# Patient Record
Sex: FEMALE | Race: WHITE | NOT HISPANIC OR LATINO | Employment: OTHER | URBAN - METROPOLITAN AREA
[De-identification: names, ages, dates, MRNs, and addresses within clinical notes are randomized per-mention and may not be internally consistent; named-entity substitution may affect disease eponyms.]

---

## 2017-01-26 ENCOUNTER — OFFICE VISIT (OUTPATIENT)
Dept: NEUROSURGERY | Facility: CLINIC | Age: 57
End: 2017-01-26

## 2017-01-26 VITALS
BODY MASS INDEX: 31.02 KG/M2 | DIASTOLIC BLOOD PRESSURE: 88 MMHG | SYSTOLIC BLOOD PRESSURE: 182 MMHG | HEART RATE: 100 BPM | WEIGHT: 193 LBS | HEIGHT: 66 IN

## 2017-01-26 DIAGNOSIS — M54.12 CERVICAL RADICULITIS: Primary | ICD-10-CM

## 2017-01-26 PROCEDURE — 99243 OFF/OP CNSLTJ NEW/EST LOW 30: CPT | Performed by: NEUROLOGICAL SURGERY

## 2017-01-26 RX ORDER — BUTALBITAL, ACETAMINOPHEN AND CAFFEINE 50; 325; 40 MG/1; MG/1; MG/1
1 CAPSULE ORAL
COMMUNITY
Start: 2016-02-26 | End: 2017-02-09

## 2017-01-26 RX ORDER — NAPROXEN 500 MG/1
500 TABLET ORAL
COMMUNITY
Start: 2014-10-10 | End: 2017-07-03

## 2017-01-26 RX ORDER — MELOXICAM 15 MG/1
15 TABLET ORAL
COMMUNITY
End: 2017-02-09

## 2017-01-26 NOTE — LETTER
January 26, 2017     DAYNE Ospina  [address]  [facility] [address]    Patient: Kristina Yates   YOB: 1960   Date of Visit: 1/26/2017       Dear DAYNE Connolly:    Thank you for referring Kristina Yates to me for evaluation. Below are the relevant portions of my assessment and plan of care.      Independent Review of Radiographic Studies:      I reviewed an MRI of her cervical spine done on November 23 of last year.  This looks okay at C2 3.  There is a little foraminal narrowing at C3 4 but more significant foraminal narrowing with a possible left-sided disc extrusion into the neural foramen at C4 5.  C5 6 and C6 7 are widely patent and appeared to be solidly fused.  C7-T1 looks okay overall.    Medical Decision Making:      I told the patient about the MRI.  I told her I did not see any imaging of the lumbar spine on the disc.  I told her that from my point of view the disc at C4 5 and may be causing some of her problems but that nerve does not go all the way down her arm and is not huge.  I think that we should really try some further nonsurgical treatment before we talk about adding a third fusion level in her neck.  I recommended some cervical epidural blocks and some cervical PT.  I will have her call me after that has been done and if she is still having problems we will get her back into the office to discuss a possible myelogram.    I counseled this patient with regard to weight loss.  We gave him a handout on exercise for weight loss and I told the patient to talk to their family doctor about a proper diet.    I counseled the patient with regard to smoking cessation.  We gave the patient a handout about how to quit.    Kristina was seen today for neck pain and back pain.    Diagnoses and all orders for this visit:    Cervical radiculitis  -     Epidural Block  -     Ambulatory Referral to Physical Therapy    Return for Recheck and call after treatment or consultation.                         If you have questions, please do not hesitate to call me. I look forward to following Kristina along with you.         Sincerely,        Radu Renteria MD        CC: No Recipients

## 2017-01-26 NOTE — PATIENT INSTRUCTIONS

## 2017-01-26 NOTE — PROGRESS NOTES
Subjective   Patient ID: Kristina Yates is a 56 y.o. female is being seen for consultation today at the request of DAYNE Ospina for neck and back pain with left arm and hand pain.     History of Present Illness    This patient has been having pain in her left neck and into her left shoulder and left arm for about 5 months.  She says the pain is sharp and stabbing and began without a particular incident.  It is located in the left side of the neck and radiates into the left shoulder.  It then radiates down the entire left arm and into the left wrist.  She does not have any trouble with her right arm at all.  She had a two-level anterior cervical discectomy and fusion in 2003.  She has no difficulty with bowel or bladder control or other associated symptoms.  She has some lower back pain as well.  Activity makes the pain worse.    The following portions of the patient's history were reviewed and updated as appropriate: allergies, current medications, past family history, past medical history, past social history, past surgical history and problem list.    Review of Systems   Constitutional: Positive for activity change and fatigue.   Respiratory: Negative for chest tightness and shortness of breath.    Cardiovascular: Negative for chest pain.   Gastrointestinal: Positive for diarrhea.   Musculoskeletal: Positive for arthralgias, back pain, myalgias, neck pain and neck stiffness.   Neurological: Positive for numbness and headaches.        Bladder incontinence, Tingling   Psychiatric/Behavioral: Positive for sleep disturbance.   All other systems reviewed and are negative.      Objective   Physical Exam   Constitutional: She is oriented to person, place, and time. She appears well-developed and well-nourished.   HENT:   Head: Normocephalic and atraumatic.   Eyes: Conjunctivae and EOM are normal. Pupils are equal, round, and reactive to light.   Fundoscopic exam:       The right eye shows no papilledema. The right  eye shows venous pulsations.        The left eye shows no papilledema. The left eye shows venous pulsations.   Neck: Carotid bruit is not present.   Neurological: She is oriented to person, place, and time. She has a normal Finger-Nose-Finger Test and a normal Heel to Shin Test. Gait normal.   Reflex Scores:       Tricep reflexes are 2+ on the right side and 2+ on the left side.       Bicep reflexes are 2+ on the right side and 2+ on the left side.       Brachioradialis reflexes are 2+ on the right side and 2+ on the left side.       Patellar reflexes are 2+ on the right side and 2+ on the left side.       Achilles reflexes are 2+ on the right side and 2+ on the left side.  Psychiatric: Her speech is normal.     Neurologic Exam     Mental Status   Oriented to person, place, and time.   Registration of memory: Good recent and remote memory.   Attention: normal. Concentration: normal.   Speech: speech is normal   Level of consciousness: alert  Knowledge: consistent with education.     Cranial Nerves     CN II   Visual fields full to confrontation.   Visual acuity: normal    CN III, IV, VI   Pupils are equal, round, and reactive to light.  Extraocular motions are normal.     CN V   Facial sensation intact.   Right corneal reflex: normal  Left corneal reflex: normal    CN VII   Facial expression full, symmetric.   Right facial weakness: none  Left facial weakness: none    CN VIII   Hearing: intact    CN IX, X   Palate: symmetric    CN XI   Right sternocleidomastoid strength: normal  Left sternocleidomastoid strength: normal    CN XII   Tongue: not atrophic  Tongue deviation: none    Motor Exam   Muscle bulk: normal  Right arm tone: normal  Left arm tone: normal  Right leg tone: normal  Left leg tone: normal    Strength   Strength 5/5 except as noted.     Sensory Exam   Light touch normal.     Gait, Coordination, and Reflexes     Gait  Gait: normal    Coordination   Finger to nose coordination: normal  Heel to shin  coordination: normal    Reflexes   Right brachioradialis: 2+  Left brachioradialis: 2+  Right biceps: 2+  Left biceps: 2+  Right triceps: 2+  Left triceps: 2+  Right patellar: 2+  Left patellar: 2+  Right achilles: 2+  Left achilles: 2+  Right : 2+  Left : 2+      Assessment/Plan   Independent Review of Radiographic Studies:      I reviewed an MRI of her cervical spine done on November 23 of last year.  This looks okay at C2 3.  There is a little foraminal narrowing at C3 4 but more significant foraminal narrowing with a possible left-sided disc extrusion into the neural foramen at C4 5.  C5 6 and C6 7 are widely patent and appeared to be solidly fused.  C7-T1 looks okay overall.    Medical Decision Making:      I told the patient about the MRI.  I told her I did not see any imaging of the lumbar spine on the disc.  I told her that from my point of view the disc at C4 5 and may be causing some of her problems but that nerve does not go all the way down her arm and is not huge.  I think that we should really try some further nonsurgical treatment before we talk about adding a third fusion level in her neck.  I recommended some cervical epidural blocks and some cervical PT.  I will have her call me after that has been done and if she is still having problems we will get her back into the office to discuss a possible myelogram.    I counseled this patient with regard to weight loss.  We gave him a handout on exercise for weight loss and I told the patient to talk to their family doctor about a proper diet.    I counseled the patient with regard to smoking cessation.  We gave the patient a handout about how to quit.    Kristina was seen today for neck pain and back pain.    Diagnoses and all orders for this visit:    Cervical radiculitis  -     Epidural Block  -     Ambulatory Referral to Physical Therapy    Return for Recheck and call after treatment or consultation.

## 2017-01-26 NOTE — MR AVS SNAPSHOT
[facility] NEUROSURGERY  [phone]                    Kristina Yates   1/26/2017 12:00 PM   Office Visit    Dept Phone:  [phone]   Encounter #:  [id number]    Provider:  Radu Renteria MD   Department:  [facility] NEUROSURGERY                Your Full Care Plan              Your Updated Medication List          This list is accurate as of: 1/26/17 11:55 AM.  Always use your most recent med list.                buPROPion  MG 12 hr tablet   Commonly known as:  WELLBUTRIN SR       Butalbital-APAP-Caffeine -40 MG per capsule       CYMBALTA PO       gabapentin 100 MG capsule   Commonly known as:  NEURONTIN       lithium carbonate 300 MG capsule       meloxicam 15 MG tablet   Commonly known as:  MOBIC       MUCINEX ALLERGY PO       NAPROSYN 500 MG tablet   Generic drug:  naproxen       PROAIR HFA IN       promethazine 12.5 MG tablet   Commonly known as:  PHENERGAN   Take 1 tablet by mouth Every 8 (Eight) Hours As Needed for nausea.       QUEtiapine 100 MG tablet   Commonly known as:  SEROquel       TOPAMAX PO               We Performed the Following     Ambulatory Referral to Physical Therapy     Epidural Block       You Were Diagnosed With        Codes Comments    Cervical radiculitis    -  Primary ICD-10-CM: M54.12  ICD-9-CM: 723.4       Instructions    Exercising to Lose Weight  Exercising can help you to lose weight. In order to lose weight through exercise, you need to do vigorous-intensity exercise. You can tell that you are exercising with vigorous intensity if you are breathing very hard and fast and cannot hold a conversation while exercising.  Moderate-intensity exercise helps to maintain your current weight. You can tell that you are exercising at a moderate level if you have a higher heart rate and faster breathing, but you are still able to hold a conversation.  HOW OFTEN SHOULD I EXERCISE?  Choose an activity that you enjoy and set realistic goals.  Your health care provider can help you to make an activity plan that works for you. Exercise regularly as directed by your health care provider. This may include:  · Doing resistance training twice each week, such as:    Push-ups.    Sit-ups.    Lifting weights.    Using resistance bands.  · Doing a given intensity of exercise for a given amount of time. Choose from these options:    150 minutes of moderate-intensity exercise every week.    75 minutes of vigorous-intensity exercise every week.    A mix of moderate-intensity and vigorous-intensity exercise every week.  Children, pregnant women, people who are out of shape, people who are overweight, and older adults may need to consult a health care provider for individual recommendations. If you have any sort of medical condition, be sure to consult your health care provider before starting a new exercise program.  WHAT ARE SOME ACTIVITIES THAT CAN HELP ME TO LOSE WEIGHT?   · Walking at a rate of at least 4.5 miles an hour.  · Jogging or running at a rate of 5 miles per hour.  · Biking at a rate of at least 10 miles per hour.  · Lap swimming.  · Roller-skating or in-line skating.  · Cross-country skiing.  · Vigorous competitive sports, such as football, basketball, and soccer.  · Jumping rope.  · Aerobic dancing.  HOW CAN I BE MORE ACTIVE IN MY DAY-TO-DAY ACTIVITIES?  · Use the stairs instead of the elevator.  · Take a walk during your lunch break.  · If you drive, park your car farther away from work or school.  · If you take public transportation, get off one stop early and walk the rest of the way.  · Make all of your phone calls while standing up and walking around.  · Get up, stretch, and walk around every 30 minutes throughout the day.  WHAT GUIDELINES SHOULD I FOLLOW WHILE EXERCISING?  · Do not exercise so much that you hurt yourself, feel dizzy, or get very short of breath.  · Consult your health care provider prior to starting a new exercise  program.  · Wear comfortable clothes and shoes with good support.  · Drink plenty of water while you exercise to prevent dehydration or heat stroke. Body water is lost during exercise and must be replaced.  · Work out until you breathe faster and your heart beats faster.     This information is not intended to replace advice given to you by your health care provider. Make sure you discuss any questions you have with your health care provider.     Document Released: 2012 Document Revised: 2016 Document Reviewed: 2015  [employer] Interactive Patient Education © [employer]       Patient Instructions History      Upcoming Appointments     Visit Type Date Time Department    NEW PATIENT 2017 12:00 PM [facility]      CoinBatch Signup     [facility] CoinBatch allows you to send messages to your doctor, view your test results, renew your prescriptions, schedule appointments, and more. To sign up, go to [employer] and click on the Sign Up Now link in the New User? box. Enter your CoinBatch Activation Code exactly as it appears below along with the last four digits of your Social Security Number and your Date of Birth () to complete the sign-up process. If you do not sign up before the expiration date, you must request a new code.    CoinBatch Activation Code: [email or web address]  Expires: 2017 11:47 AM    If you have questions, you can email [email or web address] or call [phone] to talk to our Coin[employer] staff. Remember, CoinBatch is NOT to be used for urgent needs. For medical emergencies, dial 911.               Other Info from Your Visit           Your Appointments     2017 12:00 PM EST   New Patient with Radu Renteria MD   [facility] NEUROSURGERY (--)    [address]  [facility] [id number] [phone]           Bring all previous medical records and films, along with current medications and insurance information.     "          Allergies     Fluoxetine      Hives    Sulfa Antibiotics      THROAT CLOSES    Zofran [Ondansetron Hcl]      THROAT CLOSES      Reason for Visit     Neck Pain     Back Pain           Vital Signs     Blood Pressure Pulse Height Weight Body Mass Index Smoking Status    182/88 100 66\" (167.6 cm) 193 lb (87.5 kg) 31.15 kg/m2 Current Every Day Smoker      Problems and Diagnoses Noted     Cervical radiculitis        "

## 2017-02-09 ENCOUNTER — TRANSCRIBE ORDERS (OUTPATIENT)
Dept: PAIN MEDICINE | Facility: HOSPITAL | Age: 57
End: 2017-02-09

## 2017-02-09 ENCOUNTER — HOSPITAL ENCOUNTER (OUTPATIENT)
Dept: PAIN MEDICINE | Facility: HOSPITAL | Age: 57
Discharge: HOME | End: 2017-02-09
Attending: NEUROLOGICAL SURGERY | Admitting: NEUROLOGICAL SURGERY

## 2017-02-09 ENCOUNTER — ANESTHESIA (OUTPATIENT)
Dept: PAIN MEDICINE | Facility: HOSPITAL | Age: 57
End: 2017-02-09

## 2017-02-09 ENCOUNTER — HOSPITAL ENCOUNTER (OUTPATIENT)
Dept: GENERAL RADIOLOGY | Facility: HOSPITAL | Age: 57
Discharge: HOME | End: 2017-02-09

## 2017-02-09 ENCOUNTER — ANESTHESIA EVENT (OUTPATIENT)
Dept: PAIN MEDICINE | Facility: HOSPITAL | Age: 57
End: 2017-02-09

## 2017-02-09 VITALS
TEMPERATURE: 97.4 F | WEIGHT: 195 LBS | HEART RATE: 82 BPM | OXYGEN SATURATION: 100 % | BODY MASS INDEX: 31.34 KG/M2 | DIASTOLIC BLOOD PRESSURE: 95 MMHG | RESPIRATION RATE: 16 BRPM | SYSTOLIC BLOOD PRESSURE: 160 MMHG | HEIGHT: 66 IN

## 2017-02-09 DIAGNOSIS — R52 PAIN: ICD-10-CM

## 2017-02-09 DIAGNOSIS — M54.12 CERVICAL RADICULITIS: Primary | ICD-10-CM

## 2017-02-09 DIAGNOSIS — M54.12 CERVICAL RADICULITIS: ICD-10-CM

## 2017-02-09 PROCEDURE — 25010000002 MIDAZOLAM PER 1 MG: Performed by: ANESTHESIOLOGY

## 2017-02-09 PROCEDURE — 25010000002 PROMETHAZINE PER 50 MG: Performed by: NEUROLOGICAL SURGERY

## 2017-02-09 PROCEDURE — 25010000002 METHYLPREDNISOLONE PER 80 MG: Performed by: ANESTHESIOLOGY

## 2017-02-09 PROCEDURE — 25010000002 FENTANYL CITRATE (PF) 100 MCG/2ML SOLUTION: Performed by: ANESTHESIOLOGY

## 2017-02-09 PROCEDURE — C1755 CATHETER, INTRASPINAL: HCPCS

## 2017-02-09 PROCEDURE — 77003 FLUOROGUIDE FOR SPINE INJECT: CPT

## 2017-02-09 PROCEDURE — 99156 MOD SED OTH PHYS/QHP 5/>YRS: CPT

## 2017-02-09 RX ORDER — METHYLPREDNISOLONE ACETATE 80 MG/ML
80 INJECTION, SUSPENSION INTRA-ARTICULAR; INTRALESIONAL; INTRAMUSCULAR; SOFT TISSUE ONCE
Status: COMPLETED | OUTPATIENT
Start: 2017-02-09 | End: 2017-02-09

## 2017-02-09 RX ORDER — SODIUM CHLORIDE 0.9 % (FLUSH) 0.9 %
1-10 SYRINGE (ML) INJECTION AS NEEDED
Status: DISCONTINUED | OUTPATIENT
Start: 2017-02-09 | End: 2017-02-10 | Stop reason: HOSPADM

## 2017-02-09 RX ORDER — LIDOCAINE HYDROCHLORIDE 10 MG/ML
1 INJECTION, SOLUTION INFILTRATION; PERINEURAL ONCE
Status: DISCONTINUED | OUTPATIENT
Start: 2017-02-09 | End: 2017-02-10 | Stop reason: HOSPADM

## 2017-02-09 RX ORDER — MIDAZOLAM HYDROCHLORIDE 1 MG/ML
1 INJECTION INTRAMUSCULAR; INTRAVENOUS
Status: DISCONTINUED | OUTPATIENT
Start: 2017-02-09 | End: 2017-02-10 | Stop reason: HOSPADM

## 2017-02-09 RX ORDER — FENTANYL CITRATE 50 UG/ML
50 INJECTION, SOLUTION INTRAMUSCULAR; INTRAVENOUS
Status: DISCONTINUED | OUTPATIENT
Start: 2017-02-09 | End: 2017-02-10 | Stop reason: HOSPADM

## 2017-02-09 RX ORDER — PROMETHAZINE HYDROCHLORIDE 25 MG/ML
12.5 INJECTION, SOLUTION INTRAMUSCULAR; INTRAVENOUS EVERY 6 HOURS PRN
Status: DISCONTINUED | OUTPATIENT
Start: 2017-02-09 | End: 2017-02-10 | Stop reason: HOSPADM

## 2017-02-09 RX ADMIN — METHYLPREDNISOLONE ACETATE 80 MG: 80 INJECTION, SUSPENSION INTRA-ARTICULAR; INTRALESIONAL; INTRAMUSCULAR; SOFT TISSUE at 10:27

## 2017-02-09 RX ADMIN — FENTANYL CITRATE 50 MCG: 50 INJECTION INTRAMUSCULAR; INTRAVENOUS at 10:22

## 2017-02-09 RX ADMIN — PROMETHAZINE HYDROCHLORIDE 12.5 MG: 25 INJECTION INTRAMUSCULAR; INTRAVENOUS at 10:44

## 2017-02-09 RX ADMIN — MIDAZOLAM 1 MG: 1 INJECTION INTRAMUSCULAR; INTRAVENOUS at 10:22

## 2017-02-09 NOTE — ANESTHESIA PROCEDURE NOTES
PAIN Epidural block    Patient location during procedure: pain clinic  Indication:procedure for pain  Performed By  Anesthesiologist: OBINNA CRAIG  Preanesthetic Checklist  Completed: patient identified, site marked, surgical consent, pre-op evaluation, timeout performed, IV checked, risks and benefits discussed and monitors and equipment checked  Additional Notes  C RAD WITH FLURO  Epidural Block Prep:  Pt Position:prone  Sterile Tech:cap, gloves, mask and sterile barrier  Monitoring:blood pressure monitoring, continuous pulse oximetry and EKG  Epidural Block Procedure:  Approach:midline  Guidance: fluoroscopy  Location:cervical  Level:7-8  Needle Type:Tuohy  Needle Gauge:20  Aspiration:negative  Medications:  Depomedrol:80  Preservative Free Saline:3mL    Post Assessment:  Dressing:secured with tape  Pt Tolerance:patient tolerated the procedure well with no apparent complications  Complications:no

## 2017-02-09 NOTE — H&P
[facility]    History and Physical    Patient Name: Kristina Yates  :  1960  MRN:  [id number]  Date of Admission: 2017    Subjective     Patient is a 56 y.o. female presents with chief complaint of chronic neck pain.  Onset of symptoms was gradual starting several years ago.  Symptoms are associated/aggravated by exercise or lifting. Symptoms improve with relaxation and injection.  Patient has started PT.  Pain 6-8/10, weakness in left arm worse than right.    The following portions of the patients history were reviewed and updated as appropriate: current medications, allergies, past medical history, past surgical history, past family history, past social history and problem list                Objective     Past Medical History:   Past Medical History   Diagnosis Date   • Arthritis    • Arthritis    • Asthma    • Edmonds esophagus    • Cervical neuritis    • Foot spasms    • GERD (gastroesophageal reflux disease)    • Headache    • Hiatal hernia    • Seasonal allergies      Past Surgical History:   Past Surgical History   Procedure Laterality Date   • Laparoscopic nissen fundoplication  2012   • Endoscopy       MULTIPLE   • Esophageal dilation     • Anterior cervical discectomy w/ fusion  2003     C5-6, C6-7   • Cervical epidural     • Cerclage cervix     • Hand surgery Bilateral      CARTILAGE   • Endometrial ablation     • Endoscopy N/A 2016     Procedure: ESOPHAGOGASTRODUODENOSCOPY WITH COLD BIOPSIES;  Surgeon: Jose Mcclellan MD;  Location: [facility] ENDOSCOPY;  Service:    • Dilatation and curettage       Family History: History reviewed. No pertinent family history.  Social History:   Social History   Substance Use Topics   • Smoking status: Current Every Day Smoker     Packs/day: 0.50     Years: 15.00   • Smokeless tobacco: Never Used   • Alcohol use No       Vital Signs Range for the last 24 hours  Temperature: Temp:  [36.3 °C (97.4 °F)] 36.3 °C (97.4 °F)   Temp Source:  "Temp src: Oral   BP: BP: (155)/(98) 155/98   Pulse: Heart Rate:  [85] 85   Respirations: Resp:  [16] 16   SPO2: SpO2:  [99 %] 99 %   O2 Amount (l/min):     O2 Devices O2 Device: room air   Weight: Weight:  [195 lb (88.5 kg)] 195 lb (88.5 kg)     Flowsheet Rows         First Filed Value    Admission Height  66\" (167.6 cm) Documented at 02/09/2017 0942    Admission Weight  195 lb (88.5 kg) Documented at 02/09/2017 0942          --------------------------------------------------------------------------------    Current Outpatient Prescriptions   Medication Sig Dispense Refill   • Albuterol Sulfate (PROAIR HFA IN) Inhale as needed.     • buPROPion SR (WELLBUTRIN SR) 150 MG 12 hr tablet Take 150 mg by mouth daily.     • DULoxetine HCl (CYMBALTA PO) Take 120 mg by mouth daily.     • Fexofenadine HCl (MUCINEX ALLERGY PO) Take  by mouth daily as needed.     • gabapentin (NEURONTIN) 100 MG capsule Take 100 mg by mouth 2 (Two) Times a Day.     • naproxen (NAPROSYN) 500 MG tablet Take 500 mg by mouth.     • promethazine (PHENERGAN) 12.5 MG tablet Take 1 tablet by mouth Every 8 (Eight) Hours As Needed for nausea. 10 tablet 0   • QUEtiapine (SEROquel) 100 MG tablet      • Topiramate (TOPAMAX PO) Take 300 mg by mouth daily.       Current Facility-Administered Medications   Medication Dose Route Frequency Provider Last Rate Last Dose   • sodium chloride 0.9 % flush 1-10 mL  1-10 mL Intravenous PRN Radu Renteria MD           --------------------------------------------------------------------------------  Assessment/Plan      Anesthesia Evaluation     Patient summary reviewed and Nursing notes reviewed      Airway   Mallampati: II  TM distance: >3 FB  Neck ROM: full  no difficulty expected  Dental - normal exam     Pulmonary     breath sounds clear to auscultation  (+) a smoker, asthma,   Cardiovascular - negative cardio ROS and normal exam  Exercise tolerance: good (4-7 METS)    Rhythm: regular  Rate: normal        Neuro/Psych- " neuro exam normal  (+) headaches, numbness, psychiatric history Anxiety and Depression,    GI/Hepatic/Renal/Endo    (+) obesity,  hiatal hernia, GERD,     Musculoskeletal (-) normal exam    (+) neck pain, neck stiffness, radiculopathy Left upper extremity and Right upper extremity  Abdominal  - normal exam   Substance History - negative use     OB/GYN          Other   (+) arthritis                            Diagnosis and Plan    Treatment Plan  ASA 3      Procedures: Cervical Epidural Steroid Injection(DICKSON), With fluoroscopy,       Anesthetic plan and risks discussed with patient.          Diagnosis     * Cervical radiculopathy due to degenerative joint disease of spine [M47.22]

## 2017-02-16 ENCOUNTER — ANESTHESIA EVENT (OUTPATIENT)
Dept: PAIN MEDICINE | Facility: HOSPITAL | Age: 57
End: 2017-02-16

## 2017-02-16 ENCOUNTER — ANESTHESIA (OUTPATIENT)
Dept: PAIN MEDICINE | Facility: HOSPITAL | Age: 57
End: 2017-02-16

## 2017-02-16 ENCOUNTER — HOSPITAL ENCOUNTER (OUTPATIENT)
Dept: PAIN MEDICINE | Facility: HOSPITAL | Age: 57
Discharge: HOME | End: 2017-02-16
Admitting: NEUROLOGICAL SURGERY

## 2017-02-16 ENCOUNTER — HOSPITAL ENCOUNTER (OUTPATIENT)
Dept: GENERAL RADIOLOGY | Facility: HOSPITAL | Age: 57
Discharge: HOME | End: 2017-02-16

## 2017-02-16 VITALS
HEART RATE: 86 BPM | OXYGEN SATURATION: 96 % | DIASTOLIC BLOOD PRESSURE: 101 MMHG | TEMPERATURE: 98.6 F | SYSTOLIC BLOOD PRESSURE: 152 MMHG | RESPIRATION RATE: 16 BRPM

## 2017-02-16 DIAGNOSIS — M54.12 CERVICAL RADICULITIS: ICD-10-CM

## 2017-02-16 DIAGNOSIS — R52 PAIN: ICD-10-CM

## 2017-02-16 PROCEDURE — 25010000002 METHYLPREDNISOLONE PER 80 MG: Performed by: ANESTHESIOLOGY

## 2017-02-16 PROCEDURE — 77003 FLUOROGUIDE FOR SPINE INJECT: CPT

## 2017-02-16 PROCEDURE — 25010000002 MIDAZOLAM PER 1 MG: Performed by: ANESTHESIOLOGY

## 2017-02-16 PROCEDURE — 25010000002 FENTANYL CITRATE (PF) 100 MCG/2ML SOLUTION: Performed by: ANESTHESIOLOGY

## 2017-02-16 PROCEDURE — C1755 CATHETER, INTRASPINAL: HCPCS

## 2017-02-16 RX ORDER — LIDOCAINE HYDROCHLORIDE 10 MG/ML
1 INJECTION, SOLUTION INFILTRATION; PERINEURAL ONCE
Status: DISCONTINUED | OUTPATIENT
Start: 2017-02-16 | End: 2017-02-17 | Stop reason: HOSPADM

## 2017-02-16 RX ORDER — METHYLPREDNISOLONE ACETATE 80 MG/ML
80 INJECTION, SUSPENSION INTRA-ARTICULAR; INTRALESIONAL; INTRAMUSCULAR; SOFT TISSUE ONCE
Status: COMPLETED | OUTPATIENT
Start: 2017-02-16 | End: 2017-02-16

## 2017-02-16 RX ORDER — LIDOCAINE HYDROCHLORIDE 10 MG/ML
0.5 INJECTION, SOLUTION INFILTRATION; PERINEURAL ONCE AS NEEDED
Status: CANCELLED | OUTPATIENT
Start: 2017-02-16

## 2017-02-16 RX ORDER — SODIUM CHLORIDE 0.9 % (FLUSH) 0.9 %
1-10 SYRINGE (ML) INJECTION AS NEEDED
Status: DISCONTINUED | OUTPATIENT
Start: 2017-02-16 | End: 2017-02-17 | Stop reason: HOSPADM

## 2017-02-16 RX ORDER — MIDAZOLAM HYDROCHLORIDE 1 MG/ML
1 INJECTION INTRAMUSCULAR; INTRAVENOUS
Status: DISCONTINUED | OUTPATIENT
Start: 2017-02-16 | End: 2017-02-17 | Stop reason: HOSPADM

## 2017-02-16 RX ORDER — FENTANYL CITRATE 50 UG/ML
50 INJECTION, SOLUTION INTRAMUSCULAR; INTRAVENOUS
Status: DISCONTINUED | OUTPATIENT
Start: 2017-02-16 | End: 2017-02-17 | Stop reason: HOSPADM

## 2017-02-16 RX ADMIN — METHYLPREDNISOLONE ACETATE 80 MG: 80 INJECTION, SUSPENSION INTRA-ARTICULAR; INTRALESIONAL; INTRAMUSCULAR; SOFT TISSUE at 08:17

## 2017-02-16 RX ADMIN — MIDAZOLAM 2 MG: 1 INJECTION INTRAMUSCULAR; INTRAVENOUS at 08:14

## 2017-02-16 RX ADMIN — FENTANYL CITRATE 50 MCG: 50 INJECTION INTRAMUSCULAR; INTRAVENOUS at 08:14

## 2017-02-16 NOTE — INTERVAL H&P NOTE
[facility]  H&P reviewed. No changes since last visit.  Patient states   25-50% improvement since the last procedure/injection.    Diagnosis     * Cervical radiculopathy due to degenerative joint disease of spine [M47.22]      Airway assessed since last visit. Airway class equals: 2.

## 2017-02-16 NOTE — H&P (VIEW-ONLY)
[facility]    History and Physical    Patient Name: Kristina Yates  :  1960  MRN:  [id number]  Date of Admission: 2017    Subjective     Patient is a 56 y.o. female presents with chief complaint of chronic neck pain.  Onset of symptoms was gradual starting several years ago.  Symptoms are associated/aggravated by exercise or lifting. Symptoms improve with relaxation and injection.  Patient has started PT.  Pain 6-8/10, weakness in left arm worse than right.    The following portions of the patients history were reviewed and updated as appropriate: current medications, allergies, past medical history, past surgical history, past family history, past social history and problem list                Objective     Past Medical History:   Past Medical History   Diagnosis Date   • Arthritis    • Arthritis    • Asthma    • Edmonds esophagus    • Cervical neuritis    • Foot spasms    • GERD (gastroesophageal reflux disease)    • Headache    • Hiatal hernia    • Seasonal allergies      Past Surgical History:   Past Surgical History   Procedure Laterality Date   • Laparoscopic nissen fundoplication  2012   • Endoscopy       MULTIPLE   • Esophageal dilation     • Anterior cervical discectomy w/ fusion  2003     C5-6, C6-7   • Cervical epidural     • Cerclage cervix     • Hand surgery Bilateral      CARTILAGE   • Endometrial ablation     • Endoscopy N/A 2016     Procedure: ESOPHAGOGASTRODUODENOSCOPY WITH COLD BIOPSIES;  Surgeon: Jose Mcclellan MD;  Location: [facility];  Service:    • Dilatation and curettage       Family History: History reviewed. No pertinent family history.  Social History:   Social History   Substance Use Topics   • Smoking status: Current Every Day Smoker     Packs/day: 0.50     Years: 15.00   • Smokeless tobacco: Never Used   • Alcohol use No       Vital Signs Range for the last 24 hours  Temperature: Temp:  [36.3 °C (97.4 °F)] 36.3 °C (97.4 °F)   Temp Source:  "Temp src: Oral   BP: BP: (155)/(98) 155/98   Pulse: Heart Rate:  [85] 85   Respirations: Resp:  [16] 16   SPO2: SpO2:  [99 %] 99 %   O2 Amount (l/min):     O2 Devices O2 Device: room air   Weight: Weight:  [195 lb (88.5 kg)] 195 lb (88.5 kg)     Flowsheet Rows         First Filed Value    Admission Height  66\" (167.6 cm) Documented at 02/09/2017 0942    Admission Weight  195 lb (88.5 kg) Documented at 02/09/2017 0942          --------------------------------------------------------------------------------    Current Outpatient Prescriptions   Medication Sig Dispense Refill   • Albuterol Sulfate (PROAIR HFA IN) Inhale as needed.     • buPROPion SR (WELLBUTRIN SR) 150 MG 12 hr tablet Take 150 mg by mouth daily.     • DULoxetine HCl (CYMBALTA PO) Take 120 mg by mouth daily.     • Fexofenadine HCl (MUCINEX ALLERGY PO) Take  by mouth daily as needed.     • gabapentin (NEURONTIN) 100 MG capsule Take 100 mg by mouth 2 (Two) Times a Day.     • naproxen (NAPROSYN) 500 MG tablet Take 500 mg by mouth.     • promethazine (PHENERGAN) 12.5 MG tablet Take 1 tablet by mouth Every 8 (Eight) Hours As Needed for nausea. 10 tablet 0   • QUEtiapine (SEROquel) 100 MG tablet      • Topiramate (TOPAMAX PO) Take 300 mg by mouth daily.       Current Facility-Administered Medications   Medication Dose Route Frequency Provider Last Rate Last Dose   • sodium chloride 0.9 % flush 1-10 mL  1-10 mL Intravenous PRN Radu Renteria MD           --------------------------------------------------------------------------------  Assessment/Plan      Anesthesia Evaluation     Patient summary reviewed and Nursing notes reviewed      Airway   Mallampati: II  TM distance: >3 FB  Neck ROM: full  no difficulty expected  Dental - normal exam     Pulmonary     breath sounds clear to auscultation  (+) a smoker, asthma,   Cardiovascular - negative cardio ROS and normal exam  Exercise tolerance: good (4-7 METS)    Rhythm: regular  Rate: normal        Neuro/Psych- " neuro exam normal  (+) headaches, numbness, psychiatric history Anxiety and Depression,    GI/Hepatic/Renal/Endo    (+) obesity,  hiatal hernia, GERD,     Musculoskeletal (-) normal exam    (+) neck pain, neck stiffness, radiculopathy Left upper extremity and Right upper extremity  Abdominal  - normal exam   Substance History - negative use     OB/GYN          Other   (+) arthritis                            Diagnosis and Plan    Treatment Plan  ASA 3      Procedures: Cervical Epidural Steroid Injection(DICKSON), With fluoroscopy,       Anesthetic plan and risks discussed with patient.          Diagnosis     * Cervical radiculopathy due to degenerative joint disease of spine [M47.22]

## 2017-02-16 NOTE — ANESTHESIA PROCEDURE NOTES
PAIN Epidural block    Patient location during procedure: pain clinic  Indication:procedure for pain  Performed By  Anesthesiologist: LILO WEAVER  Preanesthetic Checklist  Completed: patient identified, surgical consent, pre-op evaluation, timeout performed, IV checked, risks and benefits discussed and monitors and equipment checked  Additional Notes  Diagnosis:  Post-Op Diagnosis Codes:     * Cervical radiculopathy due to degenerative joint disease of spine (M47.22)    Plan includes:  1. Epidural steroid injections, up to 3, spaced 1-2 weeks apart. If pain control is acceptable after 1 or 2 injections, it was discussed with the patient that they may return for the subsequent injections if and when their pain returns. The risks were discussed with the patient including failure of relief, worsening pain, Headache (post dural puncture headache), bleeding (epidural hematoma) and infection (epidural abscess or skin infection).  2. Physical therapy exercises at home as prescribed by physical therapy or from the pain clinic handout (given to the patient). Continuation of these exercises every day, or multiple times per week, even when the patient has good pain relief, was stressed to the patient as a preventative measure to decrease the frequency and severity of future pain episodes.  3. Continue pain medicines as already prescribed. If patient not currently taking any, it is recommended to begin Acetaminophen 1000 mg po q 8 hours. If other medicines containing Acetaminophen are currently prescribed, maintain daily dose at 3000 mg.   4. If they can tolerate NSAIDS, it is recommended to take Ibuprofen 600 mg po q 6 hours for 7 days during pain exacerbations. Alternatively, they may substitute an NSAID of their choice (e.g. Aleve). This may be taken at the same time as Acetaminophen.  5. Heat and ice to the affected area as tolerated for pain control. It was discussed that heating pads can cause burns.  6. Daily  low impact exercise such as walking or water exercise was recommended to maintain overall health and aid in weight control.   7. Follow up as needed for subsequent injections.  8. Patient was counseled to abstain from tobacco products.   Epidural Block Prep:  Pt Position:prone  Sterile Tech:gloves, mask and sterile barrier  Monitoring:blood pressure monitoring, continuous pulse oximetry and EKG  Epidural Block Procedure:  Approach:midline  Guidance: fluoroscopy  Location:cervical  Level:6-7  Needle Type:Tuohy  Needle Gauge:20  Aspiration:negative  Test Dose:negative  Medications:  Depomedrol:80 mg  Preservative Free Saline:3mL    Post Assessment:  Dressing:occlusive dressing applied  Pt Tolerance:patient tolerated the procedure well with no apparent complications  Complications:no

## 2017-03-02 ENCOUNTER — APPOINTMENT (OUTPATIENT)
Dept: PAIN MEDICINE | Facility: HOSPITAL | Age: 57
End: 2017-03-02

## 2017-03-08 ENCOUNTER — HOSPITAL ENCOUNTER (OUTPATIENT)
Dept: GENERAL RADIOLOGY | Facility: HOSPITAL | Age: 57
Discharge: HOME | End: 2017-03-08

## 2017-03-08 ENCOUNTER — ANESTHESIA EVENT (OUTPATIENT)
Dept: PAIN MEDICINE | Facility: HOSPITAL | Age: 57
End: 2017-03-08

## 2017-03-08 ENCOUNTER — HOSPITAL ENCOUNTER (OUTPATIENT)
Dept: PAIN MEDICINE | Facility: HOSPITAL | Age: 57
Discharge: HOME | End: 2017-03-08
Attending: NEUROLOGICAL SURGERY | Admitting: NEUROLOGICAL SURGERY

## 2017-03-08 ENCOUNTER — ANESTHESIA (OUTPATIENT)
Dept: PAIN MEDICINE | Facility: HOSPITAL | Age: 57
End: 2017-03-08

## 2017-03-08 VITALS
OXYGEN SATURATION: 100 % | DIASTOLIC BLOOD PRESSURE: 82 MMHG | TEMPERATURE: 98.5 F | SYSTOLIC BLOOD PRESSURE: 141 MMHG | RESPIRATION RATE: 16 BRPM | HEIGHT: 66 IN | BODY MASS INDEX: 31.82 KG/M2 | WEIGHT: 198 LBS | HEART RATE: 65 BPM

## 2017-03-08 DIAGNOSIS — R52 PAIN: ICD-10-CM

## 2017-03-08 PROCEDURE — 25010000002 METHYLPREDNISOLONE PER 80 MG: Performed by: ANESTHESIOLOGY

## 2017-03-08 PROCEDURE — C1755 CATHETER, INTRASPINAL: HCPCS

## 2017-03-08 PROCEDURE — 25010000002 MIDAZOLAM PER 1 MG: Performed by: ANESTHESIOLOGY

## 2017-03-08 PROCEDURE — 25010000002 FENTANYL CITRATE (PF) 100 MCG/2ML SOLUTION: Performed by: ANESTHESIOLOGY

## 2017-03-08 PROCEDURE — 77003 FLUOROGUIDE FOR SPINE INJECT: CPT

## 2017-03-08 RX ORDER — SODIUM CHLORIDE 0.9 % (FLUSH) 0.9 %
1-10 SYRINGE (ML) INJECTION AS NEEDED
Status: DISCONTINUED | OUTPATIENT
Start: 2017-03-08 | End: 2017-03-09 | Stop reason: HOSPADM

## 2017-03-08 RX ORDER — LIDOCAINE HYDROCHLORIDE 10 MG/ML
0.5 INJECTION, SOLUTION INFILTRATION; PERINEURAL ONCE AS NEEDED
Status: CANCELLED | OUTPATIENT
Start: 2017-03-08

## 2017-03-08 RX ORDER — METHYLPREDNISOLONE ACETATE 80 MG/ML
80 INJECTION, SUSPENSION INTRA-ARTICULAR; INTRALESIONAL; INTRAMUSCULAR; SOFT TISSUE ONCE
Status: COMPLETED | OUTPATIENT
Start: 2017-03-08 | End: 2017-03-08

## 2017-03-08 RX ORDER — FENTANYL CITRATE 50 UG/ML
50 INJECTION, SOLUTION INTRAMUSCULAR; INTRAVENOUS
Status: DISCONTINUED | OUTPATIENT
Start: 2017-03-08 | End: 2017-03-09 | Stop reason: HOSPADM

## 2017-03-08 RX ORDER — MIDAZOLAM HYDROCHLORIDE 1 MG/ML
1 INJECTION INTRAMUSCULAR; INTRAVENOUS
Status: DISCONTINUED | OUTPATIENT
Start: 2017-03-08 | End: 2017-03-09 | Stop reason: HOSPADM

## 2017-03-08 RX ORDER — LIDOCAINE HYDROCHLORIDE 10 MG/ML
1 INJECTION, SOLUTION INFILTRATION; PERINEURAL ONCE
Status: DISCONTINUED | OUTPATIENT
Start: 2017-03-08 | End: 2017-03-09 | Stop reason: HOSPADM

## 2017-03-08 RX ADMIN — METHYLPREDNISOLONE ACETATE 80 MG: 80 INJECTION, SUSPENSION INTRA-ARTICULAR; INTRALESIONAL; INTRAMUSCULAR; SOFT TISSUE at 09:12

## 2017-03-08 RX ADMIN — FENTANYL CITRATE 50 MCG: 50 INJECTION INTRAMUSCULAR; INTRAVENOUS at 08:59

## 2017-03-08 RX ADMIN — MIDAZOLAM 2 MG: 1 INJECTION INTRAMUSCULAR; INTRAVENOUS at 08:59

## 2017-03-08 NOTE — INTERVAL H&P NOTE
[facility]  H&P reviewed. No changes since last visit.  Patient states   25-50% improvement since the last procedure/injection.    Diagnosis     * Displacement of cervical intervertebral disc [M50.20]     * Cervical radiculitis [M54.12]      Airway assessed since last visit. Airway class equals: 3.  Her pain level today is a 7/10.

## 2017-03-08 NOTE — PLAN OF CARE
Problem: Pain, Chronic (Adult)  Goal: Acceptable Pain Control/Comfort Level  Outcome: Unable to achieve outcome(s) by discharge Date Met:  03/08/17

## 2017-03-08 NOTE — ANESTHESIA PROCEDURE NOTES
PAIN Epidural block    Patient location during procedure: pain clinic  Start Time: 3/8/2017 8:57 AM  Stop Time: 3/8/2017 9:15 AM  Indication:procedure for pain  Performed By  Anesthesiologist: JOHANA BENITES  Preanesthetic Checklist  Completed: patient identified, site marked, surgical consent, pre-op evaluation, timeout performed, risks and benefits discussed and monitors and equipment checked  Additional Notes  Interlaminar epidural was performed under fluoroscopic guidance.    Diagnosis  * Displacement of cervical intervertebral disc (M50.20)  * Cervical radiculitis (M54.12)  * Previous anterior cervical fusion with hardware  Epidural Block Prep:  Pt Position:prone  Sterile Tech:cap, gloves, mask and sterile barrier  Monitoring:blood pressure monitoring, continuous pulse oximetry and EKG  Epidural Block Procedure:  Approach:left paramedian  Guidance: fluoroscopy  Location:cervical  Interspace: C7-T1.  Needle Type:Tuohy  Needle Gauge:20  Aspiration:negative  Medications:  Depomedrol:80 mg  Preservative Free Saline:3mL  Isovue:0mL    Post Assessment:  Dressing:occlusive dressing applied  Pt Tolerance:patient tolerated the procedure well with no apparent complications  Complications:no

## 2017-03-08 NOTE — PLAN OF CARE
Problem: Pain, Chronic (Adult)  Goal: Identify Related Risk Factors and Signs and Symptoms  Outcome: Unable to achieve outcome(s) by discharge Date Met:  03/08/17

## 2017-03-08 NOTE — H&P (VIEW-ONLY)
[facility]    History and Physical    Patient Name: Kristina Yates  :  1960  MRN:  [id number]  Date of Admission: 2017    Subjective     Patient is a 56 y.o. female presents with chief complaint of chronic neck pain.  Onset of symptoms was gradual starting several years ago.  Symptoms are associated/aggravated by exercise or lifting. Symptoms improve with relaxation and injection.  Patient has started PT.  Pain 6-8/10, weakness in left arm worse than right.    The following portions of the patients history were reviewed and updated as appropriate: current medications, allergies, past medical history, past surgical history, past family history, past social history and problem list                Objective     Past Medical History:   Past Medical History   Diagnosis Date   • Arthritis    • Arthritis    • Asthma    • Edmonds esophagus    • Cervical neuritis    • Foot spasms    • GERD (gastroesophageal reflux disease)    • Headache    • Hiatal hernia    • Seasonal allergies      Past Surgical History:   Past Surgical History   Procedure Laterality Date   • Laparoscopic nissen fundoplication  2012   • Endoscopy       MULTIPLE   • Esophageal dilation     • Anterior cervical discectomy w/ fusion  2003     C5-6, C6-7   • Cervical epidural     • Cerclage cervix     • Hand surgery Bilateral      CARTILAGE   • Endometrial ablation     • Endoscopy N/A 2016     Procedure: ESOPHAGOGASTRODUODENOSCOPY WITH COLD BIOPSIES;  Surgeon: Jose Mcclellan MD;  Location: [facility] ENDOSCOPY;  Service:    • Dilatation and curettage       Family History: History reviewed. No pertinent family history.  Social History:   Social History   Substance Use Topics   • Smoking status: Current Every Day Smoker     Packs/day: 0.50     Years: 15.00   • Smokeless tobacco: Never Used   • Alcohol use No       Vital Signs Range for the last 24 hours  Temperature: Temp:  [36.3 °C (97.4 °F)] 36.3 °C (97.4 °F)   Temp Source:  "Temp src: Oral   BP: BP: (155)/(98) 155/98   Pulse: Heart Rate:  [85] 85   Respirations: Resp:  [16] 16   SPO2: SpO2:  [99 %] 99 %   O2 Amount (l/min):     O2 Devices O2 Device: room air   Weight: Weight:  [195 lb (88.5 kg)] 195 lb (88.5 kg)     Flowsheet Rows         First Filed Value    Admission Height  66\" (167.6 cm) Documented at 02/09/2017 0942    Admission Weight  195 lb (88.5 kg) Documented at 02/09/2017 0942          --------------------------------------------------------------------------------    Current Outpatient Prescriptions   Medication Sig Dispense Refill   • Albuterol Sulfate (PROAIR HFA IN) Inhale as needed.     • buPROPion SR (WELLBUTRIN SR) 150 MG 12 hr tablet Take 150 mg by mouth daily.     • DULoxetine HCl (CYMBALTA PO) Take 120 mg by mouth daily.     • Fexofenadine HCl (MUCINEX ALLERGY PO) Take  by mouth daily as needed.     • gabapentin (NEURONTIN) 100 MG capsule Take 100 mg by mouth 2 (Two) Times a Day.     • naproxen (NAPROSYN) 500 MG tablet Take 500 mg by mouth.     • promethazine (PHENERGAN) 12.5 MG tablet Take 1 tablet by mouth Every 8 (Eight) Hours As Needed for nausea. 10 tablet 0   • QUEtiapine (SEROquel) 100 MG tablet      • Topiramate (TOPAMAX PO) Take 300 mg by mouth daily.       Current Facility-Administered Medications   Medication Dose Route Frequency Provider Last Rate Last Dose   • sodium chloride 0.9 % flush 1-10 mL  1-10 mL Intravenous PRN Radu Renteria MD           --------------------------------------------------------------------------------  Assessment/Plan      Anesthesia Evaluation     Patient summary reviewed and Nursing notes reviewed      Airway   Mallampati: II  TM distance: >3 FB  Neck ROM: full  no difficulty expected  Dental - normal exam     Pulmonary     breath sounds clear to auscultation  (+) a smoker, asthma,   Cardiovascular - negative cardio ROS and normal exam  Exercise tolerance: good (4-7 METS)    Rhythm: regular  Rate: normal        Neuro/Psych- " neuro exam normal  (+) headaches, numbness, psychiatric history Anxiety and Depression,    GI/Hepatic/Renal/Endo    (+) obesity,  hiatal hernia, GERD,     Musculoskeletal (-) normal exam    (+) neck pain, neck stiffness, radiculopathy Left upper extremity and Right upper extremity  Abdominal  - normal exam   Substance History - negative use     OB/GYN          Other   (+) arthritis                            Diagnosis and Plan    Treatment Plan  ASA 3      Procedures: Cervical Epidural Steroid Injection(DICKSON), With fluoroscopy,       Anesthetic plan and risks discussed with patient.          Diagnosis     * Cervical radiculopathy due to degenerative joint disease of spine [M47.22]

## 2017-03-20 DIAGNOSIS — M54.2 NECK PAIN: ICD-10-CM

## 2017-03-20 RX ORDER — PROMETHAZINE HYDROCHLORIDE 12.5 MG/1
12.5 TABLET ORAL EVERY 8 HOURS PRN
Qty: 10 TABLET | Refills: 0 | Status: SHIPPED | OUTPATIENT
Start: 2017-03-20 | End: 2017-09-12 | Stop reason: SDUPTHER

## 2017-03-20 NOTE — TELEPHONE ENCOUNTER
----- Message from Maru Mendoza sent at 3/20/2017  3:04 PM EDT -----  Patient called asking for a refill on Phenergan 25mg called to [facility] [phone] call back number [phone]

## 2017-04-04 ENCOUNTER — OFFICE VISIT (OUTPATIENT)
Dept: FAMILY MEDICINE CLINIC | Facility: CLINIC | Age: 57
End: 2017-04-04

## 2017-04-04 VITALS
DIASTOLIC BLOOD PRESSURE: 90 MMHG | SYSTOLIC BLOOD PRESSURE: 142 MMHG | WEIGHT: 195 LBS | BODY MASS INDEX: 31.47 KG/M2 | HEART RATE: 118 BPM | OXYGEN SATURATION: 97 %

## 2017-04-04 DIAGNOSIS — N20.0 KIDNEY STONE: Primary | ICD-10-CM

## 2017-04-04 DIAGNOSIS — R10.9 FLANK PAIN: ICD-10-CM

## 2017-04-04 PROCEDURE — 99213 OFFICE O/P EST LOW 20 MIN: CPT | Performed by: NURSE PRACTITIONER

## 2017-04-04 RX ORDER — HYDROCODONE BITARTRATE AND ACETAMINOPHEN 5; 325 MG/1; MG/1
2 TABLET ORAL EVERY 6 HOURS PRN
Qty: 20 TABLET | Refills: 0 | Status: SHIPPED | OUTPATIENT
Start: 2017-04-04 | End: 2017-07-03

## 2017-04-04 RX ORDER — PROMETHAZINE HYDROCHLORIDE 25 MG/1
TABLET ORAL
COMMUNITY
Start: 2017-04-01 | End: 2017-04-04

## 2017-04-04 RX ORDER — CIPROFLOXACIN 500 MG/1
TABLET, FILM COATED ORAL
COMMUNITY
Start: 2017-04-01 | End: 2017-07-03

## 2017-04-04 NOTE — PROGRESS NOTES
Subjective   Kristina Yates is a 56 y.o. female.     Chief Complaint   Patient presents with   • Flank Pain     Went to ER last friday dx with kidney stone in the kidney and a UTI and was started on Cipro and some medication for nausea.      Social History   Substance Use Topics   • Smoking status: Current Every Day Smoker     Packs/day: 0.50     Years: 15.00   • Smokeless tobacco: Never Used   • Alcohol use No       History of Present Illness   Has had flank pain for the past 4-5 days, was seen at the [facility] and diagnosed with kidney stones that were nonobstructing. She is still in quite a bit of pain and is having nausea. The pain is stabbing and constant    Review of Systems   Gastrointestinal: Positive for abdominal distention and abdominal pain.   Genitourinary: Positive for frequency and urgency.   All other systems reviewed and are negative.      Objective   Vitals:    04/04/17 1415   BP: 142/90   Pulse: 118   SpO2: 97%   Weight: 195 lb (88.5 kg)     Body mass index is 31.47 kg/(m^2).    Physical Exam   Constitutional: She appears well-developed and well-nourished. She appears distressed.   HENT:   Head: Atraumatic.   Cardiovascular: Normal rate and regular rhythm.    Pulmonary/Chest: Effort normal and breath sounds normal.   Abdominal: Soft.   + rt flank pain   Skin: Skin is warm.   Nursing note and vitals reviewed.      Assessment/Plan   Problem List Items Addressed This Visit     None      Visit Diagnoses     Kidney stone    -  Primary    Relevant Medications    HYDROcodone-acetaminophen (NORCO) 5-325 MG per tablet    Other Relevant Orders    Ambulatory Referral to Urology    Flank pain             Cleve #[id number] rena

## 2017-05-04 ENCOUNTER — LAB REQUISITION (OUTPATIENT)
Dept: LAB | Facility: HOSPITAL | Age: 57
End: 2017-05-04

## 2017-05-04 DIAGNOSIS — N39.0 URINARY TRACT INFECTION: ICD-10-CM

## 2017-05-04 PROCEDURE — 87086 URINE CULTURE/COLONY COUNT: CPT | Performed by: UROLOGY

## 2017-05-06 LAB — BACTERIA SPEC AEROBE CULT: NO GROWTH

## 2017-07-03 ENCOUNTER — OFFICE VISIT (OUTPATIENT)
Dept: FAMILY MEDICINE CLINIC | Facility: CLINIC | Age: 57
End: 2017-07-03

## 2017-07-03 VITALS
SYSTOLIC BLOOD PRESSURE: 144 MMHG | DIASTOLIC BLOOD PRESSURE: 82 MMHG | OXYGEN SATURATION: 99 % | WEIGHT: 196.6 LBS | HEART RATE: 88 BPM | BODY MASS INDEX: 31.73 KG/M2

## 2017-07-03 DIAGNOSIS — R19.5 DARK STOOLS: ICD-10-CM

## 2017-07-03 DIAGNOSIS — R10.84 GENERALIZED ABDOMINAL PAIN: Primary | ICD-10-CM

## 2017-07-03 PROCEDURE — 99214 OFFICE O/P EST MOD 30 MIN: CPT | Performed by: NURSE PRACTITIONER

## 2017-07-03 NOTE — PROGRESS NOTES
Kristina Yates is a 56 y.o. female.  Seen 07/03/2017    Assessment/Plan   Problem List Items Addressed This Visit     None      Visit Diagnoses     Generalized abdominal pain    -  Primary    Dark stools                 No Follow-up on file.  There are no Patient Instructions on file for this visit.    Vitals:    07/03/17 1111   BP: 144/82   Pulse: 88   SpO2: 99%   Weight: 196 lb 9.6 oz (89.2 kg)     Body mass index is 31.73 kg/(m^2).    Subjective   Diarrhea for the last week and a half. Has 3-4 loose stools a day. Is very painful. Has been taking imodium with no improvement.    Heartburn for a couple weeks, took some tums and zagrid which helps a little.  Nausea for about 3 weeks.  Dark tarry stools for the past 3 weeks.  Chief Complaint   Patient presents with   • Nausea     x 4 weeks   • Diarrhea     x 4 weeks    • Abdominal Pain     x 4 weeks     Social History   Substance Use Topics   • Smoking status: Current Every Day Smoker     Packs/day: 0.50     Years: 15.00   • Smokeless tobacco: Never Used   • Alcohol use No       History of Present Illness     The following portions of the patient's history were reviewed and updated as appropriate:PMHroutine: Social history , Allergies, Current Medications and Active Problem List    Review of Systems   Constitutional: Positive for activity change, appetite change, fatigue and fever.   Gastrointestinal: Positive for abdominal pain, diarrhea and nausea.   All other systems reviewed and are negative.      Objective   Physical Exam   Constitutional: She appears distressed.   Nontoxic but ill appearing   Cardiovascular: Normal rate and regular rhythm.    Pulmonary/Chest: Effort normal and breath sounds normal.   Abdominal: Soft. She exhibits distension. There is tenderness. There is guarding. There is no rebound.   Musculoskeletal: Normal range of motion.   Neurological: She is alert.   Skin: Skin is warm.   Nursing note and vitals reviewed.    Reviewed Data:  No visits with  results within 1 Month(s) from this visit.  Latest known visit with results is:    Lab Requisition on 05/04/2017   Component Date Value Ref Range Status   • Urine Culture 05/04/2017 No growth   Final     Pt left and went to the emergency room at [facility]. Report was called by Yessi

## 2017-09-12 ENCOUNTER — OFFICE VISIT (OUTPATIENT)
Dept: FAMILY MEDICINE CLINIC | Facility: CLINIC | Age: 57
End: 2017-09-12

## 2017-09-12 VITALS
DIASTOLIC BLOOD PRESSURE: 82 MMHG | OXYGEN SATURATION: 97 % | WEIGHT: 194.3 LBS | HEIGHT: 67 IN | HEART RATE: 106 BPM | SYSTOLIC BLOOD PRESSURE: 140 MMHG | BODY MASS INDEX: 30.49 KG/M2

## 2017-09-12 DIAGNOSIS — M54.2 NECK PAIN: ICD-10-CM

## 2017-09-12 DIAGNOSIS — R73.01 ELEVATED FASTING GLUCOSE: ICD-10-CM

## 2017-09-12 DIAGNOSIS — B95.8 STAPH INFECTION: Primary | ICD-10-CM

## 2017-09-12 PROCEDURE — 99214 OFFICE O/P EST MOD 30 MIN: CPT | Performed by: NURSE PRACTITIONER

## 2017-09-12 RX ORDER — CLINDAMYCIN HYDROCHLORIDE 300 MG/1
300 CAPSULE ORAL 3 TIMES DAILY
Qty: 30 CAPSULE | Refills: 0 | Status: SHIPPED | OUTPATIENT
Start: 2017-09-12 | End: 2017-09-25

## 2017-09-12 RX ORDER — PROMETHAZINE HYDROCHLORIDE 12.5 MG/1
12.5 TABLET ORAL EVERY 8 HOURS PRN
Qty: 10 TABLET | Refills: 0 | Status: SHIPPED | OUTPATIENT
Start: 2017-09-12 | End: 2017-09-25 | Stop reason: SDUPTHER

## 2017-09-12 NOTE — PROGRESS NOTES
"Kristina Yates is a 57 y.o. female.  Seen 09/12/2017 rash on hands, arms and ankles   Started a while back but has been picking and scratching all of the lesions and then she has more lesions.  Also continues to have chronic neck pain that she has tried PT for several times.  Has not had any lab work for a couple of years.    Assessment/Plan   Problem List Items Addressed This Visit     None      Visit Diagnoses     Staph infection    -  Primary    Relevant Medications    clindamycin (CLEOCIN) 300 MG capsule    mupirocin (BACTROBAN) 2 % ointment    Neck pain        Relevant Medications    promethazine (PHENERGAN) 12.5 MG tablet    Elevated fasting glucose        Relevant Orders    Comprehensive Metabolic Panel    Lipid Panel With / Chol / HDL Ratio    Hemoglobin A1c             No Follow-up on file.  There are no Patient Instructions on file for this visit.    Subjective     Chief Complaint   Patient presents with   • Rash     Social History   Substance Use Topics   • Smoking status: Current Every Day Smoker     Packs/day: 0.50     Years: 15.00   • Smokeless tobacco: Never Used   • Alcohol use No       History of Present Illness     The following portions of the patient's history were reviewed and updated as appropriate:PMHroutine: Social history , Allergies, Current Medications and Active Problem List    Review of Systems   Constitutional: Negative for activity change and appetite change.   Musculoskeletal: Positive for back pain and neck pain.   Skin: Positive for rash.       Objective   Vitals:    09/12/17 1458   BP: 140/82   BP Location: Left arm   Patient Position: Sitting   Cuff Size: Adult   Pulse: 106   SpO2: 97%   Weight: 194 lb 4.8 oz (88.1 kg)   Height: 66.5\" (168.9 cm)     Body mass index is 30.89 kg/(m^2).  Physical Exam   Constitutional: She appears well-developed and well-nourished. She appears distressed.   HENT:   Head: Normocephalic.   Right Ear: External ear normal.   Left Ear: External ear normal. "   Eyes: EOM are normal.   Cardiovascular: Normal rate and regular rhythm.    Pulmonary/Chest: Effort normal and breath sounds normal.   Musculoskeletal: Normal range of motion. She exhibits tenderness.   Pain to lumbar spine for several years, is followed by Dr. Renteria neurosurg.   Neurological: No cranial nerve deficit.   Skin: Skin is warm.   Nursing note and vitals reviewed.    Reviewed Data:  No visits with results within 1 Month(s) from this visit.  Latest known visit with results is:    Lab Requisition on 05/04/2017   Component Date Value Ref Range Status   • Urine Culture 05/04/2017 No growth   Final     Will return to have labs drawn and will then schedule a physical exam.

## 2017-09-17 ENCOUNTER — RESULTS ENCOUNTER (OUTPATIENT)
Dept: FAMILY MEDICINE CLINIC | Facility: CLINIC | Age: 57
End: 2017-09-17

## 2017-09-17 DIAGNOSIS — R73.01 ELEVATED FASTING GLUCOSE: ICD-10-CM

## 2017-09-19 LAB
ALBUMIN SERPL-MCNC: 4.7 G/DL (ref 3.5–5.2)
ALBUMIN/GLOB SERPL: 2 G/DL
ALP SERPL-CCNC: 86 U/L (ref 39–117)
ALT SERPL-CCNC: 28 U/L (ref 1–33)
AST SERPL-CCNC: 23 U/L (ref 1–32)
BILIRUB SERPL-MCNC: <0.2 MG/DL (ref 0.1–1.2)
BUN SERPL-MCNC: 11 MG/DL (ref 6–20)
BUN/CREAT SERPL: 14.3 (ref 7–25)
CALCIUM SERPL-MCNC: 10.1 MG/DL (ref 8.6–10.5)
CHLORIDE SERPL-SCNC: 100 MMOL/L (ref 98–107)
CO2 SERPL-SCNC: 27.1 MMOL/L (ref 22–29)
CREAT SERPL-MCNC: 0.77 MG/DL (ref 0.57–1)
GLOBULIN SER CALC-MCNC: 2.4 GM/DL
GLUCOSE SERPL-MCNC: 146 MG/DL (ref 65–99)
HBA1C MFR BLD: 6.8 % (ref 4.8–5.6)
POTASSIUM SERPL-SCNC: 4.5 MMOL/L (ref 3.5–5.2)
PROT SERPL-MCNC: 7.1 G/DL (ref 6–8.5)
SODIUM SERPL-SCNC: 142 MMOL/L (ref 136–145)

## 2017-09-20 LAB
CHOLEST SERPL-MCNC: 270 MG/DL (ref 0–200)
CHOLEST/HDLC SERPL: 7.11 {RATIO}
HDLC SERPL-MCNC: 38 MG/DL (ref 40–60)
LDLC SERPL CALC-MCNC: ABNORMAL MG/DL
TRIGL SERPL-MCNC: 433 MG/DL (ref 0–150)
VLDLC SERPL CALC-MCNC: ABNORMAL MG/DL

## 2017-09-25 ENCOUNTER — OFFICE VISIT (OUTPATIENT)
Dept: FAMILY MEDICINE CLINIC | Facility: CLINIC | Age: 57
End: 2017-09-25

## 2017-09-25 VITALS
SYSTOLIC BLOOD PRESSURE: 138 MMHG | OXYGEN SATURATION: 99 % | WEIGHT: 193 LBS | BODY MASS INDEX: 31.02 KG/M2 | HEIGHT: 66 IN | HEART RATE: 94 BPM | DIASTOLIC BLOOD PRESSURE: 88 MMHG

## 2017-09-25 DIAGNOSIS — Z00.00 PHYSICAL EXAM: Primary | ICD-10-CM

## 2017-09-25 DIAGNOSIS — Z12.39 SCREENING FOR BREAST CANCER: ICD-10-CM

## 2017-09-25 DIAGNOSIS — E78.2 MIXED HYPERLIPIDEMIA: ICD-10-CM

## 2017-09-25 DIAGNOSIS — R11.0 NAUSEA: ICD-10-CM

## 2017-09-25 DIAGNOSIS — Z23 NEED FOR VACCINATION: ICD-10-CM

## 2017-09-25 DIAGNOSIS — M54.2 NECK PAIN: ICD-10-CM

## 2017-09-25 DIAGNOSIS — E13.3551: ICD-10-CM

## 2017-09-25 PROCEDURE — 99396 PREV VISIT EST AGE 40-64: CPT | Performed by: NURSE PRACTITIONER

## 2017-09-25 PROCEDURE — 99213 OFFICE O/P EST LOW 20 MIN: CPT | Performed by: NURSE PRACTITIONER

## 2017-09-25 PROCEDURE — 90471 IMMUNIZATION ADMIN: CPT | Performed by: NURSE PRACTITIONER

## 2017-09-25 PROCEDURE — 90686 IIV4 VACC NO PRSV 0.5 ML IM: CPT | Performed by: NURSE PRACTITIONER

## 2017-09-25 RX ORDER — PROMETHAZINE HYDROCHLORIDE 12.5 MG/1
25 TABLET ORAL EVERY 8 HOURS PRN
Qty: 10 TABLET | Refills: 0 | Status: SHIPPED | OUTPATIENT
Start: 2017-09-25 | End: 2017-10-17 | Stop reason: SDUPTHER

## 2017-09-25 NOTE — PROGRESS NOTES
Kristina Yates is a 57 y.o. female.  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  There are no Patient Instructions on file for this visit.    Subjective   Here for her annual exam.  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   has 2 children.  Still having trouble with nausea once or twice a week.  Colonoscopy this year and was fine.  Hx.of back pain and nausea and seasonal allergies and doing well on her medicines.  Chief Complaint   Patient presents with   • Physical     Social History   Substance Use Topics   • Smoking status: Current Every Day Smoker     Packs/day: 0.50     Years: 15.00   • Smokeless tobacco: Never Used   • Alcohol use No       History of Present Illness     The following portions of the patient's history were reviewed and updated as appropriate:PMHroutine: Social history , Past Medical History, Surgical history , Allergies, Current Medications, Active Problem List, Family History and Health Maintenance    Review of Systems   Endocrine: Positive for polydipsia, polyphagia and polyuria.   Musculoskeletal: Positive for back pain.       Objective   Vitals:    09/25/17  "0808   BP: 138/88   Pulse: 94   SpO2: 99%   Weight: 193 lb (87.5 kg)   Height: 66\" (167.6 cm)     Body mass index is 31.15 kg/(m^2).  Physical Exam   Constitutional: She appears well-developed and well-nourished. No distress.   HENT:   Head: Normocephalic and atraumatic.   Right Ear: External ear normal.   Left Ear: External ear normal.   Eyes: EOM are normal.   Neck: Neck supple. No thyromegaly present.   Cardiovascular: Normal rate, regular rhythm and normal heart sounds.    Pulmonary/Chest: Effort normal and breath sounds normal.   Musculoskeletal: Normal range of motion.   Neurological: She is alert.   Skin: Skin is warm.   Pt is a \"\" has several areas to her arms and legs that look excoriated.   Nursing note and vitals reviewed.    Reviewed Data:  Results Encounter on 09/17/2017   Component Date Value Ref Range Status   • Glucose 09/19/2017 146* 65 - 99 mg/dL Final   • BUN 09/19/2017 11  6 - 20 mg/dL Final   • Creatinine 09/19/2017 0.77  0.57 - 1.00 mg/dL Final   • eGFR Non  Am 09/19/2017 78  >60 mL/min/1.73 Final   • eGFR African Am 09/19/2017 94  >60 mL/min/1.73 Final   • BUN/Creatinine Ratio 09/19/2017 14.3  7.0 - 25.0 Final   • Sodium 09/19/2017 142  136 - 145 mmol/L Final   • Potassium 09/19/2017 4.5  3.5 - 5.2 mmol/L Final   • Chloride 09/19/2017 100  98 - 107 mmol/L Final   • Total CO2 09/19/2017 27.1  22.0 - 29.0 mmol/L Final   • Calcium 09/19/2017 10.1  8.6 - 10.5 mg/dL Final   • Total Protein 09/19/2017 7.1  6.0 - 8.5 g/dL Final   • Albumin 09/19/2017 4.70  3.50 - 5.20 g/dL Final   • Globulin 09/19/2017 2.4  gm/dL Final   • A/G Ratio 09/19/2017 2.0  g/dL Final   • Total Bilirubin 09/19/2017 <0.2  0.1 - 1.2 mg/dL Final   • Alkaline Phosphatase 09/19/2017 86  39 - 117 U/L Final   • AST (SGOT) 09/19/2017 23  1 - 32 U/L Final   • ALT (SGPT) 09/19/2017 28  1 - 33 U/L Final   • Total Cholesterol 09/19/2017 270* 0 - 200 mg/dL Final   • Triglycerides 09/19/2017 433* 0 - 150 mg/dL Final   • HDL " Cholesterol 09/19/2017 38* 40 - 60 mg/dL Final   • VLDL Cholesterol 09/19/2017 CANCELED  mg/dL Final-Edited    Comment: Test not performed  Unable to calculate    Result canceled by the ancillary     • LDL Cholesterol  09/19/2017 CANCELED  mg/dL Final-Edited    Comment: Test not performed  Unable to calculate    Result canceled by the ancillary     • Chol/HDL Ratio 09/19/2017 7.11   Final   • Hemoglobin A1C 09/19/2017 6.80* 4.80 - 5.60 % Final    Comment: Hemoglobin A1C Ranges:  Increased Risk for Diabetes  5.7% to 6.4%  Diabetes                     >= 6.5%  Diabetic Goal                < 7.0%       Discussed her labs with her, we are going to start her on metformin for a couple of weeks and she is going to call the office and then we will be ordering some lipitor for her.  We will recheck her labs in 2 months and may readjust her meds.

## 2017-10-17 DIAGNOSIS — R11.0 NAUSEA: ICD-10-CM

## 2017-10-17 DIAGNOSIS — M54.2 NECK PAIN: ICD-10-CM

## 2017-10-17 NOTE — TELEPHONE ENCOUNTER
----- Message from Maru Mendoza sent at 10/17/2017  1:27 PM EDT -----  Patient called asking for a refill on Phenergan 25mg , states she thinks she has a virus complaining of nausea, body aches, and chills.  Taking OTC medication.  [facility] [phone]

## 2017-10-18 RX ORDER — PROMETHAZINE HYDROCHLORIDE 12.5 MG/1
25 TABLET ORAL 2 TIMES DAILY WITH MEALS
Qty: 4 TABLET | Refills: 0 | Status: SHIPPED | OUTPATIENT
Start: 2017-10-18 | End: 2018-01-25

## 2017-11-14 ENCOUNTER — LAB (OUTPATIENT)
Dept: FAMILY MEDICINE CLINIC | Facility: CLINIC | Age: 57
End: 2017-11-14

## 2017-11-14 DIAGNOSIS — E13.3551: ICD-10-CM

## 2017-11-14 DIAGNOSIS — E78.2 MIXED HYPERLIPIDEMIA: ICD-10-CM

## 2017-11-14 LAB
CHOLEST SERPL-MCNC: 284 MG/DL (ref 0–200)
CHOLEST/HDLC SERPL: 7.28 {RATIO}
HBA1C MFR BLD: 6.5 % (ref 4.8–5.6)
HDLC SERPL-MCNC: 39 MG/DL (ref 40–60)
LDLC SERPL CALC-MCNC: 168 MG/DL (ref 0–100)
TRIGL SERPL-MCNC: 386 MG/DL (ref 0–150)
VLDLC SERPL CALC-MCNC: 77.2 MG/DL (ref 5–40)

## 2017-12-20 DIAGNOSIS — E13.3551: ICD-10-CM

## 2017-12-21 DIAGNOSIS — E78.2 MIXED HYPERLIPIDEMIA: Primary | ICD-10-CM

## 2017-12-21 DIAGNOSIS — E11.9 TYPE 2 DIABETES MELLITUS WITHOUT COMPLICATION, WITHOUT LONG-TERM CURRENT USE OF INSULIN (HCC): ICD-10-CM

## 2017-12-21 RX ORDER — ATORVASTATIN CALCIUM 20 MG/1
20 TABLET, FILM COATED ORAL DAILY
Qty: 30 TABLET | Refills: 2 | Status: SHIPPED | OUTPATIENT
Start: 2017-12-21 | End: 2018-02-13

## 2018-01-25 ENCOUNTER — OFFICE VISIT (OUTPATIENT)
Dept: FAMILY MEDICINE CLINIC | Facility: CLINIC | Age: 58
End: 2018-01-25

## 2018-01-25 VITALS
HEART RATE: 105 BPM | WEIGHT: 178 LBS | DIASTOLIC BLOOD PRESSURE: 98 MMHG | HEIGHT: 66 IN | SYSTOLIC BLOOD PRESSURE: 162 MMHG | BODY MASS INDEX: 28.61 KG/M2 | OXYGEN SATURATION: 98 %

## 2018-01-25 DIAGNOSIS — G89.29 CHRONIC NECK PAIN: ICD-10-CM

## 2018-01-25 DIAGNOSIS — E78.1 PURE HYPERGLYCERIDEMIA: ICD-10-CM

## 2018-01-25 DIAGNOSIS — E11.9 TYPE 2 DIABETES MELLITUS WITHOUT COMPLICATION, WITHOUT LONG-TERM CURRENT USE OF INSULIN (HCC): Primary | ICD-10-CM

## 2018-01-25 DIAGNOSIS — M54.2 CHRONIC NECK PAIN: ICD-10-CM

## 2018-01-25 LAB
ALBUMIN SERPL-MCNC: 5.1 G/DL (ref 3.5–5.2)
ALBUMIN/GLOB SERPL: 2.3 G/DL
ALP SERPL-CCNC: 86 U/L (ref 39–117)
ALT SERPL-CCNC: 20 U/L (ref 1–33)
AST SERPL-CCNC: 20 U/L (ref 1–32)
BILIRUB SERPL-MCNC: 0.2 MG/DL (ref 0.1–1.2)
BUN SERPL-MCNC: 15 MG/DL (ref 6–20)
BUN/CREAT SERPL: 20.8 (ref 7–25)
CALCIUM SERPL-MCNC: 9.7 MG/DL (ref 8.6–10.5)
CHLORIDE SERPL-SCNC: 100 MMOL/L (ref 98–107)
CHOLEST SERPL-MCNC: 253 MG/DL (ref 0–200)
CHOLEST/HDLC SERPL: 4.87 {RATIO}
CO2 SERPL-SCNC: 26 MMOL/L (ref 22–29)
CREAT SERPL-MCNC: 0.72 MG/DL (ref 0.57–1)
GFR SERPLBLD CREATININE-BSD FMLA CKD-EPI: 102 ML/MIN/1.73
GFR SERPLBLD CREATININE-BSD FMLA CKD-EPI: 84 ML/MIN/1.73
GLOBULIN SER CALC-MCNC: 2.2 GM/DL
GLUCOSE SERPL-MCNC: 116 MG/DL (ref 65–99)
HBA1C MFR BLD: 6.5 % (ref 4.8–5.6)
HDLC SERPL-MCNC: 52 MG/DL (ref 40–60)
LDLC SERPL CALC-MCNC: 170 MG/DL (ref 0–100)
POTASSIUM SERPL-SCNC: 4.5 MMOL/L (ref 3.5–5.2)
PROT SERPL-MCNC: 7.3 G/DL (ref 6–8.5)
SODIUM SERPL-SCNC: 142 MMOL/L (ref 136–145)
TRIGL SERPL-MCNC: 155 MG/DL (ref 0–150)
VLDLC SERPL CALC-MCNC: 31 MG/DL (ref 5–40)

## 2018-01-25 PROCEDURE — 99214 OFFICE O/P EST MOD 30 MIN: CPT | Performed by: NURSE PRACTITIONER

## 2018-01-25 RX ORDER — HYDROCODONE BITARTRATE AND ACETAMINOPHEN 5; 325 MG/1; MG/1
1 TABLET ORAL EVERY 8 HOURS PRN
Qty: 12 TABLET | Refills: 0 | Status: SHIPPED | OUTPATIENT
Start: 2018-01-25 | End: 2018-02-13

## 2018-01-25 RX ORDER — METHYLPREDNISOLONE 4 MG/1
TABLET ORAL
Qty: 1 EACH | Refills: 0 | Status: SHIPPED | OUTPATIENT
Start: 2018-01-25 | End: 2018-02-13

## 2018-01-25 NOTE — PROGRESS NOTES
Kristina Yates is a 57 y.o. female.Patient presents for a follow up of diabetes. She has lost 15 pounds since her last visit. Patient has also cut down on tobacco use, she is currently smoking about 3 a day.    Patient is following up on neck pain. She states that she has had intermittent neck pain since a spinal fusion. Over the last 3 weeks the pain has been severe. She was seen by Dr. Patel the last time she had a problem and was sent to physical therapy.She states that physical therapy helps short term, however she is looking for a long term solution. She is having a difficult time going to work due to this.       Assessment/Plan   Problem List Items Addressed This Visit     None      Visit Diagnoses     Type 2 diabetes mellitus without complication, without long-term current use of insulin    -  Primary    Relevant Orders    Ambulatory Referral to Endocrinology    Comprehensive Metabolic Panel    Hemoglobin A1c    Chronic neck pain        Relevant Medications    MethylPREDNISolone (MEDROL, REYNA,) 4 MG tablet    Other Relevant Orders    Ambulatory Referral to Pain Management    Pure hyperglyceridemia        Relevant Orders    Lipid Panel With / Chol / HDL Ratio             No Follow-up on file.  There are no Patient Instructions on file for this visit.    Chief Complaint   Patient presents with   • Neck Pain   • Diabetes     Social History   Substance Use Topics   • Smoking status: Current Every Day Smoker     Packs/day: 0.25     Years: 15.00   • Smokeless tobacco: Never Used   • Alcohol use No       History of Present Illness     The following portions of the patient's history were reviewed and updated as appropriate:PMHroutine: Social history , Allergies, Current Medications, Active Problem List and Health Maintenance    Review of Systems   Constitutional: Positive for activity change and appetite change.   Respiratory: Negative for cough.    Gastrointestinal: Negative for vomiting.   Genitourinary: Negative for  "difficulty urinating and frequency.   Musculoskeletal: Positive for back pain, myalgias and neck pain.   Neurological: Negative for dizziness.       Objective   Vitals:    01/25/18 1006   BP: 162/98   Pulse: 105   SpO2: 98%   Weight: 80.7 kg (178 lb)   Height: 167.6 cm (66\")     Body mass index is 28.73 kg/(m^2).  Physical Exam   Constitutional: She appears well-developed and well-nourished. She appears distressed.   Mild discomfort   HENT:   Head: Normocephalic and atraumatic.   Right Ear: External ear normal.   Left Ear: External ear normal.   Eyes: EOM are normal.   Neck: Neck supple. No thyromegaly present.   Cardiovascular: Normal rate, regular rhythm and normal heart sounds.    Pulmonary/Chest: Effort normal and breath sounds normal.   Musculoskeletal: Normal range of motion.   Neurological: She is alert. She displays normal reflexes. No cranial nerve deficit. She exhibits normal muscle tone. Coordination normal.   Skin: Skin is warm.   Nursing note and vitals reviewed.    Reviewed Data:  No visits with results within 1 Month(s) from this visit.  Latest known visit with results is:    Lab on 11/14/2017   Component Date Value Ref Range Status   • Total Cholesterol 11/14/2017 284* 0 - 200 mg/dL Final   • Triglycerides 11/14/2017 386* 0 - 150 mg/dL Final   • HDL Cholesterol 11/14/2017 39* 40 - 60 mg/dL Final   • VLDL Cholesterol 11/14/2017 77.2* 5 - 40 mg/dL Final   • LDL Cholesterol  11/14/2017 168* 0 - 100 mg/dL Final   • Chol/HDL Ratio 11/14/2017 7.28   Final   • Hemoglobin A1C 11/14/2017 6.50* 4.80 - 5.60 % Final    Comment: Hemoglobin A1C Ranges:  Increased Risk for Diabetes  5.7% to 6.4%  Diabetes                     >= 6.5%  Diabetic Goal                < 7.0%       [facility] [id number] clear    "

## 2018-02-01 ENCOUNTER — TELEPHONE (OUTPATIENT)
Dept: FAMILY MEDICINE CLINIC | Facility: CLINIC | Age: 58
End: 2018-02-01

## 2018-02-01 DIAGNOSIS — G89.29 CHRONIC NECK AND BACK PAIN: Primary | ICD-10-CM

## 2018-02-01 DIAGNOSIS — M54.9 CHRONIC NECK AND BACK PAIN: Primary | ICD-10-CM

## 2018-02-01 DIAGNOSIS — M54.2 CHRONIC NECK AND BACK PAIN: Primary | ICD-10-CM

## 2018-02-13 ENCOUNTER — HOSPITAL ENCOUNTER (OUTPATIENT)
Dept: PAIN MEDICINE | Facility: HOSPITAL | Age: 58
Discharge: HOME | End: 2018-02-13
Admitting: NURSE PRACTITIONER

## 2018-02-13 ENCOUNTER — HOSPITAL ENCOUNTER (OUTPATIENT)
Dept: GENERAL RADIOLOGY | Facility: HOSPITAL | Age: 58
Discharge: HOME | End: 2018-02-13

## 2018-02-13 ENCOUNTER — ANESTHESIA (OUTPATIENT)
Dept: PAIN MEDICINE | Facility: HOSPITAL | Age: 58
End: 2018-02-13

## 2018-02-13 ENCOUNTER — ANESTHESIA EVENT (OUTPATIENT)
Dept: PAIN MEDICINE | Facility: HOSPITAL | Age: 58
End: 2018-02-13

## 2018-02-13 VITALS
BODY MASS INDEX: 29.41 KG/M2 | TEMPERATURE: 97.4 F | OXYGEN SATURATION: 100 % | HEIGHT: 66 IN | SYSTOLIC BLOOD PRESSURE: 140 MMHG | WEIGHT: 183 LBS | HEART RATE: 109 BPM | RESPIRATION RATE: 16 BRPM | DIASTOLIC BLOOD PRESSURE: 99 MMHG

## 2018-02-13 DIAGNOSIS — M54.9 CHRONIC NECK AND BACK PAIN: ICD-10-CM

## 2018-02-13 DIAGNOSIS — G89.29 CHRONIC NECK AND BACK PAIN: ICD-10-CM

## 2018-02-13 DIAGNOSIS — R52 PAIN: ICD-10-CM

## 2018-02-13 DIAGNOSIS — M54.2 CHRONIC NECK AND BACK PAIN: ICD-10-CM

## 2018-02-13 PROCEDURE — 25010000002 METHYLPREDNISOLONE PER 80 MG: Performed by: ANESTHESIOLOGY

## 2018-02-13 PROCEDURE — 25010000002 MIDAZOLAM PER 1 MG: Performed by: ANESTHESIOLOGY

## 2018-02-13 PROCEDURE — 77003 FLUOROGUIDE FOR SPINE INJECT: CPT

## 2018-02-13 PROCEDURE — 25010000002 FENTANYL CITRATE (PF) 100 MCG/2ML SOLUTION: Performed by: ANESTHESIOLOGY

## 2018-02-13 PROCEDURE — C1755 CATHETER, INTRASPINAL: HCPCS

## 2018-02-13 RX ORDER — SODIUM CHLORIDE 0.9 % (FLUSH) 0.9 %
1-10 SYRINGE (ML) INJECTION AS NEEDED
Status: DISCONTINUED | OUTPATIENT
Start: 2018-02-13 | End: 2018-02-14 | Stop reason: HOSPADM

## 2018-02-13 RX ORDER — FENTANYL CITRATE 50 UG/ML
50 INJECTION, SOLUTION INTRAMUSCULAR; INTRAVENOUS AS NEEDED
Status: DISCONTINUED | OUTPATIENT
Start: 2018-02-13 | End: 2018-02-14 | Stop reason: HOSPADM

## 2018-02-13 RX ORDER — LIDOCAINE HYDROCHLORIDE 10 MG/ML
1 INJECTION, SOLUTION INFILTRATION; PERINEURAL ONCE AS NEEDED
Status: DISCONTINUED | OUTPATIENT
Start: 2018-02-13 | End: 2018-02-14 | Stop reason: HOSPADM

## 2018-02-13 RX ORDER — METHYLPREDNISOLONE ACETATE 80 MG/ML
80 INJECTION, SUSPENSION INTRA-ARTICULAR; INTRALESIONAL; INTRAMUSCULAR; SOFT TISSUE ONCE
Status: COMPLETED | OUTPATIENT
Start: 2018-02-13 | End: 2018-02-13

## 2018-02-13 RX ORDER — MIDAZOLAM HYDROCHLORIDE 1 MG/ML
1 INJECTION INTRAMUSCULAR; INTRAVENOUS AS NEEDED
Status: DISCONTINUED | OUTPATIENT
Start: 2018-02-13 | End: 2018-02-14 | Stop reason: HOSPADM

## 2018-02-13 RX ADMIN — MIDAZOLAM 1 MG: 1 INJECTION INTRAMUSCULAR; INTRAVENOUS at 14:16

## 2018-02-13 RX ADMIN — METHYLPREDNISOLONE ACETATE 80 MG: 80 INJECTION, SUSPENSION INTRA-ARTICULAR; INTRALESIONAL; INTRAMUSCULAR; SOFT TISSUE at 14:18

## 2018-02-13 RX ADMIN — FENTANYL CITRATE 50 MCG: 50 INJECTION INTRAMUSCULAR; INTRAVENOUS at 14:15

## 2018-02-13 NOTE — H&P
[facility]    History and Physical    Patient Name: Kristina Yates  :  1960  MRN:  [id number]  Date of Admission: 2018    Subjective   Patient is a 57 y.o. female presents with chief complaint of chronic neck pain.  Onset of symptoms was gradual starting several years ago.  Symptoms are associated/aggravated by exercise or lifting. Symptoms improve with relaxation and injection.  Patient has undergone PT.  Pain 6-8/10, weakness in L>R arm.     The following portions of the patients history were reviewed and updated as appropriate: current medications, allergies, past medical history, past surgical history, past family history, past social history and problem list                Objective     Past Medical History:   Past Medical History:   Diagnosis Date   • Arthritis    • Arthritis    • Asthma    • Edmonds esophagus    • Cervical neuritis    • Foot spasms    • GERD (gastroesophageal reflux disease)    • Headache    • Hiatal hernia    • Seasonal allergies      Past Surgical History:   Past Surgical History:   Procedure Laterality Date   • ANTERIOR CERVICAL DISCECTOMY W/ FUSION  2003    C5-6, C6-7   • CERCLAGE CERVIX     • CERVICAL EPIDURAL     • DILATATION AND CURETTAGE     • ENDOMETRIAL ABLATION     • ENDOSCOPY      MULTIPLE   • ENDOSCOPY N/A 2016    Procedure: ESOPHAGOGASTRODUODENOSCOPY WITH COLD BIOPSIES;  Surgeon: Jose Mcclellan MD;  Location: [facility] ENDOSCOPY;  Service:    • ESOPHAGEAL DILATATION     • HAND SURGERY Bilateral     CARTILAGE   • NISSEN FUNDOPLICATION LAPAROSCOPIC  2012     Family History:   Family History   Problem Relation Age of Onset   • Cancer Mother    • Heart disease Father    • Lupus Maternal Aunt      Social History:   Social History   Substance Use Topics   • Smoking status: Current Every Day Smoker     Packs/day: 0.25     Years: 15.00   • Smokeless tobacco: Never Used   • Alcohol use No       Vital Signs Range for the last 24 hours  Temperature:      Temp Source:     BP:     Pulse:     Respirations:     SPO2:     O2 Amount (l/min):     O2 Devices     Weight:           --------------------------------------------------------------------------------    Current Outpatient Prescriptions   Medication Sig Dispense Refill   • Albuterol Sulfate (PROAIR HFA IN) Inhale as needed.     • atorvastatin (LIPITOR) 20 MG tablet Take 1 tablet by mouth Daily. 30 tablet 2   • DULoxetine HCl (CYMBALTA PO) Take 120 mg by mouth daily.     • Fexofenadine HCl (MUCINEX ALLERGY PO) Take  by mouth daily as needed.     • HYDROcodone-acetaminophen (NORCO) 5-325 MG per tablet Take 1 tablet by mouth Every 8 (Eight) Hours As Needed (neck pain). 12 tablet 0   • metFORMIN (GLUCOPHAGE) 500 MG tablet Take 1 tablet by mouth 2 (Two) Times a Day With Meals. 60 tablet 2   • MethylPREDNISolone (MEDROL, REYNA,) 4 MG tablet Take as directed on package instructions. 1 each 0   • mupirocin (BACTROBAN) 2 % ointment Apply  topically 3 (Three) Times a Day. 15 g 1     No current facility-administered medications for this encounter.        --------------------------------------------------------------------------------  Assessment/Plan      Anesthesia Evaluation     Patient summary reviewed and Nursing notes reviewed                Airway   Mallampati: II  TM distance: >3 FB  Dental - normal exam     Pulmonary - normal exam   (+) asthma,   Cardiovascular - negative cardio ROS and normal exam  Exercise tolerance: good (4-7 METS)        Neuro/Psych- neuro exam normal  (+) headaches, numbness,     GI/Hepatic/Renal/Endo    (+)  hiatal hernia, GERD,     Musculoskeletal (-) normal exam    (+) neck pain, neck stiffness,   Abdominal  - normal exam   Substance History - negative use     OB/GYN negative ob/gyn ROS         Other   (+) arthritis                Diagnosis and Plan   Plan:  1. Epidural with fluoroscopy +/- epidurogram (if needed)  2. Physical therapy exercises at home as prescribed by physical therapy or from  the pain clinic handout (given to the patient). Continuation of these exercises every day, or multiple times per week, even when the patient has good pain relief, was stressed to the patient as a preventative measure to decrease the frequency and severity of future pain episodes.  3. Continue pain medicines as already prescribed. If patient not currently taking any, it is recommended to begin Acetaminophen 1000 mg po q 8 hours. If other medicines containing Acetaminophen are currently prescribed, maintain daily dose at 3000mg.   4. If they can tolerate NSAIDS, it is recommended to take Ibuprofen 600 mg po q 6 hours for 7 days during pain exacerbations. Alternatively, they may substitute an NSAID of their choice (e.g. Aleve)  5. Heat and ice to the affected area as tolerated for pain control. It was discussed that heating pads can cause burns.  6. Low impact exercise such as walking or water exercise was recommended to maintain overall health and aid in weight control.   7. Follow up as needed for subsequent injections.  8. Patient was counseled to abstain from tobacco products.    Treatment Plan  ASA 2      Procedures: Cervical Epidural Steroid Injection(DICKSON), With fluoroscopy,       Anesthetic plan and risks discussed with patient.          Diagnosis     * Cervical radiculitis [M54.12]

## 2018-02-13 NOTE — ANESTHESIA PROCEDURE NOTES
PAIN Epidural block    Patient location during procedure: pain clinic  Start Time: 2/13/2018 2:10 PM  Stop Time: 2/13/2018 2:20 PM  Indication:procedure for pain  Performed By  Anesthesiologist: KENA SALINAS  Preanesthetic Checklist  Completed: patient identified, site marked, surgical consent, pre-op evaluation, timeout performed, IV checked, risks and benefits discussed and monitors and equipment checked  Additional Notes  Cervical radiculitis  Fluoro used  Prep:  Pt Position:prone  Sterile Tech:cap, gloves, mask and sterile barrier  Prep:chlorhexidine gluconate and isopropyl alcohol  Monitoring:blood pressure monitoring, continuous pulse oximetry and EKG  Procedure:  Sedation: yes   Approach:midline  Guidance: fluoroscopy  Location:cervical  Level:7-8  Needle Type:Tuohy  Needle Gauge:20  Aspiration:negative  Medications:  Depomedrol:80  Preservative Free Saline:3mL    Post Assessment:  Dressing:occlusive dressing applied  Pt Tolerance:patient tolerated the procedure well with no apparent complications  Complications:no

## 2018-02-26 ENCOUNTER — RESULTS ENCOUNTER (OUTPATIENT)
Dept: FAMILY MEDICINE CLINIC | Facility: CLINIC | Age: 58
End: 2018-02-26

## 2018-02-26 DIAGNOSIS — E11.9 TYPE 2 DIABETES MELLITUS WITHOUT COMPLICATION, WITHOUT LONG-TERM CURRENT USE OF INSULIN (HCC): ICD-10-CM

## 2018-02-26 DIAGNOSIS — E78.2 MIXED HYPERLIPIDEMIA: ICD-10-CM

## 2018-02-27 ENCOUNTER — OFFICE VISIT (OUTPATIENT)
Dept: ENDOCRINOLOGY | Age: 58
End: 2018-02-27

## 2018-02-27 ENCOUNTER — TELEPHONE (OUTPATIENT)
Dept: FAMILY MEDICINE CLINIC | Facility: CLINIC | Age: 58
End: 2018-02-27

## 2018-02-27 VITALS
SYSTOLIC BLOOD PRESSURE: 124 MMHG | BODY MASS INDEX: 28.61 KG/M2 | WEIGHT: 178 LBS | DIASTOLIC BLOOD PRESSURE: 78 MMHG | HEIGHT: 66 IN

## 2018-02-27 DIAGNOSIS — N95.1 MENOPAUSAL SYMPTOM: ICD-10-CM

## 2018-02-27 DIAGNOSIS — L70.9 ADULT ACNE: ICD-10-CM

## 2018-02-27 DIAGNOSIS — E11.42 DIABETIC PERIPHERAL NEUROPATHY (HCC): ICD-10-CM

## 2018-02-27 DIAGNOSIS — E11.9 CONTROLLED TYPE 2 DIABETES MELLITUS WITHOUT COMPLICATION, WITHOUT LONG-TERM CURRENT USE OF INSULIN (HCC): Primary | ICD-10-CM

## 2018-02-27 DIAGNOSIS — E78.5 HYPERLIPIDEMIA, UNSPECIFIED HYPERLIPIDEMIA TYPE: ICD-10-CM

## 2018-02-27 PROCEDURE — 99214 OFFICE O/P EST MOD 30 MIN: CPT | Performed by: NURSE PRACTITIONER

## 2018-02-27 RX ORDER — DULOXETIN HYDROCHLORIDE 60 MG/1
60 CAPSULE, DELAYED RELEASE ORAL DAILY
COMMUNITY
Start: 2018-02-17 | End: 2021-04-01 | Stop reason: SDUPTHER

## 2018-02-27 RX ORDER — LISINOPRIL 10 MG/1
10 TABLET ORAL DAILY
Qty: 30 TABLET | Refills: 5 | Status: SHIPPED | OUTPATIENT
Start: 2018-02-27 | End: 2018-03-15 | Stop reason: SINTOL

## 2018-02-27 RX ORDER — ATORVASTATIN CALCIUM 20 MG/1
20 TABLET, FILM COATED ORAL DAILY
COMMUNITY
End: 2018-03-01 | Stop reason: SDUPTHER

## 2018-02-27 NOTE — TELEPHONE ENCOUNTER
Patient called stating that she needed an order for an epidural.     I contacted [facility] and they state that they need a Prior Auth for the epidural.

## 2018-02-27 NOTE — PROGRESS NOTES
"Jc Yates is a 57 y.o. female is here today for new pt referral  Chief Complaint   Patient presents with   • Diabetes     New patient, recent labs with PCP, pt does not test BG     /78  Ht 167.6 cm (66\")  Wt 80.7 kg (178 lb)  BMI 28.73 kg/m2  Current Outpatient Prescriptions on File Prior to Visit   Medication Sig   • Albuterol Sulfate (PROAIR HFA IN) Inhale as needed.   • Fexofenadine HCl (MUCINEX ALLERGY PO) Take  by mouth daily as needed.   • metFORMIN (GLUCOPHAGE) 500 MG tablet Take 1 tablet by mouth 2 (Two) Times a Day With Meals.   • [DISCONTINUED] DULoxetine HCl (CYMBALTA PO) Take 120 mg by mouth daily.     No current facility-administered medications on file prior to visit.      Family History   Problem Relation Age of Onset   • Cancer Mother    • Heart disease Father    • Lupus Maternal Aunt      Social History   Substance Use Topics   • Smoking status: Current Every Day Smoker     Packs/day: 0.25     Years: 15.00   • Smokeless tobacco: Never Used   • Alcohol use No     Allergies   Allergen Reactions   • Fluoxetine      Hives   • Sulfa Antibiotics      THROAT CLOSES   • Zofran [Ondansetron Hcl]      THROAT CLOSES         History of Present Illness   Encounter Diagnoses   Name Primary?   • Controlled type 2 diabetes mellitus without complication, without long-term current use of insulin Yes   • Diabetic peripheral neuropathy    • Hyperlipidemia, unspecified hyperlipidemia type    • Adult acne    • Menopausal symptom      This is a 57-year-old female patient here today for a new patient referral for evaluation of type 2 diabetes.  She was recently diagnosed with type 2 diabetes and started on metformin approximately 6 months ago.  She states that time she has changed her diet and lowered her carbohydrate intake.  She is also stopped drinking regular soft drinks.  She has lost 20 pounds.  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  " She is cut out process sugars and pasta and potatoes from her diet.  She does have a history of having back surgery on her cervical spine and also a history of bulging disc.  She deals with chronic pain and is needing to go to pain management.  She does have trouble sitting due to pain and states she feels much better moving around.  She had a uterine cerclage done in .  She does have complaints of sweats and hot flashes at times.  She is currently not on any hormone therapy.  She was recently started on atorvastatin 20 mg for dyslipidemia.  She has complaints of muscle aches however this was occurring prior to the start of statin therapy.  Currently for pain she will take Phenergan and use alternate hot and cold therapy.  She does not know her family medical history other than her mother passed away at age 48 from non-Hodgkin's lymphoma at her father  at age 37 from a drug overdose.  She had recent labs done her primary care providers office which were reviewed.  Her most recent hemoglobin A1c is 6.5.  She has no renal insufficiency.  She does have hypertension as noted in her medical record    The following portions of the patient's history were reviewed and updated as appropriate: allergies, current medications, past family history, past medical history, past social history, past surgical history and problem list.    Review of Systems   Constitutional: Negative for fatigue.   HENT: Negative for trouble swallowing.    Eyes: Negative for visual disturbance.   Respiratory: Negative for shortness of breath.    Cardiovascular: Negative for leg swelling.   Endocrine: Negative for polyuria.   Skin: Negative for wound.   Neurological: Negative for numbness.       Objective   Physical Exam   Constitutional: She is oriented to person, place, and time. She appears well-developed and well-nourished. No distress.   HENT:   Head: Normocephalic and atraumatic.   Right Ear: External ear normal.   Left Ear: External ear  normal.   Nose: Nose normal.   Mouth/Throat: Oropharynx is clear and moist. No oropharyngeal exudate.   Eyes: EOM are normal. Pupils are equal, round, and reactive to light. Right eye exhibits no discharge. Left eye exhibits no discharge.   Neck: Trachea normal, normal range of motion and full passive range of motion without pain. Neck supple. No tracheal tenderness present. Carotid bruit is not present. No tracheal deviation, no edema and no erythema present. No thyroid mass and no thyromegaly present.   Cardiovascular: Normal rate, regular rhythm, normal heart sounds and intact distal pulses.  Exam reveals no gallop and no friction rub.    No murmur heard.  Pulmonary/Chest: Effort normal and breath sounds normal. No stridor. No respiratory distress. She has no wheezes. She has no rales.   Abdominal: Soft. Bowel sounds are normal. She exhibits no distension.   Musculoskeletal: Normal range of motion. She exhibits no edema or deformity.   Lymphadenopathy:     She has no cervical adenopathy.   Neurological: She is alert and oriented to person, place, and time.   Skin: Skin is warm and dry. No rash noted. She is not diaphoretic. No erythema. No pallor.   Psychiatric: She has a normal mood and affect. Her behavior is normal. Judgment and thought content normal.   Nursing note and vitals reviewed.    Results for orders placed or performed in visit on 01/25/18   Comprehensive Metabolic Panel   Result Value Ref Range    Glucose 116 (H) 65 - 99 mg/dL    BUN 15 6 - 20 mg/dL    Creatinine 0.72 0.57 - 1.00 mg/dL    eGFR Non African Am 84 >60 mL/min/1.73    eGFR African Am 102 >60 mL/min/1.73    BUN/Creatinine Ratio 20.8 7.0 - 25.0    Sodium 142 136 - 145 mmol/L    Potassium 4.5 3.5 - 5.2 mmol/L    Chloride 100 98 - 107 mmol/L    Total CO2 26.0 22.0 - 29.0 mmol/L    Calcium 9.7 8.6 - 10.5 mg/dL    Total Protein 7.3 6.0 - 8.5 g/dL    Albumin 5.10 3.50 - 5.20 g/dL    Globulin 2.2 gm/dL    A/G Ratio 2.3 g/dL    Total Bilirubin  0.2 0.1 - 1.2 mg/dL    Alkaline Phosphatase 86 39 - 117 U/L    AST (SGOT) 20 1 - 32 U/L    ALT (SGPT) 20 1 - 33 U/L   Lipid Panel With / Chol / HDL Ratio   Result Value Ref Range    Total Cholesterol 253 (H) 0 - 200 mg/dL    Triglycerides 155 (H) 0 - 150 mg/dL    HDL Cholesterol 52 40 - 60 mg/dL    VLDL Cholesterol 31 5 - 40 mg/dL    LDL Cholesterol  170 (H) 0 - 100 mg/dL    Chol/HDL Ratio 4.87    Hemoglobin A1c   Result Value Ref Range    Hemoglobin A1C 6.50 (H) 4.80 - 5.60 %         Assessment/Plan   Problems Addressed this Visit        Endocrine    Controlled type 2 diabetes mellitus without complication, without long-term current use of insulin - Primary    Relevant Medications    DULoxetine (CYMBALTA) 60 MG capsule    atorvastatin (LIPITOR) 20 MG tablet    Other Relevant Orders    TestT+TestF+SHBG    FSH & LH    Vitamin D 25 Hydroxy    Lipid Panel    MicroAlbumin, Urine, Random - Urine, Clean Catch    C-Peptide    T3, Free    T4, Free    Thyroid Panel With TSH    Thyroid Antibodies    Thyroid Peroxidase Antibody    Diabetic peripheral neuropathy    Relevant Medications    DULoxetine (CYMBALTA) 60 MG capsule    atorvastatin (LIPITOR) 20 MG tablet    Other Relevant Orders    TestT+TestF+SHBG    FSH & LH    Vitamin D 25 Hydroxy    Lipid Panel    MicroAlbumin, Urine, Random - Urine, Clean Catch    C-Peptide    T3, Free    T4, Free    Thyroid Panel With TSH    Thyroid Antibodies    Thyroid Peroxidase Antibody      Other Visit Diagnoses     Hyperlipidemia, unspecified hyperlipidemia type        Relevant Medications    DULoxetine (CYMBALTA) 60 MG capsule    atorvastatin (LIPITOR) 20 MG tablet    Other Relevant Orders    TestT+TestF+SHBG    FSH & LH    Vitamin D 25 Hydroxy    Lipid Panel    MicroAlbumin, Urine, Random - Urine, Clean Catch    C-Peptide    T3, Free    T4, Free    Thyroid Panel With TSH    Thyroid Antibodies    Thyroid Peroxidase Antibody          In summary, patient was seen and examined.  She'll be  started on lisinopril 10 mg once daily for renal protection as well as for blood pressure.  She will have some labs done at today's visit to further evaluate her hormone status.  She has had problems with adult acne here recently and is experiencing hot flashes.  She is currently taking metformin 500 mg twice a day with food.  She is tolerating this medication okay.  She has lost 20 pounds with diet and exercise.  Her BMI is still above goal at 28.7.  Metabolically she is stable.  Her medical record as well as her recent labs were all reviewed.  She is to follow-up in 4 months with labs prior.  I've encouraged her to keep the office inform should she have any questions or concerns prior to her next visit.  Currently she does not check blood sugars and is not at risk for hypoglycemia

## 2018-02-28 ENCOUNTER — ANESTHESIA (OUTPATIENT)
Dept: PAIN MEDICINE | Facility: HOSPITAL | Age: 58
End: 2018-02-28

## 2018-02-28 ENCOUNTER — HOSPITAL ENCOUNTER (OUTPATIENT)
Dept: PAIN MEDICINE | Facility: HOSPITAL | Age: 58
Discharge: HOME | End: 2018-02-28
Admitting: NURSE PRACTITIONER

## 2018-02-28 ENCOUNTER — HOSPITAL ENCOUNTER (OUTPATIENT)
Dept: GENERAL RADIOLOGY | Facility: HOSPITAL | Age: 58
Discharge: HOME | End: 2018-02-28

## 2018-02-28 ENCOUNTER — TRANSCRIBE ORDERS (OUTPATIENT)
Dept: PAIN MEDICINE | Facility: HOSPITAL | Age: 58
End: 2018-02-28

## 2018-02-28 ENCOUNTER — ANESTHESIA EVENT (OUTPATIENT)
Dept: PAIN MEDICINE | Facility: HOSPITAL | Age: 58
End: 2018-02-28

## 2018-02-28 VITALS
HEART RATE: 85 BPM | DIASTOLIC BLOOD PRESSURE: 89 MMHG | RESPIRATION RATE: 16 BRPM | OXYGEN SATURATION: 99 % | SYSTOLIC BLOOD PRESSURE: 148 MMHG | TEMPERATURE: 98.8 F

## 2018-02-28 DIAGNOSIS — M54.9 CHRONIC NECK AND BACK PAIN: Primary | ICD-10-CM

## 2018-02-28 DIAGNOSIS — R52 PAIN: ICD-10-CM

## 2018-02-28 DIAGNOSIS — M54.2 CHRONIC NECK AND BACK PAIN: Primary | ICD-10-CM

## 2018-02-28 DIAGNOSIS — G89.29 CHRONIC NECK AND BACK PAIN: Primary | ICD-10-CM

## 2018-02-28 PROCEDURE — 77003 FLUOROGUIDE FOR SPINE INJECT: CPT

## 2018-02-28 PROCEDURE — 25010000002 METHYLPREDNISOLONE PER 80 MG: Performed by: ANESTHESIOLOGY

## 2018-02-28 PROCEDURE — C1755 CATHETER, INTRASPINAL: HCPCS

## 2018-02-28 PROCEDURE — 25010000002 MIDAZOLAM PER 1 MG: Performed by: ANESTHESIOLOGY

## 2018-02-28 RX ORDER — FENTANYL CITRATE 50 UG/ML
50 INJECTION, SOLUTION INTRAMUSCULAR; INTRAVENOUS AS NEEDED
Status: DISCONTINUED | OUTPATIENT
Start: 2018-02-28 | End: 2018-03-01 | Stop reason: HOSPADM

## 2018-02-28 RX ORDER — MIDAZOLAM HYDROCHLORIDE 1 MG/ML
1 INJECTION INTRAMUSCULAR; INTRAVENOUS AS NEEDED
Status: DISCONTINUED | OUTPATIENT
Start: 2018-02-28 | End: 2018-03-01 | Stop reason: HOSPADM

## 2018-02-28 RX ORDER — METHYLPREDNISOLONE ACETATE 80 MG/ML
80 INJECTION, SUSPENSION INTRA-ARTICULAR; INTRALESIONAL; INTRAMUSCULAR; SOFT TISSUE ONCE
Status: COMPLETED | OUTPATIENT
Start: 2018-02-28 | End: 2018-02-28

## 2018-02-28 RX ORDER — SODIUM CHLORIDE 0.9 % (FLUSH) 0.9 %
1-10 SYRINGE (ML) INJECTION AS NEEDED
Status: DISCONTINUED | OUTPATIENT
Start: 2018-02-28 | End: 2018-03-01 | Stop reason: HOSPADM

## 2018-02-28 RX ORDER — LIDOCAINE HYDROCHLORIDE 10 MG/ML
0.5 INJECTION, SOLUTION INFILTRATION; PERINEURAL ONCE AS NEEDED
Status: CANCELLED | OUTPATIENT
Start: 2018-02-28

## 2018-02-28 RX ORDER — LIDOCAINE HYDROCHLORIDE 10 MG/ML
1 INJECTION, SOLUTION INFILTRATION; PERINEURAL ONCE AS NEEDED
Status: DISCONTINUED | OUTPATIENT
Start: 2018-02-28 | End: 2018-03-01 | Stop reason: HOSPADM

## 2018-02-28 RX ADMIN — MIDAZOLAM 2 MG: 1 INJECTION INTRAMUSCULAR; INTRAVENOUS at 08:30

## 2018-02-28 RX ADMIN — METHYLPREDNISOLONE ACETATE 80 MG: 80 INJECTION, SUSPENSION INTRA-ARTICULAR; INTRALESIONAL; INTRAMUSCULAR; SOFT TISSUE at 08:35

## 2018-02-28 NOTE — ANESTHESIA PROCEDURE NOTES
PAIN Epidural block    Patient location during procedure: pain clinic  Start Time: 2/28/2018 8:30 AM  Stop Time: 2/28/2018 8:35 AM  Indication:procedure for pain  Performed By  Anesthesiologist: CURT MEZA  Preanesthetic Checklist  Completed: patient identified, site marked, surgical consent, pre-op evaluation, timeout performed, IV checked, risks and benefits discussed and monitors and equipment checked  Additional Notes  Post-Op Diagnosis Codes:     * Cervical radiculitis (M54.12)  Performed under fluoroscopic guidance.  Prep:  Pt Position:prone  Sterile Tech:cap, gloves, mask and sterile barrier  Prep:chlorhexidine gluconate and isopropyl alcohol  Monitoring:blood pressure monitoring, continuous pulse oximetry and EKG  Procedure:  Sedation: yes   Approach:midline  Guidance: fluoroscopy  Location:cervical (Intralaminar)  Interspace: C7-T1.  Needle Type:Tuohy  Needle Gauge:20  Aspiration:negative  Medications:  Depomedrol:80  Preservative Free Saline:2mL    Post Assessment:  Post-procedure: Band-Aid.  Pt Tolerance:patient tolerated the procedure well with no apparent complications  Complications:no

## 2018-03-01 DIAGNOSIS — E11.42 DIABETIC PERIPHERAL NEUROPATHY (HCC): ICD-10-CM

## 2018-03-01 DIAGNOSIS — E11.9 CONTROLLED TYPE 2 DIABETES MELLITUS WITHOUT COMPLICATION, WITHOUT LONG-TERM CURRENT USE OF INSULIN (HCC): ICD-10-CM

## 2018-03-01 DIAGNOSIS — E78.5 HYPERLIPIDEMIA, UNSPECIFIED HYPERLIPIDEMIA TYPE: ICD-10-CM

## 2018-03-01 LAB
25(OH)D3+25(OH)D2 SERPL-MCNC: 15.4 NG/ML (ref 30–100)
C PEPTIDE SERPL-MCNC: 12.2 NG/ML (ref 1.1–4.4)
CHOLEST SERPL-MCNC: 254 MG/DL (ref 0–200)
FSH SERPL-ACNC: 66.1 MIU/ML
FT4I SERPL CALC-MCNC: 2.3 (ref 1.2–4.9)
HDLC SERPL-MCNC: 65 MG/DL (ref 40–60)
INTERPRETATION: NORMAL
LDLC SERPL CALC-MCNC: 156 MG/DL (ref 0–100)
LH SERPL-ACNC: 24.4 MIU/ML
MICROALBUMIN UR-MCNC: 13.9 UG/ML
SHBG SERPL-SCNC: 50.6 NMOL/L (ref 17.3–125)
T3FREE SERPL-MCNC: 3 PG/ML (ref 2–4.4)
T3RU NFR SERPL: 26 % (ref 24–39)
T4 FREE SERPL-MCNC: 1.27 NG/DL (ref 0.93–1.7)
T4 SERPL-MCNC: 8.7 UG/DL (ref 4.5–12)
TESTOST FREE SERPL-MCNC: <0.2 PG/ML (ref 0–4.2)
TESTOST SERPL-MCNC: <3 NG/DL (ref 3–41)
THYROGLOB AB SERPL-ACNC: <1 IU/ML (ref 0–0.9)
THYROPEROXIDASE AB SERPL-ACNC: 22 IU/ML (ref 0–34)
TRIGL SERPL-MCNC: 167 MG/DL (ref 0–150)
TSH SERPL DL<=0.005 MIU/L-ACNC: 0.51 UIU/ML (ref 0.45–4.5)
VLDLC SERPL CALC-MCNC: 33.4 MG/DL (ref 5–40)

## 2018-03-01 RX ORDER — ERGOCALCIFEROL 1.25 MG/1
CAPSULE ORAL
Qty: 12 CAPSULE | Refills: 1 | Status: SHIPPED | OUTPATIENT
Start: 2018-03-01 | End: 2021-03-03

## 2018-03-01 RX ORDER — ATORVASTATIN CALCIUM 40 MG/1
40 TABLET, FILM COATED ORAL DAILY
Qty: 90 TABLET | Refills: 1 | Status: SHIPPED | OUTPATIENT
Start: 2018-03-01

## 2018-03-05 ENCOUNTER — OFFICE VISIT (OUTPATIENT)
Dept: FAMILY MEDICINE CLINIC | Facility: CLINIC | Age: 58
End: 2018-03-05

## 2018-03-05 VITALS
WEIGHT: 180 LBS | OXYGEN SATURATION: 98 % | SYSTOLIC BLOOD PRESSURE: 130 MMHG | HEART RATE: 94 BPM | HEIGHT: 66 IN | DIASTOLIC BLOOD PRESSURE: 78 MMHG | BODY MASS INDEX: 28.93 KG/M2

## 2018-03-05 DIAGNOSIS — G89.29 CHRONIC NECK PAIN: Primary | ICD-10-CM

## 2018-03-05 DIAGNOSIS — M54.2 CHRONIC NECK PAIN: Primary | ICD-10-CM

## 2018-03-05 PROCEDURE — 99213 OFFICE O/P EST LOW 20 MIN: CPT | Performed by: NURSE PRACTITIONER

## 2018-03-05 NOTE — PROGRESS NOTES
"Kristina Yates is a 57 y.o. female.Patient states that she was seen by endocrinology last week. She was placed on Lisinopril and Vitamin D.    Secondly patient has had two spinal epidurals for back pain and states that the second one has helped some. She does admit to headaches following the epidural. She would like to go back to pain management      Assessment/Plan   Problem List Items Addressed This Visit     None      Visit Diagnoses     Chronic neck pain    -  Primary    Relevant Orders    Ambulatory Referral to Pain Management             No Follow-up on file.  There are no Patient Instructions on file for this visit.    Chief Complaint   Patient presents with   • Back Pain   • Hypertension     Social History   Substance Use Topics   • Smoking status: Current Every Day Smoker     Packs/day: 0.25     Years: 15.00   • Smokeless tobacco: Never Used   • Alcohol use No       History of Present Illness     The following portions of the patient's history were reviewed and updated as appropriate:PMHroutine: Social history , Allergies, Current Medications and Active Problem List    Review of Systems   Constitutional: Negative for activity change and appetite change.   Musculoskeletal: Positive for neck pain.       Objective   Vitals:    03/05/18 0929   BP: 130/78   Pulse: 94   SpO2: 98%   Weight: 81.6 kg (180 lb)   Height: 167.6 cm (66\")     Body mass index is 29.05 kg/(m^2).  Physical Exam   Constitutional: She is oriented to person, place, and time. She appears well-developed and well-nourished. No distress.   HENT:   Head: Normocephalic and atraumatic.   Mouth/Throat: Oropharynx is clear and moist.   Pulmonary/Chest: Effort normal.   Musculoskeletal: Normal range of motion.   Neurological: She is alert and oriented to person, place, and time.   Nursing note and vitals reviewed.    Reviewed Data:  Office Visit on 02/27/2018   Component Date Value Ref Range Status   • Testosterone, Total 02/27/2018 <3* 3 - 41 ng/dL Final "   • Testosterone, Free 02/27/2018 <0.2  0.0 - 4.2 pg/mL Final   • Sex Hormone Binding Globulin 02/27/2018 50.6  17.3 - 125.0 nmol/L Final   • LH 02/27/2018 24.4  mIU/mL Final    Comment:                     Adult Female:                        Follicular phase      2.4 -  12.6                        Ovulation phase      14.0 -  95.6                        Luteal phase          1.0 -  11.4                        Postmenopausal        7.7 -  58.5     • FSH 02/27/2018 66.1  mIU/mL Final    Comment:                     Adult Female:                        Follicular phase      3.5 -  12.5                        Ovulation phase       4.7 -  21.5                        Luteal phase          1.7 -   7.7                        Postmenopausal       25.8 - 134.8     • 25 Hydroxy, Vitamin D 02/27/2018 15.4* 30.0 - 100.0 ng/ml Final    Comment: Reference Range for Total Vitamin D 25(OH)  Deficiency    <20.0 ng/mL  Insufficiency 21-29 ng/mL  Sufficiency    ng/mL  Toxicity      >100 ng/ml        • Total Cholesterol 02/27/2018 254* 0 - 200 mg/dL Final   • Triglycerides 02/27/2018 167* 0 - 150 mg/dL Final   • HDL Cholesterol 02/27/2018 65* 40 - 60 mg/dL Final   • VLDL Cholesterol 02/27/2018 33.4  5 - 40 mg/dL Final   • LDL Cholesterol  02/27/2018 156* 0 - 100 mg/dL Final   • Microalbumin, Urine 02/27/2018 13.9  Not Estab. ug/mL Final   • C-Peptide 02/27/2018 12.2* 1.1 - 4.4 ng/mL Final    C-Peptide reference interval is for fasting patients.   • T3, Free 02/27/2018 3.0  2.0 - 4.4 pg/mL Final   • Free T4 02/27/2018 1.27  0.93 - 1.70 ng/dL Final   • TSH 02/27/2018 0.508  0.450 - 4.500 uIU/mL Final   • T4, Total 02/27/2018 8.7  4.5 - 12.0 ug/dL Final   • T3 Uptake 02/27/2018 26  24 - 39 % Final   • Free Thyroxine Index 02/27/2018 2.3  1.2 - 4.9 Final   • Thyroid Peroxidase Antibody 02/27/2018 22  0 - 34 IU/mL Final   • Thyroglobulin Ab 02/27/2018 <1.0  0.0 - 0.9 IU/mL Final    Thyroglobulin Antibody measured by Hortencia Dyan  Methodology   • Interpretation 02/27/2018 Note   Final    Supplemental report is available.       Referral to pain management

## 2018-03-09 ENCOUNTER — TELEPHONE (OUTPATIENT)
Dept: FAMILY MEDICINE CLINIC | Facility: CLINIC | Age: 58
End: 2018-03-09

## 2018-03-09 DIAGNOSIS — R11.2 NON-INTRACTABLE VOMITING WITH NAUSEA, UNSPECIFIED VOMITING TYPE: Primary | ICD-10-CM

## 2018-03-09 RX ORDER — PROMETHAZINE HYDROCHLORIDE 12.5 MG/1
12.5 TABLET ORAL EVERY 6 HOURS PRN
Qty: 30 TABLET | Refills: 0 | Status: SHIPPED | OUTPATIENT
Start: 2018-03-09 | End: 2018-03-15

## 2018-03-09 NOTE — TELEPHONE ENCOUNTER
Patient states that she has been throwing up all morning with nausea. She denies diarrhea or stomach pain. No fever. She asked for nausea medication.

## 2018-03-14 ENCOUNTER — TELEPHONE (OUTPATIENT)
Dept: ENDOCRINOLOGY | Age: 58
End: 2018-03-14

## 2018-03-14 ENCOUNTER — APPOINTMENT (OUTPATIENT)
Dept: PAIN MEDICINE | Facility: HOSPITAL | Age: 58
End: 2018-03-14

## 2018-03-15 ENCOUNTER — OFFICE VISIT (OUTPATIENT)
Dept: FAMILY MEDICINE CLINIC | Facility: CLINIC | Age: 58
End: 2018-03-15

## 2018-03-15 VITALS
HEIGHT: 66 IN | OXYGEN SATURATION: 98 % | HEART RATE: 104 BPM | WEIGHT: 179 LBS | RESPIRATION RATE: 15 BRPM | DIASTOLIC BLOOD PRESSURE: 86 MMHG | SYSTOLIC BLOOD PRESSURE: 124 MMHG | BODY MASS INDEX: 28.77 KG/M2

## 2018-03-15 DIAGNOSIS — M47.22 OSTEOARTHRITIS OF SPINE WITH RADICULOPATHY, CERVICAL REGION: Primary | ICD-10-CM

## 2018-03-15 DIAGNOSIS — I10 ESSENTIAL HYPERTENSION: ICD-10-CM

## 2018-03-15 PROCEDURE — 99214 OFFICE O/P EST MOD 30 MIN: CPT | Performed by: FAMILY MEDICINE

## 2018-03-15 RX ORDER — LOSARTAN POTASSIUM 25 MG/1
25 TABLET ORAL DAILY
Qty: 30 TABLET | Refills: 3 | Status: SHIPPED | OUTPATIENT
Start: 2018-03-15 | End: 2021-03-03 | Stop reason: DRUGHIGH

## 2018-03-15 RX ORDER — TIZANIDINE HYDROCHLORIDE 2 MG/1
2 CAPSULE, GELATIN COATED ORAL 3 TIMES DAILY PRN
Qty: 90 CAPSULE | Refills: 1 | Status: SHIPPED | OUTPATIENT
Start: 2018-03-15 | End: 2018-12-19

## 2018-03-15 RX ORDER — METHYLPREDNISOLONE 4 MG/1
TABLET ORAL
Qty: 21 TABLET | Refills: 0 | Status: SHIPPED | OUTPATIENT
Start: 2018-03-15 | End: 2018-03-29

## 2018-03-29 ENCOUNTER — OFFICE VISIT (OUTPATIENT)
Dept: FAMILY MEDICINE CLINIC | Facility: CLINIC | Age: 58
End: 2018-03-29

## 2018-03-29 VITALS
BODY MASS INDEX: 28.83 KG/M2 | RESPIRATION RATE: 18 BRPM | DIASTOLIC BLOOD PRESSURE: 96 MMHG | WEIGHT: 179.4 LBS | HEART RATE: 112 BPM | OXYGEN SATURATION: 97 % | SYSTOLIC BLOOD PRESSURE: 180 MMHG | HEIGHT: 66 IN

## 2018-03-29 DIAGNOSIS — M54.50 ACUTE LEFT-SIDED LOW BACK PAIN WITHOUT SCIATICA: ICD-10-CM

## 2018-03-29 DIAGNOSIS — R30.0 DYSURIA: Primary | ICD-10-CM

## 2018-03-29 LAB
BILIRUB BLD-MCNC: NEGATIVE MG/DL
CLARITY, POC: CLEAR
COLOR UR: ABNORMAL
GLUCOSE UR STRIP-MCNC: NEGATIVE MG/DL
KETONES UR QL: ABNORMAL
LEUKOCYTE EST, POC: ABNORMAL
NITRITE UR-MCNC: NEGATIVE MG/ML
PH UR: 7.5 [PH] (ref 5–8)
PROT UR STRIP-MCNC: ABNORMAL MG/DL
RBC # UR STRIP: NEGATIVE /UL
SP GR UR: 1.01 (ref 1–1.03)
UROBILINOGEN UR QL: NORMAL

## 2018-03-29 PROCEDURE — 81002 URINALYSIS NONAUTO W/O SCOPE: CPT | Performed by: FAMILY MEDICINE

## 2018-03-29 PROCEDURE — 99213 OFFICE O/P EST LOW 20 MIN: CPT | Performed by: FAMILY MEDICINE

## 2018-03-29 RX ORDER — ACETAMINOPHEN 500 MG
500 TABLET ORAL ONCE
COMMUNITY

## 2018-03-29 NOTE — PROGRESS NOTES
Subjective   Kristina Yates is a 57 y.o. female.     History of Present Illness   Pt is here for low back pain and dark urine. Pt is having severe pain in L low back. She says it started 3/26/18 , and it's been getting worse until today that is severe. And she states her urine looked darker than usual.   She has had a stone in the past and is concerned.    The following portions of the patient's history were reviewed and updated as appropriate: allergies, current medications, past medical history, past social history, past surgical history and problem list.    Review of Systems   Musculoskeletal: Positive for back pain.       Objective   Physical Exam   Constitutional: She appears well-developed and well-nourished. No distress.   Cardiovascular: Normal rate and regular rhythm.    Pulmonary/Chest: Effort normal.   Abdominal: Soft. There is no tenderness. There is no guarding.   Musculoskeletal: She exhibits no edema.   Skin: Skin is warm and dry. No rash noted.   Nursing note and vitals reviewed.      Assessment/Plan   Kristina was seen today for back pain and polyuria.    Diagnoses and all orders for this visit:    Dysuria  -     POCT urinalysis dipstick, manual  No blood or bacteria in urine    Acute left-sided low back pain without sciatica      She is really in pain and it seems to be high up on the left side. I have advised her to go to the ER for evaluation.       //

## 2018-04-16 ENCOUNTER — OFFICE VISIT (OUTPATIENT)
Dept: FAMILY MEDICINE CLINIC | Facility: CLINIC | Age: 58
End: 2018-04-16

## 2018-04-16 VITALS
WEIGHT: 174 LBS | DIASTOLIC BLOOD PRESSURE: 90 MMHG | HEIGHT: 66 IN | OXYGEN SATURATION: 95 % | HEART RATE: 103 BPM | BODY MASS INDEX: 27.97 KG/M2 | SYSTOLIC BLOOD PRESSURE: 150 MMHG

## 2018-04-16 DIAGNOSIS — R10.9 FLANK PAIN: Primary | ICD-10-CM

## 2018-04-16 DIAGNOSIS — R31.9 HEMATURIA, UNSPECIFIED TYPE: ICD-10-CM

## 2018-04-16 DIAGNOSIS — N20.0 KIDNEY STONE: Primary | ICD-10-CM

## 2018-04-16 DIAGNOSIS — R03.0 ELEVATED BLOOD PRESSURE READING: ICD-10-CM

## 2018-04-16 DIAGNOSIS — A49.01 STAPH AUREUS INFECTION: ICD-10-CM

## 2018-04-16 LAB
BILIRUB BLD-MCNC: NEGATIVE MG/DL
CLARITY, POC: CLEAR
COLOR UR: YELLOW
GLUCOSE UR STRIP-MCNC: NEGATIVE MG/DL
KETONES UR QL: NEGATIVE
LEUKOCYTE EST, POC: ABNORMAL
NITRITE UR-MCNC: NEGATIVE MG/ML
PH UR: 7 [PH] (ref 5–8)
PROT UR STRIP-MCNC: ABNORMAL MG/DL
RBC # UR STRIP: ABNORMAL /UL
SP GR UR: 1.01 (ref 1–1.03)
UROBILINOGEN UR QL: NORMAL

## 2018-04-16 PROCEDURE — 81002 URINALYSIS NONAUTO W/O SCOPE: CPT | Performed by: NURSE PRACTITIONER

## 2018-04-16 PROCEDURE — 99213 OFFICE O/P EST LOW 20 MIN: CPT | Performed by: NURSE PRACTITIONER

## 2018-04-16 RX ORDER — CLINDAMYCIN HYDROCHLORIDE 300 MG/1
300 CAPSULE ORAL 3 TIMES DAILY
Qty: 30 CAPSULE | Refills: 0 | Status: SHIPPED | OUTPATIENT
Start: 2018-04-16 | End: 2018-12-19

## 2018-04-16 NOTE — PROGRESS NOTES
"Kristina Yates is a 57 y.o. female. Patient states that she was seen in the ER for a kidney stone on 3/29/18. She does have some back pain at this time, having left side flank pain    Secondly patient presents with acne that is on her back and has been present for about one year. Symptoms decreased when starting Metformin. She does state that her testosterone level is high.H/O staph and the same lesions were cleared up with a round of clindamycin      Assessment/Plan   Problem List Items Addressed This Visit     None      Visit Diagnoses     Flank pain    -  Primary    Staph aureus infection        Relevant Medications    clindamycin (CLEOCIN) 300 MG capsule    Other Relevant Orders    Ambulatory Referral to Urology    Elevated blood pressure reading        Hematuria, unspecified type        Relevant Orders    POCT urinalysis dipstick, manual (Completed)    Urine Culture - Urine, Urine, Clean Catch             No Follow-up on file.  There are no Patient Instructions on file for this visit.    Chief Complaint   Patient presents with   • Back Pain   • Acne     Social History   Substance Use Topics   • Smoking status: Current Every Day Smoker     Packs/day: 0.50     Years: 15.00   • Smokeless tobacco: Never Used   • Alcohol use No      Comment: sober since 2012       History of Present Illness     The following portions of the patient's history were reviewed and updated as appropriate:PMHroutine: Social history , Allergies, Current Medications and Active Problem List    Review of Systems   Constitutional: Negative for activity change, appetite change and fever.   Gastrointestinal: Negative for vomiting.   Endocrine: Negative for cold intolerance and heat intolerance.   Genitourinary: Positive for flank pain.   Musculoskeletal: Positive for back pain.   Skin: Positive for wound.       Objective   Vitals:    04/16/18 1038   BP: 150/90   Pulse: 103   SpO2: 95%   Weight: 78.9 kg (174 lb)   Height: 167.6 cm (66\")     Body " mass index is 28.08 kg/m².  Physical Exam   Constitutional: She appears well-developed and well-nourished. No distress.   HENT:   Head: Normocephalic and atraumatic.   Eyes: EOM are normal.   Cardiovascular: Normal rate and regular rhythm.    Pulmonary/Chest: Effort normal and breath sounds normal.   Genitourinary:   Genitourinary Comments: Left side flank pain   Musculoskeletal: Normal range of motion. She exhibits no edema.   Neurological: She is alert.   Skin: Skin is warm.   Several areas that looked as though they are staph   Nursing note and vitals reviewed.    Reviewed Data:  Office Visit on 04/16/2018   Component Date Value Ref Range Status   • Color 04/16/2018 Yellow  Yellow, Straw, Dark Yellow, Gina Final   • Clarity, UA 04/16/2018 Clear  Clear Final   • Glucose, UA 04/16/2018 Negative  Negative, 1000 mg/dL (3+) mg/dL Final   • Bilirubin 04/16/2018 Negative  Negative Final   • Ketones, UA 04/16/2018 Negative  Negative Final   • Specific Gravity  04/16/2018 1.010  1.005 - 1.030 Final   • Blood, UA 04/16/2018 Trace* Negative Final   • pH, Urine 04/16/2018 7.0  5.0 - 8.0 Final   • Protein, POC 04/16/2018 1+* Negative mg/dL Final   • Urobilinogen, UA 04/16/2018 Normal  Normal Final   • Leukocytes 04/16/2018 Trace* Negative Final   • Nitrite, UA 04/16/2018 Negative  Negative Final   Office Visit on 03/29/2018   Component Date Value Ref Range Status   • Color 03/29/2018 Straw  Yellow, Straw, Dark Yellow, Gina Final   • Clarity, UA 03/29/2018 Clear  Clear Final   • Glucose, UA 03/29/2018 Negative  Negative, 1000 mg/dL (3+) mg/dL Final   • Bilirubin 03/29/2018 Negative  Negative Final   • Ketones, UA 03/29/2018 Trace* Negative Final   • Specific Gravity  03/29/2018 1.010  1.005 - 1.030 Final   • Blood, UA 03/29/2018 Negative  Negative Final   • pH, Urine 03/29/2018 7.5  5.0 - 8.0 Final   • Protein, POC 03/29/2018 1+* Negative mg/dL Final   • Urobilinogen, UA 03/29/2018 Normal  Normal Final   • Leukocytes  03/29/2018 Trace* Negative Final   • Nitrite, UA 03/29/2018 Negative  Negative Final     Pt was seen by Dr Boyce urology last year will send her back.

## 2018-04-18 LAB
BACTERIA UR CULT: NO GROWTH
BACTERIA UR CULT: NORMAL

## 2018-05-04 ENCOUNTER — TELEPHONE (OUTPATIENT)
Dept: FAMILY MEDICINE CLINIC | Facility: CLINIC | Age: 58
End: 2018-05-04

## 2018-05-04 NOTE — TELEPHONE ENCOUNTER
Patient contacted the office stating that she is now on Medicaid, she takes Cymbalta and is needing a refill however the provider that refills this for her does not take Medicaid and is unable to fill it. I left a detailed message letting the patient that we also do not take Medicaid and that Medicaid would not pay for an rx that we have written. She was advised to reach out to the doctor that she has been assigned to or go to the ER.

## 2018-07-17 DIAGNOSIS — E55.9 VITAMIN D DEFICIENCY: ICD-10-CM

## 2018-07-17 DIAGNOSIS — E11.9 CONTROLLED TYPE 2 DIABETES MELLITUS WITHOUT COMPLICATION, WITHOUT LONG-TERM CURRENT USE OF INSULIN (HCC): Primary | ICD-10-CM

## 2018-07-19 ENCOUNTER — TELEPHONE (OUTPATIENT)
Dept: FAMILY MEDICINE CLINIC | Facility: CLINIC | Age: 58
End: 2018-07-19

## 2018-07-19 NOTE — TELEPHONE ENCOUNTER
----- Message from Malick Mares sent at 7/19/2018 12:22 PM EDT -----  Regarding: FW: Cancelled Order      ----- Message -----  From: Malick Mares  Sent: 7/19/2018   9:58 AM  To: Ann Santiago  Subject: Cancelled Order                                  I spoke to pt and she states that her  has lost his job and they now have Medicaid. She is going to see another provider in a couple of weeks and she is supposed to order the mammo.

## 2018-08-17 ENCOUNTER — RESULTS ENCOUNTER (OUTPATIENT)
Dept: ENDOCRINOLOGY | Age: 58
End: 2018-08-17

## 2018-08-17 DIAGNOSIS — E55.9 VITAMIN D DEFICIENCY: ICD-10-CM

## 2018-08-17 DIAGNOSIS — E11.9 CONTROLLED TYPE 2 DIABETES MELLITUS WITHOUT COMPLICATION, WITHOUT LONG-TERM CURRENT USE OF INSULIN (HCC): ICD-10-CM

## 2019-02-14 ENCOUNTER — APPOINTMENT (OUTPATIENT)
Dept: GENERAL RADIOLOGY | Facility: HOSPITAL | Age: 59
End: 2019-02-14

## 2019-02-14 ENCOUNTER — HOSPITAL ENCOUNTER (EMERGENCY)
Facility: HOSPITAL | Age: 59
Discharge: HOME | End: 2019-02-14
Attending: EMERGENCY MEDICINE | Admitting: EMERGENCY MEDICINE

## 2019-02-14 VITALS
DIASTOLIC BLOOD PRESSURE: 85 MMHG | TEMPERATURE: 97.4 F | BODY MASS INDEX: 29.73 KG/M2 | SYSTOLIC BLOOD PRESSURE: 168 MMHG | HEART RATE: 114 BPM | RESPIRATION RATE: 16 BRPM | OXYGEN SATURATION: 98 % | WEIGHT: 185 LBS | HEIGHT: 66 IN

## 2019-02-14 DIAGNOSIS — M25.511 PAIN OF RIGHT STERNOCLAVICULAR JOINT: ICD-10-CM

## 2019-02-14 DIAGNOSIS — S16.1XXA ACUTE STRAIN OF NECK MUSCLE, INITIAL ENCOUNTER: Primary | ICD-10-CM

## 2019-02-14 PROCEDURE — 96372 THER/PROPH/DIAG INJ SC/IM: CPT

## 2019-02-14 PROCEDURE — 25010000002 PROMETHAZINE PER 50 MG: Performed by: EMERGENCY MEDICINE

## 2019-02-14 PROCEDURE — 99283 EMERGENCY DEPT VISIT LOW MDM: CPT

## 2019-02-14 PROCEDURE — 71046 X-RAY EXAM CHEST 2 VIEWS: CPT

## 2019-02-14 PROCEDURE — 72050 X-RAY EXAM NECK SPINE 4/5VWS: CPT

## 2019-02-14 PROCEDURE — 25010000002 HYDROMORPHONE 1 MG/ML SOLUTION: Performed by: EMERGENCY MEDICINE

## 2019-02-14 RX ORDER — PROMETHAZINE HYDROCHLORIDE 25 MG/ML
12.5 INJECTION, SOLUTION INTRAMUSCULAR; INTRAVENOUS ONCE
Status: COMPLETED | OUTPATIENT
Start: 2019-02-14 | End: 2019-02-14

## 2019-02-14 RX ORDER — HYDROCODONE BITARTRATE AND ACETAMINOPHEN 5; 325 MG/1; MG/1
1 TABLET ORAL EVERY 6 HOURS PRN
Qty: 10 TABLET | Refills: 0 | Status: SHIPPED | OUTPATIENT
Start: 2019-02-14 | End: 2019-09-01

## 2019-02-14 RX ORDER — PREDNISONE 20 MG/1
20 TABLET ORAL 2 TIMES DAILY
Qty: 6 TABLET | Refills: 0 | OUTPATIENT
Start: 2019-02-14 | End: 2019-05-07 | Stop reason: HOSPADM

## 2019-02-14 RX ADMIN — HYDROMORPHONE HYDROCHLORIDE 1 MG: 1 INJECTION, SOLUTION INTRAMUSCULAR; INTRAVENOUS; SUBCUTANEOUS at 17:19

## 2019-02-14 RX ADMIN — PROMETHAZINE HYDROCHLORIDE 25 MG: 25 INJECTION INTRAMUSCULAR; INTRAVENOUS at 17:20

## 2019-02-14 NOTE — ED NOTES
Patient was assisting friends son while he was having a seizure and she thinks she hurt her neck area. Hx of cervical fusion. Friend told her she thinks it is her thyroid, so she wants to be evaluated.      Nat Anderson  02/14/19 0937

## 2019-02-14 NOTE — ED PROVIDER NOTES
" EMERGENCY DEPARTMENT ENCOUNTER    CHIEF COMPLAINT  Chief Complaint: L sided neck pain  History given by: patient   History limited by: nothing  Room Number: 08/08  PMD: Provider, No Known      HPI:  Pt is a 58 y.o. female who presents to the ED complaining of L sided neck pain that started a few days ago. Pt states her friends son was having a seizure and they tried to help and get him on his side and 2 days later pt felt a knot in her neck. Pt reports \"uncomfortable pain\" that radiates to her L arm and L hand. Pt denies abd pain. Pt states she has had neck surgery in the past. Pt reports a hx of asthma and diabetes. Pt is a smoker.     Duration: days   Onset: gradual  Timing: constant  Location: L side of neck  Radiation: L arm and L hand  Quality: \"uncomfortable\"  Intensity/Severity: moderate  Progression: unchanged   Associated Symptoms: L arm and L hand pain  Aggravating Factors: none mentioned  Alleviating Factors: none mentioned   Previous Episodes: pt states she has had neck surgery in the past   Treatment before arrival: none    PAST MEDICAL HISTORY  Active Ambulatory Problems     Diagnosis Date Noted   • Cervical radiculitis 01/26/2017   • Controlled type 2 diabetes mellitus without complication, without long-term current use of insulin (CMS/[facility]) 02/27/2018   • Diabetic peripheral neuropathy (CMS/[facility]) 02/27/2018   • Menopausal symptom 02/27/2018   • Adult acne 02/27/2018   • Hyperlipidemia 02/27/2018   • Arthritis    • DJD (degenerative joint disease), cervical    • Asthma    • Spinal stenosis    • Chronic pain    • COPD (chronic obstructive pulmonary disease) (CMS/[facility])    • GERD (gastroesophageal reflux disease)    • Alcohol abuse    • Edmonds esophagus      Resolved Ambulatory Problems     Diagnosis Date Noted   • No Resolved Ambulatory Problems     Past Medical History:   Diagnosis Date   • Alcohol abuse    • Arthritis    • Asthma    • Edmonds esophagus    • Cervical neuritis    • Chronic pain    • COPD " (chronic obstructive pulmonary disease) (CMS/HCC)    • Depression    • Diabetes mellitus (CMS/HCC)    • DJD (degenerative joint disease), cervical    • Foot spasms    • GERD (gastroesophageal reflux disease)    • Headache    • Hiatal hernia    • Hyperlipidemia    • Seasonal allergies    • Spinal stenosis        PAST SURGICAL HISTORY  Past Surgical History:   Procedure Laterality Date   • ANTERIOR CERVICAL DISCECTOMY W/ FUSION  11/2003    C5-6, C6-7   • CERCLAGE CERVIX     • CERVICAL EPIDURAL  2007   • CERVICAL FUSION     • DILATATION AND CURETTAGE     • ENDOMETRIAL ABLATION  2007   • ENDOSCOPY      MULTIPLE   • ENDOSCOPY N/A 7/5/2016    Procedure: ESOPHAGOGASTRODUODENOSCOPY WITH COLD BIOPSIES;  Surgeon: Jose Mcclellan MD;  Location: [facility] ENDOSCOPY;  Service:    • ESOPHAGEAL DILATATION  2009   • HAND SURGERY Bilateral     CARTILAGE   • NECK SURGERY     • NISSEN FUNDOPLICATION LAPAROSCOPIC  03/2012       FAMILY HISTORY  Family History   Problem Relation Age of Onset   • Cancer Mother    • Heart disease Father    • Lupus Maternal Aunt        SOCIAL HISTORY  Social History     Socioeconomic History   • Marital status:      Spouse name: Not on file   • Number of children: 2   • Years of education: Not on file   • Highest education level: Not on file   Social Needs   • Financial resource strain: Not on file   • Food insecurity - worry: Not on file   • Food insecurity - inability: Not on file   • Transportation needs - medical: Not on file   • Transportation needs - non-medical: Not on file   Occupational History   • Not on file   Tobacco Use   • Smoking status: Current Every Day Smoker     Packs/day: 0.50     Years: 15.00     Pack years: 7.50   • Smokeless tobacco: Never Used   Substance and Sexual Activity   • Alcohol use: No     Comment: sober since 2012   • Drug use: No   • Sexual activity: Yes     Partners: Male   Other Topics Concern   • Not on file   Social History Narrative    Works cleaning and caring  for the elderly.  Lives at home with her  and one of their 2 sons.       ALLERGIES  Fluoxetine; Lisinopril; Sulfa antibiotics; and Zofran [ondansetron hcl]    REVIEW OF SYSTEMS  Review of Systems   Constitutional: Negative for fever.   HENT: Negative for sore throat.    Eyes: Negative.    Respiratory: Negative for cough and shortness of breath.    Cardiovascular: Negative for chest pain.   Gastrointestinal: Negative for abdominal pain, diarrhea and vomiting.   Genitourinary: Negative for dysuria.   Musculoskeletal: Positive for neck pain (L side).        L arm and L hand pain   Skin: Negative for rash.   Neurological: Negative for weakness, numbness and headaches.   Hematological: Negative.    Psychiatric/Behavioral: Negative.    All other systems reviewed and are negative.      PHYSICAL EXAM  ED Triage Vitals [02/14/19 1643]   Temp Heart Rate Resp BP SpO2   97.4 °F (36.3 °C) 114 16 -- 98 %      Temp src Heart Rate Source Patient Position BP Location FiO2 (%)   -- -- -- -- --       Physical Exam   Constitutional: She is oriented to person, place, and time. No distress.   HENT:   Head: Normocephalic and atraumatic.   Eyes: EOM are normal. Pupils are equal, round, and reactive to light.   Neck: Normal range of motion. Neck supple.   Cardiovascular: Normal rate, regular rhythm and normal heart sounds.   Pulmonary/Chest: Effort normal and breath sounds normal. No respiratory distress.   Abdominal: Soft. There is no tenderness. There is no rebound and no guarding.   Musculoskeletal: Normal range of motion. She exhibits tenderness (to L side of back and L sternal clavicular joint). She exhibits no edema.   Neurological: She is alert and oriented to person, place, and time. She has normal sensation, normal strength, normal reflexes and intact cranial nerves.   Skin: Skin is warm and dry. No rash noted.   Psychiatric: Mood and affect normal.   Nursing note and vitals reviewed.      LAB RESULTS  Lab Results (last 24  hours)     ** No results found for the last 24 hours. **          I ordered the above labs and reviewed the results    RADIOLOGY  XR Spine Cervical Complete 4 or 5 View   Final Result      XR Chest 2 View   Final Result           I ordered the above noted radiological studies. Interpreted by radiologist. Reviewed by me in PACS.       PROCEDURES  Procedures      PROGRESS AND CONSULTS  ED Course as of Feb 14 1900   Thu Feb 14, 2019   1856 6:56 PM  Patient with left sided neck pain after helping with a person having a seizure.  Xrays negative.  May have a little radiculopathy.  Will start on steroids. Small amount of pain meds.  Follow up with Dr. Renteria.  [SL]      ED Course User Index  [SL] Radu Asher MD     1700  Ordered CXR and XR spine cervical for further evaluation and ordered phenergan and dilaudid for pain.     1855  Rechecked pt. Pt is resting comfortably. Notified pt of normal CXR and spine XR results. Discussed the plan to discharge. Pt understands and agrees with the plan, all questions answered.      MEDICAL DECISION MAKING  Results were reviewed/discussed with the patient and they were also made aware of online access. Pt also made aware that some labs, such as cultures, will not be resulted during ER visit and follow up with PMD is necessary.     MDM  Number of Diagnoses or Management Options     Amount and/or Complexity of Data Reviewed  Tests in the radiology section of CPT®: ordered and reviewed (CXR= nothing acute.)  Decide to obtain previous medical records or to obtain history from someone other than the patient: yes  Review and summarize past medical records: yes (Pt's previous medical records show pt was seen on 12/19/2018 at [facility] due to RUQ abd pain.)           DIAGNOSIS  Final diagnoses:   Acute strain of neck muscle, initial encounter   Pain of right sternoclavicular joint       DISPOSITION  DISCHARGE    Patient discharged in stable condition.    Reviewed  implications of results, diagnosis, meds, responsibility to follow up, warning signs and symptoms of possible worsening, potential complications and reasons to return to ER.    Patient/Family voiced understanding of above instructions.    Discussed plan for discharge, as there is no emergent indication for admission. Patient referred to primary care provider for BP management due to today's BP. Pt/family is agreeable and understands need for follow up and repeat testing.  Pt is aware that discharge does not mean that nothing is wrong but it indicates no emergency is present that requires admission and they must continue care with follow-up as given below or physician of their choice.     FOLLOW-UP  PATIENT LIAISON [facility] [address]  [phone]  Schedule an appointment as soon as possible for a visit       Radu Renteria MD  [address] [address]  [phone]    Schedule an appointment as soon as possible for a visit            Medication List      New Prescriptions    HYDROcodone-acetaminophen 5-325 MG per tablet  Commonly known as:  NORCO  Take 1 tablet by mouth Every 6 (Six) Hours As Needed for Moderate Pain .     predniSONE 20 MG tablet  Commonly known as:  DELTASONE  Take 1 tablet by mouth 2 (Two) Times a Day.              Latest Documented Vital Signs:  As of 7:00 PM  BP- 168/85 HR- 114 Temp- 97.4 °F (36.3 °C) O2 sat- 98%    --  Documentation assistance provided by kendra Hung for Dr. Lakeshia MD. Information recorded by the scribe was done at my direction and has been verified and validated by me.         Mirella Hung  02/14/19 0929       Radu Asher MD  02/14/19 [id number]

## 2019-03-01 ENCOUNTER — TELEPHONE (OUTPATIENT)
Dept: FAMILY MEDICINE CLINIC | Facility: CLINIC | Age: 59
End: 2019-03-01

## 2019-03-01 NOTE — TELEPHONE ENCOUNTER
Lmtc, I received a prior auth request regarding an rx for Tizanidine, it states Dr. Eugene has prescribed this. I do not see that she has prescribed this since last year. Is she taking this still?

## 2019-06-13 ENCOUNTER — APPOINTMENT (OUTPATIENT)
Dept: GENERAL RADIOLOGY | Facility: HOSPITAL | Age: 59
End: 2019-06-13

## 2019-06-13 ENCOUNTER — HOSPITAL ENCOUNTER (EMERGENCY)
Facility: HOSPITAL | Age: 59
Discharge: HOME | End: 2019-06-13
Attending: EMERGENCY MEDICINE | Admitting: EMERGENCY MEDICINE

## 2019-06-13 VITALS
RESPIRATION RATE: 18 BRPM | HEART RATE: 96 BPM | OXYGEN SATURATION: 98 % | HEIGHT: 66 IN | SYSTOLIC BLOOD PRESSURE: 178 MMHG | DIASTOLIC BLOOD PRESSURE: 86 MMHG | BODY MASS INDEX: 29.73 KG/M2 | WEIGHT: 185 LBS | TEMPERATURE: 97.2 F

## 2019-06-13 DIAGNOSIS — S20.211A CONTUSION OF RIGHT CHEST WALL, INITIAL ENCOUNTER: Primary | ICD-10-CM

## 2019-06-13 DIAGNOSIS — S83.8X2A SPRAIN OF OTHER LIGAMENT OF LEFT KNEE, INITIAL ENCOUNTER: ICD-10-CM

## 2019-06-13 LAB
BACTERIA UR QL AUTO: NORMAL /HPF
BILIRUB UR QL STRIP: NEGATIVE
CLARITY UR: CLEAR
COLOR UR: YELLOW
GLUCOSE UR STRIP-MCNC: NEGATIVE MG/DL
HGB UR QL STRIP.AUTO: NEGATIVE
HYALINE CASTS UR QL AUTO: NORMAL /LPF
KETONES UR QL STRIP: NEGATIVE
LEUKOCYTE ESTERASE UR QL STRIP.AUTO: ABNORMAL
NITRITE UR QL STRIP: NEGATIVE
PH UR STRIP.AUTO: 6.5 [PH] (ref 5–8)
PROT UR QL STRIP: NEGATIVE
RBC # UR: NORMAL /HPF
REF LAB TEST METHOD: NORMAL
SP GR UR STRIP: 1.01 (ref 1–1.03)
SQUAMOUS #/AREA URNS HPF: NORMAL /HPF
UROBILINOGEN UR QL STRIP: ABNORMAL
WBC UR QL AUTO: NORMAL /HPF

## 2019-06-13 PROCEDURE — 73560 X-RAY EXAM OF KNEE 1 OR 2: CPT

## 2019-06-13 PROCEDURE — 63710000001 PROMETHAZINE PER 25 MG: Performed by: EMERGENCY MEDICINE

## 2019-06-13 PROCEDURE — 71101 X-RAY EXAM UNILAT RIBS/CHEST: CPT

## 2019-06-13 PROCEDURE — 99284 EMERGENCY DEPT VISIT MOD MDM: CPT

## 2019-06-13 PROCEDURE — 81001 URINALYSIS AUTO W/SCOPE: CPT | Performed by: EMERGENCY MEDICINE

## 2019-06-13 RX ORDER — HYDROCODONE BITARTRATE AND ACETAMINOPHEN 5; 325 MG/1; MG/1
1 TABLET ORAL EVERY 6 HOURS PRN
Qty: 8 TABLET | Refills: 0 | Status: SHIPPED | OUTPATIENT
Start: 2019-06-13 | End: 2019-09-01

## 2019-06-13 RX ORDER — PROMETHAZINE HYDROCHLORIDE 25 MG/1
25 TABLET ORAL ONCE
Status: COMPLETED | OUTPATIENT
Start: 2019-06-13 | End: 2019-06-13

## 2019-06-13 RX ORDER — HYDROCODONE BITARTRATE AND ACETAMINOPHEN 7.5; 325 MG/1; MG/1
1 TABLET ORAL ONCE
Status: COMPLETED | OUTPATIENT
Start: 2019-06-13 | End: 2019-06-13

## 2019-06-13 RX ADMIN — HYDROCODONE BITARTRATE AND ACETAMINOPHEN 1 TABLET: 7.5; 325 TABLET ORAL at 19:32

## 2019-06-13 RX ADMIN — PROMETHAZINE HYDROCHLORIDE 25 MG: 25 TABLET ORAL at 19:38

## 2019-11-15 ENCOUNTER — APPOINTMENT (OUTPATIENT)
Dept: CT IMAGING | Facility: HOSPITAL | Age: 59
End: 2019-11-15

## 2019-11-15 ENCOUNTER — HOSPITAL ENCOUNTER (EMERGENCY)
Facility: HOSPITAL | Age: 59
Discharge: HOME | End: 2019-11-15
Attending: EMERGENCY MEDICINE | Admitting: EMERGENCY MEDICINE

## 2019-11-15 VITALS
BODY MASS INDEX: 29.05 KG/M2 | TEMPERATURE: 98.3 F | HEART RATE: 87 BPM | DIASTOLIC BLOOD PRESSURE: 78 MMHG | SYSTOLIC BLOOD PRESSURE: 150 MMHG | HEIGHT: 66 IN | RESPIRATION RATE: 16 BRPM | OXYGEN SATURATION: 98 %

## 2019-11-15 DIAGNOSIS — N23 RENAL COLIC ON LEFT SIDE: Primary | ICD-10-CM

## 2019-11-15 LAB
ALBUMIN SERPL-MCNC: 4.3 G/DL (ref 3.5–5.2)
ALBUMIN/GLOB SERPL: 1.4 G/DL
ALP SERPL-CCNC: 90 U/L (ref 39–117)
ALT SERPL W P-5'-P-CCNC: 11 U/L (ref 1–33)
ANION GAP SERPL CALCULATED.3IONS-SCNC: 10.2 MMOL/L (ref 5–15)
AST SERPL-CCNC: 14 U/L (ref 1–32)
BACTERIA UR QL AUTO: ABNORMAL /HPF
BASOPHILS # BLD AUTO: 0.06 10*3/MM3 (ref 0–0.2)
BASOPHILS NFR BLD AUTO: 0.6 % (ref 0–1.5)
BILIRUB SERPL-MCNC: <0.2 MG/DL (ref 0.2–1.2)
BILIRUB UR QL STRIP: NEGATIVE
BUN BLD-MCNC: 12 MG/DL (ref 6–20)
BUN/CREAT SERPL: 18.2 (ref 7–25)
CALCIUM SPEC-SCNC: 9.3 MG/DL (ref 8.6–10.5)
CHLORIDE SERPL-SCNC: 104 MMOL/L (ref 98–107)
CLARITY UR: CLEAR
CO2 SERPL-SCNC: 28.8 MMOL/L (ref 22–29)
COLOR UR: YELLOW
CREAT BLD-MCNC: 0.66 MG/DL (ref 0.57–1)
DEPRECATED RDW RBC AUTO: 45.6 FL (ref 37–54)
EOSINOPHIL # BLD AUTO: 0.46 10*3/MM3 (ref 0–0.4)
EOSINOPHIL NFR BLD AUTO: 4.4 % (ref 0.3–6.2)
ERYTHROCYTE [DISTWIDTH] IN BLOOD BY AUTOMATED COUNT: 13.1 % (ref 12.3–15.4)
GFR SERPL CREATININE-BSD FRML MDRD: 92 ML/MIN/1.73
GLOBULIN UR ELPH-MCNC: 3.1 GM/DL
GLUCOSE BLD-MCNC: 146 MG/DL (ref 65–99)
GLUCOSE UR STRIP-MCNC: NEGATIVE MG/DL
HCT VFR BLD AUTO: 37.5 % (ref 34–46.6)
HGB BLD-MCNC: 12.3 G/DL (ref 12–15.9)
HGB UR QL STRIP.AUTO: NEGATIVE
HYALINE CASTS UR QL AUTO: ABNORMAL /LPF
IMM GRANULOCYTES # BLD AUTO: 0.04 10*3/MM3 (ref 0–0.05)
IMM GRANULOCYTES NFR BLD AUTO: 0.4 % (ref 0–0.5)
KETONES UR QL STRIP: NEGATIVE
LEUKOCYTE ESTERASE UR QL STRIP.AUTO: ABNORMAL
LIPASE SERPL-CCNC: 35 U/L (ref 13–60)
LYMPHOCYTES # BLD AUTO: 4.27 10*3/MM3 (ref 0.7–3.1)
LYMPHOCYTES NFR BLD AUTO: 40.5 % (ref 19.6–45.3)
MCH RBC QN AUTO: 31.1 PG (ref 26.6–33)
MCHC RBC AUTO-ENTMCNC: 32.8 G/DL (ref 31.5–35.7)
MCV RBC AUTO: 94.9 FL (ref 79–97)
MONOCYTES # BLD AUTO: 0.75 10*3/MM3 (ref 0.1–0.9)
MONOCYTES NFR BLD AUTO: 7.1 % (ref 5–12)
NEUTROPHILS # BLD AUTO: 4.97 10*3/MM3 (ref 1.7–7)
NEUTROPHILS NFR BLD AUTO: 47 % (ref 42.7–76)
NITRITE UR QL STRIP: NEGATIVE
NRBC BLD AUTO-RTO: 0 /100 WBC (ref 0–0.2)
PH UR STRIP.AUTO: 6.5 [PH] (ref 5–8)
PLATELET # BLD AUTO: 261 10*3/MM3 (ref 140–450)
PMV BLD AUTO: 10.3 FL (ref 6–12)
POTASSIUM BLD-SCNC: 3.9 MMOL/L (ref 3.5–5.2)
PROT SERPL-MCNC: 7.4 G/DL (ref 6–8.5)
PROT UR QL STRIP: NEGATIVE
RBC # BLD AUTO: 3.95 10*6/MM3 (ref 3.77–5.28)
RBC # UR: ABNORMAL /HPF
REF LAB TEST METHOD: ABNORMAL
SODIUM BLD-SCNC: 143 MMOL/L (ref 136–145)
SP GR UR STRIP: 1.01 (ref 1–1.03)
SQUAMOUS #/AREA URNS HPF: ABNORMAL /HPF
UROBILINOGEN UR QL STRIP: ABNORMAL
WBC NRBC COR # BLD: 10.55 10*3/MM3 (ref 3.4–10.8)
WBC UR QL AUTO: ABNORMAL /HPF

## 2019-11-15 PROCEDURE — 25010000002 HYDROMORPHONE PER 4 MG: Performed by: EMERGENCY MEDICINE

## 2019-11-15 PROCEDURE — 99284 EMERGENCY DEPT VISIT MOD MDM: CPT

## 2019-11-15 PROCEDURE — 25010000002 KETOROLAC TROMETHAMINE PER 15 MG: Performed by: EMERGENCY MEDICINE

## 2019-11-15 PROCEDURE — 96375 TX/PRO/DX INJ NEW DRUG ADDON: CPT

## 2019-11-15 PROCEDURE — 81001 URINALYSIS AUTO W/SCOPE: CPT | Performed by: EMERGENCY MEDICINE

## 2019-11-15 PROCEDURE — 80053 COMPREHEN METABOLIC PANEL: CPT | Performed by: EMERGENCY MEDICINE

## 2019-11-15 PROCEDURE — 96374 THER/PROPH/DIAG INJ IV PUSH: CPT

## 2019-11-15 PROCEDURE — 85025 COMPLETE CBC W/AUTO DIFF WBC: CPT | Performed by: EMERGENCY MEDICINE

## 2019-11-15 PROCEDURE — 25010000002 PROMETHAZINE PER 50 MG: Performed by: EMERGENCY MEDICINE

## 2019-11-15 PROCEDURE — 96361 HYDRATE IV INFUSION ADD-ON: CPT

## 2019-11-15 PROCEDURE — 83690 ASSAY OF LIPASE: CPT | Performed by: EMERGENCY MEDICINE

## 2019-11-15 PROCEDURE — 74176 CT ABD & PELVIS W/O CONTRAST: CPT

## 2019-11-15 RX ORDER — CEFUROXIME AXETIL 500 MG/1
500 TABLET ORAL 2 TIMES DAILY
Qty: 14 TABLET | Refills: 0 | Status: SHIPPED | OUTPATIENT
Start: 2019-11-15 | End: 2021-03-03

## 2019-11-15 RX ORDER — CEPHALEXIN 500 MG/1
500 CAPSULE ORAL ONCE
Status: COMPLETED | OUTPATIENT
Start: 2019-11-15 | End: 2019-11-15

## 2019-11-15 RX ORDER — HYDROMORPHONE HYDROCHLORIDE 1 MG/ML
0.5 INJECTION, SOLUTION INTRAMUSCULAR; INTRAVENOUS; SUBCUTANEOUS ONCE
Status: COMPLETED | OUTPATIENT
Start: 2019-11-15 | End: 2019-11-15

## 2019-11-15 RX ORDER — PROMETHAZINE HYDROCHLORIDE 12.5 MG/1
12.5 TABLET ORAL EVERY 6 HOURS PRN
Qty: 12 TABLET | Refills: 0 | Status: SHIPPED | OUTPATIENT
Start: 2019-11-15 | End: 2021-03-03

## 2019-11-15 RX ORDER — HYDROCODONE BITARTRATE AND ACETAMINOPHEN 5; 325 MG/1; MG/1
1 TABLET ORAL EVERY 6 HOURS PRN
Qty: 12 TABLET | Refills: 0 | Status: SHIPPED | OUTPATIENT
Start: 2019-11-15 | End: 2021-03-03

## 2019-11-15 RX ORDER — SODIUM CHLORIDE 0.9 % (FLUSH) 0.9 %
10 SYRINGE (ML) INJECTION AS NEEDED
Status: DISCONTINUED | OUTPATIENT
Start: 2019-11-15 | End: 2019-11-15 | Stop reason: HOSPADM

## 2019-11-15 RX ORDER — HYDROCODONE BITARTRATE AND ACETAMINOPHEN 7.5; 325 MG/1; MG/1
1 TABLET ORAL ONCE
Status: COMPLETED | OUTPATIENT
Start: 2019-11-15 | End: 2019-11-15

## 2019-11-15 RX ORDER — PROMETHAZINE HYDROCHLORIDE 25 MG/ML
6.25 INJECTION, SOLUTION INTRAMUSCULAR; INTRAVENOUS ONCE
Status: COMPLETED | OUTPATIENT
Start: 2019-11-15 | End: 2019-11-15

## 2019-11-15 RX ORDER — KETOROLAC TROMETHAMINE 15 MG/ML
10 INJECTION, SOLUTION INTRAMUSCULAR; INTRAVENOUS ONCE
Status: COMPLETED | OUTPATIENT
Start: 2019-11-15 | End: 2019-11-15

## 2019-11-15 RX ORDER — LOSARTAN POTASSIUM AND HYDROCHLOROTHIAZIDE 12.5; 1 MG/1; MG/1
1 TABLET ORAL DAILY
COMMUNITY
Start: 2019-10-04 | End: 2020-10-03

## 2019-11-15 RX ADMIN — HYDROMORPHONE HYDROCHLORIDE 0.5 MG: 10 INJECTION INTRAMUSCULAR; INTRAVENOUS; SUBCUTANEOUS at 00:46

## 2019-11-15 RX ADMIN — SODIUM CHLORIDE, POTASSIUM CHLORIDE, SODIUM LACTATE AND CALCIUM CHLORIDE 1000 ML: 600; 310; 30; 20 INJECTION, SOLUTION INTRAVENOUS at 00:46

## 2019-11-15 RX ADMIN — KETOROLAC TROMETHAMINE 10 MG: 15 INJECTION, SOLUTION INTRAMUSCULAR; INTRAVENOUS at 00:46

## 2019-11-15 RX ADMIN — CEPHALEXIN 500 MG: 500 CAPSULE ORAL at 02:36

## 2019-11-15 RX ADMIN — HYDROCODONE BITARTRATE AND ACETAMINOPHEN 1 TABLET: 7.5; 325 TABLET ORAL at 02:36

## 2019-11-15 RX ADMIN — PROMETHAZINE HYDROCHLORIDE 6.25 MG: 25 INJECTION INTRAMUSCULAR; INTRAVENOUS at 00:46

## 2019-11-15 NOTE — ED NOTES
Pt c/o L flank pain that is worse with movement and radiates to LLQ abd. Hx of kidney stones. Denies urinary symptoms. +nausea. Denies v/d.      Katie Velázquez, RN  11/15/19 0007

## 2019-11-15 NOTE — DISCHARGE INSTRUCTIONS
Take medications as prescribed.  Drink plenty of fluids.  Follow-up with your primary care doctor later today as scheduled.  Return to emergency department for worsening pain, persistent vomiting, fever, difficulty urinating, or other concern.

## 2019-11-15 NOTE — ED PROVIDER NOTES
" EMERGENCY DEPARTMENT ENCOUNTER    CHIEF COMPLAINT  Chief Complaint: Left flank pain  History given by: Patient  History limited by: None  Room Number: 28/28  PMD: Denice Benson MD      HPI:  Pt is a 59 y.o. female who presents complaining of gradual onset of intermittent left flank pain which radiates to left groin and is associated with nausea that started PTA. Pt reports her sxs are progressively worsening since onset. Pt reports that laying down helps provide sxs relief and movement aggravates her current sxs. Pt reports her urine looks \"concentrated.\" Pt denies fever, chills, dysuria, hematuria, abd pain, vomiting, diarrhea, CP, SOA. Pt reports PMHx of kidney stones and chronic low back pain.    Duration:  PTA  Onset: Gradual  Timing: Intermittent  Location: Left flank  Radiation: Left groin  Intensity/Severity: Moderate  Progression: Worsening   Associated Symptoms: Nausea  Aggravating Factors: Movement  Alleviating Factors: Laying down      PAST MEDICAL HISTORY  Active Ambulatory Problems     Diagnosis Date Noted   • Cervical radiculitis 01/26/2017   • Controlled type 2 diabetes mellitus without complication, without long-term current use of insulin (CMS/[facility]) 02/27/2018   • Diabetic peripheral neuropathy (CMS/[facility]) 02/27/2018   • Menopausal symptom 02/27/2018   • Adult acne 02/27/2018   • Hyperlipidemia 02/27/2018   • Arthritis    • DJD (degenerative joint disease), cervical    • Asthma    • Spinal stenosis    • Chronic pain    • COPD (chronic obstructive pulmonary disease) (CMS/[facility])    • GERD (gastroesophageal reflux disease)    • Alcohol abuse    • Edmonds esophagus      Resolved Ambulatory Problems     Diagnosis Date Noted   • No Resolved Ambulatory Problems     Past Medical History:   Diagnosis Date   • Alcohol abuse    • Arthritis    • Asthma    • Edmonds esophagus    • Cervical neuritis    • Chronic pain    • COPD (chronic obstructive pulmonary disease) (CMS/[facility])    • Depression    • Diabetes " mellitus (CMS/HCC)    • DJD (degenerative joint disease), cervical    • Foot spasms    • GERD (gastroesophageal reflux disease)    • Headache    • Hiatal hernia    • Hyperlipidemia    • Seasonal allergies    • Spinal stenosis        PAST SURGICAL HISTORY  Past Surgical History:   Procedure Laterality Date   • ANTERIOR CERVICAL DISCECTOMY W/ FUSION  11/2003    C5-6, C6-7   • CERCLAGE CERVIX     • CERVICAL EPIDURAL  2007   • CERVICAL FUSION     • DILATATION AND CURETTAGE     • ENDOMETRIAL ABLATION  2007   • ENDOSCOPY      MULTIPLE   • ENDOSCOPY N/A 7/5/2016    Procedure: ESOPHAGOGASTRODUODENOSCOPY WITH COLD BIOPSIES;  Surgeon: Jose Mcclellan MD;  Location: [facility] ENDOSCOPY;  Service:    • ESOPHAGEAL DILATATION  2009   • HAND SURGERY Bilateral     CARTILAGE   • NECK SURGERY     • NISSEN FUNDOPLICATION LAPAROSCOPIC  03/2012       FAMILY HISTORY  Family History   Problem Relation Age of Onset   • Cancer Mother    • Heart disease Father    • Lupus Maternal Aunt        SOCIAL HISTORY  Social History     Socioeconomic History   • Marital status:      Spouse name: Not on file   • Number of children: 2   • Years of education: Not on file   • Highest education level: Not on file   Tobacco Use   • Smoking status: Current Every Day Smoker     Packs/day: 0.50     Years: 15.00     Pack years: 7.50   • Smokeless tobacco: Never Used   Substance and Sexual Activity   • Alcohol use: No     Comment: sober since 2012   • Drug use: No   • Sexual activity: Yes     Partners: Male   Social History Narrative    Works cleaning and caring for the elderly.  Lives at home with her  and one of their 2 sons.       ALLERGIES  Fluoxetine; Lisinopril; Sulfa antibiotics; and Zofran [ondansetron hcl]    REVIEW OF SYSTEMS  Review of Systems   Constitutional: Negative for chills and fever.   HENT: Negative for sore throat.    Eyes: Negative.    Respiratory: Negative for cough and shortness of breath.    Cardiovascular: Negative for chest  pain.   Gastrointestinal: Positive for nausea. Negative for abdominal pain, diarrhea and vomiting.   Genitourinary: Negative for dysuria and hematuria.   Musculoskeletal: Positive for arthralgias (left flank). Negative for neck pain.   Skin: Negative for rash.   Allergic/Immunologic: Negative.    Neurological: Negative for weakness, numbness and headaches.   Hematological: Negative.    Psychiatric/Behavioral: Negative.    All other systems reviewed and are negative.      PHYSICAL EXAM  ED Triage Vitals   Temp Heart Rate Resp BP SpO2   11/15/19 0007 11/15/19 0007 11/15/19 0007 11/15/19 0035 11/15/19 0007   98 °F (36.7 °C) 113 16 (!) 168/103 98 %      Temp src Heart Rate Source Patient Position BP Location FiO2 (%)   11/15/19 0007 11/15/19 0007 -- -- --   Tympanic Monitor            Physical Exam   Constitutional: She is oriented to person, place, and time. Distressed: mild.   Appears uncomfortable    HENT:   Head: Normocephalic and atraumatic.   Eyes: EOM are normal. Pupils are equal, round, and reactive to light.   Neck: Normal range of motion. Neck supple.   Cardiovascular: Normal rate, regular rhythm and normal heart sounds.   Pulmonary/Chest: Effort normal and breath sounds normal. No respiratory distress.   Abdominal: Soft. Bowel sounds are normal. She exhibits no distension. There is no tenderness. There is no rebound and no guarding.   Musculoskeletal: Normal range of motion. She exhibits tenderness (left CVA). She exhibits no edema.   Neurological: She is alert and oriented to person, place, and time. She has normal sensation and normal strength.   Skin: Skin is warm and dry. No rash noted.   Psychiatric: Mood and affect normal.   Nursing note and vitals reviewed.      LAB RESULTS  Lab Results (last 24 hours)     Procedure Component Value Units Date/Time    CBC & Differential [[id number]] Collected:  11/15/19 0045    Specimen:  Blood Updated:  11/15/19 0056    Narrative:       The following orders were created  for panel order CBC & Differential.  Procedure                               Abnormality         Status                     ---------                               -----------         ------                     CBC Auto Differential[[id number]]        Abnormal            Final result                 Please view results for these tests on the individual orders.    Comprehensive Metabolic Panel [[id number]]  (Abnormal) Collected:  11/15/19 0045    Specimen:  Blood Updated:  11/15/19 0116     Glucose 146 mg/dL      BUN 12 mg/dL      Creatinine 0.66 mg/dL      Sodium 143 mmol/L      Potassium 3.9 mmol/L      Chloride 104 mmol/L      CO2 28.8 mmol/L      Calcium 9.3 mg/dL      Total Protein 7.4 g/dL      Albumin 4.30 g/dL      ALT (SGPT) 11 U/L      AST (SGOT) 14 U/L      Alkaline Phosphatase 90 U/L      Total Bilirubin <0.2 mg/dL      eGFR Non African Amer 92 mL/min/1.73      Globulin 3.1 gm/dL      A/G Ratio 1.4 g/dL      BUN/Creatinine Ratio 18.2     Anion Gap 10.2 mmol/L     Narrative:       GFR Normal >60  Chronic Kidney Disease <60  Kidney Failure <15    Lipase [[id number]]  (Normal) Collected:  11/15/19 0045    Specimen:  Blood Updated:  11/15/19 0116     Lipase 35 U/L     CBC Auto Differential [[id number]]  (Abnormal) Collected:  11/15/19 0045    Specimen:  Blood Updated:  11/15/19 0056     WBC 10.55 10*3/mm3      RBC 3.95 10*6/mm3      Hemoglobin 12.3 g/dL      Hematocrit 37.5 %      MCV 94.9 fL      MCH 31.1 pg      MCHC 32.8 g/dL      RDW 13.1 %      RDW-SD 45.6 fl      MPV 10.3 fL      Platelets 261 10*3/mm3      Neutrophil % 47.0 %      Lymphocyte % 40.5 %      Monocyte % 7.1 %      Eosinophil % 4.4 %      Basophil % 0.6 %      Immature Grans % 0.4 %      Neutrophils, Absolute 4.97 10*3/mm3      Lymphocytes, Absolute 4.27 10*3/mm3      Monocytes, Absolute 0.75 10*3/mm3      Eosinophils, Absolute 0.46 10*3/mm3      Basophils, Absolute 0.06 10*3/mm3      Immature Grans, Absolute 0.04 10*3/mm3      nRBC 0.0 /100  WBC     Urinalysis With Microscopic If Indicated (No Culture) - Urine, Clean Catch [[id number]]  (Abnormal) Collected:  11/15/19 0123    Specimen:  Urine, Clean Catch Updated:  11/15/19 0201     Color, UA Yellow     Appearance, UA Clear     pH, UA 6.5     Specific Gravity, UA 1.008     Glucose, UA Negative     Ketones, UA Negative     Bilirubin, UA Negative     Blood, UA Negative     Protein, UA Negative     Leuk Esterase, UA Large (3+)     Nitrite, UA Negative     Urobilinogen, UA 0.2 E.U./dL    Urinalysis, Microscopic Only - Urine, Clean Catch [[id number]]  (Abnormal) Collected:  11/15/19 0123    Specimen:  Urine, Clean Catch Updated:  11/15/19 0211     RBC, UA None Seen /HPF      WBC, UA 13-20 /HPF      Bacteria, UA None Seen /HPF      Squamous Epithelial Cells, UA None Seen /HPF      Hyaline Casts, UA None Seen /LPF      Methodology Manual Light Microscopy          I ordered the above labs and reviewed the results    RADIOLOGY  CT Abdomen Pelvis Without Contrast   Preliminary Result   1.  Mild constipation without obstruction or acute inflammation as best   assessed by noncontrast technique                                   I ordered the above noted radiological studies. Interpreted by radiologist. Reviewed by me in PACS.       PROCEDURES  Procedures      PROGRESS AND CONSULTS    0029: Ordered labs and imaging studies for further evaluation and management. Ordered Phenergan for Nausea. Ordered Toradol and Dilaudid for pain.       ED Course as of Nov 15 0251   Fri Nov 15, 2019   0212 Test results discussed with the patient.  She is resting comfortably.  Her pain is improved after IV medications.  She will be discharged with prescriptions for Norco, Phenergan, and Ceftin.  [WH]   0219 BEL report was not available.  I believe the medical necessity for and safety in prescribing the controlled substance substantially outweighs the risk of unlawful use or diversion of the controlled substance.    [WH]      ED Course  User Index  [WH] Kelvin Marc MD            MEDICAL DECISION MAKING  Results were reviewed/discussed with the patient and they were also made aware of online access. Pt also made aware that some labs, such as cultures, will not be resulted during ER visit and follow up with PMD is necessary.     MDM  Number of Diagnoses or Management Options  Renal colic on left side:   Diagnosis management comments: Patient's symptoms were suggestive of renal colic.  CT scan did not show any obvious ureteral stones.  There were some WBCs and the patient's urine.  Pain was well controlled with IV Toradol and Dilaudid.  Patient will be discharged with prescriptions for Norco, Phenergan, and Ceftin.  Return precautions were discussed with the patient.  Patient has an appointment with her primary care doctor later today.       Amount and/or Complexity of Data Reviewed  Clinical lab tests: reviewed  Tests in the radiology section of CPT®: reviewed           DIAGNOSIS  Final diagnoses:   Renal colic on left side       DISPOSITION  DISCHARGE    Patient discharged in stable condition.    Reviewed implications of results, diagnosis, meds, responsibility to follow up, warning signs and symptoms of possible worsening, potential complications and reasons to return to ER.    Patient/Family voiced understanding of above instructions.    Discussed plan for discharge, as there is no emergent indication for admission. Patient referred to primary care provider for BP management due to today's BP. Pt/family is agreeable and understands need for follow up and repeat testing.  Pt is aware that discharge does not mean that nothing is wrong but it indicates no emergency is present that requires admission and they must continue care with follow-up as given below or physician of their choice.     FOLLOW-UP  Denice Benson MD  [address] [place], [place] [id number] [phone]    Go today  As scheduled         Medication List      New  Prescriptions    cefuroxime 500 MG tablet  Commonly known as:  CEFTIN  Take 1 tablet by mouth 2 (Two) Times a Day.     HYDROcodone-acetaminophen 5-325 MG per tablet  Commonly known as:  NORCO  Take 1 tablet by mouth Every 6 (Six) Hours As Needed for Moderate Pain .        Changed    promethazine 12.5 MG tablet  Commonly known as:  PHENERGAN  Take 1 tablet by mouth Every 6 (Six) Hours As Needed for Nausea or   Vomiting.  What changed:    medication strength  how much to take  when to take this  reasons to take this              Latest Documented Vital Signs:  As of 2:51 AM  BP- 150/78 HR- 87 Temp- 98.3 °F (36.8 °C) (Oral) O2 sat- 98%    --  Documentation assistance provided by kendra Cyr for Dr. Yasir Marc MD. Information recorded by the bassemibalejandro was done at my direction and has been verified and validated by me.         Sierra Cyr  11/15/19 0222       Kelvin Marc MD  11/15/19 0251

## 2020-06-24 ENCOUNTER — APPOINTMENT (OUTPATIENT)
Dept: GENERAL RADIOLOGY | Facility: HOSPITAL | Age: 60
End: 2020-06-24

## 2020-06-24 ENCOUNTER — HOSPITAL ENCOUNTER (EMERGENCY)
Facility: HOSPITAL | Age: 60
Discharge: HOME | End: 2020-06-24
Attending: EMERGENCY MEDICINE | Admitting: EMERGENCY MEDICINE

## 2020-06-24 VITALS
OXYGEN SATURATION: 97 % | TEMPERATURE: 96.6 F | DIASTOLIC BLOOD PRESSURE: 90 MMHG | RESPIRATION RATE: 16 BRPM | SYSTOLIC BLOOD PRESSURE: 158 MMHG | HEART RATE: 74 BPM | WEIGHT: 175 LBS | HEIGHT: 66 IN | BODY MASS INDEX: 28.12 KG/M2

## 2020-06-24 DIAGNOSIS — M54.2 NECK PAIN: Primary | ICD-10-CM

## 2020-06-24 PROCEDURE — 99283 EMERGENCY DEPT VISIT LOW MDM: CPT

## 2020-06-24 PROCEDURE — 63710000001 PROMETHAZINE PER 25 MG: Performed by: EMERGENCY MEDICINE

## 2020-06-24 PROCEDURE — 72050 X-RAY EXAM NECK SPINE 4/5VWS: CPT

## 2020-06-24 RX ORDER — PROMETHAZINE HYDROCHLORIDE 25 MG/1
25 TABLET ORAL ONCE
Status: COMPLETED | OUTPATIENT
Start: 2020-06-24 | End: 2020-06-24

## 2020-06-24 RX ADMIN — PROMETHAZINE HYDROCHLORIDE 25 MG: 25 TABLET ORAL at 20:42

## 2020-06-24 NOTE — ED TRIAGE NOTES
Pt states that she tripped going down the steps last night and aggravated her existing C5 fracture. States that her pain has been worse today. Pt masked at arrival and triage staff had all appropriate PPE.

## 2020-06-25 NOTE — ED PROVIDER NOTES
" EMERGENCY DEPARTMENT ENCOUNTER    Room Number:  26/26  Date of encounter:  6/24/2020  PCP: Denice Benson MD  Historian: Patient      HPI:  Chief Complaint: Neck pain  A complete HPI/ROS/PMH/PSH/SH/FH are unobtainable due to: Nothing    Context: Kristina Yates is a 59 y.o. female who presents to the ED c/o severe neck pain.  Patient has a history of neck problems, and said that she had a cervical fusion about 15 years ago by Dr. Renteria.  She said that she has been having some increased pain in the neck over the last few weeks, and she reports that she had an x-ray performed at her PCPs office last week which showed \"a broken bone\".    Patient has chronic radiculopathy in the right arm and hand, and does have some sensory deficit in that hand at baseline.  She said that none of this is gotten worse.    Last night she was carrying a laundry when she fell and hit the back of her head.  She said that since then the pain is increased in her neck, but she has no new weakness or numbness.      PAST MEDICAL HISTORY  Active Ambulatory Problems     Diagnosis Date Noted   • Cervical radiculitis 01/26/2017   • Controlled type 2 diabetes mellitus without complication, without long-term current use of insulin (CMS/[facility]) 02/27/2018   • Diabetic peripheral neuropathy (CMS/[facility]) 02/27/2018   • Menopausal symptom 02/27/2018   • Adult acne 02/27/2018   • Hyperlipidemia 02/27/2018   • Arthritis    • DJD (degenerative joint disease), cervical    • Asthma    • Spinal stenosis    • Chronic pain    • COPD (chronic obstructive pulmonary disease) (CMS/[facility])    • GERD (gastroesophageal reflux disease)    • Alcohol abuse    • Edmonds esophagus      Resolved Ambulatory Problems     Diagnosis Date Noted   • No Resolved Ambulatory Problems     Past Medical History:   Diagnosis Date   • Cervical neuritis    • Depression    • Diabetes mellitus (CMS/[facility])    • Foot spasms    • Headache    • Hiatal hernia    • Seasonal allergies          PAST SURGICAL " HISTORY  Past Surgical History:   Procedure Laterality Date   • ANTERIOR CERVICAL DISCECTOMY W/ FUSION  11/2003    C5-6, C6-7   • CERCLAGE CERVIX     • CERVICAL EPIDURAL  2007   • CERVICAL FUSION     • DILATATION AND CURETTAGE     • ENDOMETRIAL ABLATION  2007   • ENDOSCOPY      MULTIPLE   • ENDOSCOPY N/A 7/5/2016    Procedure: ESOPHAGOGASTRODUODENOSCOPY WITH COLD BIOPSIES;  Surgeon: Jose Mcclellan MD;  Location: [facility] ENDOSCOPY;  Service:    • ESOPHAGEAL DILATATION  2009   • HAND SURGERY Bilateral     CARTILAGE   • NECK SURGERY     • NISSEN FUNDOPLICATION LAPAROSCOPIC  03/2012         FAMILY HISTORY  Family History   Problem Relation Age of Onset   • Cancer Mother    • Heart disease Father    • Lupus Maternal Aunt          SOCIAL HISTORY  Social History     Socioeconomic History   • Marital status:      Spouse name: Not on file   • Number of children: 2   • Years of education: Not on file   • Highest education level: Not on file   Tobacco Use   • Smoking status: Current Every Day Smoker     Packs/day: 0.50     Years: 15.00     Pack years: 7.50   • Smokeless tobacco: Never Used   Substance and Sexual Activity   • Alcohol use: No     Comment: sober since 2012   • Drug use: No   • Sexual activity: Yes     Partners: Male   Social History Narrative    Works cleaning and caring for the elderly.  Lives at home with her  and one of their 2 sons.         ALLERGIES  Fluoxetine; Lisinopril; Sulfa antibiotics; and Zofran [ondansetron hcl]        REVIEW OF SYSTEMS  Review of Systems   All systems reviewed and negative except for those discussed in HPI.       PHYSICAL EXAM    I have reviewed the triage vital signs and nursing notes.    ED Triage Vitals   Temp Heart Rate Resp BP SpO2   06/24/20 1902 06/24/20 1902 06/24/20 1902 06/24/20 1904 06/24/20 1902   96.6 °F (35.9 °C) 114 18 161/94 94 %      Temp src Heart Rate Source Patient Position BP Location FiO2 (%)   06/24/20 1902 06/24/20 1902 06/24/20 1904 -- --    Tympanic Monitor Standing         Physical Exam  GENERAL: Awake and alert, nondistressed  HENT: nares patent.  There is some soft tissue tenderness in the right paracervical region adjacent to C7-T2.  There is no midline tenderness or step-off.  Range of motion is slightly limited due to pain.  EYES: no scleral icterus  CV: regular rhythm, regular rate  RESPIRATORY: normal effort  ABDOMEN: soft  MUSCULOSKELETAL: no deformity  NEURO: alert, moves all extremities, follows commands.  Strength is normal and symmetric in the upper extremities, reflexes are symmetric, but there is some loss of fine point sensation in the right hand.  SKIN: warm, dry        LAB RESULTS  No results found for this or any previous visit (from the past 24 hour(s)).    Ordered the above labs and independently reviewed the results.        RADIOLOGY  Xr Spine Cervical Complete 4 Or 5 View    Result Date: 6/24/2020  XR SPINE CERVICAL COMPLETE 4 OR 5 VW-  06/24/2020  HISTORY: Neck pain. Fell.  FINDINGS: AP lateral and oblique views are submitted. There is an anterior cervical plate fusing C5, C6 and C7. There is fracture of the superiormost leftward screw anchoring the compression plate. This was also present on the 02/14/2019 study.  There is normal alignment. There is osseous fusion of the vertebral bodies of C5-6 and C6-7. Anterior osteophytosis is seen at the C4-5 level.  No acute fractures are seen. The neural foramen appear relatively well-maintained except for slight narrowing on the left at C4-5 and slight narrowing on the right at C3-4.  Prevertebral soft tissues appear normal.      1. Postoperative changes of the cervical spine. Fracture of a superior most screw anchoring the surgical plate is again seen, unchanged from 02/14/2019. 2. No acute process identified.         I ordered the above noted radiological studies. Reviewed by me and discussed with radiologist.  See dictation for official radiology  interpretation.      PROCEDURES    Procedures      MEDICATIONS GIVEN IN ER    Medications   promethazine (PHENERGAN) tablet 25 mg (25 mg Oral Given 6/24/20 2042)         PROGRESS, DATA ANALYSIS, CONSULTS, AND MEDICAL DECISION MAKING    All labs have been independently reviewed by me.  All radiology studies have been reviewed by me and discussed with radiologist dictating the report.   EKG's independently viewed and interpreted by me.  Discussion below represents my analysis of pertinent findings related to patient's condition, differential diagnosis, treatment plan and final disposition.        ED Course as of Jun 24 2144   Wed Jun 24, 2020 2143 Cervical spine series shows no acute fracture.  No acute malalignment.  The fusion hardware is seen from C3-C5, and the most superior screw is fractured at the end.  There is no bony fracture.  Of note this is reported to be unchanged from imaging on February 2019    [DP]   2143 Patient asked if I could give her some Phenergan so that she could make sure and get her pain medicine ingested at home.  She asked for a cervical collar which I gave her.  She plans to follow-up with her neurosurgeon, which is now Dr. Hadley at [place]    [DP]      ED Course User Index  [DP] Milton Medeiros MD               AS OF 21:44 VITALS:    BP - 158/90  HR - 74  TEMP - 96.6 °F (35.9 °C) (Tympanic)  O2 SATS - 97%        DIAGNOSIS  Final diagnoses:   Neck pain         DISPOSITION  Discharge           Milton Medeiros MD  06/24/20 2144

## 2021-02-05 ENCOUNTER — IMMUNIZATION (OUTPATIENT)
Dept: VACCINE CLINIC | Facility: HOSPITAL | Age: 61
End: 2021-02-05

## 2021-02-05 PROCEDURE — 0001A: CPT | Performed by: THORACIC SURGERY (CARDIOTHORACIC VASCULAR SURGERY)

## 2021-02-05 PROCEDURE — 91300 HC SARSCOV02 VAC 30MCG/0.3ML IM: CPT | Performed by: THORACIC SURGERY (CARDIOTHORACIC VASCULAR SURGERY)

## 2021-02-26 ENCOUNTER — APPOINTMENT (OUTPATIENT)
Dept: VACCINE CLINIC | Facility: HOSPITAL | Age: 61
End: 2021-02-26

## 2021-03-03 ENCOUNTER — OFFICE VISIT (OUTPATIENT)
Dept: FAMILY MEDICINE CLINIC | Facility: CLINIC | Age: 61
End: 2021-03-03

## 2021-03-03 VITALS
WEIGHT: 177 LBS | DIASTOLIC BLOOD PRESSURE: 106 MMHG | BODY MASS INDEX: 28.45 KG/M2 | HEIGHT: 66 IN | SYSTOLIC BLOOD PRESSURE: 182 MMHG | RESPIRATION RATE: 18 BRPM

## 2021-03-03 DIAGNOSIS — E11.9 CONTROLLED TYPE 2 DIABETES MELLITUS WITHOUT COMPLICATION, WITHOUT LONG-TERM CURRENT USE OF INSULIN (HCC): ICD-10-CM

## 2021-03-03 DIAGNOSIS — E78.5 HYPERLIPIDEMIA, UNSPECIFIED HYPERLIPIDEMIA TYPE: ICD-10-CM

## 2021-03-03 DIAGNOSIS — M25.541 ARTHRALGIA OF BOTH HANDS: Primary | ICD-10-CM

## 2021-03-03 DIAGNOSIS — M25.542 ARTHRALGIA OF BOTH HANDS: Primary | ICD-10-CM

## 2021-03-03 DIAGNOSIS — K21.9 GASTROESOPHAGEAL REFLUX DISEASE WITHOUT ESOPHAGITIS: ICD-10-CM

## 2021-03-03 DIAGNOSIS — Z01.89 ROUTINE LAB DRAW: ICD-10-CM

## 2021-03-03 PROCEDURE — 99214 OFFICE O/P EST MOD 30 MIN: CPT | Performed by: NURSE PRACTITIONER

## 2021-03-03 RX ORDER — LOSARTAN POTASSIUM AND HYDROCHLOROTHIAZIDE 25; 100 MG/1; MG/1
1 TABLET ORAL DAILY
COMMUNITY
Start: 2021-02-03

## 2021-03-03 RX ORDER — AMITRIPTYLINE HYDROCHLORIDE 25 MG/1
25 TABLET, FILM COATED ORAL NIGHTLY
Qty: 30 TABLET | Refills: 1 | Status: SHIPPED | OUTPATIENT
Start: 2021-03-03 | End: 2021-05-24

## 2021-03-03 RX ORDER — MELOXICAM 7.5 MG/1
7.5 TABLET ORAL DAILY
COMMUNITY

## 2021-03-03 NOTE — PROGRESS NOTES
Subjective   Kristina Yates is a 60 y.o. female.   Hypertension, Diabetes, Anxiety, and Arthritis    History of Present Illness   Patient was here before 2018 and then moved to another practice because of her insurance.  She has history of hypertension, diabetes, anxiety and arthritis.  She is stable on all her current medications which we have reviewed in the chart.  BP recheck was 138/84.  She denies any complaints other than hand pain secondary to her arthritis.  The following portions of the patient's history were reviewed and updated as appropriate: allergies, current medications, past family history, past medical history, past social history, past surgical history and problem list.    Review of Systems   Constitutional: Negative for activity change, appetite change, chills and fever.   HENT: Negative for congestion.    Eyes: Negative for pain.   Respiratory: Negative for cough and shortness of breath.    Cardiovascular: Negative for chest pain and leg swelling.   Gastrointestinal: Negative for nausea and vomiting.   Genitourinary: Negative for difficulty urinating.   Skin: Negative for rash.   Neurological: Negative for dizziness, facial asymmetry and headache.   Psychiatric/Behavioral: Negative for sleep disturbance. The patient is nervous/anxious.        Objective   Physical Exam  Vitals signs and nursing note reviewed.   Constitutional:       General: She is not in acute distress.     Appearance: She is well-developed.   HENT:      Head: Normocephalic and atraumatic.      Right Ear: External ear normal.      Left Ear: External ear normal.   Neck:      Musculoskeletal: Neck supple.      Thyroid: No thyromegaly.   Cardiovascular:      Rate and Rhythm: Normal rate and regular rhythm.      Heart sounds: Normal heart sounds.   Pulmonary:      Effort: Pulmonary effort is normal.      Breath sounds: Normal breath sounds.   Musculoskeletal: Normal range of motion.   Skin:     General: Skin is warm.   Neurological:       Mental Status: She is alert.           Assessment/Plan   Problem List Items Addressed This Visit        Cardiac and Vasculature    Hyperlipidemia       Endocrine and Metabolic    Controlled type 2 diabetes mellitus without complication, without long-term current use of insulin (CMS/HCC)       Gastrointestinal Abdominal     GERD (gastroesophageal reflux disease)      Other Visit Diagnoses     Arthralgia of both hands    -  Primary    Relevant Orders    Uric acid    Antistreptolysin O screen    Rheumatoid factor    C-reactive protein    DEAN    Routine lab draw        Relevant Orders    Comprehensive Metabolic Panel    Lipid Panel With LDL / HDL Ratio        Will call with her lab results.  She was using trazodone for sleep but it was not helping.  She was taken off the trazodone today and started on 25 mg of Elavil nightly.  Return in 4 weeks for review of the Elavil.         Return in about 4 weeks (around 3/31/2021) for Annual.

## 2021-03-04 LAB
ALBUMIN SERPL-MCNC: 4.3 G/DL (ref 3.5–5.2)
ALBUMIN/GLOB SERPL: 1.9 G/DL
ALP SERPL-CCNC: 78 U/L (ref 39–117)
ALT SERPL-CCNC: 10 U/L (ref 1–33)
ANA SER QL: NEGATIVE
AST SERPL-CCNC: 16 U/L (ref 1–32)
BILIRUB SERPL-MCNC: 0.2 MG/DL (ref 0–1.2)
BUN SERPL-MCNC: 11 MG/DL (ref 6–20)
BUN/CREAT SERPL: 15.5 (ref 7–25)
CALCIUM SERPL-MCNC: 9.4 MG/DL (ref 8.6–10.5)
CHLORIDE SERPL-SCNC: 104 MMOL/L (ref 98–107)
CHOLEST SERPL-MCNC: 246 MG/DL (ref 0–200)
CO2 SERPL-SCNC: 27.8 MMOL/L (ref 22–29)
CREAT SERPL-MCNC: 0.71 MG/DL (ref 0.57–1)
CRP SERPL-MCNC: 0.55 MG/DL (ref 0–0.5)
GLOBULIN SER CALC-MCNC: 2.3 GM/DL
GLUCOSE SERPL-MCNC: 103 MG/DL (ref 65–99)
HDLC SERPL-MCNC: 49 MG/DL (ref 40–60)
LDLC SERPL CALC-MCNC: 165 MG/DL (ref 0–100)
LDLC/HDLC SERPL: 3.31 {RATIO}
POTASSIUM SERPL-SCNC: 4.9 MMOL/L (ref 3.5–5.2)
PROT SERPL-MCNC: 6.6 G/DL (ref 6–8.5)
SODIUM SERPL-SCNC: 140 MMOL/L (ref 136–145)
TRIGL SERPL-MCNC: 173 MG/DL (ref 0–150)
URATE SERPL-MCNC: 4 MG/DL (ref 2.4–5.7)
VLDLC SERPL CALC-MCNC: 32 MG/DL (ref 5–40)

## 2021-03-29 ENCOUNTER — LAB (OUTPATIENT)
Dept: FAMILY MEDICINE CLINIC | Facility: CLINIC | Age: 61
End: 2021-03-29

## 2021-03-29 DIAGNOSIS — M25.542 ARTHRALGIA OF BOTH HANDS: Primary | ICD-10-CM

## 2021-03-29 DIAGNOSIS — M25.541 ARTHRALGIA OF BOTH HANDS: Primary | ICD-10-CM

## 2021-03-30 LAB
ASO AB SERPL-ACNC: <20 IU/ML (ref 0–200)
RHEUMATOID FACT SERPL-ACNC: <10 IU/ML (ref 0–13.9)

## 2021-04-01 DIAGNOSIS — E11.42 DIABETIC PERIPHERAL NEUROPATHY (HCC): ICD-10-CM

## 2021-04-01 DIAGNOSIS — E78.5 HYPERLIPIDEMIA, UNSPECIFIED HYPERLIPIDEMIA TYPE: ICD-10-CM

## 2021-04-01 DIAGNOSIS — E11.9 CONTROLLED TYPE 2 DIABETES MELLITUS WITHOUT COMPLICATION, WITHOUT LONG-TERM CURRENT USE OF INSULIN (HCC): ICD-10-CM

## 2021-04-01 RX ORDER — DULOXETIN HYDROCHLORIDE 60 MG/1
60 CAPSULE, DELAYED RELEASE ORAL DAILY
Qty: 30 CAPSULE | Refills: 3 | Status: SHIPPED | OUTPATIENT
Start: 2021-04-01 | End: 2021-09-29

## 2021-04-01 NOTE — TELEPHONE ENCOUNTER
Caller: Kristina Yates    Relationship: Self    Best call back number: [phone]    Medication needed:   Requested Prescriptions     Pending Prescriptions Disp Refills   • DULoxetine (CYMBALTA) 60 MG capsule       Sig: Take 1 capsule by mouth Daily.       When do you need the refill by: ASAP  What additional details did the patient provide when requesting the medication: PATIENT STATES SHE HAS ONE DAY LEFT    Does the patient have less than a 3 day supply:  [x] Yes  [] No    What is the patient's preferred pharmacy: [id number] [place] [address] AT [facility] [phone] [facility] [phone] FX           ”

## 2021-04-06 ENCOUNTER — OFFICE VISIT (OUTPATIENT)
Dept: FAMILY MEDICINE CLINIC | Facility: CLINIC | Age: 61
End: 2021-04-06

## 2021-04-06 VITALS
WEIGHT: 177 LBS | HEIGHT: 66 IN | RESPIRATION RATE: 14 BRPM | HEART RATE: 102 BPM | SYSTOLIC BLOOD PRESSURE: 164 MMHG | DIASTOLIC BLOOD PRESSURE: 98 MMHG | OXYGEN SATURATION: 96 % | BODY MASS INDEX: 28.45 KG/M2

## 2021-04-06 DIAGNOSIS — M19.90 ARTHRITIS: ICD-10-CM

## 2021-04-06 DIAGNOSIS — G89.4 CHRONIC PAIN SYNDROME: ICD-10-CM

## 2021-04-06 DIAGNOSIS — E11.9 CONTROLLED TYPE 2 DIABETES MELLITUS WITHOUT COMPLICATION, WITHOUT LONG-TERM CURRENT USE OF INSULIN (HCC): ICD-10-CM

## 2021-04-06 DIAGNOSIS — Z01.419 PAP SMEAR, LOW-RISK: ICD-10-CM

## 2021-04-06 DIAGNOSIS — I10 ESSENTIAL HYPERTENSION: ICD-10-CM

## 2021-04-06 DIAGNOSIS — E78.5 HYPERLIPIDEMIA, UNSPECIFIED HYPERLIPIDEMIA TYPE: Primary | ICD-10-CM

## 2021-04-06 DIAGNOSIS — Z00.00 PHYSICAL EXAM: ICD-10-CM

## 2021-04-06 PROCEDURE — 99396 PREV VISIT EST AGE 40-64: CPT | Performed by: NURSE PRACTITIONER

## 2021-04-06 NOTE — PROGRESS NOTES
Subjective   Kristina Yates is a 60 y.o. female.   PMHX:Hypertension,hyperlipidemia,chronic pain,insomnia and diabetes.  PSHX:Reviewed in chart and with pt.  PFHX: Reviewed in chart and with pt.  Exercise:walks alot  Diet:Well balanced  Sleep hygiene:mediocre sleep hygiene  Employment status:[employer]  Followed by Dr Perez with [facility]    History of Present Illness   See above  The following portions of the patient's history were reviewed and updated as appropriate: allergies, current medications, past family history, past medical history, past social history, past surgical history and problem list.    Review of Systems   Constitutional: Negative for activity change, appetite change, chills and fever.   HENT: Negative for congestion.    Eyes: Negative for pain.   Respiratory: Negative for cough and shortness of breath.    Cardiovascular: Negative for chest pain and leg swelling.   Gastrointestinal: Negative for nausea and vomiting.   Genitourinary: Negative for difficulty urinating.   Musculoskeletal:        Hand pain    Skin: Negative for rash.   Neurological: Negative for dizziness, facial asymmetry and headache.       Objective   Physical Exam  Vitals and nursing note reviewed.   Constitutional:       General: She is not in acute distress.     Appearance: She is well-developed.   HENT:      Head: Normocephalic and atraumatic.      Right Ear: External ear normal.      Left Ear: External ear normal.   Neck:      Thyroid: No thyromegaly.   Cardiovascular:      Rate and Rhythm: Normal rate and regular rhythm.      Heart sounds: Normal heart sounds.   Pulmonary:      Effort: Pulmonary effort is normal.      Breath sounds: Normal breath sounds.   Musculoskeletal:         General: Normal range of motion.      Cervical back: Neck supple.   Skin:     General: Skin is warm.   Neurological:      Mental Status: She is alert.           Assessment/Plan   Diagnoses and all orders for this visit:    1.  Hyperlipidemia, unspecified hyperlipidemia type (Primary)    2. Essential hypertension    3. Physical exam    4. Chronic pain syndrome    5. Arthritis    6. Controlled type 2 diabetes mellitus without complication, without long-term current use of insulin (CMS/[facility])    7. Pap smear, low-risk  -     Ambulatory Referral to Gynecology      She will continue her current medications as discussed at todays visit.  She was advised to follow back up with [facility] as they have taken care of her in the past for her hand pain.  She received an ambulatory referral to gynecology for her Pap smear as she requested.

## 2021-04-22 ENCOUNTER — APPOINTMENT (OUTPATIENT)
Dept: CT IMAGING | Facility: HOSPITAL | Age: 61
End: 2021-04-22

## 2021-04-22 ENCOUNTER — HOSPITAL ENCOUNTER (EMERGENCY)
Facility: HOSPITAL | Age: 61
Discharge: HOME | End: 2021-04-22
Attending: EMERGENCY MEDICINE | Admitting: EMERGENCY MEDICINE

## 2021-04-22 VITALS
BODY MASS INDEX: 28.45 KG/M2 | TEMPERATURE: 97.5 F | RESPIRATION RATE: 18 BRPM | HEIGHT: 66 IN | SYSTOLIC BLOOD PRESSURE: 149 MMHG | OXYGEN SATURATION: 97 % | WEIGHT: 177 LBS | DIASTOLIC BLOOD PRESSURE: 79 MMHG | HEART RATE: 107 BPM

## 2021-04-22 DIAGNOSIS — M62.830 LUMBAR PARASPINAL MUSCLE SPASM: Primary | ICD-10-CM

## 2021-04-22 LAB
ALBUMIN SERPL-MCNC: 4.4 G/DL (ref 3.5–5.2)
ALBUMIN/GLOB SERPL: 1.8 G/DL
ALP SERPL-CCNC: 70 U/L (ref 39–117)
ALT SERPL W P-5'-P-CCNC: 12 U/L (ref 1–33)
ANION GAP SERPL CALCULATED.3IONS-SCNC: 9.4 MMOL/L (ref 5–15)
AST SERPL-CCNC: 15 U/L (ref 1–32)
BACTERIA UR QL AUTO: ABNORMAL /HPF
BASOPHILS # BLD AUTO: 0.04 10*3/MM3 (ref 0–0.2)
BASOPHILS NFR BLD AUTO: 0.5 % (ref 0–1.5)
BILIRUB SERPL-MCNC: 0.2 MG/DL (ref 0–1.2)
BILIRUB UR QL STRIP: NEGATIVE
BUN SERPL-MCNC: 10 MG/DL (ref 6–20)
BUN/CREAT SERPL: 13.5 (ref 7–25)
CALCIUM SPEC-SCNC: 9 MG/DL (ref 8.6–10.5)
CHLORIDE SERPL-SCNC: 106 MMOL/L (ref 98–107)
CLARITY UR: ABNORMAL
CO2 SERPL-SCNC: 25.6 MMOL/L (ref 22–29)
COLOR UR: YELLOW
CREAT SERPL-MCNC: 0.74 MG/DL (ref 0.57–1)
DEPRECATED RDW RBC AUTO: 42.9 FL (ref 37–54)
EOSINOPHIL # BLD AUTO: 0.35 10*3/MM3 (ref 0–0.4)
EOSINOPHIL NFR BLD AUTO: 4.1 % (ref 0.3–6.2)
ERYTHROCYTE [DISTWIDTH] IN BLOOD BY AUTOMATED COUNT: 12.8 % (ref 12.3–15.4)
GFR SERPL CREATININE-BSD FRML MDRD: 80 ML/MIN/1.73
GLOBULIN UR ELPH-MCNC: 2.5 GM/DL
GLUCOSE SERPL-MCNC: 112 MG/DL (ref 65–99)
GLUCOSE UR STRIP-MCNC: NEGATIVE MG/DL
HCT VFR BLD AUTO: 39.9 % (ref 34–46.6)
HGB BLD-MCNC: 13.3 G/DL (ref 12–15.9)
HGB UR QL STRIP.AUTO: NEGATIVE
HOLD SPECIMEN: NORMAL
HOLD SPECIMEN: NORMAL
HYALINE CASTS UR QL AUTO: ABNORMAL /LPF
IMM GRANULOCYTES # BLD AUTO: 0.02 10*3/MM3 (ref 0–0.05)
IMM GRANULOCYTES NFR BLD AUTO: 0.2 % (ref 0–0.5)
KETONES UR QL STRIP: NEGATIVE
LEUKOCYTE ESTERASE UR QL STRIP.AUTO: ABNORMAL
LIPASE SERPL-CCNC: 34 U/L (ref 13–60)
LIPASE SERPL-CCNC: 35 U/L (ref 13–60)
LYMPHOCYTES # BLD AUTO: 3.37 10*3/MM3 (ref 0.7–3.1)
LYMPHOCYTES NFR BLD AUTO: 39.1 % (ref 19.6–45.3)
MCH RBC QN AUTO: 30.8 PG (ref 26.6–33)
MCHC RBC AUTO-ENTMCNC: 33.3 G/DL (ref 31.5–35.7)
MCV RBC AUTO: 92.4 FL (ref 79–97)
MONOCYTES # BLD AUTO: 0.68 10*3/MM3 (ref 0.1–0.9)
MONOCYTES NFR BLD AUTO: 7.9 % (ref 5–12)
NEUTROPHILS NFR BLD AUTO: 4.15 10*3/MM3 (ref 1.7–7)
NEUTROPHILS NFR BLD AUTO: 48.2 % (ref 42.7–76)
NITRITE UR QL STRIP: NEGATIVE
NRBC BLD AUTO-RTO: 0 /100 WBC (ref 0–0.2)
PH UR STRIP.AUTO: 6 [PH] (ref 5–8)
PLATELET # BLD AUTO: 261 10*3/MM3 (ref 140–450)
PMV BLD AUTO: 11 FL (ref 6–12)
POTASSIUM SERPL-SCNC: 4.1 MMOL/L (ref 3.5–5.2)
PROT SERPL-MCNC: 6.9 G/DL (ref 6–8.5)
PROT UR QL STRIP: NEGATIVE
RBC # BLD AUTO: 4.32 10*6/MM3 (ref 3.77–5.28)
RBC # UR: ABNORMAL /HPF
REF LAB TEST METHOD: ABNORMAL
SODIUM SERPL-SCNC: 141 MMOL/L (ref 136–145)
SP GR UR STRIP: 1.02 (ref 1–1.03)
SQUAMOUS #/AREA URNS HPF: ABNORMAL /HPF
UROBILINOGEN UR QL STRIP: ABNORMAL
WBC # BLD AUTO: 8.61 10*3/MM3 (ref 3.4–10.8)
WBC UR QL AUTO: ABNORMAL /HPF
WHOLE BLOOD HOLD SPECIMEN: NORMAL
WHOLE BLOOD HOLD SPECIMEN: NORMAL

## 2021-04-22 PROCEDURE — 83690 ASSAY OF LIPASE: CPT | Performed by: EMERGENCY MEDICINE

## 2021-04-22 PROCEDURE — 96361 HYDRATE IV INFUSION ADD-ON: CPT

## 2021-04-22 PROCEDURE — 74176 CT ABD & PELVIS W/O CONTRAST: CPT

## 2021-04-22 PROCEDURE — 83690 ASSAY OF LIPASE: CPT

## 2021-04-22 PROCEDURE — 96375 TX/PRO/DX INJ NEW DRUG ADDON: CPT

## 2021-04-22 PROCEDURE — 99283 EMERGENCY DEPT VISIT LOW MDM: CPT

## 2021-04-22 PROCEDURE — 25010000002 METOCLOPRAMIDE PER 10 MG: Performed by: EMERGENCY MEDICINE

## 2021-04-22 PROCEDURE — 80053 COMPREHEN METABOLIC PANEL: CPT

## 2021-04-22 PROCEDURE — 85025 COMPLETE CBC W/AUTO DIFF WBC: CPT

## 2021-04-22 PROCEDURE — 96374 THER/PROPH/DIAG INJ IV PUSH: CPT

## 2021-04-22 PROCEDURE — 81001 URINALYSIS AUTO W/SCOPE: CPT

## 2021-04-22 PROCEDURE — 25010000002 MORPHINE PER 10 MG: Performed by: EMERGENCY MEDICINE

## 2021-04-22 RX ORDER — METOCLOPRAMIDE HYDROCHLORIDE 5 MG/ML
5 INJECTION INTRAMUSCULAR; INTRAVENOUS ONCE
Status: COMPLETED | OUTPATIENT
Start: 2021-04-22 | End: 2021-04-22

## 2021-04-22 RX ORDER — LIDOCAINE 50 MG/G
1 PATCH TOPICAL EVERY 24 HOURS
Qty: 6 EACH | Refills: 0 | Status: SHIPPED | OUTPATIENT
Start: 2021-04-22

## 2021-04-22 RX ORDER — CYCLOBENZAPRINE HCL 10 MG
10 TABLET ORAL 3 TIMES DAILY PRN
Qty: 12 TABLET | Refills: 0 | Status: SHIPPED | OUTPATIENT
Start: 2021-04-22

## 2021-04-22 RX ORDER — FAMOTIDINE 10 MG/ML
20 INJECTION, SOLUTION INTRAVENOUS ONCE
Status: COMPLETED | OUTPATIENT
Start: 2021-04-22 | End: 2021-04-22

## 2021-04-22 RX ORDER — SODIUM CHLORIDE 9 MG/ML
125 INJECTION, SOLUTION INTRAVENOUS CONTINUOUS
Status: DISCONTINUED | OUTPATIENT
Start: 2021-04-22 | End: 2021-04-22 | Stop reason: HOSPADM

## 2021-04-22 RX ORDER — SODIUM CHLORIDE 0.9 % (FLUSH) 0.9 %
10 SYRINGE (ML) INJECTION AS NEEDED
Status: DISCONTINUED | OUTPATIENT
Start: 2021-04-22 | End: 2021-04-22

## 2021-04-22 RX ORDER — MORPHINE SULFATE 2 MG/ML
4 INJECTION, SOLUTION INTRAMUSCULAR; INTRAVENOUS ONCE
Status: COMPLETED | OUTPATIENT
Start: 2021-04-22 | End: 2021-04-22

## 2021-04-22 RX ORDER — LIDOCAINE 50 MG/G
1 PATCH TOPICAL ONCE
Status: DISCONTINUED | OUTPATIENT
Start: 2021-04-22 | End: 2021-04-22 | Stop reason: HOSPADM

## 2021-04-22 RX ORDER — SODIUM CHLORIDE 9 MG/ML
125 INJECTION, SOLUTION INTRAVENOUS CONTINUOUS
Status: DISCONTINUED | OUTPATIENT
Start: 2021-04-22 | End: 2021-04-22

## 2021-04-22 RX ORDER — CYCLOBENZAPRINE HCL 10 MG
10 TABLET ORAL ONCE
Status: COMPLETED | OUTPATIENT
Start: 2021-04-22 | End: 2021-04-22

## 2021-04-22 RX ADMIN — FAMOTIDINE 20 MG: 10 INJECTION INTRAVENOUS at 20:17

## 2021-04-22 RX ADMIN — SODIUM CHLORIDE 125 ML/HR: 9 INJECTION, SOLUTION INTRAVENOUS at 19:31

## 2021-04-22 RX ADMIN — CYCLOBENZAPRINE 10 MG: 10 TABLET, FILM COATED ORAL at 20:18

## 2021-04-22 RX ADMIN — METOCLOPRAMIDE HYDROCHLORIDE 5 MG: 5 INJECTION INTRAMUSCULAR; INTRAVENOUS at 18:09

## 2021-04-22 RX ADMIN — MORPHINE SULFATE 4 MG: 2 INJECTION, SOLUTION INTRAMUSCULAR; INTRAVENOUS at 18:10

## 2021-04-22 RX ADMIN — LIDOCAINE 1 PATCH: 50 PATCH TOPICAL at 20:21

## 2021-04-22 NOTE — ED NOTES
Patient from home with complaints of left flank pain that started intermittently since Sunday that has progressively gotten worse. No known injury. Hx of kidney stones.      Jena Vela, MAGGY  04/22/21 2705

## 2021-04-22 NOTE — ED PROVIDER NOTES
EMERGENCY DEPARTMENT ENCOUNTER    CHIEF COMPLAINT  Chief Complaint: Left back pain  History given by: Patient  History limited by: Nothing  Room Number: 14/14  PMD: Maru Flores APRN      HPI:  Pt is a 60 y.o. female presents with report of left mid back pain intermittent over the past 4 days, sharp in nature, worse with movement.  Patient reports the pain has increased in frequency since 11 AM, is accompanied with nausea.  Patient denies chest pains, shortness of air, cough, fever, abdominal pain, vomiting, dysuria but does report decreased urination, dark malodorous urine over the past 3 days.  Patient denies recent injury, heavy lifting, pain in her abdomen, rash, pain/weakness/numbness of her legs, new incontinence.    Duration: 4 days  Associated Symptoms: Decreased urination, malodorous urine  Aggravating Factors: Movement  Alleviating Factors: Nothing  Treatment before arrival: Patient reports she has tried nothing for her discomfort at home    PAST MEDICAL HISTORY  Active Ambulatory Problems     Diagnosis Date Noted   • Cervical radiculitis 01/26/2017   • Controlled type 2 diabetes mellitus without complication, without long-term current use of insulin (CMS/[facility]) 02/27/2018   • Diabetic peripheral neuropathy (CMS/[facility]) 02/27/2018   • Menopausal symptom 02/27/2018   • Adult acne 02/27/2018   • Hyperlipidemia 02/27/2018   • Arthritis    • DJD (degenerative joint disease), cervical    • Asthma    • Spinal stenosis    • Chronic pain    • COPD (chronic obstructive pulmonary disease) (CMS/[facility])    • GERD (gastroesophageal reflux disease)    • Alcohol abuse    • Edmonds esophagus    • Essential hypertension 04/06/2021     Resolved Ambulatory Problems     Diagnosis Date Noted   • No Resolved Ambulatory Problems     Past Medical History:   Diagnosis Date   • Cervical neuritis    • Depression    • Diabetes mellitus (CMS/[facility])    • Foot spasms    • Headache    • Hiatal hernia    • Seasonal allergies        PAST  SURGICAL HISTORY  Past Surgical History:   Procedure Laterality Date   • ANTERIOR CERVICAL DISCECTOMY W/ FUSION  11/2003    C5-6, C6-7   • CERCLAGE CERVIX     • CERVICAL EPIDURAL  2007   • CERVICAL FUSION     • DILATATION AND CURETTAGE     • ENDOMETRIAL ABLATION  2007   • ENDOSCOPY      MULTIPLE   • ENDOSCOPY N/A 7/5/2016    Procedure: ESOPHAGOGASTRODUODENOSCOPY WITH COLD BIOPSIES;  Surgeon: Jose Mcclellan MD;  Location: [facility] ENDOSCOPY;  Service:    • ESOPHAGEAL DILATATION  2009   • HAND SURGERY Bilateral     CARTILAGE   • NECK SURGERY     • NISSEN FUNDOPLICATION LAPAROSCOPIC  03/2012       FAMILY HISTORY  Family History   Problem Relation Age of Onset   • Cancer Mother    • Heart disease Father    • Lupus Maternal Aunt        SOCIAL HISTORY  Social History     Socioeconomic History   • Marital status:      Spouse name: Not on file   • Number of children: 2   • Years of education: Not on file   • Highest education level: Not on file   Tobacco Use   • Smoking status: Current Every Day Smoker     Packs/day: 0.50     Years: 15.00     Pack years: 7.50   • Smokeless tobacco: Never Used   Substance and Sexual Activity   • Alcohol use: No     Comment: sober since 2012   • Drug use: No   • Sexual activity: Yes     Partners: Male       ALLERGIES  Fluoxetine, Sulfa antibiotics, Zofran [ondansetron hcl], and Lisinopril    REVIEW OF SYSTEMS  Review of Systems   Constitutional: Negative for chills and fever.   HENT: Negative for rhinorrhea and sore throat.    Eyes: Negative for visual disturbance.   Respiratory: Negative for cough and shortness of breath.    Cardiovascular: Negative for chest pain, palpitations and leg swelling.   Gastrointestinal: Positive for nausea. Negative for abdominal pain, diarrhea and vomiting.   Endocrine: Negative.    Genitourinary: Positive for decreased urine volume. Negative for dysuria, enuresis, frequency, hematuria and urgency.   Musculoskeletal: Positive for back pain. Negative for  neck pain.   Neurological: Negative for syncope and headaches.   Psychiatric/Behavioral: Negative.    All other systems reviewed and are negative.      PHYSICAL EXAM  ED Triage Vitals [04/22/21 1555]   Temp Heart Rate Resp BP SpO2   97.5 °F (36.4 °C) 107 18 (!) 181/105 97 %      Temp src Heart Rate Source Patient Position BP Location FiO2 (%)   -- -- -- -- --       Physical Exam  Vitals and nursing note reviewed.   Constitutional:       General: She is in acute distress (mild).      Appearance: She is not toxic-appearing.   HENT:      Head: Normocephalic and atraumatic.   Cardiovascular:      Rate and Rhythm: Normal rate.      Pulses: Normal pulses.           Posterior tibial pulses are 2+ on the right side and 2+ on the left side.   Pulmonary:      Effort: Pulmonary effort is normal. No respiratory distress.      Breath sounds: Normal breath sounds.   Abdominal:      General: Bowel sounds are normal.      Palpations: Abdomen is soft.      Tenderness: There is no abdominal tenderness. There is no right CVA tenderness, left CVA tenderness, guarding or rebound.      Comments: No rash to the torso   Musculoskeletal:         General: Normal range of motion.      Cervical back: Normal range of motion and neck supple.      Lumbar back: Spasms and tenderness present. No bony tenderness.        Back:       Right lower leg: No edema.      Left lower leg: No edema.      Comments: No midline T/L-spine tenderness   Skin:     General: Skin is warm and dry.      Capillary Refill: Capillary refill takes less than 2 seconds.      Findings: No rash.   Neurological:      Mental Status: She is alert and oriented to person, place, and time.   Psychiatric:         Mood and Affect: Affect normal.         LAB RESULTS  Lab Results (last 24 hours)     Procedure Component Value Units Date/Time    CBC & Differential [[id number]]  (Abnormal) Collected: 04/22/21 1616    Specimen: Blood Updated: 04/22/21 1644    Narrative:      The following  orders were created for panel order CBC & Differential.  Procedure                               Abnormality         Status                     ---------                               -----------         ------                     CBC Auto Differential[[id number]]        Abnormal            Final result                 Please view results for these tests on the individual orders.    Comprehensive Metabolic Panel [[id number]]  (Abnormal) Collected: 04/22/21 1616    Specimen: Blood Updated: 04/22/21 1703     Glucose 112 mg/dL      BUN 10 mg/dL      Creatinine 0.74 mg/dL      Sodium 141 mmol/L      Potassium 4.1 mmol/L      Chloride 106 mmol/L      CO2 25.6 mmol/L      Calcium 9.0 mg/dL      Total Protein 6.9 g/dL      Albumin 4.40 g/dL      ALT (SGPT) 12 U/L      AST (SGOT) 15 U/L      Alkaline Phosphatase 70 U/L      Total Bilirubin 0.2 mg/dL      eGFR Non African Amer 80 mL/min/1.73      Globulin 2.5 gm/dL      A/G Ratio 1.8 g/dL      BUN/Creatinine Ratio 13.5     Anion Gap 9.4 mmol/L     Narrative:      GFR Normal >60  Chronic Kidney Disease <60  Kidney Failure <15      Lipase [[id number]]  (Normal) Collected: 04/22/21 1616    Specimen: Blood Updated: 04/22/21 1703     Lipase 34 U/L     CBC Auto Differential [[id number]]  (Abnormal) Collected: 04/22/21 1616    Specimen: Blood Updated: 04/22/21 1644     WBC 8.61 10*3/mm3      RBC 4.32 10*6/mm3      Hemoglobin 13.3 g/dL      Hematocrit 39.9 %      MCV 92.4 fL      MCH 30.8 pg      MCHC 33.3 g/dL      RDW 12.8 %      RDW-SD 42.9 fl      MPV 11.0 fL      Platelets 261 10*3/mm3      Neutrophil % 48.2 %      Lymphocyte % 39.1 %      Monocyte % 7.9 %      Eosinophil % 4.1 %      Basophil % 0.5 %      Immature Grans % 0.2 %      Neutrophils, Absolute 4.15 10*3/mm3      Lymphocytes, Absolute 3.37 10*3/mm3      Monocytes, Absolute 0.68 10*3/mm3      Eosinophils, Absolute 0.35 10*3/mm3      Basophils, Absolute 0.04 10*3/mm3      Immature Grans, Absolute 0.02 10*3/mm3      nRBC  0.0 /100 WBC     Lipase [[id number]]  (Normal) Collected: 04/22/21 1616    Specimen: Blood Updated: 04/22/21 1812     Lipase 35 U/L     Urinalysis With Microscopic If Indicated (No Culture) - Urine, Clean Catch [[id number]]  (Abnormal) Collected: 04/22/21 1633    Specimen: Urine, Clean Catch Updated: 04/22/21 1649     Color, UA Yellow     Appearance, UA Slightly Cloudy     pH, UA 6.0     Specific Gravity, UA 1.020     Glucose, UA Negative     Ketones, UA Negative     Bilirubin, UA Negative     Blood, UA Negative     Protein, UA Negative     Leuk Esterase, UA Small (1+)     Nitrite, UA Negative     Urobilinogen, UA 0.2 E.U./dL    Urinalysis, Microscopic Only - Urine, Clean Catch [[id number]]  (Abnormal) Collected: 04/22/21 1633    Specimen: Urine, Clean Catch Updated: 04/22/21 1839     RBC, UA 3-5 /HPF      WBC, UA 31-50 /HPF      Bacteria, UA None Seen /HPF      Squamous Epithelial Cells, UA 3-6 /HPF      Hyaline Casts, UA 0-2 /LPF      Methodology Automated Microscopy          I ordered the above labs and reviewed the results    RADIOLOGY  CT Abdomen Pelvis Without Contrast   Final Result           1. No urolithiasis or hydronephrosis.   2. Colonic diverticulosis. No acute inflammatory process of bowel is   identified.       This report was finalized on 4/22/2021 7:33 PM by Dr. Vicente Sainz M.D.               I ordered the above noted radiological studies. Interpreted by radiologist. Viewed by me in PACS.       PROCEDURES  Procedures      PROGRESS AND CONSULTS  ED Course as of Apr 23 0103   Thu Apr 22, 2021 1952 Blood pressure 140s over 80s, no further vomiting in the ER, patient complaining of recurrent pain and nausea    [TO]      ED Course User Index  [TO] Pili Flores MD       Patient voices understanding of need to follow closely with PCP for recheck, further testing, treatment as needed, heat and gentle stretching.    MEDICAL DECISION MAKING  Results were reviewed/discussed with the patient and they  were also made aware of online access. Pt also made aware that some labs, such as cultures, will not be resulted during ER visit and follow up with PMD is necessary.       MDM       DIAGNOSIS  Final diagnoses:   Lumbar paraspinal muscle spasm       DISPOSITION  DISCHARGE    Patient discharged in stable condition.    Reviewed implications of results, diagnosis, meds, responsibility to follow up, warning signs and symptoms of possible worsening, potential complications and reasons to return to ER, including persistent or worsening pain, fever, chest pain, shortness of air, abdominal pain, persistent vomiting or other concerns    Patient/Family voiced understanding of above instructions.    Discussed plan for discharge, as there is no emergent indication for admission. Patient referred to primary care provider for BP management due to today's BP. Pt/family is agreeable and understands need for follow up and repeat testing.  Pt is aware that discharge does not mean that nothing is wrong but it indicates no emergency is present that requires admission and they must continue care with follow-up as given below or physician of their choice.     FOLLOW-UP  Maru Flores, APRN  [address] [facility] [address] [phone]    Schedule an appointment as soon as possible for a visit in 3 days  EVEN IF WELL         Medication List      New Prescriptions    cyclobenzaprine 10 MG tablet  Commonly known as: FLEXERIL  Take 1 tablet by mouth 3 (Three) Times a Day As Needed for Muscle Spasms.     lidocaine 5 %  Commonly known as: LIDODERM  Place 1 patch on the skin as directed by provider Daily. Remove & Discard patch within 12 hours or as directed by MD        Changed    metFORMIN 500 MG tablet  Commonly known as: GLUCOPHAGE  TAKE ONE TABLET BY MOUTH TWICE A DAY WITH A MEAL  What changed:   · how to take this  · when to take this           Where to Get Your Medications      These medications were sent to [facility] #[id number] - [place], [place] - [address] [facility] AT [facility] & [address] - [phone]  - [phone]   [address] [facility] [facility], [facility] [address]    Phone: [phone]   · cyclobenzaprine 10 MG tablet  · lidocaine 5 %           Latest Documented Vital Signs:  As of 01:03 EDT  BP- 149/79 HR- 107 Temp- 97.5 °F (36.4 °C) O2 sat- 97%    --  Patient was wearing facemask when I entered the room and throughout our encounter. Full protective equipment was worn throughout this patient encounter including a face mask, eye protection and gloves. Hand hygiene was performed before donning protective equipment and after removal when leaving the room.      Pili Flores MD  04/23/21 0104

## 2021-04-22 NOTE — DISCHARGE INSTRUCTIONS
You are advised to follow closely with Maru Flores or primary care provider of your choice in 2-3 days for recheck, final results of lab work and imaging testing, and further testing/treatment as needed.    Heat and gentle stretching to your back.      Please return to the emergency department immediately with chest pain, shortness of air, abdominal pain, persistent vomiting/fever, blood in emesis or stool, lightheadedness/fainting, problems with speech, new weakness/numbness, new incontinence, problems with vision, intolerable pain, or for worsening of symptoms or other concerns.

## 2021-04-27 ENCOUNTER — OFFICE VISIT (OUTPATIENT)
Dept: FAMILY MEDICINE CLINIC | Facility: CLINIC | Age: 61
End: 2021-04-27

## 2021-04-27 VITALS
RESPIRATION RATE: 16 BRPM | BODY MASS INDEX: 27.62 KG/M2 | SYSTOLIC BLOOD PRESSURE: 148 MMHG | TEMPERATURE: 97.3 F | OXYGEN SATURATION: 97 % | HEIGHT: 67 IN | HEART RATE: 93 BPM | WEIGHT: 176 LBS | DIASTOLIC BLOOD PRESSURE: 100 MMHG

## 2021-04-27 DIAGNOSIS — M54.6 THORACIC SPINE PAIN: Primary | ICD-10-CM

## 2021-04-27 PROCEDURE — 99213 OFFICE O/P EST LOW 20 MIN: CPT | Performed by: NURSE PRACTITIONER

## 2021-04-27 RX ORDER — METHYLPREDNISOLONE 4 MG/1
TABLET ORAL
Qty: 1 EACH | Refills: 0 | Status: SHIPPED | OUTPATIENT
Start: 2021-04-27

## 2021-04-27 RX ORDER — HYDROCODONE BITARTRATE AND ACETAMINOPHEN 5; 325 MG/1; MG/1
1 TABLET ORAL EVERY 6 HOURS PRN
Qty: 12 TABLET | Refills: 0 | Status: SHIPPED | OUTPATIENT
Start: 2021-04-27

## 2021-04-27 NOTE — PROGRESS NOTES
Subjective   Kristina Yates is a 60 y.o. female.   er follow up (back pain )    History of Present Illness   In the emergency room for back pain. She had a cervical fusion in 2003 and has done well.She was seen for thoracic back pain at the ER and  She had an abdominal CT which showed nothing in her abdomen but had DDD in her thoracic spine which is causing her pain. OTC medicine is not working.    The following portions of the patient's history were reviewed and updated as appropriate: allergies, current medications, past family history, past medical history, past social history, past surgical history and problem list.    Review of Systems   Constitutional: Positive for activity change.   Musculoskeletal: Positive for arthralgias, back pain and myalgias.       Objective   Physical Exam  Vitals and nursing note reviewed.   Constitutional:       Appearance: She is well-developed.   HENT:      Head: Normocephalic and atraumatic.   Eyes:      Pupils: Pupils are equal, round, and reactive to light.   Pulmonary:      Effort: Pulmonary effort is normal.   Musculoskeletal:         General: Normal range of motion.   Skin:     General: Skin is warm and dry.   Neurological:      Mental Status: She is alert and oriented to person, place, and time.           Assessment/Plan   Problem List Items Addressed This Visit     None      Visit Diagnoses     Thoracic spine pain    -  Primary    Relevant Orders    MRI thoracic spine wo contrast    Ambulatory Referral to Spine Surgery        Will follow up PRN       Return if symptoms worsen or fail to improve.

## 2021-04-30 DIAGNOSIS — J06.9 UPPER RESPIRATORY TRACT INFECTION, UNSPECIFIED TYPE: ICD-10-CM

## 2021-04-30 DIAGNOSIS — R06.2 WHEEZING: ICD-10-CM

## 2021-04-30 RX ORDER — ALBUTEROL SULFATE 90 UG/1
2 AEROSOL, METERED RESPIRATORY (INHALATION) EVERY 4 HOURS PRN
Qty: 6.7 G | Refills: 5 | Status: SHIPPED | OUTPATIENT
Start: 2021-04-30

## 2021-04-30 NOTE — TELEPHONE ENCOUNTER
Caller: Kristina Yates    Relationship: Self    Best call back number: [phone]    Medication needed:   Requested Prescriptions     Pending Prescriptions Disp Refills   • albuterol sulfate  (90 Base) MCG/ACT inhaler       Sig: Inhale 2 puffs Every 4 (Four) Hours As Needed for Wheezing.       When do you need the refill by: ASAP    What additional details did the patient provide when requesting the medication: PATIENT STATES THAT SHE IS OUT OF THIS MEDICATION AND IS WHEEZY AND TIGHT IN CHEST.    Does the patient have less than a 3 day supply:  [x] Yes  [] No    What is the patient's preferred pharmacy: [facility] #[id number] [facility] [address] AT [facility] [phone] [facility] [phone] FX

## 2021-05-24 RX ORDER — AMITRIPTYLINE HYDROCHLORIDE 25 MG/1
25 TABLET, FILM COATED ORAL NIGHTLY
Qty: 30 TABLET | Refills: 1 | Status: SHIPPED | OUTPATIENT
Start: 2021-05-24

## 2021-05-26 ENCOUNTER — HOSPITAL ENCOUNTER (OUTPATIENT)
Dept: MRI IMAGING | Facility: HOSPITAL | Age: 61
Discharge: HOME | End: 2021-05-26
Admitting: NURSE PRACTITIONER

## 2021-05-26 DIAGNOSIS — M54.6 THORACIC SPINE PAIN: ICD-10-CM

## 2021-05-26 PROCEDURE — 72146 MRI CHEST SPINE W/O DYE: CPT

## 2021-05-27 DIAGNOSIS — M54.6 CHRONIC BILATERAL THORACIC BACK PAIN: Primary | ICD-10-CM

## 2021-05-27 DIAGNOSIS — G89.29 CHRONIC BILATERAL THORACIC BACK PAIN: Primary | ICD-10-CM

## 2021-06-17 ENCOUNTER — TELEPHONE (OUTPATIENT)
Dept: FAMILY MEDICINE CLINIC | Facility: CLINIC | Age: 61
End: 2021-06-17

## 2021-06-17 RX ORDER — PROMETHAZINE HYDROCHLORIDE 25 MG/1
25 TABLET ORAL EVERY 6 HOURS PRN
Qty: 9 TABLET | Refills: 0 | Status: SHIPPED | OUTPATIENT
Start: 2021-06-17

## 2021-06-17 NOTE — TELEPHONE ENCOUNTER
Spoke w/ pt.  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  She states she likes to have these on hand because when she has the nausea she cannot work.  Please advise.

## 2021-06-17 NOTE — TELEPHONE ENCOUNTER
Caller: Kristina Yates    Relationship to patient: Self    Best call back number: [phone]    Patient is needing: PATIENT IS REQUESTING THAT SHE GET SOME FENAGRIN FOR NAUSEA. IT ISN'T SHOWING IN HER CHART, BUT SHE SAYS SHE HAS HAD THIS BEFORE. PLEASE REACH OUT WITH ANY QUESTIONS  PLEASE SEND TO   [id number] [place], [place] - [address] AT [facility] [address] [facility] [phone] [facility] [phone] FX

## 2021-09-28 DIAGNOSIS — E78.5 HYPERLIPIDEMIA, UNSPECIFIED HYPERLIPIDEMIA TYPE: ICD-10-CM

## 2021-09-28 DIAGNOSIS — E11.42 DIABETIC PERIPHERAL NEUROPATHY (HCC): ICD-10-CM

## 2021-09-28 DIAGNOSIS — E11.9 CONTROLLED TYPE 2 DIABETES MELLITUS WITHOUT COMPLICATION, WITHOUT LONG-TERM CURRENT USE OF INSULIN (HCC): ICD-10-CM

## 2021-09-29 RX ORDER — DULOXETIN HYDROCHLORIDE 60 MG/1
60 CAPSULE, DELAYED RELEASE ORAL DAILY
Qty: 30 CAPSULE | Refills: 2 | Status: SHIPPED | OUTPATIENT
Start: 2021-09-29

## 2021-11-16 ENCOUNTER — HOSPITAL ENCOUNTER (OUTPATIENT)
Dept: GENERAL RADIOLOGY | Facility: HOSPITAL | Age: 61
Discharge: HOME | End: 2021-11-16
Admitting: NURSE PRACTITIONER

## 2021-11-16 DIAGNOSIS — M54.2 CERVICALGIA: ICD-10-CM

## 2021-11-16 PROCEDURE — 72050 X-RAY EXAM NECK SPINE 4/5VWS: CPT

## 2021-11-23 ENCOUNTER — TRANSCRIBE ORDERS (OUTPATIENT)
Dept: ADMINISTRATIVE | Facility: HOSPITAL | Age: 61
End: 2021-11-23

## 2021-11-23 DIAGNOSIS — R93.7 ABNORMAL FINDINGS ON DIAGNOSTIC IMAGING OF SPINE: Primary | ICD-10-CM

## 2021-11-27 ENCOUNTER — IMMUNIZATION (OUTPATIENT)
Dept: VACCINE CLINIC | Facility: HOSPITAL | Age: 61
End: 2021-11-27

## 2021-11-27 PROCEDURE — 91300 HC SARSCOV02 VAC 30MCG/0.3ML IM: CPT | Performed by: INTERNAL MEDICINE

## 2021-11-27 PROCEDURE — 0004A ADM SARSCOV2 30MCG/0.3ML BOOSTER: CPT | Performed by: INTERNAL MEDICINE

## 2021-12-13 ENCOUNTER — APPOINTMENT (OUTPATIENT)
Dept: MRI IMAGING | Facility: HOSPITAL | Age: 61
End: 2021-12-13

## 2021-12-23 ENCOUNTER — HOSPITAL ENCOUNTER (OUTPATIENT)
Dept: MRI IMAGING | Facility: HOSPITAL | Age: 61
Discharge: HOME | End: 2021-12-23
Admitting: FAMILY MEDICINE

## 2021-12-23 ENCOUNTER — APPOINTMENT (OUTPATIENT)
Dept: MRI IMAGING | Facility: HOSPITAL | Age: 61
End: 2021-12-23

## 2021-12-23 DIAGNOSIS — R93.7 ABNORMAL FINDINGS ON DIAGNOSTIC IMAGING OF SPINE: ICD-10-CM

## 2021-12-23 PROCEDURE — 82565 ASSAY OF CREATININE: CPT

## 2021-12-23 PROCEDURE — 0 GADOBENATE DIMEGLUMINE 529 MG/ML SOLUTION: Performed by: FAMILY MEDICINE

## 2021-12-23 PROCEDURE — 72156 MRI NECK SPINE W/O & W/DYE: CPT

## 2021-12-23 PROCEDURE — A9577 INJ MULTIHANCE: HCPCS | Performed by: FAMILY MEDICINE

## 2021-12-23 RX ADMIN — GADOBENATE DIMEGLUMINE 16 ML: 529 INJECTION, SOLUTION INTRAVENOUS at 21:36

## 2021-12-26 LAB — CREAT BLDA-MCNC: 0.8 MG/DL (ref 0.6–1.3)

## 2023-02-10 NOTE — TELEPHONE ENCOUNTER
Pt's call returned.  She continues to have frequent vomiting and mild diarrhea.  No severe abdominal pain.  She is a diabetic but does not routinely check her blood sugar.  She takes Metformin BID.  She has been tolerating watered-down ginger ale.  She is allergic to Zofran and requests a Rx for Phenergan.    Will send Rx for Phenergan to pt's pharmacy.  Pt advised to work on maintaining PO hydration and to go the ER if symptoms worsen or if urine output drops.  Pt advised to decrease Metformin from BID to once daily while ill.     Communicate Risk of Fall with Harm to all staff/Reinforce activity limits and safety measures with patient and family/Tailored Fall Risk Interventions/Visual Cue: Yellow wristband and red socks/Bed in lowest position, wheels locked, appropriate side rails in place/Call bell, personal items and telephone in reach/Instruct patient to call for assistance before getting out of bed or chair/Non-slip footwear when patient is out of bed/[place] to call system/Physically safe environment - no spills, clutter or unnecessary equipment/Purposeful Proactive Rounding/Room/bathroom lighting operational, light cord in reach

## 2023-04-12 ENCOUNTER — APPOINTMENT (OUTPATIENT)
Dept: GENERAL RADIOLOGY | Facility: HOSPITAL | Age: 63
End: 2023-04-12
Payer: COMMERCIAL

## 2023-04-12 ENCOUNTER — HOSPITAL ENCOUNTER (EMERGENCY)
Facility: HOSPITAL | Age: 63
Discharge: HOME | End: 2023-04-12
Attending: EMERGENCY MEDICINE | Admitting: EMERGENCY MEDICINE
Payer: COMMERCIAL

## 2023-04-12 VITALS
TEMPERATURE: 97.5 F | WEIGHT: 170 LBS | SYSTOLIC BLOOD PRESSURE: 179 MMHG | OXYGEN SATURATION: 91 % | BODY MASS INDEX: 27.32 KG/M2 | HEIGHT: 66 IN | HEART RATE: 57 BPM | DIASTOLIC BLOOD PRESSURE: 78 MMHG | RESPIRATION RATE: 20 BRPM

## 2023-04-12 DIAGNOSIS — S70.02XA CONTUSION OF LEFT HIP, INITIAL ENCOUNTER: ICD-10-CM
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
## 2023-04-12 DIAGNOSIS — M54.50 ACUTE LEFT-SIDED LOW BACK PAIN WITHOUT SCIATICA: ICD-10-CM

## 2023-04-12 PROCEDURE — 72110 X-RAY EXAM L-2 SPINE 4/>VWS: CPT

## 2023-04-12 PROCEDURE — 99283 EMERGENCY DEPT VISIT LOW MDM: CPT

## 2023-04-12 PROCEDURE — 73502 X-RAY EXAM HIP UNI 2-3 VIEWS: CPT

## 2023-04-12 PROCEDURE — 72220 X-RAY EXAM SACRUM TAILBONE: CPT

## 2023-04-12 RX ORDER — OXYCODONE HYDROCHLORIDE AND ACETAMINOPHEN 5; 325 MG/1; MG/1
1 TABLET ORAL ONCE
Status: COMPLETED | OUTPATIENT
Start: 2023-04-12 | End: 2023-04-12

## 2023-04-12 RX ORDER — DICLOFENAC SODIUM 75 MG/1
75 TABLET, DELAYED RELEASE ORAL 2 TIMES DAILY
Qty: 20 TABLET | Refills: 0 | Status: SHIPPED | OUTPATIENT
Start: 2023-04-12

## 2023-04-12 RX ORDER — METHOCARBAMOL 500 MG/1
1000 TABLET, FILM COATED ORAL 4 TIMES DAILY
Qty: 40 TABLET | Refills: 0 | Status: SHIPPED | OUTPATIENT
Start: 2023-04-12

## 2023-04-12 RX ADMIN — OXYCODONE AND ACETAMINOPHEN 1 TABLET: 5; 325 TABLET ORAL at 02:45

## 2023-04-12 NOTE — ED TRIAGE NOTES
Pt to Er after falling down approx 4 stairs, landing on concrete. Pt denies LOC, blood thinners, or striking head. Pt states she fell on L side, reports difficulty ambulating d/t pain. Pt assisted to wheelchair by ER staff.

## 2023-04-12 NOTE — DISCHARGE INSTRUCTIONS
Home, rest, medicine as directed, apply ice to affected areas.  Home medicine as prescribed, follow up with PCP for recheck. Return to care with further concerns.

## 2023-04-12 NOTE — ED PROVIDER NOTES
EMERGENCY DEPARTMENT ENCOUNTER    Room Number:  03/03  Date of encounter:  4/12/2023  PCP: Provider, No Known  Patient Care Team:  Provider, No Known as PCP - Brooklyn Dela Cruz PA as Physician Assistant (Obstetrics and Gynecology)   Independent Historians: Patient    HPI:  Chief Complaint: Fall  A complete HPI/ROS/PMH/PSH/SH/FH are unobtainable due to: N/A    Chronic or social conditions impacting patient care (social determinants of health): None    Context: Kristina Yates is a 62 y.o. female with past medical history of diabetes and hypertension who arrives to the ED with complaint of left low back and hip pain after a fall.  Patient states that she was at home around 8:00 last night when she fell down approximately 4 wooden steps landing on concrete in her basement while doing laundry.  Patient states that she just lost her balance because the steps are uneven.  Patient denies any head injury, headache, loss of consciousness, no neck pain, chest pain, or abdominal pain.  Patient is hurting in her left lower back, left buttock, and left hip and tailbone.  Denies any loss of bowel or bladder control, no numbness or tingling going down the arms or legs or other complaints.  Patient states that she took a hydrocodone that she had at home with minimal relief, pain is worse with movement, walking, and sitting.    Review of prior external notes (non-ED): None    Review of prior external test results outside of this encounter: None    PAST MEDICAL HISTORY  Active Ambulatory Problems     Diagnosis Date Noted   • Cervical radiculitis 01/26/2017   • Controlled type 2 diabetes mellitus without complication, without long-term current use of insulin 02/27/2018   • Diabetic peripheral neuropathy 02/27/2018   • Menopausal symptom 02/27/2018   • Adult acne 02/27/2018   • Hyperlipidemia 02/27/2018   • Arthritis    • DJD (degenerative joint disease), cervical    • Asthma    • Spinal stenosis    • Chronic pain    • COPD  (chronic obstructive pulmonary disease)    • GERD (gastroesophageal reflux disease)    • Alcohol abuse    • Edmonds esophagus    • Essential hypertension 2021     Resolved Ambulatory Problems     Diagnosis Date Noted   • No Resolved Ambulatory Problems     Past Medical History:   Diagnosis Date   • Cervical neuritis    • Depression    • Diabetes mellitus    • Foot spasms    • Headache    • Hiatal hernia    • Seasonal allergies        The patient has started, but not completed, their COVID-19 vaccination series.    PAST SURGICAL HISTORY  Past Surgical History:   Procedure Laterality Date   • ANTERIOR CERVICAL DISCECTOMY W/ FUSION  2003    C5-6, C6-7   • CERCLAGE CERVIX     • CERVICAL EPIDURAL     • CERVICAL FUSION     • DILATATION AND CURETTAGE     • ENDOMETRIAL ABLATION     • ENDOSCOPY      MULTIPLE   • ENDOSCOPY N/A 2016    Procedure: ESOPHAGOGASTRODUODENOSCOPY WITH COLD BIOPSIES;  Surgeon: Jose Mcclellan MD;  Location: [facility] ENDOSCOPY;  Service:    • ESOPHAGEAL DILATATION     • HAND SURGERY Bilateral     CARTILAGE   • NECK SURGERY     • NISSEN FUNDOPLICATION LAPAROSCOPIC  2012         FAMILY HISTORY  Family History   Problem Relation Age of Onset   • Cancer Mother    • Heart disease Father    • Lupus Maternal Aunt          SOCIAL HISTORY  Social History     Socioeconomic History   • Marital status:    • Number of children: 2   Tobacco Use   • Smoking status: Former     Packs/day: 0.50     Years: 15.00     Pack years: 7.50     Types: Cigarettes     Quit date: 2021     Years since quittin.1   • Smokeless tobacco: Never   Substance and Sexual Activity   • Alcohol use: No     Comment: sober since    • Drug use: No   • Sexual activity: Yes     Partners: Male         ALLERGIES  Fluoxetine, Sulfa antibiotics, Zofran [ondansetron hcl], and Lisinopril        REVIEW OF SYSTEMS  Review of Systems     All systems reviewed and negative except for those discussed in HPI.        PHYSICAL EXAM    I have reviewed the triage vital signs and nursing notes.    ED Triage Vitals   Temp Heart Rate Resp BP SpO2   04/12/23 0211 04/12/23 0211 04/12/23 0211 04/12/23 0217 04/12/23 0211   97.5 °F (36.4 °C) 105 20 (!) 192/131 98 %      Temp src Heart Rate Source Patient Position BP Location FiO2 (%)   04/12/23 0211 -- -- -- --   Tympanic           Physical Exam    GENERAL: alert and oriented x4, mildly distressed  HENT: normocephalic, atraumatic, no C-spine tenderness, normal ROM, no step-offs, no dental injury or malocclusion, no hemotympanum  EYES: no scleral icterus, PERRL, EOMI  CV: regular rhythm, regular rate, intact distal perfusion  RESPIRATORY: normal effort, CTAB  ABDOMEN: soft/nontender  MUSCULOSKELETAL: no deformity, tender to palpate to the left lower back especially in the left paraspinal soft tissues of the lower L-spine, tenderness over the left buttock and lateral left hip  NEURO: alert, moves all extremities, no focal neuro deficits, follows commands  SKIN: warm, dry, no rash   Psych: Appropriate mood and affect      Nursing notes and vital signs reviewed      LAB RESULTS  No results found for this or any previous visit (from the past 24 hour(s)).    Ordered the above labs and independently reviewed and interpreted the results by me.        RADIOLOGY  XR Sacrum & Coccyx    Result Date: 4/12/2023  Patient: NUVIA SHERIFF  Time Out: 04:49 Exam(s): XR SACRUM COCCYX EXAM:   XR Sacrum and Coccyx, 2 or more Views CLINICAL HISTORY:    Reason for exam: Fall, pain. TECHNIQUE:   Frontal and lateral views of the sacrum and coccyx. COMPARISON:   No relevant prior studies available. FINDINGS:   Sacrum coccyx:  Unremarkable as visualized.  No acute fracture.   Vertebrae:  Visualized lumbar vertebrae are unremarkable.   Soft tissues:  Unremarkable. IMPRESSION:       Normal sacrum and coccyx x-rays.     Electronically signed by Pedro Lazcano M.D. on 04-12-23 at 0449    XR Spine Lumbar Complete  4+VW    Result Date: 4/12/2023  Patient: NUVIA SHERIFF  Time Out: 04:47 Exam(s): XR L SPINE 4+ VIEWS EXAM:   XR Lumbosacral Spine, 4 or 5 Views CLINICAL HISTORY:    Reason for exam: Fall, pain. TECHNIQUE:   Frontal, lateral and bilateral oblique views of the lumbar spine. COMPARISON:   No relevant prior studies available. FINDINGS:   Vertebrae:  Unremarkable.  No acute fracture.  Normal alignment.  Facet arthropathy throughout the lumbar spine most prominent at L4-L5 and L5-S1.   Sacrum coccyx:  Unremarkable as visualized.  No acute fracture.   Disc spaces:  No acute findings.  Mild disc space narrowing at L5-S1.   Soft tissues:  Unremarkable. IMPRESSION:     No acute fractures or spondylolisthesis of the lumbar spine.    Electronically signed by Pedro Lazcano M.D. on 04-12-23 at 0447    XR Hip With or Without Pelvis 2 - 3 View Left    Result Date: 4/12/2023  Patient: NUVIA SHERIFF  Time Out: 04:48 Exam(s): XR HIP + PELVIS EXAM:   XR Left Hip With Pelvis When Performed, 2 or 3 Views CLINICAL HISTORY:    Reason for exam: Fall, pain. TECHNIQUE:   Two or three views of the left hip with pelvis when performed. COMPARISON:   No relevant prior studies available. FINDINGS:   Bones joints:  Unremarkable.  No acute fracture.  No dislocation.   Soft tissues:  Unremarkable. IMPRESSION:       Normal left hip x-rays.     Electronically signed by Pedro Lazcano M.D. on 04-12-23 at 0448      I ordered the above noted radiological studies.  These were independently interpreted and reviewed by me.  See dictation for official radiology interpretation.      PROCEDURES    Procedures      MEDICATIONS GIVEN IN ER    Medications   oxyCODONE-acetaminophen (PERCOCET) 5-325 MG per tablet 1 tablet (1 tablet Oral Given 4/12/23 0245)         PROGRESS, DATA ANALYSIS, CONSULTS, AND MEDICAL DECISION MAKING    All labs have been independently reviewed by me.  All radiology studies have been reviewed by me and discussed with radiologist dictating the  report.   EKG's independently viewed and interpreted by me.  Discussion below represents my analysis of pertinent findings related to patient's condition, differential diagnosis, treatment plan and final disposition.    DDx:  Includes, but is not limited to hip fracture, pelvis fracture, compression fracture, sacrum/coccyx fracture, contusion      ED Course as of 04/12/23 0505   Wed Apr 12, 2023   0453 X-ray images independently viewed by me, I see no evidence for fracture or dislocation. [MAYTE]   0457 Patient rechecked, resting in bed, no acute distress.  Pain improved after p.o. pain medicines.  Discussed radiology results, diagnosis, and treatment plan.  Patient expresses understanding and agrees with plan. [MAYTE]      ED Course User Index  [MAYTE] Jonathan Montgomery, PA       MDM: Patient's x-rays show no evidence for fracture or acute traumatic abnormality, will treat with NSAIDs muscle relaxers and local therapy.  Vital signs are stable, patient safe for discharge home.    PPE:  The patient wore a mask and I wore a mask and all appropriate PPE throughout the entire patient encounter.      AS OF 05:05 EDT VITALS:    BP - 179/78  HR - 57  TEMP - 97.5 °F (36.4 °C) (Tympanic)  O2 SATS - 91%      DIAGNOSIS  Final diagnoses:   Fall down stairs, initial encounter   Contusion of left hip, initial encounter   Acute left-sided low back pain without sciatica         DISPOSITION  DISCHARGE    Patient discharged in stable condition.    Reviewed implications of results, diagnosis, meds, responsibility to follow up, warning signs and symptoms of possible worsening, potential complications and reasons to return to ER.    Patient/Family voiced understanding of above instructions.    Discussed plan for discharge, as there is no emergent indication for admission. Patient referred to primary care provider for BP management due to today's BP. Pt/family is agreeable and understands need for follow up and repeat testing.  Pt is aware that  discharge does not mean that nothing is wrong but it indicates no emergency is present that requires admission and they must continue care with follow-up as given below or physician of their choice.     FOLLOW-UP  PATIENT CONNECTION - [place] [id number] [phone]  Schedule an appointment as soon as possible for a visit            Medication List      New Prescriptions    diclofenac 75 MG EC tablet  Commonly known as: VOLTAREN  Take 1 tablet by mouth 2 (Two) Times a Day.     methocarbamol 500 MG tablet  Commonly known as: ROBAXIN  Take 2 tablets by mouth 4 (Four) Times a Day.        Changed    metFORMIN 500 MG tablet  Commonly known as: GLUCOPHAGE  TAKE ONE TABLET BY MOUTH TWICE A DAY WITH A MEAL  What changed:   · how to take this  · when to take this           Where to Get Your Medications      These medications were sent to [id number] - [place], [place] - [address] [address] [facility] AT [facility] - [phone]  - [phone]   [address] [address]    Phone: [phone]   · diclofenac 75 MG EC tablet  · methocarbamol 500 MG tablet           Note Disclaimer: At [facility], we believe that sharing information builds trust and better relationships. You are receiving this note because you recently visited [facility]. It is possible you will see health information before a provider has talked with you about it. This kind of information can be easy to misunderstand. To help you fully understand what it means for your health, we urge you to discuss this note with your provider.     Jonathan Montgomery PA  04/12/23 9687

## 2023-04-12 NOTE — ED PROVIDER NOTES
"The WILSON and I have discussed this patient's history, physical exam, and treatment plan.  I have reviewed the documentation and personally had a face to face interaction with the patient. I affirm the documentation and agree with the treatment and plan.  The attached note describes my personal findings.      I provided a substantive portion of the care of the patient.  I personally performed the physical exam in its entirety, and below are my findings.     Brief history of present illness: 62-year-old female complains of low back pain, left hip pain and tailbone pain status post fall approximately 2000 this evening at home.  Describes mechanical fall with no syncope.  No other injuries including chest, abdomen, head or neck.    Physical exam:    BP (!) 192/131   Pulse 105   Temp 97.5 °F (36.4 °C) (Tympanic)   Resp 20   Ht 167.6 cm (66\")   Wt 77.1 kg (170 lb)   SpO2 98%   BMI 27.44 kg/m²       Physical Exam   Constitutional: No distress.  Nontoxic  HENT:  Head: Normocephalic and atraumatic.   Oropharynx: Mucous membranes are moist.   Eyes: . No scleral icterus. No conjunctival pallor.  Neck: Normal range of motion. Neck supple.   Cardiovascular: Pink warm and well perfused throughout.    Pulmonary/Chest: No respiratory distress.  No tachypnea or increased work of breathing appreciated.    Musculoskeletal: Moves all extremities equally.  Discomfort with palpation left posterior pelvis and sacroiliac area.  Neurological: Alert and oriented.  No acute focal deficit appreciated.  Skin: Skin is pink, warm, and dry.   Psychiatric: Mood and affect normal.   Nursing note and vitals reviewed.        MDM: Agree with plan for treating discomfort and obtaining radiologic studies to evaluate for acute traumatic injuries.       hCris Sotomayor MD  04/12/23 0259    "

## 2023-06-28 PROBLEM — M54.50 LOW BACK PAIN: Status: ACTIVE | Noted: 2023-06-28

## 2023-06-29 PROBLEM — Z72.0 TOBACCO ABUSE: Status: ACTIVE | Noted: 2023-06-29

## 2023-06-29 PROBLEM — M54.50 LUMBAR BACK PAIN: Status: ACTIVE | Noted: 2023-06-29

## 2023-06-29 PROBLEM — G83.4 CAUDA EQUINA SYNDROME WITH NEUROGENIC BLADDER: Status: ACTIVE | Noted: 2023-06-28

## 2023-07-05 ENCOUNTER — HOME HEALTH ADMISSION (OUTPATIENT)
Dept: HOME HEALTH SERVICES | Facility: HOME HEALTHCARE | Age: 63
End: 2023-07-05
Payer: COMMERCIAL

## 2023-07-05 PROBLEM — K59.03 DRUG-INDUCED CONSTIPATION: Status: RESOLVED | Noted: 2023-07-05 | Resolved: 2023-07-05

## 2023-07-05 PROBLEM — G83.4 CAUDA EQUINA SYNDROME WITH NEUROGENIC BLADDER: Status: RESOLVED | Noted: 2023-06-28 | Resolved: 2023-07-05

## 2023-07-05 PROBLEM — K59.03 DRUG-INDUCED CONSTIPATION: Status: ACTIVE | Noted: 2023-07-05

## 2023-08-03 ENCOUNTER — OFFICE VISIT (OUTPATIENT)
Dept: FAMILY MEDICINE CLINIC | Facility: CLINIC | Age: 63
End: 2023-08-03
Payer: COMMERCIAL

## 2023-08-03 ENCOUNTER — TELEPHONE (OUTPATIENT)
Dept: NEUROSURGERY | Facility: CLINIC | Age: 63
End: 2023-08-03
Payer: COMMERCIAL

## 2023-08-03 VITALS
SYSTOLIC BLOOD PRESSURE: 158 MMHG | RESPIRATION RATE: 18 BRPM | BODY MASS INDEX: 25.07 KG/M2 | DIASTOLIC BLOOD PRESSURE: 90 MMHG | WEIGHT: 156 LBS | HEART RATE: 91 BPM | HEIGHT: 66 IN | OXYGEN SATURATION: 97 %

## 2023-08-03 DIAGNOSIS — Z12.31 SCREENING MAMMOGRAM FOR BREAST CANCER: ICD-10-CM

## 2023-08-03 DIAGNOSIS — Z00.00 ENCOUNTER FOR PREVENTATIVE ADULT HEALTH CARE EXAMINATION: Primary | ICD-10-CM

## 2023-08-03 DIAGNOSIS — F41.9 ANXIETY AND DEPRESSION: ICD-10-CM

## 2023-08-03 DIAGNOSIS — F32.A ANXIETY AND DEPRESSION: ICD-10-CM

## 2023-08-03 DIAGNOSIS — E11.9 CONTROLLED TYPE 2 DIABETES MELLITUS WITHOUT COMPLICATION, WITHOUT LONG-TERM CURRENT USE OF INSULIN: ICD-10-CM

## 2023-08-03 DIAGNOSIS — G89.4 CHRONIC PAIN SYNDROME: ICD-10-CM

## 2023-08-03 DIAGNOSIS — I10 ESSENTIAL HYPERTENSION: ICD-10-CM

## 2023-08-03 DIAGNOSIS — M54.50 CHRONIC MIDLINE LOW BACK PAIN WITHOUT SCIATICA: ICD-10-CM

## 2023-08-03 DIAGNOSIS — M48.061 SPINAL STENOSIS OF LUMBAR REGION, UNSPECIFIED WHETHER NEUROGENIC CLAUDICATION PRESENT: ICD-10-CM

## 2023-08-03 DIAGNOSIS — E78.5 HYPERLIPIDEMIA, UNSPECIFIED HYPERLIPIDEMIA TYPE: ICD-10-CM

## 2023-08-03 DIAGNOSIS — Z11.59 ENCOUNTER FOR HEPATITIS C SCREENING TEST FOR LOW RISK PATIENT: ICD-10-CM

## 2023-08-03 DIAGNOSIS — E11.42 DIABETIC PERIPHERAL NEUROPATHY: ICD-10-CM

## 2023-08-03 DIAGNOSIS — G89.29 CHRONIC MIDLINE LOW BACK PAIN WITHOUT SCIATICA: ICD-10-CM

## 2023-08-03 DIAGNOSIS — Z72.0 TOBACCO ABUSE: ICD-10-CM

## 2023-08-03 PROBLEM — N20.0 NEPHROLITHIASIS: Status: ACTIVE | Noted: 2018-06-01

## 2023-08-03 PROBLEM — M75.41 IMPINGEMENT SYNDROME OF RIGHT SHOULDER: Status: ACTIVE | Noted: 2020-05-04

## 2023-08-03 PROBLEM — M94.269 CHONDROMALACIA OF KNEE: Status: ACTIVE | Noted: 2019-11-15

## 2023-08-03 PROBLEM — Z91.89 DES EXPOSURE IN UTERO: Status: ACTIVE | Noted: 2023-08-03

## 2023-08-03 PROBLEM — M70.50 PES ANSERINE BURSITIS: Status: ACTIVE | Noted: 2019-11-15

## 2023-08-03 RX ORDER — HYDROCHLOROTHIAZIDE 25 MG/1
25 TABLET ORAL DAILY
Qty: 30 TABLET | Refills: 0 | Status: SHIPPED | OUTPATIENT
Start: 2023-08-03 | End: 2023-09-02

## 2023-08-03 RX ORDER — BUPROPION HYDROCHLORIDE 150 MG/1
150 TABLET ORAL DAILY
Qty: 30 TABLET | Refills: 0 | Status: SHIPPED | OUTPATIENT
Start: 2023-08-03 | End: 2023-09-02

## 2023-08-04 LAB
ALBUMIN SERPL-MCNC: 4.9 G/DL (ref 3.5–5.2)
ALBUMIN/CREAT UR: 81 MG/G CREAT (ref 0–29)
ALBUMIN/GLOB SERPL: 2 G/DL
ALP SERPL-CCNC: 103 U/L (ref 39–117)
ALT SERPL-CCNC: 12 U/L (ref 1–33)
AST SERPL-CCNC: 15 U/L (ref 1–32)
BASOPHILS # BLD AUTO: 0.04 10*3/MM3 (ref 0–0.2)
BASOPHILS NFR BLD AUTO: 0.4 % (ref 0–1.5)
BILIRUB SERPL-MCNC: 0.3 MG/DL (ref 0–1.2)
BUN SERPL-MCNC: 9 MG/DL (ref 8–23)
BUN/CREAT SERPL: 12.2 (ref 7–25)
CALCIUM SERPL-MCNC: 10 MG/DL (ref 8.6–10.5)
CHLORIDE SERPL-SCNC: 97 MMOL/L (ref 98–107)
CHOLEST SERPL-MCNC: 269 MG/DL (ref 0–200)
CHOLEST/HDLC SERPL: 4.01 {RATIO}
CO2 SERPL-SCNC: 26.2 MMOL/L (ref 22–29)
CREAT SERPL-MCNC: 0.74 MG/DL (ref 0.57–1)
CREAT UR-MCNC: 123.3 MG/DL
EGFRCR SERPLBLD CKD-EPI 2021: 91.6 ML/MIN/1.73
EOSINOPHIL # BLD AUTO: 0.24 10*3/MM3 (ref 0–0.4)
EOSINOPHIL NFR BLD AUTO: 2.2 % (ref 0.3–6.2)
ERYTHROCYTE [DISTWIDTH] IN BLOOD BY AUTOMATED COUNT: 12.4 % (ref 12.3–15.4)
GLOBULIN SER CALC-MCNC: 2.4 GM/DL
GLUCOSE SERPL-MCNC: 115 MG/DL (ref 65–99)
HBA1C MFR BLD: 6 % (ref 4.8–5.6)
HCT VFR BLD AUTO: 39.1 % (ref 34–46.6)
HCV IGG SERPL QL IA: NON REACTIVE
HDLC SERPL-MCNC: 67 MG/DL (ref 40–60)
HGB BLD-MCNC: 13.1 G/DL (ref 12–15.9)
IMM GRANULOCYTES # BLD AUTO: 0.05 10*3/MM3 (ref 0–0.05)
IMM GRANULOCYTES NFR BLD AUTO: 0.5 % (ref 0–0.5)
LDLC SERPL CALC-MCNC: 182 MG/DL (ref 0–100)
LYMPHOCYTES # BLD AUTO: 2.15 10*3/MM3 (ref 0.7–3.1)
LYMPHOCYTES NFR BLD AUTO: 20.1 % (ref 19.6–45.3)
MCH RBC QN AUTO: 30.7 PG (ref 26.6–33)
MCHC RBC AUTO-ENTMCNC: 33.5 G/DL (ref 31.5–35.7)
MCV RBC AUTO: 91.6 FL (ref 79–97)
MICROALBUMIN UR-MCNC: 99.8 UG/ML
MONOCYTES # BLD AUTO: 0.57 10*3/MM3 (ref 0.1–0.9)
MONOCYTES NFR BLD AUTO: 5.3 % (ref 5–12)
NEUTROPHILS # BLD AUTO: 7.66 10*3/MM3 (ref 1.7–7)
NEUTROPHILS NFR BLD AUTO: 71.5 % (ref 42.7–76)
NRBC BLD AUTO-RTO: 0 /100 WBC (ref 0–0.2)
PLATELET # BLD AUTO: 302 10*3/MM3 (ref 140–450)
POTASSIUM SERPL-SCNC: 4.4 MMOL/L (ref 3.5–5.2)
PROT SERPL-MCNC: 7.3 G/DL (ref 6–8.5)
RBC # BLD AUTO: 4.27 10*6/MM3 (ref 3.77–5.28)
SODIUM SERPL-SCNC: 137 MMOL/L (ref 136–145)
TRIGL SERPL-MCNC: 113 MG/DL (ref 0–150)
TSH SERPL DL<=0.005 MIU/L-ACNC: 0.42 UIU/ML (ref 0.27–4.2)
VLDLC SERPL CALC-MCNC: 20 MG/DL (ref 5–40)
WBC # BLD AUTO: 10.71 10*3/MM3 (ref 3.4–10.8)

## 2023-08-08 ENCOUNTER — HOSPITAL ENCOUNTER (OUTPATIENT)
Dept: GENERAL RADIOLOGY | Facility: HOSPITAL | Age: 63
Discharge: HOME | End: 2023-08-08
Admitting: NEUROLOGICAL SURGERY
Payer: COMMERCIAL

## 2023-08-08 DIAGNOSIS — G83.4 CAUDA EQUINA SYNDROME WITH NEUROGENIC BLADDER: ICD-10-CM

## 2023-08-08 PROCEDURE — 72100 X-RAY EXAM L-S SPINE 2/3 VWS: CPT

## 2023-08-09 NOTE — PROGRESS NOTES
Patient ID: Kristina Yates is a 63 y.o. female is here today for follow-up.    Subjective     The patient is here in regards to   Chief Complaint   Patient presents with    Follow-up     Cannot sit for longer than two hours.     Leg Pain     Left leg has gotten weaker. Feeling pins and needles in left leg/foot.    Back Pain     Lower back pain       History of Present Illness  Kristina has been making progress with physical therapy.  She says that she is able to control her bladder somewhat although it is not 100% back to normal.  She has no bowel incontinence.  She has been walking asymmetrically due to residual leg discoordination and as a result has developed a right-sided back/hip pain which she describes as a crushing pain that is worse from going sitting to standing and worse when walking for certain distances or standing for long periods of time.    While in the room and during my examination of the patient I wore a mask and eye protection.  I washed my hands before and after this patient encounter.  The patient was also wearing a mask.    The following portions of the patient's history were reviewed and updated as appropriate: allergies, current medications, past family history, past medical history, past social history, past surgical history and problem list.    Review of Systems   Cardiovascular:  Negative for leg swelling.   Musculoskeletal:  Positive for back pain and gait problem. Negative for joint swelling.      Past Medical History:   Diagnosis Date    Alcohol abuse     in recovery, sober since 2012    Arthritis     Asthma     Edmonds esophagus     Cervical neuritis     Chronic pain     COPD (chronic obstructive pulmonary disease)     Depression     Diabetes mellitus     Type 2    DJD (degenerative joint disease), cervical     Foot spasms     GERD (gastroesophageal reflux disease)     Headache     Hiatal hernia     Hyperlipidemia     Seasonal allergies     Spinal stenosis        Allergies   Allergen  Reactions    Fluoxetine Hives     Hives    Sulfa Antibiotics Hives     THROAT CLOSES    Zofran [Ondansetron Hcl] Hives     THROAT CLOSES    Lisinopril Cough       Family History   Problem Relation Age of Onset    Cancer Mother         nonhodkinds lymphoma    Heart disease Father     Drug abuse Sister     Heart disease Brother     Lupus Maternal Aunt        Social History     Socioeconomic History    Marital status:     Number of children: 2   Tobacco Use    Smoking status: Every Day     Packs/day: 0.50     Years: 15.00     Pack years: 7.50     Types: Cigarettes     Last attempt to quit: 2021     Years since quittin.4    Smokeless tobacco: Never   Vaping Use    Vaping Use: Never used   Substance and Sexual Activity    Alcohol use: No     Comment: sober since , worsk 12 steps    Drug use: No    Sexual activity: Yes     Partners: Male       Past Surgical History:   Procedure Laterality Date    ANTERIOR CERVICAL DISCECTOMY W/ FUSION  2003    C5-6, C6-7    CERCLAGE CERVIX      CERVICAL EPIDURAL      CERVICAL FUSION      DILATATION AND CURETTAGE      ENDOMETRIAL ABLATION  2007    ENDOSCOPY      MULTIPLE    ENDOSCOPY N/A 2016    Procedure: ESOPHAGOGASTRODUODENOSCOPY WITH COLD BIOPSIES;  Surgeon: Jose Mcclellan MD;  Location: [facility] ENDOSCOPY;  Service:     ESOPHAGEAL DILATATION  2009    HAND SURGERY Bilateral     CARTILAGE    LUMBAR FUSION      NECK SURGERY      NISSEN FUNDOPLICATION LAPAROSCOPIC  2012         Objective     Vitals:    08/10/23 0908   BP: 160/90   Pulse: 94   SpO2: 94%     There is no height or weight on file to calculate BMI.    Physical Exam  Constitutional:       Appearance: Normal appearance.   HENT:      Head: Normocephalic and atraumatic.   Eyes:      Extraocular Movements: Extraocular movements intact.      Conjunctiva/sclera: Conjunctivae normal.      Pupils: Pupils are equal, round, and reactive to light.   Cardiovascular:      Rate and Rhythm: Normal rate and  regular rhythm.      Pulses: Normal pulses.   Pulmonary:      Breath sounds: Normal breath sounds.   Abdominal:      Palpations: Abdomen is soft.   Musculoskeletal:         General: Normal range of motion.      Cervical back: Normal range of motion and neck supple.      Comments: Right-sided SI joint inflammation characterized by:    -Pelvic Distraction test   -Lateral Iliac compression test   -FABERS (Kam's test)   -Ganslen's Test     Skin:     General: Skin is warm and dry.   Neurological:      Mental Status: She is alert and oriented to person, place, and time.      Cranial Nerves: Cranial nerves 2-12 are intact.      Motor: Motor function is intact. No weakness or atrophy.      Coordination: Coordination is intact. Romberg sign negative. Romberg Test normal.      Gait: Gait is intact. Gait normal.      Deep Tendon Reflexes: Reflexes are normal and symmetric.      Reflex Scores:       Tricep reflexes are 2+ on the right side and 2+ on the left side.       Bicep reflexes are 2+ on the right side and 2+ on the left side.       Brachioradialis reflexes are 2+ on the right side and 2+ on the left side.       Patellar reflexes are 2+ on the right side and 2+ on the left side.       Achilles reflexes are 2+ on the right side and 2+ on the left side.  Psychiatric:         Speech: Speech normal.       Neurologic Exam     Mental Status   Oriented to person, place, and time.   Attention: normal. Concentration: normal.   Speech: speech is normal   Level of consciousness: alert    Cranial Nerves   Cranial nerves II through XII intact.     CN III, IV, VI   Pupils are equal, round, and reactive to light.    Motor Exam   Muscle bulk: normal  Overall muscle tone: normal    Strength   Strength 5/5 except as noted.     Sensory Exam   Light touch normal.     Gait, Coordination, and Reflexes     Gait  Gait: normal    Coordination   Romberg: negative    Reflexes   Reflexes 2+ except as noted.   Right brachioradialis: 2+  Left  brachioradialis: 2+  Right biceps: 2+  Left biceps: 2+  Right triceps: 2+  Left triceps: 2+  Right patellar: 2+  Left patellar: 2+  Right achilles: 2+  Left achilles: 2+    Assessment & Plan   Independent Review of Radiographic Studies:      I personally reviewed the images from the following studies.    XR: XR of the lumbar spine was reviewed and shows intact instrumentation and alignment    Assessment/Plan: Kristina is overall doing well recovering from her lumbar surgery, however she has developed florid SI joint inflammation and I think she will benefit from anti-inflammatory treatments including ice baths.  I would like to see her back in 6 weeks with another lumbar x-ray.  She is not cleared to go back to work yet given her condition.    Medical Decision Making:      X-ray lumbar spine  SI joint injection, follow-up in 6 weeks         Diagnoses and all orders for this visit:    1. Cauda equina syndrome with neurogenic bladder (Primary)  -     XR Spine Lumbar 2 or 3 View; Future  -     pregabalin (Lyrica) 25 MG capsule; Take 1 capsule by mouth 2 (Two) Times a Day.  Dispense: 90 capsule; Refill: 2    2. SI (sacroiliac) joint inflammation  -     SI Joint Injection    Other orders  -     methocarbamol (ROBAXIN) 500 MG tablet; Take 1 tablet by mouth 4 (Four) Times a Day. Indications: Musculoskeletal Pain  Dispense: 90 tablet; Refill: 4             Patient Instructions/Recommendations:    After your SI joint injection:  -Please schedule your second SI joint injection at the time of your first SI joint injection for 2 weeks later  -If you have recently had a fusion surgery, do not take any NSAIDs and only take extra strength Tylenol every 4 hours for at least 2 weeks, okay to use turmeric supplement as well for anti-inflammatory effect.    -Take an ice bath by submerging the buttocks area in 50 degree water for 30 minutes/day for 5 days consecutively after your injection.   -Try adding ice gradually to decrease the  shock of the ice bath   -Try putting a robe in the dryer for 35 minutes so that the patient can have a warm rope to wear after the ice bath    -Continue to use ice packs as much as tolerable when you are not taking an ice bath        Alex Ortiz MD  08/10/23  09:22 EDT

## 2023-08-10 ENCOUNTER — OFFICE VISIT (OUTPATIENT)
Dept: NEUROSURGERY | Facility: CLINIC | Age: 63
End: 2023-08-10
Payer: COMMERCIAL

## 2023-08-10 VITALS — OXYGEN SATURATION: 94 % | SYSTOLIC BLOOD PRESSURE: 160 MMHG | DIASTOLIC BLOOD PRESSURE: 90 MMHG | HEART RATE: 94 BPM

## 2023-08-10 DIAGNOSIS — M46.1 SI (SACROILIAC) JOINT INFLAMMATION: ICD-10-CM

## 2023-08-10 DIAGNOSIS — G83.4 CAUDA EQUINA SYNDROME WITH NEUROGENIC BLADDER: Primary | ICD-10-CM

## 2023-08-10 PROCEDURE — 99024 POSTOP FOLLOW-UP VISIT: CPT | Performed by: NEUROLOGICAL SURGERY

## 2023-08-10 RX ORDER — METHOCARBAMOL 500 MG/1
500 TABLET, FILM COATED ORAL 4 TIMES DAILY
Qty: 90 TABLET | Refills: 4 | Status: SHIPPED | OUTPATIENT
Start: 2023-08-10

## 2023-08-10 RX ORDER — PREGABALIN 25 MG/1
25 CAPSULE ORAL 2 TIMES DAILY
Qty: 90 CAPSULE | Refills: 2 | Status: SHIPPED | OUTPATIENT
Start: 2023-08-10

## 2023-08-17 ENCOUNTER — HOSPITAL ENCOUNTER (OUTPATIENT)
Dept: GENERAL RADIOLOGY | Facility: HOSPITAL | Age: 63
Discharge: HOME | End: 2023-08-17
Payer: COMMERCIAL

## 2023-08-17 ENCOUNTER — ANESTHESIA (OUTPATIENT)
Dept: PAIN MEDICINE | Facility: HOSPITAL | Age: 63
End: 2023-08-17
Payer: COMMERCIAL

## 2023-08-17 ENCOUNTER — HOSPITAL ENCOUNTER (OUTPATIENT)
Dept: PAIN MEDICINE | Facility: HOSPITAL | Age: 63
Discharge: HOME | End: 2023-08-17
Payer: COMMERCIAL

## 2023-08-17 ENCOUNTER — ANESTHESIA EVENT (OUTPATIENT)
Dept: PAIN MEDICINE | Facility: HOSPITAL | Age: 63
End: 2023-08-17
Payer: COMMERCIAL

## 2023-08-17 VITALS
RESPIRATION RATE: 14 BRPM | SYSTOLIC BLOOD PRESSURE: 168 MMHG | HEART RATE: 82 BPM | DIASTOLIC BLOOD PRESSURE: 90 MMHG | HEIGHT: 67 IN | TEMPERATURE: 98.6 F | BODY MASS INDEX: 24.43 KG/M2 | OXYGEN SATURATION: 98 %

## 2023-08-17 DIAGNOSIS — R52 PAIN: ICD-10-CM

## 2023-08-17 DIAGNOSIS — M46.1 SI (SACROILIAC) JOINT INFLAMMATION: Primary | ICD-10-CM

## 2023-08-17 LAB — GLUCOSE BLDC GLUCOMTR-MCNC: 133 MG/DL (ref 70–130)

## 2023-08-17 PROCEDURE — 82948 REAGENT STRIP/BLOOD GLUCOSE: CPT

## 2023-08-17 PROCEDURE — 25010000002 MIDAZOLAM PER 1 MG: Performed by: ANESTHESIOLOGY

## 2023-08-17 PROCEDURE — 77003 FLUOROGUIDE FOR SPINE INJECT: CPT

## 2023-08-17 PROCEDURE — 25010000002 METHYLPREDNISOLONE PER 80 MG: Performed by: ANESTHESIOLOGY

## 2023-08-17 RX ORDER — MIDAZOLAM HYDROCHLORIDE 1 MG/ML
1 INJECTION INTRAMUSCULAR; INTRAVENOUS ONCE AS NEEDED
Status: COMPLETED | OUTPATIENT
Start: 2023-08-17 | End: 2023-08-17

## 2023-08-17 RX ORDER — SODIUM CHLORIDE 0.9 % (FLUSH) 0.9 %
10 SYRINGE (ML) INJECTION EVERY 12 HOURS SCHEDULED
Status: DISCONTINUED | OUTPATIENT
Start: 2023-08-17 | End: 2023-08-18 | Stop reason: HOSPADM

## 2023-08-17 RX ORDER — SODIUM CHLORIDE 0.9 % (FLUSH) 0.9 %
10 SYRINGE (ML) INJECTION AS NEEDED
Status: DISCONTINUED | OUTPATIENT
Start: 2023-08-17 | End: 2023-08-18 | Stop reason: HOSPADM

## 2023-08-17 RX ORDER — SODIUM CHLORIDE 9 MG/ML
40 INJECTION, SOLUTION INTRAVENOUS AS NEEDED
Status: DISCONTINUED | OUTPATIENT
Start: 2023-08-17 | End: 2023-08-18 | Stop reason: HOSPADM

## 2023-08-17 RX ORDER — LIDOCAINE HYDROCHLORIDE 10 MG/ML
0.5 INJECTION, SOLUTION INFILTRATION; PERINEURAL ONCE AS NEEDED
Status: DISCONTINUED | OUTPATIENT
Start: 2023-08-17 | End: 2023-08-18 | Stop reason: HOSPADM

## 2023-08-17 RX ORDER — BUPIVACAINE HYDROCHLORIDE AND EPINEPHRINE 2.5; 5 MG/ML; UG/ML
10 INJECTION, SOLUTION EPIDURAL; INFILTRATION; INTRACAUDAL; PERINEURAL ONCE
Status: COMPLETED | OUTPATIENT
Start: 2023-08-17 | End: 2023-08-17

## 2023-08-17 RX ORDER — METHYLPREDNISOLONE ACETATE 80 MG/ML
80 INJECTION, SUSPENSION INTRA-ARTICULAR; INTRALESIONAL; INTRAMUSCULAR; SOFT TISSUE ONCE
Status: COMPLETED | OUTPATIENT
Start: 2023-08-17 | End: 2023-08-17

## 2023-08-17 RX ORDER — LIDOCAINE HYDROCHLORIDE 10 MG/ML
1 INJECTION, SOLUTION INFILTRATION; PERINEURAL ONCE
Status: DISCONTINUED | OUTPATIENT
Start: 2023-08-17 | End: 2023-08-18 | Stop reason: HOSPADM

## 2023-08-17 RX ORDER — FENTANYL CITRATE 50 UG/ML
50 INJECTION, SOLUTION INTRAMUSCULAR; INTRAVENOUS ONCE
Status: DISCONTINUED | OUTPATIENT
Start: 2023-08-17 | End: 2023-08-18 | Stop reason: HOSPADM

## 2023-08-17 RX ADMIN — MIDAZOLAM 2 MG: 1 INJECTION INTRAMUSCULAR; INTRAVENOUS at 08:11

## 2023-08-17 RX ADMIN — BUPIVACAINE HYDROCHLORIDE AND EPINEPHRINE BITARTRATE 10 ML: 2.5; .005 INJECTION, SOLUTION EPIDURAL; INFILTRATION; INTRACAUDAL; PERINEURAL at 08:14

## 2023-08-17 RX ADMIN — METHYLPREDNISOLONE ACETATE 80 MG: 80 INJECTION, SUSPENSION INTRA-ARTICULAR; INTRALESIONAL; INTRAMUSCULAR; SOFT TISSUE at 08:14

## 2023-08-17 NOTE — ANESTHESIA PROCEDURE NOTES
PAIN SI joint injection    Pre-sedation assessment completed: 8/17/2023 7:55 AM    Patient reassessed immediately prior to procedure    Patient location during procedure: Pain Clinic  Start time: 8/17/2023 8:01 AM  Stop time: 8/17/2023 8:17 AM    Reason for block: procedure for pain  Performed by  Anesthesiologist: Amanda Alves MD  Preanesthetic Checklist  Completed: patient identified, IV checked, site marked, risks and benefits discussed, surgical consent, monitors and equipment checked, pre-op evaluation and timeout performed  Prep:  Patient position: supine  Sterile barriers:mask, gloves and cap  Prep: ChloraPrep  Patient monitoring: blood pressure monitoring, continuous pulse oximetry and EKG  Procedure:  Sedation:yes  Guidance:fluoroscopy  Contrast Medium:no  Location:Right SIJ  Needle Type:Tuohy  Needle Gauge:22 G  Aspiration:negative  Medications:  Depomedrol:80 mg  Comment:3 ml 0.25% marcaine w/ epi  Dx: sacroiliac joint inflammation   Fluoro used.    Sedation: [id number]    Post Assessment  Injection Assessment: negative  Patient Tolerance:comfortable throughout block  Complications:no

## 2023-08-17 NOTE — H&P
[facility]    History and Physical    Patient Name: Kristina Yates  :  1960  MRN:  [id number]  Date of Admission: 2023    Subjective     Patient is a 63 y.o. female s/p L4/5 fusion on 23 w dr ortiz who presents with chief complaint of chronic, moderate, severe low back, hips: right, and buttock pain.  Also reports numbness/tingling in left foot.  Bladder control has improved since surgery.  Onset of symptoms was gradual starting several months ago.  Symptoms are associated/aggravated by activity or standing or sitting for long periods of time. Symptoms improve with lying down.  She has been seen by dr. Ortiz who referred her for a right sacroiliac steroid joint injection which she presents for today.      The following portions of the patients history were reviewed and updated as appropriate: current medications, allergies, past medical history, past surgical history, past family history, past social history, and problem list                Objective     Past Medical History:   Past Medical History:   Diagnosis Date    Alcohol abuse     in recovery, sober since     Arthritis     Asthma     Edmonds esophagus     Cervical neuritis     Chronic pain     COPD (chronic obstructive pulmonary disease)     Depression     Diabetes mellitus     Type 2    DJD (degenerative joint disease), cervical     Foot spasms     GERD (gastroesophageal reflux disease)     Headache     Hiatal hernia     Hyperlipidemia     Low back pain     Seasonal allergies     Spinal stenosis      Past Surgical History:   Past Surgical History:   Procedure Laterality Date    ANTERIOR CERVICAL DISCECTOMY W/ FUSION  2003    C5-6, C6-7    CERCLAGE CERVIX      CERVICAL EPIDURAL      CERVICAL FUSION      DILATATION AND CURETTAGE      ENDOMETRIAL ABLATION  2007    ENDOSCOPY      MULTIPLE    ENDOSCOPY N/A 2016    Procedure: ESOPHAGOGASTRODUODENOSCOPY WITH COLD BIOPSIES;  Surgeon: Jose Mcclellan MD;  Location: [facility]  ENDOSCOPY;  Service:     ESOPHAGEAL DILATATION      HAND SURGERY Bilateral     CARTILAGE    LUMBAR FUSION      LUMBAR LAMINECTOMY WITH FUSION N/A 2023    Procedure: OPEN LUMBAR FOUR TO FIVE TRANSFORAMINAL LUMBAR INTERBODY FUSION;  Surgeon: Alex Ortiz MD;  Location: [facility] OR;  Service: Neurosurgery;  Laterality: N/A;    NECK SURGERY      NISSEN FUNDOPLICATION LAPAROSCOPIC  2012     Family History:   Family History   Problem Relation Age of Onset    Cancer Mother         nonhodkinds lymphoma    Heart disease Father     Drug abuse Sister     Heart disease Brother     Lupus Maternal Aunt      Social History:   Social History     Socioeconomic History    Marital status:     Number of children: 2   Tobacco Use    Smoking status: Every Day     Packs/day: 0.50     Years: 15.00     Pack years: 7.50     Types: Cigarettes     Last attempt to quit: 2021     Years since quittin.4    Smokeless tobacco: Never   Vaping Use    Vaping Use: Never used   Substance and Sexual Activity    Alcohol use: No     Comment: sober since , worsk 12 steps    Drug use: No    Sexual activity: Yes     Partners: Male       Vital Signs Range for the last 24 hours  Temperature:     Temp Source:     BP:     Pulse:     Respirations:     SPO2:     O2 Amount (l/min):     O2 Devices     Weight:           --------------------------------------------------------------------------------    Current Outpatient Medications   Medication Sig Dispense Refill    albuterol sulfate  (90 Base) MCG/ACT inhaler Inhale 2 puffs Every 4 (Four) Hours As Needed for Wheezing. 6.7 g 5    atorvastatin (LIPITOR) 40 MG tablet Take 1 tablet by mouth Daily. 90 tablet 1    buPROPion XL (Wellbutrin XL) 150 MG 24 hr tablet Take 1 tablet by mouth Daily for 30 days. 30 tablet 0    DULoxetine (CYMBALTA) 60 MG capsule TAKE 1 CAPSULE BY MOUTH DAILY 30 capsule 2    hydroCHLOROthiazide (HYDRODIURIL) 25 MG tablet Take 1 tablet by mouth Daily for 30  days. 30 tablet 0    losartan (COZAAR) 100 MG tablet Take 0.5 tablets by mouth Daily.      metFORMIN (GLUCOPHAGE) 500 MG tablet TAKE ONE TABLET BY MOUTH TWICE A DAY WITH A MEAL (Patient taking differently: 1 tablet.) 60 tablet 4    methocarbamol (ROBAXIN) 500 MG tablet Take 1 tablet by mouth 4 (Four) Times a Day. Indications: Musculoskeletal Pain 90 tablet 4    Multiple Vitamins-Minerals (MULTIVITAMIN ADULT) tablet Take 1 tablet by mouth Daily.      polyethylene glycol (MIRALAX) 17 GM/SCOOP powder Mix one capful (17 g) with liquid and drink once Daily. 510 g 0    pregabalin (Lyrica) 25 MG capsule Take 1 capsule by mouth 2 (Two) Times a Day. 90 capsule 2    sennosides-docusate (PERICOLACE) 8.6-50 MG per tablet Take 1 tablet by mouth 2 (Two) Times a Day. 60 tablet 0    acetaminophen (TYLENOL) 500 MG tablet Take 1 tablet by mouth 2 (Two) Times a Day As Needed. Indications: Pain      diclofenac (VOLTAREN) 75 MG EC tablet Take 1 tablet by mouth 2 (Two) Times a Day. 20 tablet 0    naloxone (NARCAN) 4 MG/0.1ML nasal spray Call 911. Don't prime. [place] in 1 nostril for overdose. Repeat in 2-3 minutes in other nostril if no or minimal breathing/responsiveness. 2 each 0    oxyCODONE-acetaminophen (Percocet) 5-325 MG per tablet Take 1 tablet by mouth Every 4 (Four) Hours As Needed for Moderate Pain or Severe Pain. 18 tablet 0    promethazine (PHENERGAN) 25 MG tablet Take 1 tablet by mouth Every 6 (Six) Hours As Needed for Nausea or Vomiting. (Patient not taking: Reported on 8/10/2023) 9 tablet 0     Current Facility-Administered Medications   Medication Dose Route Frequency Provider Last Rate Last Admin    bupivacaine-EPINEPHrine PF (MARCAINE w/EPI) 0.25% [id number] injection 10 mL  10 mL Injection Once Amanda Alves MD        fentaNYL citrate (PF) (SUBLIMAZE) injection 50 mcg  50 mcg Intravenous Once Amanda Alves MD        iopamidol (ISOVUE-M 200) injection 41%  10 mL Epidural Once in imaging  Amanda Alves MD        lidocaine (XYLOCAINE) 1 % injection 0.5 mL  0.5 mL Intradermal Once PRN Amanda Alves MD        lidocaine (XYLOCAINE) 1 % injection 1 mL  1 mL Intradermal Once Amanda Alves MD        methylPREDNISolone acetate (DEPO-medrol) injection 80 mg  80 mg Epidural Once Amanda Alves MD        midazolam (VERSED) injection 1 mg  1 mg Intravenous Once PRN Amanda Alvse MD        sodium chloride 0.9 % flush 10 mL  10 mL Intravenous Q12H Amanda Alves MD        sodium chloride 0.9 % flush 10 mL  10 mL Intravenous PRN Amanda Alves MD        sodium chloride 0.9 % infusion 40 mL  40 mL Intravenous PRN Amanda Alves MD           --------------------------------------------------------------------------------  Assessment & Plan      Anesthesia Evaluation     Patient summary reviewed and Nursing notes reviewed   no history of anesthetic complications:                Airway   Mallampati: II  Dental      Pulmonary    (+) a smoker Former, COPD, asthma,  Cardiovascular     (+) hypertensionhyperlipidemia      Neuro/Psych  (+) headaches, numbness, psychiatric history Anxiety and Depression  GI/Hepatic/Renal/Endo    (+) hiatal hernia, GERD, renal disease, diabetes mellitus    Musculoskeletal     (+) back pain, chronic pain, SAI test      PE comment: + right SI distraction  + right SI compression  Abdominal    Substance History   (+) alcohol use      Comment: Sober since 2012   OB/GYN negative ob/gyn ROS         Other   arthritis,                Diagnosis and Plan    Treatment Plan  ASA 3      Procedures: Sacroiliac joint injection, With fluoroscopy,      Anesthetic plan and risks discussed with patient.        Diagnosis     * SI (sacroiliac) joint inflammation [M46.1]

## 2023-08-24 DIAGNOSIS — R06.2 WHEEZING: ICD-10-CM

## 2023-08-24 DIAGNOSIS — E11.42 DIABETIC PERIPHERAL NEUROPATHY: ICD-10-CM

## 2023-08-24 DIAGNOSIS — J06.9 UPPER RESPIRATORY TRACT INFECTION, UNSPECIFIED TYPE: ICD-10-CM

## 2023-08-24 DIAGNOSIS — E11.9 CONTROLLED TYPE 2 DIABETES MELLITUS WITHOUT COMPLICATION, WITHOUT LONG-TERM CURRENT USE OF INSULIN: ICD-10-CM

## 2023-08-24 DIAGNOSIS — E78.5 HYPERLIPIDEMIA, UNSPECIFIED HYPERLIPIDEMIA TYPE: ICD-10-CM

## 2023-08-24 RX ORDER — DULOXETIN HYDROCHLORIDE 60 MG/1
60 CAPSULE, DELAYED RELEASE ORAL DAILY
Qty: 90 CAPSULE | Refills: 0 | Status: SHIPPED | OUTPATIENT
Start: 2023-08-24

## 2023-08-24 RX ORDER — ALBUTEROL SULFATE 90 UG/1
AEROSOL, METERED RESPIRATORY (INHALATION)
Qty: 8.5 G | Refills: 1 | Status: SHIPPED | OUTPATIENT
Start: 2023-08-24

## 2023-09-01 ENCOUNTER — HOSPITAL ENCOUNTER (OUTPATIENT)
Dept: MAMMOGRAPHY | Facility: HOSPITAL | Age: 63
Discharge: HOME | End: 2023-09-01
Payer: COMMERCIAL

## 2023-09-05 RX ORDER — HYDROCHLOROTHIAZIDE 25 MG/1
TABLET ORAL
Qty: 30 TABLET | Refills: 0 | Status: SHIPPED | OUTPATIENT
Start: 2023-09-05

## 2023-09-05 RX ORDER — BUPROPION HYDROCHLORIDE 150 MG/1
TABLET ORAL
Qty: 30 TABLET | Refills: 0 | Status: SHIPPED | OUTPATIENT
Start: 2023-09-05

## 2023-09-21 ENCOUNTER — TELEPHONE (OUTPATIENT)
Dept: NEUROSURGERY | Facility: CLINIC | Age: 63
End: 2023-09-21

## 2023-09-21 NOTE — TELEPHONE ENCOUNTER
PATIENT CALLED IN AND STATES SHE HAS FALLEN 2 TIMES SINCE HER LAST VISIT 08/10/23.  STATES SHE IS BACK ON THE CANE SINCE HER FALLS.     PATIENT HAS ONLY BEEN ABLE TO GET 1 SHOT, INSURANCE WILL NOT APPROVE ANOTHER SHOT FOR 3 MONTHS.     PAIN HAS INCREASED BUT HAS NOT HAD ANY FEVER.  HAS BEEN DOING HER ICE BATHS AS INSTRUCTED.       PATIENT STATES SHE IS DEPRESSED AND TIRE OF ALL THIS, WANTS HER OLD LIFE BACK.  DID NOT WANT TO MAKE THE FALLS A BIG DEAL BUT HER  TOLD HER TO CALL THE OFFICE TODAY.     PLEASE CALL PATIENT WITH ANY ADVICE.     THANK YOU!

## 2023-09-25 NOTE — PROGRESS NOTES
08-Jun-2023 11:06 13-Jun-2023 08:00 Patient ID: Kristina Yates is a 63 y.o. female is here today for follow-up after having an SI injection on 08-17-23    Subjective     The patient is here in regards to   Chief Complaint   Patient presents with    Follow-up       History of Present Illness  Kristina has done well recovering from her cauda equina syndrome and neurogenic bladder.  The last time she was here 6 weeks ago she had significant right-sided SI joint pain and we schedule her for SI joint injections and ice baths.  That has essentially taken care of her right-sided pain but since then she has developed left-sided SI joint pain.  She did not get SI joint injection on the left side because her insurance company will not approve it until November.  It seems to be affecting her ability to ambulate as well as sleep.  She had 2 falls related to her SI joint pain on the left side recently.  She is now ambulating using a cane to make sure that she does not fall.  Lower Extremity Issue  Pertinent negatives include no chest pain.     While in the room and during my examination of the patient I wore a mask and eye protection.  I washed my hands before and after this patient encounter.  The patient was also wearing a mask.    The following portions of the patient's history were reviewed and updated as appropriate: allergies, current medications, past family history, past medical history, past social history, past surgical history and problem list.    Review of Systems   Respiratory:  Negative for chest tightness and shortness of breath.    Cardiovascular:  Negative for chest pain.   Musculoskeletal:  Positive for back pain.      Past Medical History:   Diagnosis Date    Alcohol abuse     in recovery, sober since 2012    Arthritis     Asthma     Edmonds esophagus     Cervical neuritis     Chronic pain     COPD (chronic obstructive pulmonary disease)     Depression     Diabetes mellitus     Type 2    DJD (degenerative joint disease), cervical     Foot spasms      08-Jun-2023 05:58 GERD (gastroesophageal reflux disease)     Headache     Hiatal hernia     Hyperlipidemia     Low back pain     Seasonal allergies     Spinal stenosis        Allergies   Allergen Reactions    Fluoxetine Hives     Hives    Sulfa Antibiotics Hives     THROAT CLOSES    Zofran [Ondansetron Hcl] Hives     THROAT CLOSES    Lisinopril Cough       Family History   Problem Relation Age of Onset    Cancer Mother         nonhodkinds lymphoma    Heart disease Father     Drug abuse Sister     Heart disease Brother     Lupus Maternal Aunt        Social History     Socioeconomic History    Marital status:     Number of children: 2   Tobacco Use    Smoking status: Every Day     Packs/day: 0.50     Years: 15.00     Pack years: 7.50     Types: Cigarettes     Last attempt to quit: 2021     Years since quittin.5    Smokeless tobacco: Never   Vaping Use    Vaping Use: Never used   Substance and Sexual Activity    Alcohol use: No     Comment: sober since , worsk 12 steps    Drug use: No    Sexual activity: Yes     Partners: Male       Past Surgical History:   Procedure Laterality Date    ANTERIOR CERVICAL DISCECTOMY W/ FUSION  2003    C5-6, C6-7    CERCLAGE CERVIX      CERVICAL EPIDURAL      CERVICAL FUSION      DILATATION AND CURETTAGE      ENDOMETRIAL ABLATION  2007    ENDOSCOPY      MULTIPLE    ENDOSCOPY N/A 2016    Procedure: ESOPHAGOGASTRODUODENOSCOPY WITH COLD BIOPSIES;  Surgeon: Jose Mcclellan MD;  Location: [facility] ENDOSCOPY;  Service:     ESOPHAGEAL DILATATION  2009    HAND SURGERY Bilateral     CARTILAGE    LUMBAR FUSION      LUMBAR LAMINECTOMY WITH FUSION N/A 2023    Procedure: OPEN LUMBAR FOUR TO FIVE TRANSFORAMINAL LUMBAR INTERBODY FUSION;  Surgeon: Alex Ortiz MD;  Location: [facility] MAIN OR;  Service: Neurosurgery;  Laterality: N/A;    NECK SURGERY      NISSEN FUNDOPLICATION LAPAROSCOPIC  2012         Objective     Vitals:    23 0901   BP: 158/88     Body mass index is 24.43  kg/m².    Physical Exam  Constitutional:       Appearance: Normal appearance.   HENT:      Head: Normocephalic and atraumatic.   Eyes:      Extraocular Movements: Extraocular movements intact.      Conjunctiva/sclera: Conjunctivae normal.      Pupils: Pupils are equal, round, and reactive to light.   Cardiovascular:      Rate and Rhythm: Normal rate and regular rhythm.      Pulses: Normal pulses.   Pulmonary:      Breath sounds: Normal breath sounds.   Abdominal:      Palpations: Abdomen is soft.   Musculoskeletal:         General: Normal range of motion.      Cervical back: Normal range of motion and neck supple.      Comments: Left-sided SI joint inflammation characterized by:    -Pelvic Distraction test   -Lateral Iliac compression test   -FABERS (Kam's test)   -Ganslen's Test     Skin:     General: Skin is warm and dry.   Neurological:      Mental Status: She is alert and oriented to person, place, and time.      Cranial Nerves: Cranial nerves 2-12 are intact.      Motor: Motor function is intact. No weakness or atrophy.      Coordination: Coordination is intact. Romberg sign negative. Romberg Test normal.      Gait: Gait is intact. Gait normal.      Deep Tendon Reflexes: Reflexes are normal and symmetric.      Reflex Scores:       Tricep reflexes are 2+ on the right side and 2+ on the left side.       Bicep reflexes are 2+ on the right side and 2+ on the left side.       Brachioradialis reflexes are 2+ on the right side and 2+ on the left side.       Patellar reflexes are 2+ on the right side and 2+ on the left side.       Achilles reflexes are 2+ on the right side and 2+ on the left side.  Psychiatric:         Speech: Speech normal.       Neurologic Exam     Mental Status   Oriented to person, place, and time.   Attention: normal. Concentration: normal.   Speech: speech is normal   Level of consciousness: alert    Cranial Nerves   Cranial nerves II through XII intact.     CN III, IV, VI   Pupils are equal,  round, and reactive to light.    Motor Exam   Muscle bulk: normal  Overall muscle tone: normal    Strength   Strength 5/5 except as noted.     Sensory Exam   Light touch normal.     Gait, Coordination, and Reflexes     Gait  Gait: normal    Coordination   Romberg: negative    Reflexes   Reflexes 2+ except as noted.   Right brachioradialis: 2+  Left brachioradialis: 2+  Right biceps: 2+  Left biceps: 2+  Right triceps: 2+  Left triceps: 2+  Right patellar: 2+  Left patellar: 2+  Right achilles: 2+  Left achilles: 2+    Assessment & Plan   Independent Review of Radiographic Studies:      I personally reviewed the images from the following studies.    XR: XR of the lumbar spine was reviewed and shows intact hardware and alignment    Assessment/Plan: She is cleared from lifting restrictions.  I do think that she needs SI joint injections bilaterally followed by ice baths and hopefully that will normalize her symptoms.  She is doing remarkably well after recovering from cauda equina syndrome overall.  I like to see her back in 3 months with final x-rays    Medical Decision Making:      X-ray lumbar spine  X-ray scoliosis  SI joint injections, bilateral with steroids         Diagnoses and all orders for this visit:    1. Cauda equina syndrome with neurogenic bladder (Primary)  -     XR Spine Lumbar 2 or 3 View; Future  -     XR Spine Scoliosis 2 or 3 Views; Future    2. SI (sacroiliac) joint inflammation  -     SI Joint Injection             Patient Instructions/Recommendations:    Follow-up in 3 months with x-rays      Alex Ortiz MD  09/28/23  09:23 EDT       09-Jun-2023 13:49 08-Jun-2023 08:40

## 2023-09-26 ENCOUNTER — HOSPITAL ENCOUNTER (OUTPATIENT)
Dept: GENERAL RADIOLOGY | Facility: HOSPITAL | Age: 63
Discharge: HOME | End: 2023-09-26
Admitting: NEUROLOGICAL SURGERY
Payer: COMMERCIAL

## 2023-09-26 DIAGNOSIS — G83.4 CAUDA EQUINA SYNDROME WITH NEUROGENIC BLADDER: ICD-10-CM

## 2023-09-26 PROCEDURE — 72100 X-RAY EXAM L-S SPINE 2/3 VWS: CPT

## 2023-09-26 NOTE — TELEPHONE ENCOUNTER
PATIENT CALLED IN SAYING SHE MISSED A CALL, ADVISED THAT DR CRAIG WILL CALL HER BACK AND TO KEEP PHONE NEAR HER TO ANSWER.

## 2023-09-28 ENCOUNTER — OFFICE VISIT (OUTPATIENT)
Dept: NEUROSURGERY | Facility: CLINIC | Age: 63
End: 2023-09-28
Payer: COMMERCIAL

## 2023-09-28 VITALS
WEIGHT: 156 LBS | DIASTOLIC BLOOD PRESSURE: 88 MMHG | BODY MASS INDEX: 24.48 KG/M2 | SYSTOLIC BLOOD PRESSURE: 158 MMHG | HEIGHT: 67 IN

## 2023-09-28 DIAGNOSIS — G83.4 CAUDA EQUINA SYNDROME WITH NEUROGENIC BLADDER: Primary | ICD-10-CM

## 2023-09-28 DIAGNOSIS — M46.1 SI (SACROILIAC) JOINT INFLAMMATION: ICD-10-CM

## 2023-09-28 PROCEDURE — 99024 POSTOP FOLLOW-UP VISIT: CPT | Performed by: NEUROLOGICAL SURGERY

## 2023-11-06 ENCOUNTER — OFFICE VISIT (OUTPATIENT)
Dept: FAMILY MEDICINE CLINIC | Facility: CLINIC | Age: 63
End: 2023-11-06
Payer: COMMERCIAL

## 2023-11-06 VITALS
WEIGHT: 166.4 LBS | BODY MASS INDEX: 26.74 KG/M2 | SYSTOLIC BLOOD PRESSURE: 140 MMHG | DIASTOLIC BLOOD PRESSURE: 90 MMHG | RESPIRATION RATE: 20 BRPM | OXYGEN SATURATION: 98 % | HEIGHT: 66 IN | HEART RATE: 96 BPM | TEMPERATURE: 98.4 F

## 2023-11-06 DIAGNOSIS — G62.9 PERIPHERAL POLYNEUROPATHY: ICD-10-CM

## 2023-11-06 DIAGNOSIS — I10 PRIMARY HYPERTENSION: Primary | ICD-10-CM

## 2023-11-06 LAB
ALBUMIN SERPL-MCNC: 5 G/DL (ref 3.5–5.2)
ALBUMIN/GLOB SERPL: 2.4 G/DL
ALP SERPL-CCNC: 94 U/L (ref 39–117)
ALT SERPL-CCNC: 30 U/L (ref 1–33)
AST SERPL-CCNC: 33 U/L (ref 1–32)
BILIRUB SERPL-MCNC: <0.2 MG/DL (ref 0–1.2)
BUN SERPL-MCNC: 9 MG/DL (ref 8–23)
BUN/CREAT SERPL: 13.2 (ref 7–25)
CALCIUM SERPL-MCNC: 10 MG/DL (ref 8.6–10.5)
CHLORIDE SERPL-SCNC: 101 MMOL/L (ref 98–107)
CO2 SERPL-SCNC: 25.5 MMOL/L (ref 22–29)
CREAT SERPL-MCNC: 0.68 MG/DL (ref 0.57–1)
EGFRCR SERPLBLD CKD-EPI 2021: 98.6 ML/MIN/1.73
GLOBULIN SER CALC-MCNC: 2.1 GM/DL
GLUCOSE SERPL-MCNC: 103 MG/DL (ref 65–99)
POTASSIUM SERPL-SCNC: 4.7 MMOL/L (ref 3.5–5.2)
PROT SERPL-MCNC: 7.1 G/DL (ref 6–8.5)
SODIUM SERPL-SCNC: 138 MMOL/L (ref 136–145)

## 2023-11-06 RX ORDER — LOSARTAN POTASSIUM 100 MG/1
100 TABLET ORAL DAILY
Qty: 90 TABLET | Refills: 0 | Status: SHIPPED | OUTPATIENT
Start: 2023-11-06 | End: 2024-02-04

## 2023-11-06 RX ORDER — NAPROXEN SODIUM 220 MG
220 TABLET ORAL 2 TIMES DAILY PRN
COMMUNITY

## 2023-11-06 RX ORDER — DICLOFENAC SODIUM 75 MG/1
75 TABLET, DELAYED RELEASE ORAL DAILY
Qty: 30 TABLET | Refills: 0 | Status: SHIPPED | OUTPATIENT
Start: 2023-11-06 | End: 2023-12-06

## 2023-11-06 RX ORDER — BUPROPION HYDROCHLORIDE 150 MG/1
150 TABLET ORAL DAILY
Qty: 90 TABLET | Refills: 0 | Status: SHIPPED | OUTPATIENT
Start: 2023-11-06

## 2023-11-06 RX ORDER — HYDROCHLOROTHIAZIDE 25 MG/1
25 TABLET ORAL DAILY
Qty: 90 TABLET | Refills: 0 | Status: SHIPPED | OUTPATIENT
Start: 2023-11-06 | End: 2024-02-04

## 2023-11-06 NOTE — PROGRESS NOTES
Subjective   Kristina Yates is a 63 y.o. female.     History of Present Illness   Estab pt here for follow up    Chronic htn x years. [id number] mg and hydrochlorothiazide 25 mg. BP today 140/90. Needs refills. Denies chest pain, palps, headache, vision changes.     Neuropathy in Left foot. Sees Dr Ortiz. She is on lyrica and duloxetine 60 mg  and uses voltaren.     The following portions of the patient's history were reviewed and updated as appropriate: allergies, current medications, past family history, past medical history, past social history, past surgical history and problem list.    Review of Systems    Objective   Physical Exam  Vitals reviewed.   Constitutional:       Appearance: Normal appearance.   HENT:      Mouth/Throat:      Mouth: Mucous membranes are moist.   Cardiovascular:      Rate and Rhythm: Normal rate and regular rhythm.      Pulses: Normal pulses.      Heart sounds: Normal heart sounds.   Pulmonary:      Effort: Pulmonary effort is normal.      Breath sounds: Normal breath sounds.   Musculoskeletal:         General: Normal range of motion.   Skin:     General: Skin is warm.   Neurological:      General: No focal deficit present.      Mental Status: She is alert.         Vitals:    11/06/23 1034   BP: 140/90   Pulse: 96   Resp: 20   Temp: 98.4 °F (36.9 °C)   SpO2: 98%     Body mass index is 26.86 kg/m².    Procedures    Assessment & Plan   Problems Addressed this Visit    None  Visit Diagnoses       Primary hypertension    -  Primary    Relevant Medications    hydroCHLOROthiazide (HYDRODIURIL) 25 MG tablet    losartan (COZAAR) 100 MG tablet    Other Relevant Orders    Comprehensive metabolic panel (Completed)    Peripheral polyneuropathy              Diagnoses         Codes Comments    Primary hypertension    -  Primary ICD-10-CM: I10  ICD-9-CM: 401.9     Peripheral polyneuropathy     ICD-10-CM: G62.9  ICD-9-CM: 356.9           HTN- enc heart healthy diet, cw hctz 25, losartan 100, enc low na  diet    Neuroapthy- cw lyrica and duloxetine 60 mg and add voltaren gel 4 gm tid prn pain, ice, heat, check skin daily          Education provided in AVS   No follow-ups on file.

## 2023-11-17 DIAGNOSIS — J06.9 UPPER RESPIRATORY TRACT INFECTION, UNSPECIFIED TYPE: ICD-10-CM

## 2023-11-17 DIAGNOSIS — R06.2 WHEEZING: ICD-10-CM

## 2023-11-17 RX ORDER — ALBUTEROL SULFATE 90 UG/1
AEROSOL, METERED RESPIRATORY (INHALATION)
Qty: 8.5 G | Refills: 1 | Status: SHIPPED | OUTPATIENT
Start: 2023-11-17

## 2023-11-27 ENCOUNTER — ANESTHESIA EVENT (OUTPATIENT)
Dept: PAIN MEDICINE | Facility: HOSPITAL | Age: 63
End: 2023-11-27
Payer: COMMERCIAL

## 2023-11-27 ENCOUNTER — HOSPITAL ENCOUNTER (OUTPATIENT)
Dept: GENERAL RADIOLOGY | Facility: HOSPITAL | Age: 63
Discharge: HOME | End: 2023-11-27
Payer: COMMERCIAL

## 2023-11-27 ENCOUNTER — ANESTHESIA (OUTPATIENT)
Dept: PAIN MEDICINE | Facility: HOSPITAL | Age: 63
End: 2023-11-27
Payer: COMMERCIAL

## 2023-11-27 ENCOUNTER — HOSPITAL ENCOUNTER (OUTPATIENT)
Dept: PAIN MEDICINE | Facility: HOSPITAL | Age: 63
Discharge: HOME | End: 2023-11-27
Payer: COMMERCIAL

## 2023-11-27 VITALS
OXYGEN SATURATION: 95 % | TEMPERATURE: 98.6 F | HEART RATE: 86 BPM | SYSTOLIC BLOOD PRESSURE: 183 MMHG | DIASTOLIC BLOOD PRESSURE: 102 MMHG | RESPIRATION RATE: 16 BRPM

## 2023-11-27 DIAGNOSIS — M46.1 SI (SACROILIAC) JOINT INFLAMMATION: ICD-10-CM

## 2023-11-27 DIAGNOSIS — R52 PAIN: ICD-10-CM

## 2023-11-27 LAB — GLUCOSE BLDC GLUCOMTR-MCNC: 135 MG/DL (ref 70–130)

## 2023-11-27 PROCEDURE — 82948 REAGENT STRIP/BLOOD GLUCOSE: CPT

## 2023-11-27 PROCEDURE — 25010000002 METHYLPREDNISOLONE PER 80 MG: Performed by: ANESTHESIOLOGY

## 2023-11-27 PROCEDURE — 25010000002 MIDAZOLAM PER 1 MG: Performed by: ANESTHESIOLOGY

## 2023-11-27 PROCEDURE — 25010000002 BUPIVACAINE (PF) 0.25 % SOLUTION: Performed by: ANESTHESIOLOGY

## 2023-11-27 RX ORDER — FENTANYL CITRATE 50 UG/ML
100 INJECTION, SOLUTION INTRAMUSCULAR; INTRAVENOUS ONCE
Status: DISCONTINUED | OUTPATIENT
Start: 2023-11-27 | End: 2023-11-28 | Stop reason: HOSPADM

## 2023-11-27 RX ORDER — SODIUM CHLORIDE 0.9 % (FLUSH) 0.9 %
10 SYRINGE (ML) INJECTION EVERY 12 HOURS SCHEDULED
Status: DISCONTINUED | OUTPATIENT
Start: 2023-11-27 | End: 2023-11-28 | Stop reason: HOSPADM

## 2023-11-27 RX ORDER — SODIUM CHLORIDE 9 MG/ML
40 INJECTION, SOLUTION INTRAVENOUS AS NEEDED
Status: DISCONTINUED | OUTPATIENT
Start: 2023-11-27 | End: 2023-11-28 | Stop reason: HOSPADM

## 2023-11-27 RX ORDER — LIDOCAINE HYDROCHLORIDE 10 MG/ML
1 INJECTION, SOLUTION INFILTRATION; PERINEURAL ONCE
Status: DISCONTINUED | OUTPATIENT
Start: 2023-11-27 | End: 2023-11-28 | Stop reason: HOSPADM

## 2023-11-27 RX ORDER — SODIUM CHLORIDE 0.9 % (FLUSH) 0.9 %
10 SYRINGE (ML) INJECTION AS NEEDED
Status: DISCONTINUED | OUTPATIENT
Start: 2023-11-27 | End: 2023-11-28 | Stop reason: HOSPADM

## 2023-11-27 RX ORDER — METHYLPREDNISOLONE ACETATE 80 MG/ML
80 INJECTION, SUSPENSION INTRA-ARTICULAR; INTRALESIONAL; INTRAMUSCULAR; SOFT TISSUE ONCE
Status: COMPLETED | OUTPATIENT
Start: 2023-11-27 | End: 2023-11-27

## 2023-11-27 RX ORDER — LIDOCAINE HYDROCHLORIDE 10 MG/ML
0.5 INJECTION, SOLUTION INFILTRATION; PERINEURAL ONCE AS NEEDED
Status: DISCONTINUED | OUTPATIENT
Start: 2023-11-27 | End: 2023-11-28 | Stop reason: HOSPADM

## 2023-11-27 RX ORDER — BUPIVACAINE HYDROCHLORIDE 2.5 MG/ML
10 INJECTION, SOLUTION EPIDURAL; INFILTRATION; INTRACAUDAL ONCE
Status: COMPLETED | OUTPATIENT
Start: 2023-11-27 | End: 2023-11-27

## 2023-11-27 RX ORDER — MIDAZOLAM HYDROCHLORIDE 1 MG/ML
2 INJECTION INTRAMUSCULAR; INTRAVENOUS ONCE
Status: COMPLETED | OUTPATIENT
Start: 2023-11-27 | End: 2023-11-27

## 2023-11-27 RX ADMIN — MIDAZOLAM 2 MG: 1 INJECTION INTRAMUSCULAR; INTRAVENOUS at 08:41

## 2023-11-27 RX ADMIN — BUPIVACAINE HYDROCHLORIDE 10 ML: 2.5 INJECTION, SOLUTION EPIDURAL; INFILTRATION; INTRACAUDAL; PERINEURAL at 08:45

## 2023-11-27 RX ADMIN — METHYLPREDNISOLONE ACETATE 80 MG: 80 INJECTION, SUSPENSION INTRA-ARTICULAR; INTRALESIONAL; INTRAMUSCULAR; SOFT TISSUE at 08:45

## 2023-11-27 NOTE — ANESTHESIA PROCEDURE NOTES
PAIN SI joint injection    Pre-sedation assessment completed: 11/27/2023 8:39 AM    Patient reassessed immediately prior to procedure    Patient location during procedure: Pain Clinic  Start time: 11/27/2023 8:39 AM  Stop time: 11/27/2023 8:51 AM    Reason for block: procedure for pain  Performed by  Anesthesiologist: Jonathan Gaston MD  Preanesthetic Checklist  Completed: patient identified, IV checked, site marked, risks and benefits discussed, surgical consent, monitors and equipment checked, pre-op evaluation and timeout performed  Prep:  Patient position: prone  Sterile barriers:cap, gloves, mask and sterile barrier  Prep: ChloraPrep  Patient monitoring: blood pressure monitoring, continuous pulse oximetry and EKG  Procedure:  Sedation:yes  Guidance:fluoroscopy  Contrast Medium:no  Location:Bilateral  Needle type: short bevel needle.  Needle Gauge:22 G  Aspiration:negative  Medications:  Depomedrol:80  Comment:0.25 % bup 2cc each side    Post Assessment  Injection Assessment: negative  Patient Tolerance:comfortable throughout block  Complications:no  Additional Notes  Dx : Post-Op Diagnosis Codes:     * Sacroiliitis [M46.1]    Sedation start:        0841                             End:   0851

## 2023-11-27 NOTE — H&P
INTERVAL HISTORY:    The patient returns for another sacroiliac joint steroid injection, left and right  today.  They have received 80 % improvement since their last injection with a pain level of 9/10 at its worst recently.  She only had the right side treated on her last treatment day because she thought she can only have 1 side at a time but her left side is also hurting and is worse in the right side now.  So we will do both sides today  Conservative measures tried in the interim. Daily activities are still affected by the pain.    Radiology reports and/or previous notes have been reviewed and are consistent with their diagnosis of Post-Op Diagnosis Codes:     * Sacroiliitis [M46.1]    Alert and oriented  MP - 2  Lungs - clear  CV - rrr    Diagnosis:  Post-Op Diagnosis Codes:     * Sacroiliitis [M46.1]      Plan:  sacroiliac joint steroid injection, left and right under fluoroscopic guidance        Target : sacroiliac joint steroid injection, left and right    I have encouraged them to continue:  1.  Physical therapy exercises at home as prescribed by physical therapy or from the pain clinic handout (given to the patient).  Continuation of these exercises every day, or multiple times per week, even when the patient has good pain relief, was stressed to the patient as a preventative measure to decrease the frequency and severity of future pain episodes.  2.  Continue pain medicines as already prescribed.  If patient not currently taking any, it is recommended to begin Acetaminophen 1000 mg po q 8 hours.  If other medicines containing Acetaminophen are currently prescribed, maintain daily dose at 3000mg.    3.  If they can tolerate NSAIDS, it is recommended to take Ibuprofen 600 mg po q 6 hours for 7 days during pain exacerbations.   Alternatively, they may substitute an NSAID of their choice (e.g. Aleve)  4.  Heat and ice to the affected area as tolerated for pain control.  It was discussed that heating pads can  cause burns.  5.  Low impact exercise such as walking or water exercise was recommended to maintain overall health and aid in weight control.   6.  Follow up as needed for subsequent injections.  7.  Patient was counseled to abstain from tobacco products.    Time :  23  min

## 2023-11-27 NOTE — ADDENDUM NOTE
Addendum  created 11/27/23 0859 by Jonathan Gaston MD    Clinical Note Signed, SmartForm saved

## 2023-11-27 NOTE — DISCHARGE INSTRUCTIONS
After your SI joint injection:  -Please schedule your second SI joint injection at the time of your first SI joint injection for 2 weeks later  -Alternate 1 tablet of extra strength Tylenol and 2 tablets of Aleve (or any other NSAID) in a scheduled manner every 4 hours for at least 2 weeks after the injection  -Continue to use ice packs as much as tolerable and if you can tolerate an ice bath at least up to the waist  -The patient can also consider taking an ice bath by submerging the buttocks area in 60 degree water for 30 minutes/day for 5 days consecutively.   -Try adding ice gradually to decrease the shock of the ice bath   -Try putting a robe in the dryer for 35 minutes so that the patient can have a warm rope to wear after the ice bath  -After 2 injections 2 weeks apart, if your symptoms have not fully resolved we can consider an SI joint fusionAfter your SI joint injection:  -Please schedule your second SI joint injection at the time of your first SI joint injection for 2 weeks later  -Alternate 1 tablet of extra strength Tylenol and 2 tablets of Aleve (or any other NSAID) in a scheduled manner every 4 hours for at least 2 weeks after the injection  -Continue to use ice packs as much as tolerable and if you can tolerate an ice bath at least up to the waist  -The patient can also consider taking an ice bath by submerging the buttocks area in 60 degree water for 30 minutes/day for 5 days consecutively.   -Try adding ice gradually to decrease the shock of the ice bath   -Try putting a robe in the dryer for 35 minutes so that the patient can have a warm rope to wear after the ice bath  -After 2 injections 2 weeks apart, if your symptoms have not fully resolved we can consider an SI joint fusion

## 2023-11-30 DIAGNOSIS — E11.42 DIABETIC PERIPHERAL NEUROPATHY: ICD-10-CM

## 2023-11-30 DIAGNOSIS — E78.5 HYPERLIPIDEMIA, UNSPECIFIED HYPERLIPIDEMIA TYPE: ICD-10-CM

## 2023-11-30 DIAGNOSIS — E11.9 CONTROLLED TYPE 2 DIABETES MELLITUS WITHOUT COMPLICATION, WITHOUT LONG-TERM CURRENT USE OF INSULIN: ICD-10-CM

## 2023-11-30 RX ORDER — DULOXETIN HYDROCHLORIDE 60 MG/1
60 CAPSULE, DELAYED RELEASE ORAL DAILY
Qty: 90 CAPSULE | Refills: 0 | Status: SHIPPED | OUTPATIENT
Start: 2023-11-30

## 2023-12-26 ENCOUNTER — HOSPITAL ENCOUNTER (OUTPATIENT)
Dept: GENERAL RADIOLOGY | Facility: HOSPITAL | Age: 63
Discharge: HOME | End: 2023-12-26
Payer: COMMERCIAL

## 2023-12-26 DIAGNOSIS — G83.4 CAUDA EQUINA SYNDROME WITH NEUROGENIC BLADDER: ICD-10-CM

## 2023-12-26 PROCEDURE — 72100 X-RAY EXAM L-S SPINE 2/3 VWS: CPT

## 2023-12-26 PROCEDURE — 72082 X-RAY EXAM ENTIRE SPI 2/3 VW: CPT

## 2023-12-27 NOTE — PROGRESS NOTES
Patient ID: Kristina Yates is a 63 y.o. female is here today for a follow-up for cauda equina syndrome and SI joint inflammation.  Last completed XRs on 12/26/2023.    Subjective     The patient is here in regards to   Chief Complaint   Patient presents with    Follow-up       History of Present Illness  Kristina continues to have SI joint pain causing the majority of her discomfort.  She tolerates pain overall very well.  She has had 2 rounds of SI joint injections and each time he takes away about 60% of her pain she does use ice afterwards but the SI joint pain does come back eventually after a few weeks.  She has been able to quit smoking since I first talked to her after her emergent cauda equina decompression.  She does occasionally take pain medications and steroids for her back pain.  She has recently tried acupuncture for her peripheral neuropathy in her lower extremities and has gotten excellent relief.  She started having some cervical radiculopathy in her first 2 digits on her left side and is going to try acupuncture for those symptoms as well.      While in the room and during my examination of the patient I wore a mask and eye protection.  I washed my hands before and after this patient encounter.  The patient was also wearing a mask.    The following portions of the patient's history were reviewed and updated as appropriate: allergies, current medications, past family history, past medical history, past social history, past surgical history and problem list.    Review of Systems   HENT:  Negative for tinnitus.    Eyes:  Positive for visual disturbance (blurry vision).   Musculoskeletal:  Positive for back pain, gait problem (blance issues) and neck pain. Negative for neck stiffness.   Neurological:  Positive for weakness (left leg and arm), light-headedness and numbness (bilateral hands). Negative for dizziness and headaches.        Past Medical History:   Diagnosis Date    Alcohol abuse     in recovery,  sober since     Arthritis     Asthma     Edmonds esophagus     Cervical neuritis     Chronic pain     COPD (chronic obstructive pulmonary disease)     Depression     Diabetes mellitus     Type 2    DJD (degenerative joint disease), cervical     Foot spasms     GERD (gastroesophageal reflux disease)     Headache     Hiatal hernia     Hyperlipidemia     Low back pain     Seasonal allergies     Spinal stenosis        Allergies   Allergen Reactions    Fluoxetine Hives     Hives    Sulfa Antibiotics Hives     THROAT CLOSES    Zofran [Ondansetron Hcl] Hives     THROAT CLOSES    Lisinopril Cough       Family History   Problem Relation Age of Onset    Cancer Mother         nonhodkinds lymphoma    Heart disease Father     Drug abuse Sister     Heart disease Brother     Lupus Maternal Aunt        Social History     Socioeconomic History    Marital status:     Number of children: 2   Tobacco Use    Smoking status: Every Day     Packs/day: 0.50     Years: 15.00     Additional pack years: 0.00     Total pack years: 7.50     Types: Cigarettes     Last attempt to quit: 2021     Years since quittin.8    Smokeless tobacco: Never   Vaping Use    Vaping Use: Never used   Substance and Sexual Activity    Alcohol use: No     Comment: sober since , worsk 12 steps    Drug use: No    Sexual activity: Yes     Partners: Male       Past Surgical History:   Procedure Laterality Date    ANTERIOR CERVICAL DISCECTOMY W/ FUSION  2003    C5-6, C6-7    CERCLAGE CERVIX      CERVICAL EPIDURAL      CERVICAL FUSION      DILATATION AND CURETTAGE      ENDOMETRIAL ABLATION  2007    ENDOSCOPY      MULTIPLE    ENDOSCOPY N/A 2016    Procedure: ESOPHAGOGASTRODUODENOSCOPY WITH COLD BIOPSIES;  Surgeon: Jose Mcclellan MD;  Location[facility] ENDOSCOPY;  Service:     ESOPHAGEAL DILATATION  2009    HAND SURGERY Bilateral     CARTILAGE    LUMBAR FUSION      LUMBAR LAMINECTOMY WITH FUSION N/A 2023    Procedure: OPEN LUMBAR  FOUR TO FIVE TRANSFORAMINAL LUMBAR INTERBODY FUSION;  Surgeon: Alex Ortiz MD;  Location: [facility] OR;  Service: Neurosurgery;  Laterality: N/A;    NECK SURGERY      NISSEN FUNDOPLICATION LAPAROSCOPIC  03/2012         Objective     Vitals:    12/28/23 0835   BP: 166/86   Pulse: 102   Resp: 16   SpO2: 95%     Body mass index is 26.79 kg/m².    Physical Exam  Constitutional:       Appearance: Normal appearance.   HENT:      Head: Normocephalic and atraumatic.   Eyes:      Extraocular Movements: Extraocular movements intact.      Conjunctiva/sclera: Conjunctivae normal.      Pupils: Pupils are equal, round, and reactive to light.   Cardiovascular:      Rate and Rhythm: Normal rate and regular rhythm.      Pulses: Normal pulses.   Pulmonary:      Breath sounds: Normal breath sounds.   Abdominal:      Palpations: Abdomen is soft.   Musculoskeletal:         General: Normal range of motion.      Cervical back: Normal range of motion and neck supple.      Comments: Bilateral SI joint inflammation characterized by:    -Pelvic Distraction test   -Lateral Iliac compression test   -FABERS (Kam's test)   -Ganslen's Test     Skin:     General: Skin is warm and dry.   Neurological:      Mental Status: She is alert and oriented to person, place, and time.      Cranial Nerves: Cranial nerves 2-12 are intact.      Motor: Motor function is intact. No weakness or atrophy.      Coordination: Coordination is intact. Romberg sign negative. Romberg Test normal.      Gait: Gait is intact. Gait normal.      Deep Tendon Reflexes: Reflexes are normal and symmetric.      Reflex Scores:       Tricep reflexes are 2+ on the right side and 2+ on the left side.       Bicep reflexes are 2+ on the right side and 2+ on the left side.       Brachioradialis reflexes are 2+ on the right side and 2+ on the left side.       Patellar reflexes are 2+ on the right side and 2+ on the left side.       Achilles reflexes are 2+ on the right side and 2+ on  the left side.  Psychiatric:         Speech: Speech normal.         Neurologic Exam     Mental Status   Oriented to person, place, and time.   Attention: normal. Concentration: normal.   Speech: speech is normal   Level of consciousness: alert    Cranial Nerves   Cranial nerves II through XII intact.     CN III, IV, VI   Pupils are equal, round, and reactive to light.    Motor Exam   Muscle bulk: normal  Overall muscle tone: normal    Strength   Strength 5/5 except as noted.     Sensory Exam   Light touch normal.     Gait, Coordination, and Reflexes     Gait  Gait: normal    Coordination   Romberg: negative    Reflexes   Reflexes 2+ except as noted.   Right brachioradialis: 2+  Left brachioradialis: 2+  Right biceps: 2+  Left biceps: 2+  Right triceps: 2+  Left triceps: 2+  Right patellar: 2+  Left patellar: 2+  Right achilles: 2+  Left achilles: 2+      Assessment & Plan   Independent Review of Radiographic Studies:      I personally reviewed the images from the following studies.    XR: XR of the lumbar spine was reviewed and shows settling of the superior endplate of L5, preserved hardware alignment  XR of the whole standing spine was reviewed and shows excellent neutral sagittal alignment    Assessment/Plan: Her scoliosis x-ray looks good.  I am not sure if she is fully fused yet due to her increased risk of pseudoarthrosis from smoking and steroid usage but her pain is manageable.  I do think that given enough time, her L4-5 TLIF may fuse eventually since it has not really changed over time.  She has developed cervical radiculopathy I do think it is warranted to obtain a cervical MRI.  We also discussed the possibility of an SI joint fusion for her right side which is slightly worse than her left side.  At this point she would like to try another SI joint injection and follow-up in March.    Medical Decision Making:      MRI of the cervical spine without contrast         Diagnoses and all orders for this  visit:    1. SI (sacroiliac) joint inflammation (Primary)    2. Lumbar pseudoarthrosis    3. Cervical radiculopathy  -     MRI Cervical Spine Without Contrast; Future             Patient Instructions/Recommendations:    Follow-up in March with MRI.      Alex Ortiz MD  12/28/23  09:13 EST

## 2023-12-28 ENCOUNTER — OFFICE VISIT (OUTPATIENT)
Dept: NEUROSURGERY | Facility: CLINIC | Age: 63
End: 2023-12-28
Payer: COMMERCIAL

## 2023-12-28 VITALS
HEART RATE: 102 BPM | BODY MASS INDEX: 26.68 KG/M2 | SYSTOLIC BLOOD PRESSURE: 166 MMHG | DIASTOLIC BLOOD PRESSURE: 86 MMHG | HEIGHT: 66 IN | OXYGEN SATURATION: 95 % | RESPIRATION RATE: 16 BRPM | WEIGHT: 166 LBS

## 2023-12-28 DIAGNOSIS — S32.009K LUMBAR PSEUDOARTHROSIS: ICD-10-CM

## 2023-12-28 DIAGNOSIS — M46.1 SI (SACROILIAC) JOINT INFLAMMATION: Primary | ICD-10-CM

## 2023-12-28 DIAGNOSIS — M54.12 CERVICAL RADICULOPATHY: ICD-10-CM

## 2024-01-10 ENCOUNTER — OFFICE VISIT (OUTPATIENT)
Dept: FAMILY MEDICINE CLINIC | Facility: CLINIC | Age: 64
End: 2024-01-10
Payer: COMMERCIAL

## 2024-01-10 VITALS
RESPIRATION RATE: 18 BRPM | WEIGHT: 166 LBS | HEART RATE: 72 BPM | DIASTOLIC BLOOD PRESSURE: 98 MMHG | HEIGHT: 66 IN | BODY MASS INDEX: 26.68 KG/M2 | SYSTOLIC BLOOD PRESSURE: 170 MMHG | OXYGEN SATURATION: 97 %

## 2024-01-10 DIAGNOSIS — I10 UNCONTROLLED HYPERTENSION: Primary | ICD-10-CM

## 2024-01-10 DIAGNOSIS — E78.5 HYPERLIPIDEMIA, UNSPECIFIED HYPERLIPIDEMIA TYPE: ICD-10-CM

## 2024-01-10 DIAGNOSIS — E11.42 DIABETIC PERIPHERAL NEUROPATHY: ICD-10-CM

## 2024-01-10 DIAGNOSIS — E11.9 CONTROLLED TYPE 2 DIABETES MELLITUS WITHOUT COMPLICATION, WITHOUT LONG-TERM CURRENT USE OF INSULIN: ICD-10-CM

## 2024-01-10 RX ORDER — AMLODIPINE BESYLATE 5 MG/1
5 TABLET ORAL DAILY
Qty: 30 TABLET | Refills: 0 | Status: SHIPPED | OUTPATIENT
Start: 2024-01-10

## 2024-01-10 RX ORDER — DULOXETIN HYDROCHLORIDE 60 MG/1
60 CAPSULE, DELAYED RELEASE ORAL DAILY
Qty: 90 CAPSULE | Refills: 0 | Status: SHIPPED | OUTPATIENT
Start: 2024-01-10

## 2024-01-10 NOTE — PROGRESS NOTES
Subjective   Kristina Yates is a 63 y.o. female.     History of Present Illness   Estab pt here for uncontrolled hypertension    Uncontrolled hypertension x years . She has been checking this at home. She is on losartan 100 mg, hctz 25 mg qd. Some caffeine, no alcohol, no smoking or vaping. She has been having headaches. No chest pain, palps, vision changes.   She has been checking this at home running 160s-170s/ 90s-100. Last labs 11/6/23 GFR 98.6, creat 0.68. She has chronic pain which affects her BP. She is having much relief w acupuncture.     Chronic pain and depression/anxiety x years. She is needing refill of duloxetine 60 mg qd. Denies SI.HI. She has chronic neuropathy and this has improved w acupuncture.       The following portions of the patient's history were reviewed and updated as appropriate: allergies, current medications, past family history, past medical history, past social history, past surgical history and problem list.    Review of Systems    Objective   Physical Exam  Vitals reviewed.   Constitutional:       Appearance: Normal appearance.   HENT:      Mouth/Throat:      Mouth: Mucous membranes are moist.   Cardiovascular:      Rate and Rhythm: Normal rate and regular rhythm.      Pulses: Normal pulses.      Heart sounds: Normal heart sounds.   Pulmonary:      Effort: Pulmonary effort is normal.      Breath sounds: Normal breath sounds.   Abdominal:      General: Bowel sounds are normal.   Musculoskeletal:      Right lower leg: No edema.      Left lower leg: No edema.   Skin:     General: Skin is warm.   Neurological:      General: No focal deficit present.      Mental Status: She is alert.   Psychiatric:         Mood and Affect: Mood is anxious.         Vitals:    01/10/24 1401   BP: 170/98   Pulse: 72   Resp: 18   SpO2: 97%     Body mass index is 26.79 kg/m².    Procedures    Assessment & Plan   Problems Addressed this Visit       Controlled type 2 diabetes mellitus without complication,  without long-term current use of insulin    Relevant Medications    DULoxetine (CYMBALTA) 60 MG capsule    Diabetic peripheral neuropathy    Relevant Medications    DULoxetine (CYMBALTA) 60 MG capsule    Hyperlipidemia    Relevant Medications    DULoxetine (CYMBALTA) 60 MG capsule     Other Visit Diagnoses       Uncontrolled hypertension    -  Primary    Relevant Medications    amLODIPine (NORVASC) 5 MG tablet          Diagnoses         Codes Comments    Uncontrolled hypertension    -  Primary ICD-10-CM: I10  ICD-9-CM: 401.9     Controlled type 2 diabetes mellitus without complication, without long-term current use of insulin     ICD-10-CM: E11.9  ICD-9-CM: 250.00     Diabetic peripheral neuropathy     ICD-10-CM: E11.42  ICD-9-CM: 250.60, 357.2     Hyperlipidemia, unspecified hyperlipidemia type     ICD-10-CM: E78.5  ICD-9-CM: 272.4           HTN- add amlopdine 5 mg qd, cont hctz 25 and losartan 100 mg, walk, low na diet, keep BP log, goal < 130/80  Heart healthy diet , drink water  Limit stress, less stimulants     Neuropathy/pain- refill duloxetine 60 mg qd.       Education provided in AVS   Return in about 4 weeks (around 2/7/2024) for Recheck.  Answers submitted by the patient for this visit:  Primary Reason for Visit (Submitted on 1/9/2024)  What is the primary reason for your visit?: High Blood Pressure

## 2024-02-05 ENCOUNTER — OFFICE VISIT (OUTPATIENT)
Dept: FAMILY MEDICINE CLINIC | Facility: CLINIC | Age: 64
End: 2024-02-05
Payer: COMMERCIAL

## 2024-02-05 VITALS
HEART RATE: 79 BPM | OXYGEN SATURATION: 98 % | HEIGHT: 66 IN | BODY MASS INDEX: 26.84 KG/M2 | SYSTOLIC BLOOD PRESSURE: 162 MMHG | WEIGHT: 167 LBS | RESPIRATION RATE: 18 BRPM | DIASTOLIC BLOOD PRESSURE: 99 MMHG

## 2024-02-05 DIAGNOSIS — I10 UNCONTROLLED HYPERTENSION: Primary | ICD-10-CM

## 2024-02-05 DIAGNOSIS — E11.42 DIABETIC PERIPHERAL NEUROPATHY: ICD-10-CM

## 2024-02-05 DIAGNOSIS — E78.5 HYPERLIPIDEMIA, UNSPECIFIED HYPERLIPIDEMIA TYPE: ICD-10-CM

## 2024-02-05 DIAGNOSIS — J01.10 ACUTE NON-RECURRENT FRONTAL SINUSITIS: ICD-10-CM

## 2024-02-05 DIAGNOSIS — E11.9 CONTROLLED TYPE 2 DIABETES MELLITUS WITHOUT COMPLICATION, WITHOUT LONG-TERM CURRENT USE OF INSULIN: ICD-10-CM

## 2024-02-05 DIAGNOSIS — R06.2 WHEEZING: ICD-10-CM

## 2024-02-05 PROCEDURE — 99214 OFFICE O/P EST MOD 30 MIN: CPT | Performed by: NURSE PRACTITIONER

## 2024-02-05 RX ORDER — FLUTICASONE PROPIONATE 50 MCG
2 SPRAY, SUSPENSION (ML) NASAL DAILY
Qty: 18.2 ML | Refills: 5 | Status: SHIPPED | OUTPATIENT
Start: 2024-02-05

## 2024-02-05 RX ORDER — HYDROCHLOROTHIAZIDE 25 MG/1
25 TABLET ORAL DAILY
Qty: 90 TABLET | Refills: 1 | Status: SHIPPED | OUTPATIENT
Start: 2024-02-05 | End: 2024-05-05

## 2024-02-05 RX ORDER — AMLODIPINE BESYLATE 10 MG/1
10 TABLET ORAL DAILY
Qty: 90 TABLET | Refills: 1 | Status: SHIPPED | OUTPATIENT
Start: 2024-02-05 | End: 2024-03-06

## 2024-02-05 RX ORDER — LOSARTAN POTASSIUM 100 MG/1
100 TABLET ORAL DAILY
Qty: 90 TABLET | Refills: 1 | Status: SHIPPED | OUTPATIENT
Start: 2024-02-05 | End: 2024-05-05

## 2024-02-05 RX ORDER — AMOXICILLIN 500 MG/1
1000 CAPSULE ORAL 2 TIMES DAILY
Qty: 28 CAPSULE | Refills: 0 | Status: SHIPPED | OUTPATIENT
Start: 2024-02-05 | End: 2024-02-12

## 2024-02-05 RX ORDER — ATORVASTATIN CALCIUM 40 MG/1
40 TABLET, FILM COATED ORAL DAILY
Qty: 90 TABLET | Refills: 1 | Status: SHIPPED | OUTPATIENT
Start: 2024-02-05

## 2024-02-05 RX ORDER — METFORMIN HYDROCHLORIDE 500 MG/1
500 TABLET, EXTENDED RELEASE ORAL NIGHTLY
Qty: 90 TABLET | Refills: 1 | Status: SHIPPED | OUTPATIENT
Start: 2024-02-05 | End: 2024-05-05

## 2024-02-05 RX ORDER — ALBUTEROL SULFATE 90 UG/1
2 AEROSOL, METERED RESPIRATORY (INHALATION) EVERY 4 HOURS PRN
Qty: 8.5 G | Refills: 1 | Status: SHIPPED | OUTPATIENT
Start: 2024-02-05 | End: 2024-03-06

## 2024-02-05 NOTE — PROGRESS NOTES
Subjective   Kristina Yates is a 63 y.o. female.     History of Present Illness   Estab pt here for follow up uncontrolled hypertension    She was last seen 3 weeks ago for hypertension. She has added amlodipine and is still having headaches with uncontrolled hypertension. She is still on hctz 25, losartan 100. She denies chest pain, palps, vision changes. She has reduced salt intake and driks caffeine and quits by 2 pm.She is working as caregiver. She is sober. No alcohol intake. Daily smoker.     Acute sinus congestions, nasal drip and facial fullness x 2 weeks. Worsening headaches. She needs albuterol inhaler refill today. Uses as needed. No fever , but concerns for sinusitis     The following portions of the patient's history were reviewed and updated as appropriate: allergies, current medications, past family history, past medical history, past social history, past surgical history and problem list.    Review of Systems    Objective   Physical Exam  Vitals reviewed.   Constitutional:       Appearance: Normal appearance.   HENT:      Right Ear: Tympanic membrane normal.      Left Ear: Tympanic membrane normal.      Nose: Nose normal.      Right Turbinates: Swollen.      Left Turbinates: Swollen.      Right Sinus: Frontal sinus tenderness present.      Left Sinus: Frontal sinus tenderness present.      Mouth/Throat:      Mouth: Mucous membranes are moist.   Cardiovascular:      Rate and Rhythm: Normal rate and regular rhythm.      Pulses: Normal pulses.      Heart sounds: Normal heart sounds.   Pulmonary:      Effort: Pulmonary effort is normal.      Breath sounds: Normal breath sounds.   Abdominal:      General: Bowel sounds are normal.      Palpations: Abdomen is soft.   Skin:     General: Skin is warm.   Neurological:      General: No focal deficit present.      Mental Status: She is alert.         Vitals:    02/05/24 0750   BP: 162/99   Pulse: 79   Resp: 18   SpO2: 98%     Body mass index is 26.95  kg/m².    Procedures    Assessment & Plan   Problems Addressed this Visit       Controlled type 2 diabetes mellitus without complication, without long-term current use of insulin    Relevant Medications    atorvastatin (LIPITOR) 40 MG tablet    metFORMIN ER (GLUCOPHAGE-XR) 500 MG 24 hr tablet    Diabetic peripheral neuropathy    Relevant Medications    atorvastatin (LIPITOR) 40 MG tablet    metFORMIN ER (GLUCOPHAGE-XR) 500 MG 24 hr tablet    Hyperlipidemia    Relevant Medications    atorvastatin (LIPITOR) 40 MG tablet     Other Visit Diagnoses       Uncontrolled hypertension    -  Primary    Relevant Medications    amLODIPine (NORVASC) 10 MG tablet    hydroCHLOROthiazide (HYDRODIURIL) 25 MG tablet    losartan (COZAAR) 100 MG tablet    Acute non-recurrent frontal sinusitis        Relevant Medications    albuterol sulfate  (90 Base) MCG/ACT inhaler    Wheezing        Relevant Medications    albuterol sulfate  (90 Base) MCG/ACT inhaler          Diagnoses         Codes Comments    Uncontrolled hypertension    -  Primary ICD-10-CM: I10  ICD-9-CM: 401.9     Acute non-recurrent frontal sinusitis     ICD-10-CM: J01.10  ICD-9-CM: 461.1     Wheezing     ICD-10-CM: R06.2  ICD-9-CM: 786.07     Controlled type 2 diabetes mellitus without complication, without long-term current use of insulin     ICD-10-CM: E11.9  ICD-9-CM: 250.00     Diabetic peripheral neuropathy     ICD-10-CM: E11.42  ICD-9-CM: 250.60, 357.2     Hyperlipidemia, unspecified hyperlipidemia type     ICD-10-CM: E78.5  ICD-9-CM: 272.4           HTN- INCREASE amlopdine 10 mg qd, cont hctz 25 and losartan 100 mg, walk, low na diet, keep BP log, goal < 130/80, if continues to be > 140/90 call office and I will add metoprolol  Heart healthy diet , drink water  Limit stress, less stimulants , consider cardio referral and renal u/s    Sinusitis- add amox 1g bid x 7 days , flonase 2 sprays qd, add coricidin HBP, nasal saline rinse. Rest, hydrate, tyelnol as  needed          Education provided in AVS   Return in about 4 weeks (around 3/4/2024) for Recheck.

## 2024-02-15 ENCOUNTER — TELEPHONE (OUTPATIENT)
Dept: PAIN MEDICINE | Facility: HOSPITAL | Age: 64
End: 2024-02-15
Payer: COMMERCIAL

## 2024-03-06 ENCOUNTER — OFFICE VISIT (OUTPATIENT)
Dept: FAMILY MEDICINE CLINIC | Facility: CLINIC | Age: 64
End: 2024-03-06
Payer: COMMERCIAL

## 2024-03-06 VITALS
HEART RATE: 109 BPM | WEIGHT: 156 LBS | BODY MASS INDEX: 25.07 KG/M2 | RESPIRATION RATE: 18 BRPM | OXYGEN SATURATION: 96 % | SYSTOLIC BLOOD PRESSURE: 178 MMHG | DIASTOLIC BLOOD PRESSURE: 102 MMHG | HEIGHT: 66 IN

## 2024-03-06 DIAGNOSIS — R00.0 TACHYCARDIA: ICD-10-CM

## 2024-03-06 DIAGNOSIS — I10 UNCONTROLLED HYPERTENSION: Primary | ICD-10-CM

## 2024-03-06 PROCEDURE — 99214 OFFICE O/P EST MOD 30 MIN: CPT | Performed by: NURSE PRACTITIONER

## 2024-03-06 RX ORDER — METOPROLOL SUCCINATE 50 MG/1
50 TABLET, EXTENDED RELEASE ORAL DAILY
Qty: 30 TABLET | Refills: 0 | Status: SHIPPED | OUTPATIENT
Start: 2024-03-06

## 2024-03-06 NOTE — PROGRESS NOTES
Subjective   Kristina Yates is a 63 y.o. female.     Hypertension       Estab pt here for follow up hypertension    Acute on chronic uncontrolled hypertension x years, She has had recent loss of nephew 6 days ago due to drug abuse. She is tearful and stressed. On amlodipine 10, hctz 25 mg qd, losartan 100 qd. HR today 109. She reports she can feel Bp elevating at night and heart racing.  She denies chest pain, potation's, headache or vision changes today.  She is very nervous about blood pressure.  BP today 178/102.  She is open to cardiology.    Worsening acute on chronic anxiety and insomnia. She is asking about amitriptyline. She is on duloxetine 60 mg qd and buproprion 150 mg qhs.  Patient is very upset today with grief over the loss of her nephew.    The following portions of the patient's history were reviewed and updated as appropriate: allergies, current medications, past family history, past medical history, past social history, past surgical history and problem list.    Review of Systems      Current Outpatient Medications:     acetaminophen (TYLENOL) 500 MG tablet, Take 1 tablet by mouth 2 (Two) Times a Day As Needed. Indications: Pain, Disp: , Rfl:     albuterol sulfate  (90 Base) MCG/ACT inhaler, Inhale 2 puffs Every 4 (Four) Hours As Needed for Wheezing for up to 30 days., Disp: 8.5 g, Rfl: 1    amLODIPine (NORVASC) 10 MG tablet, Take 1 tablet by mouth Daily for 30 days., Disp: 90 tablet, Rfl: 1    atorvastatin (LIPITOR) 40 MG tablet, Take 1 tablet by mouth Daily., Disp: 90 tablet, Rfl: 1    buPROPion XL (WELLBUTRIN XL) 150 MG 24 hr tablet, Take 1 tablet by mouth Daily., Disp: 90 tablet, Rfl: 0    DULoxetine (CYMBALTA) 60 MG capsule, Take 1 capsule by mouth Daily. Indications: Major Depressive Disorder, Musculoskeletal Pain, Disp: 90 capsule, Rfl: 0    fluticasone (FLONASE) 50 MCG/ACT nasal spray, 2 sprays into the nostril(s) as directed by provider Daily., Disp: 18.2 mL, Rfl: 5     hydroCHLOROthiazide (HYDRODIURIL) 25 MG tablet, Take 1 tablet by mouth Daily for 90 days., Disp: 90 tablet, Rfl: 1    losartan (COZAAR) 100 MG tablet, Take 1 tablet by mouth Daily for 90 days., Disp: 90 tablet, Rfl: 1    metFORMIN ER (GLUCOPHAGE-XR) 500 MG 24 hr tablet, Take 1 tablet by mouth Every Night for 90 days., Disp: 90 tablet, Rfl: 1    methocarbamol (ROBAXIN) 500 MG tablet, Take 1 tablet by mouth 4 (Four) Times a Day. Indications: Musculoskeletal Pain, Disp: 90 tablet, Rfl: 4    metoprolol succinate XL (Toprol XL) 50 MG 24 hr tablet, Take 1 tablet by mouth Daily., Disp: 30 tablet, Rfl: 0    Multiple Vitamins-Minerals (MULTIVITAMIN ADULT) tablet, Take 1 tablet by mouth Daily., Disp: , Rfl:     naproxen sodium (ALEVE) 220 MG tablet, Take 1 tablet by mouth 2 (Two) Times a Day As Needed., Disp: , Rfl:     Objective   Physical Exam  Vitals reviewed.   Constitutional:       Appearance: Normal appearance.   HENT:      Mouth/Throat:      Mouth: Mucous membranes are moist.   Cardiovascular:      Rate and Rhythm: Regular rhythm. Tachycardia present.      Pulses: Normal pulses.      Heart sounds: Normal heart sounds.   Pulmonary:      Effort: Pulmonary effort is normal.   Abdominal:      General: Bowel sounds are normal.   Musculoskeletal:         General: Normal range of motion.   Skin:     General: Skin is warm.   Neurological:      Mental Status: She is alert.   Psychiatric:         Mood and Affect: Mood is anxious and depressed. Affect is tearful.         Vitals:    03/06/24 0826   BP: (!) 178/102   Pulse: 109   Resp: 18   SpO2: 96%     Body mass index is 25.18 kg/m².    Procedures    TSH   Date Value Ref Range Status   08/03/2023 0.418 0.270 - 4.200 uIU/mL Final     Hemoglobin A1C   Date Value Ref Range Status   08/03/2023 6.00 (H) 4.80 - 5.60 % Final     Comment:     Hemoglobin A1C Ranges:  Increased Risk for Diabetes  5.7% to 6.4%  Diabetes                     >= 6.5%  Diabetic Goal                < 7.0%            Assessment & Plan   Problems Addressed this Visit    None  Visit Diagnoses       Uncontrolled hypertension    -  Primary    Relevant Medications    metoprolol succinate XL (Toprol XL) 50 MG 24 hr tablet    Other Relevant Orders    Ambulatory Referral to Cardiology    Tachycardia        Relevant Orders    Ambulatory Referral to Cardiology          Diagnoses         Codes Comments    Uncontrolled hypertension    -  Primary ICD-10-CM: I10  ICD-9-CM: 401.9     Tachycardia     ICD-10-CM: R00.0  ICD-9-CM: 785.0           HTN-refer to cardiology, add metoprolol succinate 50 mg nightly, continue with amlopdine 10 mg qd, cont hctz 25 and losartan 100 mg, walk, low na diet, keep BP log, goal < 130/80 Heart healthy diet , drink water  Limit stress, less stimulants   Reviewed beta-blocker education that affects  ER for severe chest pain, palpitations or acute changes    Grief/anxiety-reviewed risk of adding amitriptyline to her medication regimen.  In addition to increased risk of serotonin syndrome.  Will first try beta-blocker and see if this helps settle her down, heart rate and blood pressure which may help reduce her anxiety.  Patient is open to holding off on psych med changes.  Information on therapy given, patient will call and try to find therapist and encouraged 12-step program, grief counseling.  ER or call [phone] if worsening will go to the couch       Education provided in AVS   Return in about 3 months (around 6/6/2024) for Recheck.    Answers submitted by the patient for this visit:  Primary Reason for Visit (Submitted on 3/5/2024)  What is the primary reason for your visit?: High Blood Pressure

## 2024-03-11 ENCOUNTER — HOSPITAL ENCOUNTER (OUTPATIENT)
Dept: GENERAL RADIOLOGY | Facility: HOSPITAL | Age: 64
Discharge: HOME | End: 2024-03-11
Payer: COMMERCIAL

## 2024-03-11 ENCOUNTER — HOSPITAL ENCOUNTER (OUTPATIENT)
Dept: PAIN MEDICINE | Facility: HOSPITAL | Age: 64
Discharge: HOME | End: 2024-03-11
Payer: COMMERCIAL

## 2024-03-11 ENCOUNTER — ANESTHESIA (OUTPATIENT)
Dept: PAIN MEDICINE | Facility: HOSPITAL | Age: 64
End: 2024-03-11
Payer: COMMERCIAL

## 2024-03-11 ENCOUNTER — ANESTHESIA EVENT (OUTPATIENT)
Dept: PAIN MEDICINE | Facility: HOSPITAL | Age: 64
End: 2024-03-11
Payer: COMMERCIAL

## 2024-03-11 VITALS
SYSTOLIC BLOOD PRESSURE: 114 MMHG | HEART RATE: 81 BPM | TEMPERATURE: 98.2 F | DIASTOLIC BLOOD PRESSURE: 66 MMHG | RESPIRATION RATE: 16 BRPM | OXYGEN SATURATION: 96 %

## 2024-03-11 DIAGNOSIS — R52 PAIN: ICD-10-CM

## 2024-03-11 DIAGNOSIS — M46.1 BILATERAL SACROILIITIS: Primary | ICD-10-CM

## 2024-03-11 PROCEDURE — 25010000002 BUPIVACAINE (PF) 0.25 % SOLUTION: Performed by: STUDENT IN AN ORGANIZED HEALTH CARE EDUCATION/TRAINING PROGRAM

## 2024-03-11 PROCEDURE — 25010000002 METHYLPREDNISOLONE PER 80 MG: Performed by: STUDENT IN AN ORGANIZED HEALTH CARE EDUCATION/TRAINING PROGRAM

## 2024-03-11 PROCEDURE — 25010000002 MIDAZOLAM PER 1 MG: Performed by: STUDENT IN AN ORGANIZED HEALTH CARE EDUCATION/TRAINING PROGRAM

## 2024-03-11 RX ORDER — LIDOCAINE HYDROCHLORIDE 10 MG/ML
1 INJECTION, SOLUTION INFILTRATION; PERINEURAL ONCE
Status: DISCONTINUED | OUTPATIENT
Start: 2024-03-11 | End: 2024-03-12 | Stop reason: HOSPADM

## 2024-03-11 RX ORDER — BUPIVACAINE HYDROCHLORIDE 2.5 MG/ML
10 INJECTION, SOLUTION EPIDURAL; INFILTRATION; INTRACAUDAL ONCE
Status: COMPLETED | OUTPATIENT
Start: 2024-03-11 | End: 2024-03-11

## 2024-03-11 RX ORDER — METHYLPREDNISOLONE ACETATE 80 MG/ML
80 INJECTION, SUSPENSION INTRA-ARTICULAR; INTRALESIONAL; INTRAMUSCULAR; SOFT TISSUE ONCE
Status: COMPLETED | OUTPATIENT
Start: 2024-03-11 | End: 2024-03-11

## 2024-03-11 RX ORDER — SODIUM CHLORIDE 0.9 % (FLUSH) 0.9 %
10 SYRINGE (ML) INJECTION EVERY 12 HOURS SCHEDULED
Status: DISCONTINUED | OUTPATIENT
Start: 2024-03-11 | End: 2024-03-12 | Stop reason: HOSPADM

## 2024-03-11 RX ORDER — SODIUM CHLORIDE 9 MG/ML
40 INJECTION, SOLUTION INTRAVENOUS AS NEEDED
Status: DISCONTINUED | OUTPATIENT
Start: 2024-03-11 | End: 2024-03-12 | Stop reason: HOSPADM

## 2024-03-11 RX ORDER — SODIUM CHLORIDE 0.9 % (FLUSH) 0.9 %
10 SYRINGE (ML) INJECTION AS NEEDED
Status: DISCONTINUED | OUTPATIENT
Start: 2024-03-11 | End: 2024-03-12 | Stop reason: HOSPADM

## 2024-03-11 RX ORDER — MIDAZOLAM HYDROCHLORIDE 1 MG/ML
1 INJECTION INTRAMUSCULAR; INTRAVENOUS AS NEEDED
Status: DISCONTINUED | OUTPATIENT
Start: 2024-03-11 | End: 2024-03-12 | Stop reason: HOSPADM

## 2024-03-11 RX ADMIN — METHYLPREDNISOLONE ACETATE 80 MG: 80 INJECTION, SUSPENSION INTRA-ARTICULAR; INTRALESIONAL; INTRAMUSCULAR; SOFT TISSUE at 09:07

## 2024-03-11 RX ADMIN — MIDAZOLAM 2 MG: 1 INJECTION INTRAMUSCULAR; INTRAVENOUS at 09:04

## 2024-03-11 RX ADMIN — BUPIVACAINE HYDROCHLORIDE 10 ML: 2.5 INJECTION, SOLUTION EPIDURAL; INFILTRATION; INTRACAUDAL at 09:07

## 2024-03-11 NOTE — DISCHARGE INSTRUCTIONS
After your SI joint injection:  -Please schedule your second SI joint injection at the time of your first SI joint injection for 2 weeks later  -Alternate 1 tablet of extra strength Tylenol and 2 tablets of Aleve (or any other NSAID) in a scheduled manner every 4 hours for at least 2 weeks after the injection  -Continue to use ice packs as much as tolerable and if you can tolerate an ice bath at least up to the waist  -The patient can also consider taking an ice bath by submerging the buttocks area in 60 degree water for 30 minutes/day for 5 days consecutively.   -Try adding ice gradually to decrease the shock of the ice bath   -Try putting a robe in the dryer for 35 minutes so that the patient can have a warm rope to wear after the ice bath  -After 2 injections 2 weeks apart, if your symptoms have not fully resolved we can consider an SI joint fusion

## 2024-03-11 NOTE — H&P
INTERVAL HISTORY:    The patient returns for another sacroiliac joint steroid injection today.    They have received at least 60% improvement since their last injection.    Today their pain is a 6 out of 10   The pain limits the patient's activities of daily living including: Sleep, caring for herself, in the home, staying active.    Conservative measures tried in the interim include: rest, ice, heat OTC medications, acupuncture    Current Outpatient Medications on File Prior to Encounter   Medication Sig Dispense Refill    acetaminophen (TYLENOL) 500 MG tablet Take 1 tablet by mouth 2 (Two) Times a Day As Needed. Indications: Pain      atorvastatin (LIPITOR) 40 MG tablet Take 1 tablet by mouth Daily. 90 tablet 1    buPROPion XL (WELLBUTRIN XL) 150 MG 24 hr tablet Take 1 tablet by mouth Daily. 90 tablet 0    DULoxetine (CYMBALTA) 60 MG capsule Take 1 capsule by mouth Daily. Indications: Major Depressive Disorder, Musculoskeletal Pain 90 capsule 0    fluticasone (FLONASE) 50 MCG/ACT nasal spray 2 sprays into the nostril(s) as directed by provider Daily. 18.2 mL 5    hydroCHLOROthiazide (HYDRODIURIL) 25 MG tablet Take 1 tablet by mouth Daily for 90 days. 90 tablet 1    losartan (COZAAR) 100 MG tablet Take 1 tablet by mouth Daily for 90 days. 90 tablet 1    metFORMIN ER (GLUCOPHAGE-XR) 500 MG 24 hr tablet Take 1 tablet by mouth Every Night for 90 days. 90 tablet 1    methocarbamol (ROBAXIN) 500 MG tablet Take 1 tablet by mouth 4 (Four) Times a Day. Indications: Musculoskeletal Pain 90 tablet 4    metoprolol succinate XL (Toprol XL) 50 MG 24 hr tablet Take 1 tablet by mouth Daily. 30 tablet 0    Multiple Vitamins-Minerals (MULTIVITAMIN ADULT) tablet Take 1 tablet by mouth Daily.      naproxen sodium (ALEVE) 220 MG tablet Take 1 tablet by mouth 2 (Two) Times a Day As Needed.       No current facility-administered medications on file prior to encounter.       Past Medical History:   Diagnosis Date    Alcohol abuse     in  recovery, sober since 2012    Arthritis     Asthma     Edmonds esophagus     Cervical neuritis     Chronic pain     COPD (chronic obstructive pulmonary disease)     Depression     Diabetes mellitus     Type 2    DJD (degenerative joint disease), cervical     Foot spasms     GERD (gastroesophageal reflux disease)     Headache     Hiatal hernia     Hyperlipidemia     Low back pain     Seasonal allergies     Spinal stenosis        No hematologic infectious or constitutional symptoms  Negative screen for DRAKE      Exam:  There were no vitals taken for this visit.  Alert and oriented  NCAT  Unlabored respirations  Extremities WWP  Bilateral lower extremity strength intact      Diagnosis:  Post-Op Diagnosis Codes:     * Sacroiliitis [M46.1]    Plan: Bilateral sacroiliac joint steroid injection under fluoroscopic guidance    The patient wishes to continue SI steroid injection as long as she is getting good pain relief.  In the future she is considering SI joint fusion.    I have encouraged them to continue:  1.  Physical therapy exercises at home as prescribed by physical therapy or from the pain clinic handout.  Continuation of these exercises every day, or multiple times per week, even when the patient has good pain relief, was stressed to the patient as a preventative measure to decrease the frequency and severity of future pain episodes.  2.  Continue pain medicines as already prescribed.  If patient not currently taking any, it is recommended to begin Acetaminophen 1000 mg po q 8 hours.  If other medicines containing Acetaminophen are currently prescribed, maintain daily dose at 3000mg.    3.  If they can tolerate NSAIDS, it is recommended to take Ibuprofen 600 mg po q 6 hours for 7 days during pain exacerbations.   Alternatively, they may substitute an NSAID of their choice (e.g. Aleve)  4.  Heat and ice to the affected area as tolerated for pain control.   5.  Low impact exercise such as walking or water exercise was  recommended to maintain overall health and aid in weight control.   6.  Follow up as needed for subsequent injections.

## 2024-03-11 NOTE — ANESTHESIA PROCEDURE NOTES
PAIN SI joint injection      Patient reassessed immediately prior to procedure    Patient location during procedure: Pain Clinic    Reason for block: procedure for pain  Performed by  Anesthesiologist: Priyanka Santoro MD  Preanesthetic Checklist  Completed: patient identified, IV checked, site marked, risks and benefits discussed, surgical consent, monitors and equipment checked, pre-op evaluation and timeout performed  Prep:  Patient position: prone  Sterile barriers:cap, gloves, mask and sterile barrier  Prep: ChloraPrep  Patient monitoring: blood pressure monitoring and continuous pulse oximetry  Procedure:  Sedation:yes  Guidance:fluoroscopy  Contrast Medium:no  Location:Bilateral  Needle Type:Quincke  Needle Gauge:22 G  Aspiration:negative  Medications:  Depomedrol:80 mg  Comment:0.25% Bupivacaine 4 ml    Post Assessment  Injection Assessment: negative  Patient Tolerance:comfortable throughout block  Complications:no  Additional Notes  Diagnosis: Post-Op Diagnosis Codes:     * Sacroiliitis [M46.1]     Sedation: Versed, 1 mg     Sedation time: 9:04 AM to 9:12 AM     Under fluoroscopic guidance and after ChloraPrep, the SI joint was accessed and injected with the above medications.

## 2024-03-20 ENCOUNTER — OFFICE VISIT (OUTPATIENT)
Dept: CARDIOLOGY | Facility: CLINIC | Age: 64
End: 2024-03-20
Payer: COMMERCIAL

## 2024-03-20 VITALS
DIASTOLIC BLOOD PRESSURE: 80 MMHG | HEART RATE: 70 BPM | OXYGEN SATURATION: 96 % | BODY MASS INDEX: 26.52 KG/M2 | HEIGHT: 66 IN | SYSTOLIC BLOOD PRESSURE: 136 MMHG | WEIGHT: 165 LBS

## 2024-03-20 DIAGNOSIS — I10 ESSENTIAL HYPERTENSION: ICD-10-CM

## 2024-03-20 DIAGNOSIS — R06.09 DOE (DYSPNEA ON EXERTION): Primary | ICD-10-CM

## 2024-03-20 DIAGNOSIS — E78.5 HYPERLIPIDEMIA LDL GOAL <70: ICD-10-CM

## 2024-03-20 PROCEDURE — 93000 ELECTROCARDIOGRAM COMPLETE: CPT | Performed by: STUDENT IN AN ORGANIZED HEALTH CARE EDUCATION/TRAINING PROGRAM

## 2024-03-20 PROCEDURE — 99204 OFFICE O/P NEW MOD 45 MIN: CPT | Performed by: STUDENT IN AN ORGANIZED HEALTH CARE EDUCATION/TRAINING PROGRAM

## 2024-03-20 NOTE — PROGRESS NOTES
Subjective:     Encounter Date:2024      Patient ID: Kristina Yates is a 63 y.o. female.    Chief Complaint:  HTN    HPI:   63 y.o. female, former smoker, with hypertension, diabetes, GERD, hyperlipidemia, COPD, cauda equina syndrome requiring emergent spinal fusion who presents for initial evaluation of hypertension.  Patient has unfortunately been dealing with a lot of stress.  Her nephew  about 2 weeks ago of infectious endocarditis.  She lost 2 siblings within 6 weeks of each other last year.  She also had an episode of cauda equina syndrome requiring emergent surgery.  She believes that all of this has contributed to her elevated blood pressure.  She also complains of intermittent palpitations.  They occur mostly at night, a couple of times a week.  A couple of weeks ago, her PCP Nyla Banks started her on metoprolol for her blood pressure and palpitations and she knows that her palpitations have significantly improved.  Today her blood pressure is controlled.  With respect to cardiac symptoms, she does report dyspnea on exertion particularly with stairs.  She has been attributing this to COPD/asthma.    The following portions of the patient's history were reviewed and updated as appropriate: allergies, current medications, past family history, past medical history, past social history, past surgical history and problem list.     REVIEW OF SYSTEMS:   All systems reviewed.  Pertinent positives identified in HPI.  All other systems are negative.    Past Medical History:   Diagnosis Date    Alcohol abuse     in recovery, sober since     Arthritis     Asthma     Edmonds esophagus     Cervical neuritis     Chronic pain     COPD (chronic obstructive pulmonary disease)     Depression     Diabetes mellitus     Type 2    DJD (degenerative joint disease), cervical     Foot spasms     GERD (gastroesophageal reflux disease)     Headache     Hiatal hernia     Hyperlipidemia     Low back pain     Seasonal  allergies     Spinal stenosis        Family History   Problem Relation Age of Onset    Cancer Mother         nonhodkinds lymphoma    Heart disease Father     Drug abuse Sister     Heart disease Brother     Lupus Maternal Aunt        Social History     Socioeconomic History    Marital status:     Number of children: 2   Tobacco Use    Smoking status: Every Day     Current packs/day: 0.00     Average packs/day: 0.5 packs/day for 15.0 years (7.5 ttl pk-yrs)     Types: Cigarettes     Start date: 2/25/2006     Last attempt to quit: 2/25/2021     Years since quitting: 3.0    Smokeless tobacco: Never   Vaping Use    Vaping status: Never Used   Substance and Sexual Activity    Alcohol use: No     Comment: sober since 2012, worsk 12 steps    Drug use: No    Sexual activity: Yes     Partners: Male       Allergies   Allergen Reactions    Fluoxetine Hives     Hives    Sulfa Antibiotics Hives     THROAT CLOSES    Zofran [Ondansetron Hcl] Hives     THROAT CLOSES    Lisinopril Cough       Past Surgical History:   Procedure Laterality Date    ANTERIOR CERVICAL DISCECTOMY W/ FUSION  11/2003    C5-6, C6-7    CERCLAGE CERVIX      CERVICAL EPIDURAL  2007    CERVICAL FUSION      DILATATION AND CURETTAGE      ENDOMETRIAL ABLATION  2007    ENDOSCOPY      MULTIPLE    ENDOSCOPY N/A 07/05/2016    Procedure: ESOPHAGOGASTRODUODENOSCOPY WITH COLD BIOPSIES;  Surgeon: Jose Mcclellan MD;  Location: [facility] ENDOSCOPY;  Service:     ESOPHAGEAL DILATATION  2009    HAND SURGERY Bilateral     CARTILAGE    LUMBAR FUSION      LUMBAR LAMINECTOMY WITH FUSION N/A 6/30/2023    Procedure: OPEN LUMBAR FOUR TO FIVE TRANSFORAMINAL LUMBAR INTERBODY FUSION;  Surgeon: Alex Ortiz MD;  Location: [facility] MAIN OR;  Service: Neurosurgery;  Laterality: N/A;    NECK SURGERY      NISSEN FUNDOPLICATION LAPAROSCOPIC  03/2012         ECG 12 Lead    Date/Time: 3/20/2024 2:35 PM  Performed by: Pedro Luis Zayas MD    Authorized by: Pedro Luis Zayas MD  Comparison: not compared  with previous ECG   Previous ECG: no previous ECG available  Rhythm: sinus rhythm  Rate: normal  Conduction: conduction normal  ST Segments: ST segments normal  T Waves: T waves normal  QRS axis: normal    Clinical impression: normal ECG             Objective:         Vitals:    03/20/24 1402   BP: 136/80   Pulse: 70   SpO2: 96%       PHYSICAL EXAM:  GEN: well appearing, in NAD   HEENT: NCAT, EOMI, moist mucus membranes   Respiratory: CTAB, no wheezes, rales or rhonchi  CV: normal rate, regular rhythm, normal S1, S2, 2/6 systolic murmur in RUSB, +2 radial pulses b/l  GI: soft, nontender, nondistended  MSK: no edema  Skin: no rash, warm, dry  Heme/Lymph: no bruising or bleeding  Neuro: Alert and Oriented x 3, grossly normal motor function        Assessment:         (R06.09) JOYCE (dyspnea on exertion) - Plan: Adult Transthoracic Echo Complete w/ Color, Spectral and Contrast if Necessary Per Protocol    (E78.5) Hyperlipidemia LDL goal <70 - Plan: Lipid Panel    (I10) Essential hypertension    63 y.o. female, former smoker, with hypertension, diabetes, GERD, hyperlipidemia, COPD, cauda equina syndrome requiring emergent spinal fusion who presents for initial evaluation of hypertension.        Plan:       #Hypertension  Currently well-controlled.  -Continue amlodipine 10 mg daily, hydrochlorothiazide 25 mg daily, losartan 100 mg daily, metoprolol succinate 50 mg daily    #Hyperlipidemia  Last lipid panel was August 2023 and her LDL was 182.  -Repeat lipid panel  -Continue atorvastatin 40 mg daily, depending on lipid panel, may need to double, and possibly add Zetia    #Dyspnea on exertion  Patient with significant risk factors for CAD including hypertension, diabetes, uncontrolled hyperlipidemia, and former smoking.  For now will evaluate further with an echocardiogram given her murmur however this is normal, we will likely pursue ischemic evaluation with stress testing.    DAYNE Garcias, thank you very much for  referring this kind patient to me. Please call me with any questions or concerns. I will see the patient again in the office pending test results         Pedro Luis Alvarez MD, [facility]  03/20/24  [facility]    Outpatient Encounter Medications as of 3/20/2024   Medication Sig Dispense Refill    acetaminophen (TYLENOL) 500 MG tablet Take 1 tablet by mouth 2 (Two) Times a Day As Needed. Indications: Pain      atorvastatin (LIPITOR) 40 MG tablet Take 1 tablet by mouth Daily. 90 tablet 1    buPROPion XL (WELLBUTRIN XL) 150 MG 24 hr tablet Take 1 tablet by mouth Daily. 90 tablet 0    DULoxetine (CYMBALTA) 60 MG capsule Take 1 capsule by mouth Daily. Indications: Major Depressive Disorder, Musculoskeletal Pain 90 capsule 0    fluticasone (FLONASE) 50 MCG/ACT nasal spray 2 sprays into the nostril(s) as directed by provider Daily. 18.2 mL 5    hydroCHLOROthiazide (HYDRODIURIL) 25 MG tablet Take 1 tablet by mouth Daily for 90 days. 90 tablet 1    losartan (COZAAR) 100 MG tablet Take 1 tablet by mouth Daily for 90 days. 90 tablet 1    metFORMIN ER (GLUCOPHAGE-XR) 500 MG 24 hr tablet Take 1 tablet by mouth Every Night for 90 days. 90 tablet 1    methocarbamol (ROBAXIN) 500 MG tablet Take 1 tablet by mouth 4 (Four) Times a Day. Indications: Musculoskeletal Pain 90 tablet 4    metoprolol succinate XL (Toprol XL) 50 MG 24 hr tablet Take 1 tablet by mouth Daily. 30 tablet 0    Multiple Vitamins-Minerals (MULTIVITAMIN ADULT) tablet Take 1 tablet by mouth Daily.      naproxen sodium (ALEVE) 220 MG tablet Take 1 tablet by mouth 2 (Two) Times a Day As Needed.       No facility-administered encounter medications on file as of 3/20/2024.

## 2024-04-01 RX ORDER — AMLODIPINE BESYLATE 5 MG/1
5 TABLET ORAL DAILY
Qty: 30 TABLET | Refills: 0 | OUTPATIENT
Start: 2024-04-01

## 2024-04-02 ENCOUNTER — HOSPITAL ENCOUNTER (OUTPATIENT)
Dept: CARDIOLOGY | Facility: HOSPITAL | Age: 64
Discharge: HOME | End: 2024-04-02
Admitting: STUDENT IN AN ORGANIZED HEALTH CARE EDUCATION/TRAINING PROGRAM
Payer: COMMERCIAL

## 2024-04-02 VITALS
HEART RATE: 61 BPM | SYSTOLIC BLOOD PRESSURE: 122 MMHG | OXYGEN SATURATION: 97 % | BODY MASS INDEX: 26.5 KG/M2 | HEIGHT: 66 IN | DIASTOLIC BLOOD PRESSURE: 64 MMHG | WEIGHT: 164.9 LBS

## 2024-04-02 DIAGNOSIS — R06.09 DOE (DYSPNEA ON EXERTION): ICD-10-CM

## 2024-04-02 LAB
AORTIC DIMENSIONLESS INDEX: 0.6 (DI)
ASCENDING AORTA: 2.8 CM
BH CV ECHO MEAS - ACS: 0.93 CM
BH CV ECHO MEAS - AO MAX PG: 18.2 MMHG
BH CV ECHO MEAS - AO MEAN PG: 9.5 MMHG
BH CV ECHO MEAS - AO ROOT DIAM: 2.47 CM
BH CV ECHO MEAS - AO V2 MAX: 213.4 CM/SEC
BH CV ECHO MEAS - AO V2 VTI: 53.1 CM
BH CV ECHO MEAS - AVA(I,D): 1.72 CM2
BH CV ECHO MEAS - EDV(CUBED): 57.2 ML
BH CV ECHO MEAS - EDV(MOD-SP2): 81 ML
BH CV ECHO MEAS - EDV(MOD-SP4): 65 ML
BH CV ECHO MEAS - EF(MOD-BP): 60.1 %
BH CV ECHO MEAS - EF(MOD-SP2): 60.5 %
BH CV ECHO MEAS - EF(MOD-SP4): 56.9 %
BH CV ECHO MEAS - ESV(CUBED): 15.5 ML
BH CV ECHO MEAS - ESV(MOD-SP2): 32 ML
BH CV ECHO MEAS - ESV(MOD-SP4): 28 ML
BH CV ECHO MEAS - FS: 35.2 %
BH CV ECHO MEAS - IVS/LVPW: 1.15 CM
BH CV ECHO MEAS - IVSD: 1.09 CM
BH CV ECHO MEAS - LAT PEAK E' VEL: 12 CM/SEC
BH CV ECHO MEAS - LV DIASTOLIC VOL/BSA (35-75): 35.2 CM2
BH CV ECHO MEAS - LV MASS(C)D: 123.6 GRAMS
BH CV ECHO MEAS - LV MAX PG: 6.3 MMHG
BH CV ECHO MEAS - LV MEAN PG: 3.7 MMHG
BH CV ECHO MEAS - LV SYSTOLIC VOL/BSA (12-30): 15.2 CM2
BH CV ECHO MEAS - LV V1 MAX: 125.2 CM/SEC
BH CV ECHO MEAS - LV V1 VTI: 32.9 CM
BH CV ECHO MEAS - LVIDD: 3.9 CM
BH CV ECHO MEAS - LVIDS: 2.5 CM
BH CV ECHO MEAS - LVOT AREA: 2.8 CM2
BH CV ECHO MEAS - LVOT DIAM: 1.88 CM
BH CV ECHO MEAS - LVPWD: 0.95 CM
BH CV ECHO MEAS - MED PEAK E' VEL: 8.8 CM/SEC
BH CV ECHO MEAS - MV A DUR: 0.19 SEC
BH CV ECHO MEAS - MV A MAX VEL: 69 CM/SEC
BH CV ECHO MEAS - MV DEC SLOPE: 394.2 CM/SEC2
BH CV ECHO MEAS - MV DEC TIME: 0.17 SEC
BH CV ECHO MEAS - MV E MAX VEL: 102 CM/SEC
BH CV ECHO MEAS - MV E/A: 1.48
BH CV ECHO MEAS - MV MAX PG: 5.8 MMHG
BH CV ECHO MEAS - MV MEAN PG: 1.83 MMHG
BH CV ECHO MEAS - MV P1/2T: 92.8 MSEC
BH CV ECHO MEAS - MV V2 VTI: 53 CM
BH CV ECHO MEAS - MVA(P1/2T): 2.37 CM2
BH CV ECHO MEAS - MVA(VTI): 1.72 CM2
BH CV ECHO MEAS - PA ACC TIME: 0.13 SEC
BH CV ECHO MEAS - PA V2 MAX: 102.8 CM/SEC
BH CV ECHO MEAS - PULM A REVS DUR: 0.19 SEC
BH CV ECHO MEAS - PULM A REVS VEL: 24.8 CM/SEC
BH CV ECHO MEAS - PULM DIAS VEL: 50.5 CM/SEC
BH CV ECHO MEAS - PULM S/D: 1.69
BH CV ECHO MEAS - PULM SYS VEL: 85.2 CM/SEC
BH CV ECHO MEAS - RAP SYSTOLE: 3 MMHG
BH CV ECHO MEAS - RV MAX PG: 2.41 MMHG
BH CV ECHO MEAS - RV V1 MAX: 77.7 CM/SEC
BH CV ECHO MEAS - RV V1 VTI: 24.1 CM
BH CV ECHO MEAS - RVSP: 23.8 MMHG
BH CV ECHO MEAS - SI(MOD-SP2): 26.6 ML/M2
BH CV ECHO MEAS - SI(MOD-SP4): 20 ML/M2
BH CV ECHO MEAS - SV(LVOT): 91.2 ML
BH CV ECHO MEAS - SV(MOD-SP2): 49 ML
BH CV ECHO MEAS - SV(MOD-SP4): 37 ML
BH CV ECHO MEAS - TAPSE (>1.6): 2.6 CM
BH CV ECHO MEAS - TR MAX PG: 20.8 MMHG
BH CV ECHO MEAS - TR MAX VEL: 228 CM/SEC
BH CV ECHO MEASUREMENTS AVERAGE E/E' RATIO: 9.81
BH CV XLRA - RV BASE: 3.4 CM
BH CV XLRA - RV LENGTH: 8.1 CM
BH CV XLRA - RV MID: 3.3 CM
BH CV XLRA - TDI S': 11.5 CM/SEC
LEFT ATRIUM VOLUME INDEX: 27.5 ML/M2
SINUS: 2.8 CM
STJ: 2.8 CM

## 2024-04-02 PROCEDURE — 93306 TTE W/DOPPLER COMPLETE: CPT

## 2024-04-03 DIAGNOSIS — R06.09 DOE (DYSPNEA ON EXERTION): Primary | ICD-10-CM

## 2024-04-03 NOTE — PROGRESS NOTES
Christa Acevedo,    Good news, your echocardiogram was normal.  It does not point to any specific reason as to why he would be short of breath and I think given your risk factors, the next best up is probably a stress test.  You will receive a call from our schedulers to schedule this.  Let me know if you have any questions or concerns.    Dr. Alvarez

## 2024-04-05 RX ORDER — LOSARTAN POTASSIUM 100 MG/1
100 TABLET ORAL DAILY
Qty: 90 TABLET | Refills: 1 | Status: SHIPPED | OUTPATIENT
Start: 2024-04-05

## 2024-04-09 RX ORDER — METOPROLOL SUCCINATE 50 MG/1
50 TABLET, EXTENDED RELEASE ORAL DAILY
Qty: 30 TABLET | Refills: 0 | Status: SHIPPED | OUTPATIENT
Start: 2024-04-09

## 2024-04-11 ENCOUNTER — TELEPHONE (OUTPATIENT)
Dept: CARDIOLOGY | Facility: CLINIC | Age: 64
End: 2024-04-11
Payer: COMMERCIAL

## 2024-04-11 ENCOUNTER — TELEPHONE (OUTPATIENT)
Dept: CARDIOLOGY | Facility: CLINIC | Age: 64
End: 2024-04-11

## 2024-04-14 ENCOUNTER — HOSPITAL ENCOUNTER (OUTPATIENT)
Dept: MRI IMAGING | Facility: HOSPITAL | Age: 64
Discharge: HOME | End: 2024-04-14
Admitting: NEUROLOGICAL SURGERY
Payer: COMMERCIAL

## 2024-04-14 DIAGNOSIS — M54.12 CERVICAL RADICULOPATHY: ICD-10-CM

## 2024-04-14 PROCEDURE — 72141 MRI NECK SPINE W/O DYE: CPT

## 2024-04-15 ENCOUNTER — TELEPHONE (OUTPATIENT)
Dept: CARDIOLOGY | Facility: CLINIC | Age: 64
End: 2024-04-15

## 2024-04-15 NOTE — TELEPHONE ENCOUNTER
Hub staff attempted to follow warm transfer process and was unsuccessful     Caller: Kristina Yates    Relationship to patient: Self    Best call back number: [phone]    Patient is needing: PATIENT STATED THAT SHE NEEDS TO RESCHEDULE STRESS TEST THAT IS ON 04.16.24. PATIENT STATED THAT IT WOULD NEED TO BE RESCHEDULED AT LEAST 3 WEEKS OUT. PLEASE CONTACT PATIENT TO SCHEDULE. THANK YOU.

## 2024-04-16 DIAGNOSIS — R06.09 DOE (DYSPNEA ON EXERTION): Primary | ICD-10-CM

## 2024-04-16 NOTE — PROGRESS NOTES
Patient ID: Kristina Yates is a 63 y.o. female is here today for follow-up SI joint inflammation and cervical radiculopathy.    Imaging: MRI performed on 03/26/2024    Subjective     The patient is here in regards to   Chief Complaint   Patient presents with    Neck Pain    Back Pain    Leg Pain    Follow-up       History of Present Illness  Kristina is overall in a better place than when I saw her last.  Her arm pain seems to have subsided over time and does not complain of overt cervical radiculopathy at this time.  Her SI joint pain has responded very well to SI joint injection but only last for about 1 to 2 weeks even with ice baths on the right side.  Her midline back pain has improved.  She still has some neuropathy from her cauda equina compression but that is being treated with Cymbalta.  She cannot tolerate gabapentin or Lyrica side effects.      While in the room and during my examination of the patient I wore a mask and eye protection.  I washed my hands before and after this patient encounter.  The patient was also wearing a mask.    The following portions of the patient's history were reviewed and updated as appropriate: allergies, current medications, past family history, past medical history, past social history, past surgical history and problem list.    Review of Systems   Constitutional:  Negative for fever.   Musculoskeletal:  Positive for back pain (and leg pain), gait problem, neck pain and neck stiffness.   Neurological:  Positive for weakness, numbness (bilateral feet) and headaches. Negative for dizziness and light-headedness.        Past Medical History:   Diagnosis Date    Alcohol abuse     in recovery, sober since 2012    Arthritis     Asthma     Edmonds esophagus     Cervical neuritis     Chronic pain     COPD (chronic obstructive pulmonary disease)     Depression     Diabetes mellitus     Type 2    DJD (degenerative joint disease), cervical     Foot spasms     GERD (gastroesophageal reflux  disease)     Headache     Hiatal hernia     Hyperlipidemia     Low back pain     Seasonal allergies     Spinal stenosis        Allergies   Allergen Reactions    Fluoxetine Hives     Hives    Sulfa Antibiotics Hives     THROAT CLOSES    Zofran [Ondansetron Hcl] Hives     THROAT CLOSES    Lisinopril Cough       Family History   Problem Relation Age of Onset    Cancer Mother         nonhodkinds lymphoma    Heart disease Father     Drug abuse Sister     Heart disease Brother     Lupus Maternal Aunt        Social History     Socioeconomic History    Marital status:     Number of children: 2   Tobacco Use    Smoking status: Every Day     Average packs/day: 0.5 packs/day for 15.0 years (7.5 ttl pk-yrs)     Types: Cigarettes     Start date: 2/25/2006     Last attempt to quit: 2/25/2021     Years since quitting: 3.1    Smokeless tobacco: Never   Vaping Use    Vaping status: Never Used   Substance and Sexual Activity    Alcohol use: No     Comment: sober since 2012, worsk 12 steps    Drug use: No    Sexual activity: Yes     Partners: Male       Past Surgical History:   Procedure Laterality Date    ANTERIOR CERVICAL DISCECTOMY W/ FUSION  11/2003    C5-6, C6-7    CERCLAGE CERVIX      CERVICAL EPIDURAL  2007    CERVICAL FUSION      DILATATION AND CURETTAGE      ENDOMETRIAL ABLATION  2007    ENDOSCOPY      MULTIPLE    ENDOSCOPY N/A 07/05/2016    Procedure: ESOPHAGOGASTRODUODENOSCOPY WITH COLD BIOPSIES;  Surgeon: Jose Mcclellan MD;  Location: [facility] ENDOSCOPY;  Service:     ESOPHAGEAL DILATATION  2009    HAND SURGERY Bilateral     CARTILAGE    LUMBAR FUSION      LUMBAR LAMINECTOMY WITH FUSION N/A 6/30/2023    Procedure: OPEN LUMBAR FOUR TO FIVE TRANSFORAMINAL LUMBAR INTERBODY FUSION;  Surgeon: Alex Ortiz MD;  Location: [facility] MAIN OR;  Service: Neurosurgery;  Laterality: N/A;    NECK SURGERY      NISSEN FUNDOPLICATION LAPAROSCOPIC  03/2012         Objective     Vitals:    04/17/24 0959   BP: 128/76   Pulse: 57   Resp:  16   SpO2: 92%     Body mass index is 26.15 kg/m².    Physical Exam  Constitutional:       Appearance: Normal appearance.   HENT:      Head: Normocephalic and atraumatic.   Eyes:      Extraocular Movements: Extraocular movements intact.      Conjunctiva/sclera: Conjunctivae normal.      Pupils: Pupils are equal, round, and reactive to light.   Cardiovascular:      Rate and Rhythm: Normal rate and regular rhythm.      Pulses: Normal pulses.   Pulmonary:      Breath sounds: Normal breath sounds.   Abdominal:      Palpations: Abdomen is soft.   Musculoskeletal:         General: Normal range of motion.      Cervical back: Normal range of motion and neck supple.      Comments: Right-sided SI joint inflammation characterized by:    -Pelvic Distraction test   -Lateral Iliac compression test   -FABERS (Kam's test)   -Ganslen's Test     Skin:     General: Skin is warm and dry.   Neurological:      Mental Status: She is alert and oriented to person, place, and time.      Cranial Nerves: Cranial nerves 2-12 are intact.      Motor: Motor function is intact. No weakness or atrophy.      Coordination: Coordination is intact. Romberg sign negative. Romberg Test normal.      Gait: Gait is intact. Gait normal.      Deep Tendon Reflexes: Reflexes are normal and symmetric.      Reflex Scores:       Tricep reflexes are 2+ on the right side and 2+ on the left side.       Bicep reflexes are 2+ on the right side and 2+ on the left side.       Brachioradialis reflexes are 2+ on the right side and 2+ on the left side.       Patellar reflexes are 2+ on the right side and 2+ on the left side.       Achilles reflexes are 2+ on the right side and 2+ on the left side.  Psychiatric:         Speech: Speech normal.         Neurologic Exam     Mental Status   Oriented to person, place, and time.   Attention: normal. Concentration: normal.   Speech: speech is normal   Level of consciousness: alert    Cranial Nerves   Cranial nerves II through XII  intact.     CN III, IV, VI   Pupils are equal, round, and reactive to light.    Motor Exam   Muscle bulk: normal  Overall muscle tone: normal    Strength   Strength 5/5 except as noted.     Sensory Exam   Light touch normal.     Gait, Coordination, and Reflexes     Gait  Gait: normal    Coordination   Romberg: negative    Reflexes   Reflexes 2+ except as noted.   Right brachioradialis: 2+  Left brachioradialis: 2+  Right biceps: 2+  Left biceps: 2+  Right triceps: 2+  Left triceps: 2+  Right patellar: 2+  Left patellar: 2+  Right achilles: 2+  Left achilles: 2+      Assessment & Plan   Independent Review of Radiographic Studies:      I personally reviewed the images from the following studies.    MR: MRI of the cervical spine wo contrast was reviewed and shows some adjacent segment spondylolisthesis at C4-5.  There is also mild degenerative disc disease at through C3-4 with congenitally short pedicles.  She has solid fusion from C5-7.  No overt cord compression or cord signal change    Assessment/Plan: Her MRI appears very similar to her MRI in 2021 with no significant changes and clinically she is doing better from her cervical radiculopathy.  I do not think there is any need for further workup regarding her neck.  She has recurrent right-sided SI joint inflammation and has benefited well from multiple rounds of SI joint injections but I do think that it is reasonable to pursue an SI joint fusion given that she continues to have recurrent issues.  We discussed the risk and benefits and alternatives of surgery including injury to the nerves, infection, pseudoarthrosis nonfusion.  She understands and is willing to proceed with surgery    Medical Decision Making:      Right-sided SI joint fusion         Diagnoses and all orders for this visit:    1. SI (sacroiliac) joint inflammation (Primary)  -     Case Request; Standing  -     CBC & Differential; Future  -     Comprehensive Metabolic Panel; Future  -     aPTT;  Future  -     Protime-INR; Future  -     Type & Screen; Future  -     ceFAZolin (ANCEF) 2,000 mg in sodium chloride 0.9 % 100 mL IVPB  -     Case Request    2. Cauda equina syndrome with neurogenic bladder    3. Cervical radiculopathy    Other orders  -     Outpatient In A Bed; Standing  -     Follow Anesthesia Guidelines / Protocol; Future  -     Follow Anesthesia Guidelines / Protocol; Standing  -     Verify / Perform Chlorhexidine Skin Prep; Standing  -     Provide Patient With Instructions on NPO Status; Future  -     Provide Chlorhexidine Skin Prep Wipes and Instructions; Future             Patient Instructions/Recommendations:    Call with any questions or concerns      Alex Ortiz MD  04/17/24  11:01 EDT

## 2024-04-17 ENCOUNTER — OFFICE VISIT (OUTPATIENT)
Dept: NEUROSURGERY | Facility: CLINIC | Age: 64
End: 2024-04-17
Payer: COMMERCIAL

## 2024-04-17 VITALS
BODY MASS INDEX: 26.03 KG/M2 | SYSTOLIC BLOOD PRESSURE: 128 MMHG | HEART RATE: 57 BPM | WEIGHT: 162 LBS | DIASTOLIC BLOOD PRESSURE: 76 MMHG | HEIGHT: 66 IN | OXYGEN SATURATION: 92 % | RESPIRATION RATE: 16 BRPM

## 2024-04-17 DIAGNOSIS — M54.12 CERVICAL RADICULOPATHY: ICD-10-CM

## 2024-04-17 DIAGNOSIS — M46.1 SI (SACROILIAC) JOINT INFLAMMATION: Primary | ICD-10-CM

## 2024-04-17 DIAGNOSIS — G83.4 CAUDA EQUINA SYNDROME WITH NEUROGENIC BLADDER: ICD-10-CM

## 2024-04-23 DIAGNOSIS — J06.9 UPPER RESPIRATORY TRACT INFECTION, UNSPECIFIED TYPE: ICD-10-CM

## 2024-04-23 DIAGNOSIS — R06.2 WHEEZING: ICD-10-CM

## 2024-04-23 RX ORDER — ALBUTEROL SULFATE 90 UG/1
AEROSOL, METERED RESPIRATORY (INHALATION)
Qty: 8.5 G | Refills: 1 | Status: SHIPPED | OUTPATIENT
Start: 2024-04-23

## 2024-05-07 ENCOUNTER — TELEPHONE (OUTPATIENT)
Dept: NEUROSURGERY | Facility: CLINIC | Age: 64
End: 2024-05-07
Payer: COMMERCIAL

## 2024-05-08 ENCOUNTER — TELEPHONE (OUTPATIENT)
Dept: CARDIOLOGY | Facility: CLINIC | Age: 64
End: 2024-05-08
Payer: COMMERCIAL

## 2024-05-10 ENCOUNTER — HOSPITAL ENCOUNTER (OUTPATIENT)
Dept: CARDIOLOGY | Facility: HOSPITAL | Age: 64
Discharge: HOME | End: 2024-05-10
Payer: COMMERCIAL

## 2024-05-10 ENCOUNTER — TELEPHONE (OUTPATIENT)
Dept: CARDIOLOGY | Facility: CLINIC | Age: 64
End: 2024-05-10
Payer: COMMERCIAL

## 2024-05-10 DIAGNOSIS — R06.09 DOE (DYSPNEA ON EXERTION): ICD-10-CM

## 2024-05-10 DIAGNOSIS — R06.09 DOE (DYSPNEA ON EXERTION): Primary | ICD-10-CM

## 2024-05-10 LAB
BH CV STRESS BP STAGE 1: NORMAL
BH CV STRESS DURATION MIN STAGE 1: 3
BH CV STRESS DURATION MIN STAGE 2: 0
BH CV STRESS DURATION SEC STAGE 1: 0
BH CV STRESS DURATION SEC STAGE 2: 3
BH CV STRESS GRADE STAGE 1: 10
BH CV STRESS GRADE STAGE 2: 12
BH CV STRESS HR STAGE 1: 138
BH CV STRESS HR STAGE 2: 138
BH CV STRESS METS STAGE 1: 5
BH CV STRESS METS STAGE 2: 5
BH CV STRESS PROTOCOL 1: NORMAL
BH CV STRESS RECOVERY BP: NORMAL MMHG
BH CV STRESS RECOVERY HR: 107 BPM
BH CV STRESS SPEED STAGE 1: 1.7
BH CV STRESS SPEED STAGE 2: 2.5
BH CV STRESS STAGE 1: 1
BH CV STRESS STAGE 2: 2
MAXIMAL PREDICTED HEART RATE: 158 BPM
PERCENT MAX PREDICTED HR: 87.34 %
STRESS BASELINE BP: NORMAL MMHG
STRESS BASELINE HR: 102 BPM
STRESS PERCENT HR: 103 %
STRESS POST ESTIMATED WORKLOAD: 5 METS
STRESS POST EXERCISE DUR MIN: 3 MIN
STRESS POST EXERCISE DUR SEC: 3 SEC
STRESS POST PEAK BP: NORMAL MMHG
STRESS POST PEAK HR: 138 BPM
STRESS TARGET HR: 134 BPM

## 2024-05-10 PROCEDURE — 93017 CV STRESS TEST TRACING ONLY: CPT

## 2024-05-21 ENCOUNTER — TELEPHONE (OUTPATIENT)
Dept: CARDIOLOGY | Facility: CLINIC | Age: 64
End: 2024-05-21
Payer: COMMERCIAL

## 2024-05-21 NOTE — H&P (VIEW-ONLY)
Patient ID: Kristina Yates is a 63 y.o. female is here today for follow-up to discuss upcoming surgery for SI joint inflammation.      Subjective     The patient is here in regards to   Chief Complaint   Patient presents with    Follow-up    Leg Pain       History of Present Illness  Vani is here for preoperative visit.  She still continues to have crushing right-sided low back pain consistent with her SI joint pain.      While in the room and during my examination of the patient I wore a mask and eye protection.  I washed my hands before and after this patient encounter.  The patient was also wearing a mask.    The following portions of the patient's history were reviewed and updated as appropriate: allergies, current medications, past family history, past medical history, past social history, past surgical history and problem list.    Review of Systems   Constitutional:  Negative for fever.   Musculoskeletal:  Positive for back pain (and leg pain) and gait problem (balance).   Neurological:  Positive for weakness and numbness (right foot). Negative for dizziness, light-headedness and headaches.        Past Medical History:   Diagnosis Date    Alcohol abuse     in recovery, sober since 2012    Arthritis     Asthma     Edmonds esophagus     Cervical neuritis     Chronic pain     COPD (chronic obstructive pulmonary disease)     Depression     Diabetes mellitus     Type 2    DJD (degenerative joint disease), cervical     Foot spasms     GERD (gastroesophageal reflux disease)     Headache     Hiatal hernia     Hyperlipidemia     Low back pain     Seasonal allergies     Spinal stenosis        Allergies   Allergen Reactions    Fluoxetine Hives     Hives    Sulfa Antibiotics Hives     THROAT CLOSES    Zofran [Ondansetron Hcl] Hives     THROAT CLOSES    Lisinopril Cough       Family History   Problem Relation Age of Onset    Cancer Mother         nonhodkinds lymphoma    Heart disease Father     Drug abuse Sister     Heart  disease Brother     Lupus Maternal Aunt        Social History     Socioeconomic History    Marital status:     Number of children: 2   Tobacco Use    Smoking status: Every Day     Current packs/day: 0.25     Average packs/day: 0.5 packs/day for 16.4 years (7.8 ttl pk-yrs)     Types: Cigarettes     Start date: 2/25/2006     Last attempt to quit: 2/25/2021    Smokeless tobacco: Never   Vaping Use    Vaping status: Never Used   Substance and Sexual Activity    Alcohol use: No     Comment: sober since 2012, worsk 12 steps    Drug use: No    Sexual activity: Yes     Partners: Male       Past Surgical History:   Procedure Laterality Date    ANTERIOR CERVICAL DISCECTOMY W/ FUSION  11/2003    C5-6, C6-7    CERCLAGE CERVIX      CERVICAL EPIDURAL  2007    CERVICAL FUSION      DILATATION AND CURETTAGE      ENDOMETRIAL ABLATION  2007    ENDOSCOPY      MULTIPLE    ENDOSCOPY N/A 07/05/2016    Procedure: ESOPHAGOGASTRODUODENOSCOPY WITH COLD BIOPSIES;  Surgeon: Jose Mcclellan MD;  Location: [facility] ENDOSCOPY;  Service:     ESOPHAGEAL DILATATION  2009    HAND SURGERY Bilateral     CARTILAGE    LUMBAR FUSION      LUMBAR LAMINECTOMY WITH FUSION N/A 6/30/2023    Procedure: OPEN LUMBAR FOUR TO FIVE TRANSFORAMINAL LUMBAR INTERBODY FUSION;  Surgeon: Alex Ortiz MD;  Location: [facility] MAIN OR;  Service: Neurosurgery;  Laterality: N/A;    NECK SURGERY      NISSEN FUNDOPLICATION LAPAROSCOPIC  03/2012         Objective     Vitals:    05/23/24 1057   BP: 132/84   Pulse: 93   Resp: 16   Temp: 98.5 °F (36.9 °C)   SpO2: 94%     Body mass index is 27.12 kg/m².    Physical Exam  Constitutional:       Appearance: Normal appearance.   HENT:      Head: Normocephalic and atraumatic.   Eyes:      Extraocular Movements: Extraocular movements intact.      Conjunctiva/sclera: Conjunctivae normal.      Pupils: Pupils are equal, round, and reactive to light.   Cardiovascular:      Rate and Rhythm: Normal rate and regular rhythm.      Pulses: Normal  pulses.   Pulmonary:      Breath sounds: Normal breath sounds.   Abdominal:      Palpations: Abdomen is soft.   Musculoskeletal:         General: Normal range of motion.      Cervical back: Normal range of motion and neck supple.      Comments: Right-sided SI joint inflammation characterized by:    -Pelvic Distraction test   -Lateral Iliac compression test   -FABERS (Kam's test)   -Ganslen's Test     Skin:     General: Skin is warm and dry.   Neurological:      Mental Status: She is alert and oriented to person, place, and time.      Cranial Nerves: Cranial nerves 2-12 are intact.      Motor: Motor function is intact. No weakness or atrophy.      Coordination: Coordination is intact. Romberg sign negative. Romberg Test normal.      Gait: Gait is intact. Gait normal.      Deep Tendon Reflexes: Reflexes are normal and symmetric.      Reflex Scores:       Tricep reflexes are 2+ on the right side and 2+ on the left side.       Bicep reflexes are 2+ on the right side and 2+ on the left side.       Brachioradialis reflexes are 2+ on the right side and 2+ on the left side.       Patellar reflexes are 2+ on the right side and 2+ on the left side.       Achilles reflexes are 2+ on the right side and 2+ on the left side.  Psychiatric:         Speech: Speech normal.         Neurologic Exam     Mental Status   Oriented to person, place, and time.   Attention: normal. Concentration: normal.   Speech: speech is normal   Level of consciousness: alert    Cranial Nerves   Cranial nerves II through XII intact.     CN III, IV, VI   Pupils are equal, round, and reactive to light.    Motor Exam   Muscle bulk: normal  Overall muscle tone: normal    Strength   Strength 5/5 except as noted.     Sensory Exam   Light touch normal.     Gait, Coordination, and Reflexes     Gait  Gait: normal    Coordination   Romberg: negative    Reflexes   Reflexes 2+ except as noted.   Right brachioradialis: 2+  Left brachioradialis: 2+  Right biceps:  2+  Left biceps: 2+  Right triceps: 2+  Left triceps: 2+  Right patellar: 2+  Left patellar: 2+  Right achilles: 2+  Left achilles: 2+      Assessment & Plan   Independent Review of Radiographic Studies:      I personally reviewed the images from the following studies.    No new neuroimaging.    Assessment/Plan: We discussed the risk and benefits and alternatives of surgery including pseudoarthrosis, infection, injury to the sacral nerves.  She understands and is willing to proceed with surgery.  Also talked about restarting Lyrica for her neuropathy in her feet secondary to her cauda equina damage.    Medical Decision Making:      Right-sided SI joint fusion         Diagnoses and all orders for this visit:    1. SI (sacroiliac) joint inflammation (Primary)  -     pregabalin (Lyrica) 75 MG capsule; Take 1 capsule by mouth 2 (Two) Times a Day.  Dispense: 90 capsule; Refill: 1    2. Diabetic peripheral neuropathy             Patient Instructions/Recommendations:    Call with any questions or concerns      Alex Ortiz MD  05/23/24  11:24 EDT

## 2024-05-22 ENCOUNTER — HOSPITAL ENCOUNTER (OUTPATIENT)
Dept: CARDIOLOGY | Facility: HOSPITAL | Age: 64
Discharge: HOME | End: 2024-05-22
Admitting: NURSE PRACTITIONER
Payer: COMMERCIAL

## 2024-05-22 VITALS — BODY MASS INDEX: 26.03 KG/M2 | HEIGHT: 66 IN | WEIGHT: 162 LBS

## 2024-05-22 DIAGNOSIS — R06.09 DOE (DYSPNEA ON EXERTION): ICD-10-CM

## 2024-05-22 LAB
BH CV NUCLEAR PRIOR STUDY: 2
BH CV REST NUCLEAR ISOTOPE DOSE: 10.5 MCI
BH CV STRESS BP STAGE 1: NORMAL
BH CV STRESS COMMENTS STAGE 1: NORMAL
BH CV STRESS DOSE REGADENOSON STAGE 1: 0.4
BH CV STRESS DURATION MIN STAGE 1: 0
BH CV STRESS DURATION SEC STAGE 1: 10
BH CV STRESS HR STAGE 1: 90
BH CV STRESS NUCLEAR ISOTOPE DOSE: 34.6 MCI
BH CV STRESS PROTOCOL 1: NORMAL
BH CV STRESS RECOVERY BP: NORMAL MMHG
BH CV STRESS RECOVERY HR: 80 BPM
BH CV STRESS STAGE 1: 1
LV EF NUC BP: 64 %
MAXIMAL PREDICTED HEART RATE: 158 BPM
PERCENT MAX PREDICTED HR: 56.96 %
STRESS BASELINE BP: NORMAL MMHG
STRESS BASELINE HR: 71 BPM
STRESS PERCENT HR: 67 %
STRESS POST EXERCISE DUR SEC: 10 SEC
STRESS POST PEAK BP: NORMAL MMHG
STRESS POST PEAK HR: 90 BPM
STRESS TARGET HR: 134 BPM

## 2024-05-22 PROCEDURE — 0 TECHNETIUM TETROFOSMIN KIT: Performed by: NURSE PRACTITIONER

## 2024-05-22 PROCEDURE — 93017 CV STRESS TEST TRACING ONLY: CPT

## 2024-05-22 PROCEDURE — 78452 HT MUSCLE IMAGE SPECT MULT: CPT

## 2024-05-22 PROCEDURE — 25010000002 REGADENOSON 0.4 MG/5ML SOLUTION: Performed by: NURSE PRACTITIONER

## 2024-05-22 PROCEDURE — A9502 TC99M TETROFOSMIN: HCPCS | Performed by: NURSE PRACTITIONER

## 2024-05-22 RX ORDER — REGADENOSON 0.08 MG/ML
0.4 INJECTION, SOLUTION INTRAVENOUS
Status: COMPLETED | OUTPATIENT
Start: 2024-05-22 | End: 2024-05-22

## 2024-05-22 RX ADMIN — TETROFOSMIN 1 DOSE: 1.38 INJECTION, POWDER, LYOPHILIZED, FOR SOLUTION INTRAVENOUS at 07:50

## 2024-05-22 RX ADMIN — REGADENOSON 0.4 MG: 0.08 INJECTION, SOLUTION INTRAVENOUS at 08:50

## 2024-05-22 RX ADMIN — TETROFOSMIN 1 DOSE: 1.38 INJECTION, POWDER, LYOPHILIZED, FOR SOLUTION INTRAVENOUS at 08:50

## 2024-05-23 ENCOUNTER — OFFICE VISIT (OUTPATIENT)
Dept: NEUROSURGERY | Facility: CLINIC | Age: 64
End: 2024-05-23
Payer: COMMERCIAL

## 2024-05-23 VITALS
OXYGEN SATURATION: 94 % | HEART RATE: 93 BPM | DIASTOLIC BLOOD PRESSURE: 84 MMHG | SYSTOLIC BLOOD PRESSURE: 132 MMHG | WEIGHT: 168 LBS | TEMPERATURE: 98.5 F | BODY MASS INDEX: 27 KG/M2 | RESPIRATION RATE: 16 BRPM | HEIGHT: 66 IN

## 2024-05-23 DIAGNOSIS — M46.1 SI (SACROILIAC) JOINT INFLAMMATION: Primary | ICD-10-CM

## 2024-05-23 DIAGNOSIS — E11.42 DIABETIC PERIPHERAL NEUROPATHY: ICD-10-CM

## 2024-05-23 RX ORDER — PREGABALIN 75 MG/1
75 CAPSULE ORAL 2 TIMES DAILY
Qty: 90 CAPSULE | Refills: 1 | Status: SHIPPED | OUTPATIENT
Start: 2024-05-23

## 2024-05-24 ENCOUNTER — PRE-ADMISSION TESTING (OUTPATIENT)
Dept: PREADMISSION TESTING | Facility: HOSPITAL | Age: 64
End: 2024-05-24
Payer: COMMERCIAL

## 2024-05-24 VITALS
TEMPERATURE: 98.7 F | WEIGHT: 164.4 LBS | RESPIRATION RATE: 18 BRPM | BODY MASS INDEX: 25.8 KG/M2 | HEART RATE: 98 BPM | DIASTOLIC BLOOD PRESSURE: 89 MMHG | OXYGEN SATURATION: 95 % | HEIGHT: 67 IN | SYSTOLIC BLOOD PRESSURE: 159 MMHG

## 2024-05-24 DIAGNOSIS — M46.1 SI (SACROILIAC) JOINT INFLAMMATION: ICD-10-CM

## 2024-05-24 LAB
ABO GROUP BLD: NORMAL
ALBUMIN SERPL-MCNC: 4.2 G/DL (ref 3.5–5.2)
ALBUMIN/GLOB SERPL: 1.8 G/DL
ALP SERPL-CCNC: 74 U/L (ref 39–117)
ALT SERPL W P-5'-P-CCNC: 10 U/L (ref 1–33)
ANION GAP SERPL CALCULATED.3IONS-SCNC: 11.3 MMOL/L (ref 5–15)
APTT PPP: 51.6 SECONDS (ref 22.7–35.4)
AST SERPL-CCNC: 13 U/L (ref 1–32)
BASOPHILS # BLD AUTO: 0.04 10*3/MM3 (ref 0–0.2)
BASOPHILS NFR BLD AUTO: 0.6 % (ref 0–1.5)
BILIRUB SERPL-MCNC: <0.2 MG/DL (ref 0–1.2)
BLD GP AB SCN SERPL QL: NEGATIVE
BUN SERPL-MCNC: 7 MG/DL (ref 8–23)
BUN/CREAT SERPL: 10.4 (ref 7–25)
CALCIUM SPEC-SCNC: 8.7 MG/DL (ref 8.6–10.5)
CHLORIDE SERPL-SCNC: 103 MMOL/L (ref 98–107)
CO2 SERPL-SCNC: 24.7 MMOL/L (ref 22–29)
CREAT SERPL-MCNC: 0.67 MG/DL (ref 0.57–1)
DEPRECATED RDW RBC AUTO: 43.3 FL (ref 37–54)
EGFRCR SERPLBLD CKD-EPI 2021: 99 ML/MIN/1.73
EOSINOPHIL # BLD AUTO: 0.24 10*3/MM3 (ref 0–0.4)
EOSINOPHIL NFR BLD AUTO: 3.9 % (ref 0.3–6.2)
ERYTHROCYTE [DISTWIDTH] IN BLOOD BY AUTOMATED COUNT: 12.8 % (ref 12.3–15.4)
GLOBULIN UR ELPH-MCNC: 2.4 GM/DL
GLUCOSE SERPL-MCNC: 95 MG/DL (ref 65–99)
HCT VFR BLD AUTO: 36.6 % (ref 34–46.6)
HGB BLD-MCNC: 12 G/DL (ref 12–15.9)
IMM GRANULOCYTES # BLD AUTO: 0.01 10*3/MM3 (ref 0–0.05)
IMM GRANULOCYTES NFR BLD AUTO: 0.2 % (ref 0–0.5)
INR PPP: 0.96 (ref 0.9–1.1)
LYMPHOCYTES # BLD AUTO: 2.45 10*3/MM3 (ref 0.7–3.1)
LYMPHOCYTES NFR BLD AUTO: 39.6 % (ref 19.6–45.3)
MCH RBC QN AUTO: 30.5 PG (ref 26.6–33)
MCHC RBC AUTO-ENTMCNC: 32.8 G/DL (ref 31.5–35.7)
MCV RBC AUTO: 93.1 FL (ref 79–97)
MONOCYTES # BLD AUTO: 0.38 10*3/MM3 (ref 0.1–0.9)
MONOCYTES NFR BLD AUTO: 6.1 % (ref 5–12)
NEUTROPHILS NFR BLD AUTO: 3.06 10*3/MM3 (ref 1.7–7)
NEUTROPHILS NFR BLD AUTO: 49.6 % (ref 42.7–76)
NRBC BLD AUTO-RTO: 0 /100 WBC (ref 0–0.2)
PLATELET # BLD AUTO: 205 10*3/MM3 (ref 140–450)
PMV BLD AUTO: 10.3 FL (ref 6–12)
POTASSIUM SERPL-SCNC: 4.2 MMOL/L (ref 3.5–5.2)
PROT SERPL-MCNC: 6.6 G/DL (ref 6–8.5)
PROTHROMBIN TIME: 12.9 SECONDS (ref 11.7–14.2)
RBC # BLD AUTO: 3.93 10*6/MM3 (ref 3.77–5.28)
RH BLD: NEGATIVE
SODIUM SERPL-SCNC: 139 MMOL/L (ref 136–145)
T&S EXPIRATION DATE: NORMAL
WBC NRBC COR # BLD AUTO: 6.18 10*3/MM3 (ref 3.4–10.8)

## 2024-05-24 PROCEDURE — 86850 RBC ANTIBODY SCREEN: CPT

## 2024-05-24 PROCEDURE — 85730 THROMBOPLASTIN TIME PARTIAL: CPT

## 2024-05-24 PROCEDURE — 85610 PROTHROMBIN TIME: CPT

## 2024-05-24 PROCEDURE — 36415 COLL VENOUS BLD VENIPUNCTURE: CPT

## 2024-05-24 PROCEDURE — 86901 BLOOD TYPING SEROLOGIC RH(D): CPT

## 2024-05-24 PROCEDURE — 85025 COMPLETE CBC W/AUTO DIFF WBC: CPT

## 2024-05-24 PROCEDURE — 80053 COMPREHEN METABOLIC PANEL: CPT

## 2024-05-24 PROCEDURE — 86900 BLOOD TYPING SEROLOGIC ABO: CPT

## 2024-05-24 NOTE — DISCHARGE INSTRUCTIONS
Take the following medications the morning of surgery: METOPROLOL, CYMBALTA, WELLBUTRIN, LYRICA, AND INHALER      If you are on prescription narcotic pain medication to control your pain you may also take that medication the morning of surgery.    General Instructions:  Do not eat solid food after midnight the night before surgery.  You may drink clear liquids day of surgery but must stop at least one hour before your hospital arrival time.  It is beneficial for you to have a clear drink that contains carbohydrates the day of surgery.  We suggest a 12 to 20 ounce bottle of Gatorade or Powerade for non-diabetic patients or a 12 to 20 ounce bottle of G2 or Powerade Zero for diabetic patients. (Pediatric patients, are not advised to drink a 12 to 20 ounce carbohydrate drink)    Clear liquids are liquids you can see through.  Nothing red in color.     Plain water                               Sports drinks  Sodas                                   Gelatin (Jell-O)  Fruit juices without pulp such as white grape juice and apple juice  Popsicles that contain no fruit or yogurt  Tea or coffee (no cream or milk added)  Gatorade / Powerade  G2 / Powerade Zero    Infants may have breast milk up to four hours before surgery.  Infants drinking formula may drink formula up to six hours before surgery.   Patients who avoid smoking, chewing tobacco and alcohol for 4 weeks prior to surgery have a reduced risk of post-operative complications.  Quit smoking as many days before surgery as you can.  Do not smoke, use chewing tobacco or drink alcohol the day of surgery.   If applicable bring your C-PAP/ BI-PAP machine in with you to preop day of surgery.  Bring any papers given to you in the doctor’s office.  Wear clean comfortable clothes.  Do not wear contact lenses, false eyelashes or make-up.  Bring a case for your glasses.   Bring crutches or walker if applicable.  Remove all piercings.  Leave jewelry and any other valuables at  home.  Hair extensions with metal clips must be removed prior to surgery.  The Pre-Admission Testing nurse will instruct you to bring medications if unable to obtain an accurate list in Pre-Admission Testing.        If you were given a blood bank ID arm band remember to bring it with you the day of surgery.    Preventing a Surgical Site Infection:  For 2 to 3 days before surgery, avoid shaving with a razor because the razor can irritate skin and make it easier to develop an infection.    Any areas of open skin can increase the risk of a post-operative wound infection by allowing bacteria to enter and travel throughout the body.  Notify your surgeon if you have any skin wounds / rashes even if it is not near the expected surgical site.  The area will need assessed to determine if surgery should be delayed until it is healed.  The night prior to surgery shower using a fresh bar of anti-bacterial soap (such as Dial) and clean washcloth.  Sleep in a clean bed with clean clothing.  Do not allow pets to sleep with you.  Shower on the morning of surgery using a fresh bar of anti-bacterial soap (such as Dial) and clean washcloth.  Dry with a clean towel and dress in clean clothing.  Ask your surgeon if you will be receiving antibiotics prior to surgery.  Make sure you, your family, and all healthcare providers clean their hands with soap and water or an alcohol based hand  before caring for you or your wound.  CHLORHEXIDINE CLOTH INSTRUCTIONS  The morning of surgery follow these instructions using the Chlorhexidine cloths you've been given.  These steps reduce bacteria on the body.  Do not use the cloths near your eyes, ears mouth, genitalia or on open wounds.  Throw the cloths away after use but do not try to flush them down a toilet.      Open and remove one cloth at a time from the package.    Leave the cloth unfolded and begin the bathing.  Massage the skin with the cloths using gentle pressure to remove  bacteria.  Do not scrub harshly.   Follow the steps below with one 2% CHG cloth per area (6 total cloths).  One cloth for neck, shoulders and chest.  One cloth for both arms, hands, fingers and underarms (do underarms last).  One cloth for the abdomen followed by groin.  One cloth for right leg and foot including between the toes.  One cloth for left leg and foot including between the toes.  The last cloth is to be used for the back of the neck, back and buttocks.    Allow the CHG to air dry 3 minutes on the skin which will give it time to work and decrease the chance of irritation.  The skin may feel sticky until it is dry.  Do not rinse with water or any other liquid or you will lose the beneficial effects of the CHG.  If mild skin irritation occurs, do rinse the skin to remove the CHG.  Report this to the nurse at time of admission.  Do not apply lotions, creams, ointments, deodorants or perfumes after using the clothes. Dress in clean clothes before coming to the hospital.  Day of surgery:  Your arrival time is approximately two hours before your scheduled surgery time.  Upon arrival, a Pre-op nurse and Anesthesiologist will review your health history, obtain vital signs, and answer questions you may have.  The only belongings needed at this time will be a list of your home medications and if applicable your C-PAP/BI-PAP machine.  A Pre-op nurse will start an IV and you may receive medication in preparation for surgery, including something to help you relax.     Please be aware that surgery does come with discomfort.  We want to make every effort to control your discomfort so please discuss any uncontrolled symptoms with your nurse.   Your doctor will most likely have prescribed pain medications.      If you are going home after surgery you will receive individualized written care instructions before being discharged.  A responsible adult must drive you to and from the hospital on the day of your surgery and  ideally stay with you through the night.   .  Discharge prescriptions can be filled by the hospital pharmacy during regular pharmacy hours.  If you are having surgery late in the day/evening your prescription may be e-prescribed to your pharmacy.  Please verify your pharmacy hours or chose a 24 hour pharmacy to avoid not having access to your prescription because your pharmacy has closed for the day.    If you are staying overnight following surgery, you will be transported to your hospital room following the recovery period.  [facility] has all private rooms.    If you have any questions please call Pre-Admission Testing at [phone].  Deductibles and co-payments are collected on the day of service. Please be prepared to pay the required co-pay, deductible or deposit on the day of service as defined by your plan.    Call your surgeon immediately if you experience any of the following symptoms:  Sore Throat  Shortness of Breath or difficulty breathing  Cough  Chills  Body soreness or muscle pain  Headache  Fever  New loss of taste or smell  Do not arrive for your surgery ill.  Your procedure will need to be rescheduled to another time.  You will need to call your physician before the day of surgery to avoid any unnecessary exposure to hospital staff as well as other patients.

## 2024-06-04 ENCOUNTER — ANESTHESIA (OUTPATIENT)
Dept: PERIOP | Facility: HOSPITAL | Age: 64
End: 2024-06-04
Payer: COMMERCIAL

## 2024-06-04 ENCOUNTER — APPOINTMENT (OUTPATIENT)
Dept: GENERAL RADIOLOGY | Facility: HOSPITAL | Age: 64
End: 2024-06-04
Payer: COMMERCIAL

## 2024-06-04 ENCOUNTER — ANESTHESIA EVENT (OUTPATIENT)
Dept: PERIOP | Facility: HOSPITAL | Age: 64
End: 2024-06-04
Payer: COMMERCIAL

## 2024-06-04 ENCOUNTER — HOSPITAL ENCOUNTER (OUTPATIENT)
Facility: HOSPITAL | Age: 64
Discharge: HOME | End: 2024-06-04
Attending: NEUROLOGICAL SURGERY | Admitting: NEUROLOGICAL SURGERY
Payer: COMMERCIAL

## 2024-06-04 VITALS
RESPIRATION RATE: 18 BRPM | OXYGEN SATURATION: 94 % | TEMPERATURE: 97.8 F | HEART RATE: 81 BPM | DIASTOLIC BLOOD PRESSURE: 82 MMHG | SYSTOLIC BLOOD PRESSURE: 132 MMHG

## 2024-06-04 DIAGNOSIS — M46.1 SI (SACROILIAC) JOINT INFLAMMATION: Primary | ICD-10-CM

## 2024-06-04 LAB
ABO GROUP BLD: NORMAL
BLD GP AB SCN SERPL QL: NEGATIVE
GLUCOSE BLDC GLUCOMTR-MCNC: 109 MG/DL (ref 70–130)
GLUCOSE BLDC GLUCOMTR-MCNC: 117 MG/DL (ref 70–130)
RH BLD: NEGATIVE
T&S EXPIRATION DATE: NORMAL

## 2024-06-04 PROCEDURE — 94664 DEMO&/EVAL PT USE INHALER: CPT

## 2024-06-04 PROCEDURE — 25010000002 PROPOFOL 10 MG/ML EMULSION: Performed by: NURSE ANESTHETIST, CERTIFIED REGISTERED

## 2024-06-04 PROCEDURE — 27279 ARTHRD SI JT PLMT TARTCLR DV: CPT | Performed by: NEUROLOGICAL SURGERY

## 2024-06-04 PROCEDURE — 25010000002 HYDROMORPHONE PER 4 MG: Performed by: NURSE ANESTHETIST, CERTIFIED REGISTERED

## 2024-06-04 PROCEDURE — 86850 RBC ANTIBODY SCREEN: CPT | Performed by: NEUROLOGICAL SURGERY

## 2024-06-04 PROCEDURE — C1776 JOINT DEVICE (IMPLANTABLE): HCPCS | Performed by: NEUROLOGICAL SURGERY

## 2024-06-04 PROCEDURE — 27279 ARTHRD SI JT PLMT TARTCLR DV: CPT | Performed by: SPECIALIST/TECHNOLOGIST, OTHER

## 2024-06-04 PROCEDURE — 82948 REAGENT STRIP/BLOOD GLUCOSE: CPT

## 2024-06-04 PROCEDURE — 25010000002 FENTANYL CITRATE (PF) 50 MCG/ML SOLUTION: Performed by: NURSE ANESTHETIST, CERTIFIED REGISTERED

## 2024-06-04 PROCEDURE — 25010000002 CEFAZOLIN PER 500 MG: Performed by: NEUROLOGICAL SURGERY

## 2024-06-04 PROCEDURE — 25010000002 PHENYLEPHRINE 10 MG/ML SOLUTION: Performed by: NURSE ANESTHETIST, CERTIFIED REGISTERED

## 2024-06-04 PROCEDURE — 76000 FLUOROSCOPY <1 HR PHYS/QHP: CPT

## 2024-06-04 PROCEDURE — 25810000003 LACTATED RINGERS PER 1000 ML: Performed by: STUDENT IN AN ORGANIZED HEALTH CARE EDUCATION/TRAINING PROGRAM

## 2024-06-04 PROCEDURE — 86900 BLOOD TYPING SEROLOGIC ABO: CPT | Performed by: NEUROLOGICAL SURGERY

## 2024-06-04 PROCEDURE — 94640 AIRWAY INHALATION TREATMENT: CPT

## 2024-06-04 PROCEDURE — 86901 BLOOD TYPING SEROLOGIC RH(D): CPT | Performed by: NEUROLOGICAL SURGERY

## 2024-06-04 DEVICE — IMPLANTABLE DEVICE: Type: IMPLANTABLE DEVICE | Site: SPINE LUMBAR | Status: FUNCTIONAL

## 2024-06-04 DEVICE — DEV FUSN SI IFUSE/3D 3D/PRINT TRIANG TRAB POR FEN TI 7X40MM: Type: IMPLANTABLE DEVICE | Site: SPINE LUMBAR | Status: FUNCTIONAL

## 2024-06-04 RX ORDER — PHENYLEPHRINE HYDROCHLORIDE 10 MG/ML
INJECTION INTRAVENOUS AS NEEDED
Status: DISCONTINUED | OUTPATIENT
Start: 2024-06-04 | End: 2024-06-04 | Stop reason: SURG

## 2024-06-04 RX ORDER — DOCUSATE SODIUM 250 MG
250 CAPSULE ORAL 2 TIMES DAILY
Qty: 60 CAPSULE | Refills: 2 | Status: SHIPPED | OUTPATIENT
Start: 2024-06-04

## 2024-06-04 RX ORDER — DROPERIDOL 2.5 MG/ML
0.62 INJECTION, SOLUTION INTRAMUSCULAR; INTRAVENOUS
Status: DISCONTINUED | OUTPATIENT
Start: 2024-06-04 | End: 2024-06-04 | Stop reason: HOSPADM

## 2024-06-04 RX ORDER — SODIUM CHLORIDE, SODIUM LACTATE, POTASSIUM CHLORIDE, CALCIUM CHLORIDE 600; 310; 30; 20 MG/100ML; MG/100ML; MG/100ML; MG/100ML
9 INJECTION, SOLUTION INTRAVENOUS CONTINUOUS
Status: DISCONTINUED | OUTPATIENT
Start: 2024-06-04 | End: 2024-06-04 | Stop reason: HOSPADM

## 2024-06-04 RX ORDER — BUPIVACAINE HYDROCHLORIDE AND EPINEPHRINE 2.5; 5 MG/ML; UG/ML
INJECTION, SOLUTION EPIDURAL; INFILTRATION; INTRACAUDAL; PERINEURAL AS NEEDED
Status: DISCONTINUED | OUTPATIENT
Start: 2024-06-04 | End: 2024-06-04 | Stop reason: HOSPADM

## 2024-06-04 RX ORDER — PROPOFOL 10 MG/ML
VIAL (ML) INTRAVENOUS AS NEEDED
Status: DISCONTINUED | OUTPATIENT
Start: 2024-06-04 | End: 2024-06-04 | Stop reason: SURG

## 2024-06-04 RX ORDER — NALOXONE HCL 0.4 MG/ML
0.2 VIAL (ML) INJECTION AS NEEDED
Status: DISCONTINUED | OUTPATIENT
Start: 2024-06-04 | End: 2024-06-04 | Stop reason: HOSPADM

## 2024-06-04 RX ORDER — IPRATROPIUM BROMIDE AND ALBUTEROL SULFATE 2.5; .5 MG/3ML; MG/3ML
3 SOLUTION RESPIRATORY (INHALATION) ONCE
Status: COMPLETED | OUTPATIENT
Start: 2024-06-04 | End: 2024-06-04

## 2024-06-04 RX ORDER — LABETALOL HYDROCHLORIDE 5 MG/ML
5 INJECTION, SOLUTION INTRAVENOUS
Status: DISCONTINUED | OUTPATIENT
Start: 2024-06-04 | End: 2024-06-04 | Stop reason: HOSPADM

## 2024-06-04 RX ORDER — FENTANYL CITRATE 50 UG/ML
50 INJECTION, SOLUTION INTRAMUSCULAR; INTRAVENOUS
Status: DISCONTINUED | OUTPATIENT
Start: 2024-06-04 | End: 2024-06-04 | Stop reason: HOSPADM

## 2024-06-04 RX ORDER — HYDROMORPHONE HYDROCHLORIDE 1 MG/ML
0.5 INJECTION, SOLUTION INTRAMUSCULAR; INTRAVENOUS; SUBCUTANEOUS
Status: DISCONTINUED | OUTPATIENT
Start: 2024-06-04 | End: 2024-06-04 | Stop reason: HOSPADM

## 2024-06-04 RX ORDER — SODIUM CHLORIDE 0.9 % (FLUSH) 0.9 %
3-10 SYRINGE (ML) INJECTION AS NEEDED
Status: DISCONTINUED | OUTPATIENT
Start: 2024-06-04 | End: 2024-06-04 | Stop reason: HOSPADM

## 2024-06-04 RX ORDER — OXYCODONE AND ACETAMINOPHEN 7.5; 325 MG/1; MG/1
1 TABLET ORAL EVERY 4 HOURS PRN
Status: DISCONTINUED | OUTPATIENT
Start: 2024-06-04 | End: 2024-06-04 | Stop reason: HOSPADM

## 2024-06-04 RX ORDER — EPHEDRINE SULFATE 50 MG/ML
5 INJECTION, SOLUTION INTRAVENOUS ONCE AS NEEDED
Status: DISCONTINUED | OUTPATIENT
Start: 2024-06-04 | End: 2024-06-04 | Stop reason: HOSPADM

## 2024-06-04 RX ORDER — HYDROCODONE BITARTRATE AND ACETAMINOPHEN 5; 325 MG/1; MG/1
1 TABLET ORAL ONCE AS NEEDED
Status: COMPLETED | OUTPATIENT
Start: 2024-06-04 | End: 2024-06-04

## 2024-06-04 RX ORDER — FLUMAZENIL 0.1 MG/ML
0.2 INJECTION INTRAVENOUS AS NEEDED
Status: DISCONTINUED | OUTPATIENT
Start: 2024-06-04 | End: 2024-06-04 | Stop reason: HOSPADM

## 2024-06-04 RX ORDER — FENTANYL CITRATE 50 UG/ML
INJECTION, SOLUTION INTRAMUSCULAR; INTRAVENOUS AS NEEDED
Status: DISCONTINUED | OUTPATIENT
Start: 2024-06-04 | End: 2024-06-04 | Stop reason: SURG

## 2024-06-04 RX ORDER — LIDOCAINE HYDROCHLORIDE 20 MG/ML
INJECTION, SOLUTION INFILTRATION; PERINEURAL AS NEEDED
Status: DISCONTINUED | OUTPATIENT
Start: 2024-06-04 | End: 2024-06-04 | Stop reason: SURG

## 2024-06-04 RX ORDER — EPHEDRINE SULFATE 50 MG/ML
INJECTION INTRAVENOUS AS NEEDED
Status: DISCONTINUED | OUTPATIENT
Start: 2024-06-04 | End: 2024-06-04 | Stop reason: SURG

## 2024-06-04 RX ORDER — AMLODIPINE BESYLATE 2.5 MG/1
2.5 TABLET ORAL NIGHTLY
COMMUNITY

## 2024-06-04 RX ORDER — SODIUM CHLORIDE 0.9 % (FLUSH) 0.9 %
3 SYRINGE (ML) INJECTION EVERY 12 HOURS SCHEDULED
Status: DISCONTINUED | OUTPATIENT
Start: 2024-06-04 | End: 2024-06-04 | Stop reason: HOSPADM

## 2024-06-04 RX ORDER — DEXMEDETOMIDINE HYDROCHLORIDE 4 UG/ML
INJECTION, SOLUTION INTRAVENOUS AS NEEDED
Status: DISCONTINUED | OUTPATIENT
Start: 2024-06-04 | End: 2024-06-04 | Stop reason: SURG

## 2024-06-04 RX ORDER — DIPHENHYDRAMINE HYDROCHLORIDE 50 MG/ML
12.5 INJECTION INTRAMUSCULAR; INTRAVENOUS
Status: DISCONTINUED | OUTPATIENT
Start: 2024-06-04 | End: 2024-06-04 | Stop reason: HOSPADM

## 2024-06-04 RX ORDER — SUCCINYLCHOLINE/SOD CL,ISO/PF 200MG/10ML
SYRINGE (ML) INTRAVENOUS AS NEEDED
Status: DISCONTINUED | OUTPATIENT
Start: 2024-06-04 | End: 2024-06-04 | Stop reason: SURG

## 2024-06-04 RX ORDER — PROMETHAZINE HYDROCHLORIDE 25 MG/1
25 TABLET ORAL ONCE AS NEEDED
Status: DISCONTINUED | OUTPATIENT
Start: 2024-06-04 | End: 2024-06-04 | Stop reason: HOSPADM

## 2024-06-04 RX ORDER — HYDRALAZINE HYDROCHLORIDE 20 MG/ML
5 INJECTION INTRAMUSCULAR; INTRAVENOUS
Status: DISCONTINUED | OUTPATIENT
Start: 2024-06-04 | End: 2024-06-04 | Stop reason: HOSPADM

## 2024-06-04 RX ORDER — HYDROCODONE BITARTRATE AND ACETAMINOPHEN 5; 325 MG/1; MG/1
1 TABLET ORAL EVERY 6 HOURS PRN
Qty: 6 TABLET | Refills: 0 | Status: SHIPPED | OUTPATIENT
Start: 2024-06-04 | End: 2024-06-06 | Stop reason: SDUPTHER

## 2024-06-04 RX ORDER — LIDOCAINE HYDROCHLORIDE 10 MG/ML
0.5 INJECTION, SOLUTION INFILTRATION; PERINEURAL ONCE AS NEEDED
Status: DISCONTINUED | OUTPATIENT
Start: 2024-06-04 | End: 2024-06-04 | Stop reason: HOSPADM

## 2024-06-04 RX ORDER — IPRATROPIUM BROMIDE AND ALBUTEROL SULFATE 2.5; .5 MG/3ML; MG/3ML
3 SOLUTION RESPIRATORY (INHALATION) ONCE AS NEEDED
Status: DISCONTINUED | OUTPATIENT
Start: 2024-06-04 | End: 2024-06-04 | Stop reason: HOSPADM

## 2024-06-04 RX ORDER — PROMETHAZINE HYDROCHLORIDE 25 MG/1
25 SUPPOSITORY RECTAL ONCE AS NEEDED
Status: DISCONTINUED | OUTPATIENT
Start: 2024-06-04 | End: 2024-06-04 | Stop reason: HOSPADM

## 2024-06-04 RX ADMIN — CEFAZOLIN 2000 MG: 2 INJECTION, POWDER, FOR SOLUTION INTRAVENOUS at 13:53

## 2024-06-04 RX ADMIN — HYDROMORPHONE HYDROCHLORIDE 0.5 MG: 1 INJECTION, SOLUTION INTRAMUSCULAR; INTRAVENOUS; SUBCUTANEOUS at 16:13

## 2024-06-04 RX ADMIN — Medication 120 MG: at 14:05

## 2024-06-04 RX ADMIN — FENTANYL CITRATE 50 MCG: 50 INJECTION, SOLUTION INTRAMUSCULAR; INTRAVENOUS at 15:47

## 2024-06-04 RX ADMIN — SODIUM CHLORIDE, POTASSIUM CHLORIDE, SODIUM LACTATE AND CALCIUM CHLORIDE 9 ML/HR: 600; 310; 30; 20 INJECTION, SOLUTION INTRAVENOUS at 13:54

## 2024-06-04 RX ADMIN — PROPOFOL 150 MG: 10 INJECTION, EMULSION INTRAVENOUS at 14:05

## 2024-06-04 RX ADMIN — LIDOCAINE HYDROCHLORIDE 60 MG: 20 INJECTION, SOLUTION INFILTRATION; PERINEURAL at 14:05

## 2024-06-04 RX ADMIN — HYDROMORPHONE HYDROCHLORIDE 0.5 MG: 1 INJECTION, SOLUTION INTRAMUSCULAR; INTRAVENOUS; SUBCUTANEOUS at 15:26

## 2024-06-04 RX ADMIN — EPHEDRINE SULFATE 10 MG: 50 INJECTION INTRAVENOUS at 14:41

## 2024-06-04 RX ADMIN — FENTANYL CITRATE 50 MCG: 50 INJECTION, SOLUTION INTRAMUSCULAR; INTRAVENOUS at 14:05

## 2024-06-04 RX ADMIN — EPHEDRINE SULFATE 10 MG: 50 INJECTION INTRAVENOUS at 14:26

## 2024-06-04 RX ADMIN — DEXMEDETOMIDINE HYDROCHLORIDE 12 MCG: 4 INJECTION, SOLUTION INTRAVENOUS at 14:15

## 2024-06-04 RX ADMIN — PHENYLEPHRINE HYDROCHLORIDE 100 MCG: 10 INJECTION INTRAVENOUS at 14:41

## 2024-06-04 RX ADMIN — PHENYLEPHRINE HYDROCHLORIDE 100 MCG: 10 INJECTION INTRAVENOUS at 14:30

## 2024-06-04 RX ADMIN — HYDROCODONE BITARTRATE AND ACETAMINOPHEN 1 TABLET: 5; 325 TABLET ORAL at 16:13

## 2024-06-04 RX ADMIN — IPRATROPIUM BROMIDE AND ALBUTEROL SULFATE 3 ML: 2.5; .5 SOLUTION RESPIRATORY (INHALATION) at 10:52

## 2024-06-04 NOTE — INTERVAL H&P NOTE
H&P reviewed. The patient was examined and there are no changes to the H&P.      Continues to have right worse than left SI joint inflammation.  We discussed the risk and benefits and alternatives of surgery including infection, injury to the sacral nerves, nonunion.  She understands and is willing to proceed with surgery.    Alex Ortiz MD  06/04/24  10:43 EDT

## 2024-06-04 NOTE — ANESTHESIA PREPROCEDURE EVALUATION
Anesthesia Evaluation     Patient summary reviewed and Nursing notes reviewed   NPO Solid Status: > 8 hours  NPO Liquid Status: > 2 hours           Airway   Mallampati: II  TM distance: >3 FB  Neck ROM: full  Dental      Comment: Bridge - top    Pulmonary    (+) a smoker Current, COPD, asthma,  Cardiovascular     ECG reviewed    (+) hypertension, hyperlipidemia    ROS comment: Normal stress test 5/2024, Normal EF    Neuro/Psych  (+) headaches, numbness, psychiatric history Anxiety and Depression  GI/Hepatic/Renal/Endo    (+) hiatal hernia, GERD well controlled, diabetes mellitus type 2    Musculoskeletal         ROS comment: S/p ACDF  Abdominal    Substance History      OB/GYN negative ob/gyn ROS         Other   arthritis,                   Anesthesia Plan    ASA 3     general     (CMAC d blade used previously)  intravenous induction     Anesthetic plan, risks, benefits, and alternatives have been provided, discussed and informed consent has been obtained with: patient.      CODE STATUS:

## 2024-06-04 NOTE — OP NOTE
Sacroiliac joint fusion procedure note    Kristina Yates  6/4/2024  [id number]    SURGEON  Alex Ortiz MD    Assistant:  Mely Nieves CSA was present throughout the entire surgery to provide intraoperative suction, retraction, suturing, and irrigation to promote visualization by the operative surgeon and assist with opening and closure.  The skilled assistance was necessary for the success of this case.  Our practice does not have a relationship with neurosurgical residents.      Indication: Kristina Yates is a 63 y.o. female who presents with Right-sided SI joint inflammation that is refractory to conservative measures including SI joint injections.  We discussed the risks and benefits and alternatives of surgery, he understands and is willing to proceed with surgery.    Pre-op Diagnosis:   SI joint inflammation on the Right    Post-op Diagnosis:     SI joint inflammation on the Right    Procedure Performed:  Procedure(s):  RIGHT PERCUTANEOUS ARTHRODESIS SACROILIAC JOINT, NEUROMONITORING    Percutaneous sacroiliac joint arthrodesis on the Right  Intraoperative neuro monitoring    Anesthesia: General    Staff:   Circulator: Liza Harris RN  Radiology Technologist: Cheyenne Novak, RRT  Scrub Person: Jacques Del Cid Yamungu  Vendor Representative: Kristina Minaya  Assistant: Mely Nieves CSA  Orientee: Kimberly Finn RN    Estimated Blood Loss: minimal    Specimens:                Order Name Source Comment Collection Info Order Time   TYPE AND SCREEN   Collected By: Cami Askew RN 6/4/2024 10:33 AM     Release to patient   Routine Release            Findings: None    Complications: None apparent    Narrative Description:     Positioning:  After operative consent, the patient was taken to the operating room in stable condition and placed under general endotracheal intubation. Once adequate anesthesia was administered the patient was placed in the prone position on a Buddy table.  After adequate prepping and draping, C arm was used to determine the alar line and the mid sacral line which were both marked on the skin under fluoroscopic guidance.  Neuromonitoring was used for sensory and EMG modalities.    Approach:  A 3 cm skin incision was made along the mid sacral line 1 cm posterior to the alar line.  Bovie cautery was used to dissect through the initial fat plane.  Then a radiolucent alonzo and fluoroscopic guidance were used to place a sharp probe in the superior anterior portion of the sacroiliac joint.  This positioning was confirmed using an outlet and inlet fluoroscopic view, 30 degrees angled respectively anterior and posterior from the AP x-ray.      SI joint arthrodesis:  Under the fluoroscopic working view, a mallet was used to gradually introduce the sharp probe through the SI joint just shy of the S1 foramen.  Then a tissue  device was used to clear off muscle and fascia from the surface of the ileum.  A measuring device was then used to measure the appropriate length of screw for placement.  A drill was then used to drill through the SI joint into the sacrum and then an iFuse Bethel was implanted followed by a TORQ implant followed by another triangle respectively anterior to posterior under fluoroscopic guidance.  The size of the implants were 40 mm, 35 mm, 40 mm respectively. the placement trajectories of the screws were then confirmed using an inlet and outlet x-ray view to ensure no damage to the neuroforamina and the wound was irrigated copiously prior to closure.    Closure:  The fascia was now closed with 0 Vicryl suture. The superficial subcutaneous tissues closed with 3-0 Vicryl suture and the skin with 3-0 monocryl suture in a running fashion. Dermabond was then applied to the surface of the incision. The patient arose from anesthesia in stable condition and was transported to postop recovery.    Alex Ortiz MD     Date: 6/4/2024  Time: 14:53 EDT

## 2024-06-04 NOTE — ANESTHESIA PROCEDURE NOTES
Airway  Urgency: elective    Date/Time: 6/4/2024 2:08 PM  Airway not difficult    General Information and Staff    Patient location during procedure: OR  Anesthesiologist: Francisco Lyon DO  CRNA/CAA: Marco A Strong CRNA    Indications and Patient Condition  Indications for airway management: airway protection    Preoxygenated: yes  MILS maintained throughout  Mask difficulty assessment: 1 - vent by mask    Final Airway Details  Final airway type: endotracheal airway      Successful airway: ETT  Cuffed: yes   Successful intubation technique: video laryngoscopy  Endotracheal tube insertion site: oral  Blade: CMAC  Blade size: D  ETT size (mm): 7.0  Cormack-Lehane Classification: grade I - full view of glottis  Placement verified by: chest auscultation and capnometry   Measured from: teeth  ETT/EBT  to teeth (cm): 22  Number of attempts at approach: 1  Assessment: lips, teeth, and gum same as pre-op and atraumatic intubation

## 2024-06-04 NOTE — DISCHARGE INSTRUCTIONS
[facility]  [address], [address]  [place] [address]  Phone:  [phone]  Fax:  [phone]    Dr. Alex Ortiz MD            Sacroiliac Fusion Surgery Post-Operative Instructions        Your incision is closed with skin glue. Do not attempt to remove the skin glue, it will gradually fall off by itself after a few days.    You may resume normal diet as you tolerate    Try to remain partial weightbearing for 2 weeks after surgery.  You can use crutches or a walker to help achieve this.  This will help accelerate the healing process.  Please see the brochure that was provided at the time of discharge for activity recommendations and physical therapy exercises that can be done while laying down.    Refrain from any sexual activity until after your first evaluation at the office.     Incisional pain after surgery may worsen 2 to 3 days following surgery.  It should smooth out after the third day.  Use over the counter Tylenol for pain control.    You may shower and pat the incision dry, but do not soak your wound in water such as a swimming pool, hot tub or lake.   Avoid direct sunlight to the wound for at least three (3) months.  You may apply ice to the surgical site for 15 to 20 minutes each hour while awake for the first few days following surgery.  Simply put the ice in a plastic bag in place a towel between the bag of ice and your skin.    You will leave the hospital with prescriptions for pain medicine and a muscle relaxer.  These medications must be taken as prescribed only.  A hospital policy prevents us from prescribing more than 6 days worth of pain medications, so please call into the office for refills if needed.  Please allow for 72 hours to refill the pain medication and note that it may not be possible to refill pain medications over the weekend, so please contact our office prior to the weekend. After your first week post surgery, If tolerable, please wean yourself from the  narcotic pain medications slowly but it is important that you do not stop taking the narcotics all at once.    A small amount of bleeding from the incision during the first few days is not unusual.      You should have a post-operative visit approximately 3 weeks after surgery.  If you do not have a scheduled appointment, call the office at [phone] to schedule one.  We will discuss return to work at your first post-operative visit, but you can expect to be off of work for an average of 6 weeks.     Notify the office if there are any problems, such as:    Excessive back or leg pain   If you lose control of urine or bowel movements  Persistent temperature of 101.5° F (38.6° C) or greater that is not relieved by Tylenol.  Excessive bleeding, redness, heat, leakage of fluid, swelling or pus around the incision   If you develop difficulty breathing, have chest pain or shortness of breath, call 911.  If you develop swelling in the calf or leg  Your pain is not controlled with medication  You seem to be getting worse rather than better    Thank you.

## 2024-06-05 NOTE — ANESTHESIA POSTPROCEDURE EVALUATION
Patient: Kristina ROSADO Iler    Procedure Summary       Date: 06/04/24 [place] / [place] [facility] [place] [address] [place] [address] MAIN OR    Anesthesia Start: 1359 Anesthesia Stop: 1512    Procedure: RIGHT PERCUTANEOUS ARTHRODESIS SACROILIAC JOINT, NEUROMONITORING (Right: Spine Lumbar) Diagnosis:       SI (sacroiliac) joint inflammation      (SI (sacroiliac) joint inflammation [M46.1])    Surgeons: Alex Ortiz MD Provider: Heidi De Leon MD    Anesthesia Type: general ASA Status: 3            Anesthesia Type: general    Vitals  Vitals Value Taken Time   /66 06/04/24 1645   Temp 36.6 °C (97.8 °F) 06/04/24 1510   Pulse 80 06/04/24 1656   Resp 16 06/04/24 1645   SpO2 91 % 06/04/24 1656   Vitals shown include unfiled device data.        Post Anesthesia Care and Evaluation    Patient location: did not personally evaluate patient.    Comments: Discharge criteria met per RN

## 2024-06-06 DIAGNOSIS — J06.9 UPPER RESPIRATORY TRACT INFECTION, UNSPECIFIED TYPE: ICD-10-CM

## 2024-06-06 DIAGNOSIS — R06.2 WHEEZING: ICD-10-CM

## 2024-06-06 DIAGNOSIS — M46.1 SI (SACROILIAC) JOINT INFLAMMATION: ICD-10-CM

## 2024-06-06 RX ORDER — ALBUTEROL SULFATE 90 UG/1
1 AEROSOL, METERED RESPIRATORY (INHALATION) AS NEEDED
Qty: 6.7 G | Refills: 3 | Status: SHIPPED | OUTPATIENT
Start: 2024-06-06

## 2024-06-06 RX ORDER — HYDROCODONE BITARTRATE AND ACETAMINOPHEN 5; 325 MG/1; MG/1
1 TABLET ORAL EVERY 6 HOURS PRN
Qty: 6 TABLET | Refills: 0 | Status: SHIPPED | OUTPATIENT
Start: 2024-06-06

## 2024-06-06 NOTE — TELEPHONE ENCOUNTER
Pt  was provided 6 HYDROCODONE AFTER SURGERY and only has one left.  Pt had surgery ON 06/04/24       Any refills need to go through RX request from their pharmacy(by the patient calling their pharmacy and requesting the refill), unless they are calling to change dosage or change pharmacy.     Confirm pharmacy.[facility]    [phone]      Which prescription needs to be filled?  Hydrocodone 5/325 mg      How many days' worth does have the patient currently have left?   1 left    PT PHONE NUMBER: [phone]

## 2024-06-06 NOTE — TELEPHONE ENCOUNTER
PATIENT CALLED FOR MEDICATION REFILL OF     albuterol sulfate  (90 Base) MCG/ACT inhaler     SHE HAS ABOUT 2 PUFFS LEFT    [facility] [id number] - [place] [address] [address] AT [facility] [address] - [phone]  - [phone]  [phone]     CALL BACK NUMBER [phone]

## 2024-06-13 ENCOUNTER — OFFICE VISIT (OUTPATIENT)
Dept: FAMILY MEDICINE CLINIC | Facility: CLINIC | Age: 64
End: 2024-06-13
Payer: COMMERCIAL

## 2024-06-13 VITALS
OXYGEN SATURATION: 99 % | HEIGHT: 67 IN | BODY MASS INDEX: 25.43 KG/M2 | RESPIRATION RATE: 18 BRPM | DIASTOLIC BLOOD PRESSURE: 88 MMHG | WEIGHT: 162 LBS | SYSTOLIC BLOOD PRESSURE: 118 MMHG | HEART RATE: 52 BPM

## 2024-06-13 DIAGNOSIS — F32.A ANXIETY AND DEPRESSION: ICD-10-CM

## 2024-06-13 DIAGNOSIS — F41.9 ANXIETY AND DEPRESSION: ICD-10-CM

## 2024-06-13 DIAGNOSIS — M53.3 CHRONIC RIGHT SI JOINT PAIN: Primary | ICD-10-CM

## 2024-06-13 DIAGNOSIS — G89.29 CHRONIC RIGHT SI JOINT PAIN: Primary | ICD-10-CM

## 2024-06-13 DIAGNOSIS — E11.9 TYPE 2 DIABETES MELLITUS WITHOUT COMPLICATION, WITHOUT LONG-TERM CURRENT USE OF INSULIN: ICD-10-CM

## 2024-06-13 DIAGNOSIS — F43.21 GRIEF: ICD-10-CM

## 2024-06-13 PROCEDURE — 99214 OFFICE O/P EST MOD 30 MIN: CPT | Performed by: NURSE PRACTITIONER

## 2024-06-13 RX ORDER — DULOXETIN HYDROCHLORIDE 30 MG/1
30 CAPSULE, DELAYED RELEASE ORAL DAILY
Qty: 30 CAPSULE | Refills: 5 | Status: SHIPPED | OUTPATIENT
Start: 2024-06-13

## 2024-06-13 NOTE — PROGRESS NOTES
"Transitional Care Follow Up Visit  Subjective     Kristina Yates is a 63 y.o. female who presents for a transitional care management visit.    Within 48 business hours after discharge our office contacted her via telephone to coordinate her care and needs.      I reviewed and discussed the details of that call along with the discharge summary, hospital problems, inpatient lab results, inpatient diagnostic studies, and consultation reports with Kristina.     Current outpatient and discharge medications have been reconciled for the patient.  Reviewed by: DAYNE Garcias           No data to display              Risk for Readmission (LACE) No data recorded    History of Present Illness   Course During Hospital Stay:  Neurosurgery 6/4/24 Dr Ortiz Right Si joint fusion. She is following up 6/19 w neurosurg. She is recovering well. She is doing daily PT exercises. Incision is pink, no heat, no dng, some itching. No fever, no chills.     Anxiety/depression and grief since death of nephew at the end of February. She is on wellbutrin 150 and duloxetine 60. She is very tearful and having a difficult time. She is in therapy and is part of 12 step program. She is asking for increase in duloxetine 90 mg. Denies SI/HI.     Dm2 x years, managed well with diet changes and metformin 500 mg qd.  A1c 6. Renal fxn is stable     The following portions of the patient's history were reviewed and updated as appropriate: allergies, current medications, past family history, past medical history, past social history, past surgical history, and problem list.    Review of Systems    Objective   /88   Pulse 52   Resp 18   Ht 170.2 cm (67\")   Wt 73.5 kg (162 lb)   SpO2 99%   BMI 25.37 kg/m²   Physical Exam  Vitals reviewed.   Constitutional:       Appearance: Normal appearance.   HENT:      Mouth/Throat:      Mouth: Mucous membranes are moist.   Cardiovascular:      Rate and Rhythm: Normal rate and regular rhythm.      Pulses: Normal " pulses.      Heart sounds: Normal heart sounds.   Pulmonary:      Effort: Pulmonary effort is normal.      Breath sounds: Normal breath sounds.   Abdominal:      General: Bowel sounds are normal.   Musculoskeletal:         General: Tenderness present. Normal range of motion.   Skin:     General: Skin is warm.             Comments: Surgical wound healing, mild erythema, no heat, no dng    Neurological:      General: No focal deficit present.      Mental Status: She is alert.   Psychiatric:         Mood and Affect: Mood is anxious and depressed. Affect is tearful.         Assessment & Plan   Diagnoses and all orders for this visit:    1. Chronic right SI joint pain (Primary)    2. Grief    3. Anxiety and depression    4. Type 2 diabetes mellitus without complication, without long-term current use of insulin    Other orders  -     DULoxetine (CYMBALTA) 30 MG capsule; Take 1 capsule by mouth Daily. Add to 60 mg= 90 mg total daily  Dispense: 30 capsule; Refill: 5      SI joint- follow w neurosurg, cw PT exercises, inspect incision daily, call if redness, heat, dng  Follow up w neurosurg next week     Grief/anxiety- increase duloxetine to 90 mg qd, aware of side effect profiles, cw therapy    Dm2- cw metformin and ADA diet

## 2024-06-17 NOTE — PROGRESS NOTES
Patient ID: Kristina Yates is a 63 y.o. female is an established patient with SI joint inflammation who has previously undergone right percutaneous arthrodesis sacroiliac joint on 06/04/2024.    Subjective:     History of Present Illness  Kristina has some postoperative pain and soreness in her left SI joint after surgery.  She still continues to have some SI joint inflammation after surgery.      Review of Systems   Constitutional:  Positive for appetite change and fatigue. Negative for fever.   Musculoskeletal:  Positive for gait problem.   Neurological:  Positive for dizziness, weakness and light-headedness.   All other systems reviewed and are negative.       While in the room and during my examination of the patient I wore a mask and eye protection.  I washed my hands before and after this patient encounter.  The patient was also wearing a mask.    The following portions of the patient's history were reviewed and updated as appropriate: allergies, current medications, past family history, past medical history, past social history, past surgical history and problem list.     Objective:    Vitals:    06/19/24 1050   BP: 124/70   Resp: 20     Body mass index is 25.37 kg/m².    Physical Exam  Constitutional:       Appearance: Normal appearance.   HENT:      Head: Normocephalic and atraumatic.   Eyes:      Extraocular Movements: Extraocular movements intact.      Conjunctiva/sclera: Conjunctivae normal.      Pupils: Pupils are equal, round, and reactive to light.   Cardiovascular:      Rate and Rhythm: Normal rate and regular rhythm.      Pulses: Normal pulses.   Pulmonary:      Breath sounds: Normal breath sounds.   Abdominal:      Palpations: Abdomen is soft.   Musculoskeletal:         General: Normal range of motion.      Cervical back: Normal range of motion and neck supple.      Comments: Bilateral SI joint inflammation characterized by:    -Pelvic Distraction test   -Lateral Iliac compression test   -FABERS  (Kam's test)   -Ganslen's Test     Skin:     General: Skin is warm and dry.   Neurological:      Mental Status: She is alert and oriented to person, place, and time.      Cranial Nerves: Cranial nerves 2-12 are intact.      Motor: Motor function is intact. No weakness or atrophy.      Coordination: Coordination is intact. Romberg sign negative. Romberg Test normal.      Gait: Gait is intact. Gait normal.      Deep Tendon Reflexes: Reflexes are normal and symmetric.      Reflex Scores:       Tricep reflexes are 2+ on the right side and 2+ on the left side.       Bicep reflexes are 2+ on the right side and 2+ on the left side.       Brachioradialis reflexes are 2+ on the right side and 2+ on the left side.       Patellar reflexes are 2+ on the right side and 2+ on the left side.       Achilles reflexes are 2+ on the right side and 2+ on the left side.  Psychiatric:         Speech: Speech normal.       Neurologic Exam     Mental Status   Oriented to person, place, and time.   Attention: normal. Concentration: normal.   Speech: speech is normal   Level of consciousness: alert    Cranial Nerves   Cranial nerves II through XII intact.     CN III, IV, VI   Pupils are equal, round, and reactive to light.    Motor Exam   Muscle bulk: normal  Overall muscle tone: normal    Strength   Strength 5/5 except as noted.     Sensory Exam   Light touch normal.     Gait, Coordination, and Reflexes     Gait  Gait: normal    Coordination   Romberg: negative    Reflexes   Reflexes 2+ except as noted.   Right brachioradialis: 2+  Left brachioradialis: 2+  Right biceps: 2+  Left biceps: 2+  Right triceps: 2+  Left triceps: 2+  Right patellar: 2+  Left patellar: 2+  Right achilles: 2+  Left achilles: 2+       Results: No new neuroimaging    Assessment/Plan: He is recovering as expected after surgery.  Incisional pain is controlled.  Still continues to have some SI joint pain which is to be expected.  I think that may gradually improve  over time.  She is cleared for normal activity and I like to see her back in 3 months.     Diagnoses and all orders for this visit:    1. SI (sacroiliac) joint inflammation (Primary)    2. Benign paroxysmal positional vertigo, unspecified laterality  -     Ambulatory Referral to ENT (Otolaryngology)                Alex Ortiz MD  06/19/24  11:06 EDT            Answers submitted by the patient for this visit:  Other (Submitted on 6/17/2024)  Please describe your symptoms.: Follow up from outpatient surgery  Have you had these symptoms before?: Yes  How long have you been having these symptoms?: 1-2 weeks  Please list any medications you are currently taking for this condition.: Tylenol Aleve Ice  Please describe any probable cause for these symptoms. : Healing from outpatient surgery  Primary Reason for Visit (Submitted on 6/17/2024)  What is the primary reason for your visit?: Other

## 2024-06-18 ENCOUNTER — TELEPHONE (OUTPATIENT)
Dept: NEUROSURGERY | Facility: CLINIC | Age: 64
End: 2024-06-18

## 2024-06-18 ENCOUNTER — TELEPHONE (OUTPATIENT)
Dept: NEUROSURGERY | Facility: CLINIC | Age: 64
End: 2024-06-18
Payer: COMMERCIAL

## 2024-06-18 NOTE — TELEPHONE ENCOUNTER
Pt started feeling as of yesterday morning lightheaded and nausea. She stated she fell twice yesterday and wants to know what pain medicine she can take today to help with pain.     Please call and advise   Thanks

## 2024-06-19 ENCOUNTER — OFFICE VISIT (OUTPATIENT)
Dept: NEUROSURGERY | Facility: CLINIC | Age: 64
End: 2024-06-19
Payer: COMMERCIAL

## 2024-06-19 VITALS
DIASTOLIC BLOOD PRESSURE: 70 MMHG | SYSTOLIC BLOOD PRESSURE: 124 MMHG | WEIGHT: 162 LBS | BODY MASS INDEX: 25.43 KG/M2 | HEIGHT: 67 IN | RESPIRATION RATE: 20 BRPM

## 2024-06-19 DIAGNOSIS — H81.10 BENIGN PAROXYSMAL POSITIONAL VERTIGO, UNSPECIFIED LATERALITY: ICD-10-CM

## 2024-06-19 DIAGNOSIS — M46.1 SI (SACROILIAC) JOINT INFLAMMATION: Primary | ICD-10-CM

## 2024-06-19 PROCEDURE — 99024 POSTOP FOLLOW-UP VISIT: CPT | Performed by: NEUROLOGICAL SURGERY

## 2024-06-27 RX ORDER — FLUTICASONE PROPIONATE 50 MCG
2 SPRAY, SUSPENSION (ML) NASAL DAILY
Qty: 18.2 ML | Refills: 5 | Status: SHIPPED | OUTPATIENT
Start: 2024-06-27

## 2024-07-01 DIAGNOSIS — E11.42 DIABETIC PERIPHERAL NEUROPATHY: ICD-10-CM

## 2024-07-01 DIAGNOSIS — E11.9 CONTROLLED TYPE 2 DIABETES MELLITUS WITHOUT COMPLICATION, WITHOUT LONG-TERM CURRENT USE OF INSULIN: ICD-10-CM

## 2024-07-01 DIAGNOSIS — E78.5 HYPERLIPIDEMIA, UNSPECIFIED HYPERLIPIDEMIA TYPE: ICD-10-CM

## 2024-07-01 RX ORDER — DULOXETIN HYDROCHLORIDE 60 MG/1
60 CAPSULE, DELAYED RELEASE ORAL DAILY
Qty: 90 CAPSULE | Refills: 0 | Status: SHIPPED | OUTPATIENT
Start: 2024-07-01

## 2024-07-02 ENCOUNTER — TELEPHONE (OUTPATIENT)
Dept: NEUROSURGERY | Facility: CLINIC | Age: 64
End: 2024-07-02
Payer: COMMERCIAL

## 2024-07-02 DIAGNOSIS — M46.1 SI (SACROILIAC) JOINT INFLAMMATION: Primary | ICD-10-CM

## 2024-07-02 DIAGNOSIS — S32.009K LUMBAR PSEUDOARTHROSIS: ICD-10-CM

## 2024-07-02 NOTE — TELEPHONE ENCOUNTER
Patient called office. She stated that she is following the exercises given to her after surgery but she is having hip pain while doing those exercises. Patient wants to make sure she is recovering correctly. She would like to know if the pain in her hips is normal or if she is doing the exercise incorrect? She wants to know if she can have a referral to PT to follow a physical therapist to make sure she is recovering correctly. Please advise and a referral for PT.

## 2024-07-05 NOTE — TELEPHONE ENCOUNTER
Called patient. Sent orders to [facility] per patient's request.

## 2024-07-26 ENCOUNTER — TELEPHONE (OUTPATIENT)
Dept: NEUROSURGERY | Facility: CLINIC | Age: 64
End: 2024-07-26
Payer: COMMERCIAL

## 2024-07-26 DIAGNOSIS — M54.12 CERVICAL RADICULOPATHY: Primary | ICD-10-CM

## 2024-07-26 NOTE — TELEPHONE ENCOUNTER
PATIENT: NUVIA SHERIFF    PROVIDER: DR. CRAIG    REASON FOR CALL:    PATIENT HAD PREVIOUSLY BEEN SEEN BY DR. EDMONDSON FOR HER NECK - DEBO COLE PERFORMED SX ON HER NECK IN 2003 - SHE STATES THAT HER NECK HAS BEEN IN TERRIBLE PAIN OVER THE PAST 3 WEEKS. SHE HAS DONE ACUPUNCTURE AND MASSAGES FOR THE PAST 3 WEEKS AND NOTHING IS HELPING HER. SINCE DR. CRAIG HAS NEVER SEEN HER FOR THIS, BUT SHE HAS BEEN SEEN BY US FOR THIS DX, DO WE NEED A NEW REFERRAL OR CAN WE SCHEDULE A F/U TO DISCUSS HER NECK? SHE HAD AND MRI OF HER CERVICAL SPINE 04/14/24, WHICH IS IN HER CHART. PLEASE CALL THE PATIENT BACK TO [facility]VISE, THANKS.     CALL BACK # [phone]    CALL BACK ASAP.

## 2024-07-26 NOTE — TELEPHONE ENCOUNTER
Know that MRI should be fine.  Please order a cervical flexion-extension x-ray that she can obtain prior to her visit.

## 2024-07-29 NOTE — PROGRESS NOTES
Patient ID: Kristina Yates is a 64 y.o. female is here today for follow-up for neck pain.    Imaging: MRI of the cervical spine performed on 04/14/2024 and XR of there cervical spine performed on 07/30/2024    Subjective     The patient is here in regards to   Chief Complaint   Patient presents with    Neck Pain    Follow-up       History of Present Illness  Kristina is doing really well with her SI joint fusion and is walking a lot but in the past few weeks has developed severe cervicalgia towards the left side that radiates down her arm into her left ring finger.  This has been refractory to massage and acupuncture which typically helps her neck pain.  Has become severe.  She has had a previous cervical fusion as C5-6, C6-7      While in the room and during my examination of the patient I wore a mask and eye protection.  I washed my hands before and after this patient encounter.  The patient was also wearing a mask.    The following portions of the patient's history were reviewed and updated as appropriate: allergies, current medications, past family history, past medical history, past social history, past surgical history and problem list.    Review of Systems   Eyes:  Positive for visual disturbance.   Gastrointestinal:  Positive for nausea. Negative for vomiting.   Musculoskeletal:  Positive for neck pain and neck stiffness. Negative for back pain.   Neurological:  Positive for weakness, light-headedness, numbness and headaches. Negative for dizziness.   Psychiatric/Behavioral:  Positive for decreased concentration. Negative for confusion.         Past Medical History:   Diagnosis Date    Alcohol abuse     in recovery, sober since 2012    Anxiety     Arthritis     Asthma     Edmonds esophagus     Cervical neuritis     Chronic pain     COPD (chronic obstructive pulmonary disease)     Depression     Diabetes mellitus     Type 2    DJD (degenerative joint disease), cervical     Foot spasms     GERD (gastroesophageal  reflux disease)     Headache     Hiatal hernia     Hyperlipidemia     Hypertension     Low back pain     Seasonal allergies     Spinal headache     Spinal stenosis        Allergies   Allergen Reactions    Fluoxetine Hives     Hives    Sulfa Antibiotics Hives     THROAT CLOSES    Zofran [Ondansetron Hcl] Hives     THROAT CLOSES    Lisinopril Cough       Family History   Problem Relation Age of Onset    Cancer Mother         nonhodkinds lymphoma    Heart disease Father     Drug abuse Sister     Heart disease Brother     Lupus Maternal Aunt     Malig Hyperthermia Neg Hx        Social History     Socioeconomic History    Marital status:     Number of children: 2   Tobacco Use    Smoking status: Every Day     Current packs/day: 0.25     Average packs/day: 0.5 packs/day for 16.6 years (7.9 ttl pk-yrs)     Types: Cigarettes     Start date: 2/25/2006     Last attempt to quit: 2/25/2021    Smokeless tobacco: Never   Vaping Use    Vaping status: Never Used   Substance and Sexual Activity    Alcohol use: No     Comment: sober since 2012, worsk 12 steps    Drug use: No    Sexual activity: Yes     Partners: Male       Past Surgical History:   Procedure Laterality Date    ANTERIOR CERVICAL DISCECTOMY W/ FUSION  11/2003    C5-6, C6-7    CERCLAGE CERVIX      CERVICAL EPIDURAL  2007    CERVICAL FUSION      DILATATION AND CURETTAGE      ENDOMETRIAL ABLATION  2007    ENDOSCOPY      MULTIPLE    ENDOSCOPY N/A 07/05/2016    Procedure: ESOPHAGOGASTRODUODENOSCOPY WITH COLD BIOPSIES;  Surgeon: Jose Mcclellan MD;  Location: [facility] ENDOSCOPY;  Service:     ESOPHAGEAL DILATATION  2009    HAND SURGERY Bilateral     CARTILAGE    LUMBAR FUSION      LUMBAR LAMINECTOMY WITH FUSION N/A 06/30/2023    Procedure: OPEN LUMBAR FOUR TO FIVE TRANSFORAMINAL LUMBAR INTERBODY FUSION;  Surgeon: Alex Ortiz MD;  Location: [facility] MAIN OR;  Service: Neurosurgery;  Laterality: N/A;    NECK SURGERY      NISSEN FUNDOPLICATION LAPAROSCOPIC  03/2012     SACROILIAC JOINT FUSION Right 6/4/2024    Procedure: RIGHT PERCUTANEOUS ARTHRODESIS SACROILIAC JOINT, NEUROMONITORING;  Surgeon: Alex Ortiz MD;  Location: [facility];  Service: Neurosurgery;  Laterality: Right;    TONSILLECTOMY           Objective     Vitals:    07/30/24 0921   BP: 154/82   Pulse: 110   Resp: 16   Temp: 97.7 °F (36.5 °C)   SpO2: 97%     Body mass index is 25.8 kg/m².    Physical Exam  Constitutional:       Appearance: Normal appearance.   HENT:      Head: Normocephalic and atraumatic.   Eyes:      Extraocular Movements: Extraocular movements intact.      Conjunctiva/sclera: Conjunctivae normal.      Pupils: Pupils are equal, round, and reactive to light.   Neck:     Cardiovascular:      Rate and Rhythm: Normal rate and regular rhythm.      Pulses: Normal pulses.   Pulmonary:      Breath sounds: Normal breath sounds.   Abdominal:      Palpations: Abdomen is soft.   Musculoskeletal:         General: Normal range of motion.      Cervical back: Normal range of motion and neck supple.   Skin:     General: Skin is warm and dry.   Neurological:      Mental Status: She is alert and oriented to person, place, and time.      Cranial Nerves: Cranial nerves 2-12 are intact.      Motor: Motor function is intact. No weakness or atrophy.      Coordination: Coordination is intact. Romberg sign negative. Romberg Test normal.      Gait: Gait is intact. Gait normal.      Deep Tendon Reflexes: Reflexes are normal and symmetric.      Reflex Scores:       Tricep reflexes are 2+ on the right side and 2+ on the left side.       Bicep reflexes are 2+ on the right side and 2+ on the left side.       Brachioradialis reflexes are 2+ on the right side and 2+ on the left side.       Patellar reflexes are 2+ on the right side and 2+ on the left side.       Achilles reflexes are 2+ on the right side and 2+ on the left side.  Psychiatric:         Speech: Speech normal.         Neurologic Exam     Mental Status   Oriented to  person, place, and time.   Attention: normal. Concentration: normal.   Speech: speech is normal   Level of consciousness: alert    Cranial Nerves   Cranial nerves II through XII intact.     CN III, IV, VI   Pupils are equal, round, and reactive to light.    Motor Exam   Muscle bulk: normal  Overall muscle tone: normal    Strength   Strength 5/5 except as noted.     Sensory Exam   Light touch normal.     Gait, Coordination, and Reflexes     Gait  Gait: normal    Coordination   Romberg: negative    Reflexes   Reflexes 2+ except as noted.   Right brachioradialis: 2+  Left brachioradialis: 2+  Right biceps: 2+  Left biceps: 2+  Right triceps: 2+  Left triceps: 2+  Right patellar: 2+  Left patellar: 2+  Right achilles: 2+  Left achilles: 2+      Assessment & Plan   Independent Review of Radiographic Studies:      I personally reviewed the images from the following studies.    XR: XR of the cervical spine was reviewed and shows no subluxation with dynamic movement.  Stable spondylolisthesis at C3-4, C4-5    Assessment/Plan: Has spondylolisthesis and adjacent segment disease at C4-5 with some involvement of C3-4 as well.  She also has loss of normal cervical lordosis on her most recent MRI this year in April.  I do think that she will respond well to a cervical epidural steroid injection with physical therapy.  If not we may need to consider a C4-5 ACDF and removal of her previous hardware.    Medical Decision Making:      Epidural steroid injection  Physical therapy         Diagnoses and all orders for this visit:    1. Cervical radiculopathy (Primary)  -     Epidural Block  -     Ambulatory Referral to Physical Therapy    2. Cervicalgia  -     Epidural Block  -     Ambulatory Referral to Physical Therapy    Other orders  -     cyclobenzaprine (FLEXERIL) 10 MG tablet; Take 1 tablet by mouth 3 (Three) Times a Day As Needed for Muscle Spasms.  Dispense: 90 tablet; Refill: 2             Patient  Instructions/Recommendations:    Follow-up as previously scheduled in September      Alex Ortiz MD  07/30/24  09:49 EDT

## 2024-07-30 ENCOUNTER — OFFICE VISIT (OUTPATIENT)
Dept: NEUROSURGERY | Facility: CLINIC | Age: 64
End: 2024-07-30
Payer: COMMERCIAL

## 2024-07-30 VITALS
TEMPERATURE: 97.7 F | WEIGHT: 164.7 LBS | DIASTOLIC BLOOD PRESSURE: 82 MMHG | RESPIRATION RATE: 16 BRPM | HEIGHT: 67 IN | OXYGEN SATURATION: 97 % | HEART RATE: 110 BPM | BODY MASS INDEX: 25.85 KG/M2 | SYSTOLIC BLOOD PRESSURE: 154 MMHG

## 2024-07-30 DIAGNOSIS — M54.12 CERVICAL RADICULOPATHY: Primary | ICD-10-CM

## 2024-07-30 DIAGNOSIS — M54.2 CERVICALGIA: ICD-10-CM

## 2024-07-30 RX ORDER — CYCLOBENZAPRINE HCL 10 MG
10 TABLET ORAL 3 TIMES DAILY PRN
Qty: 90 TABLET | Refills: 2 | Status: SHIPPED | OUTPATIENT
Start: 2024-07-30

## 2024-08-14 ENCOUNTER — HOSPITAL ENCOUNTER (OUTPATIENT)
Facility: HOSPITAL | Age: 64
Setting detail: OBSERVATION
LOS: 1 days | Discharge: HOME | End: 2024-08-17
Attending: EMERGENCY MEDICINE | Admitting: INTERNAL MEDICINE
Payer: COMMERCIAL

## 2024-08-14 ENCOUNTER — TELEPHONE (OUTPATIENT)
Dept: NEUROSURGERY | Facility: CLINIC | Age: 64
End: 2024-08-14
Payer: COMMERCIAL

## 2024-08-14 DIAGNOSIS — M54.2 CERVICALGIA: ICD-10-CM

## 2024-08-14 DIAGNOSIS — M54.12 ACUTE CERVICAL RADICULOPATHY: Primary | ICD-10-CM

## 2024-08-14 DIAGNOSIS — M54.2 ACUTE NECK PAIN: ICD-10-CM

## 2024-08-14 DIAGNOSIS — R52 INTRACTABLE PAIN: ICD-10-CM

## 2024-08-14 LAB
ALBUMIN SERPL-MCNC: 4.6 G/DL (ref 3.5–5.2)
ALBUMIN/GLOB SERPL: 1.7 G/DL
ALP SERPL-CCNC: 95 U/L (ref 39–117)
ALT SERPL W P-5'-P-CCNC: 8 U/L (ref 1–33)
ANION GAP SERPL CALCULATED.3IONS-SCNC: 11 MMOL/L (ref 5–15)
APTT PPP: 49.7 SECONDS (ref 22.7–35.4)
AST SERPL-CCNC: 15 U/L (ref 1–32)
BASOPHILS # BLD AUTO: 0.04 10*3/MM3 (ref 0–0.2)
BASOPHILS NFR BLD AUTO: 0.6 % (ref 0–1.5)
BILIRUB SERPL-MCNC: <0.2 MG/DL (ref 0–1.2)
BUN SERPL-MCNC: 7 MG/DL (ref 8–23)
BUN/CREAT SERPL: 10.8 (ref 7–25)
CALCIUM SPEC-SCNC: 9.5 MG/DL (ref 8.6–10.5)
CHLORIDE SERPL-SCNC: 99 MMOL/L (ref 98–107)
CO2 SERPL-SCNC: 25 MMOL/L (ref 22–29)
CREAT SERPL-MCNC: 0.65 MG/DL (ref 0.57–1)
DEPRECATED RDW RBC AUTO: 39.8 FL (ref 37–54)
EGFRCR SERPLBLD CKD-EPI 2021: 99.1 ML/MIN/1.73
EOSINOPHIL # BLD AUTO: 0.19 10*3/MM3 (ref 0–0.4)
EOSINOPHIL NFR BLD AUTO: 2.7 % (ref 0.3–6.2)
ERYTHROCYTE [DISTWIDTH] IN BLOOD BY AUTOMATED COUNT: 12.2 % (ref 12.3–15.4)
GLOBULIN UR ELPH-MCNC: 2.7 GM/DL
GLUCOSE SERPL-MCNC: 101 MG/DL (ref 65–99)
HCT VFR BLD AUTO: 38.5 % (ref 34–46.6)
HGB BLD-MCNC: 12.8 G/DL (ref 12–15.9)
IMM GRANULOCYTES # BLD AUTO: 0.01 10*3/MM3 (ref 0–0.05)
IMM GRANULOCYTES NFR BLD AUTO: 0.1 % (ref 0–0.5)
INR PPP: 0.93 (ref 0.9–1.1)
LYMPHOCYTES # BLD AUTO: 2.86 10*3/MM3 (ref 0.7–3.1)
LYMPHOCYTES NFR BLD AUTO: 41 % (ref 19.6–45.3)
MCH RBC QN AUTO: 30 PG (ref 26.6–33)
MCHC RBC AUTO-ENTMCNC: 33.2 G/DL (ref 31.5–35.7)
MCV RBC AUTO: 90.4 FL (ref 79–97)
MONOCYTES # BLD AUTO: 0.53 10*3/MM3 (ref 0.1–0.9)
MONOCYTES NFR BLD AUTO: 7.6 % (ref 5–12)
NEUTROPHILS NFR BLD AUTO: 3.34 10*3/MM3 (ref 1.7–7)
NEUTROPHILS NFR BLD AUTO: 48 % (ref 42.7–76)
NRBC BLD AUTO-RTO: 0 /100 WBC (ref 0–0.2)
PLATELET # BLD AUTO: 248 10*3/MM3 (ref 140–450)
PMV BLD AUTO: 10.1 FL (ref 6–12)
POTASSIUM SERPL-SCNC: 3.8 MMOL/L (ref 3.5–5.2)
PROT SERPL-MCNC: 7.3 G/DL (ref 6–8.5)
PROTHROMBIN TIME: 12.7 SECONDS (ref 11.7–14.2)
RBC # BLD AUTO: 4.26 10*6/MM3 (ref 3.77–5.28)
SODIUM SERPL-SCNC: 135 MMOL/L (ref 136–145)
WBC NRBC COR # BLD AUTO: 6.97 10*3/MM3 (ref 3.4–10.8)

## 2024-08-14 PROCEDURE — 99285 EMERGENCY DEPT VISIT HI MDM: CPT

## 2024-08-14 PROCEDURE — 25010000002 KETOROLAC TROMETHAMINE PER 15 MG: Performed by: INTERNAL MEDICINE

## 2024-08-14 PROCEDURE — 96374 THER/PROPH/DIAG INJ IV PUSH: CPT

## 2024-08-14 PROCEDURE — 36415 COLL VENOUS BLD VENIPUNCTURE: CPT

## 2024-08-14 PROCEDURE — 25010000002 KETOROLAC TROMETHAMINE PER 15 MG: Performed by: PHYSICIAN ASSISTANT

## 2024-08-14 PROCEDURE — 85610 PROTHROMBIN TIME: CPT | Performed by: PHYSICIAN ASSISTANT

## 2024-08-14 PROCEDURE — 85025 COMPLETE CBC W/AUTO DIFF WBC: CPT | Performed by: PHYSICIAN ASSISTANT

## 2024-08-14 PROCEDURE — G0378 HOSPITAL OBSERVATION PER HR: HCPCS

## 2024-08-14 PROCEDURE — 99221 1ST HOSP IP/OBS SF/LOW 40: CPT | Performed by: NURSE PRACTITIONER

## 2024-08-14 PROCEDURE — 25010000002 PROCHLORPERAZINE 10 MG/2ML SOLUTION: Performed by: INTERNAL MEDICINE

## 2024-08-14 PROCEDURE — 25010000002 METOCLOPRAMIDE PER 10 MG: Performed by: EMERGENCY MEDICINE

## 2024-08-14 PROCEDURE — 96375 TX/PRO/DX INJ NEW DRUG ADDON: CPT

## 2024-08-14 PROCEDURE — 25010000002 MORPHINE PER 10 MG: Performed by: EMERGENCY MEDICINE

## 2024-08-14 PROCEDURE — 25010000002 DEXAMETHASONE PER 1 MG: Performed by: PHYSICIAN ASSISTANT

## 2024-08-14 PROCEDURE — 96376 TX/PRO/DX INJ SAME DRUG ADON: CPT

## 2024-08-14 PROCEDURE — 80053 COMPREHEN METABOLIC PANEL: CPT | Performed by: PHYSICIAN ASSISTANT

## 2024-08-14 PROCEDURE — 85730 THROMBOPLASTIN TIME PARTIAL: CPT | Performed by: PHYSICIAN ASSISTANT

## 2024-08-14 RX ORDER — METOCLOPRAMIDE HYDROCHLORIDE 5 MG/ML
10 INJECTION INTRAMUSCULAR; INTRAVENOUS ONCE
Status: COMPLETED | OUTPATIENT
Start: 2024-08-14 | End: 2024-08-14

## 2024-08-14 RX ORDER — ATORVASTATIN CALCIUM 20 MG/1
40 TABLET, FILM COATED ORAL NIGHTLY
Status: DISCONTINUED | OUTPATIENT
Start: 2024-08-14 | End: 2024-08-17 | Stop reason: HOSPADM

## 2024-08-14 RX ORDER — DULOXETIN HYDROCHLORIDE 60 MG/1
60 CAPSULE, DELAYED RELEASE ORAL DAILY
Status: DISCONTINUED | OUTPATIENT
Start: 2024-08-14 | End: 2024-08-17 | Stop reason: HOSPADM

## 2024-08-14 RX ORDER — AMOXICILLIN 250 MG
2 CAPSULE ORAL 2 TIMES DAILY
Status: DISCONTINUED | OUTPATIENT
Start: 2024-08-14 | End: 2024-08-17 | Stop reason: HOSPADM

## 2024-08-14 RX ORDER — CYCLOBENZAPRINE HCL 10 MG
10 TABLET ORAL 3 TIMES DAILY PRN
Status: DISCONTINUED | OUTPATIENT
Start: 2024-08-14 | End: 2024-08-16

## 2024-08-14 RX ORDER — AMLODIPINE BESYLATE 2.5 MG/1
2.5 TABLET ORAL NIGHTLY
Status: DISCONTINUED | OUTPATIENT
Start: 2024-08-14 | End: 2024-08-16

## 2024-08-14 RX ORDER — ALBUTEROL SULFATE 0.83 MG/ML
2.5 SOLUTION RESPIRATORY (INHALATION) EVERY 6 HOURS PRN
Status: DISCONTINUED | OUTPATIENT
Start: 2024-08-14 | End: 2024-08-17 | Stop reason: HOSPADM

## 2024-08-14 RX ORDER — KETOROLAC TROMETHAMINE 15 MG/ML
15 INJECTION, SOLUTION INTRAMUSCULAR; INTRAVENOUS EVERY 6 HOURS PRN
Status: DISCONTINUED | OUTPATIENT
Start: 2024-08-14 | End: 2024-08-15

## 2024-08-14 RX ORDER — SODIUM CHLORIDE 0.9 % (FLUSH) 0.9 %
10 SYRINGE (ML) INJECTION AS NEEDED
Status: DISCONTINUED | OUTPATIENT
Start: 2024-08-14 | End: 2024-08-17 | Stop reason: HOSPADM

## 2024-08-14 RX ORDER — HYDROCODONE BITARTRATE AND ACETAMINOPHEN 5; 325 MG/1; MG/1
1 TABLET ORAL EVERY 6 HOURS PRN
Status: DISCONTINUED | OUTPATIENT
Start: 2024-08-14 | End: 2024-08-15

## 2024-08-14 RX ORDER — DULOXETIN HYDROCHLORIDE 30 MG/1
30 CAPSULE, DELAYED RELEASE ORAL DAILY
Status: DISCONTINUED | OUTPATIENT
Start: 2024-08-14 | End: 2024-08-17 | Stop reason: HOSPADM

## 2024-08-14 RX ORDER — GABAPENTIN 100 MG/1
100 CAPSULE ORAL EVERY 8 HOURS SCHEDULED
Status: DISCONTINUED | OUTPATIENT
Start: 2024-08-14 | End: 2024-08-17

## 2024-08-14 RX ORDER — HYDROCHLOROTHIAZIDE 25 MG/1
25 TABLET ORAL DAILY
Status: DISCONTINUED | OUTPATIENT
Start: 2024-08-14 | End: 2024-08-15

## 2024-08-14 RX ORDER — SODIUM CHLORIDE 9 MG/ML
40 INJECTION, SOLUTION INTRAVENOUS AS NEEDED
Status: DISCONTINUED | OUTPATIENT
Start: 2024-08-14 | End: 2024-08-17 | Stop reason: HOSPADM

## 2024-08-14 RX ORDER — LIDOCAINE 4 G/G
1 PATCH TOPICAL ONCE
Status: COMPLETED | OUTPATIENT
Start: 2024-08-14 | End: 2024-08-14

## 2024-08-14 RX ORDER — BISACODYL 10 MG
10 SUPPOSITORY, RECTAL RECTAL DAILY PRN
Status: DISCONTINUED | OUTPATIENT
Start: 2024-08-14 | End: 2024-08-17 | Stop reason: HOSPADM

## 2024-08-14 RX ORDER — POLYETHYLENE GLYCOL 3350 17 G/17G
17 POWDER, FOR SOLUTION ORAL DAILY PRN
Status: DISCONTINUED | OUTPATIENT
Start: 2024-08-14 | End: 2024-08-17 | Stop reason: HOSPADM

## 2024-08-14 RX ORDER — PROCHLORPERAZINE EDISYLATE 5 MG/ML
10 INJECTION INTRAMUSCULAR; INTRAVENOUS EVERY 6 HOURS PRN
Status: DISCONTINUED | OUTPATIENT
Start: 2024-08-14 | End: 2024-08-17 | Stop reason: HOSPADM

## 2024-08-14 RX ORDER — LOSARTAN POTASSIUM 100 MG/1
100 TABLET ORAL DAILY
Status: DISCONTINUED | OUTPATIENT
Start: 2024-08-14 | End: 2024-08-17 | Stop reason: HOSPADM

## 2024-08-14 RX ORDER — MORPHINE SULFATE 2 MG/ML
4 INJECTION, SOLUTION INTRAMUSCULAR; INTRAVENOUS ONCE
Status: COMPLETED | OUTPATIENT
Start: 2024-08-14 | End: 2024-08-14

## 2024-08-14 RX ORDER — KETOROLAC TROMETHAMINE 30 MG/ML
30 INJECTION, SOLUTION INTRAMUSCULAR; INTRAVENOUS ONCE
Status: COMPLETED | OUTPATIENT
Start: 2024-08-14 | End: 2024-08-14

## 2024-08-14 RX ORDER — SODIUM CHLORIDE 0.9 % (FLUSH) 0.9 %
10 SYRINGE (ML) INJECTION EVERY 12 HOURS SCHEDULED
Status: DISCONTINUED | OUTPATIENT
Start: 2024-08-14 | End: 2024-08-17 | Stop reason: HOSPADM

## 2024-08-14 RX ORDER — BISACODYL 5 MG/1
5 TABLET, DELAYED RELEASE ORAL DAILY PRN
Status: DISCONTINUED | OUTPATIENT
Start: 2024-08-14 | End: 2024-08-17 | Stop reason: HOSPADM

## 2024-08-14 RX ORDER — METOPROLOL SUCCINATE 50 MG/1
50 TABLET, EXTENDED RELEASE ORAL DAILY
Status: DISCONTINUED | OUTPATIENT
Start: 2024-08-14 | End: 2024-08-17 | Stop reason: HOSPADM

## 2024-08-14 RX ORDER — PROCHLORPERAZINE EDISYLATE 5 MG/ML
10 INJECTION INTRAMUSCULAR; INTRAVENOUS ONCE
Status: DISCONTINUED | OUTPATIENT
Start: 2024-08-14 | End: 2024-08-17 | Stop reason: HOSPADM

## 2024-08-14 RX ORDER — DEXAMETHASONE SODIUM PHOSPHATE 10 MG/ML
10 INJECTION INTRAMUSCULAR; INTRAVENOUS ONCE
Status: COMPLETED | OUTPATIENT
Start: 2024-08-14 | End: 2024-08-14

## 2024-08-14 RX ADMIN — GABAPENTIN 100 MG: 100 CAPSULE ORAL at 21:25

## 2024-08-14 RX ADMIN — PROCHLORPERAZINE EDISYLATE 10 MG: 5 INJECTION INTRAMUSCULAR; INTRAVENOUS at 15:50

## 2024-08-14 RX ADMIN — HYDROCODONE BITARTRATE AND ACETAMINOPHEN 1 TABLET: 5; 325 TABLET ORAL at 15:50

## 2024-08-14 RX ADMIN — Medication 10 ML: at 21:36

## 2024-08-14 RX ADMIN — LOSARTAN POTASSIUM 100 MG: 100 TABLET, FILM COATED ORAL at 17:02

## 2024-08-14 RX ADMIN — DULOXETINE HYDROCHLORIDE 60 MG: 60 CAPSULE, DELAYED RELEASE ORAL at 17:02

## 2024-08-14 RX ADMIN — DULOXETINE HYDROCHLORIDE 30 MG: 30 CAPSULE, DELAYED RELEASE ORAL at 17:01

## 2024-08-14 RX ADMIN — ATORVASTATIN CALCIUM 40 MG: 20 TABLET, FILM COATED ORAL at 21:25

## 2024-08-14 RX ADMIN — GABAPENTIN 100 MG: 100 CAPSULE ORAL at 15:50

## 2024-08-14 RX ADMIN — METOPROLOL SUCCINATE 50 MG: 50 TABLET, FILM COATED, EXTENDED RELEASE ORAL at 17:02

## 2024-08-14 RX ADMIN — CYCLOBENZAPRINE 10 MG: 10 TABLET, FILM COATED ORAL at 17:02

## 2024-08-14 RX ADMIN — LIDOCAINE 1 PATCH: 4 PATCH TOPICAL at 10:23

## 2024-08-14 RX ADMIN — HYDROCODONE BITARTRATE AND ACETAMINOPHEN 1 TABLET: 5; 325 TABLET ORAL at 21:34

## 2024-08-14 RX ADMIN — KETOROLAC TROMETHAMINE 30 MG: 30 INJECTION, SOLUTION INTRAMUSCULAR at 10:20

## 2024-08-14 RX ADMIN — METOCLOPRAMIDE 10 MG: 5 INJECTION, SOLUTION INTRAMUSCULAR; INTRAVENOUS at 11:05

## 2024-08-14 RX ADMIN — HYDROCHLOROTHIAZIDE 25 MG: 25 TABLET ORAL at 17:02

## 2024-08-14 RX ADMIN — PROCHLORPERAZINE EDISYLATE 10 MG: 5 INJECTION INTRAMUSCULAR; INTRAVENOUS at 21:34

## 2024-08-14 RX ADMIN — MORPHINE SULFATE 4 MG: 2 INJECTION, SOLUTION INTRAMUSCULAR; INTRAVENOUS at 11:07

## 2024-08-14 RX ADMIN — AMLODIPINE BESYLATE 2.5 MG: 2.5 TABLET ORAL at 21:25

## 2024-08-14 RX ADMIN — KETOROLAC TROMETHAMINE 15 MG: 15 INJECTION, SOLUTION INTRAMUSCULAR; INTRAVENOUS at 17:01

## 2024-08-14 RX ADMIN — DEXAMETHASONE SODIUM PHOSPHATE 10 MG: 10 INJECTION INTRAMUSCULAR; INTRAVENOUS at 10:21

## 2024-08-14 RX ADMIN — KETOROLAC TROMETHAMINE 15 MG: 15 INJECTION, SOLUTION INTRAMUSCULAR; INTRAVENOUS at 23:01

## 2024-08-14 NOTE — TELEPHONE ENCOUNTER
Caller: Kristina Yates    Relationship to patient: Self    Best call back number: [phone]    Patient is needing: PATIENT CALLED, STATES SHE HAS HAD 2 WEEKS OF PT AND HER EPIDURAL IS SCHEDULED FOR 08/20/24. PATIENT STATES SHE IS IN EXTREME PAIN AND CAN ONLY LAY IN BED ALL DAY. PATIENT STATES THE PAIN IS SO BAD SHE ALMOST WENT TO ER LAST NIGHT. PATIENT STATES WITH THE PT THEY HAVE TRIED DRY NEEDLING BUT THERE HAS BEEN NO RELIEF. PATIENT STATES SHE DOES NOT SLEEP AT ALL. PATIENT WANTS TO KNOW WHAT SHE NEEDS TO DO AS THIS IS EXTREME AND WORRYING. PLEASE CALL PATIENT TO ADVISE.    THANK YOU

## 2024-08-14 NOTE — Clinical Note
Level of Care: Med/Surg [1]   Diagnosis: Acute neck pain [151177]   Admitting Physician: XAVI SANTIAGO [[id number]]   Attending Physician: XAVI SANTIAGO [[id number]]   Certification: I Certify That Inpatient Hospital Services Are Medically Necessary For Greater Than 2 Midnights

## 2024-08-14 NOTE — TELEPHONE ENCOUNTER
I called and talked to Kristina to let her know if her pain is that out of control then she should go to the ER for pain management and control.

## 2024-08-14 NOTE — ED PROVIDER NOTES
EMERGENCY DEPARTMENT ENCOUNTER      Room Number:  05/05  PCP: Nyla Mejia APRN  Independent Historians: Patient  Patient Care Team:  Nyla Mejia APRN as PCP - General (Family Medicine)  Brooklyn Miles PA as Physician Assistant (Obstetrics and Gynecology)  Pedro Luis Zayas MD as Cardiologist (Cardiology)       HPI:  Chief Complaint: acute on chronic neck pain    A complete HPI/ROS/PMH/PSH/SH/FH are unobtainable due to: None    Chronic or social conditions impacting patient care (Social Determinants of Health): None      Context: Kristina Yates is a 64 y.o. female with a PMH significant for cervical radiculopathy diabetic polyneuropathy, type 2 diabetes, degenerative joint disease of the cervical spine, spinal stenosis, chronic pain, cauda equina syndrome with neurogenic bladder, SI joint inflammation, lumbar pseudoarthrosis who presents to the ED for acute on chronic neck pain. Pt states she went to PT yesterday for dry needling, but there was no improvement with the pain. Pt admits to talking to Neurosurgery office today regarding the pain and they advised to come to the ED. Pt is scheduled to have an epidural 8/20 but came in today due to worsening pain. Pt admits to new onset burning and tingling in her posterior lower cervical spine. Pt admits to taking her muscle relaxer with no relief. Pt admits to sharp and burning pain in the neck, rating it a 9/10. Pt states the pain is constant and has rapidly gotten worse. Pt admits to nausea. Pt denies any visual changes, vomiting, diarrhea, lightheadedness, dizziness.       Upon review of prior external notes (non-ED) -and- Review of prior external test results outside of this encounter it appears the patient was evaluated in the office with neurosurgery for SI joint inflammation on June 19, 2024.  The patient had a normal CBC on 5/20/2024 and a normal INR on that date.      PAST MEDICAL HISTORY  Active Ambulatory Problems     Diagnosis Date Noted    Cervical  radiculopathy 01/26/2017    Controlled type 2 diabetes mellitus without complication, without long-term current use of insulin 02/27/2018    Diabetic peripheral neuropathy 02/27/2018    Menopausal symptom 02/27/2018    Adult acne 02/27/2018    Hyperlipidemia 02/27/2018    Arthritis     DJD (degenerative joint disease), cervical     Asthma     Spinal stenosis     Chronic pain     COPD (chronic obstructive pulmonary disease)     GERD (gastroesophageal reflux disease)     Alcohol abuse     Edmonds esophagus     Essential hypertension 04/06/2021    Low back pain 06/28/2023    Cauda equina syndrome with neurogenic bladder 06/28/2023    Lumbar back pain 06/29/2023    Tobacco abuse 06/29/2023    Anxiety and depression 06/01/2018    Chondromalacia of knee 11/15/2019    IBRAHIMA exposure in utero 08/03/2023    Impingement syndrome of right shoulder 05/04/2020    Nephrolithiasis 06/01/2018    Pes anserine bursitis 11/15/2019    SI (sacroiliac) joint inflammation 08/10/2023    Lumbar pseudoarthrosis 12/28/2023    Cervicalgia 07/30/2024     Resolved Ambulatory Problems     Diagnosis Date Noted    Drug-induced constipation 07/05/2023     Past Medical History:   Diagnosis Date    Anxiety     Cervical neuritis     Depression     Diabetes mellitus     Foot spasms     Headache     Hiatal hernia     Hypertension     Seasonal allergies     Spinal headache          PAST SURGICAL HISTORY  Past Surgical History:   Procedure Laterality Date    ANTERIOR CERVICAL DISCECTOMY W/ FUSION  11/2003    C5-6, C6-7    CERCLAGE CERVIX      CERVICAL EPIDURAL  2007    CERVICAL FUSION      DILATATION AND CURETTAGE      ENDOMETRIAL ABLATION  2007    ENDOSCOPY      MULTIPLE    ENDOSCOPY N/A 07/05/2016    Procedure: ESOPHAGOGASTRODUODENOSCOPY WITH COLD BIOPSIES;  Surgeon: Jose Mcclellan MD;  Location: [facility] ENDOSCOPY;  Service:     ESOPHAGEAL DILATATION  2009    HAND SURGERY Bilateral     CARTILAGE    LUMBAR FUSION      LUMBAR LAMINECTOMY WITH FUSION N/A  06/30/2023    Procedure: OPEN LUMBAR FOUR TO FIVE TRANSFORAMINAL LUMBAR INTERBODY FUSION;  Surgeon: Alex Ortiz MD;  Location: [facility] MAIN OR;  Service: Neurosurgery;  Laterality: N/A;    NECK SURGERY      NISSEN FUNDOPLICATION LAPAROSCOPIC  03/2012    SACROILIAC JOINT FUSION Right 6/4/2024    Procedure: RIGHT PERCUTANEOUS ARTHRODESIS SACROILIAC JOINT, NEUROMONITORING;  Surgeon: Alex Ortiz MD;  Location: [facility] MAIN OR;  Service: Neurosurgery;  Laterality: Right;    TONSILLECTOMY           FAMILY HISTORY  Family History   Problem Relation Age of Onset    Cancer Mother         nonhodkinds lymphoma    Heart disease Father     Drug abuse Sister     Heart disease Brother     Lupus Maternal Aunt     Malig Hyperthermia Neg Hx          SOCIAL HISTORY  Social History     Socioeconomic History    Marital status:     Number of children: 2   Tobacco Use    Smoking status: Every Day     Current packs/day: 0.25     Average packs/day: 0.5 packs/day for 16.6 years (7.9 ttl pk-yrs)     Types: Cigarettes     Start date: 2/25/2006     Last attempt to quit: 2/25/2021    Smokeless tobacco: Never   Vaping Use    Vaping status: Never Used   Substance and Sexual Activity    Alcohol use: No     Comment: sober since 2012, worsk 12 steps    Drug use: No    Sexual activity: Yes     Partners: Male         ALLERGIES  Fluoxetine, Sulfa antibiotics, Zofran [ondansetron hcl], and Lisinopril      REVIEW OF SYSTEMS  Included in HPI  All systems reviewed and negative except for those discussed in HPI.      PHYSICAL EXAM    I have reviewed the triage vital signs and nursing notes.    ED Triage Vitals [08/14/24 0945]   Temp Heart Rate Resp BP SpO2   98.9 °F (37.2 °C) 100 18 -- 100 %      Temp src Heart Rate Source Patient Position BP Location FiO2 (%)   -- -- -- -- --       Physical Exam    Vital signs and nursing notes reviewed.      PPE: I wore a surgical mask throughout my encounters with the pt. I performed hand hygiene on entry into  the pt room and upon exit.     LAB RESULTS  Recent Results (from the past 24 hour(s))   Comprehensive Metabolic Panel    Collection Time: 08/14/24 10:13 AM    Specimen: Blood   Result Value Ref Range    Glucose 101 (H) 65 - 99 mg/dL    BUN 7 (L) 8 - 23 mg/dL    Creatinine 0.65 0.57 - 1.00 mg/dL    Sodium 135 (L) 136 - 145 mmol/L    Potassium 3.8 3.5 - 5.2 mmol/L    Chloride 99 98 - 107 mmol/L    CO2 25.0 22.0 - 29.0 mmol/L    Calcium 9.5 8.6 - 10.5 mg/dL    Total Protein 7.3 6.0 - 8.5 g/dL    Albumin 4.6 3.5 - 5.2 g/dL    ALT (SGPT) 8 1 - 33 U/L    AST (SGOT) 15 1 - 32 U/L    Alkaline Phosphatase 95 39 - 117 U/L    Total Bilirubin <0.2 0.0 - 1.2 mg/dL    Globulin 2.7 gm/dL    A/G Ratio 1.7 g/dL    BUN/Creatinine Ratio 10.8 7.0 - 25.0    Anion Gap 11.0 5.0 - 15.0 mmol/L    eGFR 99.1 >60.0 mL/min/1.73   Protime-INR    Collection Time: 08/14/24 10:13 AM    Specimen: Blood   Result Value Ref Range    Protime 12.7 11.7 - 14.2 Seconds    INR 0.93 0.90 - 1.10   aPTT    Collection Time: 08/14/24 10:13 AM    Specimen: Blood   Result Value Ref Range    PTT 49.7 (H) 22.7 - 35.4 seconds   CBC Auto Differential    Collection Time: 08/14/24 10:13 AM    Specimen: Blood   Result Value Ref Range    WBC 6.97 3.40 - 10.80 10*3/mm3    RBC 4.26 3.77 - 5.28 10*6/mm3    Hemoglobin 12.8 12.0 - 15.9 g/dL    Hematocrit 38.5 34.0 - 46.6 %    MCV 90.4 79.0 - 97.0 fL    MCH 30.0 26.6 - 33.0 pg    MCHC 33.2 31.5 - 35.7 g/dL    RDW 12.2 (L) 12.3 - 15.4 %    RDW-SD 39.8 37.0 - 54.0 fl    MPV 10.1 6.0 - 12.0 fL    Platelets 248 140 - 450 10*3/mm3    Neutrophil % 48.0 42.7 - 76.0 %    Lymphocyte % 41.0 19.6 - 45.3 %    Monocyte % 7.6 5.0 - 12.0 %    Eosinophil % 2.7 0.3 - 6.2 %    Basophil % 0.6 0.0 - 1.5 %    Immature Grans % 0.1 0.0 - 0.5 %    Neutrophils, Absolute 3.34 1.70 - 7.00 10*3/mm3    Lymphocytes, Absolute 2.86 0.70 - 3.10 10*3/mm3    Monocytes, Absolute 0.53 0.10 - 0.90 10*3/mm3    Eosinophils, Absolute 0.19 0.00 - 0.40 10*3/mm3     Basophils, Absolute 0.04 0.00 - 0.20 10*3/mm3    Immature Grans, Absolute 0.01 0.00 - 0.05 10*3/mm3    nRBC 0.0 0.0 - 0.2 /100 WBC         RADIOLOGY  No Radiology Exams Resulted Within Past 24 Hours      MEDICATIONS GIVEN IN ER  Medications   sodium chloride 0.9 % flush 10 mL (has no administration in time range)   Lidocaine 4 % 1 patch (1 patch Transdermal Medication Applied 8/14/24 1023)   prochlorperazine (COMPAZINE) injection 10 mg (0 mg Intravenous Hold 8/14/24 1111)   dexAMETHasone (DECADRON) injection 10 mg (10 mg Intravenous Given 8/14/24 1021)   ketorolac (TORADOL) injection 30 mg (30 mg Intravenous Given 8/14/24 1020)   morphine injection 4 mg (4 mg Intravenous Given 8/14/24 1107)   metoclopramide (REGLAN) injection 10 mg (10 mg Intravenous Given 8/14/24 1105)         ORDERS PLACED DURING THIS VISIT:  Orders Placed This Encounter   Procedures    Comprehensive Metabolic Panel    Protime-INR    aPTT    CBC Auto Differential    Neurosurgery (on-call MD unless specified)    [facility] (on-call MD unless specified) Details    Insert Peripheral IV    Inpatient Admission    CBC & Differential         OUTPATIENT MEDICATION MANAGEMENT:  Current Facility-Administered Medications Ordered in Epic   Medication Dose Route Frequency Provider Last Rate Last Admin    Lidocaine 4 % 1 patch  1 patch Transdermal Once Niko Khanna PA   1 patch at 08/14/24 1023    prochlorperazine (COMPAZINE) injection 10 mg  10 mg Intravenous Once Niko Khanna PA        sodium chloride 0.9 % flush 10 mL  10 mL Intravenous PRN Niko Khanna PA         Current Outpatient Medications Ordered in Epic   Medication Sig Dispense Refill    acetaminophen (TYLENOL) 500 MG tablet Take 1 tablet by mouth 2 (Two) Times a Day As Needed. Indications: Pain      albuterol sulfate  (90 Base) MCG/ACT inhaler Inhale 1 puff As Needed for Wheezing. 6.7 g 3    amLODIPine (NORVASC) 2.5 MG tablet Take 1 tablet by mouth Every Night.      atorvastatin  (LIPITOR) 40 MG tablet Take 1 tablet by mouth Daily. (Patient taking differently: Take 1 tablet by mouth Every Night.) 90 tablet 1    cyclobenzaprine (FLEXERIL) 10 MG tablet Take 1 tablet by mouth 3 (Three) Times a Day As Needed for Muscle Spasms. 90 tablet 2    DULoxetine (CYMBALTA) 30 MG capsule Take 1 capsule by mouth Daily. Add to 60 mg= 90 mg total daily 30 capsule 5    DULoxetine (CYMBALTA) 60 MG capsule Take 1 capsule by mouth Daily. Indications: Major Depressive Disorder, Musculoskeletal Pain 90 capsule 0    fluticasone (FLONASE) 50 MCG/ACT nasal spray 2 sprays into the nostril(s) as directed by provider Daily. 18.2 mL 5    hydroCHLOROthiazide (HYDRODIURIL) 25 MG tablet Take 1 tablet by mouth Daily for 90 days. 90 tablet 1    losartan (COZAAR) 100 MG tablet TAKE 1 TABLET BY MOUTH DAILY 90 tablet 1    metFORMIN ER (GLUCOPHAGE-XR) 500 MG 24 hr tablet Take 1 tablet by mouth Every Night for 90 days. 90 tablet 1    metoprolol succinate XL (TOPROL-XL) 50 MG 24 hr tablet Take 1 tablet by mouth Daily. 90 tablet 0    Multiple Vitamins-Minerals (MULTIVITAMIN ADULT) tablet Take 1 tablet by mouth Daily.      naproxen sodium (ALEVE) 220 MG tablet Take 1 tablet by mouth 2 (Two) Times a Day As Needed.             PROGRESS, DATA ANALYSIS, CONSULTS, AND MEDICAL DECISION MAKING  All labs have been independently interpreted by me.  All radiology studies have been reviewed by me. All EKG's have been independently viewed and interpreted by me.  Discussion below represents my analysis of pertinent findings related to patient's condition, differential diagnosis, treatment plan and final disposition.    Patient presentation and evaluation most consistent with acute on chronic neck pain with new radiculopathic symptoms requiring admission for neurosurgical consultation and probable MRI/epidural.  Patient agreeable and all questions answered.    DIFFERENTIAL DIAGNOSIS INCLUDE BUT NOT LIMITED TO:     Cervical spinal stenosis, cervical  vertebral fracture, degenerative disc disease    Clinical Scores: N/A      ED Course as of 08/14/24 1128   Wed Aug 14, 2024   1039 I discussed the case with WILSON Guzmán with [facility] at this time regarding the patient.  I discussed work-up, results, concerns.  I discussed the consulting provider's desire for admission for observation and probable epidural, as well as neurosurgical consultation.   [DC]   1127 I discussed the case with MD Daphnie with [facility] at this time regarding the patient.  I discussed work-up, results, concerns.  I discussed the consulting provider's desire for MedSurg admit.   [DC]      ED Course User Index  [DC] Niko Khanna, PA          1128 I rechecked the patient.  I discussed the patient's labs, radiology findings (including all incidental findings), diagnosis, and plan for admission. The patient understands and agrees with the plan.      AS OF 11:28 EDT VITALS:    BP - 164/91  HR - 80  TEMP - 98.9 °F (37.2 °C)  O2 SATS - 100%    COMPLEXITY OF CARE  The patient requires admission.      DIAGNOSIS  Final diagnoses:   Acute cervical radiculopathy   Intractable pain         DISPOSITION  ED Disposition       ED Disposition   Decision to Admit    Condition   --    Comment   Level of Care: Med/Surg [1]   Diagnosis: Acute neck pain [159176]   Admitting Physician: XAVI SANTIAGO [[id number]]   Attending Physician: XAVI SANTIAGO [[id number]]   Certification: I Certify That Inpatient Hospital Services Are Medically Necessary For Greater Than 2 Midnights                  Please note that portions of this document were completed with a voice recognition program.    Note Disclaimer: At [facility], we believe that sharing information builds trust and better relationships. You are receiving this note because you recently visited [facility]. It is possible you will see health information before a provider has talked with you about it. This kind of information can be easy to  misunderstand. To help you fully understand what it means for your health, we urge you to discuss this note with your provider.         Niko Khanna PA  08/14/24 1128

## 2024-08-14 NOTE — ED NOTES
.Nursing report ED to floor  Kristina Yates  64 y.o.  female    HPI :  HPI (Adult)  Stated Reason for Visit: neck pain, told to come to ed from dr. perdue  History Obtained From: patient    Chief Complaint  Chief Complaint   Patient presents with    Neck Pain       Admitting doctor:   Adams Eller MD    Admitting diagnosis:   The primary encounter diagnosis was Acute cervical radiculopathy. A diagnosis of Intractable pain was also pertinent to this visit.    Code status:   Current Code Status       Date Active Code Status Order ID Comments User Context       Prior            Allergies:   Fluoxetine, Sulfa antibiotics, Zofran [ondansetron hcl], and Lisinopril    Isolation:   No active isolations    Intake and Output  No intake or output data in the 24 hours ending 08/14/24 1230    Weight:       08/14/24  0945   Weight: 74.8 kg (165 lb)       Most recent vitals:   Vitals:    08/14/24 0953 08/14/24 1031 08/14/24 1101 08/14/24 1131   BP:  169/85 164/91 167/87   Pulse: 93 80 80 79   Resp:       Temp:       SpO2: 100% 98% 100% 99%   Weight:       Height:           Active LDAs/IV Access:   Lines, Drains & Airways       Active LDAs       Name Placement date Placement time Site Days    Peripheral IV 06/04/24 1005 Posterior;Right Hand 06/04/24  1005  Hand  71    Peripheral IV 08/14/24 1015 Posterior;Right Hand 08/14/24  1015  Hand  less than 1                    Labs (abnormal labs have a star):   Labs Reviewed   COMPREHENSIVE METABOLIC PANEL - Abnormal; Notable for the following components:       Result Value    Glucose 101 (*)     BUN 7 (*)     Sodium 135 (*)     All other components within normal limits    Narrative:     GFR Normal >60  Chronic Kidney Disease <60  Kidney Failure <15     APTT - Abnormal; Notable for the following components:    PTT 49.7 (*)     All other components within normal limits   CBC WITH AUTO DIFFERENTIAL - Abnormal; Notable for the following components:    RDW 12.2 (*)     All other  components within normal limits   PROTIME-INR - Normal   CBC AND DIFFERENTIAL    Narrative:     The following orders were created for panel order CBC & Differential.  Procedure                               Abnormality         Status                     ---------                               -----------         ------                     CBC Auto Differential[[id number]]        Abnormal            Final result                 Please view results for these tests on the individual orders.       EKG:   No orders to display       Meds given in ED:   Medications   sodium chloride 0.9 % flush 10 mL (has no administration in time range)   Lidocaine 4 % 1 patch (1 patch Transdermal Medication Applied 8/14/24 1023)   prochlorperazine (COMPAZINE) injection 10 mg (0 mg Intravenous Hold 8/14/24 1111)   dexAMETHasone (DECADRON) injection 10 mg (10 mg Intravenous Given 8/14/24 1021)   ketorolac (TORADOL) injection 30 mg (30 mg Intravenous Given 8/14/24 1020)   morphine injection 4 mg (4 mg Intravenous Given 8/14/24 1107)   metoclopramide (REGLAN) injection 10 mg (10 mg Intravenous Given 8/14/24 1105)       Imaging results:  No radiology results for the last day    Ambulatory status:   -     Social issues:   Social History     Socioeconomic History    Marital status:     Number of children: 2   Tobacco Use    Smoking status: Every Day     Current packs/day: 0.25     Average packs/day: 0.5 packs/day for 16.6 years (7.9 ttl pk-yrs)     Types: Cigarettes     Start date: 2/25/2006     Last attempt to quit: 2/25/2021    Smokeless tobacco: Never   Vaping Use    Vaping status: Never Used   Substance and Sexual Activity    Alcohol use: No     Comment: sober since 2012, worsk 12 steps    Drug use: No    Sexual activity: Yes     Partners: Male       Peripheral Neurovascular  Peripheral Neurovascular (Adult)  Peripheral Neurovascular WDL: WDL    Neuro Cognitive  Neuro Cognitive (Adult)  Cognitive/Neuro/Behavioral WDL:  WDL    Learning       Respiratory  Respiratory WDL  Respiratory WDL: WDL    Abdominal Pain       Pain Assessments  Pain (Adult)  (0-10) Pain Rating: Rest: 9  (0-10) Pain Rating: Activity: 10  Pain Location: neck         Lanette Oliveira RN  08/14/24 12:30 EDT

## 2024-08-14 NOTE — H&P
[facility] Admission H&P    Patient Care Team:  Nyla Mejia APRN as PCP - General (Family Medicine)  Brooklyn Miles PA as Physician Assistant (Obstetrics and Gynecology)  Pedro Luis Zayas MD as Cardiologist (Cardiology)    Chief complaint: Neck pain    History of Present Illness    This is a 64-year-old female with history of cervical spine disease requiring fusion 20 years ago who presents to the emergency room with progressive acute on chronic neck pain predominantly on the left side of the neck radiating out into the left arm.  She has noticed some associated weakness in the hand.  She describes it as a numbness and tingling sensation.  She contacted her neurosurgeons office who ordered PT and a steroid epidural.  Unfortunately, the epidural has not yet occurred.  Her pain became unbearable and she presented to the emergency room this morning.  Neurosurgery was contacted and recommended admission for further pain management and decision making regarding her cervical spine.    Past Medical History:   Diagnosis Date    Alcohol abuse     in recovery, sober since 2012    Anxiety     Arthritis     Asthma     Edmonds esophagus     Cervical neuritis     Chronic pain     COPD (chronic obstructive pulmonary disease)     Depression     Diabetes mellitus     Type 2    DJD (degenerative joint disease), cervical     Foot spasms     GERD (gastroesophageal reflux disease)     Headache     Hiatal hernia     Hyperlipidemia     Hypertension     Low back pain     Seasonal allergies     Spinal headache     Spinal stenosis      Past Surgical History:   Procedure Laterality Date    ANTERIOR CERVICAL DISCECTOMY W/ FUSION  11/2003    C5-6, C6-7    CERCLAGE CERVIX      CERVICAL EPIDURAL  2007    CERVICAL FUSION      DILATATION AND CURETTAGE      ENDOMETRIAL ABLATION  2007    ENDOSCOPY      MULTIPLE    ENDOSCOPY N/A 07/05/2016    Procedure: ESOPHAGOGASTRODUODENOSCOPY WITH COLD BIOPSIES;  Surgeon: Jose Mcclellan MD;  Location: [facility]  ENDOSCOPY;  Service:     ESOPHAGEAL DILATATION  2009    HAND SURGERY Bilateral     CARTILAGE    LUMBAR FUSION      LUMBAR LAMINECTOMY WITH FUSION N/A 06/30/2023    Procedure: OPEN LUMBAR FOUR TO FIVE TRANSFORAMINAL LUMBAR INTERBODY FUSION;  Surgeon: Alex Ortiz MD;  Location: [facility];  Service: Neurosurgery;  Laterality: N/A;    NECK SURGERY      NISSEN FUNDOPLICATION LAPAROSCOPIC  03/2012    SACROILIAC JOINT FUSION Right 6/4/2024    Procedure: RIGHT PERCUTANEOUS ARTHRODESIS SACROILIAC JOINT, NEUROMONITORING;  Surgeon: Alex Ortiz MD;  Location: [facility] OR;  Service: Neurosurgery;  Laterality: Right;    TONSILLECTOMY       Family History   Problem Relation Age of Onset    Cancer Mother         nonhodkinds lymphoma    Heart disease Father     Drug abuse Sister     Heart disease Brother     Lupus Maternal Aunt     Malig Hyperthermia Neg Hx      Social History     Tobacco Use    Smoking status: Every Day     Current packs/day: 0.25     Average packs/day: 0.5 packs/day for 16.6 years (7.9 ttl pk-yrs)     Types: Cigarettes     Start date: 2/25/2006     Last attempt to quit: 2/25/2021    Smokeless tobacco: Never   Vaping Use    Vaping status: Never Used   Substance Use Topics    Alcohol use: No     Comment: sober since 2012, worsk 12 steps    Drug use: No     Medications Prior to Admission   Medication Sig Dispense Refill Last Dose    acetaminophen (TYLENOL) 500 MG tablet Take 1 tablet by mouth 2 (Two) Times a Day As Needed. Indications: Pain   8/13/2024    albuterol sulfate  (90 Base) MCG/ACT inhaler Inhale 1 puff As Needed for Wheezing. 6.7 g 3 Past Week    amLODIPine (NORVASC) 2.5 MG tablet Take 1 tablet by mouth Every Night.   8/13/2024    atorvastatin (LIPITOR) 40 MG tablet Take 1 tablet by mouth Daily. (Patient taking differently: Take 1 tablet by mouth Every Night.) 90 tablet 1 8/13/2024    cyclobenzaprine (FLEXERIL) 10 MG tablet Take 1 tablet by mouth 3 (Three) Times a Day As Needed for  Muscle Spasms. 90 tablet 2 8/13/2024    DULoxetine (CYMBALTA) 30 MG capsule Take 1 capsule by mouth Daily. Add to 60 mg= 90 mg total daily 30 capsule 5 8/13/2024    DULoxetine (CYMBALTA) 60 MG capsule Take 1 capsule by mouth Daily. Indications: Major Depressive Disorder, Musculoskeletal Pain 90 capsule 0 8/13/2024    fluticasone (FLONASE) 50 MCG/ACT nasal spray 2 sprays into the nostril(s) as directed by provider Daily. 18.2 mL 5 8/13/2024    hydroCHLOROthiazide (HYDRODIURIL) 25 MG tablet Take 1 tablet by mouth Daily for 90 days. 90 tablet 1 8/13/2024    losartan (COZAAR) 100 MG tablet TAKE 1 TABLET BY MOUTH DAILY 90 tablet 1 8/13/2024    metFORMIN ER (GLUCOPHAGE-XR) 500 MG 24 hr tablet Take 1 tablet by mouth Every Night for 90 days. 90 tablet 1 8/13/2024    metoprolol succinate XL (TOPROL-XL) 50 MG 24 hr tablet Take 1 tablet by mouth Daily. 90 tablet 0 8/13/2024    Multiple Vitamins-Minerals (MULTIVITAMIN ADULT) tablet Take 1 tablet by mouth Daily.   8/13/2024    naproxen sodium (ALEVE) 220 MG tablet Take 1 tablet by mouth 2 (Two) Times a Day As Needed.   8/13/2024     Allergies:  Fluoxetine, Sulfa antibiotics, Zofran [ondansetron hcl], and Lisinopril    Review of Systems   Constitutional:  Negative for chills and fever.   HENT:  Negative for congestion and sore throat.         Posterior neck pain   Eyes:  Negative for visual disturbance.   Respiratory:  Negative for cough, chest tightness, shortness of breath and wheezing.    Cardiovascular:  Negative for chest pain, palpitations and leg swelling.   Gastrointestinal:  Negative for abdominal distention, abdominal pain, diarrhea, nausea and vomiting.   Endocrine: Negative for polydipsia and polyuria.   Genitourinary:  Negative for difficulty urinating, dysuria, frequency and urgency.   Musculoskeletal:  Negative for arthralgias and myalgias.   Skin:  Negative for color change and rash.   Neurological:  Positive for numbness. Negative for dizziness and  light-headedness.        PHYSICAL EXAM    Vital Signs  tMax 24 hrs:  Temp (24hrs), Av.8 °F (37.1 °C), Min:98.7 °F (37.1 °C), Max:98.9 °F (37.2 °C)    Vitals Ranges:  Temp:  [98.7 °F (37.1 °C)-98.9 °F (37.2 °C)] 98.7 °F (37.1 °C)  Heart Rate:  [] 90  Resp:  [18] 18  BP: (145-196)/(83-98) 181/96    Physical Exam  Vitals and nursing note reviewed.   Constitutional:       General: She is not in acute distress.  HENT:      Head: Normocephalic and atraumatic.   Eyes:      Extraocular Movements: Extraocular movements intact.      Pupils: Pupils are equal, round, and reactive to light.   Cardiovascular:      Rate and Rhythm: Normal rate and regular rhythm.   Pulmonary:      Effort: Pulmonary effort is normal. No respiratory distress.      Breath sounds: Normal breath sounds.   Abdominal:      General: There is no distension.      Palpations: Abdomen is soft.      Tenderness: There is no abdominal tenderness.   Musculoskeletal:         General: No swelling or deformity.      Cervical back: Normal range of motion.   Skin:     General: Skin is warm and dry.   Neurological:      General: No focal deficit present.      Mental Status: She is alert and oriented to person, place, and time.         Results Review:  Results from last 7 days   Lab Units 24  1013   WBC 10*3/mm3 6.97   HEMOGLOBIN g/dL 12.8   HEMATOCRIT % 38.5   PLATELETS 10*3/mm3 248     Results from last 7 days   Lab Units 24  1013   SODIUM mmol/L 135*   POTASSIUM mmol/L 3.8   CHLORIDE mmol/L 99   CO2 mmol/L 25.0   BUN mg/dL 7*   CREATININE mg/dL 0.65   CALCIUM mg/dL 9.5   BILIRUBIN mg/dL <0.2   ALK PHOS U/L 95   ALT (SGPT) U/L 8   AST (SGOT) U/L 15   GLUCOSE mg/dL 101*        I reviewed the patient's new clinical results.  I reviewed the patient's new imaging results and agree with the interpretation.        Active Hospital Problems    Diagnosis  POA    **Acute neck pain [M54.2]  Yes    Essential hypertension [I10]  Yes    COPD (chronic  obstructive pulmonary disease) [J44.9]  Yes    Controlled type 2 diabetes mellitus without complication, without long-term current use of insulin [E11.9]  Yes    Hyperlipidemia [E78.5]  Yes      Resolved Hospital Problems   No resolved problems to display.       Assessment & Plan    The patient has been admitted.  Neurosurgery has been consulted for further recommendations regarding epidural steroid injection and need for systemic steroids, etc.  Defer any need for additional imaging of the cervical spine to neurosurgery.  I will go ahead and restart her home dose of Cymbalta, muscle relaxer, and add low-dose gabapentin along with as needed Toradol.  Blood pressure is elevated due to pain and will reinstitute her home antihypertensive regimen.  Her COPD appears stable with no evidence of acute exacerbation.  Diabetes also appears well-controlled.  Place her home metformin on hold for now.  I think we can hold off on serial Accu-Cheks and sliding scale but will check a glucose again in the morning.  Additional plans based on her clinical course.    I discussed the patients findings and my recommendations with patient and nursing staff      Adams Eller MD  08/14/24  14:56 EDT

## 2024-08-14 NOTE — PROGRESS NOTES
Clinical Pharmacy Services: Medication History    Kristina Yates is a 64 y.o. female presenting to [facility] for   Chief Complaint   Patient presents with    Neck Pain       She  has a past medical history of Alcohol abuse, Anxiety, Arthritis, Asthma, Edmonds esophagus, Cervical neuritis, Chronic pain, COPD (chronic obstructive pulmonary disease), Depression, Diabetes mellitus, DJD (degenerative joint disease), cervical, Foot spasms, GERD (gastroesophageal reflux disease), Headache, Hiatal hernia, Hyperlipidemia, Hypertension, Low back pain, Seasonal allergies, Spinal headache, and Spinal stenosis.    Allergies as of 08/14/2024 - Reviewed 08/14/2024   Allergen Reaction Noted    Fluoxetine Hives 05/23/2014    Sulfa antibiotics Hives 07/01/2016    Zofran [ondansetron hcl] Hives 07/01/2016    Lisinopril Cough 03/15/2018       Medication information was obtained from: Patient   Pharmacy and Phone Number:     Prior to Admission Medications       Prescriptions Last Dose Informant Patient Reported? Taking?    acetaminophen (TYLENOL) 500 MG tablet 8/13/2024 Self Yes Yes    Take 1 tablet by mouth 2 (Two) Times a Day As Needed. Indications: Pain    albuterol sulfate  (90 Base) MCG/ACT inhaler Past Week Pharmacy No Yes    Inhale 1 puff As Needed for Wheezing.    amLODIPine (NORVASC) 2.5 MG tablet 8/13/2024 Self, Pharmacy Yes Yes    Take 1 tablet by mouth Every Night.    atorvastatin (LIPITOR) 40 MG tablet 8/13/2024 Self No Yes    Take 1 tablet by mouth Daily.    Patient taking differently:  Take 1 tablet by mouth Every Night.    cyclobenzaprine (FLEXERIL) 10 MG tablet 8/13/2024 Pharmacy, Self No Yes    Take 1 tablet by mouth 3 (Three) Times a Day As Needed for Muscle Spasms.    DULoxetine (CYMBALTA) 30 MG capsule 8/13/2024  No Yes    Take 1 capsule by mouth Daily. Add to 60 mg= 90 mg total daily    DULoxetine (CYMBALTA) 60 MG capsule 8/13/2024 Pharmacy, Self No Yes    Take 1 capsule by mouth Daily.  Indications: Major Depressive Disorder, Musculoskeletal Pain    fluticasone (FLONASE) 50 MCG/ACT nasal spray 8/13/2024 Pharmacy, Self No Yes    2 sprays into the nostril(s) as directed by provider Daily.    hydroCHLOROthiazide (HYDRODIURIL) 25 MG tablet 8/13/2024 Pharmacy, Self No Yes    Take 1 tablet by mouth Daily for 90 days.    losartan (COZAAR) 100 MG tablet 8/13/2024 Self No Yes    TAKE 1 TABLET BY MOUTH DAILY    metFORMIN ER (GLUCOPHAGE-XR) 500 MG 24 hr tablet 8/13/2024 Pharmacy, Self No Yes    Take 1 tablet by mouth Every Night for 90 days.    metoprolol succinate XL (TOPROL-XL) 50 MG 24 hr tablet 8/13/2024 Pharmacy, Self No Yes    Take 1 tablet by mouth Daily.    Multiple Vitamins-Minerals (MULTIVITAMIN ADULT) tablet 8/13/2024 Self Yes Yes    Take 1 tablet by mouth Daily.    naproxen sodium (ALEVE) 220 MG tablet 8/13/2024 Self Yes Yes    Take 1 tablet by mouth 2 (Two) Times a Day As Needed.              Medication notes:     This medication list is complete to the best of my knowledge as of 8/14/2024    Please call if questions.    Michael Hackett  Medication History Technician  [phone]    8/14/2024 11:01 EDT

## 2024-08-14 NOTE — CONSULTS
[facility] NEUROSURGERY CONSULT NOTE    Patient name: Kristina Yates  Referring Provider: KELIN Siegel  Reason for Consultation: Neck pain    Patient Care Team:  Nyla Mejia APRN as PCP - General (Family Medicine)  Brooklyn Miles PA as Physician Assistant (Obstetrics and Gynecology)  Pedro Luis Zayas MD as Cardiologist (Cardiology)    Chief complaint: Neck pain    Subjective .     History of present illness:    Patient is a 64 y.o. right handed female with past medical history of DM 2, cervical radiculopathy, cauda equina syndrome with neurogenic bladder status post L4-5 TLIF 7/2/2023 previous C5-7 cervical fusion in 2003.  Patient presents today with worsening neck pain over the past 6 weeks.  She reports that the pain is constant, throbbing and burning in nature.  It does radiate down the left arm into her fourth finger only.  She denies weakness, fever, chills or recent injury or trauma.  She last saw Dr. Ortiz in the office 7/30/2024 with the same complaint and has been doing physical therapy and dry needling which provide temporary relief of her symptoms.  She is scheduled for cervical epidural at [facility] pain management on August 20, however,  feels that the pain is continuing to worsen and she cannot wait that long. She has been taking Flexeril and Tylenol without relief of her symptoms.      History  PAST MEDICAL HISTORY  Past Medical History:   Diagnosis Date    Alcohol abuse     in recovery, sober since 2012    Anxiety     Arthritis     Asthma     Edmonds esophagus     Cervical neuritis     Chronic pain     COPD (chronic obstructive pulmonary disease)     Depression     Diabetes mellitus     Type 2    DJD (degenerative joint disease), cervical     Foot spasms     GERD (gastroesophageal reflux disease)     Headache     Hiatal hernia     Hyperlipidemia     Hypertension     Low back pain     Seasonal allergies     Spinal headache     Spinal stenosis        PAST SURGICAL HISTORY  Past Surgical  History:   Procedure Laterality Date    ANTERIOR CERVICAL DISCECTOMY W/ FUSION  11/2003    C5-6, C6-7    CERCLAGE CERVIX      CERVICAL EPIDURAL  2007    CERVICAL FUSION      DILATATION AND CURETTAGE      ENDOMETRIAL ABLATION  2007    ENDOSCOPY      MULTIPLE    ENDOSCOPY N/A 07/05/2016    Procedure: ESOPHAGOGASTRODUODENOSCOPY WITH COLD BIOPSIES;  Surgeon: Jose Mcclellan MD;  Location: [facility] ENDOSCOPY;  Service:     ESOPHAGEAL DILATATION  2009    HAND SURGERY Bilateral     CARTILAGE    LUMBAR FUSION      LUMBAR LAMINECTOMY WITH FUSION N/A 06/30/2023    Procedure: OPEN LUMBAR FOUR TO FIVE TRANSFORAMINAL LUMBAR INTERBODY FUSION;  Surgeon: Alex Ortiz MD;  Location: [facility] MAIN OR;  Service: Neurosurgery;  Laterality: N/A;    NECK SURGERY      NISSEN FUNDOPLICATION LAPAROSCOPIC  03/2012    SACROILIAC JOINT FUSION Right 6/4/2024    Procedure: RIGHT PERCUTANEOUS ARTHRODESIS SACROILIAC JOINT, NEUROMONITORING;  Surgeon: Alex Ortiz MD;  Location: [facility] MAIN OR;  Service: Neurosurgery;  Laterality: Right;    TONSILLECTOMY         FAMILY HISTORY  Family History   Problem Relation Age of Onset    Cancer Mother         nonhodkinds lymphoma    Heart disease Father     Drug abuse Sister     Heart disease Brother     Lupus Maternal Aunt     Malig Hyperthermia Neg Hx        SOCIAL HISTORY  Social History     Tobacco Use    Smoking status: Every Day     Current packs/day: 0.25     Average packs/day: 0.5 packs/day for 16.6 years (7.9 ttl pk-yrs)     Types: Cigarettes     Start date: 2/25/2006     Last attempt to quit: 2/25/2021    Smokeless tobacco: Never   Vaping Use    Vaping status: Never Used   Substance Use Topics    Alcohol use: No     Comment: sober since 2012, worsk 12 steps    Drug use: No       Not employed    Allergies:  Fluoxetine, Sulfa antibiotics, Zofran [ondansetron hcl], and Lisinopril    MEDICATIONS:  Medications Prior to Admission   Medication Sig Dispense Refill Last Dose    acetaminophen (TYLENOL)  500 MG tablet Take 1 tablet by mouth 2 (Two) Times a Day As Needed. Indications: Pain   8/13/2024    albuterol sulfate  (90 Base) MCG/ACT inhaler Inhale 1 puff As Needed for Wheezing. 6.7 g 3 Past Week    amLODIPine (NORVASC) 2.5 MG tablet Take 1 tablet by mouth Every Night.   8/13/2024    atorvastatin (LIPITOR) 40 MG tablet Take 1 tablet by mouth Daily. (Patient taking differently: Take 1 tablet by mouth Every Night.) 90 tablet 1 8/13/2024    cyclobenzaprine (FLEXERIL) 10 MG tablet Take 1 tablet by mouth 3 (Three) Times a Day As Needed for Muscle Spasms. 90 tablet 2 8/13/2024    DULoxetine (CYMBALTA) 30 MG capsule Take 1 capsule by mouth Daily. Add to 60 mg= 90 mg total daily 30 capsule 5 8/13/2024    DULoxetine (CYMBALTA) 60 MG capsule Take 1 capsule by mouth Daily. Indications: Major Depressive Disorder, Musculoskeletal Pain 90 capsule 0 8/13/2024    fluticasone (FLONASE) 50 MCG/ACT nasal spray 2 sprays into the nostril(s) as directed by provider Daily. 18.2 mL 5 8/13/2024    hydroCHLOROthiazide (HYDRODIURIL) 25 MG tablet Take 1 tablet by mouth Daily for 90 days. 90 tablet 1 8/13/2024    losartan (COZAAR) 100 MG tablet TAKE 1 TABLET BY MOUTH DAILY 90 tablet 1 8/13/2024    metFORMIN ER (GLUCOPHAGE-XR) 500 MG 24 hr tablet Take 1 tablet by mouth Every Night for 90 days. 90 tablet 1 8/13/2024    metoprolol succinate XL (TOPROL-XL) 50 MG 24 hr tablet Take 1 tablet by mouth Daily. 90 tablet 0 8/13/2024    Multiple Vitamins-Minerals (MULTIVITAMIN ADULT) tablet Take 1 tablet by mouth Daily.   8/13/2024    naproxen sodium (ALEVE) 220 MG tablet Take 1 tablet by mouth 2 (Two) Times a Day As Needed.   8/13/2024       COMORBID CONDITIONS:  Diabetes Type 2      Review of Systems  Review of Systems   Constitutional:  Negative for chills and fever.   Musculoskeletal:  Positive for neck pain. Negative for back pain.   Neurological:  Negative for weakness.   Psychiatric/Behavioral:  The patient is not nervous/anxious.       Objective     Physical Exam  Physical Exam  Vitals reviewed.   Constitutional:       Appearance: Normal appearance.   Pulmonary:      Effort: Pulmonary effort is normal.   Musculoskeletal:      Cervical back: Bony tenderness present.      Thoracic back: Normal.      Lumbar back: Normal.   Skin:     General: Skin is warm and dry.   Neurological:      Mental Status: She is alert and oriented to person, place, and time.      Deep Tendon Reflexes:      Reflex Scores:       Tricep reflexes are 2+ on the right side and 2+ on the left side.       Bicep reflexes are 2+ on the right side and 2+ on the left side.       Brachioradialis reflexes are 2+ on the right side and 2+ on the left side.  Psychiatric:         Speech: Speech normal.       Neurologic Exam     Mental Status   Oriented to person, place, and time.   Speech: speech is normal   Level of consciousness: alert  Knowledge: good.     Motor Exam   Muscle bulk: normal  Overall muscle tone: normal  5/5 strength right upper extremity, very mild noticeable weakness in LUE.  5/5 strength to BLE     Sensory Exam   Light touch normal.     Gait, Coordination, and Reflexes     Reflexes   Right brachioradialis: 2+  Left brachioradialis: 2+  Right biceps: 2+  Left biceps: 2+  Right triceps: 2+  Left triceps: 2+  Right Roy: absent  Left Roy: absent  Gait not tested         Results Review:    LABS:    Admission on 08/14/2024   Component Date Value Ref Range Status    Glucose 08/14/2024 101 (H)  65 - 99 mg/dL Final    BUN 08/14/2024 7 (L)  8 - 23 mg/dL Final    Creatinine 08/14/2024 0.65  0.57 - 1.00 mg/dL Final    Sodium 08/14/2024 135 (L)  136 - 145 mmol/L Final    Potassium 08/14/2024 3.8  3.5 - 5.2 mmol/L Final    Chloride 08/14/2024 99  98 - 107 mmol/L Final    CO2 08/14/2024 25.0  22.0 - 29.0 mmol/L Final    Calcium 08/14/2024 9.5  8.6 - 10.5 mg/dL Final    Total Protein 08/14/2024 7.3  6.0 - 8.5 g/dL Final    Albumin 08/14/2024 4.6  3.5 - 5.2 g/dL Final    ALT  (SGPT) 08/14/2024 8  1 - 33 U/L Final    AST (SGOT) 08/14/2024 15  1 - 32 U/L Final    Alkaline Phosphatase 08/14/2024 95  39 - 117 U/L Final    Total Bilirubin 08/14/2024 <0.2  0.0 - 1.2 mg/dL Final    Globulin 08/14/2024 2.7  gm/dL Final    A/G Ratio 08/14/2024 1.7  g/dL Final    BUN/Creatinine Ratio 08/14/2024 10.8  7.0 - 25.0 Final    Anion Gap 08/14/2024 11.0  5.0 - 15.0 mmol/L Final    eGFR 08/14/2024 99.1  >60.0 mL/min/1.73 Final    Protime 08/14/2024 12.7  11.7 - 14.2 Seconds Final    INR 08/14/2024 0.93  0.90 - 1.10 Final    PTT 08/14/2024 49.7 (H)  22.7 - 35.4 seconds Final    WBC 08/14/2024 6.97  3.40 - 10.80 10*3/mm3 Final    RBC 08/14/2024 4.26  3.77 - 5.28 10*6/mm3 Final    Hemoglobin 08/14/2024 12.8  12.0 - 15.9 g/dL Final    Hematocrit 08/14/2024 38.5  34.0 - 46.6 % Final    MCV 08/14/2024 90.4  79.0 - 97.0 fL Final    MCH 08/14/2024 30.0  26.6 - 33.0 pg Final    MCHC 08/14/2024 33.2  31.5 - 35.7 g/dL Final    RDW 08/14/2024 12.2 (L)  12.3 - 15.4 % Final    RDW-SD 08/14/2024 39.8  37.0 - 54.0 fl Final    MPV 08/14/2024 10.1  6.0 - 12.0 fL Final    Platelets 08/14/2024 248  140 - 450 10*3/mm3 Final    Neutrophil % 08/14/2024 48.0  42.7 - 76.0 % Final    Lymphocyte % 08/14/2024 41.0  19.6 - 45.3 % Final    Monocyte % 08/14/2024 7.6  5.0 - 12.0 % Final    Eosinophil % 08/14/2024 2.7  0.3 - 6.2 % Final    Basophil % 08/14/2024 0.6  0.0 - 1.5 % Final    Immature Grans % 08/14/2024 0.1  0.0 - 0.5 % Final    Neutrophils, Absolute 08/14/2024 3.34  1.70 - 7.00 10*3/mm3 Final    Lymphocytes, Absolute 08/14/2024 2.86  0.70 - 3.10 10*3/mm3 Final    Monocytes, Absolute 08/14/2024 0.53  0.10 - 0.90 10*3/mm3 Final    Eosinophils, Absolute 08/14/2024 0.19  0.00 - 0.40 10*3/mm3 Final    Basophils, Absolute 08/14/2024 0.04  0.00 - 0.20 10*3/mm3 Final    Immature Grans, Absolute 08/14/2024 0.01  0.00 - 0.05 10*3/mm3 Final    nRBC 08/14/2024 0.0  0.0 - 0.2 /100 WBC Final       DIAGNOSTICS:  MRI Cervical spine  4/14/2024:  Patient is status post ACDF from C5-C7.  Multilevel foraminal narrowing with no significant change since prior study.  No significant canal stenosis identified.  C4-5 degenerative anterior spondylolisthesis of C4 on C5.    Results Review:   I reviewed the patient's new clinical results.  I personally viewed  the patient's MRI cervical spine    Vital Signs   Temp:  [98.7 °F (37.1 °C)-98.9 °F (37.2 °C)] 98.7 °F (37.1 °C)  Heart Rate:  [] 90  Resp:  [18] 18  BP: (145-196)/(83-98) 181/96      Assessment & Plan       Acute neck pain    Controlled type 2 diabetes mellitus without complication, without long-term current use of insulin    Hyperlipidemia    COPD (chronic obstructive pulmonary disease)    Essential hypertension      Problem List Items Addressed This Visit    None  Visit Diagnoses       Acute cervical radiculopathy    -  Primary    Intractable pain               64-year-old female with history of previous cervical fusion C5-7 and L4-5 TLIF who presents with worsening neck pain over the past 6 weeks.  Scheduled for cervical epidural 8/28 [facility] pain management however pain is increased and she does not feel like she can wait that long.  Will order DICKSON, continue PT during this admission.  Patient is followed by Dr. Ortiz, however he is out of the office this week, we will have patient follow-up with him when he returns.  Patient had cervical MRI in April, do not feel new imaging needed at this time.  Will try [place] cervical collar to see if that helps with patient's pain.      PLAN:   -DICKSON  -Aspen collar for comfort/pain      I discussed the patient's findings and my recommendations with patient, family, consulting provider, and Dr Obrien    During patient visit, I utilized appropriate personal protective equipment including gloves and mask.  Mask used was standard procedure mask. Appropriate PPE was worn during the entire visit.  Hand hygiene was completed before and after.  "    Adrienne Krause, APRN  08/14/24  14:19 EDT    \"Dictated utilizing Dragon dictation\".      " No

## 2024-08-14 NOTE — ED PROVIDER NOTES
MD ATTESTATION NOTE    SHARED VISIT: This visit was performed by BOTH a physician and an APC. The substantive portion of the medical decision making was performed by this attesting physician who made or approved the management plan and takes responsibility for patient management. All studies documented in the APC note (if performed) were independently interpreted by me.    The WILSON and I have discussed this patient's history, physical exam, MDM, and treatment plan.  I have reviewed the documentation and personally had a face to face interaction with the patient. The attached note describes my personal findings.      Kristina Yates is a 64 y.o. female who presents to the ED c/o acute on chronic neck pain.  She states that she has baseline neck pain but it has been worse over the past 24 hours.  No fever.  Nonradiating.    On exam:  GENERAL: not distressed  HENT: nares patent  EYES: no scleral icterus  CV: regular rhythm, regular rate  RESPIRATORY: normal effort  ABDOMEN: soft  MUSCULOSKELETAL: no deformity  NEURO: alert, moves all extremities, follows commands  5/5 strength to biceps, triceps, interosseus muscles, and FCR.  2+ reflexes to biceps, triceps, brachioradialis.   Sensation intact to light touch.  SKIN: warm, dry    Labs  Recent Results (from the past 24 hour(s))   Comprehensive Metabolic Panel    Collection Time: 08/14/24 10:13 AM    Specimen: Blood   Result Value Ref Range    Glucose 101 (H) 65 - 99 mg/dL    BUN 7 (L) 8 - 23 mg/dL    Creatinine 0.65 0.57 - 1.00 mg/dL    Sodium 135 (L) 136 - 145 mmol/L    Potassium 3.8 3.5 - 5.2 mmol/L    Chloride 99 98 - 107 mmol/L    CO2 25.0 22.0 - 29.0 mmol/L    Calcium 9.5 8.6 - 10.5 mg/dL    Total Protein 7.3 6.0 - 8.5 g/dL    Albumin 4.6 3.5 - 5.2 g/dL    ALT (SGPT) 8 1 - 33 U/L    AST (SGOT) 15 1 - 32 U/L    Alkaline Phosphatase 95 39 - 117 U/L    Total Bilirubin <0.2 0.0 - 1.2 mg/dL    Globulin 2.7 gm/dL    A/G Ratio 1.7 g/dL    BUN/Creatinine Ratio 10.8 7.0 - 25.0     Anion Gap 11.0 5.0 - 15.0 mmol/L    eGFR 99.1 >60.0 mL/min/1.73   Protime-INR    Collection Time: 08/14/24 10:13 AM    Specimen: Blood   Result Value Ref Range    Protime 12.7 11.7 - 14.2 Seconds    INR 0.93 0.90 - 1.10   aPTT    Collection Time: 08/14/24 10:13 AM    Specimen: Blood   Result Value Ref Range    PTT 49.7 (H) 22.7 - 35.4 seconds   CBC Auto Differential    Collection Time: 08/14/24 10:13 AM    Specimen: Blood   Result Value Ref Range    WBC 6.97 3.40 - 10.80 10*3/mm3    RBC 4.26 3.77 - 5.28 10*6/mm3    Hemoglobin 12.8 12.0 - 15.9 g/dL    Hematocrit 38.5 34.0 - 46.6 %    MCV 90.4 79.0 - 97.0 fL    MCH 30.0 26.6 - 33.0 pg    MCHC 33.2 31.5 - 35.7 g/dL    RDW 12.2 (L) 12.3 - 15.4 %    RDW-SD 39.8 37.0 - 54.0 fl    MPV 10.1 6.0 - 12.0 fL    Platelets 248 140 - 450 10*3/mm3    Neutrophil % 48.0 42.7 - 76.0 %    Lymphocyte % 41.0 19.6 - 45.3 %    Monocyte % 7.6 5.0 - 12.0 %    Eosinophil % 2.7 0.3 - 6.2 %    Basophil % 0.6 0.0 - 1.5 %    Immature Grans % 0.1 0.0 - 0.5 %    Neutrophils, Absolute 3.34 1.70 - 7.00 10*3/mm3    Lymphocytes, Absolute 2.86 0.70 - 3.10 10*3/mm3    Monocytes, Absolute 0.53 0.10 - 0.90 10*3/mm3    Eosinophils, Absolute 0.19 0.00 - 0.40 10*3/mm3    Basophils, Absolute 0.04 0.00 - 0.20 10*3/mm3    Immature Grans, Absolute 0.01 0.00 - 0.05 10*3/mm3    nRBC 0.0 0.0 - 0.2 /100 WBC       Radiology  No Radiology Exams Resulted Within Past 24 Hours    Medications given in the ED:  Medications   sodium chloride 0.9 % flush 10 mL (has no administration in time range)   Lidocaine 4 % 1 patch (1 patch Transdermal Medication Applied 8/14/24 1023)   prochlorperazine (COMPAZINE) injection 10 mg (0 mg Intravenous Hold 8/14/24 1111)   dexAMETHasone (DECADRON) injection 10 mg (10 mg Intravenous Given 8/14/24 1021)   ketorolac (TORADOL) injection 30 mg (30 mg Intravenous Given 8/14/24 1020)   morphine injection 4 mg (4 mg Intravenous Given 8/14/24 1107)   metoclopramide (REGLAN) injection 10 mg (10 mg  Intravenous Given 8/14/24 1105)       Orders placed during this visit:  Orders Placed This Encounter   Procedures    Comprehensive Metabolic Panel    Protime-INR    aPTT    CBC Auto Differential    Neurosurgery (on-call MD unless specified)    [facility] (on-call MD unless specified) Details    Insert Peripheral IV    Inpatient Admission    Initiate Observation Status    CBC & Differential       Medical Decision Making:  ED Course as of 08/14/24 1204   Wed Aug 14, 2024   1039 I discussed the case with WILSON Guzmán with NS at this time regarding the patient.  I discussed work-up, results, concerns.  I discussed the consulting provider's desire for admission for observation and probable epidural, as well as neurosurgical consultation.   [DC]   1127 I discussed the case with MD Daphnie with [facility] at this time regarding the patient.  I discussed work-up, results, concerns.  I discussed the consulting provider's desire for MedSurg admit.   [DC]      ED Course User Index  [DC] Niko Khanna, PA           Diagnosis  Final diagnoses:   Acute cervical radiculopathy   Intractable pain          Estevan Barton II, MD  08/14/24 1205

## 2024-08-14 NOTE — PLAN OF CARE
Goal Outcome Evaluation:  Plan of Care Reviewed With: patient        Progress: no change  Outcome Evaluation: pt admitted from ER, c/o severe neck pain, medication given per orders, c/o nausea-medicated w compazine, tolerating diet, fall prec in place, amb w assist to BR, family visited at bedside, neurosx consult, pain management consult that needs called in the AM when they open, NPO after MN

## 2024-08-14 NOTE — TELEPHONE ENCOUNTER
Spoke with Mrs. Wyatt who is having a lot of neck pain and she stated that she is not sleeping at all. She attends PT twice a week and she does some exercises at home which will give her some relief for about an hour and then the pain persists again. She is due to have an epidural next Tuesday but she said that she doesn't think that she can wait that long to try to get some relief. She stated that she almost went to the ER yesterday but she was not sure if she should go, she is requesting a callback thanks!      Any injury? (Details to what happened) no      Any arm pain? (Left/Right/Bilateral) yes left arm    Numbness, tingling or weakness present? Yes numbness, tingling,weakness, and also lightheaded at times    Any imbalance issues? yes    Any incontinence issues? no    Have you had any recent imaging? Last XRAY was on 07/30/24    Any recent surgery? (If so, when and who's the surgeon) 06/04/24 with Dr. Ortiz    Are you taking any medications for this issue? Flexeril three times a day which takes the edge off but doesn't help a great deal.

## 2024-08-15 LAB
ANION GAP SERPL CALCULATED.3IONS-SCNC: 12.6 MMOL/L (ref 5–15)
BASOPHILS # BLD AUTO: 0.02 10*3/MM3 (ref 0–0.2)
BASOPHILS NFR BLD AUTO: 0.1 % (ref 0–1.5)
BUN SERPL-MCNC: 11 MG/DL (ref 8–23)
BUN/CREAT SERPL: 18.3 (ref 7–25)
CALCIUM SPEC-SCNC: 8.8 MG/DL (ref 8.6–10.5)
CHLORIDE SERPL-SCNC: 94 MMOL/L (ref 98–107)
CO2 SERPL-SCNC: 21.4 MMOL/L (ref 22–29)
CREAT SERPL-MCNC: 0.6 MG/DL (ref 0.57–1)
DEPRECATED RDW RBC AUTO: 41.5 FL (ref 37–54)
EGFRCR SERPLBLD CKD-EPI 2021: 101 ML/MIN/1.73
EOSINOPHIL # BLD AUTO: 0.02 10*3/MM3 (ref 0–0.4)
EOSINOPHIL NFR BLD AUTO: 0.1 % (ref 0.3–6.2)
ERYTHROCYTE [DISTWIDTH] IN BLOOD BY AUTOMATED COUNT: 12.5 % (ref 12.3–15.4)
GLUCOSE SERPL-MCNC: 144 MG/DL (ref 65–99)
HCT VFR BLD AUTO: 36.1 % (ref 34–46.6)
HGB BLD-MCNC: 12.4 G/DL (ref 12–15.9)
IMM GRANULOCYTES # BLD AUTO: 0.11 10*3/MM3 (ref 0–0.05)
IMM GRANULOCYTES NFR BLD AUTO: 0.7 % (ref 0–0.5)
LYMPHOCYTES # BLD AUTO: 1.48 10*3/MM3 (ref 0.7–3.1)
LYMPHOCYTES NFR BLD AUTO: 9.2 % (ref 19.6–45.3)
MCH RBC QN AUTO: 31.5 PG (ref 26.6–33)
MCHC RBC AUTO-ENTMCNC: 34.3 G/DL (ref 31.5–35.7)
MCV RBC AUTO: 91.6 FL (ref 79–97)
MONOCYTES # BLD AUTO: 0.9 10*3/MM3 (ref 0.1–0.9)
MONOCYTES NFR BLD AUTO: 5.6 % (ref 5–12)
NEUTROPHILS NFR BLD AUTO: 13.59 10*3/MM3 (ref 1.7–7)
NEUTROPHILS NFR BLD AUTO: 84.3 % (ref 42.7–76)
NRBC BLD AUTO-RTO: 0 /100 WBC (ref 0–0.2)
PLATELET # BLD AUTO: 255 10*3/MM3 (ref 140–450)
PMV BLD AUTO: 10.1 FL (ref 6–12)
POTASSIUM SERPL-SCNC: 4.1 MMOL/L (ref 3.5–5.2)
RBC # BLD AUTO: 3.94 10*6/MM3 (ref 3.77–5.28)
SODIUM SERPL-SCNC: 128 MMOL/L (ref 136–145)
WBC NRBC COR # BLD AUTO: 16.12 10*3/MM3 (ref 3.4–10.8)

## 2024-08-15 PROCEDURE — 25010000002 KETOROLAC TROMETHAMINE PER 15 MG: Performed by: INTERNAL MEDICINE

## 2024-08-15 PROCEDURE — 96376 TX/PRO/DX INJ SAME DRUG ADON: CPT

## 2024-08-15 PROCEDURE — G0378 HOSPITAL OBSERVATION PER HR: HCPCS

## 2024-08-15 PROCEDURE — 25010000002 PROCHLORPERAZINE 10 MG/2ML SOLUTION: Performed by: INTERNAL MEDICINE

## 2024-08-15 PROCEDURE — 85025 COMPLETE CBC W/AUTO DIFF WBC: CPT | Performed by: INTERNAL MEDICINE

## 2024-08-15 PROCEDURE — 80048 BASIC METABOLIC PNL TOTAL CA: CPT | Performed by: INTERNAL MEDICINE

## 2024-08-15 PROCEDURE — 25810000003 SODIUM CHLORIDE 0.9 % SOLUTION: Performed by: INTERNAL MEDICINE

## 2024-08-15 PROCEDURE — 25010000002 MORPHINE PER 10 MG: Performed by: INTERNAL MEDICINE

## 2024-08-15 RX ORDER — SODIUM CHLORIDE 9 MG/ML
100 INJECTION, SOLUTION INTRAVENOUS CONTINUOUS
Status: ACTIVE | OUTPATIENT
Start: 2024-08-15 | End: 2024-08-15

## 2024-08-15 RX ORDER — MORPHINE SULFATE 2 MG/ML
2 INJECTION, SOLUTION INTRAMUSCULAR; INTRAVENOUS EVERY 4 HOURS PRN
Status: DISCONTINUED | OUTPATIENT
Start: 2024-08-15 | End: 2024-08-17 | Stop reason: HOSPADM

## 2024-08-15 RX ORDER — HYDROCODONE BITARTRATE AND ACETAMINOPHEN 5; 325 MG/1; MG/1
1 TABLET ORAL EVERY 4 HOURS PRN
Status: DISCONTINUED | OUTPATIENT
Start: 2024-08-15 | End: 2024-08-16

## 2024-08-15 RX ADMIN — PROCHLORPERAZINE EDISYLATE 10 MG: 5 INJECTION INTRAMUSCULAR; INTRAVENOUS at 20:07

## 2024-08-15 RX ADMIN — MORPHINE SULFATE 2 MG: 2 INJECTION, SOLUTION INTRAMUSCULAR; INTRAVENOUS at 08:52

## 2024-08-15 RX ADMIN — Medication 10 ML: at 08:53

## 2024-08-15 RX ADMIN — PROCHLORPERAZINE EDISYLATE 10 MG: 5 INJECTION INTRAMUSCULAR; INTRAVENOUS at 03:41

## 2024-08-15 RX ADMIN — LOSARTAN POTASSIUM 100 MG: 100 TABLET, FILM COATED ORAL at 08:52

## 2024-08-15 RX ADMIN — HYDROCODONE BITARTRATE AND ACETAMINOPHEN 1 TABLET: 5; 325 TABLET ORAL at 23:42

## 2024-08-15 RX ADMIN — CYCLOBENZAPRINE 10 MG: 10 TABLET, FILM COATED ORAL at 16:52

## 2024-08-15 RX ADMIN — Medication 10 ML: at 20:10

## 2024-08-15 RX ADMIN — GABAPENTIN 100 MG: 100 CAPSULE ORAL at 06:00

## 2024-08-15 RX ADMIN — MORPHINE SULFATE 2 MG: 2 INJECTION, SOLUTION INTRAMUSCULAR; INTRAVENOUS at 20:07

## 2024-08-15 RX ADMIN — ATORVASTATIN CALCIUM 40 MG: 20 TABLET, FILM COATED ORAL at 20:09

## 2024-08-15 RX ADMIN — DULOXETINE HYDROCHLORIDE 30 MG: 30 CAPSULE, DELAYED RELEASE ORAL at 08:52

## 2024-08-15 RX ADMIN — HYDROCODONE BITARTRATE AND ACETAMINOPHEN 1 TABLET: 5; 325 TABLET ORAL at 03:38

## 2024-08-15 RX ADMIN — DULOXETINE HYDROCHLORIDE 60 MG: 60 CAPSULE, DELAYED RELEASE ORAL at 08:53

## 2024-08-15 RX ADMIN — SENNOSIDES AND DOCUSATE SODIUM 2 TABLET: 50; 8.6 TABLET ORAL at 08:52

## 2024-08-15 RX ADMIN — SODIUM CHLORIDE 100 ML/HR: 9 INJECTION, SOLUTION INTRAVENOUS at 08:52

## 2024-08-15 RX ADMIN — HYDROCODONE BITARTRATE AND ACETAMINOPHEN 1 TABLET: 5; 325 TABLET ORAL at 11:56

## 2024-08-15 RX ADMIN — METOPROLOL SUCCINATE 50 MG: 50 TABLET, FILM COATED, EXTENDED RELEASE ORAL at 08:53

## 2024-08-15 RX ADMIN — CYCLOBENZAPRINE 10 MG: 10 TABLET, FILM COATED ORAL at 08:52

## 2024-08-15 RX ADMIN — MORPHINE SULFATE 2 MG: 2 INJECTION, SOLUTION INTRAMUSCULAR; INTRAVENOUS at 14:21

## 2024-08-15 RX ADMIN — HYDROCODONE BITARTRATE AND ACETAMINOPHEN 1 TABLET: 5; 325 TABLET ORAL at 16:52

## 2024-08-15 RX ADMIN — AMLODIPINE BESYLATE 2.5 MG: 2.5 TABLET ORAL at 20:18

## 2024-08-15 RX ADMIN — KETOROLAC TROMETHAMINE 15 MG: 15 INJECTION, SOLUTION INTRAMUSCULAR; INTRAVENOUS at 06:04

## 2024-08-15 NOTE — PROGRESS NOTES
" LOS: 1 day     Name: Kristina Yates  Age: 64 y.o.  Sex: female  :  1960  MRN: [id number]         Primary Care Physician: Nyla Mejia APRN    Subjective   Subjective  Still with left-sided neck pain.    Objective   Vital Signs  Temp:  [96.9 °F (36.1 °C)-98.7 °F (37.1 °C)] 97 °F (36.1 °C)  Heart Rate:  [] 81  Resp:  [15-18] 16  BP: (115-181)/(69-96) 145/77  Body mass index is 25.76 kg/m².    Objective:  General Appearance:  Comfortable and in no acute distress.    Vital signs: (most recent): Blood pressure 145/77, pulse 81, temperature 97 °F (36.1 °C), temperature source Oral, resp. rate 16, height 168.9 cm (66.5\"), weight 73.5 kg (162 lb 0.6 oz), SpO2 98%, not currently breastfeeding.    Lungs:  Normal effort and normal respiratory rate.  She is not in respiratory distress.  There are decreased breath sounds.    Heart: Normal rate.  Regular rhythm.    Abdomen: Abdomen is soft.  Bowel sounds are normal.   There is no abdominal tenderness.     Extremities: There is no dependent edema or local swelling.    Neurological: Patient is alert and oriented to person, place and time.    Skin:  Warm and dry.                Results Review:       I reviewed the patient's new clinical results.    Results from last 7 days   Lab Units 08/15/24  0556 24  1013   WBC 10*3/mm3 16.12* 6.97   HEMOGLOBIN g/dL 12.4 12.8   PLATELETS 10*3/mm3 255 248     Results from last 7 days   Lab Units 08/15/24  0556 24  1013   SODIUM mmol/L 128* 135*   POTASSIUM mmol/L 4.1 3.8   CHLORIDE mmol/L 94* 99   CO2 mmol/L 21.4* 25.0   BUN mg/dL 11 7*   CREATININE mg/dL 0.60 0.65   CALCIUM mg/dL 8.8 9.5   GLUCOSE mg/dL 144* 101*     Results from last 7 days   Lab Units 24  1013   INR  0.93             Scheduled Meds:   amLODIPine, 2.5 mg, Oral, Nightly  atorvastatin, 40 mg, Oral, Nightly  DULoxetine, 30 mg, Oral, Daily  DULoxetine, 60 mg, Oral, Daily  gabapentin, 100 mg, Oral, Q8H  losartan, 100 mg, Oral, Daily  metoprolol " succinate XL, 50 mg, Oral, Daily  prochlorperazine, 10 mg, Intravenous, Once  senna-docusate sodium, 2 tablet, Oral, BID  sodium chloride, 10 mL, Intravenous, Q12H      PRN Meds:     albuterol    senna-docusate sodium **AND** polyethylene glycol **AND** bisacodyl **AND** bisacodyl    cyclobenzaprine    HYDROcodone-acetaminophen    Morphine    prochlorperazine    [COMPLETED] Insert Peripheral IV **AND** sodium chloride    sodium chloride    sodium chloride  Continuous Infusions:  sodium chloride, 100 mL/hr, Last Rate: 100 mL/hr (08/15/24 0852)        Assessment & Plan   Active Hospital Problems    Diagnosis  POA    **Acute neck pain [M54.2]  Yes    Essential hypertension [I10]  Yes    COPD (chronic obstructive pulmonary disease) [J44.9]  Yes    Controlled type 2 diabetes mellitus without complication, without long-term current use of insulin [E11.9]  Yes    Hyperlipidemia [E78.5]  Yes      Resolved Hospital Problems   No resolved problems to display.       Assessment & Plan    -Will increase the hydrocodone to every 4 hours.  Otherwise keep her pain control regimen the same.  Neurosurgery arranging for epidural steroid injection of the cervical spine which unfortunately is being delayed until tomorrow.  -Hyponatremia noted.  Stop hydrochlorothiazide.  Will give her some gentle normal saline today and recheck tomorrow.  -Continue Norvasc, losartan, metoprolol for blood pressure control  -Leukocytosis noted and most likely secondary to the steroid she received in the emergency room yesterday.  She is afebrile and we will recheck tomorrow.        Expected Discharge Date: 8/17/2024; Expected Discharge Time:      Adams Eller MD  [facility]  08/15/24  12:25 EDT

## 2024-08-15 NOTE — PROGRESS NOTES
Pain management will not be able to get patient's DICKSON performed today, she has been rescheduled for tomorrow.

## 2024-08-15 NOTE — PLAN OF CARE
Problem: Adult Inpatient Plan of Care  Goal: Plan of Care Review  Outcome: Ongoing, Not Progressing  Flowsheets (Taken 8/15/2024 0442)  Progress: no change  Plan of Care Reviewed With: patient  Outcome Evaluation: alert and oriented, complained frequently of neck pain and upper back pain that radiates to the LUE, medicated alternately with Norco, Flexeril on scheduled gabapentin, Comapzine given for nausea, assisted to the bathrrom with cane, NPO post MN for epidural today, VSS   Goal Outcome Evaluation:  Plan of Care Reviewed With: patient        Progress: no change  Outcome Evaluation: alert and oriented, complained frequently of neck pain and upper back pain that radiates to the LUE, medicated alternately with Norco, Flexeril on scheduled gabapentin, Comapzine given for nausea, assisted to the bathrrom with cane, NPO post MN for epidural today, VSS

## 2024-08-15 NOTE — PROGRESS NOTES
Continued Stay Note  [facility]     Patient Name: Kristina Yates  MRN: [id number]  Today's Date: 8/15/2024    Admit Date: 8/14/2024    Plan: Home with family assist   Discharge Plan       Row Name 08/15/24 1219       Plan    Plan Home with family assist    Plan Comments Introduced self and role of CCP. Patient confirmed DC plan is to return to home. Stated she is independent with ADL's and uses a cane as needed. Stated her spouse and son will assist as needed and will provide transportation at DC. Denies any needs/equipment. Patient si scheduled for Epidural tomorrow.                   Discharge Codes    No documentation.                       Maru Martinez RN

## 2024-08-15 NOTE — PLAN OF CARE
Goal Outcome Evaluation:  Plan of Care Reviewed With: patient        Progress: no change  Outcome Evaluation: Medicated for neck pain with norco and morphine as needed. Up with assist and cane. Walked in halls. Voiding freely. Epidural planned for tomorrow morning, patient to be NPO after midnight. Rested well between care.

## 2024-08-15 NOTE — NURSING NOTE
Diabetes Education    Patient Name:  Kristina Yates  YOB: 1960  MRN: [id number]  Admit Date:  8/14/2024      Diabetes educator consulted. Patient's A1c is well controlled. Please reconsult for new educational needs.      Electronically signed by:  Ervin Pacheco RN  08/15/24 12:47 EDT

## 2024-08-16 ENCOUNTER — ANESTHESIA (OUTPATIENT)
Dept: PAIN MEDICINE | Facility: HOSPITAL | Age: 64
End: 2024-08-16
Payer: COMMERCIAL

## 2024-08-16 ENCOUNTER — APPOINTMENT (OUTPATIENT)
Dept: GENERAL RADIOLOGY | Facility: HOSPITAL | Age: 64
End: 2024-08-16
Payer: COMMERCIAL

## 2024-08-16 ENCOUNTER — APPOINTMENT (OUTPATIENT)
Dept: PAIN MEDICINE | Facility: HOSPITAL | Age: 64
End: 2024-08-16
Payer: COMMERCIAL

## 2024-08-16 ENCOUNTER — ANESTHESIA EVENT (OUTPATIENT)
Dept: PAIN MEDICINE | Facility: HOSPITAL | Age: 64
End: 2024-08-16
Payer: COMMERCIAL

## 2024-08-16 LAB
ANION GAP SERPL CALCULATED.3IONS-SCNC: 8.1 MMOL/L (ref 5–15)
BUN SERPL-MCNC: 11 MG/DL (ref 8–23)
BUN/CREAT SERPL: 20 (ref 7–25)
CALCIUM SPEC-SCNC: 8.8 MG/DL (ref 8.6–10.5)
CHLORIDE SERPL-SCNC: 102 MMOL/L (ref 98–107)
CO2 SERPL-SCNC: 24.9 MMOL/L (ref 22–29)
CREAT SERPL-MCNC: 0.55 MG/DL (ref 0.57–1)
DEPRECATED RDW RBC AUTO: 42.8 FL (ref 37–54)
EGFRCR SERPLBLD CKD-EPI 2021: 103.1 ML/MIN/1.73
ERYTHROCYTE [DISTWIDTH] IN BLOOD BY AUTOMATED COUNT: 12.6 % (ref 12.3–15.4)
GLUCOSE SERPL-MCNC: 95 MG/DL (ref 65–99)
HCT VFR BLD AUTO: 35.6 % (ref 34–46.6)
HGB BLD-MCNC: 11.7 G/DL (ref 12–15.9)
MCH RBC QN AUTO: 30.3 PG (ref 26.6–33)
MCHC RBC AUTO-ENTMCNC: 32.9 G/DL (ref 31.5–35.7)
MCV RBC AUTO: 92.2 FL (ref 79–97)
PLATELET # BLD AUTO: 219 10*3/MM3 (ref 140–450)
PMV BLD AUTO: 10.8 FL (ref 6–12)
POTASSIUM SERPL-SCNC: 4.5 MMOL/L (ref 3.5–5.2)
RBC # BLD AUTO: 3.86 10*6/MM3 (ref 3.77–5.28)
SODIUM SERPL-SCNC: 135 MMOL/L (ref 136–145)
WBC NRBC COR # BLD AUTO: 11.09 10*3/MM3 (ref 3.4–10.8)

## 2024-08-16 PROCEDURE — 96376 TX/PRO/DX INJ SAME DRUG ADON: CPT

## 2024-08-16 PROCEDURE — 99232 SBSQ HOSP IP/OBS MODERATE 35: CPT

## 2024-08-16 PROCEDURE — 25010000002 DEXAMETHASONE SODIUM PHOSPHATE 10 MG/ML SOLUTION: Performed by: ANESTHESIOLOGY

## 2024-08-16 PROCEDURE — 25010000002 FENTANYL CITRATE (PF) 50 MCG/ML SOLUTION: Performed by: ANESTHESIOLOGY

## 2024-08-16 PROCEDURE — 25010000002 DEXAMETHASONE PER 1 MG: Performed by: ANESTHESIOLOGY

## 2024-08-16 PROCEDURE — 25010000002 MORPHINE PER 10 MG: Performed by: INTERNAL MEDICINE

## 2024-08-16 PROCEDURE — G0378 HOSPITAL OBSERVATION PER HR: HCPCS

## 2024-08-16 PROCEDURE — 85027 COMPLETE CBC AUTOMATED: CPT | Performed by: INTERNAL MEDICINE

## 2024-08-16 PROCEDURE — 25010000002 MIDAZOLAM PER 1 MG: Performed by: ANESTHESIOLOGY

## 2024-08-16 PROCEDURE — 77003 FLUOROGUIDE FOR SPINE INJECT: CPT

## 2024-08-16 PROCEDURE — 25510000001 IOPAMIDOL 41 % SOLUTION: Performed by: ANESTHESIOLOGY

## 2024-08-16 PROCEDURE — 80048 BASIC METABOLIC PNL TOTAL CA: CPT | Performed by: INTERNAL MEDICINE

## 2024-08-16 RX ORDER — AMLODIPINE BESYLATE 5 MG/1
5 TABLET ORAL NIGHTLY
Status: DISCONTINUED | OUTPATIENT
Start: 2024-08-16 | End: 2024-08-17 | Stop reason: HOSPADM

## 2024-08-16 RX ORDER — MIDAZOLAM HYDROCHLORIDE 1 MG/ML
1 INJECTION INTRAMUSCULAR; INTRAVENOUS AS NEEDED
Status: DISCONTINUED | OUTPATIENT
Start: 2024-08-16 | End: 2024-08-17 | Stop reason: HOSPADM

## 2024-08-16 RX ORDER — HYDROCODONE BITARTRATE AND ACETAMINOPHEN 5; 325 MG/1; MG/1
1 TABLET ORAL EVERY 4 HOURS PRN
Status: DISCONTINUED | OUTPATIENT
Start: 2024-08-16 | End: 2024-08-17 | Stop reason: HOSPADM

## 2024-08-16 RX ORDER — FENTANYL CITRATE 50 UG/ML
50 INJECTION, SOLUTION INTRAMUSCULAR; INTRAVENOUS AS NEEDED
Status: DISCONTINUED | OUTPATIENT
Start: 2024-08-16 | End: 2024-08-17 | Stop reason: HOSPADM

## 2024-08-16 RX ORDER — CYCLOBENZAPRINE HCL 10 MG
10 TABLET ORAL EVERY 8 HOURS
Status: DISCONTINUED | OUTPATIENT
Start: 2024-08-16 | End: 2024-08-17 | Stop reason: HOSPADM

## 2024-08-16 RX ORDER — HYDROCHLOROTHIAZIDE 12.5 MG/1
12.5 TABLET ORAL DAILY
Status: DISCONTINUED | OUTPATIENT
Start: 2024-08-16 | End: 2024-08-17 | Stop reason: HOSPADM

## 2024-08-16 RX ORDER — LIDOCAINE HYDROCHLORIDE 10 MG/ML
1 INJECTION, SOLUTION INFILTRATION; PERINEURAL ONCE AS NEEDED
Status: DISCONTINUED | OUTPATIENT
Start: 2024-08-16 | End: 2024-08-17 | Stop reason: HOSPADM

## 2024-08-16 RX ORDER — SODIUM CHLORIDE 0.9 % (FLUSH) 0.9 %
1-10 SYRINGE (ML) INJECTION AS NEEDED
Status: DISCONTINUED | OUTPATIENT
Start: 2024-08-16 | End: 2024-08-17 | Stop reason: HOSPADM

## 2024-08-16 RX ORDER — DEXAMETHASONE SODIUM PHOSPHATE 10 MG/ML
10 INJECTION, SOLUTION INTRAMUSCULAR; INTRAVENOUS ONCE
Status: COMPLETED | OUTPATIENT
Start: 2024-08-16 | End: 2024-08-16

## 2024-08-16 RX ORDER — IOPAMIDOL 408 MG/ML
12 INJECTION, SOLUTION INTRATHECAL
Status: COMPLETED | OUTPATIENT
Start: 2024-08-16 | End: 2024-08-16

## 2024-08-16 RX ORDER — DEXAMETHASONE SODIUM PHOSPHATE 4 MG/ML
INJECTION, SOLUTION INTRA-ARTICULAR; INTRALESIONAL; INTRAMUSCULAR; INTRAVENOUS; SOFT TISSUE
Status: COMPLETED | OUTPATIENT
Start: 2024-08-16 | End: 2024-08-16

## 2024-08-16 RX ADMIN — CYCLOBENZAPRINE 10 MG: 10 TABLET, FILM COATED ORAL at 03:18

## 2024-08-16 RX ADMIN — HYDROCODONE BITARTRATE AND ACETAMINOPHEN 1 TABLET: 5; 325 TABLET ORAL at 16:30

## 2024-08-16 RX ADMIN — MIDAZOLAM 1 MG: 1 INJECTION INTRAMUSCULAR; INTRAVENOUS at 08:58

## 2024-08-16 RX ADMIN — CYCLOBENZAPRINE HYDROCHLORIDE 10 MG: 10 TABLET, FILM COATED ORAL at 16:30

## 2024-08-16 RX ADMIN — GABAPENTIN 100 MG: 100 CAPSULE ORAL at 22:34

## 2024-08-16 RX ADMIN — MORPHINE SULFATE 2 MG: 2 INJECTION, SOLUTION INTRAMUSCULAR; INTRAVENOUS at 01:20

## 2024-08-16 RX ADMIN — Medication 10 ML: at 10:12

## 2024-08-16 RX ADMIN — AMLODIPINE BESYLATE 5 MG: 5 TABLET ORAL at 20:53

## 2024-08-16 RX ADMIN — SENNOSIDES AND DOCUSATE SODIUM 2 TABLET: 50; 8.6 TABLET ORAL at 20:53

## 2024-08-16 RX ADMIN — METOPROLOL SUCCINATE 50 MG: 50 TABLET, FILM COATED, EXTENDED RELEASE ORAL at 10:12

## 2024-08-16 RX ADMIN — MORPHINE SULFATE 2 MG: 2 INJECTION, SOLUTION INTRAMUSCULAR; INTRAVENOUS at 05:55

## 2024-08-16 RX ADMIN — IOPAMIDOL 10 ML: 408 INJECTION, SOLUTION INTRATHECAL at 09:00

## 2024-08-16 RX ADMIN — GABAPENTIN 100 MG: 100 CAPSULE ORAL at 14:37

## 2024-08-16 RX ADMIN — HYDROCHLOROTHIAZIDE 12.5 MG: 12.5 TABLET ORAL at 18:38

## 2024-08-16 RX ADMIN — MORPHINE SULFATE 2 MG: 2 INJECTION, SOLUTION INTRAMUSCULAR; INTRAVENOUS at 14:37

## 2024-08-16 RX ADMIN — LOSARTAN POTASSIUM 100 MG: 100 TABLET, FILM COATED ORAL at 10:12

## 2024-08-16 RX ADMIN — FENTANYL CITRATE 50 MCG: 50 INJECTION, SOLUTION INTRAMUSCULAR; INTRAVENOUS at 08:58

## 2024-08-16 RX ADMIN — DEXAMETHASONE SODIUM PHOSPHATE 10 MG: 10 INJECTION INTRAMUSCULAR; INTRAVENOUS at 09:00

## 2024-08-16 RX ADMIN — DULOXETINE HYDROCHLORIDE 30 MG: 30 CAPSULE, DELAYED RELEASE ORAL at 10:11

## 2024-08-16 RX ADMIN — ATORVASTATIN CALCIUM 40 MG: 20 TABLET, FILM COATED ORAL at 20:53

## 2024-08-16 RX ADMIN — HYDROCODONE BITARTRATE AND ACETAMINOPHEN 1 TABLET: 5; 325 TABLET ORAL at 20:53

## 2024-08-16 RX ADMIN — Medication 10 ML: at 20:54

## 2024-08-16 RX ADMIN — DULOXETINE HYDROCHLORIDE 60 MG: 60 CAPSULE, DELAYED RELEASE ORAL at 10:11

## 2024-08-16 RX ADMIN — MORPHINE SULFATE 2 MG: 2 INJECTION, SOLUTION INTRAMUSCULAR; INTRAVENOUS at 18:38

## 2024-08-16 RX ADMIN — HYDROCODONE BITARTRATE AND ACETAMINOPHEN 1 TABLET: 5; 325 TABLET ORAL at 11:53

## 2024-08-16 RX ADMIN — HYDROCODONE BITARTRATE AND ACETAMINOPHEN 1 TABLET: 5; 325 TABLET ORAL at 03:50

## 2024-08-16 RX ADMIN — DEXAMETHASONE SODIUM PHOSPHATE 10 MG: 4 INJECTION, SOLUTION INTRA-ARTICULAR; INTRALESIONAL; INTRAMUSCULAR; INTRAVENOUS; SOFT TISSUE at 09:07

## 2024-08-16 RX ADMIN — MORPHINE SULFATE 2 MG: 2 INJECTION, SOLUTION INTRAMUSCULAR; INTRAVENOUS at 10:12

## 2024-08-16 NOTE — NURSING NOTE
Report to Floor RN, VSS, Bandaid CDI, 50mcg fentanyl IV and 1mg versed IV give for Cervical Epidural. No heat today,no heating pads, hot tubs, tub bath, may use ice pack 20 minutes on/20 minutes off. No driving for 24 hours. VSS, pain 7/10 from 10/10.

## 2024-08-16 NOTE — PROGRESS NOTES
[facility], [place] NEUROSURGERY CERVICAL PROGRESS NOTE      CC:Neck pain      Subjective     Interval History:  Got cervical VIDHI this morning. Reports little to no improvement, but has only been an hour or so. No new complaints        Objective     Vital signs in last 24 hours:  Temp:  [97.1 °F (36.2 °C)-98.2 °F (36.8 °C)] 97.6 °F (36.4 °C)  Heart Rate:  [61-93] 93  Resp:  [14-16] 16  BP: ()/(56-89) 181/88    Intake/Output this shift:  No intake/output data recorded.    LABS:  Results from last 7 days   Lab Units 08/16/24  0459 08/15/24  0556 08/14/24  1013   WBC 10*3/mm3 11.09* 16.12* 6.97   HEMOGLOBIN g/dL 11.7* 12.4 12.8   HEMATOCRIT % 35.6 36.1 38.5   PLATELETS 10*3/mm3 219 255 248      Results from last 7 days   Lab Units 08/16/24  0459 08/15/24  0556 08/14/24  1013   SODIUM mmol/L 135* 128* 135*   POTASSIUM mmol/L 4.5 4.1 3.8   CHLORIDE mmol/L 102 94* 99   CO2 mmol/L 24.9 21.4* 25.0   BUN mg/dL 11 11 7*   CREATININE mg/dL 0.55* 0.60 0.65   GLUCOSE mg/dL 95 144* 101*   CALCIUM mg/dL 8.8 8.8 9.5        IMAGING STUDIES:  No new imaging    I personally viewed and interpreted the patient's chart.    Meds reviewed/changed: Yes  Cymbalta 90 mg p.o. daily  Gabapentin 100 mg p.o. every 8 hours  Flexeril 10 mg p.o. 3 times daily as needed x 1  Norco 5 p.o. every 4 hours as needed x 2  Morphine 2 mg IV every 4 hours as needed x 4    Physical Exam:    General:   Awake, alert. Resting in bed  Neck:    No brace  Motor:    Normal muscle strength, bulk and tone in upper and lower extremities. Slightly weaker in LUE  Sensation:   Slightly decreased sensation in L hand intermittently  Station and Gait:             Deferred d/t pain  Extremities:   SCD in place    Assessment & Plan     ASSESSMENT:      Acute neck pain    Controlled type 2 diabetes mellitus without complication, without long-term current use of insulin    Hyperlipidemia    COPD (chronic obstructive pulmonary disease)    Essential hypertension    68-year-old female  "with prior cervical fusion C5-C7 about 20 years ago and prior L4-5 TLIF who presents with progressive neck pain past 6 weeks.  She is established with [facility] pain management and had plan for cervical epidural 8/28 but did not feel she could manage until then. She got a DICKSON this morning.  She denies any noticeable improvement but verbalized this about an hour after her injection.  She has had these injections in the past and is aware that it may take several hours to days to feel any effects. She does not feel that she can go home in this state.  She is receiving Flexeril and Norco in addition to morphine for pain medications however these are not utilized nearly as much as her morphine.  Will make her muscle relaxer scheduled and recommend using Norco for PRN pain relief and reserve morphine for breakthrough pain.  Considered increasing gabapentin if her pain is not improved tomorrow, however she is reserved about this as she experienced some GI discomfort with his medication in the past. She may continue to utilize aspen collar for comfort. She is free to discharge whenever she feels able from neurosurgery perspective. Will see her tomorrow if she is still here.      PLAN:     -Scheduled flexeril  -Utilize norco over morphine  -Aspen collar for comfort      I discussed the patient's findings and my recommendations with patient, nursing staff, and Dr. Obrien    During patient visit, I utilized appropriate personal protective equipment including mask and gloves.  Mask used was standard procedure mask. Appropriate PPE was worn during the entire visit.  Hand hygiene was completed before and after.      LOS: 1 day       Isi Crandall, APRN  8/16/2024  15:35 EDT    \"Dictated utilizing Dragon dictation\".     "

## 2024-08-16 NOTE — ANESTHESIA PROCEDURE NOTES
PAIN Epidural block    Pre-sedation assessment completed: 8/16/2024 8:56 AM    Patient reassessed immediately prior to procedure    Patient location during procedure: pain clinic  Start Time: 8/16/2024 8:59 AM  Stop Time: 8/16/2024 9:06 AM  Indication:at surgeon's request and procedure for pain  Performed By  Anesthesiologist: Arsh Comer MD  Preanesthetic Checklist  Completed: patient identified, risks and benefits discussed, surgical consent, monitors and equipment checked, pre-op evaluation and timeout performed  Additional Notes  Post-Op Diagnosis Codes:     * Cervical neuritis [M54.12]     * Osseous and 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  I then turned my attention to lateral fluoroscopy as AP fluoroscopy was obscured by anterior cervical fusion.  The needle was slowly advanced until there is loss resistance to injection.  Position was checked in the AP plane again to make sure I was midline.  Contrast demonstrated spread cranially caudally in the posterior epidural space.  There was moderate discomfort with injection.  I then injected dexamethasone and the needle was removed.  She tolerated the procedure well.  Post Assessment:  Pt Tolerance:patient tolerated the procedure well with no apparent complications  Complications:no

## 2024-08-16 NOTE — PROGRESS NOTES
Name: Kristina Yates ADMIT: 2024   : 1960  PCP: Nyla Mejia APRN    MRN: [id number] LOS: 1 days   AGE/SEX: 64 y.o. female  ROOM: [facility]     Subjective   Subjective     She underwent DICKSON earlier today but hasn't yet had any relief       Objective   Objective   Vital Signs  Temp:  [97.1 °F (36.2 °C)-98.2 °F (36.8 °C)] 97.6 °F (36.4 °C)  Heart Rate:  [61-93] 93  Resp:  [14-16] 16  BP: ()/(56-89) 181/88  SpO2:  [94 %-100 %] 95 %  on   ;   Device (Oxygen Therapy): room air  Body mass index is 25.76 kg/m².  Physical Exam  Constitutional:       General: She is not in acute distress.     Appearance: She is not toxic-appearing.   Cardiovascular:      Rate and Rhythm: Normal rate and regular rhythm.      Heart sounds: Normal heart sounds.   Pulmonary:      Effort: Pulmonary effort is normal.      Breath sounds: Normal breath sounds.   Abdominal:      General: Bowel sounds are normal.      Palpations: Abdomen is soft.   Musculoskeletal:         General: No tenderness.      Right lower leg: No edema.      Left lower leg: No edema.   Neurological:      Mental Status: She is alert.   Psychiatric:         Mood and Affect: Mood normal.         Behavior: Behavior normal.         Results Review     I reviewed the patient's new clinical results.  Results from last 7 days   Lab Units 24  0459 08/15/24  0556 24  1013   WBC 10*3/mm3 11.09* 16.12* 6.97   HEMOGLOBIN g/dL 11.7* 12.4 12.8   PLATELETS 10*3/mm3 219 255 248     Results from last 7 days   Lab Units 24  0459 08/15/24  0556 24  1013   SODIUM mmol/L 135* 128* 135*   POTASSIUM mmol/L 4.5 4.1 3.8   CHLORIDE mmol/L 102 94* 99   CO2 mmol/L 24.9 21.4* 25.0   BUN mg/dL 11 11 7*   CREATININE mg/dL 0.55* 0.60 0.65   GLUCOSE mg/dL 95 144* 101*   Estimated Creatinine Clearance: 108.6 mL/min (A) (by C-G formula based on SCr of 0.55 mg/dL (L)).  Results from last 7 days   Lab Units 24  1013   ALBUMIN g/dL 4.6   BILIRUBIN mg/dL <0.2   ALK  "PHOS U/L 95   AST (SGOT) U/L 15   ALT (SGPT) U/L 8     Results from last 7 days   Lab Units 08/16/24  0459 08/15/24  0556 08/14/24  1013   CALCIUM mg/dL 8.8 8.8 9.5   ALBUMIN g/dL  --   --  4.6       SARS-CoV-2, JULIO   Date Value Ref Range Status   08/12/2020 Not Detected Not Detected Final     Comment:     This test was developed and its performance characteristics determined  by [employer]. This test has not been FDA cleared or  approved. This test has been authorized by FDA under an Emergency Use  Authorization (EUA). This test is only authorized for the duration of  time the declaration that circumstances exist justifying the  authorization of the emergency use of in vitro diagnostic tests for  detection of SARS-CoV-2 virus and/or diagnosis of COVID-19 infection  under section 564(b)(1) of the Act, 21 U.S.C. 360bbb-3(b)(1), unless  the authorization is terminated or revoked sooner.  When diagnostic testing is negative, the possibility of a false  negative result should be considered in the context of a patient's  recent exposures and the presence of clinical signs and symptoms  consistent with COVID-19. An individual without symptoms of COVID-19  and who is not shedding SARS-CoV-2 virus would expect to have a  negative (not detected) result in this assay.     No results found for: \"HGBA1C\", \"POCGLU\"    [facility]  This procedure was auto-finalized with no dictation required.    Scheduled Medications  amLODIPine, 5 mg, Oral, Nightly  atorvastatin, 40 mg, Oral, Nightly  cyclobenzaprine, 10 mg, Oral, Q8H  DULoxetine, 30 mg, Oral, Daily  DULoxetine, 60 mg, Oral, Daily  gabapentin, 100 mg, Oral, Q8H  losartan, 100 mg, Oral, Daily  metoprolol succinate XL, 50 mg, Oral, Daily  prochlorperazine, 10 mg, Intravenous, Once  senna-docusate sodium, 2 tablet, Oral, BID  sodium chloride, 10 mL, Intravenous, Q12H    Infusions   Diet  Diet: Regular/House, Diabetic; Consistent Carbohydrate; Fluid " Consistency: Thin (IDDSI 0)       Assessment/Plan     Active Hospital Problems    Diagnosis  POA    **Acute neck pain [M54.2]  Yes    Essential hypertension [I10]  Yes    COPD (chronic obstructive pulmonary disease) [J44.9]  Yes    Controlled type 2 diabetes mellitus without complication, without long-term current use of insulin [E11.9]  Yes    Hyperlipidemia [E78.5]  Yes      Resolved Hospital Problems   No resolved problems to display.       64 y.o. female admitted with Acute neck pain.    Acute cervical pain-s/p DICKSON this morning without significant relief yet. Neurosurgery has scheduled her flexeril and is encouraging her to utilize norco over morphine as well as to use an aspen collar. She's already on gabapentin and duloxetine and she isn't really willing to increase the gabapentin dose at the moment since she's had side effects on higher doses in the past.   Hyponatremia-resolved with fluids and with hctz held. This honestly could have been secondary to pain as well and I'm less convinced since she's been on hctz for months. Her bp is uncontrolled, so I'll restart it at a half dose  Essential hypertension-increase amlodipine and restart hctz at a reduced dose. Continue losartan  Hyperlipidemia-statin  Type 2 diabetes-metformin is held acutely  SCDs for DVT prophylaxis.  Full code.  Discussed with patient and CCP.  Anticipate discharge home in 1-2 days.      Neil Cameron MD  [facility]  08/16/24  17:06 EDT    I wore protective equipment throughout this patient encounter including a face mask, gloves and protective eyewear.  Hand hygiene was performed before donning protective equipment and after removal when leaving the room.

## 2024-08-16 NOTE — PLAN OF CARE
Problem: Adult Inpatient Plan of Care  Goal: Plan of Care Review  Outcome: Ongoing, Not Progressing  Flowsheets (Taken 8/16/2024 0325)  Progress: no change  Plan of Care Reviewed With: patient  Outcome Evaluation: c/o constant neck pain tonight, requiring IV Morphine. NOrco and Flexeril, slept between care, for Epidural injection tomorrow, NPO post MN, falls precaution, assisted to the bathroom x1 with her cane, VSS   Goal Outcome Evaluation:  Plan of Care Reviewed With: patient        Progress: no change  Outcome Evaluation: c/o constant neck pain tonight, requiring IV Morphine. NOrco and Flexeril, slept between care, for Epidural injection tomorrow, NPO post MN, falls precaution, assisted to the bathroom x1 with her cane, VSS

## 2024-08-16 NOTE — H&P
[facility]    History and Physical    Patient Name: Kristina Yates  :  1960  MRN:  [id number]  Date of Admission: 2024    Subjective     Patient is a 64 y.o. female presents with chief complaint of acute on chronic neck pain.  Onset of symptoms was gradual starting several weeks ago.  Symptoms are associated/aggravated by nothing in particular or activity. Symptoms improve with nothing    The following portions of the patients history were reviewed and updated as appropriate: current medications, allergies, past medical history, past surgical history, past family history, past social history, and problem list    Previous anterior cervical fusion of C5-6 and C6-7    She rates her pain essentially to 10 right now with severe caused her to be admitted to the hospital she says she is not able to do anything around the house other than watch TV and go to the restroom.  She was scheduled to have an epidural, with however could not make it and was admitted to the hospital.    This is adversely affect her ability to do any of her home housework or take care of herself at home.    She scribes the pain is severe and burning radiating to both her shoulders and especially to her left arm    MRI  IMPRESSION:     The patient is status post an anterior cervical discectomy and fusion  procedure from C5 down to C7. An anterior cervical plate and traversing  screws fuse C5 down to C7. Interbody fusion procedures are evident and  complete osseous fusion is noted across the C5-6 and C6-7 disc spaces.  These postsurgical changes were also identified on the prior exam.  Multilevel foraminal narrowing is evident and demonstrates no  significant interval change since the prior study. No significant canal  stenosis is identified.     There is prominent arthritic change involving the left C3-4 facet joint  with new arthritic marrow edema is noted within the left posterior  elements of C3 and C4. There is new degenerative  anterior  spondylolisthesis of C3 on C4 by approximately 2 mm.      This report was finalized on 4/16/2024 8:27 AM by Dr. Isaiah Olea M.D  on Workstation: [id number]     Objective     Past Medical History:   Past Medical History:   Diagnosis Date   • Alcohol abuse     in recovery, sober since 2012   • Anxiety    • Arthritis    • Asthma    • Edmonds esophagus    • Cervical neuritis    • Chronic pain    • COPD (chronic obstructive pulmonary disease)    • Depression    • Diabetes mellitus     Type 2   • DJD (degenerative joint disease), cervical    • Foot spasms    • GERD (gastroesophageal reflux disease)    • Headache    • Hiatal hernia    • Hyperlipidemia    • Hypertension    • Low back pain    • Seasonal allergies    • Spinal headache    • Spinal stenosis      Past Surgical History:   Past Surgical History:   Procedure Laterality Date   • ANTERIOR CERVICAL DISCECTOMY W/ FUSION  11/2003    C5-6, C6-7   • CERCLAGE CERVIX     • CERVICAL EPIDURAL  2007   • CERVICAL FUSION     • DILATATION AND CURETTAGE     • ENDOMETRIAL ABLATION  2007   • ENDOSCOPY      MULTIPLE   • ENDOSCOPY N/A 07/05/2016    Procedure: ESOPHAGOGASTRODUODENOSCOPY WITH COLD BIOPSIES;  Surgeon: Jose Mcclellan MD;  Location: [facility] ENDOSCOPY;  Service:    • ESOPHAGEAL DILATATION  2009   • HAND SURGERY Bilateral     CARTILAGE   • LUMBAR FUSION     • LUMBAR LAMINECTOMY WITH FUSION N/A 06/30/2023    Procedure: OPEN LUMBAR FOUR TO FIVE TRANSFORAMINAL LUMBAR INTERBODY FUSION;  Surgeon: Alex Ortiz MD;  Location: [facility];  Service: Neurosurgery;  Laterality: N/A;   • NECK SURGERY     • NISSEN FUNDOPLICATION LAPAROSCOPIC  03/2012   • SACROILIAC JOINT FUSION Right 6/4/2024    Procedure: RIGHT PERCUTANEOUS ARTHRODESIS SACROILIAC JOINT, NEUROMONITORING;  Surgeon: Alex Ortiz MD;  Location: [facility];  Service: Neurosurgery;  Laterality: Right;   • TONSILLECTOMY       Family History:   Family History   Problem Relation Age of Onset   • Cancer  "Mother         nonhodkinds lymphoma   • Heart disease Father    • Drug abuse Sister    • Heart disease Brother    • Lupus Maternal Aunt    • Malig Hyperthermia Neg Hx      Social History:   Social History     Socioeconomic History   • Marital status:    • Number of children: 2   Tobacco Use   • Smoking status: Every Day     Current packs/day: 0.25     Average packs/day: 0.5 packs/day for 16.6 years (7.9 ttl pk-yrs)     Types: Cigarettes     Start date: 2/25/2006     Last attempt to quit: 2/25/2021   • Smokeless tobacco: Never   Vaping Use   • Vaping status: Never Used   Substance and Sexual Activity   • Alcohol use: No     Comment: sober since 2012, worsk 12 steps   • Drug use: No   • Sexual activity: Yes     Partners: Male       Vital Signs Range for the last 24 hours  Temperature: Temp:  [36.1 °C (97 °F)-36.5 °C (97.7 °F)] 36.5 °C (97.7 °F)   Temp Source: Temp src: Oral   BP: BP: ()/(56-77) 130/74   Pulse: Heart Rate:  [61-81] 80   Respirations: Resp:  [16] 16   SPO2: SpO2:  [95 %-100 %] 97 %   O2 Amount (l/min):     O2 Devices Device (Oxygen Therapy): room air   Weight:       Flowsheet Rows      Flowsheet Row First Filed Value   Admission Height 168.9 cm (66.5\") Documented at 08/14/2024 0945   Admission Weight 74.8 kg (165 lb) Documented at 08/14/2024 0945            --------------------------------------------------------------------------------    Current Facility-Administered Medications   Medication Dose Route Frequency Provider Last Rate Last Admin   • albuterol (PROVENTIL) nebulizer solution 0.083% 2.5 mg/3mL  2.5 mg Nebulization Q6H PRN Adams Eller MD       • amLODIPine (NORVASC) tablet 2.5 mg  2.5 mg Oral Nightly Adams Eller MD   2.5 mg at 08/15/24 2018   • atorvastatin (LIPITOR) tablet 40 mg  40 mg Oral Adams Puentes MD   40 mg at 08/15/24 2009   • sennosides-docusate (PERICOLACE) 8.6-50 MG per tablet 2 tablet  2 tablet Oral BID Daphnie, " Adams Soares MD   2 tablet at 08/15/24 0852    And   • polyethylene glycol (MIRALAX) packet 17 g  17 g Oral Daily PRN Adams Eller MD        And   • bisacodyl (DULCOLAX) EC tablet 5 mg  5 mg Oral Daily PRN Adams Eller MD        And   • bisacodyl (DULCOLAX) suppository 10 mg  10 mg Rectal Daily PRN Adams Eller MD       • cyclobenzaprine (FLEXERIL) tablet 10 mg  10 mg Oral TID PRN Adams Eller MD   10 mg at 08/16/24 0318   • dexAMETHasone sodium phosphate injection 10 mg  10 mg Epidural Once Render, Arsh Jensen MD       • DULoxetine (CYMBALTA) DR capsule 30 mg  30 mg Oral Daily Adams Eller MD   30 mg at 08/15/24 0852   • DULoxetine (CYMBALTA) DR capsule 60 mg  60 mg Oral Daily Adams Eller MD   60 mg at 08/15/24 0853   • fentaNYL citrate (PF) (SUBLIMAZE) injection 50 mcg  50 mcg Intravenous PRN Render, Arsh Jensen MD       • gabapentin (NEURONTIN) capsule 100 mg  100 mg Oral Q8H Adams Eller MD   100 mg at 08/15/24 0600   • HYDROcodone-acetaminophen (NORCO) 5-325 MG per tablet 1 tablet  1 tablet Oral Q4H PRN Adams Eller MD   1 tablet at 08/16/24 0350   • iopamidol (ISOVUE-M 200) injection 41%  12 mL Epidural Once in imaging Render, Arsh Jensen MD       • lidocaine (XYLOCAINE) 1 % injection 1 mL  1 mL Intradermal Once PRN Render, Arsh Jensen MD       • losartan (COZAAR) tablet 100 mg  100 mg Oral Daily Adams Eller MD   100 mg at 08/15/24 0852   • metoprolol succinate XL (TOPROL-XL) 24 hr tablet 50 mg  50 mg Oral Daily Adams Eller MD   50 mg at 08/15/24 0853   • midazolam (VERSED) injection 1 mg  1 mg Intravenous PRN Render, Arsh Jensen MD       • morphine injection 2 mg  2 mg Intravenous Q4H PRN Adams Eller MD   2 mg at 08/16/24 0555   • prochlorperazine (COMPAZINE) injection 10 mg  10 mg Intravenous Once Niko Khanna PA       • prochlorperazine (COMPAZINE)  injection 10 mg  10 mg Intravenous Q6H PRN Adams Eller MD   10 mg at 08/15/24 2007   • sodium chloride 0.9 % flush 1-10 mL  1-10 mL Intravenous PRN Arsh Comer MD       • sodium chloride 0.9 % flush 10 mL  10 mL Intravenous PRN Niko Khanna PA       • sodium chloride 0.9 % flush 10 mL  10 mL Intravenous Q12H Adams Eller MD   10 mL at 08/15/24 2010   • sodium chloride 0.9 % flush 10 mL  10 mL Intravenous PRN Adams Eller MD       • sodium chloride 0.9 % infusion 40 mL  40 mL Intravenous PRN Adams Eller MD           --------------------------------------------------------------------------------  Assessment & Plan      Anesthesia Evaluation     history of anesthetic complications:         Pain impairs ability to perform ADLs: Sleeping, Exercise/Activity, Working, Housekeeping, Meal prep/Eating and Bathing  Modalities previously tried to control pain with limited effectiveness within the last 4-6 weeks: Ice, Rest and OTC medications     Airway   Mallampati: II  Neck ROM: full  Possible difficult intubation  Dental      Pulmonary    (+) COPD, asthma,  (-) wheezes  Cardiovascular     Rhythm: regular    (+) hypertension, hyperlipidemia  (-) murmur      Neuro/Psych    PE Comment: She reports pain into her left arm and shoulder.  He does not really follow dermatomal pattern at this moment  GI/Hepatic/Renal/Endo    (+) hiatal hernia, GERD, renal disease-, diabetes mellitus    Musculoskeletal         PE comment:  strength feels significantly weaker on the left than the right.  All the muscles of her upper extremities feel pretty weak although she seems to be guarding a great deal due to pain so it is very difficult to interpret.  Abdominal    Substance History      OB/GYN          Other                 Diagnosis and Plan    Treatment Plan  ASA 3      Procedures: Cervical Epidural Steroid Injection(DICKSON), With fluoroscopy,      Anesthetic plan and risks  "discussed with patient.      Discussed with patient risk and benefits of VIDHI including but not limited to : Bleeding, infection, PDPH, inadvertant spinal anesthetic, worsening pain, hyperglycemia, hypertension, CHF, nerve damage, steroid \"toxicity\" and AVN of hips.  Pt agrees to proceed.  Diagnosis     * Cervical neuritis [M54.12]     * Osseous and subluxation stenosis of intervertebral foramina of cervical region [M99.61]                      "

## 2024-08-16 NOTE — DISCHARGE INSTRUCTIONS
"Guide To Relieving And Avoiding Neck Pain    Exercise is important to help prevent and treat neck pain.  Good posture, exercise and avoiding injury will help to keep your neck healthy.    When the neck is strained or over worked symptoms may include headache, upper back pain, shoulder pain or arm pain.  Numbness or tingling in the fingers, dizziness or nausea may also occur.    Posture:    Avoid slumping over a desk.  Raise your work (including computer) to eye level to avoid bending at the neck.      Change Position Often:  Changing position prevents overuse of particular muscles.    Sleep On One Pillow:  Using to many pillows or to large of a pillow causes a \"kink\" in you neck.      Move and Exercise:  Living an active lifestyle is an important part of staying healthy.  Be sure to include the exercise to follow specifically for your neck.                    Range of Motion Exercises:  Do these exercises three times a day.  If you experience increased pain stop and contact your physician.  All exercises can be performed sitting or standing.        1.    2.   3.    1.  Place both hands behind you neck.  Gently tilt your neck backward so that you are looking at the ceiling.  Hold for a count of 10.    2.  Look straight facing forward.  Slowly tip your ear toward your right shoulder.  Do not force the motion.  Hold for a count of 10.  Bring head back to starting position and repeat to left side.    3.  Look straight facing forward.  Gently turn your head to the right.  Do not force the motion.  Hold for a count of 10.  Bring head back to starting position and repeat to the left side.    Exercises To Strengthen Muscles:  1.  Look straight facing forward.  Relax your shoulders.  Raise both shoulders toward your ears.  Hold for 3 seconds.       1.       2.   3.      1.  Look straight facing forward.  Relax your shoulders.  Raise both shoulders toward     2.  Raise your ars to your side and bend your elbows.  Squeeze " shoulder blades together as you rotate your arms outward.  Hold for 5 seconds.    3.  Look straight facing forward.  Pull your head straight back.  Do not tip or move your jaw.  Hold for 5 seconds.   EPIDURAL STEROID INJECTION          An epidural steroid injection is a shot of steroid medicine and numbing medicine that is given into the space between the spinal cord and the bones of the back (epidural space).  The injection helps relieve pain by an irritated or swollen nerve root.    TELL YOUR HEALTH CARE PROVIDER ABOUT:  Any allergies you have  All medicines you are taking including any over the counter medicines  Any blood disorders you have  Any surgeries you have had  Any medical conditions you have  Whether you are pregnant or may be pregnant    WHAT ARE THE RISK?  Generally, this is a safe procedure. However,problems may occur, including  Headache  Bleeding  Infection  Allergic Reaction  Nerve Damage    WHAT CAN I EXPECT AFTER THE PROCEDURE?    INJECTION SITE  Remove the Band-Aid/s after 24 hours  Check your injection site every day for signs of infection.  Check for:             Redness             Bleeding (small amt is normal)             Warmth             Pus or bad odor  Some numbness may be experienced for several hours following the procedure.  Avoid using heat on the injection site for 24 hours. You may use ice intermittently if needed by placing a         towel between your skin and the ice bag and using the ice for 20 minutes 2-3 times a day.  Do not take baths, swim or use a hot tub for 24 hours.    ACTIVITY  No strenuous activity for 24 hours then return to normal activity as tolerated.  If your leg is numb, no driving until full sensation and strength has returned.    GENERAL INSTRUCTIONS:  The injection site may feel numb, use ice with caution if numbness is present and no heat for 24 hours or until numbness is gone.   If you have numbness or weakness in your arm or leg, use those areas with  caution until normal sensation returns.  It is not uncommon to notice an increase in discomfort or a change in the location of discomfort for 3-4 days after the procedure.  If discomfort is noticed at the injection site, ice may be            applied to that area for 20 min 2-3 times a day.  Take the pain medicine your physician has prescribed or over the counter pain relievers as long as you do not have any contraindications.  If you are a diabetic, monitor your blood sugar closely.  The steroids used in your procedure may increase your blood sugar level up to 36 hours after the injection.  If your blood sugar is greater than 250, call the physician that helps you monitor your blood sugar.  Keep all follow-up visits as scheduled by your health care provider. This is important.    CONTACT OUR OFFICE IF:  You have any of these signs of infection            -Redness, swelling, or warmth around your injection site.            -Fluid or blood coming from your injection site (small amt of blood is normal)            -Pus or a bad odor from your injection site            -A fever  You develop a severe headache or a stiff neck  You lose control of your bladder or bowel movements      PAIN MANAGEMENT CENTER HOURS   Monday-Friday 7:30 am. - 4:00 pm.  For any problem related to your procedure we can be reached at [phone].  If you experience an emergency with your procedure, call [phone] or go to the emergency room.

## 2024-08-16 NOTE — PROGRESS NOTES
Continued Stay Note  [facility]     Patient Name: Kristina Yates  MRN: [id number]  Today's Date: 8/16/2024    Admit Date: 8/14/2024    Plan: Home with family assist   Discharge Plan       Row Name 08/16/24 1544       Plan    Plan Home with family assist    Plan Comments Met with patient after Epidural who stated still in pain. DC plan is home but stated not sure when. CCP will continue to follow for poss needs/equipment.                   Discharge Codes    No documentation.                 Expected Discharge Date and Time       Expected Discharge Date Expected Discharge Time    Aug 17, 2024               Maru Martinez RN

## 2024-08-16 NOTE — PLAN OF CARE
Goal Outcome Evaluation:           Progress: no change  Outcome Evaluation: BP max 181/88. bp medication added for this evening. reports constant neck pain requiring norco and morphine. flexeril now scheduled. encouraged pt to use norco and flexeril and keep the morphine for breakthrough pain. up with assist. amb in [place]. sent for epidural. started back on regular CC diet. fall precautions maintained. ordered cervical collar but has not arrived from distribution. pt updated on plan of care and planned d/c tomorrow.

## 2024-08-17 ENCOUNTER — READMISSION MANAGEMENT (OUTPATIENT)
Dept: CALL CENTER | Facility: HOSPITAL | Age: 64
End: 2024-08-17
Payer: COMMERCIAL

## 2024-08-17 VITALS
DIASTOLIC BLOOD PRESSURE: 91 MMHG | WEIGHT: 162.04 LBS | TEMPERATURE: 98.9 F | RESPIRATION RATE: 16 BRPM | BODY MASS INDEX: 25.43 KG/M2 | HEIGHT: 67 IN | HEART RATE: 86 BPM | SYSTOLIC BLOOD PRESSURE: 171 MMHG | OXYGEN SATURATION: 96 %

## 2024-08-17 LAB
ANION GAP SERPL CALCULATED.3IONS-SCNC: 13.7 MMOL/L (ref 5–15)
BUN SERPL-MCNC: 9 MG/DL (ref 8–23)
BUN/CREAT SERPL: 14.3 (ref 7–25)
CALCIUM SPEC-SCNC: 9.1 MG/DL (ref 8.6–10.5)
CHLORIDE SERPL-SCNC: 100 MMOL/L (ref 98–107)
CO2 SERPL-SCNC: 23.3 MMOL/L (ref 22–29)
CREAT SERPL-MCNC: 0.63 MG/DL (ref 0.57–1)
EGFRCR SERPLBLD CKD-EPI 2021: 99.8 ML/MIN/1.73
GLUCOSE SERPL-MCNC: 128 MG/DL (ref 65–99)
POTASSIUM SERPL-SCNC: 3.8 MMOL/L (ref 3.5–5.2)
SODIUM SERPL-SCNC: 137 MMOL/L (ref 136–145)

## 2024-08-17 PROCEDURE — 99232 SBSQ HOSP IP/OBS MODERATE 35: CPT

## 2024-08-17 PROCEDURE — 80048 BASIC METABOLIC PNL TOTAL CA: CPT | Performed by: STUDENT IN AN ORGANIZED HEALTH CARE EDUCATION/TRAINING PROGRAM

## 2024-08-17 PROCEDURE — G0378 HOSPITAL OBSERVATION PER HR: HCPCS

## 2024-08-17 PROCEDURE — 25010000002 MORPHINE PER 10 MG: Performed by: INTERNAL MEDICINE

## 2024-08-17 PROCEDURE — 96376 TX/PRO/DX INJ SAME DRUG ADON: CPT

## 2024-08-17 RX ORDER — LIDOCAINE 4 G/G
1 PATCH TOPICAL
Qty: 30 PATCH | Refills: 0 | Status: SHIPPED | OUTPATIENT
Start: 2024-08-17

## 2024-08-17 RX ORDER — LIDOCAINE 4 G/G
1 PATCH TOPICAL
Status: DISCONTINUED | OUTPATIENT
Start: 2024-08-17 | End: 2024-08-17 | Stop reason: HOSPADM

## 2024-08-17 RX ORDER — HYDROCODONE BITARTRATE AND ACETAMINOPHEN 7.5; 325 MG/1; MG/1
1 TABLET ORAL EVERY 6 HOURS PRN
Qty: 30 TABLET | Refills: 0 | Status: SHIPPED | OUTPATIENT
Start: 2024-08-17

## 2024-08-17 RX ORDER — GABAPENTIN 100 MG/1
200 CAPSULE ORAL EVERY 8 HOURS SCHEDULED
Status: DISCONTINUED | OUTPATIENT
Start: 2024-08-17 | End: 2024-08-17 | Stop reason: HOSPADM

## 2024-08-17 RX ORDER — AMLODIPINE BESYLATE 5 MG/1
5 TABLET ORAL NIGHTLY
Qty: 30 TABLET | Refills: 0 | Status: SHIPPED | OUTPATIENT
Start: 2024-08-17

## 2024-08-17 RX ORDER — GABAPENTIN 100 MG/1
200 CAPSULE ORAL EVERY 8 HOURS SCHEDULED
Qty: 90 CAPSULE | Refills: 0 | Status: SHIPPED | OUTPATIENT
Start: 2024-08-17

## 2024-08-17 RX ORDER — HYDROCHLOROTHIAZIDE 25 MG/1
12.5 TABLET ORAL DAILY
Qty: 30 TABLET | Refills: 1 | Status: SHIPPED | OUTPATIENT
Start: 2024-08-17 | End: 2024-11-15

## 2024-08-17 RX ADMIN — GABAPENTIN 100 MG: 100 CAPSULE ORAL at 06:16

## 2024-08-17 RX ADMIN — HYDROCHLOROTHIAZIDE 12.5 MG: 12.5 TABLET ORAL at 09:00

## 2024-08-17 RX ADMIN — METOPROLOL SUCCINATE 50 MG: 50 TABLET, FILM COATED, EXTENDED RELEASE ORAL at 09:00

## 2024-08-17 RX ADMIN — SENNOSIDES AND DOCUSATE SODIUM 2 TABLET: 50; 8.6 TABLET ORAL at 09:03

## 2024-08-17 RX ADMIN — MORPHINE SULFATE 2 MG: 2 INJECTION, SOLUTION INTRAMUSCULAR; INTRAVENOUS at 04:12

## 2024-08-17 RX ADMIN — CYCLOBENZAPRINE HYDROCHLORIDE 10 MG: 10 TABLET, FILM COATED ORAL at 00:02

## 2024-08-17 RX ADMIN — HYDROCODONE BITARTRATE AND ACETAMINOPHEN 1 TABLET: 5; 325 TABLET ORAL at 09:00

## 2024-08-17 RX ADMIN — DULOXETINE HYDROCHLORIDE 60 MG: 60 CAPSULE, DELAYED RELEASE ORAL at 09:00

## 2024-08-17 RX ADMIN — HYDROCODONE BITARTRATE AND ACETAMINOPHEN 1 TABLET: 5; 325 TABLET ORAL at 02:04

## 2024-08-17 RX ADMIN — MORPHINE SULFATE 2 MG: 2 INJECTION, SOLUTION INTRAMUSCULAR; INTRAVENOUS at 00:02

## 2024-08-17 RX ADMIN — CYCLOBENZAPRINE HYDROCHLORIDE 10 MG: 10 TABLET, FILM COATED ORAL at 09:00

## 2024-08-17 RX ADMIN — DULOXETINE HYDROCHLORIDE 30 MG: 30 CAPSULE, DELAYED RELEASE ORAL at 09:01

## 2024-08-17 RX ADMIN — Medication 10 ML: at 09:01

## 2024-08-17 RX ADMIN — LOSARTAN POTASSIUM 100 MG: 100 TABLET, FILM COATED ORAL at 09:00

## 2024-08-17 NOTE — PROGRESS NOTES
[facility] NEUROSURGERY CERVICAL PROGRESS NOTE      CC:left neck and arm pain      Subjective     Interval History:  Continues to complain of significant neck and arm pain after DICKSON with little improvement. She is asking to increase her Jeffrey and discharge home where she feels she can recover better. Open to slowly increasing gabapentin.       Objective     Vital signs in last 24 hours:  Temp:  [97.2 °F (36.2 °C)-98.9 °F (37.2 °C)] 98.9 °F (37.2 °C)  Heart Rate:  [] 86  Resp:  [16-18] 16  BP: (125-181)/(72-91) 171/91    Intake/Output this shift:  No intake/output data recorded.    LABS:  Results from last 7 days   Lab Units 08/16/24  0459 08/15/24  0556 08/14/24  1013   WBC 10*3/mm3 11.09* 16.12* 6.97   HEMOGLOBIN g/dL 11.7* 12.4 12.8   HEMATOCRIT % 35.6 36.1 38.5   PLATELETS 10*3/mm3 219 255 248      Results from last 7 days   Lab Units 08/17/24  0637 08/16/24  0459 08/15/24  0556   SODIUM mmol/L 137 135* 128*   POTASSIUM mmol/L 3.8 4.5 4.1   CHLORIDE mmol/L 100 102 94*   CO2 mmol/L 23.3 24.9 21.4*   BUN mg/dL 9 11 11   CREATININE mg/dL 0.63 0.55* 0.60   GLUCOSE mg/dL 128* 95 144*   CALCIUM mg/dL 9.1 8.8 8.8        IMAGING STUDIES:  No new imaging    I personally viewed and interpreted the patient's chart.    Meds reviewed/changed: Yes  Flexeril 10 mg p.o. every 8 hours  Cymbalta 90 mg p.o. daily  Gabapentin 100 mg p.o. every 8 hours  Norco 5 p.o. every 4 hours as needed x 2  Morphine 2 mg IV every 4 hours as needed x 2    Physical Exam:    General:   Awake, alert. Resting in bed  Neck:    No collar at bedside.   Motor:    Normal muscle strength, bulk and tone in upper and lower extremities.  Slightly weaker in LUE  Sensation:   Intermittent numbness in fingers of left hand  Extremities:   Warm/dry, SCD in place    Assessment & Plan     ASSESSMENT:      Acute neck pain    Controlled type 2 diabetes mellitus without complication, without long-term current use of insulin    Hyperlipidemia    COPD (chronic  "obstructive pulmonary disease)    Essential hypertension    Continues to have left-sided neck/shoulder pain with radiation in the left arm after DICKSON yesterday with little improvement.  Continuing to need morphine every 4 hours, but states she feels that she will be more comfortable at home.  Agreeable to increasing gabapentin to 200 mg every 8 hours.  Asking to discharge home with Norco 7.5.  Discussed with Dr. Obrien he is agreeable to this plan of sending her home with Norco 7.5 and gabapentin 200.  Medications added to discharge med rec.  She has an appointment with pain management at the end of the month and states she will call our office next week to move her September appointment up with Dr. Ortiz if her pain continues.  Unable to get her hard collar for neck support due to availability, will try soft collar instead.  She is ok discharge home from neurosurgery perspective and has been discussed with admitting provider.  No further recommendations at this time.  Please feel free to contact us for any questions or concerns.      PLAN:     Increased gabapentin to 200mg Q8 hrs  Added lidocaine patches  Ok to discharge on increased dose of nicholas and noco 7.5-meds ordered for discharge  Aspen or soft collar for pain control    I discussed the patient's findings and my recommendations with patient, nursing staff, primary care team, and Dr. Obrien    During patient visit, I utilized appropriate personal protective equipment including mask and gloves.  Mask used was standard procedure mask. Appropriate PPE was worn during the entire visit.  Hand hygiene was completed before and after.      LOS: 1 day       Isi Crandall, APRN  8/17/2024  10:22 EDT    \"Dictated utilizing Dragon dictation\".     "

## 2024-08-17 NOTE — DISCHARGE SUMMARY
Patient Name: Kristina Yates  : 1960  MRN: [id number]    Date of Admission: 2024  Date of Discharge:  2024  Primary Care Physician: Nyla Mejia APRN      Chief Complaint:   Neck Pain, Shoulder Pain (L), Extremity Pain (LUE), and Hand Pain (L)      Discharge Diagnoses     Active Hospital Problems    Diagnosis  POA    **Acute neck pain [M54.2]  Yes    Essential hypertension [I10]  Yes    COPD (chronic obstructive pulmonary disease) [J44.9]  Yes    Controlled type 2 diabetes mellitus without complication, without long-term current use of insulin [E11.9]  Yes    Hyperlipidemia [E78.5]  Yes      Resolved Hospital Problems   No resolved problems to display.        Hospital Course     Ms. Yates is a 64 y.o. female with a history of  cervical disc disease s/p fusion 20 years ago presented to ED with progressive acute on chronic cervical neck pain over the course of 6 weeks.  Pain greater on the left side with radiation to left arm associated with numbness, tingling, and weakness of left hand.  She notified her neurosurgeon who ordered PT and a steroid epidural as outpt.  Prior to getting appt for epidural, her pain had became intractable so she presented to ED on .  Neurosurgery recommended Cervical epidural steroid injection. Anesthesia performed this on  .  She had some mild improvement in symptoms but continued with significant pain.   Neurosurgery team increased her norco and gabapentin and ordered cervical soft collar and felt she was stable from their standpoint to discharge home today with recommendations she f/u with Dr. Ortiz her attending neurosurgeon if pain does continue to improve. She is to also f/u with pain management in the next 1-2 weeks.     Patient was anxious for discharge home today.  She feels she will be able to rest better and have better pain control at home with adjustments made in her medications.         Day of Discharge     Subjective      Anxious to go home.  Pain  mildly improved.  Denies shoa, n/v, abd pain, or chest pain.      Physical Exam:  Temp:  [97.2 °F (36.2 °C)-98.9 °F (37.2 °C)] 98.9 °F (37.2 °C)  Heart Rate:  [] 86  Resp:  [16-18] 16  BP: (125-181)/(72-91) 171/91  Body mass index is 25.76 kg/m².    Physical Exam  Constitutional:       Appearance: Normal appearance.   Cardiovascular:      Rate and Rhythm: Normal rate and regular rhythm.      Pulses: Normal pulses.      Heart sounds: Normal heart sounds.   Pulmonary:      Effort: Pulmonary effort is normal.      Breath sounds: Normal breath sounds.   Abdominal:      General: Bowel sounds are normal.      Palpations: Abdomen is soft.   Musculoskeletal:      Right lower leg: No edema.      Left lower leg: No edema.   Skin:     General: Skin is warm and dry.   Neurological:      General: No focal deficit present.      Mental Status: She is alert and oriented to person, place, and time. Mental status is at baseline.         Consultants     Consult Orders (all) (From admission, onward)       Start     Ordered    08/14/24 1506  Inpatient Anesthesia [facility] Management [facility] Consult  Once        Specialty:  Pain Medicine  Provider:  (Not yet assigned)    08/14/24 1505    08/14/24 1334  Inpatient Case Management  Consult  Once        Provider:  (Not yet assigned)    08/14/24 1334    08/14/24 1334  Inpatient Diabetes Educator Consult  Once,   Status:  Canceled        Provider:  (Not yet assigned)    08/14/24 1334    08/14/24 1040  [facility] (on-call MD unless specified) Details  Once        Specialty:  Hospitalist  Provider:  Adams Eller MD    08/14/24 1039    08/14/24 1011  Neurosurgery (on-call MD unless specified)  Once        Specialty:  Neurosurgery  Provider:  Alex Ortiz MD    08/14/24 1011                  Procedures     * Surgery not found *    Imaging Results (All)       Procedure Component Value Units Date/Time    FL Guided Pain Management Spine [[id number]] Resulted: 08/16/24 0910      "Updated: 08/16/24 0910    Narrative:      This procedure was auto-finalized with no dictation required.            Results for orders placed during the hospital encounter of 04/02/24    Adult Transthoracic Echo Complete w/ Color, Spectral and Contrast if Necessary Per Protocol    Interpretation Summary    Left ventricular systolic function is normal. Calculated left ventricular EF = 60.1%    Left ventricular diastolic function was normal.    Estimated right ventricular systolic pressure from tricuspid regurgitation is normal (<35 mmHg). Calculated right ventricular systolic pressure from tricuspid regurgitation is 24 mmHg.    Pertinent Labs     Results from last 7 days   Lab Units 08/16/24  0459 08/15/24  0556 08/14/24  1013   WBC 10*3/mm3 11.09* 16.12* 6.97   HEMOGLOBIN g/dL 11.7* 12.4 12.8   PLATELETS 10*3/mm3 219 255 248     Results from last 7 days   Lab Units 08/17/24  0637 08/16/24  0459 08/15/24  0556 08/14/24  1013   SODIUM mmol/L 137 135* 128* 135*   POTASSIUM mmol/L 3.8 4.5 4.1 3.8   CHLORIDE mmol/L 100 102 94* 99   CO2 mmol/L 23.3 24.9 21.4* 25.0   BUN mg/dL 9 11 11 7*   CREATININE mg/dL 0.63 0.55* 0.60 0.65   GLUCOSE mg/dL 128* 95 144* 101*   EGFR mL/min/1.73 99.8 103.1 101.0 99.1     Results from last 7 days   Lab Units 08/14/24  1013   ALBUMIN g/dL 4.6   BILIRUBIN mg/dL <0.2   ALK PHOS U/L 95   AST (SGOT) U/L 15   ALT (SGPT) U/L 8     Results from last 7 days   Lab Units 08/17/24  0637 08/16/24  0459 08/15/24  0556 08/14/24  1013   CALCIUM mg/dL 9.1 8.8 8.8 9.5   ALBUMIN g/dL  --   --   --  4.6               Invalid input(s): \"LDLCALC\"          Test Results Pending at Discharge       Discharge Details        Discharge Medications        New Medications        Instructions Start Date   gabapentin 100 MG capsule  Commonly known as: NEURONTIN   200 mg, Oral, Every 8 Hours Scheduled      HYDROcodone-acetaminophen 7.5-325 MG per tablet  Commonly known as: NORCO   1 tablet, Oral, Every 6 Hours PRN    "   Lidocaine 4 %   1 patch, Transdermal, Every 24 Hours Scheduled, Remove & Discard patch within 12 hours or as directed by MD             Changes to Medications        Instructions Start Date   amLODIPine 5 MG tablet  Commonly known as: NORVASC  What changed:   medication strength  how much to take   5 mg, Oral, Nightly      atorvastatin 40 MG tablet  Commonly known as: LIPITOR  What changed: when to take this   40 mg, Oral, Daily      hydroCHLOROthiazide 25 MG tablet  What changed: how much to take   12.5 mg, Oral, Daily             Continue These Medications        Instructions Start Date   acetaminophen 500 MG tablet  Commonly known as: TYLENOL   500 mg, Oral, 2 Times Daily PRN      albuterol sulfate  (90 Base) MCG/ACT inhaler  Commonly known as: PROVENTIL HFA;VENTOLIN HFA;PROAIR HFA   1 puff, Inhalation, As Needed      cyclobenzaprine 10 MG tablet  Commonly known as: FLEXERIL   10 mg, Oral, 3 Times Daily PRN      DULoxetine 30 MG capsule  Commonly known as: CYMBALTA   30 mg, Oral, Daily, Add to 60 mg= 90 mg total daily      DULoxetine 60 MG capsule  Commonly known as: CYMBALTA   60 mg, Oral, Daily      fluticasone 50 MCG/ACT nasal spray  Commonly known as: FLONASE   2 sprays, Nasal, Daily      losartan 100 MG tablet  Commonly known as: COZAAR   100 mg, Oral, Daily      metFORMIN  MG 24 hr tablet  Commonly known as: GLUCOPHAGE-XR   500 mg, Oral, Nightly      metoprolol succinate XL 50 MG 24 hr tablet  Commonly known as: TOPROL-XL   50 mg, Oral, Daily      Multivitamin Adult tablet tablet  Generic drug: multivitamin with minerals   1 tablet, Oral, Daily      naproxen sodium 220 MG tablet  Commonly known as: ALEVE   220 mg, Oral, 2 Times Daily PRN               Allergies   Allergen Reactions    Fluoxetine Hives     Hives    Sulfa Antibiotics Hives     THROAT CLOSES    Zofran [Ondansetron Hcl] Hives     THROAT CLOSES    Lisinopril Cough       Discharge Disposition:  Home or Self Care      Discharge  Diet:  Diet Order   Procedures    Diet: Regular/House, Diabetic; Consistent Carbohydrate; Fluid Consistency: Thin (IDDSI 0)       Discharge Activity:   Activity Instructions       Gradually Increase Activity Until at Pre-Hospitalization Level              CODE STATUS:    Code Status and Medical Interventions: CPR (Attempt to Resuscitate); Full Support   Ordered at: 08/14/24 1445     Code Status (Patient has no pulse and is not breathing):    CPR (Attempt to Resuscitate)     Medical Interventions (Patient has pulse or is breathing):    Full Support       Future Appointments   Date Time Provider Department Center   9/12/2024  8:45 AM Nyla Mejia APRN [facility] DR [facility]   9/17/2024 12:45 PM Pedro Luis Zayas MD [id number] None   9/19/2024  8:30 AM Alex Ortiz MD [facility]      Follow-up Information       Nyla Mejia APRN Follow up in 1 week(s).    Specialties: Family Medicine, Nurse Practitioner  Why: follow up with primary care provider in 1 week.  Monitor your blood pressure and heart rates daily and take log with you to your follow up.  Contact information:  [address]  [place] [address]  [phone]               Alex Ortiz MD Follow up in 1 month(s).    Specialty: Neurosurgery  Why: Keep your September appointment.  If pain does not improve notify Dr Ortiz for sooner appointment.  Contact information:  [address] [address]  [phone]               Pain management Follow up in 1 week(s).    Why: follow up with pain management in 7-10 days.                           Time Spent on Discharge:  Greater than 30 minutes      DAYNE Gutiérrez  [facility]  08/17/24  12:05 EDT

## 2024-08-17 NOTE — SIGNIFICANT NOTE
Spoke with Pt and spouse and patient reports she ambulated entire unit earlier this morning and feels she has returned to her functional baseline. She has 3 BRYANT her home but declines assessment due to feeling she is capable and doesn't need skilled PT. Informed NSG and PT will sign off.

## 2024-08-17 NOTE — PLAN OF CARE
Goal Outcome Evaluation:  Plan of Care Reviewed With: patient        Progress: improving  Outcome Evaluation: VSS. Norco for pain and morphine for breakthrough pain. Up with assist. Tolerating diet. Falls precautions in place.

## 2024-08-17 NOTE — OUTREACH NOTE
Prep Survey      Flowsheet Row Responses   [facility] patient discharged from? [facility]   Is LACE score < 7 ? No   Eligibility [facility]   Date of Admission 08/14/24   Date of Discharge 08/17/24   Discharge Disposition Home or Self Care   Discharge diagnosis Acute neck pain   Does the patient have one of the following disease processes/diagnoses(primary or secondary)? Other   Does the patient have Home health ordered? No   Is there a DME ordered? No   Comments regarding appointments Epidural Block   Complete by:  Sep 16, 2024 (Approximate)   Medication alerts for this patient see AVS   Prep survey completed? Yes            Wanda LUCAS - Registered Nurse

## 2024-08-17 NOTE — PLAN OF CARE
Goal Outcome Evaluation: Patient discharged home, accompanied by her . PIV removed. Discharge education completed. Patient has no questions or concerns at this time. Patient wore soft cervical collar at discharge.

## 2024-08-19 ENCOUNTER — TRANSITIONAL CARE MANAGEMENT TELEPHONE ENCOUNTER (OUTPATIENT)
Dept: CALL CENTER | Facility: HOSPITAL | Age: 64
End: 2024-08-19
Payer: COMMERCIAL

## 2024-08-19 NOTE — OUTREACH NOTE
Call Center TCM Note      Flowsheet Row Responses   [facility] patient discharged from? [place]   Does the patient have one of the following disease processes/diagnoses(primary or secondary)? Other   TCM attempt successful? Yes   Call start time 1523   Call end time 1533   Meds reviewed with patient/caregiver? Yes   Is the patient having any side effects they believe may be caused by any medication additions or changes? No   Does the patient have all medications ordered at discharge? Yes   Is the patient taking all medications as directed (includes completed medication regime)? Yes   Comments Patient wishes to contact PCP office to arrange hospital f/u appt after her visit with neurosurgeon on 08/23/24. Cardiology appt 09/17/24. Patient's next PCP appt already scheduled for 09/12/24.   Does the patient have an appointment with their PCP within 7-14 days of discharge? No   Nursing Interventions Patient declined scheduling/rescheduling appointment at this time   Has home health visited the patient within 72 hours of discharge? N/A   Home health comments Will have outpatient PT.   Psychosocial issues? No   Did the patient receive a copy of their discharge instructions? Yes   Nursing interventions Reviewed instructions with patient   What is the patient's perception of their health status since discharge? Same   Is the patient/caregiver able to teach back signs and symptoms related to disease process for when to call PCP? Yes   Is the patient/caregiver able to teach back signs and symptoms related to disease process for when to call 911? Yes   Is the patient/caregiver able to teach back the hierarchy of who to call/visit for symptoms/problems? PCP, Specialist, Home health nurse, Urgent Care, ED, 911 Yes   TCM call completed? Yes   Wrap up additional comments Patient states is about the same. States continues to have pain, and will begin seeing pain clinic/outpatient PT. States keeping log of BP/HR for appts.  Denies any needs at this time. TCM complete.   Call end time 1533   Would this patient benefit from a Referral to [facility] Social Work? No   Is the patient interested in additional calls from an ambulatory ? No            Cynthia Willett RN    8/19/2024, 15:35 EDT

## 2024-08-19 NOTE — PROGRESS NOTES
Case Management Discharge Note      Final Note: Discharged home. Maru Martinez RN         Selected Continued Care - Discharged on 8/17/2024 Admission date: 8/14/2024 - Discharge disposition: Home or Self Care     Transportation Services  Private: Car    Final Discharge Disposition Code: 01 - home or self-care

## 2024-08-19 NOTE — TELEPHONE ENCOUNTER
PATIENT CALLED BACK - STATES THAT SHE SPENT FOUR DAYS IN THE HOSPITAL AFTER GOING TO THE ED. SHE WANTS TO KNOW IF SHE CAN COME IN THIS WEEK TO SEE DR. CRAIG. SHE STATES THAT NOTHING AT ALL IS HELPING HER PAIN. PLEASE CALL HER BACK TO ADVISE. THANKS.     CALL BACK # [phone]     CALL BACK ASAP

## 2024-08-22 NOTE — PROGRESS NOTES
Patient ID: Kristina Yates is a 64 y.o. female is here today for follow-up for cervical radiculopathy and cervicalgia.    Treatment: Last Epidural performed on 08/16/2024    Subjective     The patient is here in regards to   Chief Complaint   Patient presents with    Back Pain    Follow-up       History of Present Illness  Kristina was doing well but she presented with acute neck pain and radiculopathy.  She previously had been dealing with this but was mitigated with extensive physical therapy and holistic interventions.  She presented to the ED with acute pain and had an epidural steroid injection which gave her some temporary relief but has quickly worn off.  She describes pain in her neck that excruciating requiring hydrocodone.  She also has intense pain radiating down her left arm into her first 3 digits and occasionally her fourth digit as well.      While in the room and during my examination of the patient I wore a mask and eye protection.  I washed my hands before and after this patient encounter.  The patient was also wearing a mask.    The following portions of the patient's history were reviewed and updated as appropriate: allergies, current medications, past family history, past medical history, past social history, past surgical history and problem list.    Review of Systems   Eyes:  Negative for visual disturbance.   Gastrointestinal:  Positive for nausea. Negative for vomiting.   Musculoskeletal:  Positive for neck pain and neck stiffness.   Neurological:  Positive for dizziness, weakness, light-headedness, numbness and headaches.        Past Medical History:   Diagnosis Date    Alcohol abuse     in recovery, sober since 2012    Anxiety     Arthritis     Asthma     Edmonds esophagus     Cervical neuritis     Chronic pain     COPD (chronic obstructive pulmonary disease)     Depression     Diabetes mellitus     Type 2    DJD (degenerative joint disease), cervical     Foot spasms     GERD (gastroesophageal  reflux disease)     Headache     Hiatal hernia     Hyperlipidemia     Hypertension     Low back pain     Neck pain     Seasonal allergies     Spinal headache     Spinal stenosis        Allergies   Allergen Reactions    Fluoxetine Hives     Hives    Sulfa Antibiotics Hives     THROAT CLOSES    Zofran [Ondansetron Hcl] Hives     THROAT CLOSES    Lisinopril Cough       Family History   Problem Relation Age of Onset    Cancer Mother         nonhodkinds lymphoma    Heart disease Father     Drug abuse Sister     Heart disease Brother     Lupus Maternal Aunt     Malig Hyperthermia Neg Hx        Social History     Socioeconomic History    Marital status:     Number of children: 2   Tobacco Use    Smoking status: Every Day     Current packs/day: 0.25     Average packs/day: 0.5 packs/day for 16.6 years (7.9 ttl pk-yrs)     Types: Cigarettes     Start date: 2/25/2006     Last attempt to quit: 2/25/2021    Smokeless tobacco: Never   Vaping Use    Vaping status: Never Used   Substance and Sexual Activity    Alcohol use: No     Comment: sober since 2012, worsk 12 steps    Drug use: No    Sexual activity: Yes     Partners: Male       Past Surgical History:   Procedure Laterality Date    ANTERIOR CERVICAL DISCECTOMY W/ FUSION  11/2003    C5-6, C6-7    CERCLAGE CERVIX      CERVICAL EPIDURAL  2007    CERVICAL FUSION      DILATATION AND CURETTAGE      ENDOMETRIAL ABLATION  2007    ENDOSCOPY      MULTIPLE    ENDOSCOPY N/A 07/05/2016    Procedure: ESOPHAGOGASTRODUODENOSCOPY WITH COLD BIOPSIES;  Surgeon: Jose Mcclellan MD;  Location: [facility] ENDOSCOPY;  Service:     ESOPHAGEAL DILATATION  2009    HAND SURGERY Bilateral     CARTILAGE    LUMBAR FUSION      LUMBAR LAMINECTOMY WITH FUSION N/A 06/30/2023    Procedure: OPEN LUMBAR FOUR TO FIVE TRANSFORAMINAL LUMBAR INTERBODY FUSION;  Surgeon: Alex Ortiz MD;  Location: [facility] MAIN OR;  Service: Neurosurgery;  Laterality: N/A;    NECK SURGERY      NISSEN FUNDOPLICATION LAPAROSCOPIC   03/2012    SACROILIAC JOINT FUSION Right 6/4/2024    Procedure: RIGHT PERCUTANEOUS ARTHRODESIS SACROILIAC JOINT, NEUROMONITORING;  Surgeon: Alex Ortiz MD;  Location: [facility];  Service: Neurosurgery;  Laterality: Right;    TONSILLECTOMY           Objective     Vitals:    08/23/24 0826   BP: 168/88   Pulse: 110   Resp: 16   Temp: 97.3 °F (36.3 °C)   SpO2: 95%     Body mass index is 27.9 kg/m².    Physical Exam  Constitutional:       Appearance: Normal appearance.   HENT:      Head: Normocephalic and atraumatic.   Eyes:      Extraocular Movements: Extraocular movements intact.      Conjunctiva/sclera: Conjunctivae normal.      Pupils: Pupils are equal, round, and reactive to light.   Cardiovascular:      Rate and Rhythm: Normal rate and regular rhythm.      Pulses: Normal pulses.   Pulmonary:      Breath sounds: Normal breath sounds.   Abdominal:      Palpations: Abdomen is soft.   Musculoskeletal:         General: Normal range of motion.      Cervical back: Normal range of motion and neck supple.   Skin:     General: Skin is warm and dry.   Neurological:      Mental Status: She is alert and oriented to person, place, and time.      Cranial Nerves: Cranial nerves 2-12 are intact.      Motor: Motor function is intact. No weakness or atrophy.      Coordination: Coordination is intact. Romberg sign negative. Romberg Test normal.      Gait: Gait is intact. Gait normal.      Deep Tendon Reflexes: Reflexes are normal and symmetric.      Reflex Scores:       Tricep reflexes are 2+ on the right side and 2+ on the left side.       Bicep reflexes are 2+ on the right side and 2+ on the left side.       Brachioradialis reflexes are 2+ on the right side and 2+ on the left side.       Patellar reflexes are 2+ on the right side and 2+ on the left side.       Achilles reflexes are 2+ on the right side and 2+ on the left side.  Psychiatric:         Speech: Speech normal.         Neurologic Exam     Mental Status   Oriented to  person, place, and time.   Attention: normal. Concentration: normal.   Speech: speech is normal   Level of consciousness: alert    Cranial Nerves   Cranial nerves II through XII intact.     CN III, IV, VI   Pupils are equal, round, and reactive to light.    Motor Exam   Muscle bulk: normal  Overall muscle tone: normal    Strength   Strength 5/5 except as noted.     Sensory Exam   Light touch normal.     Gait, Coordination, and Reflexes     Gait  Gait: normal    Coordination   Romberg: negative    Reflexes   Reflexes 2+ except as noted.   Right brachioradialis: 2+  Left brachioradialis: 2+  Right biceps: 2+  Left biceps: 2+  Right triceps: 2+  Left triceps: 2+  Right patellar: 2+  Left patellar: 2+  Right achilles: 2+  Left achilles: 2+      Assessment & Plan   Independent Review of Radiographic Studies:      I personally reviewed the images from the following studies.    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  I think that her adjacent segment disease has gotten worse enough that we need to consider ACDF and removal of her previous hardware.  We discussed the risk and benefits and alternatives of surgery including CSF leak, injury to nerve roots, need for further surgery down the line.  She understood and was willing to proceed with surgery.    Medical Decision Making:      C4-5 ACDF         Diagnoses and all orders for this visit:    1. Cervical radiculopathy (Primary)  -     Case Request; Standing  -     CBC & Differential; Future  -     Comprehensive Metabolic Panel; Future  -     aPTT; Future  -     Protime-INR; Future  -     Type & Screen; Future  -     ceFAZolin (ANCEF) 2,000  mg in sodium chloride 0.9 % 100 mL IVPB  -     Case Request    2. Cervicalgia  -     Case Request; Standing  -     CBC & Differential; Future  -     Comprehensive Metabolic Panel; Future  -     aPTT; Future  -     Protime-INR; Future  -     Type & Screen; Future  -     ceFAZolin (ANCEF) 2,000 mg in sodium chloride 0.9 % 100 mL IVPB  -     Case Request    Other orders  -     Inpatient Admission; Standing  -     Follow Anesthesia Guidelines / Protocol; Future  -     Follow Anesthesia Guidelines / Protocol; Standing  -     Verify / Perform Chlorhexidine Skin Prep; Standing  -     Provide Patient With Instructions on NPO Status; Future  -     Provide Chlorhexidine Skin Prep Wipes and Instructions; Future             Patient Instructions/Recommendations:    Call with any questions or concerns      Alex Ortiz MD  08/23/24  08:49 EDT

## 2024-08-23 ENCOUNTER — OFFICE VISIT (OUTPATIENT)
Dept: NEUROSURGERY | Facility: CLINIC | Age: 64
End: 2024-08-23
Payer: COMMERCIAL

## 2024-08-23 VITALS
HEIGHT: 67 IN | SYSTOLIC BLOOD PRESSURE: 168 MMHG | OXYGEN SATURATION: 95 % | HEART RATE: 110 BPM | BODY MASS INDEX: 27.55 KG/M2 | WEIGHT: 175.5 LBS | TEMPERATURE: 97.3 F | DIASTOLIC BLOOD PRESSURE: 88 MMHG | RESPIRATION RATE: 16 BRPM

## 2024-08-23 DIAGNOSIS — M54.12 CERVICAL RADICULOPATHY: Primary | ICD-10-CM

## 2024-08-23 DIAGNOSIS — M54.2 CERVICALGIA: ICD-10-CM

## 2024-08-23 PROCEDURE — 99024 POSTOP FOLLOW-UP VISIT: CPT | Performed by: NEUROLOGICAL SURGERY

## 2024-08-27 ENCOUNTER — READMISSION MANAGEMENT (OUTPATIENT)
Dept: CALL CENTER | Facility: HOSPITAL | Age: 64
End: 2024-08-27
Payer: COMMERCIAL

## 2024-08-27 NOTE — OUTREACH NOTE
Medical Week 2 Survey      Flowsheet Row Responses   [facility] patient discharged from? [facility]   Does the patient have one of the following disease processes/diagnoses(primary or secondary)? Other   Week 2 attempt successful? Yes   Call start time 1642   Discharge diagnosis Acute neck pain, essential HTN   Call end time 1653   Person spoke with today (if not patient) and relationship Patient   Meds reviewed with patient/caregiver? Yes   Does the patient have all medications ordered at discharge? Yes   Is the patient taking all medications as directed (includes completed medication regime)? Yes   Does the patient have a primary care provider?  Yes   Has the patient kept scheduled appointments due by today? Yes   Has home health visited the patient within 72 hours of discharge? N/A   Psychosocial issues? No   Did the patient receive a copy of their discharge instructions? Yes   What is the patient's perception of their health status since discharge? Same  [Patient reports that she is scheduled for upcoming cervical fusion surgery]   Is the patient/caregiver able to teach back signs and symptoms related to disease process for when to call PCP? Yes   Is the patient/caregiver able to teach back signs and symptoms related to disease process for when to call 911? Yes   Is the patient/caregiver able to teach back the hierarchy of who to call/visit for symptoms/problems? PCP, Specialist, Home health nurse, Urgent Care, ED, 911 Yes   Week 2 Call Completed? Yes   Is the patient interested in additional calls from an ambulatory ? No   Would this patient benefit from a Referral to Amb Social Work? No   Call end time 1653            CHAYA PIERSON - Registered Nurse

## 2024-09-03 NOTE — H&P (VIEW-ONLY)
Patient ID: Kristina Yates is a 64 y.o. female is here today for follow-up to discuss having surgery for cervical radiculopathy.    Subjective     The patient is here in regards to   Chief Complaint   Patient presents with    Neck Pain    Follow-up       History of Present Illness  Kristina continues to have severe cervicalgia and cervical radiculopathy.  She feels like it is getting worse.  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  This is her preoperative appointment      While in the room and during my examination of the patient I wore a mask and eye protection.  I washed my hands before and after this patient encounter.  The patient was also wearing a mask.    The following portions of the patient's history were reviewed and updated as appropriate: allergies, current medications, past family history, past medical history, past social history, past surgical history and problem list.    Review of Systems   Constitutional:  Negative for fever.   Musculoskeletal:  Positive for neck pain and neck stiffness.   Neurological:  Positive for dizziness, weakness, light-headedness, numbness and headaches.        Past Medical History:   Diagnosis Date    Alcohol abuse     in recovery, sober since 2012    Anxiety     Arthritis     Asthma     Edmonds esophagus     Cervical neuritis     Chronic pain     COPD (chronic obstructive pulmonary disease)     Depression     Diabetes mellitus     Type 2    DJD (degenerative joint disease), cervical     Foot spasms     GERD (gastroesophageal reflux disease)     Headache     Hiatal hernia     Hyperlipidemia     Hypertension     Low back pain     Neck pain     Seasonal allergies     Spinal headache     Spinal stenosis        Allergies   Allergen Reactions    Fluoxetine Hives     Hives    Sulfa Antibiotics Hives     THROAT CLOSES    Zofran [Ondansetron Hcl] Hives     THROAT CLOSES    Lisinopril Cough       Family History   Problem Relation Age  of Onset    Cancer Mother         nonhodkinds lymphoma    Heart disease Father     Drug abuse Sister     Heart disease Brother     Lupus Maternal Aunt     Malig Hyperthermia Neg Hx        Social History     Socioeconomic History    Marital status:     Number of children: 2   Tobacco Use    Smoking status: Former     Current packs/day: 0.00     Average packs/day: 0.5 packs/day for 15.0 years (7.5 ttl pk-yrs)     Types: Cigarettes     Start date: 2/25/2006     Quit date: 2/25/2021     Years since quitting: 3.5    Smokeless tobacco: Never   Vaping Use    Vaping status: Never Used   Substance and Sexual Activity    Alcohol use: No     Comment: sober since 2012, worsk 12 steps    Drug use: No    Sexual activity: Yes     Partners: Male       Past Surgical History:   Procedure Laterality Date    ANTERIOR CERVICAL DISCECTOMY W/ FUSION  11/2003    C5-6, C6-7    CERCLAGE CERVIX      CERVICAL EPIDURAL  2007    CERVICAL FUSION      DILATATION AND CURETTAGE      ENDOMETRIAL ABLATION  2007    ENDOSCOPY      MULTIPLE    ENDOSCOPY N/A 07/05/2016    Procedure: ESOPHAGOGASTRODUODENOSCOPY WITH COLD BIOPSIES;  Surgeon: Jose Mcclellan MD;  Location: [facility] ENDOSCOPY;  Service:     ESOPHAGEAL DILATATION  2009    HAND SURGERY Bilateral     CARTILAGE    LUMBAR FUSION      LUMBAR LAMINECTOMY WITH FUSION N/A 06/30/2023    Procedure: OPEN LUMBAR FOUR TO FIVE TRANSFORAMINAL LUMBAR INTERBODY FUSION;  Surgeon: Alex Ortiz MD;  Location: [facility];  Service: Neurosurgery;  Laterality: N/A;    NECK SURGERY      NISSEN FUNDOPLICATION LAPAROSCOPIC  03/2012    SACROILIAC JOINT FUSION Right 6/4/2024    Procedure: RIGHT PERCUTANEOUS ARTHRODESIS SACROILIAC JOINT, NEUROMONITORING;  Surgeon: Alex Ortiz MD;  Location: [facility];  Service: Neurosurgery;  Laterality: Right;    TONSILLECTOMY           Objective     Vitals:    09/04/24 1144   BP: 138/82   Pulse: 91   Resp: 16   Temp: 96.6 °F (35.9 °C)   SpO2: 97%     Body mass index is  21.02 kg/m².    Physical Exam  Constitutional:       Appearance: Normal appearance.   HENT:      Head: Normocephalic and atraumatic.   Eyes:      Extraocular Movements: Extraocular movements intact.      Conjunctiva/sclera: Conjunctivae normal.      Pupils: Pupils are equal, round, and reactive to light.   Cardiovascular:      Rate and Rhythm: Normal rate and regular rhythm.      Pulses: Normal pulses.   Pulmonary:      Breath sounds: Normal breath sounds.   Abdominal:      Palpations: Abdomen is soft.   Musculoskeletal:         General: Normal range of motion.      Cervical back: Normal range of motion and neck supple.   Skin:     General: Skin is warm and dry.   Neurological:      Mental Status: She is alert and oriented to person, place, and time.      Cranial Nerves: Cranial nerves 2-12 are intact.      Motor: Motor function is intact. No weakness or atrophy.      Coordination: Coordination is intact. Romberg sign negative. Romberg Test normal.      Gait: Gait is intact. Gait normal.      Deep Tendon Reflexes: Reflexes are normal and symmetric.      Reflex Scores:       Tricep reflexes are 2+ on the right side and 2+ on the left side.       Bicep reflexes are 2+ on the right side and 2+ on the left side.       Brachioradialis reflexes are 2+ on the right side and 2+ on the left side.       Patellar reflexes are 2+ on the right side and 2+ on the left side.       Achilles reflexes are 2+ on the right side and 2+ on the left side.  Psychiatric:         Speech: Speech normal.         Neurologic Exam     Mental Status   Oriented to person, place, and time.   Attention: normal. Concentration: normal.   Speech: speech is normal   Level of consciousness: alert    Cranial Nerves   Cranial nerves II through XII intact.     CN III, IV, VI   Pupils are equal, round, and reactive to light.    Motor Exam   Muscle bulk: normal  Overall muscle tone: normal    Strength   Strength 5/5 except as noted.     Sensory Exam   Light  touch normal.     Gait, Coordination, and Reflexes     Gait  Gait: normal    Coordination   Romberg: negative    Reflexes   Reflexes 2+ except as noted.   Right brachioradialis: 2+  Left brachioradialis: 2+  Right biceps: 2+  Left biceps: 2+  Right triceps: 2+  Left triceps: 2+  Right patellar: 2+  Left patellar: 2+  Right achilles: 2+  Left achilles: 2+      Assessment & Plan   Independent Review of Radiographic Studies:      I personally reviewed the images from the following studies.    No new neuroimaging.    Assessment/Plan: We discussed the risk and benefits and alternatives of ACDF with revision of her previous hardware including CSF leak, injury to nerve roots, need for further surgery down the line as well as cervical hematoma, infection.  She understands and is willing to proceed with surgery    Medical Decision Making:      C4-5 ACDF with revision of previous hardware         Diagnoses and all orders for this visit:    1. Cervical radiculopathy (Primary)             Patient Instructions/Recommendations:    Call with any questions or concerns      Alex Ortiz MD  09/04/24  12:10 EDT

## 2024-09-03 NOTE — PROGRESS NOTES
Patient ID: Kristina Yates is a 64 y.o. female is here today for follow-up to discuss having surgery for cervical radiculopathy.    Subjective     The patient is here in regards to   Chief Complaint   Patient presents with    Neck Pain    Follow-up       History of Present Illness  Kristina continues to have severe cervicalgia and cervical radiculopathy.  She feels like it is getting worse.  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  This is her preoperative appointment      While in the room and during my examination of the patient I wore a mask and eye protection.  I washed my hands before and after this patient encounter.  The patient was also wearing a mask.    The following portions of the patient's history were reviewed and updated as appropriate: allergies, current medications, past family history, past medical history, past social history, past surgical history and problem list.    Review of Systems   Constitutional:  Negative for fever.   Musculoskeletal:  Positive for neck pain and neck stiffness.   Neurological:  Positive for dizziness, weakness, light-headedness, numbness and headaches.        Past Medical History:   Diagnosis Date    Alcohol abuse     in recovery, sober since 2012    Anxiety     Arthritis     Asthma     Edmonds esophagus     Cervical neuritis     Chronic pain     COPD (chronic obstructive pulmonary disease)     Depression     Diabetes mellitus     Type 2    DJD (degenerative joint disease), cervical     Foot spasms     GERD (gastroesophageal reflux disease)     Headache     Hiatal hernia     Hyperlipidemia     Hypertension     Low back pain     Neck pain     Seasonal allergies     Spinal headache     Spinal stenosis        Allergies   Allergen Reactions    Fluoxetine Hives     Hives    Sulfa Antibiotics Hives     THROAT CLOSES    Zofran [Ondansetron Hcl] Hives     THROAT CLOSES    Lisinopril Cough       Family History   Problem Relation Age  of Onset    Cancer Mother         nonhodkinds lymphoma    Heart disease Father     Drug abuse Sister     Heart disease Brother     Lupus Maternal Aunt     Malig Hyperthermia Neg Hx        Social History     Socioeconomic History    Marital status:     Number of children: 2   Tobacco Use    Smoking status: Former     Current packs/day: 0.00     Average packs/day: 0.5 packs/day for 15.0 years (7.5 ttl pk-yrs)     Types: Cigarettes     Start date: 2/25/2006     Quit date: 2/25/2021     Years since quitting: 3.5    Smokeless tobacco: Never   Vaping Use    Vaping status: Never Used   Substance and Sexual Activity    Alcohol use: No     Comment: sober since 2012, worsk 12 steps    Drug use: No    Sexual activity: Yes     Partners: Male       Past Surgical History:   Procedure Laterality Date    ANTERIOR CERVICAL DISCECTOMY W/ FUSION  11/2003    C5-6, C6-7    CERCLAGE CERVIX      CERVICAL EPIDURAL  2007    CERVICAL FUSION      DILATATION AND CURETTAGE      ENDOMETRIAL ABLATION  2007    ENDOSCOPY      MULTIPLE    ENDOSCOPY N/A 07/05/2016    Procedure: ESOPHAGOGASTRODUODENOSCOPY WITH COLD BIOPSIES;  Surgeon: Jose Mcclellan MD;  Location: [facility] ENDOSCOPY;  Service:     ESOPHAGEAL DILATATION  2009    HAND SURGERY Bilateral     CARTILAGE    LUMBAR FUSION      LUMBAR LAMINECTOMY WITH FUSION N/A 06/30/2023    Procedure: OPEN LUMBAR FOUR TO FIVE TRANSFORAMINAL LUMBAR INTERBODY FUSION;  Surgeon: Alex Ortiz MD;  Location: [facility]  Service: Neurosurgery;  Laterality: N/A;    NECK SURGERY      NISSEN FUNDOPLICATION LAPAROSCOPIC  03/2012    SACROILIAC JOINT FUSION Right 6/4/2024    Procedure: RIGHT PERCUTANEOUS ARTHRODESIS SACROILIAC JOINT, NEUROMONITORING;  Surgeon: Alex Ortiz MD;  Location: [facility]  Service: Neurosurgery;  Laterality: Right;    TONSILLECTOMY           Objective     Vitals:    09/04/24 1144   BP: 138/82   Pulse: 91   Resp: 16   Temp: 96.6 °F (35.9 °C)   SpO2: 97%     Body mass index is  21.02 kg/m².    Physical Exam  Constitutional:       Appearance: Normal appearance.   HENT:      Head: Normocephalic and atraumatic.   Eyes:      Extraocular Movements: Extraocular movements intact.      Conjunctiva/sclera: Conjunctivae normal.      Pupils: Pupils are equal, round, and reactive to light.   Cardiovascular:      Rate and Rhythm: Normal rate and regular rhythm.      Pulses: Normal pulses.   Pulmonary:      Breath sounds: Normal breath sounds.   Abdominal:      Palpations: Abdomen is soft.   Musculoskeletal:         General: Normal range of motion.      Cervical back: Normal range of motion and neck supple.   Skin:     General: Skin is warm and dry.   Neurological:      Mental Status: She is alert and oriented to person, place, and time.      Cranial Nerves: Cranial nerves 2-12 are intact.      Motor: Motor function is intact. No weakness or atrophy.      Coordination: Coordination is intact. Romberg sign negative. Romberg Test normal.      Gait: Gait is intact. Gait normal.      Deep Tendon Reflexes: Reflexes are normal and symmetric.      Reflex Scores:       Tricep reflexes are 2+ on the right side and 2+ on the left side.       Bicep reflexes are 2+ on the right side and 2+ on the left side.       Brachioradialis reflexes are 2+ on the right side and 2+ on the left side.       Patellar reflexes are 2+ on the right side and 2+ on the left side.       Achilles reflexes are 2+ on the right side and 2+ on the left side.  Psychiatric:         Speech: Speech normal.         Neurologic Exam     Mental Status   Oriented to person, place, and time.   Attention: normal. Concentration: normal.   Speech: speech is normal   Level of consciousness: alert    Cranial Nerves   Cranial nerves II through XII intact.     CN III, IV, VI   Pupils are equal, round, and reactive to light.    Motor Exam   Muscle bulk: normal  Overall muscle tone: normal    Strength   Strength 5/5 except as noted.     Sensory Exam   Light  touch normal.     Gait, Coordination, and Reflexes     Gait  Gait: normal    Coordination   Romberg: negative    Reflexes   Reflexes 2+ except as noted.   Right brachioradialis: 2+  Left brachioradialis: 2+  Right biceps: 2+  Left biceps: 2+  Right triceps: 2+  Left triceps: 2+  Right patellar: 2+  Left patellar: 2+  Right achilles: 2+  Left achilles: 2+      Assessment & Plan   Independent Review of Radiographic Studies:      I personally reviewed the images from the following studies.    No new neuroimaging.    Assessment/Plan: We discussed the risk and benefits and alternatives of ACDF with revision of her previous hardware including CSF leak, injury to nerve roots, need for further surgery down the line as well as cervical hematoma, infection.  She understands and is willing to proceed with surgery    Medical Decision Making:      C4-5 ACDF with revision of previous hardware         Diagnoses and all orders for this visit:    1. Cervical radiculopathy (Primary)             Patient Instructions/Recommendations:    Call with any questions or concerns      Alex Ortiz MD  09/04/24  12:10 EDT

## 2024-09-04 ENCOUNTER — OFFICE VISIT (OUTPATIENT)
Dept: NEUROSURGERY | Facility: CLINIC | Age: 64
End: 2024-09-04
Payer: COMMERCIAL

## 2024-09-04 VITALS
RESPIRATION RATE: 16 BRPM | WEIGHT: 132.2 LBS | SYSTOLIC BLOOD PRESSURE: 138 MMHG | TEMPERATURE: 96.6 F | DIASTOLIC BLOOD PRESSURE: 82 MMHG | BODY MASS INDEX: 20.75 KG/M2 | OXYGEN SATURATION: 97 % | HEART RATE: 91 BPM | HEIGHT: 67 IN

## 2024-09-04 DIAGNOSIS — M54.12 CERVICAL RADICULOPATHY: Primary | ICD-10-CM

## 2024-09-04 PROCEDURE — 99213 OFFICE O/P EST LOW 20 MIN: CPT | Performed by: NEUROLOGICAL SURGERY

## 2024-09-05 ENCOUNTER — READMISSION MANAGEMENT (OUTPATIENT)
Dept: CALL CENTER | Facility: HOSPITAL | Age: 64
End: 2024-09-05
Payer: COMMERCIAL

## 2024-09-05 NOTE — OUTREACH NOTE
Medical Week 3 Survey      Flowsheet Row Responses   [facility] patient discharged from? [place]   Does the patient have one of the following disease processes/diagnoses(primary or secondary)? Other   Week 3 attempt successful? Yes   Call start time 1702   Call end time 1704   Discharge diagnosis Acute neck pain, essential HTN   Person spoke with today (if not patient) and relationship Patient   Meds reviewed with patient/caregiver? Yes   Is the patient having any side effects they believe may be caused by any medication additions or changes? No   Does the patient have all medications ordered at discharge? Yes   Is the patient taking all medications as directed (includes completed medication regime)? Yes   Does the patient have a primary care provider?  Yes   Does the patient have an appointment with their PCP within 7 days of discharge? Yes   Has the patient kept scheduled appointments due by today? N/A   Psychosocial issues? No   Did the patient receive a copy of their discharge instructions? Yes   Nursing interventions Reviewed instructions with patient   What is the patient's perception of their health status since discharge? Same   Is the patient/caregiver able to teach back signs and symptoms related to disease process for when to call PCP? Yes   Is the patient/caregiver able to teach back signs and symptoms related to disease process for when to call 911? Yes   Is the patient/caregiver able to teach back the hierarchy of who to call/visit for symptoms/problems? PCP, Specialist, Home health nurse, Urgent Care, ED, 911 Yes   If the patient is a current smoker, are they able to teach back resources for cessation? Not a smoker   Week 3 Call Completed? Yes   Graduated Yes   Is the patient interested in additional calls from an ambulatory ? No   Would this patient benefit from a Referral to Amb Social Work? No   Graduated/Revoked comments Pt states she is about the same with Neck, back pain. Pt  awaiting for upcoming surgery, CERVICAL FOUR TO FIVE DISCECTOMY ANTERIOR WITH FUSION.   Wrap up additional comments Pt states she is about the same with Neck, back pain. Pt awaiting for upcoming surgery, CERVICAL FOUR TO FIVE DISCECTOMY ANTERIOR WITH FUSION.   Call end time 1704            Brenda BOWSER - Registered Nurse

## 2024-09-09 ENCOUNTER — PRE-ADMISSION TESTING (OUTPATIENT)
Dept: PREADMISSION TESTING | Facility: HOSPITAL | Age: 64
End: 2024-09-09
Payer: COMMERCIAL

## 2024-09-09 VITALS
BODY MASS INDEX: 27.23 KG/M2 | TEMPERATURE: 98 F | DIASTOLIC BLOOD PRESSURE: 91 MMHG | WEIGHT: 173.5 LBS | HEART RATE: 87 BPM | SYSTOLIC BLOOD PRESSURE: 127 MMHG | RESPIRATION RATE: 16 BRPM | OXYGEN SATURATION: 96 % | HEIGHT: 67 IN

## 2024-09-09 DIAGNOSIS — M54.2 CERVICALGIA: ICD-10-CM

## 2024-09-09 DIAGNOSIS — M54.12 CERVICAL RADICULOPATHY: ICD-10-CM

## 2024-09-09 LAB
ABO GROUP BLD: NORMAL
ALBUMIN SERPL-MCNC: 4.3 G/DL (ref 3.5–5.2)
ALBUMIN/GLOB SERPL: 1.8 G/DL
ALP SERPL-CCNC: 91 U/L (ref 39–117)
ALT SERPL W P-5'-P-CCNC: 11 U/L (ref 1–33)
ANION GAP SERPL CALCULATED.3IONS-SCNC: 8.1 MMOL/L (ref 5–15)
APTT PPP: 56.1 SECONDS (ref 22.7–35.4)
AST SERPL-CCNC: 13 U/L (ref 1–32)
BASOPHILS # BLD AUTO: 0.04 10*3/MM3 (ref 0–0.2)
BASOPHILS NFR BLD AUTO: 0.6 % (ref 0–1.5)
BILIRUB SERPL-MCNC: <0.2 MG/DL (ref 0–1.2)
BLD GP AB SCN SERPL QL: NEGATIVE
BUN SERPL-MCNC: 5 MG/DL (ref 8–23)
BUN/CREAT SERPL: 7.9 (ref 7–25)
CALCIUM SPEC-SCNC: 9 MG/DL (ref 8.6–10.5)
CHLORIDE SERPL-SCNC: 97 MMOL/L (ref 98–107)
CO2 SERPL-SCNC: 26.9 MMOL/L (ref 22–29)
CREAT SERPL-MCNC: 0.63 MG/DL (ref 0.57–1)
DEPRECATED RDW RBC AUTO: 45.3 FL (ref 37–54)
EGFRCR SERPLBLD CKD-EPI 2021: 99.8 ML/MIN/1.73
EOSINOPHIL # BLD AUTO: 0.44 10*3/MM3 (ref 0–0.4)
EOSINOPHIL NFR BLD AUTO: 6.6 % (ref 0.3–6.2)
ERYTHROCYTE [DISTWIDTH] IN BLOOD BY AUTOMATED COUNT: 13.2 % (ref 12.3–15.4)
GLOBULIN UR ELPH-MCNC: 2.4 GM/DL
GLUCOSE SERPL-MCNC: 110 MG/DL (ref 65–99)
HCT VFR BLD AUTO: 38.6 % (ref 34–46.6)
HGB BLD-MCNC: 12.5 G/DL (ref 12–15.9)
IMM GRANULOCYTES # BLD AUTO: 0.04 10*3/MM3 (ref 0–0.05)
IMM GRANULOCYTES NFR BLD AUTO: 0.6 % (ref 0–0.5)
INR PPP: 0.94 (ref 0.9–1.1)
LYMPHOCYTES # BLD AUTO: 2.08 10*3/MM3 (ref 0.7–3.1)
LYMPHOCYTES NFR BLD AUTO: 31.1 % (ref 19.6–45.3)
MCH RBC QN AUTO: 30.3 PG (ref 26.6–33)
MCHC RBC AUTO-ENTMCNC: 32.4 G/DL (ref 31.5–35.7)
MCV RBC AUTO: 93.7 FL (ref 79–97)
MONOCYTES # BLD AUTO: 0.66 10*3/MM3 (ref 0.1–0.9)
MONOCYTES NFR BLD AUTO: 9.9 % (ref 5–12)
NEUTROPHILS NFR BLD AUTO: 3.43 10*3/MM3 (ref 1.7–7)
NEUTROPHILS NFR BLD AUTO: 51.2 % (ref 42.7–76)
NRBC BLD AUTO-RTO: 0 /100 WBC (ref 0–0.2)
PLATELET # BLD AUTO: 317 10*3/MM3 (ref 140–450)
PMV BLD AUTO: 9.7 FL (ref 6–12)
POTASSIUM SERPL-SCNC: 4.4 MMOL/L (ref 3.5–5.2)
PROT SERPL-MCNC: 6.7 G/DL (ref 6–8.5)
PROTHROMBIN TIME: 12.7 SECONDS (ref 11.7–14.2)
RBC # BLD AUTO: 4.12 10*6/MM3 (ref 3.77–5.28)
RH BLD: NEGATIVE
SODIUM SERPL-SCNC: 132 MMOL/L (ref 136–145)
T&S EXPIRATION DATE: NORMAL
WBC NRBC COR # BLD AUTO: 6.69 10*3/MM3 (ref 3.4–10.8)

## 2024-09-09 PROCEDURE — 36415 COLL VENOUS BLD VENIPUNCTURE: CPT

## 2024-09-09 PROCEDURE — 80053 COMPREHEN METABOLIC PANEL: CPT

## 2024-09-09 PROCEDURE — 86850 RBC ANTIBODY SCREEN: CPT

## 2024-09-09 PROCEDURE — 86901 BLOOD TYPING SEROLOGIC RH(D): CPT

## 2024-09-09 PROCEDURE — 86900 BLOOD TYPING SEROLOGIC ABO: CPT

## 2024-09-09 PROCEDURE — 85025 COMPLETE CBC W/AUTO DIFF WBC: CPT

## 2024-09-09 PROCEDURE — 85730 THROMBOPLASTIN TIME PARTIAL: CPT

## 2024-09-09 PROCEDURE — 85610 PROTHROMBIN TIME: CPT

## 2024-09-09 NOTE — DISCHARGE INSTRUCTIONS
Take the following medications the morning of surgery with a small sip of water: DULOXETINE METOPROLOL, GABAPENTIN. BRING ALBUTEROL INHALER. MAY TAKE HYDROCODONE IF NEEDED      If you are on prescription narcotic pain medication to control your pain you may also take that medication the morning of surgery.    General Instructions:  Do not eat or drink anything after midnight the night before surgery.  Patients who avoid smoking, chewing tobacco and alcohol for 4 weeks prior to surgery have a reduced risk of post-operative complications.  Quit smoking as many days before surgery as you can.  Do not smoke, use chewing tobacco or drink alcohol the day of surgery.   If applicable bring your C-PAP/ BI-PAP machine in with you to preop day of surgery.  Bring any papers given to you in the doctor’s office.  Wear clean comfortable clothes.  Do not wear contact lenses, false eyelashes or make-up.  Bring a case for your glasses.   Bring crutches or walker if applicable.  Remove all piercings.  Leave jewelry and any other valuables at home.  Hair extensions with metal clips must be removed prior to surgery.  The Pre-Admission Testing nurse will instruct you to bring medications if unable to obtain an accurate list in Pre-Admission Testing.        If you were given a blood bank ID arm band remember to bring it with you the day of surgery.    Preventing a Surgical Site Infection:  For 2 to 3 days before surgery, avoid shaving with a razor because the razor can irritate skin and make it easier to develop an infection.    Any areas of open skin can increase the risk of a post-operative wound infection by allowing bacteria to enter and travel throughout the body.  Notify your surgeon if you have any skin wounds / rashes even if it is not near the expected surgical site.  The area will need assessed to determine if surgery should be delayed until it is healed.  The night prior to surgery shower using a fresh bar of anti-bacterial  soap (such as Dial) and clean washcloth.  Sleep in a clean bed with clean clothing.  Do not allow pets to sleep with you.  Shower on the morning of surgery using a fresh bar of anti-bacterial soap (such as Dial) and clean washcloth.  Dry with a clean towel and dress in clean clothing.  Ask your surgeon if you will be receiving antibiotics prior to surgery.  Make sure you, your family, and all healthcare providers clean their hands with soap and water or an alcohol based hand  before caring for you or your wound.    Day of surgery:  Your arrival time is approximately two hours before your scheduled surgery time.  Please note if you have an early arrival time the surgery doors do not open before 5:00 AM.  Upon arrival, a Pre-op nurse and Anesthesiologist will review your health history, obtain vital signs, and answer questions you may have.  The only belongings needed at this time will be your home medications and if applicable your C-PAP/BI-PAP machine.  A Pre-op nurse will start an IV and you may receive medication in preparation for surgery, including something to help you relax.      Please be aware that surgery does come with discomfort.  We want to make every effort to control your discomfort so please discuss any uncontrolled symptoms with your nurse.   Your doctor will most likely have prescribed pain medications.      If you are going home after surgery you will receive individualized written care instructions before being discharged.  A responsible adult must drive you to and from the hospital on the day of your surgery and ideally stay with you through the night.  Discharge prescriptions can be filled by the hospital pharmacy during regular pharmacy hours.  If you are having surgery late in the day/evening your prescription may be e-prescribed to your pharmacy.  Please verify your pharmacy hours or chose a 24 hour pharmacy to avoid not having access to your prescription because your pharmacy has  closed for the day.    If you are staying overnight following surgery, you will be transported to your hospital room following the recovery period.  [facility] has all private rooms.    If you have any questions please call Pre-Admission Testing at [phone].  Deductibles and co-payments are collected on the day of service. Please be prepared to pay the required co-pay, deductible or deposit on the day of service as defined by your plan.      CHLORHEXIDINE CLOTH INSTRUCTIONS  The morning of surgery follow these instructions using the Chlorhexidine cloths you've been given.  These steps reduce bacteria on the body.  Do not use the cloths near your eyes, ears mouth, genitalia or on open wounds.  Throw the cloths away after use but do not try to flush them down a toilet.      Open and remove one cloth at a time from the package.    Leave the cloth unfolded and begin the bathing.  Massage the skin with the cloths using gentle pressure to remove bacteria.  Do not scrub harshly.   Follow the steps below with one 2% CHG cloth per area (6 total cloths).  One cloth for neck, shoulders and chest.  One cloth for both arms, hands, fingers and underarms (do underarms last).  One cloth for the abdomen followed by groin.  One cloth for right leg and foot including between the toes.  One cloth for left leg and foot including between the toes.  The last cloth is to be used for the back of the neck, back and buttocks.    Allow the CHG to air dry 3 minutes on the skin which will give it time to work and decrease the chance of irritation.  The skin may feel sticky until it is dry.  Do not rinse with water or any other liquid or you will lose the beneficial effects of the CHG.  If mild skin irritation occurs, do rinse the skin to remove the CHG.  Report this to the nurse at time of admission.  Do not apply lotions, creams, ointments, deodorants or perfumes after using the clothes. Dress in clean clothes before coming  to the hospital.    Call your surgeon immediately if you experience any of the following symptoms:  Sore Throat  Shortness of Breath or difficulty breathing  Cough  Chills  Body soreness or muscle pain  Headache  Fever  New loss of taste or smell  Do not arrive for your surgery ill.  Your procedure will need to be rescheduled to another time.  You will need to call your physician before the day of surgery to avoid any unnecessary exposure to hospital staff as well as other patients.

## 2024-09-16 ENCOUNTER — ANESTHESIA (OUTPATIENT)
Dept: PERIOP | Facility: HOSPITAL | Age: 64
End: 2024-09-16
Payer: COMMERCIAL

## 2024-09-16 ENCOUNTER — HOSPITAL ENCOUNTER (INPATIENT)
Facility: HOSPITAL | Age: 64
LOS: 7 days | Discharge: NURSING, REHAB OR LONG-TERM CARE | DRG: 029 | End: 2024-09-23
Attending: NEUROLOGICAL SURGERY | Admitting: NEUROLOGICAL SURGERY
Payer: COMMERCIAL

## 2024-09-16 ENCOUNTER — APPOINTMENT (OUTPATIENT)
Dept: GENERAL RADIOLOGY | Facility: HOSPITAL | Age: 64
DRG: 029 | End: 2024-09-16
Payer: COMMERCIAL

## 2024-09-16 ENCOUNTER — ANESTHESIA EVENT (OUTPATIENT)
Dept: PERIOP | Facility: HOSPITAL | Age: 64
End: 2024-09-16
Payer: COMMERCIAL

## 2024-09-16 DIAGNOSIS — M54.2 CERVICAL PAIN (NECK): ICD-10-CM

## 2024-09-16 DIAGNOSIS — M54.2 CERVICALGIA: ICD-10-CM

## 2024-09-16 DIAGNOSIS — M54.2 ACUTE NECK PAIN: ICD-10-CM

## 2024-09-16 DIAGNOSIS — S14.109A: ICD-10-CM

## 2024-09-16 DIAGNOSIS — G89.4 CHRONIC PAIN SYNDROME: ICD-10-CM

## 2024-09-16 DIAGNOSIS — E78.5 HYPERLIPIDEMIA, UNSPECIFIED HYPERLIPIDEMIA TYPE: ICD-10-CM

## 2024-09-16 DIAGNOSIS — M48.02 SPINAL STENOSIS IN CERVICAL REGION: Primary | ICD-10-CM

## 2024-09-16 DIAGNOSIS — E11.42 DIABETIC PERIPHERAL NEUROPATHY: ICD-10-CM

## 2024-09-16 DIAGNOSIS — M54.12 CERVICAL RADICULAR PAIN: ICD-10-CM

## 2024-09-16 DIAGNOSIS — E11.9 CONTROLLED TYPE 2 DIABETES MELLITUS WITHOUT COMPLICATION, WITHOUT LONG-TERM CURRENT USE OF INSULIN: ICD-10-CM

## 2024-09-16 LAB
GLUCOSE BLDC GLUCOMTR-MCNC: 153 MG/DL (ref 70–130)
GLUCOSE BLDC GLUCOMTR-MCNC: 156 MG/DL (ref 70–130)
GLUCOSE BLDC GLUCOMTR-MCNC: 165 MG/DL (ref 70–130)

## 2024-09-16 PROCEDURE — 25010000002 FENTANYL CITRATE (PF) 50 MCG/ML SOLUTION: Performed by: STUDENT IN AN ORGANIZED HEALTH CARE EDUCATION/TRAINING PROGRAM

## 2024-09-16 PROCEDURE — 25010000002 CEFAZOLIN PER 500 MG: Performed by: NEUROLOGICAL SURGERY

## 2024-09-16 PROCEDURE — L0174 CERV SR 2PC THOR EXT PRE OTS: HCPCS | Performed by: NEUROLOGICAL SURGERY

## 2024-09-16 PROCEDURE — C1713 ANCHOR/SCREW BN/BN,TIS/BN: HCPCS | Performed by: NEUROLOGICAL SURGERY

## 2024-09-16 PROCEDURE — 25810000003 SODIUM CHLORIDE 0.9 % SOLUTION

## 2024-09-16 PROCEDURE — 22854 INSJ BIOMECHANICAL DEVICE: CPT | Performed by: NEUROLOGICAL SURGERY

## 2024-09-16 PROCEDURE — 25010000002 PROPOFOL 200 MG/20ML EMULSION: Performed by: NURSE ANESTHETIST, CERTIFIED REGISTERED

## 2024-09-16 PROCEDURE — 22854 INSJ BIOMECHANICAL DEVICE: CPT

## 2024-09-16 PROCEDURE — 25010000002 NICARDIPINE 2.5 MG/ML SOLUTION: Performed by: NEUROLOGICAL SURGERY

## 2024-09-16 PROCEDURE — 25010000002 DEXAMETHASONE PER 1 MG: Performed by: NEUROLOGICAL SURGERY

## 2024-09-16 PROCEDURE — 22585 ARTHRD ANT NTRBD MIN DSC EA: CPT | Performed by: NEUROLOGICAL SURGERY

## 2024-09-16 PROCEDURE — 76000 FLUOROSCOPY <1 HR PHYS/QHP: CPT

## 2024-09-16 PROCEDURE — 63081 REMOVE VERT BODY DCMPRN CRVL: CPT

## 2024-09-16 PROCEDURE — 22585 ARTHRD ANT NTRBD MIN DSC EA: CPT

## 2024-09-16 PROCEDURE — 25010000002 FENTANYL CITRATE (PF) 50 MCG/ML SOLUTION: Performed by: NURSE ANESTHETIST, CERTIFIED REGISTERED

## 2024-09-16 PROCEDURE — 25810000003 SODIUM CHLORIDE 0.9 % SOLUTION: Performed by: NEUROLOGICAL SURGERY

## 2024-09-16 PROCEDURE — 0RP104Z REMOVAL OF INTERNAL FIXATION DEVICE FROM CERVICAL VERTEBRAL JOINT, OPEN APPROACH: ICD-10-PCS | Performed by: NEUROLOGICAL SURGERY

## 2024-09-16 PROCEDURE — 25010000002 NICARDIPINE 2.5 MG/ML SOLUTION

## 2024-09-16 PROCEDURE — 25010000002 HYDROMORPHONE 1 MG/ML SOLUTION: Performed by: NURSE ANESTHETIST, CERTIFIED REGISTERED

## 2024-09-16 PROCEDURE — 0RB30ZZ EXCISION OF CERVICAL VERTEBRAL DISC, OPEN APPROACH: ICD-10-PCS | Performed by: NEUROLOGICAL SURGERY

## 2024-09-16 PROCEDURE — 00NW0ZZ RELEASE CERVICAL SPINAL CORD, OPEN APPROACH: ICD-10-PCS | Performed by: NEUROLOGICAL SURGERY

## 2024-09-16 PROCEDURE — 0PB30ZZ EXCISION OF CERVICAL VERTEBRA, OPEN APPROACH: ICD-10-PCS | Performed by: NEUROLOGICAL SURGERY

## 2024-09-16 PROCEDURE — 0RG10A0 FUSION OF CERVICAL VERTEBRAL JOINT WITH INTERBODY FUSION DEVICE, ANTERIOR APPROACH, ANTERIOR COLUMN, OPEN APPROACH: ICD-10-PCS | Performed by: NEUROLOGICAL SURGERY

## 2024-09-16 PROCEDURE — 63081 REMOVE VERT BODY DCMPRN CRVL: CPT | Performed by: NEUROLOGICAL SURGERY

## 2024-09-16 PROCEDURE — 22554 ARTHRD ANT NTRBD MIN DSC CRV: CPT

## 2024-09-16 PROCEDURE — 00QT0ZZ REPAIR SPINAL MENINGES, OPEN APPROACH: ICD-10-PCS | Performed by: NEUROLOGICAL SURGERY

## 2024-09-16 PROCEDURE — 25010000002 METHYLPREDNISOLONE PER 80 MG: Performed by: NEUROLOGICAL SURGERY

## 2024-09-16 PROCEDURE — 25810000003 LACTATED RINGERS PER 1000 ML: Performed by: STUDENT IN AN ORGANIZED HEALTH CARE EDUCATION/TRAINING PROGRAM

## 2024-09-16 PROCEDURE — 25010000002 SUGAMMADEX 200 MG/2ML SOLUTION: Performed by: NURSE ANESTHETIST, CERTIFIED REGISTERED

## 2024-09-16 PROCEDURE — 22845 INSERT SPINE FIXATION DEVICE: CPT | Performed by: NEUROLOGICAL SURGERY

## 2024-09-16 PROCEDURE — 82948 REAGENT STRIP/BLOOD GLUCOSE: CPT

## 2024-09-16 PROCEDURE — 25010000002 DEXAMETHASONE SODIUM PHOSPHATE 20 MG/5ML SOLUTION: Performed by: NURSE ANESTHETIST, CERTIFIED REGISTERED

## 2024-09-16 PROCEDURE — 25010000002 HYDROMORPHONE PER 4 MG: Performed by: NEUROLOGICAL SURGERY

## 2024-09-16 PROCEDURE — 22554 ARTHRD ANT NTRBD MIN DSC CRV: CPT | Performed by: NEUROLOGICAL SURGERY

## 2024-09-16 PROCEDURE — 22845 INSERT SPINE FIXATION DEVICE: CPT

## 2024-09-16 RX ORDER — BISACODYL 5 MG/1
5 TABLET, DELAYED RELEASE ORAL DAILY PRN
Status: DISCONTINUED | OUTPATIENT
Start: 2024-09-16 | End: 2024-09-19

## 2024-09-16 RX ORDER — NALOXONE HCL 0.4 MG/ML
0.4 VIAL (ML) INJECTION
Status: DISCONTINUED | OUTPATIENT
Start: 2024-09-16 | End: 2024-09-23 | Stop reason: HOSPADM

## 2024-09-16 RX ORDER — DROPERIDOL 2.5 MG/ML
0.62 INJECTION, SOLUTION INTRAMUSCULAR; INTRAVENOUS
Status: DISCONTINUED | OUTPATIENT
Start: 2024-09-16 | End: 2024-09-16 | Stop reason: HOSPADM

## 2024-09-16 RX ORDER — HYDROCODONE BITARTRATE AND ACETAMINOPHEN 5; 325 MG/1; MG/1
1 TABLET ORAL EVERY 4 HOURS PRN
Status: DISCONTINUED | OUTPATIENT
Start: 2024-09-16 | End: 2024-09-17

## 2024-09-16 RX ORDER — ROCURONIUM BROMIDE 10 MG/ML
INJECTION, SOLUTION INTRAVENOUS AS NEEDED
Status: DISCONTINUED | OUTPATIENT
Start: 2024-09-16 | End: 2024-09-16 | Stop reason: SURG

## 2024-09-16 RX ORDER — SODIUM CHLORIDE 0.9 % (FLUSH) 0.9 %
10 SYRINGE (ML) INJECTION AS NEEDED
Status: DISCONTINUED | OUTPATIENT
Start: 2024-09-16 | End: 2024-09-23 | Stop reason: HOSPADM

## 2024-09-16 RX ORDER — DULOXETIN HYDROCHLORIDE 60 MG/1
60 CAPSULE, DELAYED RELEASE ORAL DAILY
Status: DISCONTINUED | OUTPATIENT
Start: 2024-09-17 | End: 2024-09-23 | Stop reason: HOSPADM

## 2024-09-16 RX ORDER — CYCLOBENZAPRINE HCL 10 MG
10 TABLET ORAL 3 TIMES DAILY PRN
Status: DISCONTINUED | OUTPATIENT
Start: 2024-09-16 | End: 2024-09-19

## 2024-09-16 RX ORDER — HYDROMORPHONE HYDROCHLORIDE 1 MG/ML
0.25 INJECTION, SOLUTION INTRAMUSCULAR; INTRAVENOUS; SUBCUTANEOUS EVERY 4 HOURS PRN
Status: DISCONTINUED | OUTPATIENT
Start: 2024-09-16 | End: 2024-09-18

## 2024-09-16 RX ORDER — BISACODYL 10 MG
10 SUPPOSITORY, RECTAL RECTAL DAILY PRN
Status: DISCONTINUED | OUTPATIENT
Start: 2024-09-16 | End: 2024-09-19

## 2024-09-16 RX ORDER — MIDAZOLAM HYDROCHLORIDE 1 MG/ML
1 INJECTION INTRAMUSCULAR; INTRAVENOUS
Status: DISCONTINUED | OUTPATIENT
Start: 2024-09-16 | End: 2024-09-16 | Stop reason: HOSPADM

## 2024-09-16 RX ORDER — SODIUM CHLORIDE 0.9 % (FLUSH) 0.9 %
3 SYRINGE (ML) INJECTION EVERY 12 HOURS SCHEDULED
Status: DISCONTINUED | OUTPATIENT
Start: 2024-09-16 | End: 2024-09-16 | Stop reason: HOSPADM

## 2024-09-16 RX ORDER — LIDOCAINE HYDROCHLORIDE 20 MG/ML
INJECTION, SOLUTION INFILTRATION; PERINEURAL AS NEEDED
Status: DISCONTINUED | OUTPATIENT
Start: 2024-09-16 | End: 2024-09-16 | Stop reason: SURG

## 2024-09-16 RX ORDER — FENTANYL CITRATE 50 UG/ML
50 INJECTION, SOLUTION INTRAMUSCULAR; INTRAVENOUS
Status: DISCONTINUED | OUTPATIENT
Start: 2024-09-16 | End: 2024-09-16 | Stop reason: HOSPADM

## 2024-09-16 RX ORDER — DEXAMETHASONE SODIUM PHOSPHATE 4 MG/ML
INJECTION, SOLUTION INTRA-ARTICULAR; INTRALESIONAL; INTRAMUSCULAR; INTRAVENOUS; SOFT TISSUE AS NEEDED
Status: DISCONTINUED | OUTPATIENT
Start: 2024-09-16 | End: 2024-09-16 | Stop reason: SURG

## 2024-09-16 RX ORDER — HYDROMORPHONE HYDROCHLORIDE 1 MG/ML
0.5 INJECTION, SOLUTION INTRAMUSCULAR; INTRAVENOUS; SUBCUTANEOUS
Status: DISCONTINUED | OUTPATIENT
Start: 2024-09-16 | End: 2024-09-16 | Stop reason: HOSPADM

## 2024-09-16 RX ORDER — POLYETHYLENE GLYCOL 3350 17 G/17G
17 POWDER, FOR SOLUTION ORAL DAILY PRN
Status: DISCONTINUED | OUTPATIENT
Start: 2024-09-16 | End: 2024-09-19

## 2024-09-16 RX ORDER — TRANEXAMIC ACID 100 MG/ML
INJECTION, SOLUTION INTRAVENOUS AS NEEDED
Status: DISCONTINUED | OUTPATIENT
Start: 2024-09-16 | End: 2024-09-16 | Stop reason: SURG

## 2024-09-16 RX ORDER — LIDOCAINE HYDROCHLORIDE 10 MG/ML
0.5 INJECTION, SOLUTION INFILTRATION; PERINEURAL ONCE AS NEEDED
Status: DISCONTINUED | OUTPATIENT
Start: 2024-09-16 | End: 2024-09-16 | Stop reason: HOSPADM

## 2024-09-16 RX ORDER — HYDROCODONE BITARTRATE AND ACETAMINOPHEN 5; 325 MG/1; MG/1
1 TABLET ORAL ONCE AS NEEDED
Status: COMPLETED | OUTPATIENT
Start: 2024-09-16 | End: 2024-09-16

## 2024-09-16 RX ORDER — ENOXAPARIN SODIUM 100 MG/ML
40 INJECTION SUBCUTANEOUS NIGHTLY
Status: DISCONTINUED | OUTPATIENT
Start: 2024-09-17 | End: 2024-09-17

## 2024-09-16 RX ORDER — GABAPENTIN 100 MG/1
200 CAPSULE ORAL 2 TIMES DAILY
Status: DISCONTINUED | OUTPATIENT
Start: 2024-09-16 | End: 2024-09-23 | Stop reason: HOSPADM

## 2024-09-16 RX ORDER — METOPROLOL SUCCINATE 50 MG/1
50 TABLET, EXTENDED RELEASE ORAL DAILY
Status: DISCONTINUED | OUTPATIENT
Start: 2024-09-17 | End: 2024-09-21

## 2024-09-16 RX ORDER — SODIUM CHLORIDE, SODIUM LACTATE, POTASSIUM CHLORIDE, CALCIUM CHLORIDE 600; 310; 30; 20 MG/100ML; MG/100ML; MG/100ML; MG/100ML
9 INJECTION, SOLUTION INTRAVENOUS CONTINUOUS
Status: DISCONTINUED | OUTPATIENT
Start: 2024-09-16 | End: 2024-09-21

## 2024-09-16 RX ORDER — DEXAMETHASONE SODIUM PHOSPHATE 4 MG/ML
4 INJECTION, SOLUTION INTRA-ARTICULAR; INTRALESIONAL; INTRAMUSCULAR; INTRAVENOUS; SOFT TISSUE EVERY 6 HOURS
Status: DISCONTINUED | OUTPATIENT
Start: 2024-09-16 | End: 2024-09-19

## 2024-09-16 RX ORDER — LOSARTAN POTASSIUM 100 MG/1
100 TABLET ORAL DAILY
Status: DISCONTINUED | OUTPATIENT
Start: 2024-09-17 | End: 2024-09-23 | Stop reason: HOSPADM

## 2024-09-16 RX ORDER — SODIUM CHLORIDE 0.9 % (FLUSH) 0.9 %
10 SYRINGE (ML) INJECTION EVERY 12 HOURS SCHEDULED
Status: DISCONTINUED | OUTPATIENT
Start: 2024-09-16 | End: 2024-09-23 | Stop reason: HOSPADM

## 2024-09-16 RX ORDER — AMLODIPINE BESYLATE 5 MG/1
5 TABLET ORAL NIGHTLY
Status: DISCONTINUED | OUTPATIENT
Start: 2024-09-16 | End: 2024-09-23 | Stop reason: HOSPADM

## 2024-09-16 RX ORDER — PROPOFOL 10 MG/ML
INJECTION, EMULSION INTRAVENOUS AS NEEDED
Status: DISCONTINUED | OUTPATIENT
Start: 2024-09-16 | End: 2024-09-16 | Stop reason: SURG

## 2024-09-16 RX ORDER — HYDROCHLOROTHIAZIDE 12.5 MG/1
12.5 TABLET ORAL DAILY
Status: DISCONTINUED | OUTPATIENT
Start: 2024-09-17 | End: 2024-09-23 | Stop reason: HOSPADM

## 2024-09-16 RX ORDER — ATORVASTATIN CALCIUM 20 MG/1
40 TABLET, FILM COATED ORAL NIGHTLY
Status: DISCONTINUED | OUTPATIENT
Start: 2024-09-16 | End: 2024-09-23 | Stop reason: HOSPADM

## 2024-09-16 RX ORDER — METHOCARBAMOL 500 MG/1
500 TABLET, FILM COATED ORAL 4 TIMES DAILY PRN
Status: DISCONTINUED | OUTPATIENT
Start: 2024-09-16 | End: 2024-09-19

## 2024-09-16 RX ORDER — ACETAMINOPHEN 650 MG/1
650 SUPPOSITORY RECTAL EVERY 4 HOURS PRN
Status: DISCONTINUED | OUTPATIENT
Start: 2024-09-16 | End: 2024-09-17

## 2024-09-16 RX ORDER — OXYCODONE AND ACETAMINOPHEN 7.5; 325 MG/1; MG/1
1 TABLET ORAL EVERY 4 HOURS PRN
Status: DISCONTINUED | OUTPATIENT
Start: 2024-09-16 | End: 2024-09-16 | Stop reason: HOSPADM

## 2024-09-16 RX ORDER — HYDRALAZINE HYDROCHLORIDE 20 MG/ML
5 INJECTION INTRAMUSCULAR; INTRAVENOUS
Status: DISCONTINUED | OUTPATIENT
Start: 2024-09-16 | End: 2024-09-16 | Stop reason: HOSPADM

## 2024-09-16 RX ORDER — NALOXONE HCL 0.4 MG/ML
0.2 VIAL (ML) INJECTION AS NEEDED
Status: DISCONTINUED | OUTPATIENT
Start: 2024-09-16 | End: 2024-09-16 | Stop reason: HOSPADM

## 2024-09-16 RX ORDER — FLUMAZENIL 0.1 MG/ML
0.2 INJECTION INTRAVENOUS AS NEEDED
Status: DISCONTINUED | OUTPATIENT
Start: 2024-09-16 | End: 2024-09-16 | Stop reason: HOSPADM

## 2024-09-16 RX ORDER — PROMETHAZINE HYDROCHLORIDE 25 MG/1
25 TABLET ORAL ONCE AS NEEDED
Status: DISCONTINUED | OUTPATIENT
Start: 2024-09-16 | End: 2024-09-16 | Stop reason: HOSPADM

## 2024-09-16 RX ORDER — DULOXETIN HYDROCHLORIDE 30 MG/1
30 CAPSULE, DELAYED RELEASE ORAL DAILY
Status: DISCONTINUED | OUTPATIENT
Start: 2024-09-17 | End: 2024-09-23 | Stop reason: HOSPADM

## 2024-09-16 RX ORDER — ACETAMINOPHEN 325 MG/1
650 TABLET ORAL EVERY 4 HOURS PRN
Status: DISCONTINUED | OUTPATIENT
Start: 2024-09-16 | End: 2024-09-17

## 2024-09-16 RX ORDER — AMOXICILLIN 250 MG
2 CAPSULE ORAL 2 TIMES DAILY
Status: DISCONTINUED | OUTPATIENT
Start: 2024-09-16 | End: 2024-09-19

## 2024-09-16 RX ORDER — PROMETHAZINE HYDROCHLORIDE 25 MG/1
25 SUPPOSITORY RECTAL ONCE AS NEEDED
Status: DISCONTINUED | OUTPATIENT
Start: 2024-09-16 | End: 2024-09-16 | Stop reason: HOSPADM

## 2024-09-16 RX ORDER — FLUTICASONE PROPIONATE 50 UG/1
2 SPRAY, METERED NASAL DAILY
Status: DISCONTINUED | OUTPATIENT
Start: 2024-09-17 | End: 2024-09-23 | Stop reason: HOSPADM

## 2024-09-16 RX ORDER — METHYLPREDNISOLONE ACETATE 80 MG/ML
INJECTION, SUSPENSION INTRA-ARTICULAR; INTRALESIONAL; INTRAMUSCULAR; SOFT TISSUE AS NEEDED
Status: DISCONTINUED | OUTPATIENT
Start: 2024-09-16 | End: 2024-09-16 | Stop reason: HOSPADM

## 2024-09-16 RX ORDER — METFORMIN HCL 500 MG
500 TABLET, EXTENDED RELEASE 24 HR ORAL NIGHTLY
Status: DISCONTINUED | OUTPATIENT
Start: 2024-09-16 | End: 2024-09-19

## 2024-09-16 RX ORDER — LABETALOL HYDROCHLORIDE 5 MG/ML
5 INJECTION, SOLUTION INTRAVENOUS
Status: DISCONTINUED | OUTPATIENT
Start: 2024-09-16 | End: 2024-09-16 | Stop reason: HOSPADM

## 2024-09-16 RX ORDER — FENTANYL CITRATE 50 UG/ML
50 INJECTION, SOLUTION INTRAMUSCULAR; INTRAVENOUS ONCE AS NEEDED
Status: COMPLETED | OUTPATIENT
Start: 2024-09-16 | End: 2024-09-16

## 2024-09-16 RX ORDER — EPHEDRINE SULFATE 50 MG/ML
5 INJECTION, SOLUTION INTRAVENOUS ONCE AS NEEDED
Status: DISCONTINUED | OUTPATIENT
Start: 2024-09-16 | End: 2024-09-16 | Stop reason: HOSPADM

## 2024-09-16 RX ORDER — MAGNESIUM HYDROXIDE 1200 MG/15ML
LIQUID ORAL AS NEEDED
Status: DISCONTINUED | OUTPATIENT
Start: 2024-09-16 | End: 2024-09-16 | Stop reason: HOSPADM

## 2024-09-16 RX ORDER — ALBUTEROL SULFATE 90 UG/1
1 INHALANT RESPIRATORY (INHALATION) EVERY 4 HOURS PRN
Status: DISCONTINUED | OUTPATIENT
Start: 2024-09-16 | End: 2024-09-17

## 2024-09-16 RX ORDER — DIPHENHYDRAMINE HYDROCHLORIDE 50 MG/ML
12.5 INJECTION INTRAMUSCULAR; INTRAVENOUS
Status: DISCONTINUED | OUTPATIENT
Start: 2024-09-16 | End: 2024-09-16 | Stop reason: HOSPADM

## 2024-09-16 RX ORDER — MULTIPLE VITAMINS W/ MINERALS TAB 9MG-400MCG
1 TAB ORAL DAILY
Status: DISCONTINUED | OUTPATIENT
Start: 2024-09-17 | End: 2024-09-23 | Stop reason: HOSPADM

## 2024-09-16 RX ORDER — BUPIVACAINE HYDROCHLORIDE AND EPINEPHRINE 5; 5 MG/ML; UG/ML
INJECTION, SOLUTION EPIDURAL; INTRACAUDAL; PERINEURAL AS NEEDED
Status: DISCONTINUED | OUTPATIENT
Start: 2024-09-16 | End: 2024-09-16 | Stop reason: HOSPADM

## 2024-09-16 RX ORDER — SODIUM CHLORIDE 0.9 % (FLUSH) 0.9 %
3-10 SYRINGE (ML) INJECTION AS NEEDED
Status: DISCONTINUED | OUTPATIENT
Start: 2024-09-16 | End: 2024-09-16 | Stop reason: HOSPADM

## 2024-09-16 RX ORDER — SODIUM CHLORIDE 9 MG/ML
40 INJECTION, SOLUTION INTRAVENOUS AS NEEDED
Status: DISCONTINUED | OUTPATIENT
Start: 2024-09-16 | End: 2024-09-23 | Stop reason: HOSPADM

## 2024-09-16 RX ORDER — PANTOPRAZOLE SODIUM 40 MG/1
40 TABLET, DELAYED RELEASE ORAL
Status: DISCONTINUED | OUTPATIENT
Start: 2024-09-17 | End: 2024-09-18

## 2024-09-16 RX ORDER — ACETAMINOPHEN 160 MG/5ML
650 SOLUTION ORAL EVERY 4 HOURS PRN
Status: DISCONTINUED | OUTPATIENT
Start: 2024-09-16 | End: 2024-09-17

## 2024-09-16 RX ORDER — IPRATROPIUM BROMIDE AND ALBUTEROL SULFATE 2.5; .5 MG/3ML; MG/3ML
3 SOLUTION RESPIRATORY (INHALATION) ONCE AS NEEDED
Status: DISCONTINUED | OUTPATIENT
Start: 2024-09-16 | End: 2024-09-16 | Stop reason: HOSPADM

## 2024-09-16 RX ADMIN — HYDROMORPHONE HYDROCHLORIDE 0.25 MG: 1 INJECTION, SOLUTION INTRAMUSCULAR; INTRAVENOUS; SUBCUTANEOUS at 20:57

## 2024-09-16 RX ADMIN — HYDROMORPHONE HYDROCHLORIDE 0.5 MG: 1 INJECTION, SOLUTION INTRAMUSCULAR; INTRAVENOUS; SUBCUTANEOUS at 08:18

## 2024-09-16 RX ADMIN — FENTANYL CITRATE 50 MCG: 50 INJECTION, SOLUTION INTRAMUSCULAR; INTRAVENOUS at 17:35

## 2024-09-16 RX ADMIN — FENTANYL CITRATE 50 MCG: 50 INJECTION, SOLUTION INTRAMUSCULAR; INTRAVENOUS at 07:40

## 2024-09-16 RX ADMIN — ROCURONIUM BROMIDE 10 MG: 10 INJECTION, SOLUTION INTRAVENOUS at 09:45

## 2024-09-16 RX ADMIN — ROCURONIUM BROMIDE 10 MG: 10 INJECTION, SOLUTION INTRAVENOUS at 08:48

## 2024-09-16 RX ADMIN — ATORVASTATIN CALCIUM 40 MG: 20 TABLET, FILM COATED ORAL at 21:38

## 2024-09-16 RX ADMIN — SODIUM CHLORIDE 2000 MG: 900 INJECTION INTRAVENOUS at 16:33

## 2024-09-16 RX ADMIN — SODIUM CHLORIDE 2000 MG: 900 INJECTION INTRAVENOUS at 23:40

## 2024-09-16 RX ADMIN — DEXAMETHASONE SODIUM PHOSPHATE 4 MG: 4 INJECTION, SOLUTION INTRAMUSCULAR; INTRAVENOUS at 20:57

## 2024-09-16 RX ADMIN — Medication 10 ML: at 21:00

## 2024-09-16 RX ADMIN — NICARDIPINE HYDROCHLORIDE 5 MG/HR: 25 INJECTION, SOLUTION INTRAVENOUS at 11:58

## 2024-09-16 RX ADMIN — HYDROCODONE BITARTRATE AND ACETAMINOPHEN 1 TABLET: 5; 325 TABLET ORAL at 15:58

## 2024-09-16 RX ADMIN — DEXAMETHASONE SODIUM PHOSPHATE 10 MG: 4 INJECTION, SOLUTION INTRAMUSCULAR; INTRAVENOUS at 08:55

## 2024-09-16 RX ADMIN — ROCURONIUM BROMIDE 10 MG: 10 INJECTION, SOLUTION INTRAVENOUS at 08:07

## 2024-09-16 RX ADMIN — CYCLOBENZAPRINE HYDROCHLORIDE 10 MG: 10 TABLET, FILM COATED ORAL at 16:35

## 2024-09-16 RX ADMIN — NICARDIPINE HYDROCHLORIDE 2.5 MG/HR: 25 INJECTION, SOLUTION INTRAVENOUS at 16:31

## 2024-09-16 RX ADMIN — PROPOFOL 150 MG: 10 INJECTION, EMULSION INTRAVENOUS at 07:40

## 2024-09-16 RX ADMIN — DEXAMETHASONE SODIUM PHOSPHATE 8 MG: 4 INJECTION, SOLUTION INTRAMUSCULAR; INTRAVENOUS at 08:07

## 2024-09-16 RX ADMIN — SUGAMMADEX 100 MG: 100 INJECTION, SOLUTION INTRAVENOUS at 11:19

## 2024-09-16 RX ADMIN — AMLODIPINE BESYLATE 5 MG: 5 TABLET ORAL at 21:38

## 2024-09-16 RX ADMIN — SODIUM CHLORIDE, POTASSIUM CHLORIDE, SODIUM LACTATE AND CALCIUM CHLORIDE 9 ML/HR: 600; 310; 30; 20 INJECTION, SOLUTION INTRAVENOUS at 07:27

## 2024-09-16 RX ADMIN — DEXAMETHASONE SODIUM PHOSPHATE 4 MG: 4 INJECTION, SOLUTION INTRAMUSCULAR; INTRAVENOUS at 14:22

## 2024-09-16 RX ADMIN — LIDOCAINE HYDROCHLORIDE 100 MG: 20 INJECTION, SOLUTION INFILTRATION; PERINEURAL at 07:40

## 2024-09-16 RX ADMIN — GABAPENTIN 200 MG: 100 CAPSULE ORAL at 21:39

## 2024-09-16 RX ADMIN — SUGAMMADEX 200 MG: 100 INJECTION, SOLUTION INTRAVENOUS at 11:02

## 2024-09-16 RX ADMIN — TRANEXAMIC ACID 1000 MG: 100 INJECTION, SOLUTION INTRAVENOUS at 09:03

## 2024-09-16 RX ADMIN — ROCURONIUM BROMIDE 50 MG: 10 INJECTION, SOLUTION INTRAVENOUS at 07:41

## 2024-09-16 RX ADMIN — NICARDIPINE HYDROCHLORIDE 7.5 MG/HR: 25 INJECTION, SOLUTION INTRAVENOUS at 23:39

## 2024-09-16 RX ADMIN — SODIUM CHLORIDE 2000 MG: 900 INJECTION INTRAVENOUS at 07:28

## 2024-09-16 RX ADMIN — METFORMIN HYDROCHLORIDE 500 MG: 500 TABLET, EXTENDED RELEASE ORAL at 21:38

## 2024-09-16 NOTE — OP NOTE
Anterior Cervical corpectomy with autograft, allograft and Instrumented Fusion  C5 vertebral body  Operative Note    Kristina Yates  9/16/2024  [id number]    SURGEON   Alex Ortiz MD    ASSISTANT:  Smita Becerra CSA was present throughout the entire surgery to provide intraoperative suction, retraction, suturing, and irrigation to promote visualization by the operative surgeon and assist with opening and closure.  The skilled assistance was necessary for the success of this case.  Our practice does not have a relationship with neurosurgical residents.      INDICATION:  Kristina Yates is a 64 y.o. female who presents with cervical radiculopathy secondary to adjacent segment disease at C4-5 after previous C5-6, C6-7 fusion in 2003.  Her symptoms are refractory to epidural steroid injections and physical therapy.  She had a known broken screw based on preoperative x-rays in the left superior C5 vertebral body.  Other screws appear to be intact but the plate was angled and tilted clockwise with the right superior C5 vertebral body screw in the middle of the vertebral body.  We discussed the risk and benefits and alternatives of surgery including CSF leak, injury to spinal cord, injury to nerve roots, need for further surgery down the line.  Additionally, we also talked about idiopathic C5 palsy, cervical hematoma and infection.  She understood and was willing to proceed with surgery.    Pre-op Diagnosis:     Cervical radiculopathy [M54.12]  Cervicalgia [M54.2]    Post-op Diagnosis:     Post-Op Diagnosis Codes:     * Cervical radiculopathy [M54.12]     * Cervicalgia [M54.2]  Spinal cord injury    PROCEDURE PERFORMED:    Spinal Surgery Levels Completed:2 Levels    Anterior Cervical corpectomy and Fusion  of C5 vertebral body    Anterior cervical C5 corpectomy, including disc space preparation, discectomy, osteophytectomy and decompression of spinal cord and/or nerve roots at  C4-5, C6-7  Anterior titanium  instrumentation at  C4-C6 ,   Morselized Vivigen allograft packed into interbody cages  Microdissection technique with the use of the operating microscope  Intraoperative duraplasty      Anesthesia: General    Staff:   Circulator: Snehal Bryant RN  Scrub Person: Kim Vick  Vendor Representative: Alejo Collado  Assistant: Smita Becerra CSA    Estimated Blood Loss:  100ml    Specimens: * No orders in the log *              Drains:   Closed/Suction Drain 1 Anterior Neck Bulb 15 Fr. (Active)       Urethral Catheter Silicone 16 Fr. (Active)       Findings: Severe cervical stenosis and spondylolisthesis    Complications: Spinal cord injury at the level of C4-5 and directly below secondary to hardware failure during hardware removal.    Narrative Description:    Positioning: After operative consent the patient was taken to the operating room in stable condition and placed under general endotracheal anesthesia. Once adequate anesthesia was established the patient was kept supine on the operating table with a shoulder roll beneath their shoulders. Their neck was placed in a slightly extended position to expose the anterior neck which was prepped and draped in the usual sterile fashion. The shoulders were then taped down using 4 inch silk tape. All pressure points were padded along the extremities to protect neurologic function.    Approach and decompression: Using intraoperative fluoroscopy the anterior cervical spine was localized. A sharp incision was made in the mid neck on the right just superior to her previous ACDF incision and taken through the platysma layer using cautery. Blunt dissection was then used to expose the anterior cervical spine medial to the carotid artery and lateral to the esophagus.  We immediately where able to identify it the previous plate and used Kitner sponges to blunt dissect the soft tissue off of the titanium hardware.  The rest of the titanium hardware was exposed using Bovie  cautery.  We circumferentially removed some of the overgrown osteophytes surrounding the previous ACDF plate and then proceeded to remove the screws.  Screw removal went as planned except in the right superior C5 screw, when downward pressure was placed to engage the screw head, the screw plunged through the vertebral body posteriorly.  We immediately took an x-ray and a lateral view which showed significant retropulsion of this screw head.  Due to the fact that the plate had been positioned clockwise compared to normal vertical, I anticipated that the screw head had likely damaged the ventral portion of the middle of her spinal cord based on the x-ray.  The plate was removed using angled curettes and gentle upward force.  We then converted our goal from hardware removal to cervical corpectomy of the C5 vertebral body in order to better visualize the damage to the spinal cord as well as remove the foreign body in the spinal cord.  The Boo Almendarez retractor system was placed beneath the longus coli muscles bilaterally.  We angled our exposure towards the left in order to avoid the right sided screw that we feared had plunged deep to the vertebral body.  At this point the operating microscope was brought into the field.  Using a high-speed vidIQ drill with a matchstick drill bit, I proceeded to drill out the C5 vertebral body until a cortical rim of bone was left overlying the posterior longitudinal ligament.  By doing this, we were able to visualize the floating broken portion of the left C5 screw and that was removed and then after circumferentially dissecting around the right C5 screw that I feared had contacted the spinal cord, the screw was then removed using a pituitary grasper after the dura around the injury had been exposed.  It was difficult to tell what amount of injury had occurred to the spinal cord but the small durotomy was closed using 4-0 Nurolon suture.  We continue to dissect out  laterally to ensure there is no additional durotomy.  At this point, the anesthesiologist had given her an additional bolus of 10 mg of Decadron.  The disc space at C5-6 was then decompressed and this process was repeated at the C6-7 level.  We then used Tisseel glue over the durotomy and Floseal to stop the bone bleeding from the corpectomy.    Operating Microscope: The operating microscope was draped using sterile technique and brought into the field and the rest of the operation was performed under operative magnification with microdissection technique and micro-illumination with the aid of the operating microscope.    Arthodesis and Intrumentation: A 18 mm spacing device was placed in the disc base and visualized under fluoroscopic guidance.  Then a 18 mm DePuy Bengal Stackable cage filled with Vivigen was placed at the  C5 vertebral body  space under fluoroscopic guidance.  The longus coli bilaterally was then cauterized using bipolar cautery and a 26 mm DePuy Coda plate was secured anterior to the arthrodesis using 14 mm variable angle screws superiorly and 14 mm rescue screws inferiorly.  Fluoroscopy was then used to document to confirm the placement and alignment of the plate.    Closure: The wound was copiously irrigated and Surgiflo and bipolar cautery were used for hemostasis. A 10 Singaporean GHANSHYAM drain was secured using 3-0 nylon suture.  The deep platysma was reapproximated with the 3-O Vicryl suture and the superficial skin was closed with 3-O Vicryl suture.  A 4-0 Monocryl was then used to close the skin and the incision was dressed with Dermabond.  The needle and sponge counts were correct at the end of the case. The patient will be transferred to the ICU after arterial line placement.  We will ensure that her MAP goals exceed 80 over the next 72 hours.  She will receive IV Decadron and remain flat.  Her spinal cord is now decompressed and she has the best possible environment to heal from her spinal cord  injury and we can only hope that she makes a good recovery.  She will likely need rehabilitation.    Alex Ortiz MD     Date: 9/16/2024  Time: 11:16 EDT

## 2024-09-16 NOTE — CONSULTS
[facility]    Reason for consult: critical care management    HPI:  Mrs. Yates is a 65yo female underwent anterior cervical corpectomy today and post operative admission to ICU has been requested secondary to spinal cord injury at c4-5 and directly below secondary to hardware failure during hardware removal.  Mrs. Yates is in [facility] recovery, she does arouse but goes back to sleep pretty quickly she denies any compliants, and is appropirate for the most part with her brief responses, but most history must be obtained from chart and my conversation with the RN at bedside    Past Medical History:   Diagnosis Date    Alcohol abuse     in recovery, sober since 2012    Anxiety     Arthritis     Asthma     Edmonds esophagus     Blurred vision     Cervical disc disease     Cervical neuritis     Chronic pain     NECK AND BACK    COPD (chronic obstructive pulmonary disease)     STATES NEVER DIAGNOSED WITH    Depression     Diabetes mellitus     Type 2    DJD (degenerative joint disease), cervical     Foot spasms     FROM BACK SURGERY    GERD (gastroesophageal reflux disease)     Hiatal hernia     Hyperlipidemia     Hypertension     Persistent headaches     ?BLOOD PRESSURE OR NECK RELATED???    Seasonal allergies     Spinal headache     Spinal stenosis      Social History     Socioeconomic History    Marital status:     Number of children: 2   Tobacco Use    Smoking status: Some Days     Current packs/day: 0.50     Average packs/day: 0.5 packs/day for 18.6 years (9.3 ttl pk-yrs)     Types: Cigarettes     Start date: 2/25/2006    Smokeless tobacco: Never   Vaping Use    Vaping status: Never Used   Substance and Sexual Activity    Alcohol use: No     Comment: sober since 2012, worsk 12 steps    Drug use: No    Sexual activity: Yes     Partners: Male     Family History   Problem Relation Age of Onset    Cancer Mother         nonhodkinds lymphoma    Heart disease Father     Drug abuse Sister     Heart disease  Brother     Lupus Maternal Aunt     Malig Hyperthermia Neg Hx      MEDS: reviewed as per chart  AlL: list of 4 reviewed as per chart  ROS: UTO  Vital Sign Min/Max for last 24 hours  Temp  Min: 97.9 °F (36.6 °C)  Max: 98 °F (36.7 °C)   BP  Min: 138/69  Max: 215/87   Pulse  Min: 100  Max: 113   Resp  Min: 14  Max: 22   SpO2  Min: 96 %  Max: 100 %   Flow (L/min)  Min: 2  Max: 2   No data recorded   GEN: appears ill, obese, sleepy   HEENT: PERRL, normal sclera, mmm, no JVD, trachea midline, neck supple  CHEST: CTA bilat, no wheezes, no crackles, no use of accessory muscles  CV: RRR, no m/g/r  ABD: soft, nt, nd +bs, no hepatosplenomegaly  EXT: no c/c/e  SKIN: no rashes, no xanthomas, nl turgor, warm, dry  LYMPH: no palpable cervical or supraclavicular lymphadenopathy  NEURO: CN 2-12 intact and symmetric bilaterally  PSYCH: deferred  MSK: moves all 4 extremities at least with resistance to gravity, difficult to get a great exam    Labs: 9/16: reviewed:  Nothing new  9/9  Na 132  Bicarb 26    A/P:  S/p anterior cervical corpectomy with fusion of the c5 vertebral body with c4-5 injury -- admit to icu , monitor and bp control as per lexx recommendations  Post operative pain control -- prn meds as needed  BP control gtt and prns for parameters as per lexx  DMII -- ssi     Can't edit orders at this point as patient still in pacu    Will follow

## 2024-09-16 NOTE — INTERVAL H&P NOTE
H&P reviewed. The patient was examined and there are no changes to the H&P.      We discussed the risk and benefits and alternatives of surgery including CSF leak, injury to nerve roots, need for further surgery down the line as well as potential cervical hematoma, infection, idiopathic C5 palsy.  She understands and is willing to proceed with surgery.

## 2024-09-16 NOTE — PROGRESS NOTES
Checked on Kristina in the PACU. Currently somewhat drowsy. BP well MAP goal. Not very verbal but follow commands with at least 3/5 strength in BUE, follows commands briskly in toes and able to bend knees with at least 2/5 strength. Reponsive to light touch and painful stimulus.    - Continue MAP 80-90 goal, avoid Phenylephrine  - Continue flat bedrest with cervical collar and sanders  - Continue arterial ine  - Continue IV Decadron and PO Protonix  - Transfer to ICU    Alex Ortiz MD  09/16/24  13:00 EDT

## 2024-09-17 LAB
ANION GAP SERPL CALCULATED.3IONS-SCNC: 9 MMOL/L (ref 5–15)
BUN SERPL-MCNC: 5 MG/DL (ref 8–23)
BUN/CREAT SERPL: 12.8 (ref 7–25)
CALCIUM SPEC-SCNC: 9 MG/DL (ref 8.6–10.5)
CHLORIDE SERPL-SCNC: 104 MMOL/L (ref 98–107)
CO2 SERPL-SCNC: 23 MMOL/L (ref 22–29)
CREAT SERPL-MCNC: 0.39 MG/DL (ref 0.57–1)
DEPRECATED RDW RBC AUTO: 43.2 FL (ref 37–54)
EGFRCR SERPLBLD CKD-EPI 2021: 112.1 ML/MIN/1.73
ERYTHROCYTE [DISTWIDTH] IN BLOOD BY AUTOMATED COUNT: 13.1 % (ref 12.3–15.4)
GLUCOSE BLDC GLUCOMTR-MCNC: 165 MG/DL (ref 70–130)
GLUCOSE SERPL-MCNC: 153 MG/DL (ref 65–99)
HCT VFR BLD AUTO: 33.8 % (ref 34–46.6)
HGB BLD-MCNC: 11.5 G/DL (ref 12–15.9)
MCH RBC QN AUTO: 31 PG (ref 26.6–33)
MCHC RBC AUTO-ENTMCNC: 34 G/DL (ref 31.5–35.7)
MCV RBC AUTO: 91.1 FL (ref 79–97)
PLATELET # BLD AUTO: 267 10*3/MM3 (ref 140–450)
PMV BLD AUTO: 9.6 FL (ref 6–12)
POTASSIUM SERPL-SCNC: 3.9 MMOL/L (ref 3.5–5.2)
RBC # BLD AUTO: 3.71 10*6/MM3 (ref 3.77–5.28)
SODIUM SERPL-SCNC: 136 MMOL/L (ref 136–145)
WBC NRBC COR # BLD AUTO: 20.02 10*3/MM3 (ref 3.4–10.8)

## 2024-09-17 PROCEDURE — 80048 BASIC METABOLIC PNL TOTAL CA: CPT

## 2024-09-17 PROCEDURE — 25010000002 NICARDIPINE 2.5 MG/ML SOLUTION

## 2024-09-17 PROCEDURE — 99024 POSTOP FOLLOW-UP VISIT: CPT | Performed by: NURSE PRACTITIONER

## 2024-09-17 PROCEDURE — 25010000002 HYDROMORPHONE PER 4 MG: Performed by: NEUROLOGICAL SURGERY

## 2024-09-17 PROCEDURE — 25010000002 DEXAMETHASONE PER 1 MG: Performed by: NEUROLOGICAL SURGERY

## 2024-09-17 PROCEDURE — 82948 REAGENT STRIP/BLOOD GLUCOSE: CPT

## 2024-09-17 PROCEDURE — 85027 COMPLETE CBC AUTOMATED: CPT

## 2024-09-17 PROCEDURE — 25810000003 SODIUM CHLORIDE 0.9 % SOLUTION

## 2024-09-17 RX ORDER — ALBUTEROL SULFATE 0.83 MG/ML
2.5 SOLUTION RESPIRATORY (INHALATION) EVERY 4 HOURS PRN
Status: DISCONTINUED | OUTPATIENT
Start: 2024-09-17 | End: 2024-09-23 | Stop reason: HOSPADM

## 2024-09-17 RX ORDER — ALBUTEROL SULFATE 90 UG/1
2 INHALANT RESPIRATORY (INHALATION) EVERY 4 HOURS PRN
COMMUNITY

## 2024-09-17 RX ORDER — ACETAMINOPHEN 160 MG/5ML
650 SOLUTION ORAL
Status: DISCONTINUED | OUTPATIENT
Start: 2024-09-17 | End: 2024-09-20

## 2024-09-17 RX ORDER — HYDROCHLOROTHIAZIDE 25 MG/1
12.5 TABLET ORAL DAILY
COMMUNITY

## 2024-09-17 RX ORDER — ENOXAPARIN SODIUM 100 MG/ML
40 INJECTION SUBCUTANEOUS NIGHTLY
Status: DISCONTINUED | OUTPATIENT
Start: 2024-09-18 | End: 2024-09-23 | Stop reason: HOSPADM

## 2024-09-17 RX ORDER — ACETAMINOPHEN 325 MG/1
650 TABLET ORAL
Status: DISCONTINUED | OUTPATIENT
Start: 2024-09-17 | End: 2024-09-18

## 2024-09-17 RX ORDER — ATORVASTATIN CALCIUM 40 MG/1
40 TABLET, FILM COATED ORAL NIGHTLY
COMMUNITY
End: 2024-09-23 | Stop reason: HOSPADM

## 2024-09-17 RX ORDER — DEXTROSE MONOHYDRATE 25 G/50ML
25 INJECTION, SOLUTION INTRAVENOUS
Status: DISCONTINUED | OUTPATIENT
Start: 2024-09-17 | End: 2024-09-23 | Stop reason: HOSPADM

## 2024-09-17 RX ORDER — DULOXETIN HYDROCHLORIDE 30 MG/1
30 CAPSULE, DELAYED RELEASE ORAL DAILY
COMMUNITY

## 2024-09-17 RX ORDER — NICOTINE POLACRILEX 4 MG
15 LOZENGE BUCCAL
Status: DISCONTINUED | OUTPATIENT
Start: 2024-09-17 | End: 2024-09-23 | Stop reason: HOSPADM

## 2024-09-17 RX ORDER — OXYCODONE HYDROCHLORIDE 5 MG/1
5 TABLET ORAL EVERY 4 HOURS PRN
Status: DISCONTINUED | OUTPATIENT
Start: 2024-09-17 | End: 2024-09-18

## 2024-09-17 RX ORDER — DULOXETIN HYDROCHLORIDE 60 MG/1
60 CAPSULE, DELAYED RELEASE ORAL DAILY
COMMUNITY

## 2024-09-17 RX ORDER — ACETAMINOPHEN 650 MG/1
650 SUPPOSITORY RECTAL
Status: DISCONTINUED | OUTPATIENT
Start: 2024-09-17 | End: 2024-09-19

## 2024-09-17 RX ORDER — IBUPROFEN 600 MG/1
1 TABLET ORAL
Status: DISCONTINUED | OUTPATIENT
Start: 2024-09-17 | End: 2024-09-23 | Stop reason: HOSPADM

## 2024-09-17 RX ADMIN — NICARDIPINE HYDROCHLORIDE 5 MG/HR: 25 INJECTION, SOLUTION INTRAVENOUS at 14:51

## 2024-09-17 RX ADMIN — HYDROMORPHONE HYDROCHLORIDE 0.25 MG: 1 INJECTION, SOLUTION INTRAMUSCULAR; INTRAVENOUS; SUBCUTANEOUS at 21:00

## 2024-09-17 RX ADMIN — ACETAMINOPHEN 650 MG: 650 LIQUID ORAL at 15:31

## 2024-09-17 RX ADMIN — HYDROMORPHONE HYDROCHLORIDE 0.25 MG: 1 INJECTION, SOLUTION INTRAMUSCULAR; INTRAVENOUS; SUBCUTANEOUS at 11:26

## 2024-09-17 RX ADMIN — HYDROMORPHONE HYDROCHLORIDE 0.25 MG: 1 INJECTION, SOLUTION INTRAMUSCULAR; INTRAVENOUS; SUBCUTANEOUS at 06:46

## 2024-09-17 RX ADMIN — DEXAMETHASONE SODIUM PHOSPHATE 4 MG: 4 INJECTION, SOLUTION INTRAMUSCULAR; INTRAVENOUS at 21:00

## 2024-09-17 RX ADMIN — Medication 10 ML: at 21:00

## 2024-09-17 RX ADMIN — DEXAMETHASONE SODIUM PHOSPHATE 4 MG: 4 INJECTION, SOLUTION INTRAMUSCULAR; INTRAVENOUS at 08:46

## 2024-09-17 RX ADMIN — NICARDIPINE HYDROCHLORIDE 7.5 MG/HR: 25 INJECTION, SOLUTION INTRAVENOUS at 19:33

## 2024-09-17 RX ADMIN — HYDROMORPHONE HYDROCHLORIDE 0.25 MG: 1 INJECTION, SOLUTION INTRAMUSCULAR; INTRAVENOUS; SUBCUTANEOUS at 02:08

## 2024-09-17 RX ADMIN — DEXAMETHASONE SODIUM PHOSPHATE 4 MG: 4 INJECTION, SOLUTION INTRAMUSCULAR; INTRAVENOUS at 03:36

## 2024-09-17 RX ADMIN — HYDROMORPHONE HYDROCHLORIDE 0.25 MG: 1 INJECTION, SOLUTION INTRAMUSCULAR; INTRAVENOUS; SUBCUTANEOUS at 16:56

## 2024-09-17 RX ADMIN — DEXAMETHASONE SODIUM PHOSPHATE 4 MG: 4 INJECTION, SOLUTION INTRAMUSCULAR; INTRAVENOUS at 15:31

## 2024-09-17 RX ADMIN — NICARDIPINE HYDROCHLORIDE 7.5 MG/HR: 25 INJECTION, SOLUTION INTRAVENOUS at 05:19

## 2024-09-17 RX ADMIN — GABAPENTIN 200 MG: 100 CAPSULE ORAL at 08:46

## 2024-09-17 RX ADMIN — NICARDIPINE HYDROCHLORIDE 7.5 MG/HR: 25 INJECTION, SOLUTION INTRAVENOUS at 03:23

## 2024-09-17 RX ADMIN — PANTOPRAZOLE SODIUM 40 MG: 40 TABLET, DELAYED RELEASE ORAL at 05:20

## 2024-09-17 RX ADMIN — OXYCODONE HYDROCHLORIDE 5 MG: 5 TABLET ORAL at 08:46

## 2024-09-17 RX ADMIN — NICARDIPINE HYDROCHLORIDE 7.5 MG/HR: 25 INJECTION, SOLUTION INTRAVENOUS at 23:14

## 2024-09-17 NOTE — PROGRESS NOTES
[facility]       Mar/chart reviewed  Follow up critical care management  Significant neck pain, some weakness more on the left lower extremity, tearful,     Vital Sign Min/Max for last 24 hours  Temp  Min: 97.9 °F (36.6 °C)  Max: 98.9 °F (37.2 °C)   BP  Min: 100/83  Max: 215/87   Pulse  Min: 100  Max: 117   Resp  Min: 14  Max: 22   SpO2  Min: 92 %  Max: 100 %   Flow (L/min)  Min: 2  Max: 2   Weight  Min: 75.5 kg (166 lb 7.2 oz)  Max: 75.5 kg (166 lb 7.2 oz)     Nad, axox3,   perrl, eomi, no icterus,  mmm, no jvd, trachea midline, neck supple,  chest cta bilaterally, no crackles, no wheezes,   rrr,   soft, nt, nd +bs,  no c/c/e  Skin warm, dry no rashes    Labs: 9/17: reviewed:  Wbc 20  Hgb 11.5  Plts 267  Glucose 153  Bun 5  Cr 0.39  Bicarb 23    A/P:  S/p anterior cervical corpectomy with fusion of the c5 vertebral body with c4-5 injury -- admit to icu , monitor and bp control as per jacque recommendations  Post operative pain control -- prn meds as needed  BP control gtt and prns for parameters as per jacque  DMII -- home meds, accuchecks ok    Follow instructions as per JACQUE

## 2024-09-17 NOTE — PROGRESS NOTES
LOS: 1 day   Patient Care Team:  Nyla Mejia APRN as PCP - General (Family Medicine)  Brooklyn Miles PA as Physician Assistant (Obstetrics and Gynecology)  Pedro Luis Zayas MD as Cardiologist (Cardiology)      Interval History: Lying flat in bed in ICU. MAP 80-90. A bit confused due to medication.  at bedside.     History taken from: patient chart family RN    Objective        Vital Signs  Temp:  [97.9 °F (36.6 °C)-98.9 °F (37.2 °C)] 98.9 °F (37.2 °C)  Heart Rate:  [100-117] 117  Resp:  [14-22] 18  BP: (100-215)/(63-93) 138/76  Arterial Line BP: (131-211)/() 135/61    Physical Exam:    Currently flat in bed. Awakens to voice. Oriented to person, president. Some inappropriate comments at times.   [place] collar intact. Anterior incision dry and intact. No hematoma.   Follows commands x 4 extremities muscular strength as below:   Deltoids  -  2/5 R; 1/5 L  Biceps    -  3/5 R;  3-/5 L  Triceps   -  3+/5 R; 3/5 L  Wrist Ext -  1/5 bilaterally  Intrinsics  -  2/5 bilaterally           -       3/5 bilaterally  Iliopsoas  -  3/5 R; 3-/5 L   Quad       -  3/5 R; 3-/5 L  Tib/Ant    -  3/5 bilaterally  EHL        -     3/5 R; 3-5 L  Gasroc    -  3/5 bilaterally    Sensation intact but reduced to light touch from shoulders to hands/fingers as well as L moreso than R DANNY's.   Dorantes in place.      Results Review:     I reviewed the patient's new clinical results.    .  Results from last 7 days   Lab Units 09/17/24  0430   WBC 10*3/mm3 20.02*   HEMOGLOBIN g/dL 11.5*   HEMATOCRIT % 33.8*   PLATELETS 10*3/mm3 267     .  Results from last 7 days   Lab Units 09/17/24  0430   SODIUM mmol/L 136   POTASSIUM mmol/L 3.9   CHLORIDE mmol/L 104   CO2 mmol/L 23.0   BUN mg/dL 5*   CREATININE mg/dL 0.39*   GLUCOSE mg/dL 153*   CALCIUM mg/dL 9.0       Assessment & Plan       Spinal cord injury, cervical, without spinal bone injury    Cervical radiculopathy    Cervicalgia    Leukocytosis - steroids; recent surgery    POD 1  anterior C5 corpectomy, discectomy, osteophytectomy, and  decompression of spinal cord and/or C4/5, C6/7 nerve roots with instrumentation C4-C6. Difficult extraction of previous cervical fusion hardware, intraoperative duraplasty, C4/5 cord injury.   Continue steroids - decadron 4mg IV q 6 hrs with Protonix for GI prophylaxis  Continue A-line, MAP goal 80-90; avoid phenylephrine  Continue cervical collar; cleared for elevation of HOB as tolerated; continue Aspen collar at all times; continue bedrest.    Continue sanders catheter - due to immobility status  Bilateral boot rotate on/off q two hours.   Start clear liquids if swallowing okay.     Heidi Malcolm, DAYNE  09/17/24  09:06 EDT

## 2024-09-17 NOTE — CONSULTS
"Pt laying flat in bed. Spouse at bedside. Chp offered introductions to Pt and sat with Pt and family to provided supportive presence. Chp provided spiritual care and prayer. Pt tearful and express feeling \"tired\", but supported by family. Chp will follow up with Pt later this week.      Chp remains available to Pt and family as needed.         "

## 2024-09-17 NOTE — PROGRESS NOTES
[facility] Services: Medication Reconciliation     Kristina Yates is a 64 y.o. female presenting to [facility] for Cervical radiculopathy [M54.12]  Cervicalgia [M54.2]  Spinal cord injury, cervical, without spinal bone injury [S14.109A]       She  has a past medical history of Alcohol abuse, Anxiety, Arthritis, Asthma, Edmonds esophagus, Blurred vision, Cervical disc disease, Cervical neuritis, Chronic pain, COPD (chronic obstructive pulmonary disease), Depression, Diabetes mellitus, DJD (degenerative joint disease), cervical, Foot spasms, GERD (gastroesophageal reflux disease), Hiatal hernia, Hyperlipidemia, Hypertension, Persistent headaches, Seasonal allergies, Spinal headache, and Spinal stenosis.       Allergies as of 08/23/2024 - Reviewed 08/23/2024   Allergen Reaction Noted    Fluoxetine Hives 05/23/2014    Sulfa antibiotics Hives 07/01/2016    Zofran [ondansetron hcl] Hives 07/01/2016    Lisinopril Cough 03/15/2018          Medication information was obtained from: insurance fill history   Pharmacy and Phone Number: [phone]   Preferred Pharmacy: [facility] [id number] - [facility] [address] AT [facility] [address]     Prior to Admission Medications       Prescriptions Last Dose Informant Patient Reported? Taking?    albuterol sulfate  (90 Base) MCG/ACT inhaler   Yes Yes    Inhale 2 puffs Every 4 (Four) Hours As Needed for Wheezing or Shortness of Air.    amLODIPine (NORVASC) 5 MG tablet 9/15/2024  No Yes    Take 1 tablet by mouth Every Night.    atorvastatin (LIPITOR) 40 MG tablet   Yes Yes    Take 1 tablet by mouth Every Night.    cyclobenzaprine (FLEXERIL) 10 MG tablet 9/15/2024 Pharmacy, Self No Yes    Take 1 tablet by mouth 3 (Three) Times a Day As Needed for Muscle Spasms.    DULoxetine (CYMBALTA) 30 MG capsule   Yes Yes    Take 1 capsule by mouth Daily. Take with 60 mg capsule for a TDD of 90 mg    DULoxetine (CYMBALTA) 60 MG capsule   Yes  Yes    Take 1 capsule by mouth Daily. Take with 30 mg capsule for a TDD of 90 mg    fluticasone (FLONASE) 50 MCG/ACT nasal spray 9/15/2024 Pharmacy, Self No Yes    2 sprays into the nostril(s) as directed by provider Daily.    gabapentin (NEURONTIN) 100 MG capsule 9/15/2024  No Yes    Take 2 capsules by mouth Every 8 (Eight) Hours.    Patient taking differently:  Take 2 capsules by mouth 2 (Two) Times a Day.    hydroCHLOROthiazide 25 MG tablet   Yes Yes    Take 0.5 tablets by mouth Daily.    metoprolol succinate XL (TOPROL-XL) 50 MG 24 hr tablet 9/15/2024 Pharmacy, Self No Yes    Take 1 tablet by mouth Daily.    Multiple Vitamins-Minerals (MULTIVITAMIN ADULT) tablet 9/15/2024 Self Yes Yes    Take 1 tablet by mouth Daily.           Medication notes: none       This medication list is complete to the best of my knowledge as of 9/17/2024       Please call if questions.     Helena Boston [facility]  Clinical Pharmacist

## 2024-09-17 NOTE — PAYOR COMM NOTE
"Kristina Sheriff (64 y.o. Female)                      ATTENTION NOTIFICATION OF ADMISSION - CASE [id number]                      REPLY TO UR DEPT  [phone] OR CALL   BAIRON DINH LPN           Date of Birth   1960    Social Security Number       Address   [address] [place], [place] [id number] [place] [address]    Home Phone   [phone]    MRN   [id number]       Cheondoism   Druze    Marital Status                               Admission Date   9/16/24    Admission Type   Elective    Admitting Provider   Alex Ortiz MD    Attending Provider   Alex Ortiz MD    Department, Room/Bed   [facility] INTENSIVE CARE, I379/1       Discharge Date       Discharge Disposition       Discharge Destination                                 Attending Provider: Alex Ortiz MD    Allergies: Fluoxetine, Sulfa Antibiotics, Zofran [Ondansetron Hcl], Lisinopril    Isolation: None   Infection: None   Code Status: CPR    Ht: 168.9 cm (66.5\")   Wt: 75.5 kg (166 lb 7.2 oz)    Admission Cmt: None   Principal Problem: Spinal cord injury, cervical, without spinal bone injury [S14.109A]                   Active Insurance as of 9/16/2024       Primary Coverage       Payor Plan Insurance Group Employer/Plan Group    [employer] PPO [id number]       Payor Plan Address Payor Plan Phone Number Payor Plan Fax Number Effective Dates    [address]   10/1/2022 - None Entered    [facility] [place] [address]         Subscriber Name Subscriber Birth Date Member ID       KRISTINA SHERIFF 1960 [id number]                     Emergency Contacts        (Rel.) Home Phone Work Phone Mobile Phone    Kyle Sheriff (Spouse) [phone] -- [phone]    Kashif Sheriff (Son) -- -- [phone]                 History & Physical        Alex Ortiz MD at 09/16/24 0717            H&P reviewed. The patient was examined and there are no changes to the H&P.      We discussed the risk and " benefits and alternatives of surgery including CSF leak, injury to nerve roots, need for further surgery down the line as well as potential cervical hematoma, infection, idiopathic C5 palsy.  She understands and is willing to proceed with surgery.    Electronically signed by Alex Ortiz MD at 09/16/24 6827   Source Note: H&P (View-Only)          Patient ID: Kristina Yates is a 64 y.o. female is here today for follow-up to discuss having surgery for cervical radiculopathy.    Subjective    The patient is here in regards to   Chief Complaint   Patient presents with    Neck Pain    Follow-up       History of Present Illness  Kristina continues to have severe cervicalgia and cervical radiculopathy.  She feels like it is getting worse.  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  This is her preoperative appointment      While in the room and during my examination of the patient I wore a mask and eye protection.  I washed my hands before and after this patient encounter.  The patient was also wearing a mask.    The following portions of the patient's history were reviewed and updated as appropriate: allergies, current medications, past family history, past medical history, past social history, past surgical history and problem list.    Review of Systems   Constitutional:  Negative for fever.   Musculoskeletal:  Positive for neck pain and neck stiffness.   Neurological:  Positive for dizziness, weakness, light-headedness, numbness and headaches.        Past Medical History:   Diagnosis Date    Alcohol abuse     in recovery, sober since 2012    Anxiety     Arthritis     Asthma     Edmonds esophagus     Cervical neuritis     Chronic pain     COPD (chronic obstructive pulmonary disease)     Depression     Diabetes mellitus     Type 2    DJD (degenerative joint disease), cervical     Foot spasms     GERD (gastroesophageal reflux disease)     Headache     Hiatal hernia      Hyperlipidemia     Hypertension     Low back pain     Neck pain     Seasonal allergies     Spinal headache     Spinal stenosis        Allergies   Allergen Reactions    Fluoxetine Hives     Hives    Sulfa Antibiotics Hives     THROAT CLOSES    Zofran [Ondansetron Hcl] Hives     THROAT CLOSES    Lisinopril Cough       Family History   Problem Relation Age of Onset    Cancer Mother         nonhodkinds lymphoma    Heart disease Father     Drug abuse Sister     Heart disease Brother     Lupus Maternal Aunt     Malig Hyperthermia Neg Hx        Social History     Socioeconomic History    Marital status:     Number of children: 2   Tobacco Use    Smoking status: Former     Current packs/day: 0.00     Average packs/day: 0.5 packs/day for 15.0 years (7.5 ttl pk-yrs)     Types: Cigarettes     Start date: 2/25/2006     Quit date: 2/25/2021     Years since quitting: 3.5    Smokeless tobacco: Never   Vaping Use    Vaping status: Never Used   Substance and Sexual Activity    Alcohol use: No     Comment: sober since 2012, worsk 12 steps    Drug use: No    Sexual activity: Yes     Partners: Male       Past Surgical History:   Procedure Laterality Date    ANTERIOR CERVICAL DISCECTOMY W/ FUSION  11/2003    C5-6, C6-7    CERCLAGE CERVIX      CERVICAL EPIDURAL  2007    CERVICAL FUSION      DILATATION AND CURETTAGE      ENDOMETRIAL ABLATION  2007    ENDOSCOPY      MULTIPLE    ENDOSCOPY N/A 07/05/2016    Procedure: ESOPHAGOGASTRODUODENOSCOPY WITH COLD BIOPSIES;  Surgeon: Jose Mcclellan MD;  Location: [facility] ENDOSCOPY;  Service:     ESOPHAGEAL DILATATION  2009    HAND SURGERY Bilateral     CARTILAGE    LUMBAR FUSION      LUMBAR LAMINECTOMY WITH FUSION N/A 06/30/2023    Procedure: OPEN LUMBAR FOUR TO FIVE TRANSFORAMINAL LUMBAR INTERBODY FUSION;  Surgeon: Alex Ortiz MD;  Location: [facility] MAIN OR;  Service: Neurosurgery;  Laterality: N/A;    NECK SURGERY      NISSEN FUNDOPLICATION LAPAROSCOPIC  03/2012    SACROILIAC JOINT FUSION  Right 6/4/2024    Procedure: RIGHT PERCUTANEOUS ARTHRODESIS SACROILIAC JOINT, NEUROMONITORING;  Surgeon: Alex Ortiz MD;  Location: [facility];  Service: Neurosurgery;  Laterality: Right;    TONSILLECTOMY           Objective    Vitals:    09/04/24 1144   BP: 138/82   Pulse: 91   Resp: 16   Temp: 96.6 °F (35.9 °C)   SpO2: 97%     Body mass index is 21.02 kg/m².    Physical Exam  Constitutional:       Appearance: Normal appearance.   HENT:      Head: Normocephalic and atraumatic.   Eyes:      Extraocular Movements: Extraocular movements intact.      Conjunctiva/sclera: Conjunctivae normal.      Pupils: Pupils are equal, round, and reactive to light.   Cardiovascular:      Rate and Rhythm: Normal rate and regular rhythm.      Pulses: Normal pulses.   Pulmonary:      Breath sounds: Normal breath sounds.   Abdominal:      Palpations: Abdomen is soft.   Musculoskeletal:         General: Normal range of motion.      Cervical back: Normal range of motion and neck supple.   Skin:     General: Skin is warm and dry.   Neurological:      Mental Status: She is alert and oriented to person, place, and time.      Cranial Nerves: Cranial nerves 2-12 are intact.      Motor: Motor function is intact. No weakness or atrophy.      Coordination: Coordination is intact. Romberg sign negative. Romberg Test normal.      Gait: Gait is intact. Gait normal.      Deep Tendon Reflexes: Reflexes are normal and symmetric.      Reflex Scores:       Tricep reflexes are 2+ on the right side and 2+ on the left side.       Bicep reflexes are 2+ on the right side and 2+ on the left side.       Brachioradialis reflexes are 2+ on the right side and 2+ on the left side.       Patellar reflexes are 2+ on the right side and 2+ on the left side.       Achilles reflexes are 2+ on the right side and 2+ on the left side.  Psychiatric:         Speech: Speech normal.         Neurologic Exam     Mental Status   Oriented to person, place, and time.    Attention: normal. Concentration: normal.   Speech: speech is normal   Level of consciousness: alert    Cranial Nerves   Cranial nerves II through XII intact.     CN III, IV, VI   Pupils are equal, round, and reactive to light.    Motor Exam   Muscle bulk: normal  Overall muscle tone: normal    Strength   Strength 5/5 except as noted.     Sensory Exam   Light touch normal.     Gait, Coordination, and Reflexes     Gait  Gait: normal    Coordination   Romberg: negative    Reflexes   Reflexes 2+ except as noted.   Right brachioradialis: 2+  Left brachioradialis: 2+  Right biceps: 2+  Left biceps: 2+  Right triceps: 2+  Left triceps: 2+  Right patellar: 2+  Left patellar: 2+  Right achilles: 2+  Left achilles: 2+      Assessment & Plan  Independent Review of Radiographic Studies:      I personally reviewed the images from the following studies.    No new neuroimaging.    Assessment/Plan: We discussed the risk and benefits and alternatives of ACDF with revision of her previous hardware including CSF leak, injury to nerve roots, need for further surgery down the line as well as cervical hematoma, infection.  She understands and is willing to proceed with surgery    Medical Decision Making:      C4-5 ACDF with revision of previous hardware         Diagnoses and all orders for this visit:    1. Cervical radiculopathy (Primary)             Patient Instructions/Recommendations:    Call with any questions or concerns      Alex Ortiz MD  09/04/24  12:10 EDT        Electronically signed by Alex Ortiz MD at 09/04/24 1210                     Vital Signs (last day)       Date/Time Temp Temp src Pulse Resp BP Patient Position SpO2    09/17/24 1200 -- -- 111 18 144/70 Lying 93    09/17/24 1100 98.3 (36.8) Oral 113 14 148/83 Lying 98    09/17/24 1000 -- -- 111 18 145/69 Lying 94    09/17/24 0900 -- -- 111 16 139/75 Lying 96    09/17/24 0800 -- -- 114 18 136/52 Lying 97    09/17/24 0700 98.9 (37.2) Oral -- -- -- -- --     09/17/24 0630 -- -- 117 -- 138/76 -- 97    09/17/24 0615 -- -- 117 -- -- -- 100    09/17/24 0600 -- -- 116 -- 118/69 -- 97    09/17/24 0500 -- -- 115 -- 155/69 -- 98    09/17/24 0430 98.8 (37.1) Oral 109 -- 137/74 -- 97    09/17/24 0315 -- -- 106 -- -- -- 93    09/17/24 0300 -- -- 107 -- 143/66 -- 93    09/17/24 0245 -- -- 109 -- -- -- 92    09/17/24 0230 -- -- 110 -- 135/75 -- 93    09/17/24 0200 -- -- 113 -- 131/69 -- 96    09/17/24 0145 -- -- 117 -- -- -- 97    09/17/24 0130 -- -- 114 -- 100/83 -- 96    09/17/24 0115 -- -- 114 -- -- -- 95    09/17/24 0100 -- -- 116 -- 139/79 -- 95    09/17/24 0045 -- -- 108 -- -- -- 94    09/17/24 0036 98.4 (36.9) Oral 109 -- -- -- 93 09/17/24 0030 -- -- 108 -- 147/73 -- 93 09/17/24 0015 -- -- 109 -- -- -- 96    09/17/24 0000 -- -- 107 18 133/67 Lying 95    09/16/24 2339 -- -- 106 -- 138/64 -- --    09/16/24 2305 -- -- 105 -- -- -- 92    09/16/24 2300 -- -- 105 -- 139/78 -- 93 09/16/24 2255 -- -- 105 -- -- -- 92 09/16/24 2250 -- -- 106 -- -- -- 92 09/16/24 2200 -- -- 104 -- 144/74 -- 93 09/16/24 2100 -- -- 109 -- -- -- 95    09/16/24 2006 98.3 (36.8) Oral 117 18 149/63 -- 98    09/16/24 2005 -- -- 102 -- 131/75 Lying 95    09/16/24 2000 -- -- 104 -- -- -- 94    09/16/24 1955 -- -- 104 -- -- -- 95    09/16/24 1950 98.3 (36.8) Oral 106 20 -- Lying 92    09/16/24 1915 -- -- 102 -- 134/71 -- 95    09/16/24 1845 -- -- 103 -- 132/73 -- 97    09/16/24 1830 -- -- 104 -- 142/74 -- 96    09/16/24 1815 -- -- 105 -- 142/78 -- 95    09/16/24 1800 -- -- 106 -- 143/74 -- 94    09/16/24 1745 -- -- 106 -- 134/81 -- 94 09/16/24 1730 -- -- 109 -- 137/89 -- 96    09/16/24 1715 -- -- 108 -- 123/65 -- 96 09/16/24 1700 -- -- 105 -- 143/72 -- 95 09/16/24 1615 -- -- 108 -- 141/73 -- 96    09/16/24 1605 -- -- 109 -- 135/73 -- 97    09/16/24 1530 -- -- 108 -- 141/78 -- 99    09/16/24 1515 -- -- 108 22 142/72 -- 100    09/16/24 1500 -- -- 108 20 141/76 -- 97    09/16/24 1445 -- --  108 22 138/69 -- 97    09/16/24 1430 -- -- 109 20 159/77 -- 99    09/16/24 1415 -- -- 110 20 145/76 -- 99    09/16/24 1400 -- -- 109 20 152/72 -- 96    09/16/24 1350 -- -- 109 -- -- -- 97    09/16/24 1345 -- -- 110 16 138/74 -- 97    09/16/24 1330 -- -- -- 16 142/71 -- --    09/16/24 1300 -- -- 113 14 148/70 -- 100    09/16/24 1240 -- -- 110 16 -- -- 98    09/16/24 1230 -- -- 109 16 161/75 -- 99    09/16/24 1219 -- -- 109 -- 186/76 -- --    09/16/24 1215 -- -- 108 18 165/77 -- 100    09/16/24 1210 -- -- 109 16 -- -- 98    09/16/24 1205 -- -- 104 16 -- -- 100    09/16/24 1200 -- -- 103 14 171/86 -- 100    09/16/24 1158 -- -- 103 -- 215/87 -- --    09/16/24 1145 -- -- 100 -- 163/87 -- 100    09/16/24 1135 -- -- 101 15 168/93 -- 100    09/16/24 1130 -- -- 104 16 185/82 -- 100    09/16/24 1126 97.9 (36.6) Oral 105 15 181/87 Lying 100    09/16/24 0649 98 (36.7) Oral 102 16 159/86 Lying 98          Oxygen Therapy (last 2 days)       Date/Time SpO2 Device (Oxygen Therapy) Flow (L/min) Oxygen Concentration (%) ETCO2 (mmHg)    09/17/24 1200 93 -- -- -- --    09/17/24 1100 98 -- -- -- --    09/17/24 1000 94 -- -- -- --    09/17/24 0900 96 -- -- -- --    09/17/24 0800 97 -- -- -- --    09/17/24 0630 97 -- -- -- --    09/17/24 0615 100 -- -- -- --    09/17/24 0600 97 -- -- -- --    09/17/24 0500 98 -- -- -- --    09/17/24 0430 97 -- -- -- --    09/17/24 0315 93 -- -- -- --    09/17/24 0300 93 -- -- -- --    09/17/24 0245 92 -- -- -- --    09/17/24 0230 93 -- -- -- --    09/17/24 0200 96 -- -- -- --    09/17/24 0145 97 -- -- -- --    09/17/24 0130 96 -- -- -- --    09/17/24 0115 95 -- -- -- --    09/17/24 0100 95 -- -- -- --    09/17/24 0045 94 -- -- -- --    09/17/24 0036 93 -- -- -- --    09/17/24 0030 93 -- -- -- --    09/17/24 0015 96 -- -- -- --    09/17/24 0000 95 room air -- -- --    09/16/24 2305 92 -- -- -- --    09/16/24 2300 93 -- -- -- --    09/16/24 2255 92 -- -- -- --    09/16/24 2250 92 -- -- -- --    09/16/24  2200 93 -- -- -- --    09/16/24 2100 95 -- -- -- --    09/16/24 2006 98 -- -- -- --    09/16/24 2005 95 room air -- -- --    09/16/24 2000 94 room air -- -- --    09/16/24 1955 95 -- -- -- --    09/16/24 1950 92 room air -- -- --    09/16/24 1915 95 -- -- -- --    09/16/24 1900 -- room air -- -- --    09/16/24 1845 97 -- -- -- --    09/16/24 1830 96 -- -- -- --    09/16/24 1815 95 -- -- -- --    09/16/24 1800 94 -- -- -- --    09/16/24 1745 94 -- -- -- --    09/16/24 1730 96 -- -- -- --    09/16/24 1715 96 -- -- -- --    09/16/24 1700 95 -- -- -- --    09/16/24 1615 96 -- -- -- --    09/16/24 1605 97 -- -- -- --    09/16/24 1530 99 nasal cannula -- -- 20 09/16/24 1515 100 -- -- -- 27 09/16/24 1500 97 -- -- -- 23 09/16/24 1445 97 -- -- -- 21 09/16/24 1430 99 -- -- -- 27 09/16/24 1415 99 -- -- -- 34 09/16/24 1400 96 -- -- -- 28 09/16/24 1350 97 -- -- -- 21 09/16/24 1345 97 -- -- -- 17 09/16/24 1330 -- nasal cannula 2 -- --    09/16/24 1300 100 -- -- -- --    09/16/24 1240 98 -- -- -- --    09/16/24 1230 99 -- -- -- --    09/16/24 1215 100 nasal cannula 2 -- --    09/16/24 1210 98 -- -- -- --    09/16/24 1205 100 -- -- -- --    09/16/24 1200 100 simple face mask 2 -- --    09/16/24 1145 100 -- -- -- --    09/16/24 1135 100 -- -- -- --    09/16/24 1130 100 -- -- -- --    09/16/24 1126 100 -- -- -- --    09/16/24 0649 98 room air -- -- --          Lines, Drains & Airways       Active LDAs       Name Placement date Placement time Site Days    Peripheral IV 09/16/24 0722 Anterior;Left Forearm 09/16/24  0722  Forearm  1    Peripheral IV 09/16/24 2355 Left;Posterior Wrist 09/16/24  2355  Wrist  less than 1    Closed/Suction Drain 1 Anterior Neck Bulb 15 Fr. 09/16/24  --  Neck  1    Urethral Catheter Silicone 16 Fr. 09/16/24  1055  -- 1    Arterial Line 09/16/24 Right Radial 09/16/24  1057  Radial  1              Inactive LDAs       Name Placement date Placement time Removal date Removal time Site  Days    [REMOVED] ETT  09/16/24  0744  created via procedure documentation  09/16/24  1122  -- less than 1                  Facility-Administered Medications as of 9/17/2024   Medication Dose Route Frequency Provider Last Rate Last Admin    acetaminophen (TYLENOL) tablet 650 mg  650 mg Oral Q4H Alex Ortiz MD        Or    acetaminophen (TYLENOL) 160 MG/5ML oral solution 650 mg  650 mg Oral Q4H Alex Ortiz MD        Or    acetaminophen (TYLENOL) suppository 650 mg  650 mg Rectal Q4H Alex Ortiz MD        albuterol (PROVENTIL) nebulizer solution 0.083% 2.5 mg/3mL  2.5 mg Nebulization Q4H PRN Alex Ortiz MD        amLODIPine (NORVASC) tablet 5 mg  5 mg Oral Nightly Alex Ortiz MD   5 mg at 09/16/24 2138    atorvastatin (LIPITOR) tablet 40 mg  40 mg Oral Nightly Alex Ortiz MD   40 mg at 09/16/24 2138    sennosides-docusate (PERICOLACE) 8.6-50 MG per tablet 2 tablet  2 tablet Oral BID Alex Ortiz MD        And    polyethylene glycol (MIRALAX) packet 17 g  17 g Oral Daily PRN Alex Ortiz MD        And    bisacodyl (DULCOLAX) EC tablet 5 mg  5 mg Oral Daily PRN Alex Ortiz MD        And    bisacodyl (DULCOLAX) suppository 10 mg  10 mg Rectal Daily PRN Alex Ortiz MD        [COMPLETED] ceFAZolin 2000 mg IVPB in 100 mL NS (MBP)  2,000 mg Intravenous Once Alex Ortiz MD   2,000 mg at 09/16/24 0728    [COMPLETED] ceFAZolin 2000 mg IVPB in 100 mL NS (MBP)  2,000 mg Intravenous Q8H Alex Ortiz MD   2,000 mg at 09/16/24 2340    cyclobenzaprine (FLEXERIL) tablet 10 mg  10 mg Oral TID PRN Alex Ortiz MD   10 mg at 09/16/24 1635    dexAMETHasone (DECADRON) injection 4 mg  4 mg Intravenous Q6H Alex Ortiz MD   4 mg at 09/17/24 0846    DULoxetine (CYMBALTA) DR capsule 30 mg  30 mg Oral Daily Alex Ortiz MD        DULoxetine (CYMBALTA) DR capsule 60 mg  60 mg Oral Daily Alex Ortiz MD        [START ON 9/18/2024] Enoxaparin Sodium (LOVENOX) syringe 40 mg  40 mg Subcutaneous Nightly Alex Ortiz MD         [COMPLETED] fentaNYL citrate (PF) (SUBLIMAZE) injection 50 mcg  50 mcg Intravenous Once PRN Gucci Tellez MD   50 mcg at 09/16/24 0740    fluticasone (FLONASE) 50 MCG/ACT nasal spray 2 spray  2 spray Nasal Daily Alex Ortiz MD        gabapentin (NEURONTIN) capsule 200 mg  200 mg Oral BID Alex Ortiz MD   200 mg at 09/17/24 0846    hydroCHLOROthiazide tablet 12.5 mg  12.5 mg Oral Daily Alex Ortiz MD        [COMPLETED] HYDROcodone-acetaminophen (NORCO) 5-325 MG per tablet 1 tablet  1 tablet Oral Once PRN Nat Escalona CRNA   1 tablet at 09/16/24 1558    HYDROmorphone (DILAUDID) injection 0.25 mg  0.25 mg Intravenous Q4H PRN Alex Ortiz MD   0.25 mg at 09/17/24 1126    And    naloxone (NARCAN) injection 0.4 mg  0.4 mg Intravenous Q5 Min PRN Alex Ortiz MD        lactated ringers infusion  9 mL/hr Intravenous Continuous Gucci Tellez MD 9 mL/hr at 09/16/24 0733 Restarted at 09/16/24 0752    losartan (COZAAR) tablet 100 mg  100 mg Oral Daily Alex Ortiz MD        metFORMIN ER (GLUCOPHAGE-XR) 24 hr tablet 500 mg  500 mg Oral Nightly Alex Ortiz MD   500 mg at 09/16/24 2138    methocarbamol (ROBAXIN) tablet 500 mg  500 mg Oral 4x Daily PRN Alex Ortiz MD        metoprolol succinate XL (TOPROL-XL) 24 hr tablet 50 mg  50 mg Oral Daily Alex Ortiz MD        multivitamin with minerals 1 tablet  1 tablet Oral Daily Aelx Ortiz MD        niCARdipine (CARDENE) 25 mg in 250 mL NS infusion kit  5-15 mg/hr Intravenous Titrated Glo Paige APRN   Stopped at 09/17/24 1216    oxyCODONE (ROXICODONE) immediate release tablet 5 mg  5 mg Oral Q4H PRN Alex Ortiz MD   5 mg at 09/17/24 0846    pantoprazole (PROTONIX) EC tablet 40 mg  40 mg Oral Q AM Alex Ortiz MD   40 mg at 09/17/24 0520    sodium chloride 0.9 % flush 10 mL  10 mL Intravenous Q12H Alex Ortiz MD   10 mL at 09/16/24 2100    sodium chloride 0.9 % flush 10 mL  10 mL Intravenous PRN Alex Ortiz MD        sodium chloride 0.9 %  infusion 40 mL  40 mL Intravenous PRN Alex Ortiz MD         Orders (all)        Start     Ordered    09/18/24 2100  Enoxaparin Sodium (LOVENOX) syringe 40 mg  Nightly         09/17/24 0745    09/17/24 2100  Enoxaparin Sodium (LOVENOX) syringe 40 mg  Nightly,   Status:  Discontinued         09/16/24 1534    09/17/24 1700  POC Glucose Finger 4x Daily Before Meals & at Bedtime  4 Times Daily Before Meals & at Bedtime      Comments: Complete no more than 45 minutes prior to patient eating      09/17/24 1120    09/17/24 1352  Splint Application  Once        Comments: Linard boots rotate on and off q 2 hrs.    09/17/24 1351    09/17/24 1122  Brace Application  Once        Comments: Continue hard cervical collar at all times. Assess skin and cervical dressing q 4 hours for bleeding and or skin irritation.    09/17/24 1122    09/17/24 1121  Elevate HOB  Continuous        Comments: As tolerated with hard cervical collar on at all times.    09/17/24 1122    09/17/24 1119  Bed Rest  Until Discontinued        Comments: HOB elevated as tolerated; continue bedrest for now.    09/17/24 1120    09/17/24 1117  Diet: Liquid; Clear Liquid; Fluid Consistency: Thin (IDDSI 0)  Diet Effective Now        Comments: Start clear liquids if patient passes nursing bedside swallow evaluation. Advance diet as tolerated.    09/17/24 1120    09/17/24 1117  Nursing bedside swallow evaluation - if passes, start clear liq diet and advance as tolerated (see order 9/17)  Physician Communication Order  Per Order Details        Comments: Nursing bedside swallow evaluation - if passes, start clear liq diet and advance as tolerated (see order 9/17)    09/17/24 1120    09/17/24 1116  Please ask intensivist to manage sliding scale insulin in light of patient's recent surgery and IV steroids. Also on oral agent for DM at home.  Physician Communication Order  Per Order Details        Comments: Please ask intensivist to manage sliding scale insulin in light  "of patient's recent surgery and IV steroids. Also on oral agent for DM at home.    09/17/24 1120    09/17/24 0900  multivitamin with minerals 1 tablet  Daily         09/16/24 1534    09/17/24 0900  losartan (COZAAR) tablet 100 mg  Daily         09/16/24 1534    09/17/24 0900  metoprolol succinate XL (TOPROL-XL) 24 hr tablet 50 mg  Daily         09/16/24 1534    09/17/24 0900  DULoxetine (CYMBALTA) DR capsule 30 mg  Daily         09/16/24 1534    09/17/24 0900  fluticasone (FLONASE) 50 MCG/ACT nasal spray 2 spray  Daily         09/16/24 1534 09/17/24 0900  DULoxetine (CYMBALTA) DR capsule 60 mg  Daily         09/16/24 1534 09/17/24 0900  hydroCHLOROthiazide tablet 12.5 mg  Daily         09/16/24 1534    09/17/24 0845  acetaminophen (TYLENOL) tablet 650 mg  Every 4 Hours Scheduled        Placed in \"Or\" Linked Group    09/17/24 0745    09/17/24 0845  acetaminophen (TYLENOL) 160 MG/5ML oral solution 650 mg  Every 4 Hours Scheduled        Placed in \"Or\" Linked Group    09/17/24 0745    09/17/24 0845  acetaminophen (TYLENOL) suppository 650 mg  Every 4 Hours Scheduled        Placed in \"Or\" Linked Group    09/17/24 0745    09/17/24 0822  albuterol (PROVENTIL) nebulizer solution 0.083% 2.5 mg/3mL  Every 4 Hours PRN         09/17/24 0823    09/17/24 0800  XR Spine Cervical 2 or 3 View  1 Time Imaging,   Status:  Canceled         09/16/24 1534 09/17/24 0745  oxyCODONE (ROXICODONE) immediate release tablet 5 mg  Every 4 Hours PRN         09/17/24 0745    09/17/24 0600  pantoprazole (PROTONIX) EC tablet 40 mg  Every Early Morning         09/16/24 1534    09/17/24 0600  CBC (No Diff)  Daily       09/17/24 0155    09/17/24 0600  Basic Metabolic Panel  Daily       09/17/24 0155    09/17/24 0015  niCARdipine (CARDENE) 25 mg in 250 mL NS infusion kit  Titrated         09/16/24 2329    09/16/24 2100  atorvastatin (LIPITOR) tablet 40 mg  Nightly         09/16/24 1534    09/16/24 2100  metFORMIN ER (GLUCOPHAGE-XR) 24 hr tablet " "500 mg  Nightly         09/16/24 1534 09/16/24 2100  gabapentin (NEURONTIN) capsule 200 mg  2 Times Daily         09/16/24 1534 09/16/24 2100  amLODIPine (NORVASC) tablet 5 mg  Nightly         09/16/24 1534 09/16/24 2100  sodium chloride 0.9 % flush 10 mL  Every 12 Hours Scheduled         09/16/24 1534 09/16/24 2100  sennosides-docusate (PERICOLACE) 8.6-50 MG per tablet 2 tablet  2 Times Daily        Placed in \"And\" Linked Group    09/16/24 1534 09/16/24 1957  POC Glucose Once  PROCEDURE ONCE        Comments: Complete no more than 45 minutes prior to patient eating      09/16/24 1945 09/16/24 1800  Ambulate Patient 3 times daily and PRN as tolerated.  3 Times Daily,   Status:  Canceled        Comments: PRN as tolerated.    09/16/24 1534 09/16/24 1659  Bed Rest  Until Discontinued,   Status:  Canceled         09/16/24 1658    09/16/24 1600  Vital Signs  Every 4 Hours       09/16/24 1534    09/16/24 1600  Neurovascular Checks  Every 4 Hours       09/16/24 1534    09/16/24 1600  Incentive Spirometry  Every 2 Hours While Awake       09/16/24 1534 09/16/24 1536  ceFAZolin 2000 mg IVPB in 100 mL NS (MBP)  Every 8 Hours         09/16/24 1534 09/16/24 1535  Code Status and Medical Interventions: CPR (Attempt to Resuscitate); Full Support  Continuous         09/16/24 1534 09/16/24 1535  Ambulate Patient  Every Shift,   Status:  Canceled       09/16/24 1534    09/16/24 1535  Advance Diet As Tolerated -  Until Discontinued         09/16/24 1534    09/16/24 1535  Oxygen Therapy- Nasal Cannula; Titrate 1-6 LPM Per SpO2; 90 - 95%  Continuous         09/16/24 1534    09/16/24 1535  Continuous Pulse Oximetry  Continuous         09/16/24 1534 09/16/24 1535  PT Consult: Eval & Treat As Tolerated; Post Surgery Care, Discharge Placement Assessment  Once,   Status:  Canceled        Comments: No lifting heavier than 5lbs, encourage as much ambulation as tolerated    09/16/24 1534    09/16/24 1535  Insert " "Peripheral IV  Once         09/16/24 1534 09/16/24 1535  Saline Lock & Maintain IV Access  Continuous         09/16/24 1534    09/16/24 1535  Diet: Regular/House; Fluid Consistency: Thin (IDDSI 0)  Diet Effective Now,   Status:  Canceled         09/16/24 1534 09/16/24 1534  acetaminophen (TYLENOL) tablet 650 mg  Every 4 Hours PRN,   Status:  Discontinued        Placed in \"Or\" Linked Group    09/16/24 1534    09/16/24 1534  acetaminophen (TYLENOL) 160 MG/5ML oral solution 650 mg  Every 4 Hours PRN,   Status:  Discontinued        Placed in \"Or\" Linked Group    09/16/24 1534    09/16/24 1534  acetaminophen (TYLENOL) suppository 650 mg  Every 4 Hours PRN,   Status:  Discontinued        Placed in \"Or\" Linked Group    09/16/24 1534    09/16/24 1534  HYDROmorphone (DILAUDID) injection 0.25 mg  Every 4 Hours PRN        Placed in \"And\" Linked Group    09/16/24 1534    09/16/24 1534  naloxone (NARCAN) injection 0.4 mg  Every 5 Minutes PRN        Placed in \"And\" Linked Group    09/16/24 1534    09/16/24 1534  polyethylene glycol (MIRALAX) packet 17 g  Daily PRN        Placed in \"And\" Linked Group    09/16/24 1534    09/16/24 1534  bisacodyl (DULCOLAX) EC tablet 5 mg  Daily PRN        Placed in \"And\" Linked Group    09/16/24 1534    09/16/24 1534  bisacodyl (DULCOLAX) suppository 10 mg  Daily PRN        Placed in \"And\" Linked Group    09/16/24 1534    09/16/24 1534  albuterol sulfate HFA (PROVENTIL HFA;VENTOLIN HFA;PROAIR HFA) inhaler 1 puff  Every 4 Hours PRN,   Status:  Discontinued         09/16/24 1534 09/16/24 1534  cyclobenzaprine (FLEXERIL) tablet 10 mg  3 Times Daily PRN         09/16/24 1534    09/16/24 1534  Up in Chair  As Needed,   Status:  Canceled       09/16/24 1534    09/16/24 1534  sodium chloride 0.9 % flush 10 mL  As Needed         09/16/24 1534    09/16/24 1534  sodium chloride 0.9 % infusion 40 mL  As Needed         09/16/24 1534    09/16/24 1534  HYDROcodone-acetaminophen (NORCO) 5-325 MG per " tablet 1 tablet  Every 4 Hours PRN,   Status:  Discontinued         09/16/24 1534    09/16/24 1534  methocarbamol (ROBAXIN) tablet 500 mg  4 Times Daily PRN         09/16/24 1534    09/16/24 1220  dexAMETHasone (DECADRON) injection 4 mg  Every 6 Hours         09/16/24 1218    09/16/24 1219  Please keep MAP > 80 until this order is discontinued. Use any pressor except Phenylephrine  Nursing Communication  Once        Comments: Please keep MAP > 80 until this order is discontinued. Use any pressor except Phenylephrine    09/16/24 1218    09/16/24 1218  Continuous Pulse Oximetry  Continuous,   Status:  Canceled         09/16/24 1217    09/16/24 1217  Oxygen Therapy- Nasal Cannula; Titrate 1-6 LPM Per SpO2; 90 - 95%  Continuous PRN,   Status:  Canceled       09/16/24 1217    09/16/24 1203  Inpatient Pulmonology Consult  Once        Specialty:  Pulmonary Disease  Provider:  Jsaon Arguello, DO    09/16/24 1202    09/16/24 1156  Head of Bed  Until Discontinued,   Status:  Canceled        Comments: FLAT UNTIL NEUROSURGERY SAYS OTHERWISE    09/16/24 1155    09/16/24 1149  niCARdipine (CARDENE) 25 mg in 250 mL NS infusion kit  Titrated,   Status:  Discontinued         09/16/24 1149    09/16/24 1147  Please go by artline BP as long as waveform appropriate.  Please keep MAP between 80-85  Nursing Communication  Once,   Status:  Canceled        Comments: Please go by artline BP as long as waveform appropriate.  Please keep MAP between 80-85    09/16/24 1149    09/16/24 1139  POC Glucose Once  PROCEDURE ONCE        Comments: Complete no more than 45 minutes prior to patient eating      09/16/24 1136    09/16/24 1118  POC Glucose Once  Once,   Status:  Canceled        Comments: Post op glucose check on all diabetic patients...Notify Anesthesia if blood sugar is less than 80 mg/dL or greater than 220 mg/dL.      09/16/24 1117    09/16/24 1118  Vital signs every 5 minutes for 15 minutes, every 15 minutes thereafter.  Once,    "Status:  Canceled         09/16/24 1117    09/16/24 1118  Call Anesthesiologist for additional IV Fluid bolus for Hypotension/Tachycardia  Until Discontinued,   Status:  Canceled         09/16/24 1117    09/16/24 1118  Notify Anesthesia of Any Acute Changes in Patient Condition  Until Discontinued,   Status:  Canceled         09/16/24 1117    09/16/24 1118  Notify Anesthesia for Unrelieved Pain  Until Discontinued,   Status:  Canceled         09/16/24 1117    09/16/24 1118  Once DC criteria to floor met, follow surgeon's orders.  Until Discontinued,   Status:  Canceled         09/16/24 1117    09/16/24 1118  Discharge patient from PACU when discharge criteria is met.  Until Discontinued,   Status:  Canceled         09/16/24 1117    09/16/24 1117  HYDROcodone-acetaminophen (NORCO) 5-325 MG per tablet 1 tablet  Once As Needed         09/16/24 1117 09/16/24 1117  HYDROmorphone (DILAUDID) injection 0.5 mg  Every 5 Minutes PRN,   Status:  Discontinued         09/16/24 1117    09/16/24 1117  oxyCODONE-acetaminophen (PERCOCET) 7.5-325 MG per tablet 1 tablet  Every 4 Hours PRN,   Status:  Discontinued         09/16/24 1117    09/16/24 1117  fentaNYL citrate (PF) (SUBLIMAZE) injection 50 mcg  Every 5 Minutes PRN,   Status:  Discontinued         09/16/24 1117    09/16/24 1117  naloxone (NARCAN) injection 0.2 mg  As Needed,   Status:  Discontinued         09/16/24 1117    09/16/24 1117  flumazenil (ROMAZICON) injection 0.2 mg  As Needed,   Status:  Discontinued         09/16/24 1117    09/16/24 1117  droperidol (INAPSINE) injection 0.625 mg  Every 20 Minutes PRN,   Status:  Discontinued        Placed in \"Or\" Linked Group    09/16/24 1117 09/16/24 1117  droperidol (INAPSINE) injection 0.625 mg  Every 20 Minutes PRN,   Status:  Discontinued        Placed in \"Or\" Linked Group    09/16/24 1117    09/16/24 1117  promethazine (PHENERGAN) suppository 25 mg  Once As Needed,   Status:  Discontinued        Placed in \"Or\" Linked " "Group    09/16/24 1117    09/16/24 1117  promethazine (PHENERGAN) tablet 25 mg  Once As Needed,   Status:  Discontinued        Placed in \"Or\" Linked Group    09/16/24 1117 09/16/24 1117  labetalol (NORMODYNE,TRANDATE) injection 5 mg  Every 5 Minutes PRN,   Status:  Discontinued         09/16/24 1117    09/16/24 1117  hydrALAZINE (APRESOLINE) injection 5 mg  Every 10 Minutes PRN,   Status:  Discontinued         09/16/24 1117 09/16/24 1117  ePHEDrine injection 5 mg  Once As Needed,   Status:  Discontinued         09/16/24 1117 09/16/24 1117  diphenhydrAMINE (BENADRYL) injection 12.5 mg  Every 15 Minutes PRN,   Status:  Discontinued         09/16/24 1117 09/16/24 1117  ipratropium-albuterol (DUO-NEB) nebulizer solution 3 mL  Once As Needed,   Status:  Discontinued         09/16/24 1117 09/16/24 1107  Inpatient Admission  Once         09/16/24 1108    09/16/24 0908  methylPREDNISolone acetate (DEPO-medrol) injection  As Needed,   Status:  Discontinued         09/16/24 0908    09/16/24 0907  sodium chloride (NS) irrigation solution  As Needed,   Status:  Discontinued         09/16/24 0907 09/16/24 0907  thrombin (THROMBIN-JMI) spray kit  As Needed,   Status:  Discontinued         09/16/24 0907 09/16/24 0906  bupivacaine-EPINEPHrine PF (MARCAINE w/EPI) 0.5% -[id number] injection  As Needed,   Status:  Discontinued         09/16/24 0906 09/16/24 0900  sodium chloride 0.9 % flush 3 mL  Every 12 Hours Scheduled,   Status:  Discontinued         09/16/24 0726 09/16/24 0728  lactated ringers infusion  Continuous         09/16/24 0726 09/16/24 0726  Continuous Pulse Oximetry  Continuous,   Status:  Canceled         09/16/24 0726 09/16/24 0726  Insert Peripheral IV  Once,   Status:  Canceled         09/16/24 0726 09/16/24 0726  Saline Lock & Maintain IV Access  Continuous,   Status:  Canceled         09/16/24 0726 09/16/24 0726  fentaNYL citrate (PF) (SUBLIMAZE) injection 50 mcg  Once As " Needed         09/16/24 0726 09/16/24 0726  midazolam (VERSED) injection 1 mg  Every 5 Minutes PRN,   Status:  Discontinued         09/16/24 0726 09/16/24 0726  May take Beta Blocker from home with sip of water.  Once,   Status:  Canceled        Comments: Only applicable to patients on home Beta Blocker    09/16/24 0726 09/16/24 0725  Vital Signs - Per Anesthesia Protocol  As Needed,   Status:  Canceled       09/16/24 0726 09/16/24 0725  Oxygen Therapy- Nasal Cannula; Titrate 1-6 LPM Per SpO2; 90 - 95%  Continuous PRN,   Status:  Canceled       09/16/24 0726 09/16/24 0725  POC Glucose PRN  As Needed,   Status:  Canceled      Comments: For all diabetic patients. Notify Anesthesiologist for blood sugar > 180.      09/16/24 0726 09/16/24 0725  sodium chloride 0.9 % flush 3-10 mL  As Needed,   Status:  Discontinued         09/16/24 0726 09/16/24 0725  lidocaine (XYLOCAINE) 1 % injection 0.5 mL  Once As Needed,   Status:  Discontinued         09/16/24 0726 09/16/24 0654  FL C Arm During Surgery  1 Time Imaging         09/16/24 0654    09/16/24 0641  ceFAZolin 2000 mg IVPB in 100 mL NS (MBP)  Once         09/16/24 0639    09/16/24 0640  Inpatient Admission  Once         09/16/24 0639    09/16/24 0640  Follow Anesthesia Guidelines / Protocol  Once,   Status:  Canceled         09/16/24 0639    09/16/24 0640  Verify / Perform Chlorhexidine Skin Prep  Once,   Status:  Canceled        Comments: Chlorhexidine Skin Prep and Instructions For All Patients Having A Procedure Requiring an Outward Incision if Not Allergic. If Allergic, Give Antibacterial Skin Wipes and Instructions. Do Not Use For Facial Cases or on Any Mucus Membranes.    09/16/24 0639 09/16/24 0630  POC Glucose Once  PROCEDURE ONCE        Comments: Complete no more than 45 minutes prior to patient eating      09/16/24 0626    --  albuterol sulfate  (90 Base) MCG/ACT inhaler  Every 4 Hours PRN         09/17/24 0805    --   atorvastatin (LIPITOR) 40 MG tablet  Nightly         09/17/24 0806    --  DULoxetine (CYMBALTA) 30 MG capsule  Daily         09/17/24 0808    --  DULoxetine (CYMBALTA) 60 MG capsule  Daily         09/17/24 0808    --  hydroCHLOROthiazide 25 MG tablet  Daily         09/17/24 0809                     Operative/Procedure Notes (all)        Alex Ortiz MD at 09/16/24 0803  Version 1 of 1         Anterior Cervical corpectomy with autograft, allograft and Instrumented Fusion  C5 vertebral body  Operative Note    Kristina Yates  9/16/2024  [id number]    SURGEON   Alex Ortiz MD    ASSISTANT:  Smita Becerra CSA was present throughout the entire surgery to provide intraoperative suction, retraction, suturing, and irrigation to promote visualization by the operative surgeon and assist with opening and closure.  The skilled assistance was necessary for the success of this case.  Our practice does not have a relationship with neurosurgical residents.      INDICATION:  Kristina Yates is a 64 y.o. female who presents with cervical radiculopathy secondary to adjacent segment disease at C4-5 after previous C5-6, C6-7 fusion in 2003.  Her symptoms are refractory to epidural steroid injections and physical therapy.  She had a known broken screw based on preoperative x-rays in the left superior C5 vertebral body.  Other screws appear to be intact but the plate was angled and tilted clockwise with the right superior C5 vertebral body screw in the middle of the vertebral body.  We discussed the risk and benefits and alternatives of surgery including CSF leak, injury to spinal cord, injury to nerve roots, need for further surgery down the line.  Additionally, we also talked about idiopathic C5 palsy, cervical hematoma and infection.  She understood and was willing to proceed with surgery.    Pre-op Diagnosis:     Cervical radiculopathy [M54.12]  Cervicalgia [M54.2]    Post-op Diagnosis:     Post-Op Diagnosis Codes:     * Cervical  radiculopathy [M54.12]     * Cervicalgia [M54.2]  Spinal cord injury    PROCEDURE PERFORMED:    Spinal Surgery Levels Completed:2 Levels    Anterior Cervical corpectomy and Fusion  of C5 vertebral body    Anterior cervical C5 corpectomy, including disc space preparation, discectomy, osteophytectomy and decompression of spinal cord and/or nerve roots at  C4-5, C6-7  Anterior titanium instrumentation at  C4-C6 ,   Morselized Vivigen allograft packed into interbody cages  Microdissection technique with the use of the operating microscope  Intraoperative duraplasty      Anesthesia: General    Staff:   Circulator: Snehal Bryant RN  Scrub Person: Kim Vick  Vendor Representative: Alejo Collado  Assistant: Smita Becerra CSA    Estimated Blood Loss:  100ml    Specimens: * No orders in the log *              Drains:   Closed/Suction Drain 1 Anterior Neck Bulb 15 Fr. (Active)       Urethral Catheter Silicone 16 Fr. (Active)       Findings: Severe cervical stenosis and spondylolisthesis    Complications: Spinal cord injury at the level of C4-5 and directly below secondary to hardware failure during hardware removal.    Narrative Description:    Positioning: After operative consent the patient was taken to the operating room in stable condition and placed under general endotracheal anesthesia. Once adequate anesthesia was established the patient was kept supine on the operating table with a shoulder roll beneath their shoulders. Their neck was placed in a slightly extended position to expose the anterior neck which was prepped and draped in the usual sterile fashion. The shoulders were then taped down using 4 inch silk tape. All pressure points were padded along the extremities to protect neurologic function.    Approach and decompression: Using intraoperative fluoroscopy the anterior cervical spine was localized. A sharp incision was made in the mid neck on the right just superior to her previous ACDF incision and  taken through the platysma layer using cautery. Blunt dissection was then used to expose the anterior cervical spine medial to the carotid artery and lateral to the esophagus.  We immediately where able to identify it the previous plate and used Kitner sponges to blunt dissect the soft tissue off of the titanium hardware.  The rest of the titanium hardware was exposed using Bovie cautery.  We circumferentially removed some of the overgrown osteophytes surrounding the previous ACDF plate and then proceeded to remove the screws.  Screw removal went as planned except in the right superior C5 screw, when downward pressure was placed to engage the screw head, the screw plunged through the vertebral body posteriorly.  We immediately took an x-ray and a lateral view which showed significant retropulsion of this screw head.  Due to the fact that the plate had been positioned clockwise compared to normal vertical, I anticipated that the screw head had likely damaged the ventral portion of the middle of her spinal cord based on the x-ray.  The plate was removed using angled curettes and gentle upward force.  We then converted our goal from hardware removal to cervical corpectomy of the C5 vertebral body in order to better visualize the damage to the spinal cord as well as remove the foreign body in the spinal cord.  The Boo Almendarez retractor system was placed beneath the longus coli muscles bilaterally.  We angled our exposure towards the left in order to avoid the right sided screw that we feared had plunged deep to the vertebral body.  At this point the operating microscope was brought into the field.  Using a high-speed [employer] drill with a matchstick drill bit, I proceeded to drill out the C5 vertebral body until a cortical rim of bone was left overlying the posterior longitudinal ligament.  By doing this, we were able to visualize the floating broken portion of the left C5 screw and that was removed and  then after circumferentially dissecting around the right C5 screw that I feared had contacted the spinal cord, the screw was then removed using a pituitary grasper after the dura around the injury had been exposed.  It was difficult to tell what amount of injury had occurred to the spinal cord but the small durotomy was closed using 4-0 Nurolon suture.  We continue to dissect out laterally to ensure there is no additional durotomy.  At this point, the anesthesiologist had given her an additional bolus of 10 mg of Decadron.  The disc space at C5-6 was then decompressed and this process was repeated at the C6-7 level.  We then used Tisseel glue over the durotomy and Floseal to stop the bone bleeding from the corpectomy.    Operating Microscope: The operating microscope was draped using sterile technique and brought into the field and the rest of the operation was performed under operative magnification with microdissection technique and micro-illumination with the aid of the operating microscope.    Arthodesis and Intrumentation: A 18 mm spacing device was placed in the disc base and visualized under fluoroscopic guidance.  Then a 18 mm DePuy Bengal Stackable cage filled with Vivigen was placed at the  C5 vertebral body  space under fluoroscopic guidance.  The longus coli bilaterally was then cauterized using bipolar cautery and a 26 mm DePuy Coda plate was secured anterior to the arthrodesis using 14 mm variable angle screws superiorly and 14 mm rescue screws inferiorly.  Fluoroscopy was then used to document to confirm the placement and alignment of the plate.    Closure: The wound was copiously irrigated and Surgiflo and bipolar cautery were used for hemostasis. A 10 Occitan GHANSHYAM drain was secured using 3-0 nylon suture.  The deep platysma was reapproximated with the 3-O Vicryl suture and the superficial skin was closed with 3-O Vicryl suture.  A 4-0 Monocryl was then used to close the skin and the incision was  dressed with Dermabond.  The needle and sponge counts were correct at the end of the case. The patient will be transferred to the ICU after arterial line placement.  We will ensure that her MAP goals exceed 80 over the next 72 hours.  She will receive IV Decadron and remain flat.  Her spinal cord is now decompressed and she has the best possible environment to heal from her spinal cord injury and we can only hope that she makes a good recovery.  She will likely need rehabilitation.    Alex Ortiz MD     Date: 9/16/2024  Time: 11:16 EDT         Electronically signed by Alex Ortiz MD at 09/16/24 1133          Physician Progress Notes (all)        Tony Boyer MD at 09/17/24 0907          [facility]       Mar/chart reviewed  Follow up critical care management  Significant neck pain, some weakness more on the left lower extremity, tearful,     Vital Sign Min/Max for last 24 hours  Temp  Min: 97.9 °F (36.6 °C)  Max: 98.9 °F (37.2 °C)   BP  Min: 100/83  Max: 215/87   Pulse  Min: 100  Max: 117   Resp  Min: 14  Max: 22   SpO2  Min: 92 %  Max: 100 %   Flow (L/min)  Min: 2  Max: 2   Weight  Min: 75.5 kg (166 lb 7.2 oz)  Max: 75.5 kg (166 lb 7.2 oz)     Nad, axox3,   perrl, eomi, no icterus,  mmm, no jvd, trachea midline, neck supple,  chest cta bilaterally, no crackles, no wheezes,   rrr,   soft, nt, nd +bs,  no c/c/e  Skin warm, dry no rashes    Labs: 9/17: reviewed:  Wbc 20  Hgb 11.5  Plts 267  Glucose 153  Bun 5  Cr 0.39  Bicarb 23    A/P:  S/p anterior cervical corpectomy with fusion of the c5 vertebral body with c4-5 injury -- admit to icu , monitor and bp control as per jacque recommendations  Post operative pain control -- prn meds as needed  BP control gtt and prns for parameters as per jacque  DMII -- home meds, accuchecks ok    Follow instructions as per JACQUE    Electronically signed by Tony Boyer MD at 09/17/24 0940       Alex Ortiz MD at 09/16/24 1258          Checked on Kristina in the PACU.  "Currently somewhat drowsy. BP well MAP goal. Not very verbal but follow commands with at least 3/5 strength in BUE, follows commands briskly in toes and able to bend knees with at least 2/5 strength. Reponsive to light touch and painful stimulus.    - Continue MAP 80-90 goal, avoid Phenylephrine  - Continue flat bedrest with cervical collar and sanders  - Continue arterial ine  - Continue IV Decadron and PO Protonix  - Transfer to ICU    Alex Ortiz MD  09/16/24  13:00 EDT      Electronically signed by Alex Ortiz MD at 09/16/24 1300          Consult Notes (all)        Kam Campbell at 09/17/24 1108          Pt laying flat in bed. Spouse at bedside. Chp offered introductions to Pt and sat with Pt and family to provided supportive presence. Chp provided spiritual care and prayer. Pt tearful and express feeling \"tired\", but supported by family. Chp will follow up with Pt later this week.      Chp remains available to Pt and family as needed.           Electronically signed by Kam Campbell at 09/17/24 1143       Tony Boyer MD at 09/16/24 1517        Consult Orders    1. Inpatient Pulmonology Consult [[id number]] ordered by Alex Ortiz MD at 09/16/24 1202                 [facility]    Reason for consult: critical care management    HPI:  Mrs. Yates is a 65yo female underwent anterior cervical corpectomy today and post operative admission to ICU has been requested secondary to spinal cord injury at c4-5 and directly below secondary to hardware failure during hardware removal.  Mrs. Yates is in [facility] recovery, she does arouse but goes back to sleep pretty quickly she denies any compliants, and is appropirate for the most part with her brief responses, but most history must be obtained from chart and my conversation with the RN at bedside    Past Medical History:   Diagnosis Date    Alcohol abuse     in recovery, sober since 2012    Anxiety     Arthritis     Asthma     Edmonds esophagus     Blurred " vision     Cervical disc disease     Cervical neuritis     Chronic pain     NECK AND BACK    COPD (chronic obstructive pulmonary disease)     STATES NEVER DIAGNOSED WITH    Depression     Diabetes mellitus     Type 2    DJD (degenerative joint disease), cervical     Foot spasms     FROM BACK SURGERY    GERD (gastroesophageal reflux disease)     Hiatal hernia     Hyperlipidemia     Hypertension     Persistent headaches     ?BLOOD PRESSURE OR NECK RELATED???    Seasonal allergies     Spinal headache     Spinal stenosis      Social History     Socioeconomic History    Marital status:     Number of children: 2   Tobacco Use    Smoking status: Some Days     Current packs/day: 0.50     Average packs/day: 0.5 packs/day for 18.6 years (9.3 ttl pk-yrs)     Types: Cigarettes     Start date: 2/25/2006    Smokeless tobacco: Never   Vaping Use    Vaping status: Never Used   Substance and Sexual Activity    Alcohol use: No     Comment: sober since 2012, worsk 12 steps    Drug use: No    Sexual activity: Yes     Partners: Male     Family History   Problem Relation Age of Onset    Cancer Mother         nonhodkinds lymphoma    Heart disease Father     Drug abuse Sister     Heart disease Brother     Lupus Maternal Aunt     Malig Hyperthermia Neg Hx      MEDS: reviewed as per chart  AlL: list of 4 reviewed as per chart  ROS: UTO  Vital Sign Min/Max for last 24 hours  Temp  Min: 97.9 °F (36.6 °C)  Max: 98 °F (36.7 °C)   BP  Min: 138/69  Max: 215/87   Pulse  Min: 100  Max: 113   Resp  Min: 14  Max: 23   SpO2  Min: 96 %  Max: 100 %   Flow (L/min)  Min: 2  Max: 2   No data recorded   GEN: appears ill, obese, sleepy   HEENT: PERRL, normal sclera, mmm, no JVD, trachea midline, neck supple  CHEST: CTA bilat, no wheezes, no crackles, no use of accessory muscles  CV: RRR, no m/g/r  ABD: soft, nt, nd +bs, no hepatosplenomegaly  EXT: no c/c/e  SKIN: no rashes, no xanthomas, nl turgor, warm, dry  LYMPH: no palpable cervical or  supraclavicular lymphadenopathy  NEURO: CN 2-12 intact and symmetric bilaterally  PSYCH: deferred  MSK: moves all 4 extremities at least with resistance to gravity, difficult to get a great exam    Labs: 9/16: reviewed:  Nothing new  9/9  Na 132  Bicarb 26    A/P:  S/p anterior cervical corpectomy with fusion of the c5 vertebral body with c4-5 injury -- admit to icu , monitor and bp control as per lexx recommendations  Post operative pain control -- prn meds as needed  BP control gtt and prns for parameters as per lexx  DMII -- ssi     Can't edit orders at this point as patient still in pacu    Will follow     Electronically signed by Tony Boyer MD at 09/16/24 9615        All medication doses during the admission are shown, including meds that are no longer on order.  Scheduled Meds Sorted by Name  for Kristina Yates as of 9/15/24 through 9/17/24    1 Day 3 Days 7 Days 10 Days < Today >   Legend:       Medications 09/15/24 09/16/24 09/17/24    acetaminophen (TYLENOL) tablet 650 mg  Dose: 650 mg  Freq: Every 4 Hours Scheduled Route: PO  Start: 09/17/24 0845   Admin Instructions:         (0845)   1200   1600 2000          Or   acetaminophen (TYLENOL) 160 MG/5ML oral solution 650 mg  Dose: 650 mg  Freq: Every 4 Hours Scheduled Route: PO  Start: 09/17/24 0845   Admin Instructions:            1200 1600 2000          Or   acetaminophen (TYLENOL) suppository 650 mg  Dose: 650 mg  Freq: Every 4 Hours Scheduled Route: RE  Start: 09/17/24 0845   Admin Instructions:            1200 1600 2000          amLODIPine (NORVASC) tablet 5 mg  Dose: 5 mg  Freq: Nightly Route: PO  Start: 09/16/24 2100   Admin Instructions:        2138 2100          atorvastatin (LIPITOR) tablet 40 mg  Dose: 40 mg  Freq: Nightly Route: PO  Start: 09/16/24 2100   Admin Instructions:        2138 2100          ceFAZolin 2000 mg IVPB in 100 mL NS (MBP)  Dose: 2,000 mg  Freq: Every 8 Hours Route: IV  Indications of Use:  PERIOPERATIVE PHARMACOPROPHYLAXIS  Start: 09/16/24 1536 End: 09/17/24 0010   Admin Instructions:        1633   2340          ceFAZolin 2000 mg IVPB in 100 mL NS (MBP)  Dose: 2,000 mg  Freq: Once Route: IV  Indications of Use: PERIOPERATIVE PHARMACOPROPHYLAXIS  Start: 09/16/24 0641 End: 09/16/24 0758   Admin Instructions:        0728           dexAMETHasone (DECADRON) injection 4 mg  Dose: 4 mg  Freq: Every 6 Hours Route: IV  Start: 09/16/24 1220 End: 09/22/24 0859   Admin Instructions:        (5714) [C]   5288   2059      0336   0846   1500     2100          DULoxetine (CYMBALTA) DR capsule 30 mg  Dose: 30 mg  Freq: Daily Route: PO  Start: 09/17/24 0900   Admin Instructions:         (0845)          DULoxetine (CYMBALTA) DR capsule 60 mg  Dose: 60 mg  Freq: Daily Route: PO  Indications of Use: MAJOR DEPRESSIVE DISORDER,MUSCULOSKELETAL PAIN  Start: 09/17/24 0900   Admin Instructions:         (0845)          Enoxaparin Sodium (LOVENOX) syringe 40 mg  Dose: 40 mg  Freq: Nightly Route: SC  Indications of Use: PROPHYLAXIS OF VENOUS THROMBOEMBOLISM  Start: 09/18/24 2100   Admin Instructions:            Enoxaparin Sodium (LOVENOX) syringe 40 mg  Dose: 40 mg  Freq: Nightly Route: SC  Indications of Use: PROPHYLAXIS OF VENOUS THROMBOEMBOLISM  Start: 09/17/24 2100 End: 09/17/24 0745   Admin Instructions:         0745-D/C'd        fluticasone (FLONASE) 50 MCG/ACT nasal spray 2 spray  Dose: 2 spray  Freq: Daily Route: NA  Start: 09/17/24 0900      (1237)          gabapentin (NEURONTIN) capsule 200 mg  Dose: 200 mg  Freq: 2 Times Daily Route: PO  Start: 09/16/24 2100   Admin Instructions:        2139        0846   2100         hydroCHLOROthiazide tablet 12.5 mg  Dose: 12.5 mg  Freq: Daily Route: PO  Start: 09/17/24 0900   Admin Instructions:         (1214)          losartan (COZAAR) tablet 100 mg  Dose: 100 mg  Freq: Daily Route: PO  Start: 09/17/24 0900   Admin Instructions:         (0851)          metFORMIN ER (GLUCOPHAGE-XR)  24 hr tablet 500 mg  Dose: 500 mg  Freq: Nightly Route: PO  Start: 09/16/24 2100   Admin Instructions:        2138 2100          metoprolol succinate XL (TOPROL-XL) 24 hr tablet 50 mg  Dose: 50 mg  Freq: Daily Route: PO  Start: 09/17/24 0900   Admin Instructions:         (4270)          multivitamin with minerals 1 tablet  Dose: 1 tablet  Freq: Daily Route: PO  Start: 09/17/24 0900   Admin Instructions:         (7732)          pantoprazole (PROTONIX) EC tablet 40 mg  Dose: 40 mg  Freq: Every Early Morning Route: PO  Start: 09/17/24 0600   Admin Instructions:         0520           sennosides-docusate (PERICOLACE) 8.6-50 MG per tablet 2 tablet  Dose: 2 tablet  Freq: 2 Times Daily Route: PO  Start: 09/16/24 2100   Admin Instructions:        2052        (0789)   2100         And   polyethylene glycol (MIRALAX) packet 17 g  Dose: 17 g  Freq: Daily PRN Route: PO  PRN Reason: Constipation  PRN Comment: Use if senna-docusate is ineffective  Start: 09/16/24 1534   Admin Instructions:            And   bisacodyl (DULCOLAX) EC tablet 5 mg  Dose: 5 mg  Freq: Daily PRN Route: PO  PRN Reason: Constipation  PRN Comment: Use if polyethylene glycol is ineffective  Start: 09/16/24 1534   Admin Instructions:            And   bisacodyl (DULCOLAX) suppository 10 mg  Dose: 10 mg  Freq: Daily PRN Route: RE  PRN Reason: Constipation  PRN Comment: Use if bisacodyl oral is ineffective  Start: 09/16/24 1534   Admin Instructions:            sodium chloride 0.9 % flush 10 mL  Dose: 10 mL  Freq: Every 12 Hours Scheduled Route: IV  Start: 09/16/24 2100 2100 0900 2100         sodium chloride 0.9 % flush 3 mL  Dose: 3 mL  Freq: Every 12 Hours Scheduled Route: IV  Start: 09/16/24 0900 End: 09/16/24 1125 0900   1125-D/C'd                    Continuous Meds Sorted by Name  for Kristina Yates as of 9/15/24 through 9/17/24  Legend:       Medications 09/15/24 09/16/24 09/17/24   lactated ringers infusion  Rate: 9 mL/hr Dose: 9  mL/hr  Freq: Continuous Route: IV  Last Dose: 9 mL/hr (09/16/24 0733)  Start: 09/16/24 0728     0727   0733   0751 [C]     0752   1045          niCARdipine (CARDENE) 25 mg in 250 mL NS infusion kit  Rate:  mL/hr Dose: 5-15 mg/hr  Freq: Titrated Route: IV  Last Dose: Stopped (09/17/24 1216)  Start: 09/17/24 0015 End: 09/30/24 2338   Admin Instructions:        2339   2347       0323   0519   0845     1216          niCARdipine (CARDENE) 25 mg in 250 mL NS infusion kit  Rate:  mL/hr Dose: 5-15 mg/hr  Freq: Titrated Route: IV  Last Dose: 2.5 mg/hr (09/16/24 1631)  Start: 09/16/24 1149 End: 09/16/24 1935   Admin Instructions:        1158   1219   1318     1631   1935-D/C'd                    PRN Meds Sorted by Name  for Kristina Yates as of 9/15/24 through 9/17/24  Legend:       Medications 09/15/24 09/16/24 09/17/24    acetaminophen (TYLENOL) tablet 650 mg  Dose: 650 mg  Freq: Every 4 Hours PRN Route: PO  PRN Reason: Mild Pain  Start: 09/16/24 1534 End: 09/17/24 0745   Admin Instructions:         0745-D/C'd        Or   acetaminophen (TYLENOL) 160 MG/5ML oral solution 650 mg  Dose: 650 mg  Freq: Every 4 Hours PRN Route: PO  PRN Reason: Mild Pain  Start: 09/16/24 1534 End: 09/17/24 0745   Admin Instructions:         0745-D/C'd        Or   acetaminophen (TYLENOL) suppository 650 mg  Dose: 650 mg  Freq: Every 4 Hours PRN Route: RE  PRN Reason: Mild Pain  Start: 09/16/24 1534 End: 09/17/24 0745   Admin Instructions:         0745-D/C'd        albuterol (PROVENTIL) nebulizer solution 0.083% 2.5 mg/3mL  Dose: 2.5 mg  Freq: Every 4 Hours PRN Route: NEBULIZATION  PRN Reason: Wheezing  Start: 09/17/24 0822   Admin Instructions:            albuterol sulfate HFA (PROVENTIL HFA;VENTOLIN HFA;PROAIR HFA) inhaler 1 puff  Dose: 1 puff  Freq: Every 4 Hours PRN Route: IN  PRN Reason: Wheezing  Start: 09/16/24 1534 End: 09/17/24 0823   Admin Instructions:         0823-D/C'd         sennosides-docusate (PERICOLACE) 8.6-50 MG per  tablet 2 tablet  Dose: 2 tablet  Freq: 2 Times Daily Route: PO  Start: 09/16/24 2100   Admin Instructions:        2059 [phone]) [phone]         And   polyethylene glycol (MIRALAX) packet 17 g  Dose: 17 g  Freq: Daily PRN Route: PO  PRN Reason: Constipation  PRN Comment: Use if senna-docusate is ineffective  Start: 09/16/24 1534   Admin Instructions:            And   bisacodyl (DULCOLAX) EC tablet 5 mg  Dose: 5 mg  Freq: Daily PRN Route: PO  PRN Reason: Constipation  PRN Comment: Use if polyethylene glycol is ineffective  Start: 09/16/24 1534   Admin Instructions:            And   bisacodyl (DULCOLAX) suppository 10 mg  Dose: 10 mg  Freq: Daily PRN Route: RE  PRN Reason: Constipation  PRN Comment: Use if bisacodyl oral is ineffective  Start: 09/16/24 1534   Admin Instructions:            bupivacaine-EPINEPHrine PF (MARCAINE w/EPI) 0.5% [id number] injection  Freq: As Needed  Start: 09/16/24 0906 End: 09/16/24 1124 0906 1124-D/C'd        cyclobenzaprine (FLEXERIL) tablet 10 mg  Dose: 10 mg  Freq: 3 Times Daily PRN Route: PO  PRN Reason: Muscle Spasms  Start: 09/16/24 1534   Admin Instructions:        1635           diphenhydrAMINE (BENADRYL) injection 12.5 mg  Dose: 12.5 mg  Freq: Every 15 Minutes PRN Route: IV  PRN Reason: Itching  PRN Comment: May repeat x 1  Start: 09/16/24 1117 End: 09/16/24 1935   Admin Instructions:        1935-D/C'd          droperidol (INAPSINE) injection 0.625 mg  Dose: 0.625 mg  Freq: Every 20 Minutes PRN Route: IV  PRN Reasons: Nausea,Vomiting  Indications of Use: NAUSEA AND VOMITING  Start: 09/16/24 1117 End: 09/16/24 1935   Admin Instructions:        1935-D/C'd         Or   droperidol (INAPSINE) injection 0.625 mg  Dose: 0.625 mg  Freq: Every 20 Minutes PRN Route: IM  PRN Reasons: Nausea,Vomiting  Start: 09/16/24 1117 End: 09/16/24 1935   Admin Instructions:        1935-D/C'd         ePHEDrine injection 5 mg  Dose: 5 mg  Freq: Once As Needed Route: IV  PRN Comment: symptomatic  hypotension - Notify attending anesthesiologist if this needs to be given  Start: 09/16/24 1117 End: 09/16/24 1935   Admin Instructions:        1935-D/C'd         fentaNYL citrate (PF) (SUBLIMAZE) injection 50 mcg  Dose: 50 mcg  Freq: Every 5 Minutes PRN Route: IV  PRN Reason: Severe Pain  Start: 09/16/24 1117 End: 09/16/24 1935   Admin Instructions:        1735 1935-D/C'd        fentaNYL citrate (PF) (SUBLIMAZE) injection 50 mcg  Dose: 50 mcg  Freq: Once As Needed Route: IV  PRN Reason: Severe Pain  Start: 09/16/24 0726 End: 09/16/24 0740   Admin Instructions:        0740           flumazenil (ROMAZICON) injection 0.2 mg  Dose: 0.2 mg  Freq: As Needed Route: IV  PRN Comment: for benzodiazepine induced unresponsiveness or sedation  Start: 09/16/24 1117 End: 09/16/24 1935   Admin Instructions:        1935-D/C'd         hydrALAZINE (APRESOLINE) injection 5 mg  Dose: 5 mg  Freq: Every 10 Minutes PRN Route: IV  PRN Reason: High Blood Pressure  PRN Comment: for systolic blood pressure greater than 180 mmHg or diastolic blood pressure greater than 105 mmHg  Start: 09/16/24 1117 End: 09/16/24 1935   Admin Instructions:        1935-D/C'd         HYDROcodone-acetaminophen (NORCO) 5-325 MG per tablet 1 tablet  Dose: 1 tablet  Freq: Once As Needed Route: PO  PRN Reason: Moderate Pain  Start: 09/16/24 1117 End: 09/16/24 1558   Admin Instructions:        1558           HYDROcodone-acetaminophen (NORCO) 5-325 MG per tablet 1 tablet  Dose: 1 tablet  Freq: Every 4 Hours PRN Route: PO  PRN Reason: Moderate Pain  Start: 09/16/24 1534 End: 09/17/24 0745   Admin Instructions:         0745-D/C'd         HYDROmorphone (DILAUDID) injection 0.25 mg  Dose: 0.25 mg  Freq: Every 4 Hours PRN Route: IV  PRN Reason: Severe Pain  Start: 09/16/24 1534 End: 09/21/24 1533   Admin Instructions:        2057        0208   0646   1126        And   naloxone (NARCAN) injection 0.4 mg  Dose: 0.4 mg  Freq: Every 5 Minutes PRN Route: IV  PRN Reason:  Respiratory Depression  Start: 09/16/24 1534   Admin Instructions:            HYDROmorphone (DILAUDID) injection 0.5 mg  Dose: 0.5 mg  Freq: Every 5 Minutes PRN Route: IV  PRN Reason: Moderate Pain  Start: 09/16/24 1117 End: 09/16/24 1935   Admin Instructions:        1935-D/C'd         ipratropium-albuterol (DUO-NEB) nebulizer solution 3 mL  Dose: 3 mL  Freq: Once As Needed Route: NEBULIZATION  PRN Reasons: Wheezing,Shortness of Air  PRN Comment: bronchospasm  Start: 09/16/24 1117 End: 09/16/24 1935   Admin Instructions:        1935-D/C'd         labetalol (NORMODYNE,TRANDATE) injection 5 mg  Dose: 5 mg  Freq: Every 5 Minutes PRN Route: IV  PRN Reason: High Blood Pressure  PRN Comment: for systolic blood pressure greater than 180 mmHg or diastolic blood pressure greater than 105 mmHg  Start: 09/16/24 1117 End: 09/16/24 1935   Admin Instructions:        1935-D/C'd         lidocaine (XYLOCAINE) 1 % injection 0.5 mL  Dose: 0.5 mL  Freq: Once As Needed Route: ID  PRN Comment: IV Start  Start: 09/16/24 0725 End: 09/16/24 1125 1125-D/C'd         methocarbamol (ROBAXIN) tablet 500 mg  Dose: 500 mg  Freq: 4 Times Daily PRN Route: PO  PRN Reason: Muscle Spasms  Start: 09/16/24 1534   Admin Instructions:            methylPREDNISolone acetate (DEPO-medrol) injection  Freq: As Needed  Start: 09/16/24 0908 End: 09/16/24 1124 0908   1124-D/C'd        midazolam (VERSED) injection 1 mg  Dose: 1 mg  Freq: Every 5 Minutes PRN Route: IV  PRN Comment: Anxiety prophylaxis, Pre-op comfort  Start: 09/16/24 0726 End: 09/16/24 1125   Admin Instructions:        1125-D/C'd         naloxone (NARCAN) injection 0.2 mg  Dose: 0.2 mg  Freq: As Needed Route: IV  PRN Reasons: Opioid Reversal,Respiratory Depression  PRN Comment: unresponsiveness, decrease oxygen saturation  Indications of Use: ACUTE RESPIRATORY FAILURE,OPIOID-INDUCED RESPIRATORY DEPRESSION  Start: 09/16/24 1117 End: 09/16/24 1935   Admin Instructions:        1935-D/C'd          oxyCODONE (ROXICODONE) immediate release tablet 5 mg  Dose: 5 mg  Freq: Every 4 Hours PRN Route: PO  PRN Reason: Moderate Pain  Start: 09/17/24 0745 End: 09/22/24 0744   Admin Instructions:         0846          oxyCODONE-acetaminophen (PERCOCET) 7.5-325 MG per tablet 1 tablet  Dose: 1 tablet  Freq: Every 4 Hours PRN Route: PO  PRN Reason: Severe Pain  Start: 09/16/24 1117 End: 09/16/24 1935   Admin Instructions:        1935-D/C'd          promethazine (PHENERGAN) suppository 25 mg  Dose: 25 mg  Freq: Once As Needed Route: RE  PRN Reasons: Nausea,Vomiting  Start: 09/16/24 1117 End: 09/16/24 1935   Admin Instructions:        1935-D/C'd         Or   promethazine (PHENERGAN) tablet 25 mg  Dose: 25 mg  Freq: Once As Needed Route: PO  PRN Reasons: Nausea,Vomiting  Start: 09/16/24 1117 End: 09/16/24 1935   Admin Instructions:        1935-D/C'd         sodium chloride (NS) irrigation solution  Freq: As Needed  Start: 09/16/24 0907 End: 09/16/24 1124 0907 1124-D/C'd        sodium chloride 0.9 % flush 10 mL  Dose: 10 mL  Freq: As Needed Route: IV  PRN Reason: Line Care  Start: 09/16/24 1534         sodium chloride 0.9 % flush 3-10 mL  Dose: 3-10 mL  Freq: As Needed Route: IV  PRN Reason: Line Care  Start: 09/16/24 0725 End: 09/16/24 1125 1125-D/C'd         sodium chloride 0.9 % infusion 40 mL  Dose: 40 mL  Freq: As Needed Route: IV  PRN Reason: Line Care  Start: 09/16/24 1534   Admin Instructions:            thrombin (THROMBIN-JMI) spray kit  Freq: As Needed  Start: 09/16/24 0907 End: 09/16/24 1124 0907 1124-D/C'd

## 2024-09-18 ENCOUNTER — APPOINTMENT (OUTPATIENT)
Dept: GENERAL RADIOLOGY | Facility: HOSPITAL | Age: 64
DRG: 029 | End: 2024-09-18
Payer: COMMERCIAL

## 2024-09-18 LAB
ANION GAP SERPL CALCULATED.3IONS-SCNC: 11 MMOL/L (ref 5–15)
BUN SERPL-MCNC: 12 MG/DL (ref 8–23)
BUN/CREAT SERPL: 23.5 (ref 7–25)
CALCIUM SPEC-SCNC: 8.9 MG/DL (ref 8.6–10.5)
CHLORIDE SERPL-SCNC: 105 MMOL/L (ref 98–107)
CO2 SERPL-SCNC: 24 MMOL/L (ref 22–29)
CREAT SERPL-MCNC: 0.51 MG/DL (ref 0.57–1)
DEPRECATED RDW RBC AUTO: 42.8 FL (ref 37–54)
EGFRCR SERPLBLD CKD-EPI 2021: 105 ML/MIN/1.73
ERYTHROCYTE [DISTWIDTH] IN BLOOD BY AUTOMATED COUNT: 13 % (ref 12.3–15.4)
GLUCOSE BLDC GLUCOMTR-MCNC: 142 MG/DL (ref 70–130)
GLUCOSE BLDC GLUCOMTR-MCNC: 151 MG/DL (ref 70–130)
GLUCOSE BLDC GLUCOMTR-MCNC: 156 MG/DL (ref 70–130)
GLUCOSE SERPL-MCNC: 152 MG/DL (ref 65–99)
HCT VFR BLD AUTO: 31.3 % (ref 34–46.6)
HGB BLD-MCNC: 10.5 G/DL (ref 12–15.9)
MCH RBC QN AUTO: 30.5 PG (ref 26.6–33)
MCHC RBC AUTO-ENTMCNC: 33.5 G/DL (ref 31.5–35.7)
MCV RBC AUTO: 91 FL (ref 79–97)
PLATELET # BLD AUTO: 240 10*3/MM3 (ref 140–450)
PMV BLD AUTO: 9.4 FL (ref 6–12)
POTASSIUM SERPL-SCNC: 4.2 MMOL/L (ref 3.5–5.2)
RBC # BLD AUTO: 3.44 10*6/MM3 (ref 3.77–5.28)
SODIUM SERPL-SCNC: 140 MMOL/L (ref 136–145)
WBC NRBC COR # BLD AUTO: 20.04 10*3/MM3 (ref 3.4–10.8)

## 2024-09-18 PROCEDURE — 25010000002 NICARDIPINE 2.5 MG/ML SOLUTION

## 2024-09-18 PROCEDURE — 25010000002 ENOXAPARIN PER 10 MG: Performed by: NEUROLOGICAL SURGERY

## 2024-09-18 PROCEDURE — 25010000002 HYDROMORPHONE PER 4 MG: Performed by: NEUROLOGICAL SURGERY

## 2024-09-18 PROCEDURE — 97166 OT EVAL MOD COMPLEX 45 MIN: CPT

## 2024-09-18 PROCEDURE — 85027 COMPLETE CBC AUTOMATED: CPT

## 2024-09-18 PROCEDURE — 80048 BASIC METABOLIC PNL TOTAL CA: CPT

## 2024-09-18 PROCEDURE — 25810000003 SODIUM CHLORIDE 0.9 % SOLUTION

## 2024-09-18 PROCEDURE — 25010000002 DEXAMETHASONE PER 1 MG: Performed by: NEUROLOGICAL SURGERY

## 2024-09-18 PROCEDURE — 63710000001 INSULIN REGULAR HUMAN PER 5 UNITS: Performed by: INTERNAL MEDICINE

## 2024-09-18 PROCEDURE — 97535 SELF CARE MNGMENT TRAINING: CPT

## 2024-09-18 PROCEDURE — 72040 X-RAY EXAM NECK SPINE 2-3 VW: CPT

## 2024-09-18 PROCEDURE — 97162 PT EVAL MOD COMPLEX 30 MIN: CPT

## 2024-09-18 PROCEDURE — 99024 POSTOP FOLLOW-UP VISIT: CPT | Performed by: NURSE PRACTITIONER

## 2024-09-18 PROCEDURE — 82948 REAGENT STRIP/BLOOD GLUCOSE: CPT

## 2024-09-18 PROCEDURE — 97530 THERAPEUTIC ACTIVITIES: CPT

## 2024-09-18 RX ORDER — OXYCODONE HCL 5 MG/5 ML
5 SOLUTION, ORAL ORAL EVERY 4 HOURS PRN
Status: DISCONTINUED | OUTPATIENT
Start: 2024-09-18 | End: 2024-09-19

## 2024-09-18 RX ORDER — PANTOPRAZOLE SODIUM 40 MG/10ML
40 INJECTION, POWDER, LYOPHILIZED, FOR SOLUTION INTRAVENOUS EVERY 24 HOURS
Status: DISCONTINUED | OUTPATIENT
Start: 2024-09-18 | End: 2024-09-18

## 2024-09-18 RX ADMIN — INSULIN HUMAN 2 UNITS: 100 INJECTION, SOLUTION PARENTERAL at 12:28

## 2024-09-18 RX ADMIN — AMLODIPINE BESYLATE 5 MG: 5 TABLET ORAL at 20:25

## 2024-09-18 RX ADMIN — ENOXAPARIN SODIUM 40 MG: 100 INJECTION SUBCUTANEOUS at 20:25

## 2024-09-18 RX ADMIN — ACETAMINOPHEN 650 MG: 650 LIQUID ORAL at 15:57

## 2024-09-18 RX ADMIN — GABAPENTIN 200 MG: 100 CAPSULE ORAL at 20:24

## 2024-09-18 RX ADMIN — OXYCODONE HYDROCHLORIDE 5 MG: 5 SOLUTION ORAL at 19:15

## 2024-09-18 RX ADMIN — INSULIN HUMAN 2 UNITS: 100 INJECTION, SOLUTION PARENTERAL at 00:47

## 2024-09-18 RX ADMIN — FLUTICASONE PROPIONATE 2 SPRAY: 50 SPRAY, METERED NASAL at 08:10

## 2024-09-18 RX ADMIN — ACETAMINOPHEN 650 MG: 650 LIQUID ORAL at 12:28

## 2024-09-18 RX ADMIN — ACETAMINOPHEN 650 MG: 650 LIQUID ORAL at 20:24

## 2024-09-18 RX ADMIN — DEXAMETHASONE SODIUM PHOSPHATE 4 MG: 4 INJECTION, SOLUTION INTRAMUSCULAR; INTRAVENOUS at 20:25

## 2024-09-18 RX ADMIN — METFORMIN HYDROCHLORIDE 500 MG: 500 TABLET, EXTENDED RELEASE ORAL at 20:25

## 2024-09-18 RX ADMIN — ATORVASTATIN CALCIUM 40 MG: 20 TABLET, FILM COATED ORAL at 20:25

## 2024-09-18 RX ADMIN — DEXAMETHASONE SODIUM PHOSPHATE 4 MG: 4 INJECTION, SOLUTION INTRAMUSCULAR; INTRAVENOUS at 03:53

## 2024-09-18 RX ADMIN — LANSOPRAZOLE 30 MG: 15 TABLET, ORALLY DISINTEGRATING ORAL at 15:08

## 2024-09-18 RX ADMIN — Medication 10 ML: at 20:26

## 2024-09-18 RX ADMIN — DEXAMETHASONE SODIUM PHOSPHATE 4 MG: 4 INJECTION, SOLUTION INTRAMUSCULAR; INTRAVENOUS at 08:10

## 2024-09-18 RX ADMIN — ACETAMINOPHEN 650 MG: 650 SUPPOSITORY RECTAL at 08:10

## 2024-09-18 RX ADMIN — HYDROMORPHONE HYDROCHLORIDE 0.25 MG: 1 INJECTION, SOLUTION INTRAMUSCULAR; INTRAVENOUS; SUBCUTANEOUS at 08:50

## 2024-09-18 RX ADMIN — OXYCODONE HYDROCHLORIDE 5 MG: 5 SOLUTION ORAL at 23:48

## 2024-09-18 RX ADMIN — OXYCODONE HYDROCHLORIDE 5 MG: 5 SOLUTION ORAL at 10:51

## 2024-09-18 RX ADMIN — OXYCODONE HYDROCHLORIDE 5 MG: 5 SOLUTION ORAL at 14:56

## 2024-09-18 RX ADMIN — HYDROMORPHONE HYDROCHLORIDE 0.25 MG: 1 INJECTION, SOLUTION INTRAMUSCULAR; INTRAVENOUS; SUBCUTANEOUS at 01:00

## 2024-09-18 RX ADMIN — HYDROMORPHONE HYDROCHLORIDE 0.25 MG: 1 INJECTION, SOLUTION INTRAMUSCULAR; INTRAVENOUS; SUBCUTANEOUS at 05:02

## 2024-09-18 RX ADMIN — NICARDIPINE HYDROCHLORIDE 2.5 MG/HR: 25 INJECTION, SOLUTION INTRAVENOUS at 06:39

## 2024-09-18 RX ADMIN — DEXAMETHASONE SODIUM PHOSPHATE 4 MG: 4 INJECTION, SOLUTION INTRAMUSCULAR; INTRAVENOUS at 15:08

## 2024-09-18 NOTE — CONSULTS
Nutrition Services    Patient Name:  Kristina Yates  YOB: 1960  MRN: [id number]  Admit Date:  9/16/2024    Assessment Date:  09/18/24    Summary: Consult for Cortrak Placement and TF Assessment   Pt is a 65 y/o female who underwent anterior cervical corpectomy on 9/16 and has been admitted to the ICU. Pt has a hx of T2DM, GERD, HLD, HTN.   RD placed Cortrak gastric to depth of 70 cm and secured with a nasal septal tie. SLP holding swallow evaluation till later this week. Pt weight appears stable per EMR.     RECS:  Please initiate Diabetisource AC at 10 ml/hr and advance 10 ml q 4 hrs to goal rate of 70 ml/hr. Goal rate to provide 1848 kcals, 92 g/protein, and 1263 ml free water. Please flush 30 ml free water q 4 hrs.     The above end goal rate is for 22 hrs/day to assume interruptions for ADLs. Water flushes adjusted based on clinical picture + Rx flushes/IV fluids     RD to follow    CLINICAL NUTRITION ASSESSMENT      Reason for Assessment Cortrak Placement, Physician Consult, TF Assessment     Diagnosis/Problem   anterior cervical corpectomy on 9/16 and has been admitted to the ICU. Pt has a hx of T2DM, GERD, HLD, HTN.    Medical/Surgical History Past Medical History:   Diagnosis Date    Alcohol abuse     in recovery, sober since 2012    Anxiety     Arthritis     Asthma     Edmonds esophagus     Blurred vision     Cervical disc disease     Cervical neuritis     Chronic pain     NECK AND BACK    COPD (chronic obstructive pulmonary disease)     STATES NEVER DIAGNOSED WITH    Depression     Diabetes mellitus     Type 2    DJD (degenerative joint disease), cervical     Foot spasms     FROM BACK SURGERY    GERD (gastroesophageal reflux disease)     Hiatal hernia     Hyperlipidemia     Hypertension     Persistent headaches     ?BLOOD PRESSURE OR NECK RELATED???    Seasonal allergies     Spinal headache     Spinal stenosis        Past Surgical History:   Procedure Laterality Date    ANTERIOR CERVICAL  "DISCECTOMY W/ FUSION  11/2003    C5-6, C6-7    CERCLAGE CERVIX      CERVICAL EPIDURAL  2007    CERVICAL FUSION      DILATATION AND CURETTAGE      ENDOMETRIAL ABLATION  2007    ENDOSCOPY      MULTIPLE    ENDOSCOPY N/A 07/05/2016    Procedure: ESOPHAGOGASTRODUODENOSCOPY WITH COLD BIOPSIES;  Surgeon: Jose Mcclellan MD;  Location: [facility] ENDOSCOPY;  Service:     ESOPHAGEAL DILATATION  2009    HAND SURGERY Bilateral     CARTILAGE    LUMBAR FUSION      LUMBAR LAMINECTOMY WITH FUSION N/A 06/30/2023    Procedure: OPEN LUMBAR FOUR TO FIVE TRANSFORAMINAL LUMBAR INTERBODY FUSION;  Surgeon: Alex Ortiz MD;  Location: [facility] MAIN OR;  Service: Neurosurgery;  Laterality: N/A;    NISSEN FUNDOPLICATION LAPAROSCOPIC  03/2012    SACROILIAC JOINT FUSION Right 06/04/2024    Procedure: RIGHT PERCUTANEOUS ARTHRODESIS SACROILIAC JOINT, NEUROMONITORING;  Surgeon: Alex Ortiz MD;  Location: [facility] OR;  Service: Neurosurgery;  Laterality: Right;    TONSILLECTOMY          Anthropometrics        Current Height  Current Weight  BMI kg/m2 Height: 168.9 cm (66.5\")  Weight: 75.5 kg (166 lb 7.2 oz) (09/16/24 2006)  Body mass index is 26.47 kg/m².   Adjusted BMI (if applicable)    BMI Category Overweight (25 - 29.9)   Ideal Body Weight (IBW) 133 lbs   Usual Body Weight (UBW) 165 lbs   Weight Trend Stable   Weight History Wt Readings from Last 30 Encounters:   09/16/24 2006 75.5 kg (166 lb 7.2 oz)   09/16/24 2005 75.5 kg (166 lb 7.2 oz)   09/09/24 0642 78.7 kg (173 lb 8 oz)   09/04/24 1144 60 kg (132 lb 3.2 oz)   08/23/24 0826 79.6 kg (175 lb 8 oz)   08/14/24 1321 73.5 kg (162 lb 0.6 oz)   08/14/24 0945 74.8 kg (165 lb)   07/30/24 0921 74.7 kg (164 lb 11.2 oz)   06/19/24 1050 73.5 kg (162 lb)   06/13/24 0827 73.5 kg (162 lb)   05/24/24 0944 74.6 kg (164 lb 6.4 oz)   05/23/24 1057 76.2 kg (168 lb)   05/22/24 0759 73.5 kg (162 lb)   04/17/24 0959 73.5 kg (162 lb)   04/02/24 1402 74.8 kg (164 lb 14.5 oz)   03/20/24 1402 74.8 kg (165 lb) "   03/06/24 0826 70.8 kg (156 lb)   02/05/24 0750 75.8 kg (167 lb)   01/10/24 1401 75.3 kg (166 lb)   12/28/23 0835 75.3 kg (166 lb)   11/06/23 1034 75.5 kg (166 lb 6.4 oz)   09/28/23 0901 70.8 kg (156 lb)   08/03/23 1104 70.8 kg (156 lb)   07/13/23 0901 79.4 kg (175 lb)   06/28/23 1533 79.4 kg (175 lb)   06/28/23 1243 79.4 kg (175 lb)   04/12/23 0217 77.1 kg (170 lb)   12/23/21 2030 79.8 kg (175 lb 14.8 oz)   04/27/21 0736 79.8 kg (176 lb)   04/22/21 1555 80.3 kg (177 lb)   04/06/21 1512 80.3 kg (177 lb)   03/03/21 0910 80.3 kg (177 lb)   06/24/20 1902 79.4 kg (175 lb)        Estimated/Assessed Needs        Energy Requirements    Weight for Calculation 75 kg   Method for Estimation  kcal/kg, other:20-25   EST Needs (kcal/day) 0110-6696       Protein Requirements    Weight for Calculation 75 kg   EST Protein Needs (g/kg) 1.2 - 1.5 gm/kg   EST Daily Needs (g/day)        Fluid Requirements     Method for Estimation 1 mL/kcal    Estimated Needs (mL/day)        Fluid Deficit    Current Na Level (mEq/L)    Desired Na Level (mEq/L)    Estimated Fluid Deficit (L)       Labs       Pertinent Labs    Results from last 7 days   Lab Units 09/18/24  0316 09/17/24  0430   SODIUM mmol/L 140 136   POTASSIUM mmol/L 4.2 3.9   CHLORIDE mmol/L 105 104   CO2 mmol/L 24.0 23.0   BUN mg/dL 12 5*   CREATININE mg/dL 0.51* 0.39*   CALCIUM mg/dL 8.9 9.0   GLUCOSE mg/dL 152* 153*     Results from last 7 days   Lab Units 09/18/24  0316   HEMOGLOBIN g/dL 10.5*   HEMATOCRIT % 31.3*   WBC 10*3/mm3 20.04*     Results from last 7 days   Lab Units 09/18/24  0316 09/17/24  0430   PLATELETS 10*3/mm3 240 267     SARS-CoV-2, JULIO   Date Value Ref Range Status   08/12/2020 Not Detected Not Detected Final     Comment:     This test was developed and its performance characteristics determined  by [employer]. This test has not been FDA cleared or  approved. This test has been authorized by FDA under an Emergency Use  Authorization (EUA). This  test is only authorized for the duration of  time the declaration that circumstances exist justifying the  authorization of the emergency use of in vitro diagnostic tests for  detection of SARS-CoV-2 virus and/or diagnosis of COVID-19 infection  under section 564(b)(1) of the Act, 21 U.S.C. 360bbb-3(b)(1), unless  the authorization is terminated or revoked sooner.  When diagnostic testing is negative, the possibility of a false  negative result should be considered in the context of a patient's  recent exposures and the presence of clinical signs and symptoms  consistent with COVID-19. An individual without symptoms of COVID-19  and who is not shedding SARS-CoV-2 virus would expect to have a  negative (not detected) result in this assay.     Lab Results   Component Value Date    HGBA1C 6.00 (H) 08/03/2023          Medications           Scheduled Medications acetaminophen, 650 mg, Oral, Q4H   Or  acetaminophen, 650 mg, Oral, Q4H   Or  acetaminophen, 650 mg, Rectal, Q4H  amLODIPine, 5 mg, Oral, Nightly  atorvastatin, 40 mg, Oral, Nightly  dexAMETHasone, 4 mg, Intravenous, Q6H  DULoxetine, 30 mg, Oral, Daily  DULoxetine, 60 mg, Oral, Daily  enoxaparin, 40 mg, Subcutaneous, Nightly  fluticasone, 2 spray, Nasal, Daily  gabapentin, 200 mg, Oral, BID  hydroCHLOROthiazide, 12.5 mg, Oral, Daily  insulin regular, 2-7 Units, Subcutaneous, Q6H  lansoprazole, 30 mg, Nasogastric, Q AM  losartan, 100 mg, Oral, Daily  metFORMIN ER, 500 mg, Oral, Nightly  metoprolol succinate XL, 50 mg, Oral, Daily  multivitamin with minerals, 1 tablet, Oral, Daily  senna-docusate sodium, 2 tablet, Oral, BID  sodium chloride, 10 mL, Intravenous, Q12H       Infusions lactated ringers, 9 mL/hr, Last Rate: 9 mL/hr (09/16/24 0733)  niCARdipine, 5-15 mg/hr, Last Rate: Stopped (09/18/24 1032)       PRN Medications   albuterol    senna-docusate sodium **AND** polyethylene glycol **AND** bisacodyl **AND** bisacodyl    cyclobenzaprine    dextrose     dextrose    glucagon (human recombinant)    HYDROmorphone **AND** naloxone    methocarbamol    oxyCODONE    sodium chloride    sodium chloride     Physical Findings          General Findings alert, anxious, oriented, room air   Oral/Mouth Cavity WDL   Edema  no edema   Gastrointestinal last bowel movement: 9/16   Skin  surgical incision: anterior neck   Tubes/Drains/Lines Cortrak, drain, anterior neck bulb, NG tube, bridle in place   NFPE Not indicated at this time   --  Current Nutrition Orders & Evaluation of Intake       Oral Nutrition     Food Allergies NKFA   Current PO Diet No diet orders on file   Supplement n/a   PO Evaluation     % PO Intake NPO    Factors Affecting Intake: swallow impairment     PES STATEMENT / NUTRITION DIAGNOSIS      Nutrition Dx Problem  Problem: Needs Alternative Composition  Etiology: Medical Diagnosis - anterior cervical corpectomy on 9/16 and has been admitted to the ICU. Pt has a hx of T2DM, GERD, HLD, HTN.     Signs/Symptoms: Report/Observation   --  NUTRITION INTERVENTION / PLAN OF CARE      Intervention Goal(s) Maintain nutrition status, Establish goals of care, Meet estimated needs, Initiate feeding/diet, Initiate TF/PN, and Tolerate TF/PN at goal         RD Intervention/Action Await initiation of EN/PN, Continue to monitor, Care plan reviewed, and Recommend/order: EN         Prescription/Orders:       PO Diet       Supplements       Enteral Nutrition    Enteral Prescription:     Enteral Route NG    TF Delivery Method Continuous    Enteral Product Diabetisource AC    Modular None    Propofol Rate/Kcal     TF Start Rate  10 mL/hr    TF Goal Rate  70 mL/hr    Free Water Flush 30 mL Q 4 hr    Provision at Goal:          Calories 1848 kcal, meets 100% needs         Protein  92 gm protein, meets 100% needs         Fluid (mL) 1263 mL free water + 180 mL in flushes         Parenteral Nutrition    New Prescription Ordered? Yes   --      Monitor/Evaluation Per protocol, Pertinent labs,  EN delivery/tolerance, Weight, Skin status, GI status, Symptoms, POC/GOC, Swallow function   Discharge Plan/Needs No discharge needs identified at this time   --    RD to follow per protocol.      Electronically signed by:  Apollo Jay RDN, LD  09/18/24 11:04 EDT

## 2024-09-18 NOTE — PLAN OF CARE
Goal Outcome Evaluation:                   Patient worked with PT.  Blue installed.  TF trickled.  BM x1

## 2024-09-18 NOTE — PROGRESS NOTES
[facility]         Mar/chart reviewed  Follow up critical care management  Significant neck pain, not swallowing well    Vital Sign Min/Max for last 24 hours  Temp  Min: 97.4 °F (36.3 °C)  Max: 98.7 °F (37.1 °C)   BP  Min: 95/82  Max: 157/64   Pulse  Min: 101  Max: 121   Resp  Min: 14  Max: 18   SpO2  Min: 92 %  Max: 100 %   No data recorded   No data recorded     Nad, axox3,   perrl, eomi, no icterus,  mmm, no jvd, trachea midline, neck supple,  chest cta bilaterally, no crackles, no wheezes,   rrr,   soft, nt, nd +bs,  no c/c/e  Skin warm, dry no rashes    Labs: 9/18: reviewed;  Wbc 20  Hgb 10.5  Plts 240  Glucose 152  Bun 12  Cr 0.5  Bicarb 24    A/P:  S/p anterior cervical corpectomy with fusion of the c5 vertebral body with c4-5 injury -- admit to icu , monitor and bp control as per lexx recommendations  Post operative pain control -- prn meds as needed  BP control gtt and prns for parameters as per lexx  DMII -- home meds, ssi, accuchecks ok  Leukocytosis - suspect steroids and reactive  Dysphagia -- temporary feeding tube placement

## 2024-09-18 NOTE — PLAN OF CARE
Goal Outcome Evaluation:              Outcome Evaluation: Discussed patient care with RN, hold swallow evaluation. Cortrak is being placed. Will check back later this week.

## 2024-09-18 NOTE — THERAPY EVALUATION
Patient Name: Kristina Yates  : 1960    MRN: [id number]                              Today's Date: 2024       Admit Date: 2024    Visit Dx: No diagnosis found.  Patient Active Problem List   Diagnosis    Cervical radiculopathy    Controlled type 2 diabetes mellitus without complication, without long-term current use of insulin    Diabetic peripheral neuropathy    Menopausal symptom    Adult acne    Hyperlipidemia    Arthritis    DJD (degenerative joint disease), cervical    Asthma    Spinal stenosis    Chronic pain    COPD (chronic obstructive pulmonary disease)    GERD (gastroesophageal reflux disease)    Alcohol abuse    Edmonds esophagus    Essential hypertension    Low back pain    Cauda equina syndrome with neurogenic bladder    Lumbar back pain    Tobacco abuse    Anxiety and depression    Chondromalacia of knee    IBRAHIMA exposure in utero    Impingement syndrome of right shoulder    Nephrolithiasis    Pes anserine bursitis    SI (sacroiliac) joint inflammation    Lumbar pseudoarthrosis    Cervicalgia    Acute neck pain    Spinal cord injury, cervical, without spinal bone injury     Past Medical History:   Diagnosis Date    Alcohol abuse     in recovery, sober since     Anxiety     Arthritis     Asthma     Edmonds esophagus     Blurred vision     Cervical disc disease     Cervical neuritis     Chronic pain     NECK AND BACK    COPD (chronic obstructive pulmonary disease)     STATES NEVER DIAGNOSED WITH    Depression     Diabetes mellitus     Type 2    DJD (degenerative joint disease), cervical     Foot spasms     FROM BACK SURGERY    GERD (gastroesophageal reflux disease)     Hiatal hernia     Hyperlipidemia     Hypertension     Persistent headaches     ?BLOOD PRESSURE OR NECK RELATED???    Seasonal allergies     Spinal headache     Spinal stenosis      Past Surgical History:   Procedure Laterality Date    ANTERIOR CERVICAL DISCECTOMY W/ FUSION  2003    C5-6, C6-7    CERCLAGE CERVIX       CERVICAL EPIDURAL  2007    CERVICAL FUSION      DILATATION AND CURETTAGE      ENDOMETRIAL ABLATION  2007    ENDOSCOPY      MULTIPLE    ENDOSCOPY N/A 07/05/2016    Procedure: ESOPHAGOGASTRODUODENOSCOPY WITH COLD BIOPSIES;  Surgeon: Jose Mcclellan MD;  Location: [facility] ENDOSCOPY;  Service:     ESOPHAGEAL DILATATION  2009    HAND SURGERY Bilateral     CARTILAGE    LUMBAR FUSION      LUMBAR LAMINECTOMY WITH FUSION N/A 06/30/2023    Procedure: OPEN LUMBAR FOUR TO FIVE TRANSFORAMINAL LUMBAR INTERBODY FUSION;  Surgeon: Alex Ortiz MD;  Location: [facility] OR;  Service: Neurosurgery;  Laterality: N/A;    NISSEN FUNDOPLICATION LAPAROSCOPIC  03/2012    SACROILIAC JOINT FUSION Right 06/04/2024    Procedure: RIGHT PERCUTANEOUS ARTHRODESIS SACROILIAC JOINT, NEUROMONITORING;  Surgeon: Alex Ortiz MD;  Location: [facility] OR;  Service: Neurosurgery;  Laterality: Right;    TONSILLECTOMY        General Information       Row Name 09/18/24 1504          Physical Therapy Time and Intention    Document Type evaluation  -EM     Mode of Treatment co-treatment;physical therapy;occupational therapy;other (see comments)  -EM       Row Name 09/18/24 1504          General Information    Patient Profile Reviewed yes  -EM     Prior Level of Function independent:  -EM     Existing Precautions/Restrictions fall;cervical collar  cervical collar to be on at all times  -EM       Row Name 09/18/24 1504          Living Environment    People in Home spouse  -EM       Row Name 09/18/24 1504          Home Main Entrance    Number of Stairs, Main Entrance four  -EM       Row Name 09/18/24 1504          Stairs Within Home, Primary    Stairs, Within Home, Primary has basement, one step to family room  -EM     Number of Stairs, Within Home, Primary one  -EM       Row Name 09/18/24 1504          Cognition    Orientation Status (Cognition) oriented x 3  -EM       Row Name 09/18/24 1504          Safety Issues, Functional Mobility    Impairments  Affecting Function (Mobility) balance;coordination;endurance/activity tolerance;strength;pain;sensation/sensory awareness  -EM     Comment, Safety Issues/Impairments (Mobility) Co treatment medically appropriate and necessary due to patient acuity level, activity tolerance and safety of patient and staff. Evaluation established to achieve all goals in POC.  -EM               User Key  (r) = Recorded By, (t) = Taken By, (c) = Cosigned By      Initials Name Provider Type    Daysi Clifton PT Physical Therapist                   Mobility       Row Name 09/18/24 1505          Bed Mobility    Bed Mobility rolling left;supine-sit;sit-supine  -EM     Rolling Left [place] (Bed Mobility) minimum assist (75% patient effort);verbal cues  -EM     Supine-Sit [place] (Bed Mobility) moderate assist (50% patient effort);2 person assist  -EM     Assistive Device (Bed Mobility) head of bed elevated;bed rails  -EM       Row Name 09/18/24 1505          Bed-Chair Transfer    Bed-Chair [place] (Transfers) minimum assist (75% patient effort);2 person assist  -EM     Comment, (Bed-Chair Transfer) tsf from bed to BSC, then BSC back to bed  -EM       Row Name 09/18/24 1505          Sit-Stand Transfer    Sit-Stand [place] (Transfers) minimum assist (75% patient effort);2 person assist  -EM       Row Name 09/18/24 1505          Gait/Stairs (Locomotion)    [place] Level (Gait) minimum assist (75% patient effort);moderate assist (50% patient effort);2 person assist  -EM     Distance in Feet (Gait) 5  -EM     Deviations/Abnormal Patterns (Gait) stride length decreased  -EM     Comment, (Gait/Stairs) posterior lean, small steps around from bed to BSC, BSC to bed, able to sidestep up to head of bed about 5 steps  -EM               User Key  (r) = Recorded By, (t) = Taken By, (c) = Cosigned By      Initials Name Provider Type    Daysi Clifton PT Physical Therapist                   Obj/Interventions        Row Name 09/18/24 1507          Range of Motion Comprehensive    General Range of Motion no range of motion deficits identified  -EM       Row Name 09/18/24 1507          Strength Comprehensive (MMT)    General Manual Muscle Testing (MMT) Assessment other (see comments)  -EM     Comment, General Manual Muscle Testing (MMT) Assessment generalized LE weakness noted but able to lift murtaza LEs against gravity, pt subjectively reports her RLE feels weaker than her LLE  -EM       Row Name 09/18/24 1507          Balance    Balance Assessment sitting static balance;sitting dynamic balance;standing static balance;standing dynamic balance  -EM     Static Sitting Balance contact guard  -EM     Dynamic Sitting Balance contact guard  -EM     Position, Sitting Balance sitting edge of bed  -EM     Static Standing Balance minimal assist;2-person assist  -EM     Dynamic Standing Balance moderate assist;2-person assist  -EM       Row Name 09/18/24 1507          Sensory Assessment (Somatosensory)    Sensory Assessment (Somatosensory) other (see comments)  intact to touch on murtaza LEs  -EM               User Key  (r) = Recorded By, (t) = Taken By, (c) = Cosigned By      Initials Name Provider Type    EM Daysi Soriano, PT Physical Therapist                   Goals/Plan       Row Name 09/18/24 1515          Bed Mobility Goal 1 (PT)    Activity/Assistive Device (Bed Mobility Goal 1, PT) bed mobility activities, all  -EM     [place] Level/Cues Needed (Bed Mobility Goal 1, PT) contact guard required  -EM     Time Frame (Bed Mobility Goal 1, PT) 1 week  -EM       Row Name 09/18/24 1515          Transfer Goal 1 (PT)    Activity/Assistive Device (Transfer Goal 1, PT) transfers, all;walker, rolling  -EM     [place] Level/Cues Needed (Transfer Goal 1, PT) minimum assist (75% or more patient effort)  -EM     Time Frame (Transfer Goal 1, PT) 1 week  -EM       Row Name 09/18/24 1515          Gait Training Goal 1 (PT)     "Activity/Assistive Device (Gait Training Goal 1, PT) gait (walking locomotion);walker, rolling  -EM     [place] Level (Gait Training Goal 1, PT) minimum assist (75% or more patient effort)  -EM     Distance (Gait Training Goal 1, PT) 50  -EM     Time Frame (Gait Training Goal 1, PT) 1 week  -EM       Row Name 09/18/24 5734          Therapy Assessment/Plan (PT)    Planned Therapy Interventions (PT) bed mobility training;gait training;patient/family education;transfer training;strengthening  -EM               User Key  (r) = Recorded By, (t) = Taken By, (c) = Cosigned By      Initials Name Provider Type    EM Daysi Soriano, PT Physical Therapist                   Clinical Impression       Row Name 09/18/24 1503          Pain    Pretreatment Pain Rating 8/10  -EM     Posttreatment Pain Rating 8/10  -EM     Pain Location - neck  -EM     Pain Intervention(s) Medication (See MAR);Repositioned  -EM       Row Name 09/18/24 7922          Plan of Care Review    Plan of Care Reviewed With patient  -EM     Outcome Evaluation Pt is 63 yo female admitted to [facility] for C5 corpectomy, discectomy, decompression of spinal cord at C4/5, C6/7 nerve roots, with instrumentation at C4-C6, difficult hardware removal, C4-5 cord injury. PMH significant for ACDF 2003, lumbar laminectomy with fusion 6/2023, SI joint fusion 6/2024, COPD, DM, HTN. patient lives with spouse, independent with mobility prior to admission. Today, patient awake and alert, rates pain at 8/10 level. Patient performed bed mobility with minAx2, sit to stand with minAx2, took a few steps from bed to BSC and then back to bed with min/modAx2. Patient has impairments consisting of generalized post op weakness and pain, decreased activity tolerance, decreased balance and would benefit from skilled PT. d/c recommendation is acute rehab.  -EM       Row Name 09/18/24 1507          Therapy Assessment/Plan (PT)    Patient/Family Therapy Goals Statement (PT) \"I want to get " "better\"  -EM     Rehab Potential (PT) good, to achieve stated therapy goals  -EM     Criteria for Skilled Interventions Met (PT) yes;skilled treatment is necessary  -EM     Therapy Frequency (PT) 6 times/wk  -EM       Row Name 09/18/24 1508          Vital Signs    Pre Systolic BP Rehab 141  -EM     Pre Treatment Diastolic BP 76  -EM     Post Systolic BP Rehab 133  -EM     Post Treatment Diastolic BP 75  -EM     Pretreatment Heart Rate (beats/min) 104  -EM     Pre SpO2 (%) 98  -EM     O2 Delivery Pre Treatment room air  -EM       Row Name 09/18/24 1508          Positioning and Restraints    Pre-Treatment Position in bed  -EM     Post Treatment Position bed  -EM     In Bed supine;call light within reach;exit alarm on;with family/caregiver;notified nsg  -EM               User Key  (r) = Recorded By, (t) = Taken By, (c) = Cosigned By      Initials Name Provider Type    Daysi Clifton, PT Physical Therapist                   Outcome Measures       Row Name 09/18/24 1516 09/18/24 0800       How much help from another person do you currently need...    Turning from your back to your side while in flat bed without using bedrails? 3  -EM 3  -RB    Moving from lying on back to sitting on the side of a flat bed without bedrails? 2  -EM 2  -RB    Moving to and from a bed to a chair (including a wheelchair)? 2  -EM 2  -RB    Standing up from a chair using your arms (e.g., wheelchair, bedside chair)? 2  -EM 2  -RB    Climbing 3-5 steps with a railing? 2  -EM 2  -RB    To walk in hospital room? 2  -EM 2  -RB    AM-PAC 6 Clicks Score (PT) 13  -EM 13  -RB    Highest Level of Mobility Goal 4 --> Transfer to chair/commode  -EM 4 --> Transfer to chair/commode  -RB              User Key  (r) = Recorded By, (t) = Taken By, (c) = Cosigned By      Initials Name Provider Type    Daysi Clifton, PT Physical Therapist    Adams Gamboa RN Registered Nurse                                 Physical Therapy Education  "      Title: PT OT SLP Therapies (In Progress)       Topic: Physical Therapy (In Progress)       Point: Mobility training (Done)       Learning Progress Summary             Patient Acceptance, E, VU by EM at 9/18/2024 1516   Significant Other Acceptance, E, VU by EM at 9/18/2024 1516                         Point: Home exercise program (Not Started)       Learner Progress:  Not documented in this visit.              Point: Body mechanics (Not Started)       Learner Progress:  Not documented in this visit.              Point: Precautions (Not Started)       Learner Progress:  Not documented in this visit.                              User Key       Initials Effective Dates Name Provider Type Discipline    EM 06/16/21 -  Daysi Soriano PT Physical Therapist PT                  PT Recommendation and Plan  Planned Therapy Interventions (PT): bed mobility training, gait training, patient/family education, transfer training, strengthening  Plan of Care Reviewed With: patient  Outcome Evaluation: Pt is 65 yo female admitted to [facility] for C5 corpectomy, discectomy, decompression of spinal cord at C4/5, C6/7 nerve roots, with instrumentation at C4-C6, difficult hardware removal, C4-5 cord injury. PMH significant for ACDF 2003, lumbar laminectomy with fusion 6/2023, SI joint fusion 6/2024, COPD, DM, HTN. patient lives with spouse, independent with mobility prior to admission. Today, patient awake and alert, rates pain at 8/10 level. Patient performed bed mobility with minAx2, sit to stand with minAx2, took a few steps from bed to BSC and then back to bed with min/modAx2. Patient has impairments consisting of generalized post op weakness and pain, decreased activity tolerance, decreased balance and would benefit from skilled PT. d/c recommendation is acute rehab.     Time Calculation:         PT Charges       Row Name 09/18/24 2757             Time Calculation    Start Time 1416  -EM      Stop Time 1439  -EM      Time  Calculation (min) 23 min  -EM      PT Received On 09/18/24  -EM      PT - Next Appointment 09/19/24  -EM      PT Goal Re-Cert Due Date 09/25/24  -EM         Time Calculation- PT    Total Timed Code Minutes- PT 15 minute(s)  -EM         Timed Charges    33835 - PT Therapeutic Activity Minutes 15  -EM         Total Minutes    Timed Charges Total Minutes 15  -EM       Total Minutes 15  -EM                User Key  (r) = Recorded By, (t) = Taken By, (c) = Cosigned By      Initials Name Provider Type    EM Daysi Soriano, PT Physical Therapist                  Therapy Charges for Today       Code Description Service Date Service Provider Modifiers Qty    20093776319 HC PT EVAL MOD COMPLEXITY 3 9/18/2024 Daysi Soriano, PT GP 1    72688680235 HC PT THERAPEUTIC ACT EA 15 MIN 9/18/2024 Daysi Soriano PT GP 1            PT G-Codes  AM-PAC 6 Clicks Score (PT): 13  PT Discharge Summary  Anticipated Discharge Disposition (PT): inpatient rehabilitation facility    Daysi Soriano PT  9/18/2024

## 2024-09-18 NOTE — CASE MANAGEMENT/SOCIAL WORK
Discharge Planning Assessment  [facility]     Patient Name: Kristina Yates  MRN: [id number]  Today's Date: 9/18/2024    Admit Date: 9/16/2024    Plan: TBD; follow for needs   Discharge Needs Assessment       Row Name 09/18/24 1156       Living Environment    People in Home spouse    Current Living Arrangements home    Potentially Unsafe Housing Conditions none    Primary Care Provided by self    Provides Primary Care For no one    Family Caregiver if Needed spouse    Quality of Family Relationships involved;helpful    Able to Return to Prior Arrangements yes       Resource/Environmental Concerns    Resource/Environmental Concerns none       Food Insecurity    Within the past 12 months, the food you bought just didn't last and you didn't have money to get more. Never true       Transition Planning    Patient/Family Anticipates Transition to home with family    Patient/Family Anticipated Services at Transition none    Transportation Anticipated family or friend will provide       Discharge Needs Assessment    Readmission Within the Last 30 Days no previous admission in last 30 days    Equipment Currently Used at Home none    Concerns to be Addressed discharge planning;basic needs                   Discharge Plan       Row Name 09/18/24 1156       Plan    Plan TBD; follow for needs    Patient/Family in Agreement with Plan yes    Plan Comments Spoke with pt bedside in ICU. Pt confirmed facesheet correct. Explained role of CCP. Pt reports she lives with her spouse. She was IADLs prior to admission. She doesn't use any DME. She has used [facility] in past, no SNF or IRF history. Currently has cortrak placed and is on bedrest at this time. CCP will continue to follow and assist with d/c needs.                  Continued Care and Services - Admitted Since 9/16/2024    No active coordination exists for this encounter.       Expected Discharge Date and Time       Expected Discharge Date Expected Discharge Time    Sep 20, 2024             Demographic Summary    No documentation.                  Functional Status    No documentation.                  Psychosocial    No documentation.                  Abuse/Neglect    No documentation.                  Legal    No documentation.                  Substance Abuse    No documentation.                  Patient Forms    No documentation.                     FLORESITA Culver

## 2024-09-18 NOTE — PLAN OF CARE
Goal Outcome Evaluation:  Plan of Care Reviewed With: patient           Outcome Evaluation: Pt is 63 yo female admitted to [facility] for C5 corpectomy, discectomy, decompression of spinal cord at C4/5, C6/7 nerve roots, with instrumentation at C4-C6, difficult hardware removal, C4-5 cord injury. PMH significant for ACDF 2003, lumbar laminectomy with fusion 6/2023, SI joint fusion 6/2024, COPD, DM, HTN. patient lives with spouse, independent with mobility prior to admission. Today, patient awake and alert, rates pain at 8/10 level. Patient performed bed mobility with minAx2, sit to stand with minAx2, took a few steps from bed to BSC and then back to bed with min/modAx2. Patient has impairments consisting of generalized post op weakness and pain, decreased activity tolerance, decreased balance and would benefit from skilled PT. d/c recommendation is acute rehab.      Anticipated Discharge Disposition (PT): inpatient rehabilitation facility

## 2024-09-18 NOTE — PLAN OF CARE
"Goal Outcome Evaluation:  Plan of Care Reviewed With: patient           Outcome Evaluation: Pt is a 64 y.o female admitted to [facility] for CERVICAL FOUR TO SIX DISCECTOMY ANTERIOR WITH FUSION WITH CERVICAL FIVE CORPECTOMY AND REMOVAL OF PREVIOUS HARDWARE 9/16, C4-5 cord injury. PMH significant for ACDF 2003, lumbar laminectomy with fusion 6/2023, SI joint fusion 6/2024, COPD, DM, HTN. Pt is able to transfer to [facility] today with assist of 2. She has decreased sensation in her legs. She reports her R leg feels weaker than L. LE weakness limiting management of her LB clothing and transfers. Pt reports need to have a BM \"pressure\" multiple times during session but not successful with voiding. Mild deficits with LUE digit sensation and coordination. Hopeful for transfer to the chair tomorrow but pt reports in too much pain today as well as dizziness with sitting up. Returned to bed with HOB at 30 degrees. Continued OT recommended to address increase in ADL and transfer independence.      Anticipated Discharge Disposition (OT): inpatient rehabilitation facility                        "

## 2024-09-18 NOTE — PROGRESS NOTES
LOS: 2 days   Patient Care Team:  Nyla Mejia APRN as PCP - General (Family Medicine)  Brooklyn Miles PA as Physician Assistant (Obstetrics and Gynecology)  Pedro Luis Zayas MD as Cardiologist (Cardiology)      Interval History: Doing better. Some mild confusion but more appropriate today.  at bedside. Having difficulty with swallowing likely from esophageal compression during surgery; not unexpected. Will continue steroids.     History taken from: patient chart family    Objective        Vital Signs  Temp:  [97.4 °F (36.3 °C)-98.7 °F (37.1 °C)] 97.4 °F (36.3 °C)  Heart Rate:  [101-121] 109  Resp:  [14-18] 18  BP: ()/(47-94) 124/58    Physical Exam:     AA&O x 3. Voice less hoarse. CorTrak tube in place.   Anterior cervical dressing dry and intact.   GHANSHYAM drain 15 cc; bloody drainage. Aspen collar intact.    Followed commands more readily; moving arm, legs more today.   Raised both arms above head. Lifting legs clearly off of bed.   Reports normal and equal sensation in arms and legs bilaterally.   Bilateral SCDs in place. No calf swelling or tenderness to bilateral palpation.      Results Review:     I reviewed the patient's new clinical results.    .  Results from last 7 days   Lab Units 09/18/24  0316 09/17/24  0430   WBC 10*3/mm3 20.04* 20.02*   HEMOGLOBIN g/dL 10.5* 11.5*   HEMATOCRIT % 31.3* 33.8*   PLATELETS 10*3/mm3 240 267     .  Results from last 7 days   Lab Units 09/18/24  0316 09/17/24  0430   SODIUM mmol/L 140 136   POTASSIUM mmol/L 4.2 3.9   CHLORIDE mmol/L 105 104   CO2 mmol/L 24.0 23.0   BUN mg/dL 12 5*   CREATININE mg/dL 0.51* 0.39*   GLUCOSE mg/dL 152* 153*   CALCIUM mg/dL 8.9 9.0     XR SPINE CERVICAL 2 VW- 9/18/2024    Degenerative changes noted.  The prior C5-C7 anterior plate and screws have been removed.  There is now bilateral screws present at C4 and C7 with plate and screw fixation extending from C4-C7.  C5 and C6 cages are intact.  All hardware intact.  No evidence of  fracture or subluxation.      Assessment & Plan       Spinal cord injury, cervical, without spinal bone injury    Cervical radiculopathy    Cervicalgia      POD 2  anterior C5 corpectomy, discectomy, osteophytectomy, and  decompression of spinal cord and/or C4/5, C6/7 nerve roots with instrumentation C4-C6. Difficult extraction of previous cervical fusion hardware, intraoperative duraplasty, C4/5 cord injury.   Swallowing difficulty - continue steroids  CorTrak in place; start tube feeds per dietician recommendations  Start roxicodone suspension via feeding tube prn pain  DC A-line and MAP parameters  Arm/leg weakness improving; OT/PT to eval; up in chair; continue steroids; continue cervical collar; continue Lovenox  Continue sanders catheter    Cervical MRI pending  Will need acute rehab; await PT/OT evals  Keep in ICU    Heidi Malcolm, DAYNE  09/18/24  08:13 EDT

## 2024-09-18 NOTE — THERAPY EVALUATION
Patient Name: Kristina Yates  : 1960    MRN: [id number]                              Today's Date: 2024       Admit Date: 2024    Visit Dx: No diagnosis found.  Patient Active Problem List   Diagnosis    Cervical radiculopathy    Controlled type 2 diabetes mellitus without complication, without long-term current use of insulin    Diabetic peripheral neuropathy    Menopausal symptom    Adult acne    Hyperlipidemia    Arthritis    DJD (degenerative joint disease), cervical    Asthma    Spinal stenosis    Chronic pain    COPD (chronic obstructive pulmonary disease)    GERD (gastroesophageal reflux disease)    Alcohol abuse    Edmonds esophagus    Essential hypertension    Low back pain    Cauda equina syndrome with neurogenic bladder    Lumbar back pain    Tobacco abuse    Anxiety and depression    Chondromalacia of knee    IBRAHIMA exposure in utero    Impingement syndrome of right shoulder    Nephrolithiasis    Pes anserine bursitis    SI (sacroiliac) joint inflammation    Lumbar pseudoarthrosis    Cervicalgia    Acute neck pain    Spinal cord injury, cervical, without spinal bone injury     Past Medical History:   Diagnosis Date    Alcohol abuse     in recovery, sober since     Anxiety     Arthritis     Asthma     Edmonds esophagus     Blurred vision     Cervical disc disease     Cervical neuritis     Chronic pain     NECK AND BACK    COPD (chronic obstructive pulmonary disease)     STATES NEVER DIAGNOSED WITH    Depression     Diabetes mellitus     Type 2    DJD (degenerative joint disease), cervical     Foot spasms     FROM BACK SURGERY    GERD (gastroesophageal reflux disease)     Hiatal hernia     Hyperlipidemia     Hypertension     Persistent headaches     ?BLOOD PRESSURE OR NECK RELATED???    Seasonal allergies     Spinal headache     Spinal stenosis      Past Surgical History:   Procedure Laterality Date    ANTERIOR CERVICAL DISCECTOMY W/ FUSION  2003    C5-6, C6-7    CERCLAGE CERVIX       CERVICAL EPIDURAL  2007    CERVICAL FUSION      DILATATION AND CURETTAGE      ENDOMETRIAL ABLATION  2007    ENDOSCOPY      MULTIPLE    ENDOSCOPY N/A 07/05/2016    Procedure: ESOPHAGOGASTRODUODENOSCOPY WITH COLD BIOPSIES;  Surgeon: Jose Mcclellan MD;  Location: [facility] ENDOSCOPY;  Service:     ESOPHAGEAL DILATATION  2009    HAND SURGERY Bilateral     CARTILAGE    LUMBAR FUSION      LUMBAR LAMINECTOMY WITH FUSION N/A 06/30/2023    Procedure: OPEN LUMBAR FOUR TO FIVE TRANSFORAMINAL LUMBAR INTERBODY FUSION;  Surgeon: Alex Ortiz MD;  Location: [facility] OR;  Service: Neurosurgery;  Laterality: N/A;    NISSEN FUNDOPLICATION LAPAROSCOPIC  03/2012    SACROILIAC JOINT FUSION Right 06/04/2024    Procedure: RIGHT PERCUTANEOUS ARTHRODESIS SACROILIAC JOINT, NEUROMONITORING;  Surgeon: Alex Ortiz MD;  Location: [facility] OR;  Service: Neurosurgery;  Laterality: Right;    TONSILLECTOMY        General Information       Row Name 09/18/24 1525          OT Time and Intention    Document Type evaluation  -     Mode of Treatment occupational therapy;co-treatment  -       Row Name 09/18/24 1525          General Information    Patient Profile Reviewed yes  -SM     Prior Level of Function independent:;ADL's;all household mobility  -     Existing Precautions/Restrictions fall;cervical collar;brace on at all times  -       Row Name 09/18/24 1525          Living Environment    People in Home spouse  -       Row Name 09/18/24 1525          Home Main Entrance    Number of Stairs, Main Entrance four  -       Row Name 09/18/24 1525          Stairs Within Home, Primary    Stairs, Within Home, Primary basement, one step to family room  -     Number of Stairs, Within Home, Primary one  -       Row Name 09/18/24 1525          Cognition    Orientation Status (Cognition) oriented x 3  -SM       Row Name 09/18/24 1525          Safety Issues, Functional Mobility    Impairments Affecting Function (Mobility)  balance;coordination;endurance/activity tolerance;strength;pain;sensation/sensory awareness  -               User Key  (r) = Recorded By, (t) = Taken By, (c) = Cosigned By      Initials Name Provider Type     Lizy Simmons OT Occupational Therapist                     Mobility/ADL's       Row Name 09/18/24 1527          Bed Mobility    Bed Mobility sit-supine  -     Rolling Left [place] (Bed Mobility) minimum assist (75% patient effort);verbal cues  -     Supine-Sit [place] (Bed Mobility) moderate assist (50% patient effort);2 person assist;verbal cues  -     Sit-Supine [place] (Bed Mobility) moderate assist (50% patient effort);2 person assist;verbal cues  -SM       Row Name 09/18/24 1527          Transfers    Transfers toilet transfer  -SM       Row Name 09/18/24 152          Sit-Stand Transfer    Sit-Stand [place] (Transfers) minimum assist (75% patient effort);2 person assist;verbal cues  -SM       Row Name 09/18/24 1527          Toilet Transfer    [place] Level (Toilet Transfer) minimum assist (75% patient effort);2 person assist  -     Assistive Device (Toilet Transfer) commode, bedside without drop arms  -SM       Row Name 09/18/24 1527          Functional Mobility    Functional Mobility- Comment too unsteady today but able to take side steps bed > BSC> bed. Unsteady  -SM       Row Name 09/18/24 1527          Activities of Daily Living    BADL Assessment/Intervention lower body dressing;toileting;feeding  -SM       Row Name 09/18/24 152          Lower Body Dressing Assessment/Training    [place] Level (Lower Body Dressing) don;socks;dependent (less than 25% patient effort)  -SM       Row Name 09/18/24 1527          Toileting Assessment/Training    [place] Level (Toileting) dependent (less than 25% patient effort)  -SM       Row Name 09/18/24 1527          Self-Feeding Assessment/Training    Comment, (Feeding) NPO but demonstrates hand to mouth movements  with UEs  -SM               User Key  (r) = Recorded By, (t) = Taken By, (c) = Cosigned By      Initials Name Provider Type    Lizy Robison OT Occupational Therapist                   Obj/Interventions       Row Name 09/18/24 1529          Sensory Assessment (Somatosensory)    Sensory Assessment reported decreased sensation BLE, normal sensation in RUE, numbness in L digits. Suspected bowel and bladded decreased sensation per pt reporting difficulty with pressure/awareness if she had a BM today.  -[facility] Name 09/18/24 1529          Range of Motion Comprehensive    Comment, General Range of Motion shoulder flex 3/4 BUE, distal AROM WFL  -[facility] Name 09/18/24 1529          Strength Comprehensive (MMT)    Comment, General Manual Muscle Testing (MMT) Assessment shoulder flex 3-/5,  -SM       Row Name 09/18/24 1529          Motor Skills    Motor Skills coordination  -     Coordination left;upper extremity;minimal impairment;finger to nose  -SM       Row Name 09/18/24 1529          Balance    Static Sitting Balance contact guard  -     Position, Sitting Balance sitting edge of bed  -     Static Standing Balance minimal assist;2-person assist  -     Dynamic Standing Balance moderate assist;2-person assist  -SM               User Key  (r) = Recorded By, (t) = Taken By, (c) = Cosigned By      Initials Name Provider Type    Lizy Robison OT Occupational Therapist                   Goals/Plan       Row Name 09/18/24 1540          Transfer Goal 1 (OT)    Activity/Assistive Device (Transfer Goal 1, OT) toilet;commode, bedside with drop arms  -     [place] Level/Cues Needed (Transfer Goal 1, OT) minimum assist (75% or more patient effort)  -SM     Time Frame (Transfer Goal 1, OT) short term goal (STG);2 weeks  -SM     Progress/Outcome (Transfer Goal 1, OT) goal ongoing  -SM       Row Name 09/18/24 1540          Bathing Goal 1 (OT)    Activity/Device (Bathing Goal 1, OT) bathing skills,  all  -SM     [place] Level/Cues Needed (Bathing Goal 1, OT) minimum assist (75% or more patient effort)  -SM     Time Frame (Bathing Goal 1, OT) short term goal (STG);2 weeks  -SM     Progress/Outcomes (Bathing Goal 1, OT) goal ongoing  -[facility] Name 09/18/24 1540          Dressing Goal 1 (OT)    Activity/Device (Dressing Goal 1, OT) lower body dressing  -SM     [place]/Cues Needed (Dressing Goal 1, OT) minimum assist (75% or more patient effort)  -SM     Time Frame (Dressing Goal 1, OT) short term goal (STG);2 weeks  -SM     Progress/Outcome (Dressing Goal 1, OT) goal ongoing  -[facility] Name 09/18/24 1540          Toileting Goal 1 (OT)    Activity/Device (Toileting Goal 1, OT) toileting skills, all  -SM     [place] Level/Cues Needed (Toileting Goal 1, OT) minimum assist (75% or more patient effort)  -SM     Time Frame (Toileting Goal 1, OT) short term goal (STG);2 weeks  -SM     Progress/Outcome (Toileting Goal 1, OT) goal ongoing  -SM       Row Name 09/18/24 6105          Therapy Assessment/Plan (OT)    Planned Therapy Interventions (OT) activity tolerance training;adaptive equipment training;BADL retraining;functional balance retraining;occupation/activity based interventions;patient/caregiver education/training;ROM/therapeutic exercise;strengthening exercise;transfer/mobility retraining  -               User Key  (r) = Recorded By, (t) = Taken By, (c) = Cosigned By      Initials Name Provider Type     Lizy Simmons, OT Occupational Therapist                   Clinical Impression       Row Name 09/18/24 1066          Pain Assessment    Pretreatment Pain Rating 8/10  -SM     Posttreatment Pain Rating 8/10  -     Pain Location - neck  -     Pain Intervention(s) Repositioned;Rest  -SM       Row Name 09/18/24 6448          Plan of Care Review    Plan of Care Reviewed With patient  -     Outcome Evaluation Pt is a 64 y.o female admitted to [facility] for CERVICAL FOUR TO SIX DISCECTOMY  "ANTERIOR WITH FUSION WITH CERVICAL FIVE CORPECTOMY AND REMOVAL OF PREVIOUS HARDWARE 9/16, C4-5 cord injury. PMH significant for ACDF 2003, lumbar laminectomy with fusion 6/2023, SI joint fusion 6/2024, COPD, DM, HTN. Pt is able to transfer to [facility] today with assist of 2. She has decreased sensation in her legs. She reports her R leg feels weaker than L. LE weakness limiting management of her LB clothing and transfers. Pt reports need to have a BM \"pressure\" multiple times during session but not successful with voiding. Mild deficits with LUE digit sensation and coordination. Hopeful for transfer to the chair tomorrow but pt reports in too much pain today as well as dizziness with sitting up. Returned to bed with HOB at 30 degrees. Continued OT recommended to address increase in ADL and transfer independence.  -       Row Name 09/18/24 153          Therapy Assessment/Plan (OT)    Rehab Potential (OT) good, to achieve stated therapy goals  -     Criteria for Skilled Therapeutic Interventions Met (OT) yes;skilled treatment is necessary  -     Therapy Frequency (OT) 5 times/wk  -       Row Name 09/18/24 1539          Therapy Plan Review/Discharge Plan (OT)    Anticipated Discharge Disposition (OT) inpatient rehabilitation facility  -       Row Name 09/18/24 1539          Vital Signs    Pre Systolic BP Rehab 141  -SM     Pre Treatment Diastolic BP 76  -SM     Intra Systolic BP Rehab 133  -SM     Intra Treatment Diastolic BP 75  -SM     O2 Delivery Pre Treatment room air  -SM     O2 Delivery Intra Treatment room air  -SM     O2 Delivery Post Treatment room air  -SM     Pre Patient Position Supine  -     Intra Patient Position Sitting  -       Row Name 09/18/24 1534          Positioning and Restraints    Pre-Treatment Position in bed  -     Post Treatment Position bed  -SM     In Bed fowlers;encouraged to call for assist;exit alarm on;notified nsg;call light within reach  -               User Key  (r) = " Recorded By, (t) = Taken By, (c) = Cosigned By      Initials Name Provider Type    Lizy Robison OT Occupational Therapist                   Outcome Measures       Row Name 09/18/24 1540          How much help from another is currently needed...    Putting on and taking off regular lower body clothing? 1  -SM     Bathing (including washing, rinsing, and drying) 2  -SM     Toileting (which includes using toilet bed pan or urinal) 1  -SM     Putting on and taking off regular upper body clothing 2  -SM     Taking care of personal grooming (such as brushing teeth) 3  -SM     Eating meals 1  -SM     AM-PAC 6 Clicks Score (OT) 10  -SM       Row Name 09/18/24 1516 09/18/24 0800       How much help from another person do you currently need...    Turning from your back to your side while in flat bed without using bedrails? 3  -EM 3  -RB    Moving from lying on back to sitting on the side of a flat bed without bedrails? 2  -EM 2  -RB    Moving to and from a bed to a chair (including a wheelchair)? 2  -EM 2  -RB    Standing up from a chair using your arms (e.g., wheelchair, bedside chair)? 2  -EM 2  -RB    Climbing 3-5 steps with a railing? 2  -EM 2  -RB    To walk in hospital room? 2  -EM 2  -RB    AM-PAC 6 Clicks Score (PT) 13  -EM 13  -RB    Highest Level of Mobility Goal 4 --> Transfer to chair/commode  -EM 4 --> Transfer to chair/commode  -RB      Row Name 09/18/24 1540          Functional Assessment    Outcome Measure Options AM-PAC 6 Clicks Daily Activity (OT)  -               User Key  (r) = Recorded By, (t) = Taken By, (c) = Cosigned By      Initials Name Provider Type    Daysi Clifton PT Physical Therapist    Adams Gamboa RN Registered Nurse    Lizy Robison OT Occupational Therapist                    Occupational Therapy Education       Title: PT OT SLP Therapies (In Progress)       Topic: Occupational Therapy (In Progress)       Point: ADL training (Done)       Description:    Instruct learner(s) on proper safety adaptation and remediation techniques during self care or transfers.   Instruct in proper use of assistive devices.                  Learning Progress Summary             Patient Acceptance, E, VU by  at 9/18/2024 9297    Comment: OT goals, POC, transfer safety                         Point: Home exercise program (Not Started)       Description:   Instruct learner(s) on appropriate technique for monitoring, assisting and/or progressing therapeutic exercises/activities.                  Learner Progress:  Not documented in this visit.              Point: Precautions (Not Started)       Description:   Instruct learner(s) on prescribed precautions during self-care and functional transfers.                  Learner Progress:  Not documented in this visit.              Point: Body mechanics (Not Started)       Description:   Instruct learner(s) on proper positioning and spine alignment during self-care, functional mobility activities and/or exercises.                  Learner Progress:  Not documented in this visit.                              User Key       Initials Effective Dates Name Provider Type Discipline     04/02/20 -  Lizy Simmons OT Occupational Therapist OT                  OT Recommendation and Plan  Planned Therapy Interventions (OT): activity tolerance training, adaptive equipment training, BADL retraining, functional balance retraining, occupation/activity based interventions, patient/caregiver education/training, ROM/therapeutic exercise, strengthening exercise, transfer/mobility retraining  Therapy Frequency (OT): 5 times/wk  Plan of Care Review  Plan of Care Reviewed With: patient  Outcome Evaluation: Pt is a 64 y.o female admitted to [facility] for CERVICAL FOUR TO SIX DISCECTOMY ANTERIOR WITH FUSION WITH CERVICAL FIVE CORPECTOMY AND REMOVAL OF PREVIOUS HARDWARE 9/16, C4-5 cord injury. PMH significant for ACDF 2003, lumbar laminectomy with fusion 6/2023, SI  "joint fusion 6/2024, COPD, DM, HTN. Pt is able to transfer to [facility] today with assist of 2. She has decreased sensation in her legs. She reports her R leg feels weaker than L. LE weakness limiting management of her LB clothing and transfers. Pt reports need to have a BM \"pressure\" multiple times during session but not successful with voiding. Mild deficits with LUE digit sensation and coordination. Hopeful for transfer to the chair tomorrow but pt reports in too much pain today as well as dizziness with sitting up. Returned to bed with HOB at 30 degrees. Continued OT recommended to address increase in ADL and transfer independence.     Time Calculation:   Evaluation Complexity (OT)  Review Occupational Profile/Medical/Therapy History Complexity: expanded/moderate complexity  Assessment, Occupational Performance/Identification of Deficit Complexity: 3-5 performance deficits  Clinical Decision Making Complexity (OT): detailed assessment/moderate complexity  Overall Complexity of Evaluation (OT): moderate complexity     Time Calculation- OT       Row Name 09/18/24 1541             Time Calculation- OT    OT Start Time 1414  -SM      OT Stop Time 1439  -      OT Time Calculation (min) 25 min  -      Total Timed Code Minutes- OT 15 minute(s)  -SM      OT Received On 09/18/24  -      OT - Next Appointment 09/19/24  -      OT Goal Re-Cert Due Date 10/02/24  -         Timed Charges    93251 - OT Self Care/Mgmt Minutes 15  -SM         Total Minutes    Timed Charges Total Minutes 15  -SM       Total Minutes 15  -SM                User Key  (r) = Recorded By, (t) = Taken By, (c) = Cosigned By      Initials Name Provider Type     Lizy Simmons OT Occupational Therapist                  Therapy Charges for Today       Code Description Service Date Service Provider Modifiers Qty    [id number]  OT SELF CARE/MGMT/TRAIN EA 15 MIN 9/18/2024 Lizy Simmons OT GO 1    [id number]  OT EVAL MOD COMPLEXITY 3 " 9/18/2024 Lizy Simmons, OT GO 1                 Lizy Simmons, CARROLL  9/18/2024

## 2024-09-19 ENCOUNTER — APPOINTMENT (OUTPATIENT)
Dept: MRI IMAGING | Facility: HOSPITAL | Age: 64
DRG: 029 | End: 2024-09-19
Payer: COMMERCIAL

## 2024-09-19 LAB
ANION GAP SERPL CALCULATED.3IONS-SCNC: 11.1 MMOL/L (ref 5–15)
BUN SERPL-MCNC: 16 MG/DL (ref 8–23)
BUN/CREAT SERPL: 31.4 (ref 7–25)
CALCIUM SPEC-SCNC: 9 MG/DL (ref 8.6–10.5)
CHLORIDE SERPL-SCNC: 105 MMOL/L (ref 98–107)
CO2 SERPL-SCNC: 22.9 MMOL/L (ref 22–29)
CREAT SERPL-MCNC: 0.51 MG/DL (ref 0.57–1)
DEPRECATED RDW RBC AUTO: 44.4 FL (ref 37–54)
EGFRCR SERPLBLD CKD-EPI 2021: 105 ML/MIN/1.73
ERYTHROCYTE [DISTWIDTH] IN BLOOD BY AUTOMATED COUNT: 13.1 % (ref 12.3–15.4)
GLUCOSE BLDC GLUCOMTR-MCNC: 154 MG/DL (ref 70–130)
GLUCOSE BLDC GLUCOMTR-MCNC: 165 MG/DL (ref 70–130)
GLUCOSE SERPL-MCNC: 143 MG/DL (ref 65–99)
HCT VFR BLD AUTO: 33.2 % (ref 34–46.6)
HGB BLD-MCNC: 11 G/DL (ref 12–15.9)
MCH RBC QN AUTO: 30.6 PG (ref 26.6–33)
MCHC RBC AUTO-ENTMCNC: 33.1 G/DL (ref 31.5–35.7)
MCV RBC AUTO: 92.5 FL (ref 79–97)
PLATELET # BLD AUTO: 248 10*3/MM3 (ref 140–450)
PMV BLD AUTO: 9.7 FL (ref 6–12)
POTASSIUM SERPL-SCNC: 4.1 MMOL/L (ref 3.5–5.2)
RBC # BLD AUTO: 3.59 10*6/MM3 (ref 3.77–5.28)
SODIUM SERPL-SCNC: 139 MMOL/L (ref 136–145)
WBC NRBC COR # BLD AUTO: 19.26 10*3/MM3 (ref 3.4–10.8)

## 2024-09-19 PROCEDURE — 80048 BASIC METABOLIC PNL TOTAL CA: CPT

## 2024-09-19 PROCEDURE — 63710000001 INSULIN GLARGINE PER 5 UNITS: Performed by: INTERNAL MEDICINE

## 2024-09-19 PROCEDURE — 25010000002 DEXAMETHASONE PER 1 MG: Performed by: NEUROLOGICAL SURGERY

## 2024-09-19 PROCEDURE — 63710000001 INSULIN REGULAR HUMAN PER 5 UNITS: Performed by: INTERNAL MEDICINE

## 2024-09-19 PROCEDURE — 99024 POSTOP FOLLOW-UP VISIT: CPT | Performed by: NURSE PRACTITIONER

## 2024-09-19 PROCEDURE — 63710000001 INSULIN REGULAR HUMAN PER 5 UNITS: Performed by: NURSE PRACTITIONER

## 2024-09-19 PROCEDURE — 82948 REAGENT STRIP/BLOOD GLUCOSE: CPT

## 2024-09-19 PROCEDURE — 97530 THERAPEUTIC ACTIVITIES: CPT

## 2024-09-19 PROCEDURE — 25010000002 LABETALOL 5 MG/ML SOLUTION: Performed by: STUDENT IN AN ORGANIZED HEALTH CARE EDUCATION/TRAINING PROGRAM

## 2024-09-19 PROCEDURE — 97110 THERAPEUTIC EXERCISES: CPT

## 2024-09-19 PROCEDURE — 72141 MRI NECK SPINE W/O DYE: CPT

## 2024-09-19 PROCEDURE — 97535 SELF CARE MNGMENT TRAINING: CPT

## 2024-09-19 PROCEDURE — 25010000002 ENOXAPARIN PER 10 MG: Performed by: NURSE PRACTITIONER

## 2024-09-19 PROCEDURE — 63710000001 DEXAMETHASONE PER 0.25 MG: Performed by: NURSE PRACTITIONER

## 2024-09-19 PROCEDURE — 85027 COMPLETE CBC AUTOMATED: CPT

## 2024-09-19 RX ORDER — BISACODYL 5 MG/1
5 TABLET, DELAYED RELEASE ORAL DAILY PRN
Status: DISCONTINUED | OUTPATIENT
Start: 2024-09-19 | End: 2024-09-23 | Stop reason: HOSPADM

## 2024-09-19 RX ORDER — LABETALOL HYDROCHLORIDE 5 MG/ML
10 INJECTION, SOLUTION INTRAVENOUS EVERY 6 HOURS PRN
Status: DISCONTINUED | OUTPATIENT
Start: 2024-09-19 | End: 2024-09-20

## 2024-09-19 RX ORDER — DEXAMETHASONE 1.5 MG/1
1.5 TABLET ORAL 2 TIMES DAILY WITH MEALS
Status: DISCONTINUED | OUTPATIENT
Start: 2024-09-22 | End: 2024-09-23 | Stop reason: HOSPADM

## 2024-09-19 RX ORDER — POLYETHYLENE GLYCOL 3350 17 G/17G
17 POWDER, FOR SOLUTION ORAL DAILY
Status: DISCONTINUED | OUTPATIENT
Start: 2024-09-19 | End: 2024-09-23 | Stop reason: HOSPADM

## 2024-09-19 RX ORDER — METHOCARBAMOL 500 MG/1
500 TABLET, FILM COATED ORAL EVERY 6 HOURS SCHEDULED
Status: DISCONTINUED | OUTPATIENT
Start: 2024-09-19 | End: 2024-09-22

## 2024-09-19 RX ORDER — AMOXICILLIN 250 MG
2 CAPSULE ORAL 2 TIMES DAILY
Status: DISCONTINUED | OUTPATIENT
Start: 2024-09-19 | End: 2024-09-23 | Stop reason: HOSPADM

## 2024-09-19 RX ORDER — BISACODYL 10 MG
10 SUPPOSITORY, RECTAL RECTAL DAILY PRN
Status: DISCONTINUED | OUTPATIENT
Start: 2024-09-19 | End: 2024-09-23 | Stop reason: HOSPADM

## 2024-09-19 RX ORDER — DEXAMETHASONE 6 MG/1
3 TABLET ORAL 2 TIMES DAILY WITH MEALS
Status: COMPLETED | OUTPATIENT
Start: 2024-09-20 | End: 2024-09-22

## 2024-09-19 RX ORDER — OXYCODONE HCL 5 MG/5 ML
7.5 SOLUTION, ORAL ORAL EVERY 4 HOURS PRN
Status: DISCONTINUED | OUTPATIENT
Start: 2024-09-19 | End: 2024-09-20

## 2024-09-19 RX ADMIN — OXYCODONE HYDROCHLORIDE 7.5 MG: 5 SOLUTION ORAL at 09:50

## 2024-09-19 RX ADMIN — METHOCARBAMOL TABLETS 500 MG: 500 TABLET, COATED ORAL at 18:32

## 2024-09-19 RX ADMIN — Medication 1 TABLET: at 09:35

## 2024-09-19 RX ADMIN — FLUTICASONE PROPIONATE 2 SPRAY: 50 SPRAY, METERED NASAL at 09:36

## 2024-09-19 RX ADMIN — LANSOPRAZOLE 30 MG: 15 TABLET, ORALLY DISINTEGRATING ORAL at 05:32

## 2024-09-19 RX ADMIN — ACETAMINOPHEN 650 MG: 650 LIQUID ORAL at 09:33

## 2024-09-19 RX ADMIN — GABAPENTIN 200 MG: 100 CAPSULE ORAL at 09:35

## 2024-09-19 RX ADMIN — ACETAMINOPHEN 650 MG: 650 LIQUID ORAL at 22:33

## 2024-09-19 RX ADMIN — INSULIN HUMAN 2 UNITS: 100 INJECTION, SOLUTION PARENTERAL at 00:18

## 2024-09-19 RX ADMIN — OXYCODONE HYDROCHLORIDE 7.5 MG: 5 SOLUTION ORAL at 22:34

## 2024-09-19 RX ADMIN — ACETAMINOPHEN 650 MG: 650 LIQUID ORAL at 13:51

## 2024-09-19 RX ADMIN — ACETAMINOPHEN 650 MG: 650 LIQUID ORAL at 18:31

## 2024-09-19 RX ADMIN — DEXAMETHASONE 4.5 MG: 4 TABLET ORAL at 18:32

## 2024-09-19 RX ADMIN — ENOXAPARIN SODIUM 40 MG: 100 INJECTION SUBCUTANEOUS at 20:41

## 2024-09-19 RX ADMIN — DEXAMETHASONE SODIUM PHOSPHATE 4 MG: 4 INJECTION, SOLUTION INTRAMUSCULAR; INTRAVENOUS at 03:25

## 2024-09-19 RX ADMIN — ACETAMINOPHEN 650 MG: 650 LIQUID ORAL at 03:25

## 2024-09-19 RX ADMIN — DEXAMETHASONE SODIUM PHOSPHATE 4 MG: 4 INJECTION, SOLUTION INTRAMUSCULAR; INTRAVENOUS at 09:34

## 2024-09-19 RX ADMIN — OXYCODONE HYDROCHLORIDE 7.5 MG: 5 SOLUTION ORAL at 18:31

## 2024-09-19 RX ADMIN — CYCLOBENZAPRINE HYDROCHLORIDE 10 MG: 10 TABLET, FILM COATED ORAL at 03:25

## 2024-09-19 RX ADMIN — DULOXETINE HYDROCHLORIDE 60 MG: 60 CAPSULE, DELAYED RELEASE ORAL at 09:35

## 2024-09-19 RX ADMIN — HYDROCHLOROTHIAZIDE 12.5 MG: 12.5 TABLET ORAL at 09:34

## 2024-09-19 RX ADMIN — LABETALOL HYDROCHLORIDE 10 MG: 5 INJECTION, SOLUTION INTRAVENOUS at 23:29

## 2024-09-19 RX ADMIN — METOPROLOL SUCCINATE 50 MG: 50 TABLET, FILM COATED, EXTENDED RELEASE ORAL at 09:34

## 2024-09-19 RX ADMIN — LOSARTAN POTASSIUM 100 MG: 100 TABLET, FILM COATED ORAL at 09:34

## 2024-09-19 RX ADMIN — OXYCODONE HYDROCHLORIDE 7.5 MG: 5 SOLUTION ORAL at 13:51

## 2024-09-19 RX ADMIN — DULOXETINE HYDROCHLORIDE 30 MG: 30 CAPSULE, DELAYED RELEASE ORAL at 09:35

## 2024-09-19 RX ADMIN — OXYCODONE HYDROCHLORIDE 5 MG: 5 SOLUTION ORAL at 05:32

## 2024-09-19 RX ADMIN — INSULIN HUMAN 2 UNITS: 100 INJECTION, SOLUTION PARENTERAL at 18:32

## 2024-09-19 RX ADMIN — ACETAMINOPHEN 650 MG: 650 LIQUID ORAL at 00:18

## 2024-09-19 RX ADMIN — GABAPENTIN 200 MG: 100 CAPSULE ORAL at 20:39

## 2024-09-19 RX ADMIN — METHOCARBAMOL TABLETS 500 MG: 500 TABLET, COATED ORAL at 13:51

## 2024-09-19 RX ADMIN — INSULIN GLARGINE 5 UNITS: 100 INJECTION, SOLUTION SUBCUTANEOUS at 09:34

## 2024-09-19 RX ADMIN — Medication 10 ML: at 20:56

## 2024-09-19 RX ADMIN — ATORVASTATIN CALCIUM 40 MG: 20 TABLET, FILM COATED ORAL at 20:41

## 2024-09-19 RX ADMIN — AMLODIPINE BESYLATE 5 MG: 5 TABLET ORAL at 20:41

## 2024-09-19 RX ADMIN — Medication 10 ML: at 09:36

## 2024-09-19 NOTE — PROGRESS NOTES
[facility]         Mar/chart reviewed  Follow up critical care management  Significant neck pain, not swallowing well   asking for more pain meds, strength not bad    Vital Sign Min/Max for last 24 hours  Temp  Min: 97.6 °F (36.4 °C)  Max: 98.8 °F (37.1 °C)   BP  Min: 121/71  Max: 154/79   Pulse  Min: 90  Max: 110   Resp  Min: 14  Max: 18   SpO2  Min: 92 %  Max: 100 %   No data recorded   Weight  Min: 76.8 kg (169 lb 5 oz)  Max: 76.8 kg (169 lb 5 oz)     Nad, axox3,   perrl, eomi, no icterus,  mmm, no jvd, trachea midline, neck supple,  chest cta bilaterally, no crackles, no wheezes,   rrr,   soft, nt, nd +bs,  no c/c/e  Skin warm, dry no rashes    Labs: 9/19: reviewed:  Wbc 19  Hgb 11  Plts 248  Glucose 143  Bun 16  Cr 0.5  Bicarb 22.9    A/P:  S/p anterior cervical corpectomy with fusion of the c5 vertebral body with c4-5 injury -- admit to icu , monitor and bp control as per jacque recommendations  Post operative pain control -- prn meds as needed  BP control gtt and prns for parameters as per jacque  DMII -- home meds, ssi, accuchecks a little high, likely steroid inducted, start a little lantus.  Leukocytosis - suspect steroids and reactive  Dysphagia -- temporary feeding tube placement  Out of unit when ok with JACQUE  Will see prn out of unit

## 2024-09-19 NOTE — DISCHARGE PLACEMENT REQUEST
"Kristina Sheriff (64 y.o. Female)       Date of Birth   1960    Social Security Number       Address   [address] [place] [address]    Home Phone   [phone]    MRN   [id number]       Yazdanism   Bahai    Marital Status                               Admission Date   9/16/24    Admission Type   Elective    Admitting Provider   Alex Ortiz MD    Attending Provider   Alex Ortiz MD    Department, Room/Bed   [facility] INTENSIVE CARE, I379/1       Discharge Date       Discharge Disposition       Discharge Destination                                 Attending Provider: Alex Ortiz MD    Allergies: Fluoxetine, Sulfa Antibiotics, Zofran [Ondansetron Hcl], Lisinopril    Isolation: None   Infection: None   Code Status: CPR    Ht: 168.9 cm (66.5\")   Wt: 76.8 kg (169 lb 5 oz)    Admission Cmt: None   Principal Problem: Spinal cord injury, cervical, without spinal bone injury [S14.109A]                   Active Insurance as of 9/16/2024       Primary Coverage       Payor Plan Insurance Group Employer/Plan Group    [facility] [id number]       Payor Plan Address Payor Plan Phone Number Payor Plan Fax Number Effective Dates    [address]   10/1/2022 - None Entered    [facility] [id number]         Subscriber Name Subscriber Birth Date Member ID       KRISTINA SHERIFF 1960 [id number]                     Emergency Contacts        (Rel.) Home Phone Work Phone Mobile Phone    Kyle Sheriff (Spouse) [phone] -- [phone]    FranciscoKashif rothman (Son) -- -- [phone]          "

## 2024-09-19 NOTE — PLAN OF CARE
Goal Outcome Evaluation:  Plan of Care Reviewed With: (P) patient, spouse           Outcome Evaluation: (P) Pt was awake and alert in bed upon arrival to room. Spouse at bedside. Pt was able to follow commands and agreeable to PT. Pt performed bed mobility supine-sit Min A, STS OOB Min A with rxw, ambulated 120' Min A with rxw. Pt sat in recliner briefly, had to use the restroom with OT then returned to bed. Pt returned to bed sit-supine Min A. Pt continues to benefit from skilled PT. DC recommendation is Luis.

## 2024-09-19 NOTE — PLAN OF CARE
Goal Outcome Evaluation:  Plan of Care Reviewed With: patient, spouse        Progress: improving  Outcome Evaluation: patient participated excellently today w/ vast improvement from previous therapy date today, no longer requiring assist of 2 for fxnl mobility w/ walker and was able to assist w/ LB dressing practice to don socks. Pt does still fatigue w/ tasks, increased neck pain and requires cues for sequence/safety w/ walker mgmt. Pt tolerated walker fxnl mobility up to chair. From Chair >BR commode and requesting back to bedside after increased pain. Pt required education on importance of OOB daily per JACQUE recovery process. Pt v/u. Pt with signficant assist (mod A) for ease down/boost up from BR commode in ICU today. Pt would continue to benefit from OT services w/ DC REC: IRF when medically stable to return to highest level of IND before home w/ spouse.      Anticipated Discharge Disposition (OT): inpatient rehabilitation facility

## 2024-09-19 NOTE — PROGRESS NOTES
"[facility] Clinical Pharmacy Services: Dexamethasone Consult     Kristina Yates has a pharmacy consult to dose dexamethasone per Laverne Knox's request.     Indication: dysphagia s/p C4-5 ACDF    Relevant clinical data and objective history reviewed:  64 y.o. female 168.9 cm (66.5\") 76.8 kg (169 lb 5 oz)  Estimated Creatinine Clearance: 119.4 mL/min (A) (by C-G formula based on SCr of 0.51 mg/dL (L)).    Past Medical History:   Diagnosis Date    Alcohol abuse     in recovery, sober since 2012    Anxiety     Arthritis     Asthma     Edmonds esophagus     Blurred vision     Cervical disc disease     Cervical neuritis     Chronic pain     NECK AND BACK    COPD (chronic obstructive pulmonary disease)     STATES NEVER DIAGNOSED WITH    Depression     Diabetes mellitus     Type 2    DJD (degenerative joint disease), cervical     Foot spasms     FROM BACK SURGERY    GERD (gastroesophageal reflux disease)     Hiatal hernia     Hyperlipidemia     Hypertension     Persistent headaches     ?BLOOD PRESSURE OR NECK RELATED???    Seasonal allergies     Spinal headache     Spinal stenosis      is allergic to fluoxetine, sulfa antibiotics, zofran [ondansetron hcl], and lisinopril.    Assessment/Plan    Will start patient on dexamethasone 4.5 mg BID x 4 doses then 3 mg BID x 4 doses then 1.5 mg BID x 4 doses to complete a 6-day taper, which has been adjusted for any patient specific factors. Pharmacy will continue to follow until completion of therapy or discharge, whichever comes first.    Helena Boston, [facility], located within [facility]  Clinical Pharmacist  "

## 2024-09-19 NOTE — PROGRESS NOTES
[facility] CERVICAL POSTOP NOTE      CC:POD 3 C5 corpectomy C4/5, C6/7 decompression, removal of old hardware and C4-7 instrumentation, duraplasty      Subjective     Interval History:  failed SLP swallow eval- Cortraq placed.  She reports quite a bit of posterior neck pain.  Voice improving.  Denies arm pain and numbness.      Objective     Vital signs in last 24 hours:  Temp:  [97.6 °F (36.4 °C)-98.8 °F (37.1 °C)] 98.8 °F (37.1 °C)  Heart Rate:  [] 101  Resp:  [14-18] 14  BP: (121-154)/() 149/85    Intake/Output this shift:  No intake/output data recorded.    GHANSHYAM 15 cc x 24 hrs    LABS:  Results from last 7 days   Lab Units 09/19/24  0337 09/18/24  0316 09/17/24  0430   WBC 10*3/mm3 19.26* 20.04* 20.02*   HEMOGLOBIN g/dL 11.0* 10.5* 11.5*   HEMATOCRIT % 33.2* 31.3* 33.8*   PLATELETS 10*3/mm3 248 240 267     Results from last 7 days   Lab Units 09/19/24  0337 09/18/24  0316 09/17/24  0430   SODIUM mmol/L 139 140 136   POTASSIUM mmol/L 4.1 4.2 3.9   CHLORIDE mmol/L 105 105 104   CO2 mmol/L 22.9 24.0 23.0   BUN mg/dL 16 12 5*   CREATININE mg/dL 0.51* 0.51* 0.39*   CALCIUM mg/dL 9.0 8.9 9.0   GLUCOSE mg/dL 143* 152* 153*         IMAGING STUDIES:  MRI cervical pending    I personally viewed and interpreted the patient's chart.    Meds reviewed/changed: Yes  Tylenol 650 mg p.o. every 4 hours  Decadron 4 mg IV every 6 hours  Lovenox 40 mg subcu nightly  Gabapentin 200 mg p.o. twice daily  Flexeril 10 mg p.o. 3 times daily as needed-1 dose   Roxicodone 5 mg every 4 hours as needed-5 doses      Physical Exam:    General:   Awake, alert, oriented x3. Speech clear with no aphasia.  Voice with minimal hoarseness  Neck:    incision right anterior well-approximated with no redness drainage swelling.  No hematoma, swallow/trachea midline; [place] collar on but chin continues to slip under.  Appears to be too large; ROM deferred; GHANSHYAM to bulb suction with serosanguineous output  Motor:    5/5 bilateral  upper and lower extremities except triceps 4/5, left thumb extension 4/5, left wrist extension 4/5   reflexes:   No Roy, no clonus  Sensation:   Normal to light touch bilateral upper and lower extremities  Station and Gait:             Per PT note 9/18-performed bed mobility with minAx2, sit to stand with minAx2, took a few steps from bed to BSC and then back to bed with min/modAx2. Patient has impairments consisting of generalized post op weakness and pain, decreased activity tolerance, decreased balance and would benefit from skilled PT.   Extremities:   SCD in place    Assessment & Plan     ASSESSMENT:      Spinal cord injury, cervical, without spinal bone injury    Cervical radiculopathy    Cervicalgia    Patient 3 days status post cervical corpectomy with removal of prior instrumentation and new C4-7 anterior instrumented fusion.  Surgery complicated by hardware failure resulting in durotomy repair and C4/5 spinal cord injury.  Patient doing remarkably well with good movement throughout bilateral upper and lower extremities.  Only mild weakness more so on the left upper extremity.  She denies any dysesthetic pain.  She has typical neck discomfort both anterior from incision and soft tissue manipulation and posterior from positioning and neck spasm.  She is tolerating hard cervical collar, but I believe the [place] is a little too large.  Will have our brace rep evaluate for appropriate fitting collar.  She failed a swallow eval yesterday but is now on IV steroid which we will taper to a p.o. wean over the course of the week.  Her vitals are stable; therefore, will transfer to floor.  Continue therapies and will ask PMR to evaluate for possible inpatient rehab at [facility], [place] or other acute rehab facility recommendations.  With regard to pain, will schedule some Robaxin and continue to use as needed Roxicodone.  MRI cervical spine to be completed today.    PLAN:   TTF  Hard collar all times- change collar for  "fit  Keep GHANSHYAM  Cont therapy, OOB activity, rehab evals  MRI cervical pending  DC IV Dex- change to 6 day Dex luisa  Schedule robaxin  PMR consult for acute rehab-?[facility]  [facility] consult for medical management out of ICU    I discussed the patient's findings and my recommendations with patient, family, nursing staff, and Dr Ortiz    During patient visit, I utilized appropriate personal protective equipment including  gloves.  Appropriate PPE was worn during the entire visit.  Hand hygiene was completed before and after.      LOS: 3 days       Laverne Knox, APRN  9/19/2024  09:04 EDT    \"Dictated utilizing Dragon dictation\".      "

## 2024-09-19 NOTE — CASE MANAGEMENT/SOCIAL WORK
Continued Stay Note  [facility]     Patient Name: Kristina Yates  MRN: [id number]  Today's Date: 9/19/2024    Admit Date: 9/16/2024    Plan: IR referral pending [facility]: will need pre-cert; anticipate family transporting pt   Discharge Plan       Row Name 09/19/24 1607       Plan    Plan IR referral pending [facility]: will need pre-cert; anticipate family transporting pt    Patient/Family in Agreement with Plan yes    Plan Comments Pt discussed in multidisciplinary rounds. Clinicals reviewed. CCP notes orders for pt to downgrade to telemetry level of care, move out of ICU, & to transfer to floor pending bed assignment. CCP met with pt & spouse, Kyle Yates. CCP obtained permission from pt to speak in front of Kyle. CCP reviewed PT & OT notes & recommendations with pt & spouse who both verbalized understanding. Pt reports she has heard [facility] for [facility]. Pt requests 1st choice [facility] & 2nd choice [facility]. DCP report created & Epic referrals placed to [facility]. CCP notified Jacinta H/[facility] & Jacinta R./[facility] re referral. Consult already placed by [facility] for PRM Dr. Us. DC plan IRF referrals pending [facility], anticipate family transporting pt. DC barrier: Cortrak.             Lulú Patel RN

## 2024-09-19 NOTE — THERAPY TREATMENT NOTE
Patient Name: Kristina Yates  : 1960    MRN: [id number]                              Today's Date: 2024       Admit Date: 2024    Visit Dx: No diagnosis found.  Patient Active Problem List   Diagnosis    Cervical radiculopathy    Controlled type 2 diabetes mellitus without complication, without long-term current use of insulin    Diabetic peripheral neuropathy    Menopausal symptom    Adult acne    Hyperlipidemia    Arthritis    DJD (degenerative joint disease), cervical    Asthma    Spinal stenosis    Chronic pain    COPD (chronic obstructive pulmonary disease)    GERD (gastroesophageal reflux disease)    Alcohol abuse    Edmonds esophagus    Essential hypertension    Low back pain    Cauda equina syndrome with neurogenic bladder    Lumbar back pain    Tobacco abuse    Anxiety and depression    Chondromalacia of knee    IBRAHIMA exposure in utero    Impingement syndrome of right shoulder    Nephrolithiasis    Pes anserine bursitis    SI (sacroiliac) joint inflammation    Lumbar pseudoarthrosis    Cervicalgia    Acute neck pain    Spinal cord injury, cervical, without spinal bone injury     Past Medical History:   Diagnosis Date    Alcohol abuse     in recovery, sober since     Anxiety     Arthritis     Asthma     Edmonds esophagus     Blurred vision     Cervical disc disease     Cervical neuritis     Chronic pain     NECK AND BACK    COPD (chronic obstructive pulmonary disease)     STATES NEVER DIAGNOSED WITH    Depression     Diabetes mellitus     Type 2    DJD (degenerative joint disease), cervical     Foot spasms     FROM BACK SURGERY    GERD (gastroesophageal reflux disease)     Hiatal hernia     Hyperlipidemia     Hypertension     Persistent headaches     ?BLOOD PRESSURE OR NECK RELATED???    Seasonal allergies     Spinal headache     Spinal stenosis      Past Surgical History:   Procedure Laterality Date    ANTERIOR CERVICAL DISCECTOMY W/ FUSION  2003    C5-6, C6-7    CERCLAGE CERVIX       CERVICAL EPIDURAL  2007    CERVICAL FUSION      DILATATION AND CURETTAGE      ENDOMETRIAL ABLATION  2007    ENDOSCOPY      MULTIPLE    ENDOSCOPY N/A 07/05/2016    Procedure: ESOPHAGOGASTRODUODENOSCOPY WITH COLD BIOPSIES;  Surgeon: Jose Mcclellan MD;  Location[facility] ENDOSCOPY;  Service:     ESOPHAGEAL DILATATION  2009    HAND SURGERY Bilateral     CARTILAGE    LUMBAR FUSION      LUMBAR LAMINECTOMY WITH FUSION N/A 06/30/2023    Procedure: OPEN LUMBAR FOUR TO FIVE TRANSFORAMINAL LUMBAR INTERBODY FUSION;  Surgeon: Alex Ortiz MD;  Location: [facility];  Service: Neurosurgery;  Laterality: N/A;    NISSEN FUNDOPLICATION LAPAROSCOPIC  03/2012    SACROILIAC JOINT FUSION Right 06/04/2024    Procedure: RIGHT PERCUTANEOUS ARTHRODESIS SACROILIAC JOINT, NEUROMONITORING;  Surgeon: Alex Ortiz MD;  Location: [facility];  Service: Neurosurgery;  Laterality: Right;    TONSILLECTOMY        General Information       Row Name 09/19/24 1515          OT Time and Intention    Mode of Treatment occupational therapy;co-treatment  -BC       Row Name 09/19/24 1515          General Information    Patient Profile Reviewed yes  -BC     Existing Precautions/Restrictions fall;cervical collar;brace on at all times  -BC       Row Name 09/19/24 1515          Cognition    Orientation Status (Cognition) oriented x 3  -BC       Row Name 09/19/24 1515          Safety Issues, Functional Mobility    Impairments Affecting Function (Mobility) balance;coordination;endurance/activity tolerance;strength;pain;sensation/sensory awareness  -BC     Comment, Safety Issues/Impairments (Mobility) PT/OT cotreatment medically appropriate and necessary due to patient acuity level, to maximize therapeutic benefit and due to concern for active tolerance. improved function no longer requiring cotx next POC date. Upon transition back to EOB following activities, rep for [place] collar present to switch collar out. assist w/ remove/don with rep present for Pt.   -BC               User Key  (r) = Recorded By, (t) = Taken By, (c) = Cosigned By      Initials Name Provider Type    BC Hari Ceballos OT Occupational Therapist                     Mobility/ADL's       Row Name 09/19/24 1516          Bed Mobility    Bed Mobility sit-supine  -BC     Rolling Left [place] (Bed Mobility) minimum assist (75% patient effort);verbal cues  -BC     Supine-Sit [place] (Bed Mobility) verbal cues;minimum assist (75% patient effort);1 person assist  -BC     Sit-Supine [place] (Bed Mobility) minimum assist (75% patient effort);2 person assist  -BC     Assistive Device (Bed Mobility) head of bed elevated;bed rails  -BC       Row Name 09/19/24 1516          Transfers    Transfers toilet transfer;bed-chair transfer;stand-sit transfer;sit-stand transfer  -BC       Row Name 09/19/24 1516          Bed-Chair Transfer    Bed-Chair [place] (Transfers) minimum assist (75% patient effort);1 person assist;1 person to manage equipment  -BC     Assistive Device (Bed-Chair Transfers) walker, front-wheeled  -BC     Comment, (Bed-Chair Transfer) fxnl mobility with walker around unit w/ chair follow. Min Ax1, increased to mod AX1 as Pt fatigued. Another initially with hands on assist but quickly progressed to one person assistance. /did not require two hands on.  -BC       Row Name 09/19/24 1516          Sit-Stand Transfer    Sit-Stand [place] (Transfers) minimum assist (75% patient effort);verbal cues;moderate assist (50% patient effort)  -BC     Assistive Device (Sit-Stand Transfers) walker, front-wheeled  -BC       Row Name 09/19/24 1516          Stand-Sit Transfer    Stand-Sit [place] (Transfers) minimum assist (75% patient effort);1 person assist  -BC     Assistive Device (Stand-Sit Transfers) walker, front-wheeled  -BC       Row Name 09/19/24 1516          Toilet Transfer    [place] Level (Toilet Transfer) 1 person assist;moderate assist (50% patient effort)  -BC      Assistive Device (Toilet Transfer) commode;walker, front-wheeled  -BC     Comment, (Toilet Transfer) following fxnl mobility pt backing up to BR commode getting extremely anxious and needing max verbal, tactile aides for safe descent onto commode with mod A. Sitting on commode and Pt immediately ready to stand back up, reports urgency has gone, mod A for boost up from standard height of commode. Some dizziness once sitting. Cues for ankle pumps, knee ext for mgmt  -BC       Row Name 09/19/24 1516          Functional Mobility    Functional Mobility- Ind. Level minimum assist (75% patient effort);moderate assist (50% patient effort)  -BC     Functional Mobility- Device walker, front-wheeled  -BC     Functional Mobility- Comment min Ax1 overall around unit for fxnl mobility ~125 feet. increased assistance to mod A at end of mobility as fatigued.  -BC     Patient was able to Ambulate yes  -BC       Row Name 09/19/24 1516          Activities of Daily Living    BADL Assessment/Intervention lower body dressing;toileting  -BC       Row Name 09/19/24 1516          Lower Body Dressing Assessment/Training    [place] Level (Lower Body Dressing) don;socks;verbal cues;nonverbal cues (demo/gesture);minimum assist (75% patient effort)  -BC     Position (Lower Body Dressing) edge of bed sitting  -BC     Comment, (Lower Body Dressing) figure four, some help w/ RLE  -BC       Row Name 09/19/24 1516          Toileting Assessment/Training    [place] Level (Toileting) maximum assist (25% patient effort);adjust/manage clothing;change pad/brief  -BC     Position (Toileting) supported standing;supported sitting  -BC               User Key  (r) = Recorded By, (t) = Taken By, (c) = Cosigned By      Initials Name Provider Type    BC Hari Ceballos OT Occupational Therapist                   Obj/Interventions       Row Name 09/19/24 1522          Shoulder (Therapeutic Exercise)    Shoulder (Therapeutic Exercise) AROM (active range  of motion)  -BC     Shoulder AROM (Therapeutic Exercise) bilateral;flexion;aBduction  -BC       Row Name 09/19/24 1522          Elbow/Forearm (Therapeutic Exercise)    Elbow/Forearm (Therapeutic Exercise) AROM (active range of motion)  -BC     Elbow/Forearm AROM (Therapeutic Exercise) bilateral;flexion;extension  -BC       Row Name 09/19/24 1522          Motor Skills    Therapeutic Exercise shoulder;elbow/forearm  -BC       Row Name 09/19/24 1522          Balance    Balance Assessment sitting dynamic balance;sitting static balance;standing static balance;standing dynamic balance  -BC     Static Sitting Balance supervision  -BC     Dynamic Sitting Balance supervision;contact guard  -BC     Position, Sitting Balance sitting edge of bed;unsupported  -BC     Static Standing Balance minimal assist;1-person assist;verbal cues  -BC     Dynamic Standing Balance moderate assist;1-person assist  -BC     Position/Device Used, Standing Balance supported;walker, front-wheeled  -BC     Balance Interventions standing;occupation based/functional task  -BC               User Key  (r) = Recorded By, (t) = Taken By, (c) = Cosigned By      Initials Name Provider Type    BC Hari Ceballos OT Occupational Therapist                   Goals/Plan    No documentation.                  Clinical Impression       [facility] Name 09/19/24 1523          Pain Assessment    Pretreatment Pain Rating 6/10  -BC     Posttreatment Pain Rating 6/10  -BC     Pre/Posttreatment Pain Comment c/o neck/back pain, RN present and notified, repositioning and mvmts  -BC     Pain Intervention(s) Repositioned  -BC       Row Name 09/19/24 1523          Plan of Care Review    Plan of Care Reviewed With patient;spouse  -BC     Progress improving  -BC     Outcome Evaluation patient participated excellently today w/ vast improvement from previous therapy date today, no longer requiring assist of 2 for fxnl mobility w/ walker and was able to assist w/ LB dressing practice to  don socks. Pt does still fatigue w/ tasks, increased neck pain and requires cues for sequence/safety w/ walker mgmt. Pt tolerated walker fxnl mobility up to chair. From Chair >BR commode and requesting back to bedside after increased pain. Pt required education on importance of OOB daily per JACQUE recovery process. Pt v/u. Pt with signficant assist (mod A) for ease down/boost up from BR commode in ICU today. Pt would continue to benefit from OT services w/ DC REC: IRF when medically stable to return to highest level of IND before home w/ spouse.  -BC       Row Name 09/19/24 1523          Therapy Assessment/Plan (OT)    Rehab Potential (OT) good, to achieve stated therapy goals  -BC     Criteria for Skilled Therapeutic Interventions Met (OT) yes;skilled treatment is necessary  -BC       Row Name 09/19/24 1523          Therapy Plan Review/Discharge Plan (OT)    Anticipated Discharge Disposition (OT) inpatient rehabilitation facility  -BC       Row Name 09/19/24 1523          Vital Signs    O2 Delivery Pre Treatment room air  -BC     O2 Delivery Intra Treatment room air  -BC     O2 Delivery Post Treatment room air  -BC     Pre Patient Position Supine  -BC     Intra Patient Position Standing  -BC     Post Patient Position Supine  -BC       Row Name 09/19/24 1523          Positioning and Restraints    Pre-Treatment Position in bed  -BC     Post Treatment Position bed  -BC     In Bed fowlers;patient within staff view;exit alarm on;encouraged to call for assist;with nsg;side rails up x3;SCD pump applied  -BC               User Key  (r) = Recorded By, (t) = Taken By, (c) = Cosigned By      Initials Name Provider Type    Hari Haley, CARROLL Occupational Therapist                   Outcome Measures       Row Name 09/19/24 1527          How much help from another is currently needed...    Putting on and taking off regular lower body clothing? 2  -BC     Bathing (including washing, rinsing, and drying) 2  -BC     Toileting  (which includes using toilet bed pan or urinal) 1  -BC     Putting on and taking off regular upper body clothing 2  -BC     Taking care of personal grooming (such as brushing teeth) 3  -BC     Eating meals 1  -BC     AM-PAC 6 Clicks Score (OT) 11  -BC       Row Name 09/19/24 1422 09/19/24 0800       How much help from another person do you currently need...    Turning from your back to your side while in flat bed without using bedrails? 3 (P)   -DG 4  -WH    Moving from lying on back to sitting on the side of a flat bed without bedrails? 3 (P)   -DG 3  -WH    Moving to and from a bed to a chair (including a wheelchair)? 3 (P)   -DG 3  -WH    Standing up from a chair using your arms (e.g., wheelchair, bedside chair)? 3 (P)   -DG 3  -WH    Climbing 3-5 steps with a railing? 2 (P)   -DG 2  -WH    To walk in hospital room? 3 (P)   -DG 3  -WH    AM-PAC 6 Clicks Score (PT) 17 (P)   -DG 18  -WH    Highest Level of Mobility Goal 5 --> Static standing (P)   -DG 6 --> Walk 10 steps or more  -      Row Name 09/19/24 1527          Functional Assessment    Outcome Measure Options AM-PAC 6 Clicks Daily Activity (OT)  -BC               User Key  (r) = Recorded By, (t) = Taken By, (c) = Cosigned By      Initials Name Provider Type     Des Sharpe, RN Registered Nurse    Hari Haley, OT Occupational Therapist    Jung Gregorio, PT Student PT Student                    Occupational Therapy Education       Title: PT OT SLP Therapies (In Progress)       Topic: Occupational Therapy (In Progress)       Point: ADL training (Done)       Description:   Instruct learner(s) on proper safety adaptation and remediation techniques during self care or transfers.   Instruct in proper use of assistive devices.                  Learning Progress Summary             Patient Acceptance, E, VU by  at 9/18/2024 5921    Comment: OT goals, POC, transfer safety                         Point: Home exercise program (Not Started)        Description:   Instruct learner(s) on appropriate technique for monitoring, assisting and/or progressing therapeutic exercises/activities.                  Learner Progress:  Not documented in this visit.              Point: Precautions (Not Started)       Description:   Instruct learner(s) on prescribed precautions during self-care and functional transfers.                  Learner Progress:  Not documented in this visit.              Point: Body mechanics (Not Started)       Description:   Instruct learner(s) on proper positioning and spine alignment during self-care, functional mobility activities and/or exercises.                  Learner Progress:  Not documented in this visit.                              User Key       Initials Effective Dates Name Provider Type Discipline     04/02/20 -  Lizy Simmons OT Occupational Therapist OT                  OT Recommendation and Plan     Plan of Care Review  Plan of Care Reviewed With: patient, spouse  Progress: improving  Outcome Evaluation: patient participated excellently today w/ vast improvement from previous therapy date today, no longer requiring assist of 2 for fxnl mobility w/ walker and was able to assist w/ LB dressing practice to don socks. Pt does still fatigue w/ tasks, increased neck pain and requires cues for sequence/safety w/ walker mgmt. Pt tolerated walker fxnl mobility up to chair. From Chair >BR commode and requesting back to bedside after increased pain. Pt required education on importance of OOB daily per JACQUE recovery process. Pt v/u. Pt with signficant assist (mod A) for ease down/boost up from BR commode in ICU today. Pt would continue to benefit from OT services w/ DC REC: IRF when medically stable to return to highest level of IND before home w/ spouse.     Time Calculation:         Time Calculation- OT       Row Name 09/19/24 1528             Time Calculation- OT    OT Start Time 1333  -BC      OT Stop Time 1406  -BC      OT Time  Calculation (min) 33 min  -BC      Total Timed Code Minutes- OT 33 minute(s)  -BC      OT Received On 09/19/24  -BC      OT - Next Appointment 09/20/24  -BC         Timed Charges    94084 - OT Therapeutic Activity Minutes 18  -BC      69105 - OT Self Care/Mgmt Minutes 15  -BC         Total Minutes    Timed Charges Total Minutes 33  -BC       Total Minutes 33  -BC                User Key  (r) = Recorded By, (t) = Taken By, (c) = Cosigned By      Initials Name Provider Type    BC Hari Ceballos OT Occupational Therapist                  Therapy Charges for Today       Code Description Service Date Service Provider Modifiers Qty    49818294515  OT THERAPEUTIC ACT EA 15 MIN 9/19/2024 Hari Ceballos OT GO 1    [id number] HC OT SELF CARE/MGMT/TRAIN EA 15 MIN 9/19/2024 Hari Ceballos OT GO 1                 Hari Ceballos OT  9/19/2024

## 2024-09-19 NOTE — THERAPY TREATMENT NOTE
Patient Name: Kristina Yates  : 1960    MRN: [id number]                              Today's Date: 2024       Admit Date: 2024    Visit Dx: No diagnosis found.  Patient Active Problem List   Diagnosis    Cervical radiculopathy    Controlled type 2 diabetes mellitus without complication, without long-term current use of insulin    Diabetic peripheral neuropathy    Menopausal symptom    Adult acne    Hyperlipidemia    Arthritis    DJD (degenerative joint disease), cervical    Asthma    Spinal stenosis    Chronic pain    COPD (chronic obstructive pulmonary disease)    GERD (gastroesophageal reflux disease)    Alcohol abuse    Edmonds esophagus    Essential hypertension    Low back pain    Cauda equina syndrome with neurogenic bladder    Lumbar back pain    Tobacco abuse    Anxiety and depression    Chondromalacia of knee    IBRAHIMA exposure in utero    Impingement syndrome of right shoulder    Nephrolithiasis    Pes anserine bursitis    SI (sacroiliac) joint inflammation    Lumbar pseudoarthrosis    Cervicalgia    Acute neck pain    Spinal cord injury, cervical, without spinal bone injury     Past Medical History:   Diagnosis Date    Alcohol abuse     in recovery, sober since     Anxiety     Arthritis     Asthma     Edmonds esophagus     Blurred vision     Cervical disc disease     Cervical neuritis     Chronic pain     NECK AND BACK    COPD (chronic obstructive pulmonary disease)     STATES NEVER DIAGNOSED WITH    Depression     Diabetes mellitus     Type 2    DJD (degenerative joint disease), cervical     Foot spasms     FROM BACK SURGERY    GERD (gastroesophageal reflux disease)     Hiatal hernia     Hyperlipidemia     Hypertension     Persistent headaches     ?BLOOD PRESSURE OR NECK RELATED???    Seasonal allergies     Spinal headache     Spinal stenosis      Past Surgical History:   Procedure Laterality Date    ANTERIOR CERVICAL DISCECTOMY W/ FUSION  2003    C5-6, C6-7    CERCLAGE CERVIX       CERVICAL EPIDURAL  2007    CERVICAL FUSION      DILATATION AND CURETTAGE      ENDOMETRIAL ABLATION  2007    ENDOSCOPY      MULTIPLE    ENDOSCOPY N/A 07/05/2016    Procedure: ESOPHAGOGASTRODUODENOSCOPY WITH COLD BIOPSIES;  Surgeon: Jose Mcclellan MD;  Location: [facility] ENDOSCOPY;  Service:     ESOPHAGEAL DILATATION  2009    HAND SURGERY Bilateral     CARTILAGE    LUMBAR FUSION      LUMBAR LAMINECTOMY WITH FUSION N/A 06/30/2023    Procedure: OPEN LUMBAR FOUR TO FIVE TRANSFORAMINAL LUMBAR INTERBODY FUSION;  Surgeon: Alex Ortiz MD;  Location: [facility] OR;  Service: Neurosurgery;  Laterality: N/A;    NISSEN FUNDOPLICATION LAPAROSCOPIC  03/2012    SACROILIAC JOINT FUSION Right 06/04/2024    Procedure: RIGHT PERCUTANEOUS ARTHRODESIS SACROILIAC JOINT, NEUROMONITORING;  Surgeon: Alex Ortiz MD;  Location: [facility];  Service: Neurosurgery;  Laterality: Right;    TONSILLECTOMY        General Information       Row Name 09/19/24 1411          Physical Therapy Time and Intention    Document Type therapy note (daily note) (P)   -DG     Mode of Treatment occupational therapy;physical therapy;co-treatment (P)   -DG       Row Name 09/19/24 1411          General Information    Patient Profile Reviewed yes (P)   -DG     Existing Precautions/Restrictions fall;cervical collar;brace on at all times (P)   -DG               User Key  (r) = Recorded By, (t) = Taken By, (c) = Cosigned By      Initials Name Provider Type    Jung Gregorio, PT Student PT Student                   Mobility       Row Name 09/19/24 1412          Bed Mobility    Supine-Sit Cowley (Bed Mobility) minimum assist (75% patient effort) (P)   -DG     Sit-Supine Cowley (Bed Mobility) minimum assist (75% patient effort) (P)   -DG     Assistive Device (Bed Mobility) draw sheet (P)   -DG       Row Name 09/19/24 1412          Sit-Stand Transfer    Sit-Stand Cowley (Transfers) minimum assist (75% patient effort) (P)   -DG     Assistive Device  (Sit-Stand Transfers) walker, front-wheeled (P)   -DG       Row Name 09/19/24 1412          Gait/Stairs (Locomotion)    Watson Level (Gait) minimum assist (75% patient effort) (P)   -DG     Assistive Device (Gait) walker, front-wheeled (P)   -DG     Distance in Feet (Gait) 120 (P)   -DG     Deviations/Abnormal Patterns (Gait) base of support, narrow;stride length decreased;gait speed decreased;joe decreased (P)   -DG     Comment, (Gait/Stairs) -- (P)   -DG               User Key  (r) = Recorded By, (t) = Taken By, (c) = Cosigned By      Initials Name Provider Type    Jung Gregorio, PT Student PT Student                   Obj/Interventions       Row Name 09/19/24 1414          Motor Skills    Therapeutic Exercise -- (P)   At EOB, performed ankle pumps x10e reps, LAQ x10e reps.  -DG       Row Name 09/19/24 1414          Balance    Static Sitting Balance standby assist (P)   -DG     Dynamic Sitting Balance standby assist (P)   -DG     Static Standing Balance minimal assist (P)   -DG     Dynamic Standing Balance minimal assist;moderate assist (P)   -DG     Position/Device Used, Standing Balance walker, front-wheeled (P)   -DG               User Key  (r) = Recorded By, (t) = Taken By, (c) = Cosigned By      Initials Name Provider Type    Jung Gregorio, PT Student PT Student                   Goals/Plan    No documentation.                  Clinical Impression       Row Name 09/19/24 1417          Pain    Pretreatment Pain Rating 6/10 (P)   -DG     Posttreatment Pain Rating 6/10 (P)   -DG     Pain Location - neck (P)   -DG     Pre/Posttreatment Pain Comment Pt stated having 6/10 neck pain. (P)   -DG     Pain Intervention(s) Medication (See MAR);Repositioned (P)   -DG       Row Name 09/19/24 1417          Plan of Care Review    Plan of Care Reviewed With patient;spouse (P)   -DG     Outcome Evaluation Pt was awake and alert in bed upon arrival to room. Spouse at bedside. Pt was able to follow commands and  agreeable to PT. Pt performed bed mobility supine-sit Min A, STS OOB Min A with rxw, ambulated 120' Min A with rxw. Pt sat in recliner briefly, had to use the restroom with OT then returned to bed. Pt returned to bed sit-supine Min A. Pt continues to benefit from skilled PT. DC recommendation is Luis. (P)   -DG       Row Name 09/19/24 1417          Positioning and Restraints    Pre-Treatment Position in bed (P)   -DG     Post Treatment Position bed (P)   -DG     In Bed call light within reach;encouraged to call for assist;exit alarm on;with family/caregiver;with nsg (P)   -DG               User Key  (r) = Recorded By, (t) = Taken By, (c) = Cosigned By      Initials Name Provider Type    Jung Gregorio, PT Student PT Student                   Outcome Measures       Row Name 09/19/24 1422 09/19/24 0800       How much help from another person do you currently need...    Turning from your back to your side while in flat bed without using bedrails? 3 (P)   -DG 4  -WH    Moving from lying on back to sitting on the side of a flat bed without bedrails? 3 (P)   -DG 3  -WH    Moving to and from a bed to a chair (including a wheelchair)? 3 (P)   -DG 3  -WH    Standing up from a chair using your arms (e.g., wheelchair, bedside chair)? 3 (P)   -DG 3  -WH    Climbing 3-5 steps with a railing? 2 (P)   -DG 2  -WH    To walk in hospital room? 3 (P)   -DG 3  -WH    AM-PAC 6 Clicks Score (PT) 17 (P)   -DG 18  -WH    Highest Level of Mobility Goal 5 --> Static standing (P)   -DG 6 --> Walk 10 steps or more  -      Row Name 09/19/24 1527          Functional Assessment    Outcome Measure Options AM-PAC 6 Clicks Daily Activity (OT)  -BC               User Key  (r) = Recorded By, (t) = Taken By, (c) = Cosigned By      Initials Name Provider Type     Des Sharpe, RN Registered Nurse    Hari Haley, OT Occupational Therapist    Jung Gregorio, PT Student PT Student                                 Physical Therapy  Education       Title: PT OT SLP Therapies (In Progress)       Topic: Physical Therapy (In Progress)       Point: Mobility training (Done)       Learning Progress Summary             Patient Acceptance, E, VU by DG at 9/19/2024 1423    Acceptance, E, VU by EM at 9/18/2024 1516   Significant Other Acceptance, E, VU by DG at 9/19/2024 1423    Acceptance, E, VU by EM at 9/18/2024 1516                         Point: Home exercise program (Not Started)       Learner Progress:  Not documented in this visit.              Point: Body mechanics (Not Started)       Learner Progress:  Not documented in this visit.              Point: Precautions (Done)       Learning Progress Summary             Patient Acceptance, E, VU by DG at 9/19/2024 1423   Significant Other Acceptance, E, VU by DG at 9/19/2024 1423                                         User Key       Initials Effective Dates Name Provider Type Discipline    EM 06/16/21 -  Daysi Soriano, PT Physical Therapist PT     08/22/24 -  Jung Leary PT Student PT Student PT                  PT Recommendation and Plan     Plan of Care Reviewed With: (P) patient, spouse  Outcome Evaluation: (P) Pt was awake and alert in bed upon arrival to room. Spouse at bedside. Pt was able to follow commands and agreeable to PT. Pt performed bed mobility supine-sit Min A, STS OOB Min A with rxw, ambulated 120' Min A with rxw. Pt sat in recliner briefly, had to use the restroom with OT then returned to bed. Pt returned to bed sit-supine Min A. Pt continues to benefit from skilled PT. DC recommendation is Luis.     Time Calculation:         PT Charges       Row Name 09/19/24 1423             Time Calculation    Start Time 1333 (P)   -DG      Stop Time 1406 (P)   -DG      Time Calculation (min) 33 min (P)   -DG      PT Received On 09/19/24 (P)   -DG      PT - Next Appointment 09/20/24 (P)   -DG         Time Calculation- PT    Total Timed Code Minutes- PT 33 minute(s) (P)   -DG          Timed Charges    54940 - PT Therapeutic Exercise Minutes 18 (P)   -DG      16208 - PT Therapeutic Activity Minutes 15 (P)   -DG         Total Minutes    Timed Charges Total Minutes 33 (P)   -DG       Total Minutes 33 (P)   -DG                User Key  (r) = Recorded By, (t) = Taken By, (c) = Cosigned By      Initials Name Provider Type    DG Jung Leary, PT Student PT Student                  Therapy Charges for Today       Code Description Service Date Service Provider Modifiers Qty    50990885611 HC PT THER PROC EA 15 MIN 9/19/2024 uJng Leary, PT Student GP 1    62332212407 HC PT THERAPEUTIC ACT EA 15 MIN 9/19/2024 Jung Leary, PT Student GP 1            PT G-Codes  Outcome Measure Options: AM-PAC 6 Clicks Daily Activity (OT)  AM-PAC 6 Clicks Score (PT): (P) 17  AM-PAC 6 Clicks Score (OT): 11       Jung Leary PT Student  9/19/2024

## 2024-09-19 NOTE — PAYOR COMM NOTE
"Kristina Sheriff (64 y.o. Female)                         ATTENTION CONTINUED CLINICALS CASE REF [id number]                      REPLY TO [facility] DEPT  [phone] OR CALL               Date of Birth   1960    Social Security Number       Address   [address] [place] [address]    Home Phone   [phone]    MRN   [id number]       Confucianist   Hinduism    Marital Status                               Admission Date   9/16/24    Admission Type   Elective    Admitting Provider   Alex Ortiz MD    Attending Provider   Alex Ortiz MD    Department, Room/Bed   [facility] INTENSIVE CARE, I379/1       Discharge Date       Discharge Disposition       Discharge Destination                                 Attending Provider: Alex Ortiz MD    Allergies: Fluoxetine, Sulfa Antibiotics, Zofran [Ondansetron Hcl], Lisinopril    Isolation: None   Infection: None   Code Status: CPR    Ht: 168.9 cm (66.5\")   Wt: 76.8 kg (169 lb 5 oz)    Admission Cmt: None   Principal Problem: Spinal cord injury, cervical, without spinal bone injury [S14.109A]                   Active Insurance as of 9/16/2024       Primary Coverage       Payor Plan Insurance Group Employer/Plan Group    [employer] ANTHEM PATHWAYS PPO [id number]       Payor Plan Address Payor Plan Phone Number Payor Plan Fax Number Effective Dates    [address]   10/1/2022 - None Entered    [place] [address]         Subscriber Name Subscriber Birth Date Member ID       KRISTINA SHERIFF 1960 [id number]                     Emergency Contacts        (Rel.) Home Phone Work Phone Mobile Phone    Kyle Sheriff (Spouse) [phone] -- [phone]    Kashif Sheriff (Son) -- -- [phone]              Vital Signs (last 3 days)       Date/Time Temp Temp src Pulse Resp BP Patient Position SpO2    09/19/24 1410 -- -- 93 -- 167/95 -- 99    09/19/24 1000 -- -- 99 -- 163/85 -- 100    09/19/24 0900 -- -- 105 -- " 172/86 -- 98    09/19/24 0800 -- -- 105 -- 166/89 -- 98    09/19/24 0747 98.8 (37.1) Oral -- -- -- -- --    09/19/24 0700 -- -- 93 -- 152/79 -- 98    09/19/24 0500 -- -- 101 -- 149/85 -- 97    09/19/24 0400 -- -- 95 14 148/83 Lying 95    09/19/24 0353 97.6 (36.4) Oral -- -- -- -- --    09/19/24 0300 -- -- 90 -- 154/79 -- 97    09/19/24 0200 -- -- 101 -- 147/74 -- 97    09/19/24 0100 -- -- 90 -- 152/73 -- 96    09/18/24 2300 -- -- 94 -- 145/73 -- 94    09/18/24 2257 97.7 (36.5) Oral -- -- -- -- --    09/18/24 2200 -- -- 91 -- 148/81 -- 96    09/18/24 2100 -- -- 98 -- 134/74 -- 92    09/18/24 2024 -- -- 101 -- 151/66 -- --    09/18/24 2002 97.6 (36.4) Oral -- -- -- -- --    09/18/24 2000 -- -- 108 16 151/66 Lying 96    09/18/24 1900 -- -- 110 -- 136/74 -- 93    09/18/24 1800 -- -- -- -- 145/77 -- --    09/18/24 1700 -- -- -- -- 124/70 -- --    09/18/24 1600 -- -- 107 18 143/83 -- 100    09/18/24 1530 -- -- 105 -- 138/111 -- 99    09/18/24 1500 -- -- 104 -- 152/89 -- 98    09/18/24 1430 -- -- -- -- 133/75 -- --    09/18/24 1400 -- -- 101 -- 141/76 -- 100    09/18/24 1330 -- -- 102 -- 141/91 -- 98    09/18/24 1300 -- -- 103 -- 121/71 -- 98    09/18/24 1230 -- -- 106 -- 146/77 -- 98    09/18/24 1200 -- -- 105 18 148/82 -- 96    09/18/24 1130 98.8 (37.1) Oral 109 -- 139/79 -- 99    09/18/24 1045 -- -- 108 -- -- -- 100    09/18/24 1030 -- -- 108 -- 137/77 -- 96    09/18/24 1015 -- -- 106 -- -- -- 98    09/18/24 1000 -- -- 109 -- 128/58 -- 93    09/18/24 0915 -- -- 109 -- -- -- 95    09/18/24 0900 -- -- 108 -- 141/67 -- 97    09/18/24 0845 -- -- 110 -- -- -- 100    09/18/24 0830 -- -- 112 -- 136/71 -- 93    09/18/24 0807 97.4 (36.3) Oral -- -- -- -- --    09/18/24 0800 -- -- 111 18 128/95 -- 99    09/18/24 0745 -- -- 113 -- -- -- 99    09/18/24 0730 -- -- 114 -- 118/69 -- 100    09/18/24 0639 -- -- 109 -- 124/58 -- 97    09/18/24 0630 -- -- 106 -- 124/74 -- 97    09/18/24 0600 -- -- 105 -- 134/69 -- 97    09/18/24 0530 --  -- 108 -- 123/59 -- 94    09/18/24 0500 -- -- 110 -- 95/82 -- 100    09/18/24 0400 98.5 (36.9) Oral 107 18 125/67 Lying 95    09/18/24 0330 -- -- 117 -- 146/92 -- 94    09/18/24 0300 -- -- 103 -- 134/68 -- 95    09/18/24 0230 -- -- 101 -- 122/74 -- 94    09/18/24 0200 -- -- 103 -- 128/72 -- 95    09/18/24 0130 -- -- 109 -- 132/68 -- 93    09/18/24 0102 -- -- 106 -- 140/58 -- 96    09/18/24 0100 -- -- 112 -- 121/63 -- 97    09/18/24 0049 -- -- 106 -- 135/53 -- --    09/18/24 0030 -- -- 111 -- 118/64 -- 95    09/18/24 0013 98.4 (36.9) Oral -- -- -- -- --    09/18/24 0000 -- -- 110 16 134/47 Lying 94    09/17/24 2330 -- -- 105 -- 125/69 -- 94    09/17/24 2300 -- -- 107 -- 132/68 -- 94    09/17/24 2230 -- -- 105 -- 132/66 -- 93 09/17/24 2200 -- -- 105 -- 130/69 -- 93    09/17/24 2130 -- -- 108 -- 142/67 -- 93    09/17/24 2100 -- -- 112 -- 143/69 -- 97 09/17/24 2030 -- -- 107 -- 137/71 -- 96    09/17/24 2015 -- -- 106 -- -- -- 97    09/17/24 2000 -- -- 105 18 127/71 Lying 95    09/17/24 1944 98.7 (37.1) Oral -- -- -- -- --    09/17/24 1933 -- -- 106 -- 157/64 -- --    09/17/24 1830 -- -- 109 -- 126/67 -- 93    09/17/24 1815 -- -- 109 -- -- -- 93    09/17/24 1800 -- -- 112 -- 130/66 -- 96    09/17/24 1745 -- -- 116 -- -- -- 95    09/17/24 1730 -- -- 113 -- 115/64 -- 96    09/17/24 1715 -- -- 114 -- -- -- 96    09/17/24 1700 -- -- 112 -- 131/67 -- 98    09/17/24 1645 -- -- 109 -- -- -- 95    09/17/24 1642 98.3 (36.8) Oral -- -- -- -- --    09/17/24 1630 -- -- 111 -- 114/72 -- 94    09/17/24 1615 -- -- 111 -- -- -- 94    09/17/24 1600 -- -- 116 -- 127/88 -- 95    09/17/24 1545 -- -- 121 -- -- -- 97    09/17/24 1530 -- -- 114 -- 136/70 -- 92    09/17/24 1515 -- -- 108 -- -- -- 98    09/17/24 1500 -- -- 108 -- 140/71 -- 94 09/17/24 1445 -- -- 110 -- -- -- 94    09/17/24 1430 -- -- 108 -- 140/94 -- 96    09/17/24 1415 -- -- 108 -- -- -- 95    09/17/24 1400 -- -- 109 -- 143/79 -- 93 09/17/24 1315 -- -- 106 -- -- --  96    09/17/24 1300 -- -- 106 -- 149/75 -- 95    09/17/24 1245 -- -- 106 -- -- -- 94    09/17/24 1230 -- -- 107 -- 140/76 -- 95    09/17/24 1215 -- -- 110 -- -- -- 93    09/17/24 1200 -- -- 111 18 144/70 Lying 93    09/17/24 1145 -- -- 111 -- -- -- 93    09/17/24 1100 98.3 (36.8) Oral 113 14 148/83 Lying 98    09/17/24 1000 -- -- 111 18 145/69 Lying 94    09/17/24 0900 -- -- 111 16 139/75 Lying 96    09/17/24 0800 -- -- 114 18 136/52 Lying 97    09/17/24 0700 98.9 (37.2) Oral -- -- -- -- --    09/17/24 0630 -- -- 117 -- 138/76 -- 97    09/17/24 0615 -- -- 117 -- -- -- 100    09/17/24 0600 -- -- 116 -- 118/69 -- 97    09/17/24 0500 -- -- 115 -- 155/69 -- 98    09/17/24 0430 98.8 (37.1) Oral 109 -- 137/74 -- 97    09/17/24 0315 -- -- 106 -- -- -- 93 09/17/24 0300 -- -- 107 -- 143/66 -- 93 09/17/24 0245 -- -- 109 -- -- -- 92    09/17/24 0230 -- -- 110 -- 135/75 -- 93    09/17/24 0200 -- -- 113 -- 131/69 -- 96    09/17/24 0145 -- -- 117 -- -- -- 97    09/17/24 0130 -- -- 114 -- 100/83 -- 96    09/17/24 0115 -- -- 114 -- -- -- 95    09/17/24 0100 -- -- 116 -- 139/79 -- 95    09/17/24 0045 -- -- 108 -- -- -- 94    09/17/24 0036 98.4 (36.9) Oral 109 -- -- -- 93 09/17/24 0030 -- -- 108 -- 147/73 -- 93 09/17/24 0015 -- -- 109 -- -- -- 96    09/17/24 0000 -- -- 107 18 133/67 Lying 95    09/16/24 2339 -- -- 106 -- 138/64 -- --    09/16/24 2305 -- -- 105 -- -- -- 92 09/16/24 2300 -- -- 105 -- 139/78 -- 93 09/16/24 2255 -- -- 105 -- -- -- 92 09/16/24 2250 -- -- 106 -- -- -- 92 09/16/24 2200 -- -- 104 -- 144/74 -- 93 09/16/24 2100 -- -- 109 -- -- -- 95 09/16/24 2006 98.3 (36.8) Oral 117 18 149/63 -- 98    09/16/24 2005 -- -- 102 -- 131/75 Lying 95 09/16/24 2000 -- -- 104 -- -- -- 94    09/16/24 1955 -- -- 104 -- -- -- 95 09/16/24 1950 98.3 (36.8) Oral 106 20 -- Lying 92    09/16/24 1915 -- -- 102 -- 134/71 -- 95    09/16/24 1845 -- -- 103 -- 132/73 -- 97    09/16/24 1830 -- -- 104 -- 142/74 --  96    09/16/24 1815 -- -- 105 -- 142/78 -- 95    09/16/24 1800 -- -- 106 -- 143/74 -- 94    09/16/24 1745 -- -- 106 -- 134/81 -- 94    09/16/24 1730 -- -- 109 -- 137/89 -- 96    09/16/24 1715 -- -- 108 -- 123/65 -- 96    09/16/24 1700 -- -- 105 -- 143/72 -- 95    09/16/24 1615 -- -- 108 -- 141/73 -- 96    09/16/24 1605 -- -- 109 -- 135/73 -- 97    09/16/24 1530 -- -- 108 -- 141/78 -- 99    09/16/24 1515 -- -- 108 22 142/72 -- 100    09/16/24 1500 -- -- 108 20 141/76 -- 97    09/16/24 1445 -- -- 108 22 138/69 -- 97    09/16/24 1430 -- -- 109 20 159/77 -- 99    09/16/24 1415 -- -- 110 20 145/76 -- 99    09/16/24 1400 -- -- 109 20 152/72 -- 96    09/16/24 1350 -- -- 109 -- -- -- 97    09/16/24 1345 -- -- 110 16 138/74 -- 97    09/16/24 1330 -- -- -- 16 142/71 -- --    09/16/24 1300 -- -- 113 14 148/70 -- 100    09/16/24 1240 -- -- 110 16 -- -- 98    09/16/24 1230 -- -- 109 16 161/75 -- 99    09/16/24 1219 -- -- 109 -- 186/76 -- --    09/16/24 1215 -- -- 108 18 165/77 -- 100    09/16/24 1210 -- -- 109 16 -- -- 98    09/16/24 1205 -- -- 104 16 -- -- 100    09/16/24 1200 -- -- 103 14 171/86 -- 100    09/16/24 1158 -- -- 103 -- 215/87 -- --    09/16/24 1145 -- -- 100 -- 163/87 -- 100    09/16/24 1135 -- -- 101 15 168/93 -- 100    09/16/24 1130 -- -- 104 16 185/82 -- 100    09/16/24 1126 97.9 (36.6) Oral 105 15 181/87 Lying 100    09/16/24 0649 98 (36.7) Oral 102 16 159/86 Lying 98          Oxygen Therapy (last 3 days)       Date/Time SpO2 Device (Oxygen Therapy) Flow (L/min) Oxygen Concentration (%) ETCO2 (mmHg)    09/19/24 1410 99 -- -- -- --    09/19/24 1000 100 -- -- -- --    09/19/24 0900 98 -- -- -- --    09/19/24 0800 98 -- -- -- --    09/19/24 0700 98 -- -- -- --    09/19/24 0500 97 -- -- -- --    09/19/24 0400 95 room air -- -- --    09/19/24 0300 97 -- -- -- --    09/19/24 0200 97 -- -- -- --    09/19/24 0100 96 -- -- -- --    09/19/24 0000 -- room air -- -- --    09/18/24 2300 94 -- -- -- --    09/18/24 2200 96  -- -- -- --    09/18/24 2100 92 -- -- -- --    09/18/24 2000 96 room air -- -- --    09/18/24 1900 93 -- -- -- --    09/18/24 1600 100 room air -- -- --    09/18/24 1530 99 -- -- -- --    09/18/24 1500 98 -- -- -- --    09/18/24 1400 100 -- -- -- --    09/18/24 1330 98 -- -- -- --    09/18/24 1300 98 -- -- -- --    09/18/24 1230 98 -- -- -- --    09/18/24 1200 96 room air -- -- --    09/18/24 1130 99 -- -- -- --    09/18/24 1045 100 -- -- -- --    09/18/24 1030 96 -- -- -- --    09/18/24 1015 98 -- -- -- --    09/18/24 1000 93 -- -- -- --    09/18/24 0915 95 -- -- -- --    09/18/24 0900 97 -- -- -- --    09/18/24 0845 100 -- -- -- --    09/18/24 0830 93 -- -- -- --    09/18/24 0800 99 room air -- -- --    09/18/24 0745 99 -- -- -- --    09/18/24 0730 100 -- -- -- --    09/18/24 0639 97 -- -- -- --    09/18/24 0630 97 -- -- -- --    09/18/24 0600 97 -- -- -- --    09/18/24 0530 94 -- -- -- --    09/18/24 0500 100 -- -- -- --    09/18/24 0400 95 room air -- -- --    09/18/24 0330 94 -- -- -- --    09/18/24 0300 95 -- -- -- --    09/18/24 0230 94 -- -- -- --    09/18/24 0200 95 -- -- -- --    09/18/24 0130 93 -- -- -- --    09/18/24 0102 96 -- -- -- --    09/18/24 0100 97 -- -- -- --    09/18/24 0030 95 -- -- -- --    09/18/24 0000 94 room air -- -- --    09/17/24 2330 94 -- -- -- --    09/17/24 2300 94 -- -- -- --    09/17/24 2230 93 -- -- -- --    09/17/24 2200 93 -- -- -- --    09/17/24 2130 93 -- -- -- --    09/17/24 2100 97 -- -- -- --    09/17/24 2030 96 -- -- -- --    09/17/24 2015 97 -- -- -- --    09/17/24 2000 95 room air -- -- --    09/17/24 1830 93 -- -- -- --    09/17/24 1815 93 -- -- -- --    09/17/24 1800 96 -- -- -- --    09/17/24 1745 95 -- -- -- --    09/17/24 1730 96 -- -- -- --    09/17/24 1715 96 -- -- -- --    09/17/24 1700 98 -- -- -- --    09/17/24 1645 95 -- -- -- --    09/17/24 1630 94 -- -- -- --    09/17/24 1615 94 -- -- -- --    09/17/24 1600 95 -- -- -- --    09/17/24 1545 97 -- -- -- --     09/17/24 1530 92 -- -- -- --    09/17/24 1515 98 -- -- -- --    09/17/24 1500 94 -- -- -- --    09/17/24 1445 94 -- -- -- --    09/17/24 1430 96 -- -- -- --    09/17/24 1415 95 -- -- -- --    09/17/24 1400 93 -- -- -- --    09/17/24 1315 96 -- -- -- --    09/17/24 1300 95 -- -- -- --    09/17/24 1245 94 -- -- -- --    09/17/24 1230 95 -- -- -- --    09/17/24 1215 93 -- -- -- --    09/17/24 1200 93 -- -- -- --    09/17/24 1145 93 -- -- -- --    09/17/24 1100 98 -- -- -- --    09/17/24 1000 94 -- -- -- --    09/17/24 0900 96 -- -- -- --    09/17/24 0800 97 -- -- -- --    09/17/24 0630 97 -- -- -- --    09/17/24 0615 100 -- -- -- --    09/17/24 0600 97 -- -- -- --    09/17/24 0500 98 -- -- -- --    09/17/24 0430 97 -- -- -- --    09/17/24 0315 93 -- -- -- --    09/17/24 0300 93 -- -- -- --    09/17/24 0245 92 -- -- -- --    09/17/24 0230 93 -- -- -- --    09/17/24 0200 96 -- -- -- --    09/17/24 0145 97 -- -- -- --    09/17/24 0130 96 -- -- -- --    09/17/24 0115 95 -- -- -- --    09/17/24 0100 95 -- -- -- --    09/17/24 0045 94 -- -- -- --    09/17/24 0036 93 -- -- -- --    09/17/24 0030 93 -- -- -- --    09/17/24 0015 96 -- -- -- --    09/17/24 0000 95 room air -- -- --    09/16/24 2305 92 -- -- -- --    09/16/24 2300 93 -- -- -- --    09/16/24 2255 92 -- -- -- --    09/16/24 2250 92 -- -- -- --    09/16/24 2200 93 -- -- -- --    09/16/24 2100 95 -- -- -- --    09/16/24 2006 98 -- -- -- --    09/16/24 2005 95 room air -- -- --    09/16/24 2000 94 room air -- -- --    09/16/24 1955 95 -- -- -- --    09/16/24 1950 92 room air -- -- --    09/16/24 1915 95 -- -- -- --    09/16/24 1900 -- room air -- -- --    09/16/24 1845 97 -- -- -- --    09/16/24 1830 96 -- -- -- --    09/16/24 1815 95 -- -- -- --    09/16/24 1800 94 -- -- -- --    09/16/24 1745 94 -- -- -- --    09/16/24 1730 96 -- -- -- --    09/16/24 1715 96 -- -- -- --    09/16/24 1700 95 -- -- -- --    09/16/24 1615 96 -- -- -- --    09/16/24 1605 97 -- -- --  "--    09/16/24 1530 99 nasal cannula -- -- 20 09/16/24 1515 100 -- -- -- 27 09/16/24 1500 97 -- -- -- 23 09/16/24 1445 97 -- -- -- 21 09/16/24 1430 99 -- -- -- 27 09/16/24 1415 99 -- -- -- 34 09/16/24 1400 96 -- -- -- 28 09/16/24 1350 97 -- -- -- 21 09/16/24 1345 97 -- -- -- 17 09/16/24 1330 -- nasal cannula 2 -- --    09/16/24 1300 100 -- -- -- --    09/16/24 1240 98 -- -- -- --    09/16/24 1230 99 -- -- -- --    09/16/24 1215 100 nasal cannula 2 -- --    09/16/24 1210 98 -- -- -- --    09/16/24 1205 100 -- -- -- --    09/16/24 1200 100 simple face mask 2 -- --    09/16/24 1145 100 -- -- -- --    09/16/24 1135 100 -- -- -- --    09/16/24 1130 100 -- -- -- --    09/16/24 1126 100 -- -- -- --    09/16/24 0649 98 room air -- -- --          Lines, Drains & Airways       Active LDAs       Name Placement date Placement time Site Days    Peripheral IV 09/16/24 0722 Anterior;Left Forearm 09/16/24  0722  Forearm  3    Peripheral IV 09/16/24 2355 Left;Posterior Wrist 09/16/24  2355  Wrist  2    Closed/Suction Drain 1 Anterior Neck Bulb 15 Fr. 09/16/24  --  Neck  3    NG/OG Tube Nasogastric 10 Fr Right nostril 09/18/24  1030  Right nostril  1    External Urinary Catheter 09/19/24  0448  --  less than 1                    Orders (last 72 hrs)        Start     Ordered    09/22/24 1800  dexAMETHasone (DECADRON) tablet 1.5 mg  2 Times Daily With Meals        Placed in \"Followed by\" Linked Group    09/19/24 1053    09/20/24 1800  dexAMETHasone (DECADRON) tablet 3 mg  2 Times Daily With Meals        Placed in \"Followed by\" Linked Group    09/19/24 1053    09/19/24 1300  Ambulate Patient  4 Times Daily       09/19/24 0914    09/19/24 1200  methocarbamol (ROBAXIN) tablet 500 mg  Every 6 Hours Scheduled         09/19/24 1047    09/19/24 1145  dexAMETHasone (DECADRON) tablet 4.5 mg  2 Times Daily With Meals        Placed in \"Followed by\" Linked Group    09/19/24 1053    09/19/24 1050  Assess Need for " Indwelling Urinary Catheter - Initiate Removal Protocol  Continuous,   Status:  Canceled        Comments: Follow Protocol As Outlined in Process Instructions (Open Order Report to View Full Instructions)    09/19/24 1049    09/19/24 1050  Discontinue Indwelling Urinary Catheter  Once         09/19/24 1049    09/19/24 1050  Notify Provider if Bladder Distention Continues  Continuous        Comments: Open Order Report to View Parameters Requiring Provider Notification    09/19/24 1049    09/19/24 1050  Consult Pharmacist For Review of Medications That May Cause Urinary Retention - RN To Place Order for Consult it Needed  Continuous         09/19/24 1049    09/19/24 1046  Pharmacy Consult - Pharmacy to dose  Continuous PRN         09/19/24 1047    09/19/24 1023  Transfer Patient  Once         09/19/24 1022    09/19/24 1000  insulin glargine (LANTUS, SEMGLEE) injection 5 Units  Daily         09/19/24 0911    09/19/24 0914  Inpatient Physical Medicine Rehab Consult  Once        Specialty:  Physical Medicine and Rehabilitation  Provider:  Jonathan Us MD    09/19/24 0914    09/19/24 0733  oxyCODONE (ROXICODONE) 5 MG/5ML solution 7.5 mg  Every 4 Hours PRN         09/19/24 0733    09/18/24 2350  POC Glucose Once  PROCEDURE ONCE        Comments: Complete no more than 45 minutes prior to patient eating      09/18/24 2348    09/18/24 2100  Enoxaparin Sodium (LOVENOX) syringe 40 mg  Nightly         09/17/24 0745    09/18/24 1800  Up In Chair  3 Times Daily        Comments: With nursing and PT/OT    09/18/24 1444    09/18/24 1448  PT Consult: Eval & Treat As Tolerated; Other; spinal surgery...cervical collar at all times  Once        Comments: Cervical collar to remain on at all times. Please mobilize as tolerated to chair and to walk with assistance if able. Please see 9/18 09/18/24 1447    09/18/24 1447  OT Consult: Eval & Treat Post-Surgery Care  Once        Comments: Spinal surgery, cervical collar on at all  times. Please mobilize as tolerated to chair and to walk with assistance if able.  Please see 9/18 09/18/24 1447    09/18/24 1222  POC Glucose Once  PROCEDURE ONCE        Comments: Complete no more than 45 minutes prior to patient eating      09/18/24 1202    09/18/24 1200  lansoprazole (PREVACID SOLUTAB) disintegrating tablet Tablet Delayed Release Dispersible 30 mg  Every Early Morning         09/18/24 1101    09/18/24 1200  POC Glucose Q6H  Every 6 Hours      Comments: Complete no more than 45 minutes prior to patient eating      09/18/24 1123    09/18/24 1124  Elevate Head Of Bed 30-45 Degrees  Continuous         09/18/24 1123    09/18/24 1124  Daily Weights  Daily       09/18/24 1123    09/18/24 1124  Tube Feeding Assessment and I/O  Every Shift       09/18/24 1123    09/18/24 1124  Write Hang Time on Enteral Feeding Container  Per Order Details        Comments: Ready-to Hang, Closed System Large Volume Bags or Containers May Hang For 24 Hours.  Open System - Small Volume Cans, Bags or Cartons May Hang for 8 Hours.    09/18/24 1123    09/18/24 1124  Notify Provider - Abdominal Discomfort, Pain or Distention, No Bowel Movement in 3 Days  Continuous        Comments: Open Order Report to View Parameters Requiring Provider Notification    09/18/24 1123    09/18/24 1124  Feeding Tube Insertion - Cortrak System  Once         09/18/24 1123 09/18/24 1124  Tube Feeding: Formula: Diabetisource AC (Glucerna 1.2); Feeding Type: Continuous; Start at: 10 mL/hr; Then Advance By: 10 mL/hr; Every: 4 hours; To Goal Rate of: 70 mL/hr; Water Flush: 30 mL; Every: 4 hours; Water Bolus: None  Diet Effective Now         09/18/24 1123    09/18/24 1124  NPO Diet NPO Type: Tube Feeding  Diet Effective Now         09/18/24 1123    09/18/24 1015  pantoprazole (PROTONIX) injection 40 mg  Every 24 Hours,   Status:  Discontinued         09/18/24 0927 09/18/24 0931  Ofter cortrak position confirmed start tube feeds and free water per  dietician recommendations.  Physician Communication Order  Per Order Details        Comments: Ofter cortrak position confirmed start tube feeds and free water per dietician recommendations.    09/18/24 0932    09/18/24 0926  MRI Cervical Spine Without Contrast  1 Time Imaging         09/18/24 0932    09/18/24 0926  XR Spine Cervical 2 View  1 Time Imaging         09/18/24 0932    09/18/24 0926  D / C Arterial Line  Once         09/18/24 0932 09/18/24 0926  Feeding Tube Insertion  Once        Comments: Place CorTrak for feeding while NPO and medication administration.    09/18/24 0932    09/18/24 0926  Inpatient Nutrition Consult  Once        Provider:  (Not yet assigned)    09/18/24 0932 09/18/24 0925  oxyCODONE (ROXICODONE) 5 MG/5ML solution 5 mg  Every 4 Hours PRN,   Status:  Discontinued         09/18/24 0932 09/18/24 0901  Nasogastric Tube Insertion  Once        Comments: Place cortrak for medication and nutrition    09/18/24 0901 09/18/24 0900  OT Consult: Eval & Treat Post-Surgery Care  Once,   Status:  Canceled        Comments: Spinal surgery, cervical collar on at all times    09/18/24 0900    09/18/24 0859  PT Consult: Eval & Treat As Tolerated; Other; spinal surgery...cervical collar at all times  Once,   Status:  Canceled         09/18/24 0900    09/18/24 0859  Activity Order  Once         09/18/24 0900 09/18/24 0551  POC Glucose Once  PROCEDURE ONCE        Comments: Complete no more than 45 minutes prior to patient eating      09/18/24 0548    09/17/24 2345  POC Glucose Once  PROCEDURE ONCE        Comments: Complete no more than 45 minutes prior to patient eating      09/17/24 2342    09/17/24 2100  Enoxaparin Sodium (LOVENOX) syringe 40 mg  Nightly,   Status:  Discontinued         09/16/24 1534    09/17/24 1800  insulin regular (humuLIN R,novoLIN R) injection 2-7 Units  Every 6 Hours Scheduled         09/17/24 1606    09/17/24 1800  POC Glucose Q6H  Every 6 Hours      Comments: Complete  no more than 45 minutes prior to patient eating      09/17/24 1718    09/17/24 1800  POC Glucose Finger Q6H  Every 6 Hours      Comments: Complete no more than 45 minutes prior to patient eating      09/17/24 1718    09/17/24 1700  POC Glucose Finger 4x Daily Before Meals & at Bedtime  4 Times Daily Before Meals & at Bedtime,   Status:  Canceled      Comments: Complete no more than 45 minutes prior to patient eating      09/17/24 1120    09/17/24 1700  POC Glucose 4x Daily Before Meals & at Bedtime  4 Times Daily Before Meals & at Bedtime,   Status:  Canceled      Comments: Complete no more than 45 minutes prior to patient eating      09/17/24 1606    09/17/24 1608  SLP Consult: Eval & Treat VFSS/FEES per SLP, RN Dysphagia Screen Failed  Once         09/17/24 1607    09/17/24 1607  NPO Diet NPO Type: Strict NPO  Diet Effective Now,   Status:  Canceled         09/17/24 1607    09/17/24 1606  dextrose (GLUTOSE) oral gel 15 g  Every 15 Minutes PRN         09/17/24 1606    09/17/24 1606  dextrose (D50W) (25 g/50 mL) IV injection 25 g  Every 15 Minutes PRN         09/17/24 1606    09/17/24 1606  glucagon (GLUCAGEN) injection 1 mg  Every 15 Minutes PRN         09/17/24 1606    09/17/24 1352  Splint Application  Once        Comments: Linard boots rotate on and off q 2 hrs.    09/17/24 1351    09/17/24 1122  Brace Application  Once        Comments: Continue hard cervical collar at all times. Assess skin and cervical dressing q 4 hours for bleeding and or skin irritation.    09/17/24 1122    09/17/24 1121  Elevate HOB  Continuous        Comments: As tolerated with hard cervical collar on at all times.    09/17/24 1122    09/17/24 1119  Bed Rest  Until Discontinued,   Status:  Canceled        Comments: HOB elevated as tolerated; continue bedrest for now.    09/17/24 1120    09/17/24 1117  Diet: Liquid; Clear Liquid; Fluid Consistency: Thin (IDDSI 0)  Diet Effective Now,   Status:  Canceled        Comments: Start clear liquids  "if patient passes nursing bedside swallow evaluation. Advance diet as tolerated.    09/17/24 1120    09/17/24 1117  Nursing bedside swallow evaluation - if passes, start clear liq diet and advance as tolerated (see order 9/17)  Physician Communication Order  Per Order Details        Comments: Nursing bedside swallow evaluation - if passes, start clear liq diet and advance as tolerated (see order 9/17)    09/17/24 1120    09/17/24 1116  Please ask intensivist to manage sliding scale insulin in light of patient's recent surgery and IV steroids. Also on oral agent for DM at home.  Physician Communication Order  Per Order Details        Comments: Please ask intensivist to manage sliding scale insulin in light of patient's recent surgery and IV steroids. Also on oral agent for DM at home.    09/17/24 1120    09/17/24 0900  multivitamin with minerals 1 tablet  Daily         09/16/24 1534    09/17/24 0900  losartan (COZAAR) tablet 100 mg  Daily         09/16/24 1534    09/17/24 0900  metoprolol succinate XL (TOPROL-XL) 24 hr tablet 50 mg  Daily         09/16/24 1534    09/17/24 0900  DULoxetine (CYMBALTA) DR capsule 30 mg  Daily         09/16/24 1534    09/17/24 0900  fluticasone (FLONASE) 50 MCG/ACT nasal spray 2 spray  Daily         09/16/24 1534    09/17/24 0900  DULoxetine (CYMBALTA) DR capsule 60 mg  Daily         09/16/24 1534    09/17/24 0900  hydroCHLOROthiazide tablet 12.5 mg  Daily         09/16/24 1534    09/17/24 0845  acetaminophen (TYLENOL) tablet 650 mg  Every 4 Hours Scheduled,   Status:  Discontinued        Placed in \"Or\" Linked Group    09/17/24 0745    09/17/24 0845  acetaminophen (TYLENOL) 160 MG/5ML oral solution 650 mg  Every 4 Hours Scheduled        Placed in \"Or\" Linked Group    09/17/24 0745    09/17/24 0845  acetaminophen (TYLENOL) suppository 650 mg  Every 4 Hours Scheduled        Placed in \"Or\" Linked Group    09/17/24 0745    09/17/24 0822  albuterol (PROVENTIL) nebulizer solution 0.083% 2.5 " "mg/3mL  Every 4 Hours PRN         09/17/24 0823    09/17/24 0800  XR Spine Cervical 2 or 3 View  1 Time Imaging,   Status:  Canceled         09/16/24 1534 09/17/24 0745  oxyCODONE (ROXICODONE) immediate release tablet 5 mg  Every 4 Hours PRN,   Status:  Discontinued         09/17/24 0745    09/17/24 0600  pantoprazole (PROTONIX) EC tablet 40 mg  Every Early Morning,   Status:  Discontinued         09/16/24 1534 09/17/24 0600  CBC (No Diff)  Daily       09/17/24 0155 09/17/24 0600  Basic Metabolic Panel  Daily       09/17/24 0155    09/17/24 0015  niCARdipine (CARDENE) 25 mg in 250 mL NS infusion kit  Titrated,   Status:  Discontinued         09/16/24 2329 09/16/24 2100  atorvastatin (LIPITOR) tablet 40 mg  Nightly         09/16/24 1534 09/16/24 2100  metFORMIN ER (GLUCOPHAGE-XR) 24 hr tablet 500 mg  Nightly         09/16/24 1534 09/16/24 2100  gabapentin (NEURONTIN) capsule 200 mg  2 Times Daily         09/16/24 1534 09/16/24 2100  amLODIPine (NORVASC) tablet 5 mg  Nightly         09/16/24 1534 09/16/24 2100  sodium chloride 0.9 % flush 10 mL  Every 12 Hours Scheduled         09/16/24 1534 09/16/24 2100  sennosides-docusate (PERICOLACE) 8.6-50 MG per tablet 2 tablet  2 Times Daily        Placed in \"And\" Linked Group    09/16/24 1534 09/16/24 1957  POC Glucose Once  PROCEDURE ONCE        Comments: Complete no more than 45 minutes prior to patient eating      09/16/24 1945 09/16/24 1800  Ambulate Patient 3 times daily and PRN as tolerated.  3 Times Daily,   Status:  Canceled        Comments: PRN as tolerated.    09/16/24 1534 09/16/24 1659  Bed Rest  Until Discontinued,   Status:  Canceled         09/16/24 1658    09/16/24 1600  Vital Signs  Every 4 Hours       09/16/24 1534 09/16/24 1600  Neurovascular Checks  Every 4 Hours       09/16/24 1534    09/16/24 1600  Incentive Spirometry  Every 2 Hours While Awake       09/16/24 1534    09/16/24 1536  ceFAZolin 2000 mg IVPB in 100 mL " "NS (MBP)  Every 8 Hours         09/16/24 1534 09/16/24 1535  Code Status and Medical Interventions: CPR (Attempt to Resuscitate); Full Support  Continuous         09/16/24 1534 09/16/24 1535  Ambulate Patient  Every Shift,   Status:  Canceled       09/16/24 1534 09/16/24 1535  Advance Diet As Tolerated -  Until Discontinued,   Status:  Canceled         09/16/24 1534    09/16/24 1535  Oxygen Therapy- Nasal Cannula; Titrate 1-6 LPM Per SpO2; 90 - 95%  Continuous         09/16/24 1534 09/16/24 1535  Continuous Pulse Oximetry  Continuous         09/16/24 1534 09/16/24 1535  PT Consult: Eval & Treat As Tolerated; Post Surgery Care, Discharge Placement Assessment  Once,   Status:  Canceled        Comments: No lifting heavier than 5lbs, encourage as much ambulation as tolerated    09/16/24 1534 09/16/24 1535  Insert Peripheral IV  Once         09/16/24 1534 09/16/24 1535  Saline Lock & Maintain IV Access  Continuous         09/16/24 1534 09/16/24 1535  Diet: Regular/House; Fluid Consistency: Thin (IDDSI 0)  Diet Effective Now,   Status:  Canceled         09/16/24 1534 09/16/24 1534  acetaminophen (TYLENOL) tablet 650 mg  Every 4 Hours PRN,   Status:  Discontinued        Placed in \"Or\" Linked Group    09/16/24 1534 09/16/24 1534  acetaminophen (TYLENOL) 160 MG/5ML oral solution 650 mg  Every 4 Hours PRN,   Status:  Discontinued        Placed in \"Or\" Linked Group    09/16/24 1534    09/16/24 1534  acetaminophen (TYLENOL) suppository 650 mg  Every 4 Hours PRN,   Status:  Discontinued        Placed in \"Or\" Linked Group    09/16/24 1534    09/16/24 1534  HYDROmorphone (DILAUDID) injection 0.25 mg  Every 4 Hours PRN,   Status:  Discontinued        Placed in \"And\" Linked Group    09/16/24 1534    09/16/24 1534  naloxone (NARCAN) injection 0.4 mg  Every 5 Minutes PRN        Placed in \"And\" Linked Group    09/16/24 1534    09/16/24 1534  polyethylene glycol (MIRALAX) packet 17 g  Daily PRN      " "  Placed in \"And\" Linked Group    09/16/24 1534    09/16/24 1534  bisacodyl (DULCOLAX) EC tablet 5 mg  Daily PRN        Placed in \"And\" Linked Group    09/16/24 1534    09/16/24 1534  bisacodyl (DULCOLAX) suppository 10 mg  Daily PRN        Placed in \"And\" Linked Group    09/16/24 1534    09/16/24 1534  albuterol sulfate HFA (PROVENTIL HFA;VENTOLIN HFA;PROAIR HFA) inhaler 1 puff  Every 4 Hours PRN,   Status:  Discontinued         09/16/24 1534    09/16/24 1534  cyclobenzaprine (FLEXERIL) tablet 10 mg  3 Times Daily PRN,   Status:  Discontinued         09/16/24 1534 09/16/24 1534  Up in Chair  As Needed,   Status:  Canceled       09/16/24 1534    09/16/24 1534  sodium chloride 0.9 % flush 10 mL  As Needed         09/16/24 1534    09/16/24 1534  sodium chloride 0.9 % infusion 40 mL  As Needed         09/16/24 1534    09/16/24 1534  HYDROcodone-acetaminophen (NORCO) 5-325 MG per tablet 1 tablet  Every 4 Hours PRN,   Status:  Discontinued         09/16/24 1534 09/16/24 1534  methocarbamol (ROBAXIN) tablet 500 mg  4 Times Daily PRN,   Status:  Discontinued         09/16/24 1534    09/16/24 1220  dexAMETHasone (DECADRON) injection 4 mg  Every 6 Hours,   Status:  Discontinued         09/16/24 1218    09/16/24 1217  Oxygen Therapy- Nasal Cannula; Titrate 1-6 LPM Per SpO2; 90 - 95%  Continuous PRN,   Status:  Canceled       09/16/24 1217    09/16/24 1149  niCARdipine (CARDENE) 25 mg in 250 mL NS infusion kit  Titrated,   Status:  Discontinued         09/16/24 1149    09/16/24 1117  HYDROcodone-acetaminophen (NORCO) 5-325 MG per tablet 1 tablet  Once As Needed         09/16/24 1117    09/16/24 1117  HYDROmorphone (DILAUDID) injection 0.5 mg  Every 5 Minutes PRN,   Status:  Discontinued         09/16/24 1117    09/16/24 1117  oxyCODONE-acetaminophen (PERCOCET) 7.5-325 MG per tablet 1 tablet  Every 4 Hours PRN,   Status:  Discontinued         09/16/24 1117 09/16/24 1117  fentaNYL citrate (PF) (SUBLIMAZE) injection 50 " "mcg  Every 5 Minutes PRN,   Status:  Discontinued         09/16/24 1117 09/16/24 1117  naloxone (NARCAN) injection 0.2 mg  As Needed,   Status:  Discontinued         09/16/24 1117 09/16/24 1117  flumazenil (ROMAZICON) injection 0.2 mg  As Needed,   Status:  Discontinued         09/16/24 1117 09/16/24 1117  droperidol (INAPSINE) injection 0.625 mg  Every 20 Minutes PRN,   Status:  Discontinued        Placed in \"Or\" Linked Group    09/16/24 1117 09/16/24 1117  droperidol (INAPSINE) injection 0.625 mg  Every 20 Minutes PRN,   Status:  Discontinued        Placed in \"Or\" Linked Group    09/16/24 1117 09/16/24 1117  promethazine (PHENERGAN) suppository 25 mg  Once As Needed,   Status:  Discontinued        Placed in \"Or\" Linked Group    09/16/24 1117 09/16/24 1117  promethazine (PHENERGAN) tablet 25 mg  Once As Needed,   Status:  Discontinued        Placed in \"Or\" Linked Group    09/16/24 1117 09/16/24 1117  labetalol (NORMODYNE,TRANDATE) injection 5 mg  Every 5 Minutes PRN,   Status:  Discontinued         09/16/24 1117 09/16/24 1117  hydrALAZINE (APRESOLINE) injection 5 mg  Every 10 Minutes PRN,   Status:  Discontinued         09/16/24 1117 09/16/24 1117  ePHEDrine injection 5 mg  Once As Needed,   Status:  Discontinued         09/16/24 1117 09/16/24 1117  diphenhydrAMINE (BENADRYL) injection 12.5 mg  Every 15 Minutes PRN,   Status:  Discontinued         09/16/24 1117 09/16/24 1117  ipratropium-albuterol (DUO-NEB) nebulizer solution 3 mL  Once As Needed,   Status:  Discontinued         09/16/24 1117 09/16/24 0728  lactated ringers infusion  Continuous         09/16/24 0726 09/16/24 0725  Oxygen Therapy- Nasal Cannula; Titrate 1-6 LPM Per SpO2; 90 - 95%  Continuous PRN,   Status:  Canceled       09/16/24 0726    Unscheduled  Follow Hypoglycemia Standing Orders For Blood Glucose <70 & Notify Provider of Treatment  As Needed      Comments: Follow Hypoglycemia Orders As Outlined in " "Process Instructions (Open Order Report to View Full Instructions)  Notify Provider Any Time Hypoglycemia Treatment is Administered    09/17/24 1606    Unscheduled  Rolan & Document Feeding Tube Depth (in cm)  As Needed       09/18/24 1123    Unscheduled  Verify Feeding Tube Placement Upon Insertion & As Needed  As Needed       09/18/24 1123    Unscheduled  Flush Feeding Tube With 30-50mL Water As Needed  As Needed       09/18/24 1123    Unscheduled  Bladder Scan if Patient Unable to Void 4-6 Hours After Catheter Removal  As Needed         09/19/24 1049    Unscheduled  If Bladder Scan Volume is Less Than 500mL & Patient is Without Symptoms of Bladder Discomfort / Distention Monitor Every 1-2 Hours for Spontaneous Void  As Needed       09/19/24 1049    Unscheduled  Straight Cath Every 4-6 Hours As Needed If Patient is Unable to Void After 4-6 Hours, Bladder Scan Volume is Greater Than 500mL & Patient Has Symptoms of Bladder Discomfort / Distention  As Needed       09/19/24 1049    Unscheduled  Schedule / Prompt Voiding For Patients With Urinary Incontinence  As Needed       09/19/24 1049    --  albuterol sulfate  (90 Base) MCG/ACT inhaler  Every 4 Hours PRN         09/17/24 0805    --  atorvastatin (LIPITOR) 40 MG tablet  Nightly         09/17/24 0806    --  DULoxetine (CYMBALTA) 30 MG capsule  Daily         09/17/24 0808    --  DULoxetine (CYMBALTA) 60 MG capsule  Daily         09/17/24 0808    --  hydroCHLOROthiazide 25 MG tablet  Daily         09/17/24 0809    Signed and Held  sennosides-docusate (PERICOLACE) 8.6-50 MG per tablet 2 tablet  2 Times Daily        Placed in \"And\" Linked Group    Signed and Held    Signed and Held  polyethylene glycol (MIRALAX) packet 17 g  Daily        Placed in \"And\" Linked Group    Signed and Held    Signed and Held  bisacodyl (DULCOLAX) EC tablet 5 mg  Daily PRN        Placed in \"And\" Linked Group    Signed and Held    Signed and Held  bisacodyl (DULCOLAX) suppository 10 mg  " "Daily PRN        Placed in \"And\" Linked Group    Signed and Held    Signed and Held  Inpatient Internal Medicine Consult  Once        Specialty:  Internal Medicine  Provider:  (Not yet assigned)    Signed and Held                          Physician Progress Notes (last 72 hours)        Tony Boyer MD at 09/19/24 0909          [facility]         Mar/chart reviewed  Follow up critical care management  Significant neck pain, not swallowing well   asking for more pain meds, strength not bad    Vital Sign Min/Max for last 24 hours  Temp  Min: 97.6 °F (36.4 °C)  Max: 98.8 °F (37.1 °C)   BP  Min: 121/71  Max: 154/79   Pulse  Min: 90  Max: 110   Resp  Min: 14  Max: 18   SpO2  Min: 92 %  Max: 100 %   No data recorded   Weight  Min: 76.8 kg (169 lb 5 oz)  Max: 76.8 kg (169 lb 5 oz)     Nad, axox3,   perrl, eomi, no icterus,  mmm, no jvd, trachea midline, neck supple,  chest cta bilaterally, no crackles, no wheezes,   rrr,   soft, nt, nd +bs,  no c/c/e  Skin warm, dry no rashes    Labs: 9/19: reviewed:  Wbc 19  Hgb 11  Plts 248  Glucose 143  Bun 16  Cr 0.5  Bicarb 22.9    A/P:  S/p anterior cervical corpectomy with fusion of the c5 vertebral body with c4-5 injury -- admit to icu , monitor and bp control as per jacque recommendations  Post operative pain control -- prn meds as needed  BP control gtt and prns for parameters as per jacque  DMII -- home meds, ssi, accuchecks a little high, likely steroid inducted, start a little lantus.  Leukocytosis - suspect steroids and reactive  Dysphagia -- temporary feeding tube placement  Out of unit when ok with JACQUE  Will see prn out of unit    Electronically signed by Tony Boeyr MD at 09/19/24 1022       Laverne Knox, APRN at 09/19/24 0904       Attestation signed by Alex Ortiz MD at 09/19/24 1132    I have reviewed this documentation and agree.                  [facility]UR[facility] CERVICAL POSTOP NOTE      CC:POD 3 C5 corpectomy C4/5, C6/7 decompression, " removal of old hardware and C4-7 instrumentation, duraplasty      Subjective     Interval History:  failed SLP swallow eval- Cortraq placed.  She reports quite a bit of posterior neck pain.  Voice improving.  Denies arm pain and numbness.      Objective     Vital signs in last 24 hours:  Temp:  [97.6 °F (36.4 °C)-98.8 °F (37.1 °C)] 98.8 °F (37.1 °C)  Heart Rate:  [] 101  Resp:  [14-18] 14  BP: (121-154)/() 149/85    Intake/Output this shift:  No intake/output data recorded.    GHANSHYAM 15 cc x 24 hrs    LABS:  Results from last 7 days   Lab Units 09/19/24  0337 09/18/24  0316 09/17/24  0430   WBC 10*3/mm3 19.26* 20.04* 20.02*   HEMOGLOBIN g/dL 11.0* 10.5* 11.5*   HEMATOCRIT % 33.2* 31.3* 33.8*   PLATELETS 10*3/mm3 248 240 267     Results from last 7 days   Lab Units 09/19/24  0337 09/18/24  0316 09/17/24  0430   SODIUM mmol/L 139 140 136   POTASSIUM mmol/L 4.1 4.2 3.9   CHLORIDE mmol/L 105 105 104   CO2 mmol/L 22.9 24.0 23.0   BUN mg/dL 16 12 5*   CREATININE mg/dL 0.51* 0.51* 0.39*   CALCIUM mg/dL 9.0 8.9 9.0   GLUCOSE mg/dL 143* 152* 153*         IMAGING STUDIES:  MRI cervical pending    I personally viewed and interpreted the patient's chart.    Meds reviewed/changed: Yes  Tylenol 650 mg p.o. every 4 hours  Decadron 4 mg IV every 6 hours  Lovenox 40 mg subcu nightly  Gabapentin 200 mg p.o. twice daily  Flexeril 10 mg p.o. 3 times daily as needed-1 dose   Roxicodone 5 mg every 4 hours as needed-5 doses      Physical Exam:    General:   Awake, alert, oriented x3. Speech clear with no aphasia.  Voice with minimal hoarseness  Neck:    incision right anterior well-approximated with no redness drainage swelling.  No hematoma, swallow/trachea midline; [place] collar on but chin continues to slip under.  Appears to be too large; ROM deferred; GHANSHYAM to bulb suction with serosanguineous output  Motor:    5/5 bilateral upper and lower extremities except triceps 4/5, left thumb extension 4/5, left wrist extension 4/5    reflexes:   No Roy, no clonus  Sensation:   Normal to light touch bilateral upper and lower extremities  Station and Gait:             Per PT note 9/18-performed bed mobility with minAx2, sit to stand with minAx2, took a few steps from bed to BSC and then back to bed with min/modAx2. Patient has impairments consisting of generalized post op weakness and pain, decreased activity tolerance, decreased balance and would benefit from skilled PT.   Extremities:   SCD in place    Assessment & Plan     ASSESSMENT:      Spinal cord injury, cervical, without spinal bone injury    Cervical radiculopathy    Cervicalgia    Patient 3 days status post cervical corpectomy with removal of prior instrumentation and new C4-7 anterior instrumented fusion.  Surgery complicated by hardware failure resulting in durotomy repair and C4/5 spinal cord injury.  Patient doing remarkably well with good movement throughout bilateral upper and lower extremities.  Only mild weakness more so on the left upper extremity.  She denies any dysesthetic pain.  She has typical neck discomfort both anterior from incision and soft tissue manipulation and posterior from positioning and neck spasm.  She is tolerating hard cervical collar, but I believe the [place] is a little too large.  Will have our brace rep evaluate for appropriate fitting collar.  She failed a swallow eval yesterday but is now on IV steroid which we will taper to a p.o. wean over the course of the week.  Her vitals are stable; therefore, will transfer to floor.  Continue therapies and will ask PMR to evaluate for possible inpatient rehab at [facility] or other acute rehab facility recommendations.  With regard to pain, will schedule some Robaxin and continue to use as needed Roxicodone.  MRI cervical spine to be completed today.    PLAN:   TTF  Hard collar all times- change collar for fit  Keep GHANSHYAM  Cont therapy, OOB activity, rehab evals  MRI cervical pending  DC IV Dex- change to  "6 day Dex luisa  Schedule robaxin  PMR consult for acute rehab-?[facility]  [facility] consult for medical management out of ICU    I discussed the patient's findings and my recommendations with patient, family, nursing staff, and Dr Ortiz    During patient visit, I utilized appropriate personal protective equipment including  gloves.  Appropriate PPE was worn during the entire visit.  Hand hygiene was completed before and after.      LOS: 3 days       Laverne Knox, DAYNE  9/19/2024  09:04 EDT    \"Dictated utilizing Dragon dictation\".        Electronically signed by Alex Ortiz MD at 09/19/24 1132       Heidi Malcolm APRN at 09/18/24 1313       Attestation signed by Alex Ortiz MD at 09/19/24 9045    I have reviewed this documentation and agree.    We all 4 extremities with relatively good strength.  She has more difficulty in her right lower extremity than her left lower extremity.  Working on pain control through oral medications and a work with PT OT.  Will definitely need acute inpatient rehab after discharge.                     LOS: 2 days   Patient Care Team:  Nyla Mejia APRN as PCP - General (Family Medicine)  Brooklyn Miles PA as Physician Assistant (Obstetrics and Gynecology)  Pedro Luis Zayas MD as Cardiologist (Cardiology)      Interval History: Doing better. Some mild confusion but more appropriate today.  at bedside. Having difficulty with swallowing likely from esophageal compression during surgery; not unexpected. Will continue steroids.     History taken from: patient chart family    Objective        Vital Signs  Temp:  [97.4 °F (36.3 °C)-98.7 °F (37.1 °C)] 97.4 °F (36.3 °C)  Heart Rate:  [101-121] 109  Resp:  [14-18] 18  BP: ()/(47-94) 124/58    Physical Exam:     AA&O x 3. Voice less hoarse. CorTrak tube in place.   Anterior cervical dressing dry and intact.   GHANSHYAM drain 15 cc; bloody drainage. [place] collar intact.    Followed commands more readily; moving arm, legs more " today.   Raised both arms above head. Lifting legs clearly off of bed.   Reports normal and equal sensation in arms and legs bilaterally.   Bilateral SCDs in place. No calf swelling or tenderness to bilateral palpation.      Results Review:     I reviewed the patient's new clinical results.    .  Results from last 7 days   Lab Units 09/18/24  0316 09/17/24  0430   WBC 10*3/mm3 20.04* 20.02*   HEMOGLOBIN g/dL 10.5* 11.5*   HEMATOCRIT % 31.3* 33.8*   PLATELETS 10*3/mm3 240 267     .  Results from last 7 days   Lab Units 09/18/24  0316 09/17/24  0430   SODIUM mmol/L 140 136   POTASSIUM mmol/L 4.2 3.9   CHLORIDE mmol/L 105 104   CO2 mmol/L 24.0 23.0   BUN mg/dL 12 5*   CREATININE mg/dL 0.51* 0.39*   GLUCOSE mg/dL 152* 153*   CALCIUM mg/dL 8.9 9.0     XR SPINE CERVICAL 2 VW- 9/18/2024    Degenerative changes noted.  The prior C5-C7 anterior plate and screws have been removed.  There is now bilateral screws present at C4 and C7 with plate and screw fixation extending from C4-C7.  C5 and C6 cages are intact.  All hardware intact.  No evidence of fracture or subluxation.      Assessment & Plan       Spinal cord injury, cervical, without spinal bone injury    Cervical radiculopathy    Cervicalgia      POD 2  anterior C5 corpectomy, discectomy, osteophytectomy, and  decompression of spinal cord and/or C4/5, C6/7 nerve roots with instrumentation C4-C6. Difficult extraction of previous cervical fusion hardware, intraoperative duraplasty, C4/5 cord injury.   Swallowing difficulty - continue steroids  CorTrak in place; start tube feeds per dietician recommendations  Start roxicodone suspension via feeding tube prn pain  DC A-line and MAP parameters  Arm/leg weakness improving; OT/PT to eval; up in chair; continue steroids; continue cervical collar; continue Lovenox  Continue sanders catheter    Cervical MRI pending  Will need acute rehab; await PT/OT evals  Keep in ICU    Heidi Malcolm, DAYNE  09/18/24  08:13  EDT          Electronically signed by Alex Ortiz MD at 09/19/24 0719       Tony Boyer MD at 09/18/24 1005          [facility]         Mar/chart reviewed  Follow up critical care management  Significant neck pain, not swallowing well    Vital Sign Min/Max for last 24 hours  Temp  Min: 97.4 °F (36.3 °C)  Max: 98.7 °F (37.1 °C)   BP  Min: 95/82  Max: 157/64   Pulse  Min: 101  Max: 121   Resp  Min: 14  Max: 18   SpO2  Min: 92 %  Max: 100 %   No data recorded   No data recorded     Nad, axox3,   perrl, eomi, no icterus,  mmm, no jvd, trachea midline, neck supple,  chest cta bilaterally, no crackles, no wheezes,   rrr,   soft, nt, nd +bs,  no c/c/e  Skin warm, dry no rashes    Labs: 9/18: reviewed;  Wbc 20  Hgb 10.5  Plts 240  Glucose 152  Bun 12  Cr 0.5  Bicarb 24    A/P:  S/p anterior cervical corpectomy with fusion of the c5 vertebral body with c4-5 injury -- admit to icu , monitor and bp control as per lexx recommendations  Post operative pain control -- prn meds as needed  BP control gtt and prns for parameters as per lexx  DMII -- home meds, ssi, accuchecks ok  Leukocytosis - suspect steroids and reactive  Dysphagia -- temporary feeding tube placement    Electronically signed by Tony Boyer MD at 09/18/24 1051       Heidi Malcolm APRN at 09/17/24 1106       Attestation signed by Alex Ortiz MD at 09/18/24 1338    I have reviewed this documentation and agree.    Working on pain control with Dobbhoff tube for p.o. pain medications which should hopefully last longer than Dilaudid alone.  Moving arms and legs much better than I expected.  Will still likely need acute rehab.  Activity as tolerated with cervical collar in place and cleared to work with physical therapy.                   LOS: 1 day   Patient Care Team:  Nyla Mejia APRN as PCP - General (Family Medicine)  Brooklyn Miles PA as Physician Assistant (Obstetrics and Gynecology)  Pedro Luis Zayas MD as Cardiologist  (Cardiology)      Interval History: Lying flat in bed in ICU. MAP 80-90. A bit confused due to medication.  at bedside.     History taken from: patient chart family RN    Objective        Vital Signs  Temp:  [97.9 °F (36.6 °C)-98.9 °F (37.2 °C)] 98.9 °F (37.2 °C)  Heart Rate:  [100-117] 117  Resp:  [14-22] 18  BP: (100-215)/(63-93) 138/76  Arterial Line BP: (131-211)/() 135/61    Physical Exam:    Currently flat in bed. Awakens to voice. Oriented to person, president. Some inappropriate comments at times.   [place] collar intact. Anterior incision dry and intact. No hematoma.   Follows commands x 4 extremities muscular strength as below:   Deltoids  -  2/5 R; 1/5 L  Biceps    -  3/5 R;  3-/5 L  Triceps   -  3+/5 R; 3/5 L  Wrist Ext -  1/5 bilaterally  Intrinsics  -  2/5 bilaterally           -       3/5 bilaterally  Iliopsoas  -  3/5 R; 3-/5 L   Quad       -  3/5 R; 3-/5 L  Tib/Ant    -  3/5 bilaterally  EHL        -     3/5 R; 3-5 L  Gasroc    -  3/5 bilaterally    Sensation intact but reduced to light touch from shoulders to hands/fingers as well as L moreso than R LE's.   Dor[place] in place.      Results Review:     I reviewed the patient's new clinical results.    .  Results from last 7 days   Lab Units 09/17/24  0430   WBC 10*3/mm3 20.02*   HEMOGLOBIN g/dL 11.5*   HEMATOCRIT % 33.8*   PLATELETS 10*3/mm3 267     .  Results from last 7 days   Lab Units 09/17/24  0430   SODIUM mmol/L 136   POTASSIUM mmol/L 3.9   CHLORIDE mmol/L 104   CO2 mmol/L 23.0   BUN mg/dL 5*   CREATININE mg/dL 0.39*   GLUCOSE mg/dL 153*   CALCIUM mg/dL 9.0       Assessment & Plan       Spinal cord injury, cervical, without spinal bone injury    Cervical radiculopathy    Cervicalgia    Leukocytosis - steroids; recent surgery    POD 1 anterior C5 corpectomy, discectomy, osteophytectomy, and  decompression of spinal cord and/or C4/5, C6/7 nerve roots with instrumentation C4-C6. Difficult extraction of previous cervical fusion  hardware, intraoperative duraplasty, C4/5 cord injury.   Continue steroids - decadron 4mg IV q 6 hrs with Protonix for GI prophylaxis  Continue A-line, MAP goal 80-90; avoid phenylephrine  Continue cervical collar; cleared for elevation of HOB as tolerated; continue Aspen collar at all times; continue bedrest.    Continue sanders catheter - due to immobility status  Bilateral boot rotate on/off q two hours.   Start clear liquids if swallowing okay.     DAYNE Gordon  09/17/24  09:06 EDT          Electronically signed by Alex Ortiz MD at 09/18/24 1338       Tony Boyer MD at 09/17/24 0907          [facility]       Mar/chart reviewed  Follow up critical care management  Significant neck pain, some weakness more on the left lower extremity, tearful,     Vital Sign Min/Max for last 24 hours  Temp  Min: 97.9 °F (36.6 °C)  Max: 98.9 °F (37.2 °C)   BP  Min: 100/83  Max: 215/87   Pulse  Min: 100  Max: 117   Resp  Min: 14  Max: 22   SpO2  Min: 92 %  Max: 100 %   Flow (L/min)  Min: 2  Max: 2   Weight  Min: 75.5 kg (166 lb 7.2 oz)  Max: 75.5 kg (166 lb 7.2 oz)     Nad, axox3,   perrl, eomi, no icterus,  mmm, no jvd, trachea midline, neck supple,  chest cta bilaterally, no crackles, no wheezes,   rrr,   soft, nt, nd +bs,  no c/c/e  Skin warm, dry no rashes    Labs: 9/17: reviewed:  Wbc 20  Hgb 11.5  Plts 267  Glucose 153  Bun 5  Cr 0.39  Bicarb 23    A/P:  S/p anterior cervical corpectomy with fusion of the c5 vertebral body with c4-5 injury -- admit to icu , monitor and bp control as per jacque recommendations  Post operative pain control -- prn meds as needed  BP control gtt and prns for parameters as per jacque  DMII -- home meds, accuchecks ok    Follow instructions as per JACQUE    Electronically signed by Tony Boyer MD at 09/17/24 0941          Consult Notes (last 72 hours)        Kam Campbell at 09/17/24 1108          Pt laying flat in bed. Spouse at bedside. Chp offered introductions to Pt and  "sat with Pt and family to provided supportive presence. Chp provided spiritual care and prayer. Pt tearful and express feeling \"tired\", but supported by family. Chp will follow up with Pt later this week.      Chp remains available to Pt and family as needed.           Electronically signed by Kam Campbell at 09/17/24 1141       Tony Boyer MD at 09/16/24 6046        Consult Orders    1. Inpatient Pulmonology Consult [[id number]] ordered by Alex Ortiz MD at 09/16/24 1202                 [facility]    Reason for consult: critical care management    HPI:  Mrs. Yates is a 63yo female underwent anterior cervical corpectomy today and post operative admission to ICU has been requested secondary to spinal cord injury at c4-5 and directly below secondary to hardware failure during hardware removal.  Mrs. Yates is in [facility] recovery, she does arouse but goes back to sleep pretty quickly she denies any compliants, and is appropirate for the most part with her brief responses, but most history must be obtained from chart and my conversation with the RN at bedside    Past Medical History:   Diagnosis Date    Alcohol abuse     in recovery, sober since 2012    Anxiety     Arthritis     Asthma     Edmonds esophagus     Blurred vision     Cervical disc disease     Cervical neuritis     Chronic pain     NECK AND BACK    COPD (chronic obstructive pulmonary disease)     STATES NEVER DIAGNOSED WITH    Depression     Diabetes mellitus     Type 2    DJD (degenerative joint disease), cervical     Foot spasms     FROM BACK SURGERY    GERD (gastroesophageal reflux disease)     Hiatal hernia     Hyperlipidemia     Hypertension     Persistent headaches     ?BLOOD PRESSURE OR NECK RELATED???    Seasonal allergies     Spinal headache     Spinal stenosis      Social History     Socioeconomic History    Marital status:     Number of children: 2   Tobacco Use    Smoking status: Some Days     Current packs/day: 0.50     " Average packs/day: 0.5 packs/day for 18.6 years (9.3 ttl pk-yrs)     Types: Cigarettes     Start date: 2/25/2006    Smokeless tobacco: Never   Vaping Use    Vaping status: Never Used   Substance and Sexual Activity    Alcohol use: No     Comment: sober since 2012, worsk 12 steps    Drug use: No    Sexual activity: Yes     Partners: Male     Family History   Problem Relation Age of Onset    Cancer Mother         nonhodkinds lymphoma    Heart disease Father     Drug abuse Sister     Heart disease Brother     Lupus Maternal Aunt     Malig Hyperthermia Neg Hx      MEDS: reviewed as per chart  AlL: list of 4 reviewed as per chart  ROS: UTO  Vital Sign Min/Max for last 24 hours  Temp  Min: 97.9 °F (36.6 °C)  Max: 98 °F (36.7 °C)   BP  Min: 138/69  Max: 215/87   Pulse  Min: 100  Max: 113   Resp  Min: 14  Max: 22   SpO2  Min: 96 %  Max: 100 %   Flow (L/min)  Min: 2  Max: 2   No data recorded   GEN: appears ill, obese, sleepy   HEENT: PERRL, normal sclera, mmm, no JVD, trachea midline, neck supple  CHEST: CTA bilat, no wheezes, no crackles, no use of accessory muscles  CV: RRR, no m/g/r  ABD: soft, nt, nd +bs, no hepatosplenomegaly  EXT: no c/c/e  SKIN: no rashes, no xanthomas, nl turgor, warm, dry  LYMPH: no palpable cervical or supraclavicular lymphadenopathy  NEURO: CN 2-12 intact and symmetric bilaterally  PSYCH: deferred  MSK: moves all 4 extremities at least with resistance to gravity, difficult to get a great exam    Labs: 9/16: reviewed:  Nothing new  9/9  Na 132  Bicarb 26    A/P:  S/p anterior cervical corpectomy with fusion of the c5 vertebral body with c4-5 injury -- admit to icu , monitor and bp control as per lexx recommendations  Post operative pain control -- prn meds as needed  BP control gtt and prns for parameters as per lexx  DMII -- ssi     Can't edit orders at this point as patient still in pacu    Will follow     Electronically signed by Tony Boyer MD at 09/16/24 0269        All medication doses  during the admission are shown, including meds that are no longer on order.  Scheduled Meds Sorted by Name  for Kristina Yates as of 9/17/24 through 9/19/24    1 Day 3 Days 7 Days 10 Days < Today >   Legend:       Medications 09/17/24 09/18/24 09/19/24    acetaminophen (TYLENOL) tablet 650 mg  Dose: 650 mg  Freq: Every 4 Hours Scheduled Route: PO  Start: 09/17/24 0845 End: 09/18/24 1450   Admin Instructions:       (0845)   1200        1955 [C]        0006   0350           1450-D/C'd     1450-D/C'd                      1800         Or   acetaminophen (TYLENOL) 160 MG/5ML oral solution 650 mg  Dose: 650 mg  Freq: Every 4 Hours Scheduled Route: PO  Start: 09/17/24 0845   Admin Instructions:          1200   1531                        1228   1557   2024      0018 [C]   0325   0933     1351   1800   2000        Or   acetaminophen (TYLENOL) suppository 650 mg  Dose: 650 mg  Freq: Every 4 Hours Scheduled Route: RE  Start: 09/17/24 0845   Admin Instructions:          1200                      0810                               1800   2000        amLODIPine (NORVASC) tablet 5 mg  Dose: 5 mg  Freq: Nightly Route: PO  Start: 09/16/24 2100   Admin Instructions:       2100 [C]        2025 2100          atorvastatin (LIPITOR) tablet 40 mg  Dose: 40 mg  Freq: Nightly Route: PO  Start: 09/16/24 2100   Admin Instructions:       2100 [C]        2025 2100          ceFAZolin 2000 mg IVPB in 100 mL NS (MBP)  Dose: 2,000 mg  Freq: Every 8 Hours Route: IV  Indications of Use: PERIOPERATIVE PHARMACOPROPHYLAXIS  Start: 09/16/24 1536 End: 09/17/24 0010   Admin Instructions:            ceFAZolin 2000 mg IVPB in 100 mL NS (MBP)  Dose: 2,000 mg  Freq: Once Route: IV  Indications of Use: PERIOPERATIVE PHARMACOPROPHYLAXIS  Start: 09/16/24 0641 End: 09/16/24 0758   Admin Instructions:            dexAMETHasone (DECADRON) injection 4 mg  Dose: 4 mg  Freq: Every 6 Hours Route: IV  Start: 09/16/24 1220 End: 09/19/24 1046   Admin  Instructions:       0336   0846   1531     2100        0353   0810   1508     2025        0327   0934   1046-D/C'd       dexAMETHasone (DECADRON) tablet 4.5 mg  Dose: 4.5 mg  Freq: 2 Times Daily With Meals Route: PO  Start: 09/19/24 1145 End: 09/20/24 1759   Admin Instructions:         (0350) 9019         Followed by   dexAMETHasone (DECADRON) tablet 3 mg  Dose: 3 mg  Freq: 2 Times Daily With Meals Route: PO  Start: 09/20/24 1800 End: 09/22/24 1759   Admin Instructions:            Followed by   dexAMETHasone (DECADRON) tablet 1.5 mg  Dose: 1.5 mg  Freq: 2 Times Daily With Meals Route: PO  Start: 09/22/24 1800 End: 09/24/24 1759   Admin Instructions:            DULoxetine (CYMBALTA) DR capsule 30 mg  Dose: 30 mg  Freq: Daily Route: PO  Start: 09/17/24 0900   Admin Instructions:       (2985) (1859) 8010          DULoxetine (CYMBALTA) DR capsule 60 mg  Dose: 60 mg  Freq: Daily Route: PO  Indications of Use: MAJOR DEPRESSIVE DISORDER,MUSCULOSKELETAL PAIN  Start: 09/17/24 0900   Admin Instructions:       (3000) (1858) 7760          Enoxaparin Sodium (LOVENOX) syringe 40 mg  Dose: 40 mg  Freq: Nightly Route: SC  Indications of Use: PROPHYLAXIS OF VENOUS THROMBOEMBOLISM  Start: 09/18/24 2100   Admin Instructions:        2025 2100          Enoxaparin Sodium (LOVENOX) syringe 40 mg  Dose: 40 mg  Freq: Nightly Route: SC  Indications of Use: PROPHYLAXIS OF VENOUS THROMBOEMBOLISM  Start: 09/17/24 2100 End: 09/17/24 0745   Admin Instructions:       0745-D/C'd          fluticasone (FLONASE) 50 MCG/ACT nasal spray 2 spray  Dose: 2 spray  Freq: Daily Route: NA  Start: 09/17/24 0900    (4224) 0250 [id number]          gabapentin (NEURONTIN) capsule 200 mg  Dose: 200 mg  Freq: 2 Times Daily Route: PO  Start: 09/16/24 2100   Admin Instructions:       0827   2100 [C]       (1898)   2025       0935 2100         hydroCHLOROthiazide tablet 12.5 mg  Dose: 12.5 mg  Freq: Daily Route: PO  Start:  09/17/24 0900   Admin Instructions:       (2163) (6150)        0974          insulin glargine (LANTUS, SEMGLEE) injection 5 Units  Dose: 5 Units  Freq: Daily Route: SC  Start: 09/19/24 1000   Admin Instructions:         0934          insulin regular (humuLIN R,novoLIN R) injection 2-7 Units  Dose: 2-7 Units  Freq: Every 6 Hours Scheduled Route: SC  Start: 09/17/24 1800   Admin Instructions:       1800        0047   (6409) [C]   1228     1800        0018   (3728) [C]   (3399)     1800          lansoprazole (PREVACID SOLUTAB) disintegrating tablet Tablet Delayed Release Dispersible 30 mg  Dose: 30 mg  Freq: Every Early Morning Route: NG  Start: 09/18/24 1200   Admin Instructions:        1505        0532          losartan (COZAAR) tablet 100 mg  Dose: 100 mg  Freq: Daily Route: PO  Start: 09/17/24 0900   Admin Instructions:       (1256) (6067) 7096          metFORMIN ER (GLUCOPHAGE-XR) 24 hr tablet 500 mg  Dose: 500 mg  Freq: Nightly Route: PO  Start: 09/16/24 2100   Admin Instructions:       2100 [C]        2025 2100          methocarbamol (ROBAXIN) tablet 500 mg  Dose: 500 mg  Freq: Every 6 Hours Scheduled Route: PO  Start: 09/19/24 1200      1351   1800         metoprolol succinate XL (TOPROL-XL) 24 hr tablet 50 mg  Dose: 50 mg  Freq: Daily Route: PO  Start: 09/17/24 0900   Admin Instructions:       (5897) (1118) 3912          multivitamin with minerals 1 tablet  Dose: 1 tablet  Freq: Daily Route: PO  Start: 09/17/24 0900   Admin Instructions:       (3816) (0130) 3066          pantoprazole (PROTONIX) EC tablet 40 mg  Dose: 40 mg  Freq: Every Early Morning Route: PO  Start: 09/17/24 0600 End: 09/18/24 0927   Admin Instructions:       0520 0600 0927-D/C'd        pantoprazole (PROTONIX) injection 40 mg  Dose: 40 mg  Freq: Every 24 Hours Route: IV  Indications of Use: STRESS ULCER PROPHYLAXIS  Start: 09/18/24 1015 End: 09/18/24 1101   Admin Instructions:         1101-D/C'd  (1053)            sennosides-docusate (PERICOLACE) 8.6-50 MG per tablet 2 tablet  Dose: 2 tablet  Freq: 2 Times Daily Route: PO  Start: 09/16/24 2100   Admin Instructions:       (3876)   2100 [C]       (0580)   2025       (5504)   2100         And   polyethylene glycol (MIRALAX) packet 17 g  Dose: 17 g  Freq: Daily PRN Route: PO  PRN Reason: Constipation  PRN Comment: Use if senna-docusate is ineffective  Start: 09/16/24 1534   Admin Instructions:            And   bisacodyl (DULCOLAX) EC tablet 5 mg  Dose: 5 mg  Freq: Daily PRN Route: PO  PRN Reason: Constipation  PRN Comment: Use if polyethylene glycol is ineffective  Start: 09/16/24 1534   Admin Instructions:            And   bisacodyl (DULCOLAX) suppository 10 mg  Dose: 10 mg  Freq: Daily PRN Route: RE  PRN Reason: Constipation  PRN Comment: Use if bisacodyl oral is ineffective  Start: 09/16/24 1534   Admin Instructions:            sodium chloride 0.9 % flush 10 mL  Dose: 10 mL  Freq: Every 12 Hours Scheduled Route: IV  Start: 09/16/24 2100 0900 2100       (7593)   2026 0936 2100         sodium chloride 0.9 % flush 3 mL  Dose: 3 mL  Freq: Every 12 Hours Scheduled Route: IV  Start: 09/16/24 0900 End: 09/16/24 1125                     Continuous Meds Sorted by Name  for Kristina Yates as of 9/17/24 through 9/19/24  Legend:       Medications 09/17/24 09/18/24 09/19/24   lactated ringers infusion  Rate: 9 mL/hr Dose: 9 mL/hr  Freq: Continuous Route: IV  Last Dose: 9 mL/hr (09/16/24 0733)  Start: 09/16/24 0728         niCARdipine (CARDENE) 25 mg in 250 mL NS infusion kit  Rate:  mL/hr Dose: 5-15 mg/hr  Freq: Titrated Route: IV  Last Dose: Stopped (09/18/24 1032)  Start: 09/17/24 0015 End: 09/19/24 0925   Admin Instructions:       0323   0519   0845     1216   1457   1751     1933   2314       0049   0102   0639     1032        0925-D/C'd        niCARdipine (CARDENE) 25 mg in 250 mL NS infusion kit  Rate:  mL/hr Dose: 5-15  mg/hr  Freq: Titrated Route: IV  Last Dose: 2.5 mg/hr (09/16/24 1631)  Start: 09/16/24 1149 End: 09/16/24 1935   Admin Instructions:            Pharmacy Consult - Pharmacy to dose  Freq: Continuous PRN Route: XX  PRN Reason: Consult  Start: 09/19/24 1046   Order specific questions:                        PRN Meds Sorted by Name  for Kristina Yates as of 9/17/24 through 9/19/24  Legend:       Medications 09/17/24 09/18/24 09/19/24    acetaminophen (TYLENOL) tablet 650 mg  Dose: 650 mg  Freq: Every 4 Hours PRN Route: PO  PRN Reason: Mild Pain  Start: 09/16/24 1534 End: 09/17/24 0745   Admin Instructions:       0745-D/C'd          Or   acetaminophen (TYLENOL) 160 MG/5ML oral solution 650 mg  Dose: 650 mg  Freq: Every 4 Hours PRN Route: PO  PRN Reason: Mild Pain  Start: 09/16/24 1534 End: 09/17/24 0745   Admin Instructions:       0745-D/C'd          Or   acetaminophen (TYLENOL) suppository 650 mg  Dose: 650 mg  Freq: Every 4 Hours PRN Route: RE  PRN Reason: Mild Pain  Start: 09/16/24 1534 End: 09/17/24 0745   Admin Instructions:       0745-D/C'd          albuterol (PROVENTIL) nebulizer solution 0.083% 2.5 mg/3mL  Dose: 2.5 mg  Freq: Every 4 Hours PRN Route: NEBULIZATION  PRN Reason: Wheezing  Start: 09/17/24 0822   Admin Instructions:            albuterol sulfate HFA (PROVENTIL HFA;VENTOLIN HFA;PROAIR HFA) inhaler 1 puff  Dose: 1 puff  Freq: Every 4 Hours PRN Route: IN  PRN Reason: Wheezing  Start: 09/16/24 1534 End: 09/17/24 0823   Admin Instructions:       0823-D/C'd           sennosides-docusate (PERICOLACE) 8.6-50 MG per tablet 2 tablet  Dose: 2 tablet  Freq: 2 Times Daily Route: PO  Start: 09/16/24 2100   Admin Instructions:       (8990) 7919 [C]       (4603) 5392 (1353)   2100         And   polyethylene glycol (MIRALAX) packet 17 g  Dose: 17 g  Freq: Daily PRN Route: PO  PRN Reason: Constipation  PRN Comment: Use if senna-docusate is ineffective  Start: 09/16/24 153   Admin Instructions:            And    bisacodyl (DULCOLAX) EC tablet 5 mg  Dose: 5 mg  Freq: Daily PRN Route: PO  PRN Reason: Constipation  PRN Comment: Use if polyethylene glycol is ineffective  Start: 09/16/24 1534   Admin Instructions:            And   bisacodyl (DULCOLAX) suppository 10 mg  Dose: 10 mg  Freq: Daily PRN Route: RE  PRN Reason: Constipation  PRN Comment: Use if bisacodyl oral is ineffective  Start: 09/16/24 1534   Admin Instructions:            bupivacaine-EPINEPHrine PF (MARCAINE w/EPI) 0.5% -[id number] injection  Freq: As Needed  Start: 09/16/24 0906 End: 09/16/24 1124         cyclobenzaprine (FLEXERIL) tablet 10 mg  Dose: 10 mg  Freq: 3 Times Daily PRN Route: PO  PRN Reason: Muscle Spasms  Start: 09/16/24 1534 End: 09/19/24 0914   Admin Instructions:         0325   0914-D/C'd       dextrose (D50W) (25 g/50 mL) IV injection 25 g  Dose: 25 g  Freq: Every 15 Minutes PRN Route: IV  PRN Reason: Low Blood Sugar  PRN Comment: Blood Sugar Less Than 70  Start: 09/17/24 1606   Admin Instructions:            dextrose (GLUTOSE) oral gel 15 g  Dose: 15 g  Freq: Every 15 Minutes PRN Route: PO  PRN Reason: Low Blood Sugar  PRN Comment: Blood sugar less than 70  Start: 09/17/24 1606   Admin Instructions:            diphenhydrAMINE (BENADRYL) injection 12.5 mg  Dose: 12.5 mg  Freq: Every 15 Minutes PRN Route: IV  PRN Reason: Itching  PRN Comment: May repeat x 1  Start: 09/16/24 1117 End: 09/16/24 1935   Admin Instructions:             droperidol (INAPSINE) injection 0.625 mg  Dose: 0.625 mg  Freq: Every 20 Minutes PRN Route: IV  PRN Reasons: Nausea,Vomiting  Indications of Use: NAUSEA AND VOMITING  Start: 09/16/24 1117 End: 09/16/24 1935   Admin Instructions:            Or   droperidol (INAPSINE) injection 0.625 mg  Dose: 0.625 mg  Freq: Every 20 Minutes PRN Route: IM  PRN Reasons: Nausea,Vomiting  Start: 09/16/24 1117 End: 09/16/24 1935   Admin Instructions:            ePHEDrine injection 5 mg  Dose: 5 mg  Freq: Once As Needed Route: IV  PRN Comment:  symptomatic hypotension - Notify attending anesthesiologist if this needs to be given  Start: 09/16/24 1117 End: 09/16/24 1935   Admin Instructions:            fentaNYL citrate (PF) (SUBLIMAZE) injection 50 mcg  Dose: 50 mcg  Freq: Every 5 Minutes PRN Route: IV  PRN Reason: Severe Pain  Start: 09/16/24 1117 End: 09/16/24 1935   Admin Instructions:            fentaNYL citrate (PF) (SUBLIMAZE) injection 50 mcg  Dose: 50 mcg  Freq: Once As Needed Route: IV  PRN Reason: Severe Pain  Start: 09/16/24 0726 End: 09/16/24 0740   Admin Instructions:            flumazenil (ROMAZICON) injection 0.2 mg  Dose: 0.2 mg  Freq: As Needed Route: IV  PRN Comment: for benzodiazepine induced unresponsiveness or sedation  Start: 09/16/24 1117 End: 09/16/24 1935   Admin Instructions:            glucagon (GLUCAGEN) injection 1 mg  Dose: 1 mg  Freq: Every 15 Minutes PRN Route: IM  PRN Reason: Low Blood Sugar  PRN Comment: Blood Glucose Less Than 70  Start: 09/17/24 1606   Admin Instructions:            hydrALAZINE (APRESOLINE) injection 5 mg  Dose: 5 mg  Freq: Every 10 Minutes PRN Route: IV  PRN Reason: High Blood Pressure  PRN Comment: for systolic blood pressure greater than 180 mmHg or diastolic blood pressure greater than 105 mmHg  Start: 09/16/24 1117 End: 09/16/24 1935   Admin Instructions:            HYDROcodone-acetaminophen (NORCO) 5-325 MG per tablet 1 tablet  Dose: 1 tablet  Freq: Once As Needed Route: PO  PRN Reason: Moderate Pain  Start: 09/16/24 1117 End: 09/16/24 1558   Admin Instructions:            HYDROcodone-acetaminophen (NORCO) 5-325 MG per tablet 1 tablet  Dose: 1 tablet  Freq: Every 4 Hours PRN Route: PO  PRN Reason: Moderate Pain  Start: 09/16/24 1534 End: 09/17/24 0745   Admin Instructions:       0745-D/C'd           HYDROmorphone (DILAUDID) injection 0.25 mg  Dose: 0.25 mg  Freq: Every 4 Hours PRN Route: IV  PRN Reason: Severe Pain  Start: 09/16/24 1534 End: 09/18/24 1450   Admin Instructions:       0208   0646    1126     1656   2100       0100   0502   0850     1450-D/C'd         And   naloxone (NARCAN) injection 0.4 mg  Dose: 0.4 mg  Freq: Every 5 Minutes PRN Route: IV  PRN Reason: Respiratory Depression  Start: 09/16/24 1534   Admin Instructions:            HYDROmorphone (DILAUDID) injection 0.5 mg  Dose: 0.5 mg  Freq: Every 5 Minutes PRN Route: IV  PRN Reason: Moderate Pain  Start: 09/16/24 1117 End: 09/16/24 1935   Admin Instructions:            ipratropium-albuterol (DUO-NEB) nebulizer solution 3 mL  Dose: 3 mL  Freq: Once As Needed Route: NEBULIZATION  PRN Reasons: Wheezing,Shortness of Air  PRN Comment: bronchospasm  Start: 09/16/24 1117 End: 09/16/24 1935   Admin Instructions:            labetalol (NORMODYNE,TRANDATE) injection 5 mg  Dose: 5 mg  Freq: Every 5 Minutes PRN Route: IV  PRN Reason: High Blood Pressure  PRN Comment: for systolic blood pressure greater than 180 mmHg or diastolic blood pressure greater than 105 mmHg  Start: 09/16/24 1117 End: 09/16/24 1935   Admin Instructions:            lidocaine (XYLOCAINE) 1 % injection 0.5 mL  Dose: 0.5 mL  Freq: Once As Needed Route: ID  PRN Comment: IV Start  Start: 09/16/24 0725 End: 09/16/24 1125         methocarbamol (ROBAXIN) tablet 500 mg  Dose: 500 mg  Freq: 4 Times Daily PRN Route: PO  PRN Reason: Muscle Spasms  Start: 09/16/24 1534 End: 09/19/24 1047      1047-D/C'd        methylPREDNISolone acetate (DEPO-medrol) injection  Freq: As Needed  Start: 09/16/24 0908 End: 09/16/24 1124         midazolam (VERSED) injection 1 mg  Dose: 1 mg  Freq: Every 5 Minutes PRN Route: IV  PRN Comment: Anxiety prophylaxis, Pre-op comfort  Start: 09/16/24 0726 End: 09/16/24 1125   Admin Instructions:            naloxone (NARCAN) injection 0.2 mg  Dose: 0.2 mg  Freq: As Needed Route: IV  PRN Reasons: Opioid Reversal,Respiratory Depression  PRN Comment: unresponsiveness, decrease oxygen saturation  Indications of Use: ACUTE RESPIRATORY FAILURE,OPIOID-INDUCED RESPIRATORY  DEPRESSION  Start: 09/16/24 1117 End: 09/16/24 1935   Admin Instructions:            oxyCODONE (ROXICODONE) 5 MG/5ML solution 5 mg  Dose: 5 mg  Freq: Every 4 Hours PRN Route: PO  PRN Reason: Moderate Pain  Start: 09/18/24 0925 End: 09/19/24 0733   Admin Instructions:        1051   1456   1915     2348        0532   0733-D/C'd       oxyCODONE (ROXICODONE) 5 MG/5ML solution 7.5 mg  Dose: 7.5 mg  Freq: Every 4 Hours PRN Route: PO  PRN Reason: Moderate Pain  Start: 09/19/24 0733 End: 09/23/24 0924   Admin Instructions:         0950      1351        oxyCODONE (ROXICODONE) immediate release tablet 5 mg  Dose: 5 mg  Freq: Every 4 Hours PRN Route: PO  PRN Reason: Moderate Pain  Start: 09/17/24 0745 End: 09/18/24 0932   Admin Instructions:       0846        0932-D/C'd         oxyCODONE-acetaminophen (PERCOCET) 7.5-325 MG per tablet 1 tablet  Dose: 1 tablet  Freq: Every 4 Hours PRN Route: PO  PRN Reason: Severe Pain  Start: 09/16/24 1117 End: 09/16/24 1935   Admin Instructions:            Pharmacy Consult - Pharmacy to dose  Freq: Continuous PRN Route: XX  PRN Reason: Consult  Start: 09/19/24 1046   Order specific questions:             promethazine (PHENERGAN) suppository 25 mg  Dose: 25 mg  Freq: Once As Needed Route: RE  PRN Reasons: Nausea,Vomiting  Start: 09/16/24 1117 End: 09/16/24 1935   Admin Instructions:            Or   promethazine (PHENERGAN) tablet 25 mg  Dose: 25 mg  Freq: Once As Needed Route: PO  PRN Reasons: Nausea,Vomiting  Start: 09/16/24 1117 End: 09/16/24 1935   Admin Instructions:            sodium chloride (NS) irrigation solution  Freq: As Needed  Start: 09/16/24 0907 End: 09/16/24 1124         sodium chloride 0.9 % flush 10 mL  Dose: 10 mL  Freq: As Needed Route: IV  PRN Reason: Line Care  Start: 09/16/24 1534         sodium chloride 0.9 % flush 3-10 mL  Dose: 3-10 mL  Freq: As Needed Route: IV  PRN Reason: Line Care  Start: 09/16/24 0725 End: 09/16/24 1125         sodium chloride 0.9 % infusion 40  mL  Dose: 40 mL  Freq: As Needed Route: IV  PRN Reason: Line Care  Start: 09/16/24 1534   Admin Instructions:            thrombin (THROMBIN-JMI) spray kit  Freq: As Needed  Start: 09/16/24 0907 End: 09/16/24 1124

## 2024-09-20 ENCOUNTER — APPOINTMENT (OUTPATIENT)
Dept: GENERAL RADIOLOGY | Facility: HOSPITAL | Age: 64
DRG: 029 | End: 2024-09-20
Payer: COMMERCIAL

## 2024-09-20 LAB
ANION GAP SERPL CALCULATED.3IONS-SCNC: 13.6 MMOL/L (ref 5–15)
BUN SERPL-MCNC: 18 MG/DL (ref 8–23)
BUN/CREAT SERPL: 30 (ref 7–25)
CALCIUM SPEC-SCNC: 9.2 MG/DL (ref 8.6–10.5)
CHLORIDE SERPL-SCNC: 100 MMOL/L (ref 98–107)
CO2 SERPL-SCNC: 23.4 MMOL/L (ref 22–29)
CREAT SERPL-MCNC: 0.6 MG/DL (ref 0.57–1)
DEPRECATED RDW RBC AUTO: 44.1 FL (ref 37–54)
EGFRCR SERPLBLD CKD-EPI 2021: 101 ML/MIN/1.73
ERYTHROCYTE [DISTWIDTH] IN BLOOD BY AUTOMATED COUNT: 12.9 % (ref 12.3–15.4)
GLUCOSE BLDC GLUCOMTR-MCNC: 155 MG/DL (ref 70–130)
GLUCOSE BLDC GLUCOMTR-MCNC: 187 MG/DL (ref 70–130)
GLUCOSE BLDC GLUCOMTR-MCNC: 193 MG/DL (ref 70–130)
GLUCOSE SERPL-MCNC: 171 MG/DL (ref 65–99)
HCT VFR BLD AUTO: 35.9 % (ref 34–46.6)
HGB BLD-MCNC: 11.9 G/DL (ref 12–15.9)
MCH RBC QN AUTO: 31 PG (ref 26.6–33)
MCHC RBC AUTO-ENTMCNC: 33.1 G/DL (ref 31.5–35.7)
MCV RBC AUTO: 93.5 FL (ref 79–97)
PLATELET # BLD AUTO: 282 10*3/MM3 (ref 140–450)
PMV BLD AUTO: 10 FL (ref 6–12)
POTASSIUM SERPL-SCNC: 3.9 MMOL/L (ref 3.5–5.2)
RBC # BLD AUTO: 3.84 10*6/MM3 (ref 3.77–5.28)
SODIUM SERPL-SCNC: 137 MMOL/L (ref 136–145)
WBC NRBC COR # BLD AUTO: 18.79 10*3/MM3 (ref 3.4–10.8)

## 2024-09-20 PROCEDURE — 82948 REAGENT STRIP/BLOOD GLUCOSE: CPT

## 2024-09-20 PROCEDURE — 92611 MOTION FLUOROSCOPY/SWALLOW: CPT

## 2024-09-20 PROCEDURE — 97530 THERAPEUTIC ACTIVITIES: CPT

## 2024-09-20 PROCEDURE — 92610 EVALUATE SWALLOWING FUNCTION: CPT

## 2024-09-20 PROCEDURE — 94640 AIRWAY INHALATION TREATMENT: CPT

## 2024-09-20 PROCEDURE — 85027 COMPLETE CBC AUTOMATED: CPT | Performed by: NURSE PRACTITIONER

## 2024-09-20 PROCEDURE — 97535 SELF CARE MNGMENT TRAINING: CPT

## 2024-09-20 PROCEDURE — 63710000001 INSULIN GLARGINE PER 5 UNITS: Performed by: NURSE PRACTITIONER

## 2024-09-20 PROCEDURE — 63710000001 INSULIN REGULAR HUMAN PER 5 UNITS: Performed by: NURSE PRACTITIONER

## 2024-09-20 PROCEDURE — 25010000002 ENOXAPARIN PER 10 MG: Performed by: NURSE PRACTITIONER

## 2024-09-20 PROCEDURE — 74230 X-RAY XM SWLNG FUNCJ C+: CPT

## 2024-09-20 PROCEDURE — 63710000001 DEXAMETHASONE PER 0.25 MG: Performed by: NURSE PRACTITIONER

## 2024-09-20 PROCEDURE — 94799 UNLISTED PULMONARY SVC/PX: CPT

## 2024-09-20 PROCEDURE — 80048 BASIC METABOLIC PNL TOTAL CA: CPT | Performed by: NURSE PRACTITIONER

## 2024-09-20 RX ORDER — OXYCODONE AND ACETAMINOPHEN 7.5; 325 MG/1; MG/1
1 TABLET ORAL EVERY 4 HOURS PRN
Status: DISCONTINUED | OUTPATIENT
Start: 2024-09-20 | End: 2024-09-23 | Stop reason: HOSPADM

## 2024-09-20 RX ORDER — PANTOPRAZOLE SODIUM 40 MG/1
40 TABLET, DELAYED RELEASE ORAL
Status: DISCONTINUED | OUTPATIENT
Start: 2024-09-21 | End: 2024-09-23 | Stop reason: HOSPADM

## 2024-09-20 RX ORDER — GUAIFENESIN 600 MG/1
600 TABLET, EXTENDED RELEASE ORAL EVERY 12 HOURS SCHEDULED
Status: COMPLETED | OUTPATIENT
Start: 2024-09-20 | End: 2024-09-20

## 2024-09-20 RX ADMIN — ENOXAPARIN SODIUM 40 MG: 100 INJECTION SUBCUTANEOUS at 20:07

## 2024-09-20 RX ADMIN — ACETAMINOPHEN 650 MG: 650 LIQUID ORAL at 12:10

## 2024-09-20 RX ADMIN — SENNOSIDES AND DOCUSATE SODIUM 2 TABLET: 50; 8.6 TABLET ORAL at 09:43

## 2024-09-20 RX ADMIN — BARIUM SULFATE 1 TEASPOON(S): 0.6 CREAM ORAL at 13:56

## 2024-09-20 RX ADMIN — AMLODIPINE BESYLATE 5 MG: 5 TABLET ORAL at 21:21

## 2024-09-20 RX ADMIN — ATORVASTATIN CALCIUM 40 MG: 20 TABLET, FILM COATED ORAL at 20:07

## 2024-09-20 RX ADMIN — OXYCODONE AND ACETAMINOPHEN 1 TABLET: 7.5; 325 TABLET ORAL at 22:23

## 2024-09-20 RX ADMIN — BARIUM SULFATE 4 ML: 980 POWDER, FOR SUSPENSION ORAL at 13:56

## 2024-09-20 RX ADMIN — OXYCODONE AND ACETAMINOPHEN 1 TABLET: 7.5; 325 TABLET ORAL at 18:15

## 2024-09-20 RX ADMIN — Medication 10 ML: at 20:07

## 2024-09-20 RX ADMIN — ALBUTEROL SULFATE 2.5 MG: 2.5 SOLUTION RESPIRATORY (INHALATION) at 21:20

## 2024-09-20 RX ADMIN — INSULIN HUMAN 2 UNITS: 100 INJECTION, SOLUTION PARENTERAL at 06:22

## 2024-09-20 RX ADMIN — METOPROLOL SUCCINATE 50 MG: 50 TABLET, FILM COATED, EXTENDED RELEASE ORAL at 12:08

## 2024-09-20 RX ADMIN — LANSOPRAZOLE 30 MG: 15 TABLET, ORALLY DISINTEGRATING ORAL at 09:42

## 2024-09-20 RX ADMIN — METHOCARBAMOL TABLETS 500 MG: 500 TABLET, COATED ORAL at 06:22

## 2024-09-20 RX ADMIN — OXYCODONE HYDROCHLORIDE 7.5 MG: 5 SOLUTION ORAL at 14:22

## 2024-09-20 RX ADMIN — OXYCODONE HYDROCHLORIDE 7.5 MG: 5 SOLUTION ORAL at 03:28

## 2024-09-20 RX ADMIN — DEXAMETHASONE 4.5 MG: 4 TABLET ORAL at 09:42

## 2024-09-20 RX ADMIN — INSULIN GLARGINE 5 UNITS: 100 INJECTION, SOLUTION SUBCUTANEOUS at 09:42

## 2024-09-20 RX ADMIN — DEXAMETHASONE 3 MG: 6 TABLET ORAL at 18:15

## 2024-09-20 RX ADMIN — DULOXETINE HYDROCHLORIDE 60 MG: 60 CAPSULE, DELAYED RELEASE ORAL at 12:08

## 2024-09-20 RX ADMIN — GUAIFENESIN 600 MG: 600 TABLET, EXTENDED RELEASE ORAL at 21:21

## 2024-09-20 RX ADMIN — GABAPENTIN 200 MG: 100 CAPSULE ORAL at 20:07

## 2024-09-20 RX ADMIN — FLUTICASONE PROPIONATE 2 SPRAY: 50 SPRAY, METERED NASAL at 12:09

## 2024-09-20 RX ADMIN — Medication 10 ML: at 09:45

## 2024-09-20 RX ADMIN — DULOXETINE HYDROCHLORIDE 30 MG: 30 CAPSULE, DELAYED RELEASE ORAL at 12:09

## 2024-09-20 RX ADMIN — METHOCARBAMOL TABLETS 500 MG: 500 TABLET, COATED ORAL at 18:15

## 2024-09-20 RX ADMIN — INSULIN HUMAN 2 UNITS: 100 INJECTION, SOLUTION PARENTERAL at 12:09

## 2024-09-20 RX ADMIN — BARIUM SULFATE 55 ML: 0.81 POWDER, FOR SUSPENSION ORAL at 13:56

## 2024-09-20 RX ADMIN — METHOCARBAMOL TABLETS 500 MG: 500 TABLET, COATED ORAL at 23:09

## 2024-09-20 RX ADMIN — LOSARTAN POTASSIUM 100 MG: 100 TABLET, FILM COATED ORAL at 12:08

## 2024-09-20 RX ADMIN — BARIUM SULFATE 65 ML: 960 POWDER, FOR SUSPENSION ORAL at 13:56

## 2024-09-20 RX ADMIN — METHOCARBAMOL TABLETS 500 MG: 500 TABLET, COATED ORAL at 00:13

## 2024-09-20 RX ADMIN — OXYCODONE HYDROCHLORIDE 7.5 MG: 5 SOLUTION ORAL at 09:48

## 2024-09-20 RX ADMIN — GABAPENTIN 200 MG: 100 CAPSULE ORAL at 09:43

## 2024-09-20 RX ADMIN — ACETAMINOPHEN 650 MG: 650 LIQUID ORAL at 09:42

## 2024-09-20 RX ADMIN — INSULIN HUMAN 2 UNITS: 100 INJECTION, SOLUTION PARENTERAL at 00:13

## 2024-09-20 RX ADMIN — HYDROCHLOROTHIAZIDE 12.5 MG: 12.5 TABLET ORAL at 12:08

## 2024-09-20 RX ADMIN — ACETAMINOPHEN 650 MG: 650 LIQUID ORAL at 03:27

## 2024-09-20 RX ADMIN — Medication 1 TABLET: at 09:42

## 2024-09-20 RX ADMIN — SENNOSIDES AND DOCUSATE SODIUM 2 TABLET: 50; 8.6 TABLET ORAL at 20:07

## 2024-09-20 RX ADMIN — METHOCARBAMOL TABLETS 500 MG: 500 TABLET, COATED ORAL at 12:08

## 2024-09-20 NOTE — THERAPY EVALUATION
Acute Care - Speech Language Pathology   Swallow Initial Evaluation [facility]     Patient Name: Kristina Yates  : 1960  MRN: [id number]  Today's Date: 2024               Admit Date: 2024    Visit Dx:   No diagnosis found.  Patient Active Problem List   Diagnosis    Cervical radiculopathy    Controlled type 2 diabetes mellitus without complication, without long-term current use of insulin    Diabetic peripheral neuropathy    Menopausal symptom    Adult acne    Hyperlipidemia    Arthritis    DJD (degenerative joint disease), cervical    Asthma    Spinal stenosis    Chronic pain    COPD (chronic obstructive pulmonary disease)    GERD (gastroesophageal reflux disease)    Alcohol abuse    Edmonds esophagus    Essential hypertension    Low back pain    Cauda equina syndrome with neurogenic bladder    Lumbar back pain    Tobacco abuse    Anxiety and depression    Chondromalacia of knee    IBRAHIMA exposure in utero    Impingement syndrome of right shoulder    Nephrolithiasis    Pes anserine bursitis    SI (sacroiliac) joint inflammation    Lumbar pseudoarthrosis    Cervicalgia    Acute neck pain    Spinal cord injury, cervical, without spinal bone injury     Past Medical History:   Diagnosis Date    Alcohol abuse     in recovery, sober since     Anxiety     Arthritis     Asthma     Edmonds esophagus     Blurred vision     Cervical disc disease     Cervical neuritis     Chronic pain     NECK AND BACK    COPD (chronic obstructive pulmonary disease)     STATES NEVER DIAGNOSED WITH    Depression     Diabetes mellitus     Type 2    DJD (degenerative joint disease), cervical     Foot spasms     FROM BACK SURGERY    GERD (gastroesophageal reflux disease)     Hiatal hernia     Hyperlipidemia     Hypertension     Persistent headaches     ?BLOOD PRESSURE OR NECK RELATED???    Seasonal allergies     Spinal headache     Spinal stenosis      Past Surgical History:   Procedure Laterality Date    ANTERIOR CERVICAL  DISCECTOMY W/ FUSION  11/2003    C5-6, C6-7    CERCLAGE CERVIX      CERVICAL EPIDURAL  2007    CERVICAL FUSION      DILATATION AND CURETTAGE      ENDOMETRIAL ABLATION  2007    ENDOSCOPY      MULTIPLE    ENDOSCOPY N/A 07/05/2016    Procedure: ESOPHAGOGASTRODUODENOSCOPY WITH COLD BIOPSIES;  Surgeon: Jose Mcclellan MD;  Location: [facility] ENDOSCOPY;  Service:     ESOPHAGEAL DILATATION  2009    HAND SURGERY Bilateral     CARTILAGE    LUMBAR FUSION      LUMBAR LAMINECTOMY WITH FUSION N/A 06/30/2023    Procedure: OPEN LUMBAR FOUR TO FIVE TRANSFORAMINAL LUMBAR INTERBODY FUSION;  Surgeon: Alex Ortiz MD;  Location: [facility] MAIN OR;  Service: Neurosurgery;  Laterality: N/A;    NISSEN FUNDOPLICATION LAPAROSCOPIC  03/2012    SACROILIAC JOINT FUSION Right 06/04/2024    Procedure: RIGHT PERCUTANEOUS ARTHRODESIS SACROILIAC JOINT, NEUROMONITORING;  Surgeon: Alex Ortiz MD;  Location: [facility] OR;  Service: Neurosurgery;  Laterality: Right;    TONSILLECTOMY         SLP Recommendation and Plan  SLP Swallowing Diagnosis: suspected pharyngeal dysphagia (09/20/24 1200)  SLP Diet Recommendation: NPO, temporary alternate methods of nutrition/hydration (09/20/24 1200)  Recommended Precautions and Strategies: general aspiration precautions (09/20/24 1200)  SLP Rec. for Method of Medication Administration: meds via alternate route (09/20/24 1200)     Monitor for Signs of Aspiration: yes, notify SLP if any concerns (09/20/24 1200)  Recommended Diagnostics: VFSS (MBS) (09/20/24 1200)  Swallow Criteria for Skilled Therapeutic Interventions Met: demonstrates skilled criteria (09/20/24 1200)  Anticipated Discharge Disposition (SLP): anticipate therapy at next level of care (09/20/24 1200)  Rehab Potential/Prognosis, Swallowing: good, to achieve stated therapy goals (09/20/24 1200)  Therapy Frequency (Swallow): PRN (09/20/24 1200)  Predicted Duration Therapy Intervention (Days): until discharge (09/20/24 1200)  Oral Care Recommendations:  "Oral Care BID/PRN (09/20/24 1200)        Daily Summary of Progress (SLP): progress toward functional goals is good (09/20/24 1200)               Treatment Assessment (SLP): continued (09/20/24 1200)     Plan for Continued Treatment (SLP): continue treatment per plan of care (09/20/24 1200)         Plan of Care Reviewed With: patient, spouse  Outcome Evaluation: Clinical swallow evaluation completed this date. Pt overtly coughing with 3/6 trials of thin by cup. Recommend continue NPO, ice chips after oral care, pending VFSS. Plan to complete VFSS this date. Pt, family, and RN all v/u of recs.      SWALLOW EVALUATION (Last 72 Hours)       SLP Adult Swallow Evaluation       Row Name 09/20/24 1200       Rehab Evaluation    Document Type evaluation  -HF    Subjective Information no complaints  -HF    Patient Observations alert;cooperative;agree to therapy  -HF    Patient Effort good  -HF    Symptoms Noted During/After Treatment none  -HF       General Information    Patient Profile Reviewed yes  -HF    Pertinent History Of Current Problem \"Pt is 63 yo female admitted to [facility] for C5 corpectomy, discectomy, decompression of spinal cord at C4/5, C6/7 nerve roots, with instrumentation at C4-C6, difficult hardware removal, C4-5 cord injury.\"  -HF    Current Method of Nutrition NPO;nasogastric feedings  -HF    Precautions/Limitations, Vision WFL;for purposes of eval  -HF    Precautions/Limitations, Hearing WFL;for purposes of eval  -HF    Prior Level of Function-Communication WFL  -HF    Prior Level of Function-Swallowing no diet consistency restrictions  -HF    Plans/Goals Discussed with agreed upon;patient and family  -HF    Barriers to Rehab none identified  -HF       Pain    Additional Documentation Pain Scale: FACES Pre/Post-Treatment (Group)  -HF       Pain Scale: FACES Pre/Post-Treatment    Pain: FACES Scale, Pretreatment 0-->no hurt  -HF       Oral Motor Structure and Function    Dentition Assessment natural, present " and adequate  -HF    Secretion Management WNL/WFL  -HF    Mucosal Quality moist, healthy  -HF       Oral Musculature and Cranial Nerve Assessment    Oral Motor General Assessment WFL  -HF       General Eating/Swallowing Observations    Respiratory Support Currently in Use room air  -HF    Eating/Swallowing Skills self-fed;appropriate self-feeding skills observed  -HF    Positioning During Eating upright 90 degree;upright in chair  -HF    Utensils Used spoon;cup  -HF    Consistencies Trialed thin liquids;ice chips  -HF       Respiratory    Respiratory Status WFL  -HF       Clinical Swallow Eval    Clinical Swallow Evaluation Summary Clinical swallow evaluation completed this date. Pt sitting upright in chair at 90 degree hip flexion and wearing hard c-collar. She is able to self feed. Pt reports hx of esteves's esophagus. Pt accepted PO trials of ice and water by tsp/cup. Functional labial seal, no anterior spillage. Suspect good bolus control and timely oral transit. Suspect reduced larygeal elevation. Cough with 3/6 trials of water by cup. No other overt s/s of aspiration. Recommend NPO with TF for nutrition/meds pending VFSS. Ice chips as needed after oral care. Plan to complete VFSS this date pending radiology availability.  -HF       SLP Evaluation Clinical Impression    SLP Swallowing Diagnosis suspected pharyngeal dysphagia  -HF    Functional Impact risk of aspiration/pneumonia  -HF    Rehab Potential/Prognosis, Swallowing good, to achieve stated therapy goals  -HF    Swallow Criteria for Skilled Therapeutic Interventions Met demonstrates skilled criteria  -HF       SLP Treatment Clinical Impressions    Treatment Assessment (SLP) continued  -HF    Daily Summary of Progress (SLP) progress toward functional goals is good  -HF    Plan for Continued Treatment (SLP) continue treatment per plan of care  -HF    Care Plan Review evaluation/treatment results reviewed;care plan/treatment goals reviewed;patient/other  agree to care plan  -HF    Care Plan Review, Other Participant(s) spouse  -HF       Recommendations    Therapy Frequency (Swallow) PRN  -HF    Predicted Duration Therapy Intervention (Days) until discharge  -HF    SLP Diet Recommendation NPO;temporary alternate methods of nutrition/hydration  -HF    Recommended Diagnostics VFSS (MBS)  -HF    Recommended Precautions and Strategies general aspiration precautions  -HF    Oral Care Recommendations Oral Care BID/PRN  -HF    SLP Rec. for Method of Medication Administration meds via alternate route  -HF    Monitor for Signs of Aspiration yes;notify SLP if any concerns  -HF    Anticipated Discharge Disposition (SLP) anticipate therapy at next level of care  -HF              User Key  (r) = Recorded By, (t) = Taken By, (c) = Cosigned By      Initials Name Effective Dates    Kim Nye SLP 08/01/23 -                     EDUCATION  The patient has been educated in the following areas:   Dysphagia (Swallowing Impairment) Oral Care/Hydration NPO rationale.                Time Calculation:    Time Calculation- SLP       Row Name 09/20/24 1236             Time Calculation- SLP    SLP Start Time 1100  -HF      SLP Received On 09/20/24  -HF         Untimed Charges    SLP Eval/Re-eval  ST Eval Oral Pharyng Swallow - 41566  -HF                User Key  (r) = Recorded By, (t) = Taken By, (c) = Cosigned By      Initials Name Provider Type    HF Kim Perales SLP Speech and Language Pathologist                    Therapy Charges for Today       Code Description Service Date Service Provider Modifiers Qty    [id number] HC ST EVAL ORAL PHARYNG SWALLOW 3 9/20/2024 Kim Perales SLP GN 1                 WAYNE Borden  9/20/2024

## 2024-09-20 NOTE — PROGRESS NOTES
Nutrition Services    Patient Name: Kristina Yates  YOB: 1960  MRN: [id number]  Admission date: 9/16/2024    PROGRESS NOTE      Encounter Information: Tf currently running at goal rate and pt tolerating well.        PO Diet: NPO Diet NPO Type: Tube Feeding   PO Supplements: -   PO Intake:  -       Current nutrition support: Diabetisource AC at 70 ml/hr goal rate.    Nutrition support review: Tolerating       Labs (reviewed below): Reviewed        GI Function:  9/20 BM       Nutrition Intervention Updates: Continue TF at current rate       Results from last 7 days   Lab Units 09/20/24  0403 09/19/24  0337 09/18/24  0316   SODIUM mmol/L 137 139 140   POTASSIUM mmol/L 3.9 4.1 4.2   CHLORIDE mmol/L 100 105 105   CO2 mmol/L 23.4 22.9 24.0   BUN mg/dL 18 16 12   CREATININE mg/dL 0.60 0.51* 0.51*   CALCIUM mg/dL 9.2 9.0 8.9   GLUCOSE mg/dL 171* 143* 152*     Results from last 7 days   Lab Units 09/20/24  0403   HEMOGLOBIN g/dL 11.9*   HEMATOCRIT % 35.9     SARS-CoV-2, JULIO   Date Value Ref Range Status   08/12/2020 Not Detected Not Detected Final     Comment:     This test was developed and its performance characteristics determined  by [employer]. This test has not been FDA cleared or  approved. This test has been authorized by FDA under an Emergency Use  Authorization (EUA). This test is only authorized for the duration of  time the declaration that circumstances exist justifying the  authorization of the emergency use of in vitro diagnostic tests for  detection of SARS-CoV-2 virus and/or diagnosis of COVID-19 infection  under section 564(b)(1) of the Act, 21 U.S.C. 360bbb-3(b)(1), unless  the authorization is terminated or revoked sooner.  When diagnostic testing is negative, the possibility of a false  negative result should be considered in the context of a patient's  recent exposures and the presence of clinical signs and symptoms  consistent with COVID-19. An individual without symptoms of  COVID-19  and who is not shedding SARS-CoV-2 virus would expect to have a  negative (not detected) result in this assay.     Lab Results   Component Value Date    HGBA1C 6.00 (H) 08/03/2023       RD to follow up per protocol.    Electronically signed by:  Apollo Jay RDN, LD  09/20/24 10:44 EDT

## 2024-09-20 NOTE — CASE MANAGEMENT/SOCIAL WORK
Continued Stay Note  [facility]     Patient Name: Kristina Yates  MRN: [id number]  Today's Date: 9/20/2024    Admit Date: 9/16/2024    Plan: [facility] accepted pending pre-cert.   Discharge Plan       Row Name 09/20/24 1003       Plan    Plan [facility] accepted pending pre-cert.    Patient/Family in Agreement with Plan yes    Plan Comments Spoke with [facility]/ [facility]. States able to  accept pending pre-cert and [facility] being discontinued. Packet: office  Pharmacy: updated  Pre-cert: need to submit  Transport: need to arrange. Continue to follow.....[facility]                   Discharge Codes    No documentation.                 Expected Discharge Date and Time       Expected Discharge Date Expected Discharge Time    Sep 23, 2024               Carol Ruiz RN

## 2024-09-20 NOTE — PLAN OF CARE
Goal Outcome Evaluation:       Outcome Evaluation: Clinical swallow evaluation completed this date. Pt overtly coughing with 3/6 trials of thin by cup. Recommend continue NPO, ice chips after oral care, pending VFSS. Plan to complete VFSS this date. Pt, family, and RN all v/u of recs.

## 2024-09-20 NOTE — PLAN OF CARE
Goal Outcome Evaluation:      VSS throughout shift. Pain mildly controlled per MAR. Video swallow completed this shift. Pt cleared for regular diet/thin liquids. NG tube removed per order. GHANSHYAM drain removed per order. Pt up to bathroom multiple times throughout shift, stand by assist. Pt no complaints at this time.

## 2024-09-20 NOTE — PROGRESS NOTES
Name: Kristina Yates ADMIT: 2024   : 1960  PCP: Nyla Mejia APRN    MRN: [id number] LOS: 4 days   AGE/SEX: 64 y.o. female  ROOM: [facility]     Subjective   Subjective   Long comfortable with feeding tube otherwise doing okay.  No nausea or vomiting.  Having some diarrhea she thinks due to BMs.       Objective   Objective   Vital Signs  Temp:  [97.9 °F (36.6 °C)-98.8 °F (37.1 °C)] 98.1 °F (36.7 °C)  Heart Rate:  [61-93] 88  Resp:  [16-18] 18  BP: (144-192)/() 166/88  SpO2:  [93 %-99 %] 98 %  on   ;   Device (Oxygen Therapy): room air  Body mass index is 26.92 kg/m².  Physical Exam  Vitals and nursing note reviewed.   Constitutional:       General: She is not in acute distress.  HENT:      Nose:      Comments: Nasogastric feeding tube  Neck:      Comments: Hard collar in place  Cardiovascular:      Rate and Rhythm: Normal rate and regular rhythm.   Pulmonary:      Effort: Pulmonary effort is normal.      Breath sounds: Normal breath sounds.   Abdominal:      General: Bowel sounds are normal.      Palpations: Abdomen is soft.      Tenderness: There is no abdominal tenderness.   Musculoskeletal:         General: No swelling.   Skin:     General: Skin is warm and dry.   Neurological:      Mental Status: She is alert. Mental status is at baseline.       Results Review:       I reviewed the patient's new clinical results.  Results from last 7 days   Lab Units 24  0403 24  0337 24  0316 24  0430   WBC 10*3/mm3 18.79* 19.26* 20.04* 20.02*   HEMOGLOBIN g/dL 11.9* 11.0* 10.5* 11.5*   PLATELETS 10*3/mm3 282 248 240 267     Results from last 7 days   Lab Units 24  0403 24  0337 24  0316 24  0430   SODIUM mmol/L 137 139 140 136   POTASSIUM mmol/L 3.9 4.1 4.2 3.9   CHLORIDE mmol/L 100 105 105 104   CO2 mmol/L 23.4 22.9 24.0 23.0   BUN mg/dL 18 16 12 5*   CREATININE mg/dL 0.60 0.51* 0.51* 0.39*   GLUCOSE mg/dL 171* 143* 152* 153*   EGFR mL/min/1.73 101.0 105.0  105.0 112.1       Results from last 7 days   Lab Units 09/20/24  0403 09/19/24  0337 09/18/24  0316 09/17/24  0430   CALCIUM mg/dL 9.2 9.0 8.9 9.0       Glucose   Date/Time Value Ref Range Status   09/20/2024 1207 193 (H) 70 - 130 mg/dL Final   09/20/2024 0515 187 (H) 70 - 130 mg/dL Final   09/19/2024 2345 165 (H) 70 - 130 mg/dL Final   09/19/2024 1811 154 (H) 70 - 130 mg/dL Final   09/18/2024 2348 151 (H) 70 - 130 mg/dL Final   09/18/2024 1202 156 (H) 70 - 130 mg/dL Final   09/18/2024 0548 142 (H) 70 - 130 mg/dL Final       I have personally reviewed all medications:  Scheduled Medications  acetaminophen, 650 mg, Oral, Q4H  amLODIPine, 5 mg, Oral, Nightly  atorvastatin, 40 mg, Oral, Nightly  dexAMETHasone, 4.5 mg, Oral, BID With Meals   Followed by  dexAMETHasone, 3 mg, Oral, BID With Meals   Followed by  [START ON 9/22/2024] dexAMETHasone, 1.5 mg, Oral, BID With Meals  DULoxetine, 30 mg, Oral, Daily  DULoxetine, 60 mg, Oral, Daily  enoxaparin, 40 mg, Subcutaneous, Nightly  fluticasone, 2 spray, Nasal, Daily  gabapentin, 200 mg, Oral, BID  hydroCHLOROthiazide, 12.5 mg, Oral, Daily  insulin glargine, 5 Units, Subcutaneous, Daily  insulin regular, 2-7 Units, Subcutaneous, Q6H  lansoprazole, 30 mg, Nasogastric, Q AM  losartan, 100 mg, Oral, Daily  methocarbamol, 500 mg, Oral, Q6H  metoprolol succinate XL, 50 mg, Oral, Daily  multivitamin with minerals, 1 tablet, Oral, Daily  senna-docusate sodium, 2 tablet, Oral, BID   And  polyethylene glycol, 17 g, Oral, Daily  sodium chloride, 10 mL, Intravenous, Q12H    Infusions  lactated ringers, 9 mL/hr, Last Rate: 9 mL/hr (09/16/24 0733)  Pharmacy Consult - Pharmacy to dose,     Diet  NPO Diet NPO Type: Tube Feeding    I have personally reviewed:  [x]  Laboratory   [x]  Microbiology   [x]  Radiology   []  EKG/Telemetry  []  Cardiology/Vascular   []  Pathology    []  Records       Assessment/Plan     Active Hospital Problems    Diagnosis  POA    **Spinal cord injury, cervical,  without spinal bone injury [S14.109A]  Yes    Cervicalgia [M54.2]  Unknown    Essential hypertension [I10]  Yes    Hyperlipidemia [E78.5]  Yes    Controlled type 2 diabetes mellitus without complication, without long-term current use of insulin [E11.9]  Yes    Cervical radiculopathy [M54.12]  Unknown      Resolved Hospital Problems   No resolved problems to display.       64 y.o. female who is s/p CERVICAL FOUR TO SIX DISCECTOMY ANTERIOR WITH FUSION WITH CERVICAL FIVE CORPECTOMY AND REMOVAL OF PREVIOUS HARDWARE.    Blood sugar slightly elevated as expected given postoperative state, steroids and tube feedings.  Continue current insulin.  Blood pressure also slightly elevated likely due to pain, stress, steroids etc.  Continue home antihypertensive regimen.  She has no known heart disease or stroke history.  Would prefer to avoid any abrupt changes in blood pressure therefore will discontinue as needed IV labetalol.      Davy Jensen MD  [facility]  09/20/24  13:25 EDT

## 2024-09-20 NOTE — CONSULTS
"    Patient Name:  Kristina Yates  YOB: 1960  MRN:  [id number]  Date of Admission:  9/16/2024  Date of Consult:  9/19/2024  Patient Care Team:  Nyla Mejia APRN as PCP - General (Family Medicine)  Brooklyn Miles PA as Physician Assistant (Obstetrics and Gynecology)  Pedro Luis Zayas MD as Cardiologist (Cardiology)    Inpatient Internal Medicine Consult  Consult performed by: Nandini Nelson MD  Consult ordered by: Laverne Knox APRN        Evaluate status and make recommendations regarding treatment for \"medical management\"  Subjective   History of Present Illness  Ms. Yates is a 64 y.o. female that has been admitted to [facility] due to cervical radiculopathy.  She has been admitted by the neurosurgical service and and we were asked to see and assist with her medical problems, specifically relating to her chronic medical problems.  Patient is evaluated in the ICU, no family present at this time.  She reports feeling okay, still having neck pain.  She has been evaluated by PM&R and ultimately will likely be going to acute rehab at discharge.  She has been having issues with dysphagia since surgery, per neurosurgery likely related to esophageal compression intraoperatively.  She is on steroids for this which she thinks is helping.      Past Medical History:   Diagnosis Date    Alcohol abuse     in recovery, sober since 2012    Anxiety     Arthritis     Asthma     Edmonds esophagus     Blurred vision     Cervical disc disease     Cervical neuritis     Chronic pain     NECK AND BACK    COPD (chronic obstructive pulmonary disease)     STATES NEVER DIAGNOSED WITH    Depression     Diabetes mellitus     Type 2    DJD (degenerative joint disease), cervical     Foot spasms     FROM BACK SURGERY    GERD (gastroesophageal reflux disease)     Hiatal hernia     Hyperlipidemia     Hypertension     Persistent headaches     ?BLOOD PRESSURE OR NECK RELATED???    Seasonal allergies     " Spinal headache     Spinal stenosis      Past Surgical History:   Procedure Laterality Date    ANTERIOR CERVICAL DISCECTOMY W/ FUSION  11/2003    C5-6, C6-7    CERCLAGE CERVIX      CERVICAL EPIDURAL  2007    CERVICAL FUSION      DILATATION AND CURETTAGE      ENDOMETRIAL ABLATION  2007    ENDOSCOPY      MULTIPLE    ENDOSCOPY N/A 07/05/2016    Procedure: ESOPHAGOGASTRODUODENOSCOPY WITH COLD BIOPSIES;  Surgeon: Jose Mcclellan MD;  Location: [facility] ENDOSCOPY;  Service:     ESOPHAGEAL DILATATION  2009    HAND SURGERY Bilateral     CARTILAGE    LUMBAR FUSION      LUMBAR LAMINECTOMY WITH FUSION N/A 06/30/2023    Procedure: OPEN LUMBAR FOUR TO FIVE TRANSFORAMINAL LUMBAR INTERBODY FUSION;  Surgeon: Alex Ortiz MD;  Location: [facility] MAIN OR;  Service: Neurosurgery;  Laterality: N/A;    NISSEN FUNDOPLICATION LAPAROSCOPIC  03/2012    SACROILIAC JOINT FUSION Right 06/04/2024    Procedure: RIGHT PERCUTANEOUS ARTHRODESIS SACROILIAC JOINT, NEUROMONITORING;  Surgeon: Alex Ortiz MD;  Location: [facility] MAIN OR;  Service: Neurosurgery;  Laterality: Right;    TONSILLECTOMY       Family History   Problem Relation Age of Onset    Cancer Mother         nonhodkinds lymphoma    Heart disease Father     Drug abuse Sister     Heart disease Brother     Lupus Maternal Aunt     Malig Hyperthermia Neg Hx      Social History     Tobacco Use    Smoking status: Some Days     Current packs/day: 0.50     Average packs/day: 0.5 packs/day for 18.6 years (9.3 ttl pk-yrs)     Types: Cigarettes     Start date: 2/25/2006    Smokeless tobacco: Never   Vaping Use    Vaping status: Never Used   Substance Use Topics    Alcohol use: No     Comment: sober since 2012, worsk 12 steps    Drug use: No     Medications Prior to Admission   Medication Sig Dispense Refill Last Dose    albuterol sulfate  (90 Base) MCG/ACT inhaler Inhale 2 puffs Every 4 (Four) Hours As Needed for Wheezing or Shortness of Air.       amLODIPine (NORVASC) 5 MG tablet Take 1 tablet  by mouth Every Night. 30 tablet 0 9/15/2024 at 1900    atorvastatin (LIPITOR) 40 MG tablet Take 1 tablet by mouth Every Night.       cyclobenzaprine (FLEXERIL) 10 MG tablet Take 1 tablet by mouth 3 (Three) Times a Day As Needed for Muscle Spasms. 90 tablet 2 9/15/2024 at 2000    DULoxetine (CYMBALTA) 30 MG capsule Take 1 capsule by mouth Daily. Take with 60 mg capsule for a TDD of 90 mg       DULoxetine (CYMBALTA) 60 MG capsule Take 1 capsule by mouth Daily. Take with 30 mg capsule for a TDD of 90 mg       fluticasone (FLONASE) 50 MCG/ACT nasal spray 2 sprays into the nostril(s) as directed by provider Daily. 18.2 mL 5 9/15/2024 at 0900    gabapentin (NEURONTIN) 100 MG capsule Take 2 capsules by mouth Every 8 (Eight) Hours. (Patient taking differently: Take 2 capsules by mouth 2 (Two) Times a Day.) 90 capsule 0 9/15/2024 at 0700    hydroCHLOROthiazide 25 MG tablet Take 0.5 tablets by mouth Daily.       metoprolol succinate XL (TOPROL-XL) 50 MG 24 hr tablet Take 1 tablet by mouth Daily. 90 tablet 0 9/15/2024 at 0700    Multiple Vitamins-Minerals (MULTIVITAMIN ADULT) tablet Take 1 tablet by mouth Daily.   9/15/2024 at 0700     Allergies:  Fluoxetine, Sulfa antibiotics, Zofran [ondansetron hcl], and Lisinopril    Review of Systems   Constitutional:  Negative for fatigue and fever.   HENT:  Positive for trouble swallowing.    Respiratory:  Negative for cough and shortness of breath.    Cardiovascular:  Negative for chest pain.   Gastrointestinal:  Negative for abdominal pain, nausea and vomiting.   Musculoskeletal:  Positive for neck pain.   Neurological:  Positive for weakness.       Objective      Vital Signs  Temp:  [97.6 °F (36.4 °C)-98.8 °F (37.1 °C)] 98.3 °F (36.8 °C)  Heart Rate:  [] 87  Resp:  [14] 14  BP: (134-181)/(73-95) 181/94  Body mass index is 26.92 kg/m².    Physical Exam  Constitutional:       General: She is not in acute distress.     Appearance: She is normal weight. She is ill-appearing.    HENT:      Head: Normocephalic and atraumatic.      Nose:      Comments: NG tube in place     Mouth/Throat:      Mouth: Mucous membranes are moist.      Pharynx: Oropharynx is clear.   Eyes:      Extraocular Movements: Extraocular movements intact.      Conjunctiva/sclera: Conjunctivae normal.      Pupils: Pupils are equal, round, and reactive to light.   Neck:      Comments: Immobilized in [place] collar  Cardiovascular:      Rate and Rhythm: Normal rate and regular rhythm.   Pulmonary:      Effort: Pulmonary effort is normal. No respiratory distress.      Breath sounds: Normal breath sounds. No wheezing.   Abdominal:      General: Abdomen is flat.      Palpations: Abdomen is soft.      Tenderness: There is no abdominal tenderness.   Musculoskeletal:         General: No swelling or tenderness. Normal range of motion.      Right lower leg: No edema.      Left lower leg: No edema.      Comments: GHANSHYAM drain in place with minimal serosanguineous drainage   Skin:     General: Skin is warm and dry.   Neurological:      Mental Status: She is alert and oriented to person, place, and time.      Motor: Weakness present.   Psychiatric:         Mood and Affect: Mood normal.         Behavior: Behavior normal.         Thought Content: Thought content normal.         Judgment: Judgment normal.         Results Review:   I reviewed the patient's new clinical results.  I reviewed the patient's new imaging results and agree with the interpretation.  I reviewed the patient's other test results and agree with the interpretation  I personally viewed and interpreted the patient's EKG/Telemetry data         Assessment/Plan     Active Hospital Problems    Diagnosis  POA    **Spinal cord injury, cervical, without spinal bone injury [S14.109A]  Yes    Cervicalgia [M54.2]  Unknown    Cervical radiculopathy [M54.12]  Unknown       Dysphagia  - per JACQUE- likely related to esophageal compression intraoperatively  - cont steroids- primary  transitioned to po today for taper  - now with [facility] for nutrition/meds  -SLP going to follow-up    Severe cervicalgia/cervical radiculopathy  - s/p anterior C5 corpectomy, discectomy, osteophytectomy, and  decompression of spinal cord and/or C4/5, C6/7 nerve roots with instrumentation C4-C6. Difficult extraction of previous cervical fusion hardware, intra-operative duraplasty, C4/5 cord injury  - management per primary- including pain control, DVT prophylaxis  - noted plans for AIR  - leukocytosis related to steroids    HTN  - cont losartan, metoprolol, amlodipine, HCTZ  - BP trending up, unclear if this is related to pain, steorids; may need to increase dose if remains uncontrolled  - add PRN labetalol for systolic BP >180    Type 2 diabetes mellitus  - continue lantus; SSI q6h given TF  - dc metformin while inpatient- can resume at discharge    Thank you very much for asking [facility] to be involved in this patient's care. We will follow along with you.      Nandini Nelson MD  [facility]  09/19/24  20:46 EDT

## 2024-09-20 NOTE — MBS/VFSS/FEES
Acute Care - Speech Language Pathology   Swallow Initial Evaluation [facility]     Patient Name: Kristina Yates  : 1960  MRN: [id number]  Today's Date: 2024               Admit Date: 2024    Visit Dx:   No diagnosis found.  Patient Active Problem List   Diagnosis    Cervical radiculopathy    Controlled type 2 diabetes mellitus without complication, without long-term current use of insulin    Diabetic peripheral neuropathy    Menopausal symptom    Adult acne    Hyperlipidemia    Arthritis    DJD (degenerative joint disease), cervical    Asthma    Spinal stenosis    Chronic pain    COPD (chronic obstructive pulmonary disease)    GERD (gastroesophageal reflux disease)    Alcohol abuse    Edmonds esophagus    Essential hypertension    Low back pain    Cauda equina syndrome with neurogenic bladder    Lumbar back pain    Tobacco abuse    Anxiety and depression    Chondromalacia of knee    IBRAHIMA exposure in utero    Impingement syndrome of right shoulder    Nephrolithiasis    Pes anserine bursitis    SI (sacroiliac) joint inflammation    Lumbar pseudoarthrosis    Cervicalgia    Acute neck pain    Spinal cord injury, cervical, without spinal bone injury     Past Medical History:   Diagnosis Date    Alcohol abuse     in recovery, sober since     Anxiety     Arthritis     Asthma     Edmonds esophagus     Blurred vision     Cervical disc disease     Cervical neuritis     Chronic pain     NECK AND BACK    COPD (chronic obstructive pulmonary disease)     STATES NEVER DIAGNOSED WITH    Depression     Diabetes mellitus     Type 2    DJD (degenerative joint disease), cervical     Foot spasms     FROM BACK SURGERY    GERD (gastroesophageal reflux disease)     Hiatal hernia     Hyperlipidemia     Hypertension     Persistent headaches     ?BLOOD PRESSURE OR NECK RELATED???    Seasonal allergies     Spinal headache     Spinal stenosis      Past Surgical History:   Procedure Laterality Date    ANTERIOR CERVICAL  DISCECTOMY W/ FUSION  11/2003    C5-6, C6-7    CERCLAGE CERVIX      CERVICAL EPIDURAL  2007    CERVICAL FUSION      DILATATION AND CURETTAGE      ENDOMETRIAL ABLATION  2007    ENDOSCOPY      MULTIPLE    ENDOSCOPY N/A 07/05/2016    Procedure: ESOPHAGOGASTRODUODENOSCOPY WITH COLD BIOPSIES;  Surgeon: Jose Mcclellan MD;  Location: [facility] ENDOSCOPY;  Service:     ESOPHAGEAL DILATATION  2009    HAND SURGERY Bilateral     CARTILAGE    LUMBAR FUSION      LUMBAR LAMINECTOMY WITH FUSION N/A 06/30/2023    Procedure: OPEN LUMBAR FOUR TO FIVE TRANSFORAMINAL LUMBAR INTERBODY FUSION;  Surgeon: Alex Ortiz MD;  Location: [facility] MAIN OR;  Service: Neurosurgery;  Laterality: N/A;    NISSEN FUNDOPLICATION LAPAROSCOPIC  03/2012    SACROILIAC JOINT FUSION Right 06/04/2024    Procedure: RIGHT PERCUTANEOUS ARTHRODESIS SACROILIAC JOINT, NEUROMONITORING;  Surgeon: Alex Ortiz MD;  Location: [facility] OR;  Service: Neurosurgery;  Laterality: Right;    TONSILLECTOMY         SLP Recommendation and Plan  SLP Swallowing Diagnosis: mild, pharyngeal dysphagia, esophageal dysphagia (09/20/24 1400)  SLP Diet Recommendation: regular textures, thin liquids (09/20/24 1400)  Recommended Precautions and Strategies: general aspiration precautions, reflux precautions (09/20/24 1400)  SLP Rec. for Method of Medication Administration: meds whole, as tolerated (09/20/24 1400)     Monitor for Signs of Aspiration: yes, notify SLP if any concerns (09/20/24 1400)  Recommended Diagnostics:  (diet tolerance) (09/20/24 1400)  Swallow Criteria for Skilled Therapeutic Interventions Met: demonstrates skilled criteria (09/20/24 1400)  Anticipated Discharge Disposition (SLP): anticipate therapy at next level of care (09/20/24 1400)  Rehab Potential/Prognosis, Swallowing: good, to achieve stated therapy goals (09/20/24 1400)  Therapy Frequency (Swallow): PRN (09/20/24 1400)  Predicted Duration Therapy Intervention (Days): until discharge (09/20/24 1400)  Oral Care  "Recommendations: Oral Care BID/PRN (09/20/24 1400)        Daily Summary of Progress (SLP): progress toward functional goals is good (09/20/24 1400)               Treatment Assessment (SLP): continued (09/20/24 1400)     Plan for Continued Treatment (SLP): continue treatment per plan of care (09/20/24 1400)         Plan of Care Reviewed With: patient  Outcome Evaluation: VFSS completed this date. Functional oropharyngeal swallow. No aspiration visualized. Recommend regular diet and thin liquids. General aspiration and reflux precautions. SLP to follow for diet tolerance as appropriate. Pt v/u of recs.      SWALLOW EVALUATION (Last 72 Hours)       SLP Adult Swallow Evaluation       Row Name 09/20/24 1400       Rehab Evaluation    Document Type evaluation  -HF    Subjective Information no complaints  -HF    Patient Observations alert;cooperative;agree to therapy  -HF    Patient Effort good  -HF    Symptoms Noted During/After Treatment none  -HF       General Information    Patient Profile Reviewed yes  -HF    Pertinent History Of Current Problem \"Pt is 65 yo female admitted to [facility] for C5 corpectomy, discectomy, decompression of spinal cord at C4/5, C6/7 nerve roots, with instrumentation at C4-C6, difficult hardware removal, C4-5 cord injury.\" Pt reports hx of Edmonds's esophagus and that she typically takes small bites and sips when eating so she \"doesn't have reflux at 3am\".  -HF    Current Method of Nutrition NPO;nasogastric feedings  -HF    Precautions/Limitations, Vision WFL;for purposes of eval  -HF    Precautions/Limitations, Hearing WFL;for purposes of eval  -HF    Prior Level of Function-Communication WFL  -HF    Prior Level of Function-Swallowing no diet consistency restrictions  -HF    Plans/Goals Discussed with agreed upon;patient and family  -HF    Barriers to Rehab none identified  -HF    Patient's Goals for Discharge return to PO diet  -HF       Pain    Additional Documentation Pain Scale: FACES " Pre/Post-Treatment (Group)  -HF       Pain Scale: FACES Pre/Post-Treatment    Pain: FACES Scale, Pretreatment 0-->no hurt  -HF       Oral Motor Structure and Function    Dentition Assessment natural, present and adequate  -HF    Secretion Management WNL/WFL  -HF    Mucosal Quality moist, healthy  -HF       Oral Musculature and Cranial Nerve Assessment    Oral Motor General Assessment WFL  -HF       General Eating/Swallowing Observations    Respiratory Support Currently in Use room air  -HF    Eating/Swallowing Skills self-fed;appropriate self-feeding skills observed  -HF    Positioning During Eating upright 90 degree;upright in bed  -HF    Utensils Used spoon;cup;straw  -HF    Consistencies Trialed thin liquids;pureed;mixed consistency;regular textures  -HF       Respiratory    Respiratory Status WFL  -HF       Clinical Swallow Eval    Clinical Swallow Evaluation Summary --       MBS/VFSS Interpretation    VFSS Summary VFSS completed this date. Pt seated fully upright in bed and viewed in the lateral projection. Cervical hardware presnet C4-6. She accepted PO trials of thin liquid by tsp/straw, puree, mixed, and regular solid. Pt naturally takes small sips/bites 2/2 Barretts esophagus dx, however able to take larger drinks when cued. Timely and functional mastication. No oral residue. Timely oral transit, fair bolus control, premature spillage ot the pyriforms with thin liquids. Adequate anteiror hyoid excursion, functional laryngeal elevation, and complete epiglottic inversion appreciated. High trace  transient penetration during the swallow with thin by cup. No aspiration. Trace-mild pyriform residue with thin liquids. Recommend regular diet and thin liquids. Small bites and drinks, sit fully upright for meals, and remain upright for at least 30 minutes after meals. SLP to follow for diet tolerance. Pt v/u of recs.  -HF       SLP Communication to Radiology    Summary Statement VFSS completed this date. Caridad Hicks,  APRN present for study. Functional oropharyngeal swallow. High trace transient penetration during the swallow with thin liquid by straw. Trace-mild pyriform residue with thin liquid trials. No aspiration.  -HF       SLP Evaluation Clinical Impression    SLP Swallowing Diagnosis mild;pharyngeal dysphagia;esophageal dysphagia  -HF    Functional Impact risk of aspiration/pneumonia  -HF    Rehab Potential/Prognosis, Swallowing good, to achieve stated therapy goals  -HF    Swallow Criteria for Skilled Therapeutic Interventions Met demonstrates skilled criteria  -HF       SLP Treatment Clinical Impressions    Treatment Assessment (SLP) continued  -HF    Daily Summary of Progress (SLP) progress toward functional goals is good  -HF    Plan for Continued Treatment (SLP) continue treatment per plan of care  -HF    Care Plan Review evaluation/treatment results reviewed;care plan/treatment goals reviewed;patient/other agree to care plan  -HF    Care Plan Review, Other Participant(s) --       Recommendations    Therapy Frequency (Swallow) PRN  -HF    Predicted Duration Therapy Intervention (Days) until discharge  -HF    SLP Diet Recommendation regular textures;thin liquids  -HF    Recommended Diagnostics --  diet tolerance  -HF    Recommended Precautions and Strategies general aspiration precautions;reflux precautions  -HF    Oral Care Recommendations Oral Care BID/PRN  -HF    SLP Rec. for Method of Medication Administration meds whole;as tolerated  -HF    Monitor for Signs of Aspiration yes;notify SLP if any concerns  -HF    Anticipated Discharge Disposition (SLP) anticipate therapy at next level of care  -HF       Swallow Goals (SLP)    Swallow LTGs Patient will demonstrate functional swallow for  -HF       (LTG) Patient will demonstrate functional swallow for    Diet Texture (Demonstrate functional swallow) regular textures  -HF    Liquid viscosity (Demonstrate functional swallow) thin liquids  -HF    Caroline  (Demonstrate functional swallow) independently (over 90% accuracy)  -HF    Time Frame (Demonstrate functional swallow) by discharge  -HF              User Key  (r) = Recorded By, (t) = Taken By, (c) = Cosigned By      Initials Name Effective Dates    Kim Nye SLP 08/01/23 -                     EDUCATION  The patient has been educated in the following areas:   Dysphagia (Swallowing Impairment) Oral Care/Hydration.        SLP GOALS       Row Name 09/20/24 1400             (LTG) Patient will demonstrate functional swallow for    Diet Texture (Demonstrate functional swallow) regular textures  -HF      Liquid viscosity (Demonstrate functional swallow) thin liquids  -HF      [place] (Demonstrate functional swallow) independently (over 90% accuracy)  -HF      Time Frame (Demonstrate functional swallow) by discharge  -HF                User Key  (r) = Recorded By, (t) = Taken By, (c) = Cosigned By      Initials Name Provider Type    Kim Nye SLP Speech and Language Pathologist                         Time Calculation:    Time Calculation- SLP       Row Name 09/20/24 1422 09/20/24 1236          Time Calculation- SLP    SLP Start Time 1315  -HF 1100  -HF     SLP Received On 09/20/24  -HF 09/20/24  -HF        Untimed Charges    SLP Eval/Re-eval  -- ST Eval Oral Pharyng Swallow - 13407  -HF               User Key  (r) = Recorded By, (t) = Taken By, (c) = Cosigned By      Initials Name Provider Type    Kim Nye SLP Speech and Language Pathologist                    Therapy Charges for Today       Code Description Service Date Service Provider Modifiers Qty    [id number] HC ST EVAL ORAL PHARYNG SWALLOW 3 9/20/2024 Kim Perales SLP GN 1    [id number] HC ST MOTION FLUORO EVAL SWALLOW 4 9/20/2024 Kim Perales SLP GN 1                 WAYNE Borden  9/20/2024

## 2024-09-20 NOTE — PLAN OF CARE
Problem: Adult Inpatient Plan of Care  Goal: Plan of Care Review  Outcome: Ongoing, Progressing  Flowsheets (Taken 9/20/2024 0450)  Plan of Care Reviewed With: patient  Goal: Patient-Specific Goal (Individualized)  Outcome: Ongoing, Progressing  Goal: Absence of Hospital-Acquired Illness or Injury  Outcome: Ongoing, Progressing  Intervention: Identify and Manage Fall Risk  Recent Flowsheet Documentation  Taken 9/20/2024 0433 by Brooklyn Barlow RN  Safety Promotion/Fall Prevention: safety round/check completed  Intervention: Prevent Skin Injury  Recent Flowsheet Documentation  Taken 9/20/2024 0433 by Brooklyn Barlow RN  Body Position: position changed independently  Skin Protection: adhesive use limited  Intervention: Prevent and Manage VTE (Venous Thromboembolism) Risk  Recent Flowsheet Documentation  Taken 9/20/2024 0433 by Brooklyn Barlow RN  VTE Prevention/Management:   bilateral   sequential compression devices on  Range of Motion: active ROM (range of motion) encouraged  Intervention: Prevent Infection  Recent Flowsheet Documentation  Taken 9/20/2024 0433 by Brooklyn Barlow RN  Infection Prevention: single patient room provided  Goal: Optimal Comfort and Wellbeing  Outcome: Ongoing, Progressing  Intervention: Provide Person-Centered Care  Recent Flowsheet Documentation  Taken 9/20/2024 0433 by Brooklyn Barlow RN  Trust Relationship/Rapport:   care explained   choices provided  Goal: Readiness for Transition of Care  Outcome: Ongoing, Progressing     Problem: Skin Injury Risk Increased  Goal: Skin Health and Integrity  Outcome: Ongoing, Progressing  Intervention: Optimize Skin Protection  Recent Flowsheet Documentation  Taken 9/20/2024 0433 by Brooklyn Barlow RN  Pressure Reduction Techniques: frequent weight shift encouraged  Head of Bed (HOB) Positioning: HOB at 30 degrees  Pressure Reduction Devices: positioning supports utilized  Skin Protection: adhesive use limited     Problem: COPD (Chronic Obstructive  Pulmonary Disease) Comorbidity  Goal: Maintenance of COPD Symptom Control  Outcome: Ongoing, Progressing  Intervention: Maintain COPD-Symptom Control  Recent Flowsheet Documentation  Taken 9/20/2024 0433 by Brooklyn Barlow RN  Supportive Measures: active listening utilized  Medication Review/Management: medications reviewed     Problem: Diabetes Comorbidity  Goal: Blood Glucose Level Within Targeted Range  Outcome: Ongoing, Progressing     Problem: Hypertension Comorbidity  Goal: Blood Pressure in Desired Range  Outcome: Ongoing, Progressing  Intervention: Maintain Blood Pressure Management  Recent Flowsheet Documentation  Taken 9/20/2024 0433 by Brooklyn Barlow RN  Syncope Management: position changed slowly  Medication Review/Management: medications reviewed     Problem: Fall Injury Risk  Goal: Absence of Fall and Fall-Related Injury  Outcome: Ongoing, Progressing  Intervention: Identify and Manage Contributors  Recent Flowsheet Documentation  Taken 9/20/2024 0433 by Brooklyn Barlow RN  Medication Review/Management: medications reviewed  Intervention: Promote Injury-Free Environment  Recent Flowsheet Documentation  Taken 9/20/2024 0433 by Brooklyn Barlow RN  Safety Promotion/Fall Prevention: safety round/check completed     Problem: Aspiration (Enteral Nutrition)  Goal: Absence of Aspiration Signs and Symptoms  Outcome: Ongoing, Progressing  Intervention: Minimize Aspiration Risk  Recent Flowsheet Documentation  Taken 9/20/2024 0433 by Brooklyn Barlow RN  Head of Bed (HOB) Positioning: HOB at 30 degrees     Problem: Device-Related Complication Risk (Enteral Nutrition)  Goal: Safe, Effective Therapy Delivery  Outcome: Ongoing, Progressing     Problem: Feeding Intolerance (Enteral Nutrition)  Goal: Feeding Tolerance  Outcome: Ongoing, Progressing     Problem: Bleeding (Spinal Surgery)  Goal: Absence of Bleeding  Outcome: Ongoing, Progressing     Problem: Bowel Motility Impaired (Spinal Surgery)  Goal: Effective  Bowel Elimination  Outcome: Ongoing, Progressing     Problem: Fluid and Electrolyte Imbalance (Spinal Surgery)  Goal: Fluid and Electrolyte Balance  Outcome: Ongoing, Progressing     Problem: Functional Ability Impaired (Spinal Surgery)  Goal: Optimal Functional Ability  Outcome: Ongoing, Progressing  Intervention: Optimize Functional Status  Recent Flowsheet Documentation  Taken 9/20/2024 0433 by Brooklyn Barlow RN  Positioning/Transfer Devices:   pillows   in use     Problem: Infection (Spinal Surgery)  Goal: Absence of Infection Signs and Symptoms  Outcome: Ongoing, Progressing  Intervention: Prevent or Manage Infection  Recent Flowsheet Documentation  Taken 9/20/2024 0433 by Brooklyn Barlow RN  Infection Prevention: single patient room provided     Problem: Neurologic Impairment (Spinal Surgery)  Goal: Optimal Neurologic Function  Outcome: Ongoing, Progressing  Intervention: Optimize Neurologic Function  Recent Flowsheet Documentation  Taken 9/20/2024 0433 by Brooklyn Barlow RN  Body Position: position changed independently  Pressure Reduction Devices: positioning supports utilized  Range of Motion: active ROM (range of motion) encouraged     Problem: Ongoing Anesthesia Effects (Spinal Surgery)  Goal: Anesthesia/Sedation Recovery  Outcome: Ongoing, Progressing  Intervention: Optimize Anesthesia Recovery  Recent Flowsheet Documentation  Taken 9/20/2024 0433 by Brooklyn Barlow RN  Safety Promotion/Fall Prevention: safety round/check completed  Reorientation Measures: clock in view     Problem: Pain (Spinal Surgery)  Goal: Acceptable Pain Control  Outcome: Ongoing, Progressing  Intervention: Prevent or Manage Pain  Recent Flowsheet Documentation  Taken 9/20/2024 0433 by Brooklyn Barlow RN  Diversional Activities: television     Problem: Postoperative Nausea and Vomiting (Spinal Surgery)  Goal: Nausea and Vomiting Relief  Outcome: Ongoing, Progressing     Problem: Postoperative Urinary Retention (Spinal  Surgery)  Goal: Effective Urinary Elimination  Outcome: Ongoing, Progressing     Problem: Respiratory Compromise (Spinal Surgery)  Goal: Effective Oxygenation and Ventilation  Outcome: Ongoing, Progressing  Intervention: Optimize Oxygenation and Ventilation  Recent Flowsheet Documentation  Taken 9/20/2024 0433 by Brooklyn Barlow RN  Head of Bed (HOB) Positioning: HOB at 30 degrees   Goal Outcome Evaluation:  Plan of Care Reviewed With: patient

## 2024-09-20 NOTE — PLAN OF CARE
Goal Outcome Evaluation:  Plan of Care Reviewed With: patient        Progress: improving  Outcome Evaluation: Pt tolerated treatment well w/ significant improvement in progression of activities w/ fxnl mobility and ADLs. Pt was very impulsive with mvmts initially attempting to stand up from EOB for toileting before OT cued with NGT wrapped around call light and walker not set up at this point. Pt also with noted LOB seated w/ toileting tasks and would continue to benefit from skilled OT services to maximize ADL IND, improve walker/transfer safety. REC: IRF when stable. If unable to DC to IRF then rec home w/ spouse and OP therapy.      Anticipated Discharge Disposition (OT): inpatient rehabilitation facility

## 2024-09-20 NOTE — THERAPY TREATMENT NOTE
Patient Name: Kristina Yates  : 1960    MRN: [id number]                              Today's Date: 2024       Admit Date: 2024    Visit Dx: No diagnosis found.  Patient Active Problem List   Diagnosis    Cervical radiculopathy    Controlled type 2 diabetes mellitus without complication, without long-term current use of insulin    Diabetic peripheral neuropathy    Menopausal symptom    Adult acne    Hyperlipidemia    Arthritis    DJD (degenerative joint disease), cervical    Asthma    Spinal stenosis    Chronic pain    COPD (chronic obstructive pulmonary disease)    GERD (gastroesophageal reflux disease)    Alcohol abuse    Edmonds esophagus    Essential hypertension    Low back pain    Cauda equina syndrome with neurogenic bladder    Lumbar back pain    Tobacco abuse    Anxiety and depression    Chondromalacia of knee    IBRAHIMA exposure in utero    Impingement syndrome of right shoulder    Nephrolithiasis    Pes anserine bursitis    SI (sacroiliac) joint inflammation    Lumbar pseudoarthrosis    Cervicalgia    Acute neck pain    Spinal cord injury, cervical, without spinal bone injury     Past Medical History:   Diagnosis Date    Alcohol abuse     in recovery, sober since     Anxiety     Arthritis     Asthma     Edmonds esophagus     Blurred vision     Cervical disc disease     Cervical neuritis     Chronic pain     NECK AND BACK    COPD (chronic obstructive pulmonary disease)     STATES NEVER DIAGNOSED WITH    Depression     Diabetes mellitus     Type 2    DJD (degenerative joint disease), cervical     Foot spasms     FROM BACK SURGERY    GERD (gastroesophageal reflux disease)     Hiatal hernia     Hyperlipidemia     Hypertension     Persistent headaches     ?BLOOD PRESSURE OR NECK RELATED???    Seasonal allergies     Spinal headache     Spinal stenosis      Past Surgical History:   Procedure Laterality Date    ANTERIOR CERVICAL DISCECTOMY W/ FUSION  2003    C5-6, C6-7    CERCLAGE CERVIX       CERVICAL EPIDURAL  2007    CERVICAL FUSION      DILATATION AND CURETTAGE      ENDOMETRIAL ABLATION  2007    ENDOSCOPY      MULTIPLE    ENDOSCOPY N/A 07/05/2016    Procedure: ESOPHAGOGASTRODUODENOSCOPY WITH COLD BIOPSIES;  Surgeon: Jose Mcclellan MD;  Location: [facility] ENDOSCOPY;  Service:     ESOPHAGEAL DILATATION  2009    HAND SURGERY Bilateral     CARTILAGE    LUMBAR FUSION      LUMBAR LAMINECTOMY WITH FUSION N/A 06/30/2023    Procedure: OPEN LUMBAR FOUR TO FIVE TRANSFORAMINAL LUMBAR INTERBODY FUSION;  Surgeon: Alex Ortiz MD;  Location: [facility] OR;  Service: Neurosurgery;  Laterality: N/A;    NISSEN FUNDOPLICATION LAPAROSCOPIC  03/2012    SACROILIAC JOINT FUSION Right 06/04/2024    Procedure: RIGHT PERCUTANEOUS ARTHRODESIS SACROILIAC JOINT, NEUROMONITORING;  Surgeon: Alex Ortiz MD;  Location: [facility];  Service: Neurosurgery;  Laterality: Right;    TONSILLECTOMY        General Information       Row Name 09/20/24 1340          OT Time and Intention    Document Type therapy note (daily note)  -BC     Mode of Treatment individual therapy;occupational therapy  -BC       Row Name 09/20/24 1340          General Information    Patient Profile Reviewed yes  -BC     Existing Precautions/Restrictions fall;cervical collar;brace on at all times  -BC       Row Name 09/20/24 1340          Cognition    Orientation Status (Cognition) oriented x 3  -BC       Row Name 09/20/24 1340          Safety Issues, Functional Mobility    Safety Issues Affecting Function (Mobility) sequencing abilities;impulsivity  -BC     Impairments Affecting Function (Mobility) balance;coordination;endurance/activity tolerance;strength;pain;sensation/sensory awareness  -BC               User Key  (r) = Recorded By, (t) = Taken By, (c) = Cosigned By      Initials Name Provider Type    BC Hari Ceballos OT Occupational Therapist                     Mobility/ADL's       Row Name 09/20/24 1340          Bed Mobility    Bed Mobility  supine-sit;sit-supine  -BC     Rolling Left [place] (Bed Mobility) supervision;verbal cues  -BC     Supine-Sit [place] (Bed Mobility) supervision;verbal cues  -BC     Sit-Supine [place] (Bed Mobility) contact guard;1 person assist  -BC     Assistive Device (Bed Mobility) head of bed elevated;bed rails  -BC     Comment, (Bed Mobility) pt impulsive w/ movements, cues for logroll and pacing and maintain c spine precautions. Pt required min  cues for sit>supine onto transport stretcher to leave floor for swallow eval.  -BC       Row Name 09/20/24 1341          Transfers    Transfers toilet transfer;stand-sit transfer;sit-stand transfer  -BC       Row Name 09/20/24 1341          Sit-Stand Transfer    Sit-Stand [place] (Transfers) supervision;verbal cues  -BC     Assistive Device (Sit-Stand Transfers) walker, front-wheeled  -BC     Comment, (Sit-Stand Transfer) Pt very impulsive, reports needing to go BR and standing up before OT donned gait belt. Cues for sitting back down/safety to get walker ready. Multiple stands before being cued, distractable and somewhat anxious today but once toileting tasks completed, less anxiety and impulsivity.  -BC       Row Name 09/20/24 1341          Stand-Sit Transfer    Stand-Sit [place] (Transfers) contact guard;1 person assist  -BC     Assistive Device (Stand-Sit Transfers) walker, front-wheeled  -BC     Comment, (Stand-Sit Transfer) stand>sit on commode twice.  -BC       Row Name 09/20/24 1341          Toilet Transfer    [place] Level (Toilet Transfer) 1 person assist;minimum assist (75% patient effort);contact guard  -BC     Assistive Device (Toilet Transfer) commode;walker, front-wheeled  -BC     Comment, (Toilet Transfer) Pt completed onto/off commode twice for toileting needs during session.  -BC       Row Name 09/20/24 1341          Functional Mobility    Functional Mobility- Ind. Level contact guard assist;supervision required;verbal cues required   -BC     Functional Mobility- Device walker, front-wheeled  -BC     Functional Mobility- Safety Issues balance decreased during turns;step length decreased  -BC     Functional Mobility- Comment Around entire park towers unit w close SUP A/CGA W FWW. Slow, guarded mvmts with decreased step length.  -BC     Patient was able to Ambulate yes  -BC       Row Name 09/20/24 1341          Toileting Assessment/Training    [place] Level (Toileting) perform perineal hygiene;adjust/manage clothing;minimum assist (75% patient effort)  -BC     Position (Toileting) supported standing;supported sitting  -BC     Comment, (Toileting) min A for toileting completed twice during session. CGA first time. 2nd time Pt had a loss of balance laterally when attempting to clean backside w/ min A for stability/safety.  -BC               User Key  (r) = Recorded By, (t) = Taken By, (c) = Cosigned By      Initials Name Provider Type    Hari Haley OT Occupational Therapist                   Obj/Interventions       Row Name 09/20/24 1441          Balance    Static Sitting Balance set-up  -BC     Dynamic Sitting Balance supervision;verbal cues;minimal assist  -BC     Position, Sitting Balance unsupported  -BC     Static Standing Balance contact guard;minimal assist;1-person assist  -BC     Dynamic Standing Balance minimal assist;1-person assist  -BC     Position/Device Used, Standing Balance supported;walker, front-wheeled  -BC     Comment, Balance one LOB seated w/ reaching backside during toileting min A required, CGA /Min A with standing balance  -BC               User Key  (r) = Recorded By, (t) = Taken By, (c) = Cosigned By      Initials Name Provider Type    Hari Haley OT Occupational Therapist                   Goals/Plan    No documentation.                  Clinical Impression       Row Name 09/20/24 1442          Pain Assessment    Pre/Posttreatment Pain Comment No c/o pain this date.  -BC       Row Name 09/20/24 1442           Plan of Care Review    Plan of Care Reviewed With patient  -BC     Progress improving  -BC     Outcome Evaluation Pt tolerated treatment well w/ significant improvement in progression of activities w/ fxnl mobility and ADLs. Pt was very impulsive with mvmts initially attempting to stand up from EOB for toileting before OT cued with NGT wrapped around call light and walker not set up at this point. Pt also with noted LOB seated w/ toileting tasks and would continue to benefit from skilled OT services to maximize ADL IND, improve walker/transfer safety. REC: IRF when stable. If unable to DC to IRF then rec home w/ spouse and OP therapy.  -BC       Row Name 09/20/24 1442          Therapy Assessment/Plan (OT)    Rehab Potential (OT) good, to achieve stated therapy goals  -BC     Criteria for Skilled Therapeutic Interventions Met (OT) yes;skilled treatment is necessary  -BC       Row Name 09/20/24 1442          Therapy Plan Review/Discharge Plan (OT)    Anticipated Discharge Disposition (OT) inpatient rehabilitation facility  -BC       Row Name 09/20/24 1442          Vital Signs    O2 Delivery Pre Treatment room air  -BC     O2 Delivery Post Treatment room air  -BC     Pre Patient Position Supine  -BC     Intra Patient Position Standing  -BC     Post Patient Position Supine  -BC       Row Name 09/20/24 1442          Positioning and Restraints    Pre-Treatment Position in bed  -BC     Post Treatment Position other  -BC     Other Position with other staff  -BC               User Key  (r) = Recorded By, (t) = Taken By, (c) = Cosigned By      Initials Name Provider Type    BC Hari Ceballos, OT Occupational Therapist                   Outcome Measures       Row Name 09/20/24 1445          How much help from another is currently needed...    Putting on and taking off regular lower body clothing? 2  -BC     Bathing (including washing, rinsing, and drying) 2  -BC     Toileting (which includes using toilet bed pan or  urinal) 3  -BC     Putting on and taking off regular upper body clothing 3  -BC     Taking care of personal grooming (such as brushing teeth) 3  -BC     Eating meals 1  -BC     AM-PAC 6 Clicks Score (OT) 14  -BC       Row Name 09/20/24 0942 09/20/24 0433       How much help from another person do you currently need...    Turning from your back to your side while in flat bed without using bedrails? 4  -LC 3  -MS    Moving from lying on back to sitting on the side of a flat bed without bedrails? 3  -LC 3  -MS    Moving to and from a bed to a chair (including a wheelchair)? 3  -LC 3  -MS    Standing up from a chair using your arms (e.g., wheelchair, bedside chair)? 3  -LC 3  -MS    Climbing 3-5 steps with a railing? 2  -LC 2  -MS    To walk in hospital room? 3  -LC 3  -MS    AM-PAC 6 Clicks Score (PT) 18  -LC 17  -MS    Highest Level of Mobility Goal 6 --> Walk 10 steps or more  - 5 --> Static standing  -MS      Row Name 09/20/24 1444          Functional Assessment    Outcome Measure Options AM-PAC 6 Clicks Daily Activity (OT)  -BC               User Key  (r) = Recorded By, (t) = Taken By, (c) = Cosigned By      Initials Name Provider Type    Brooklyn Shane, RN Registered Nurse    Lynn Barbosa RN Registered Nurse    Hari Haley, OT Occupational Therapist                    Occupational Therapy Education       Title: PT OT SLP Therapies (In Progress)       Topic: Occupational Therapy (In Progress)       Point: ADL training (Done)       Description:   Instruct learner(s) on proper safety adaptation and remediation techniques during self care or transfers.   Instruct in proper use of assistive devices.                  Learning Progress Summary             Patient Acceptance, E, VU by  at 9/18/2024 6008    Comment: OT goals, POC, transfer safety                         Point: Home exercise program (Not Started)       Description:   Instruct learner(s) on appropriate technique for monitoring, assisting  and/or progressing therapeutic exercises/activities.                  Learner Progress:  Not documented in this visit.              Point: Precautions (Not Started)       Description:   Instruct learner(s) on prescribed precautions during self-care and functional transfers.                  Learner Progress:  Not documented in this visit.              Point: Body mechanics (Not Started)       Description:   Instruct learner(s) on proper positioning and spine alignment during self-care, functional mobility activities and/or exercises.                  Learner Progress:  Not documented in this visit.                              User Key       Initials Effective Dates Name Provider Type Discipline     04/02/20 -  Lizy Simmons OT Occupational Therapist OT                  OT Recommendation and Plan     Plan of Care Review  Plan of Care Reviewed With: patient  Progress: improving  Outcome Evaluation: Pt tolerated treatment well w/ significant improvement in progression of activities w/ fxnl mobility and ADLs. Pt was very impulsive with mvmts initially attempting to stand up from EOB for toileting before OT cued with NGT wrapped around call light and walker not set up at this point. Pt also with noted LOB seated w/ toileting tasks and would continue to benefit from skilled OT services to maximize ADL IND, improve walker/transfer safety. REC: IRF when stable. If unable to DC to IRF then rec home w/ spouse and OP therapy.     Time Calculation:         Time Calculation- OT       Row Name 09/20/24 1445             Time Calculation- OT    OT Start Time 1312  -BC      OT Stop Time 1336  -BC      OT Time Calculation (min) 24 min  -BC      Total Timed Code Minutes- OT 24 minute(s)  -BC      OT Received On 09/20/24  -BC      OT - Next Appointment 09/23/24  -BC         Timed Charges    52051 - OT Therapeutic Activity Minutes 10  -BC      86176 - OT Self Care/Mgmt Minutes 14  -BC         Total Minutes    Timed Charges Total  Minutes 24  -BC       Total Minutes 24  -BC                User Key  (r) = Recorded By, (t) = Taken By, (c) = Cosigned By      Initials Name Provider Type    BC Hari Ceballos OT Occupational Therapist                  Therapy Charges for Today       Code Description Service Date Service Provider Modifiers Qty    66035894655 HC OT THERAPEUTIC ACT EA 15 MIN 9/19/2024 Hari Ceballos OT GO 1    17333889976 HC OT SELF CARE/MGMT/TRAIN EA 15 MIN 9/19/2024 Hari Ceballos OT GO 1    30099757267 HC OT THERAPEUTIC ACT EA 15 MIN 9/20/2024 Hari Ceballos OT GO 1    69041129474 HC OT SELF CARE/MGMT/TRAIN EA 15 MIN 9/20/2024 Hari Ceballos OT GO 1                 Hari Ceballos OT  9/20/2024

## 2024-09-20 NOTE — PROGRESS NOTES
"[facility] NEUROSURGERY CERVICAL POSTOP NOTE      CC:POD 4 C5 corpectomy C4/5, C6/7 decompression, removal of old hardware and C4-7 instrumentation, duraplasty      Subjective     Interval History: Reports that she walked around part of the unit.  Anticipating repeat swallow eval and hoping to get Dobbhoff tube out.  Much more comfortable in a [place] J collar.  Voiding and had BM.  Feels \"anxious\".  She states this is something she has dealt with in the past and she feels that the steroids are contributing.  Seen by Dr. Us yesterday evening.    Objective     Vital signs in last 24 hours:  Temp:  [97.9 °F (36.6 °C)-98.8 °F (37.1 °C)] 98.1 °F (36.7 °C)  Heart Rate:  [61-99] 88  Resp:  [16] 16  BP: (144-192)/() 175/94    Intake/Output this shift:  No intake/output data recorded.    GHANSHYAM 10 cc x 24 hrs    LABS:  Results from last 7 days   Lab Units 09/20/24  0403 09/19/24  0337 09/18/24  0316   WBC 10*3/mm3 18.79* 19.26* 20.04*   HEMOGLOBIN g/dL 11.9* 11.0* 10.5*   HEMATOCRIT % 35.9 33.2* 31.3*   PLATELETS 10*3/mm3 282 248 240     Results from last 7 days   Lab Units 09/20/24  0403 09/19/24  0337 09/18/24  0316   SODIUM mmol/L 137 139 140   POTASSIUM mmol/L 3.9 4.1 4.2   CHLORIDE mmol/L 100 105 105   CO2 mmol/L 23.4 22.9 24.0   BUN mg/dL 18 16 12   CREATININE mg/dL 0.60 0.51* 0.51*   CALCIUM mg/dL 9.2 9.0 8.9   GLUCOSE mg/dL 171* 143* 152*         IMAGING STUDIES:  MRI cervical postoperative changes C4-7 with new hardware at C4-6 with corpectomy of C5.  There is interval development of C5/abnormal cord signal within the cervical spinal cord at the level of C5 and small focus of susceptibility artifact consistent with small focus of hemorrhage.  Otherwise degenerative changes present.    I personally viewed and interpreted the patient's MRI cervical spine.  Also reviewed by and discussed with reading radiologist Dr. Olea and Dr. Ortiz    Meds reviewed/changed: Yes  Tylenol 650 mg p.o. every 4 hours  Decadron 6-day " wean in progress  Cymbalta DR 30 mg +60 mg p.o. daily  Lovenox 40 mg subcu nightly  Gabapentin 200 mg p.o. twice daily  Robaxin 500 mg p.o. every 6 hours  Roxicodone 7.5 mg every 4 hours as needed-5 doses      Physical Exam:    General:   Awake, alert, oriented x3. Speech clear with no aphasia.  Hoarseness resolved.  Neck:    incision right anterior well-approximated with no redness drainage swelling.  No hematoma, swallow/trachea midline; wearing [place] J collar ROM deferred; GHANSHYAM to bulb suction with serosanguineous output  Motor:    5/5 bilateral upper and lower extremities except triceps 4+/5, left thumb extension 4/5, left wrist extension 4/5   reflexes:   No Roy, no clonus  Station and Gait:             Per PT note 9/20-performed bed mobility supine-sit Min A, STS OOB Min A with rxw, ambulated 120' Min A with rxw. Pt sat in recliner briefly, had to use the restroom with OT then returned to bed. Pt returned to bed sit-supine Min A       Assessment & Plan     ASSESSMENT:      Spinal cord injury, cervical, without spinal bone injury    Cervical radiculopathy    Controlled type 2 diabetes mellitus without complication, without long-term current use of insulin    Hyperlipidemia    Essential hypertension    Cervicalgia    Patient is 4 days status post cervical 5 corpectomy with removal of prior instrumentation and new C4-6 instrumented fusion. Surgery complicated by hardware failure resulting in durotomy repair and C4/5 spinal cord injury.  Patient continues to do well with no neuropathic pain.  Typical neck discomfort although better with new collar.  Incision site is healing well.  Minimal output from GHANSHYAM.  Will be discontinued.  She is ambulating with therapy and has been seen by Dr. Us with physical medicine and rehab who recommends [facility] rehab.  She continues to have swallow difficulties and waiting reevaluation from speech therapy for possible nasogastric feeding tube removal.  She is on oral steroid  "wean and is scheduled muscle relaxant.  She is tolerating hard collar which should remain in place except for showers.  We look forward to transitioning her to acute rehab soon once her swallow issues resolved or she has temporary PEG placement.    PLAN:   Hard collar all times except may be removed for showering  DC GHANSHYAM  Cont therapy, OOB activity, rehab evals  Continue 6-day dexamethasone wean  Appreciate LHA assistance with care of patient  Okay for discharge to acute rehab when nasal feeding tube out    I discussed the patient's findings and my recommendations with patient, family, nursing staff, and Dr Ortiz    During patient visit, I utilized appropriate personal protective equipment including  gloves.  Appropriate PPE was worn during the entire visit.  Hand hygiene was completed before and after.      LOS: 4 days       Laverne Knox, APRN  9/20/2024  09:17 EDT    \"Dictated utilizing Dragon dictation\".      "

## 2024-09-20 NOTE — PLAN OF CARE
Goal Outcome Evaluation:  Plan of Care Reviewed With: patient           Outcome Evaluation: VFSS completed this date. Functional oropharyngeal swallow. No aspiration visualized. Recommend regular diet and thin liquids. General aspiration and reflux precautions. SLP to follow for diet tolerance as appropriate. Pt v/u of recs.      Anticipated Discharge Disposition (SLP): anticipate therapy at next level of care          SLP Swallowing Diagnosis: mild, pharyngeal dysphagia, esophageal dysphagia (09/20/24 1400)  Treatment Assessment (SLP): continued (09/20/24 1400)     Plan for Continued Treatment (SLP): continue treatment per plan of care (09/20/24 1400)

## 2024-09-21 LAB
ANION GAP SERPL CALCULATED.3IONS-SCNC: 13 MMOL/L (ref 5–15)
BUN SERPL-MCNC: 17 MG/DL (ref 8–23)
BUN/CREAT SERPL: 35.4 (ref 7–25)
CALCIUM SPEC-SCNC: 9.3 MG/DL (ref 8.6–10.5)
CHLORIDE SERPL-SCNC: 98 MMOL/L (ref 98–107)
CO2 SERPL-SCNC: 24 MMOL/L (ref 22–29)
CREAT SERPL-MCNC: 0.48 MG/DL (ref 0.57–1)
DEPRECATED RDW RBC AUTO: 42.3 FL (ref 37–54)
EGFRCR SERPLBLD CKD-EPI 2021: 106.6 ML/MIN/1.73
ERYTHROCYTE [DISTWIDTH] IN BLOOD BY AUTOMATED COUNT: 12.9 % (ref 12.3–15.4)
GLUCOSE BLDC GLUCOMTR-MCNC: 125 MG/DL (ref 70–130)
GLUCOSE BLDC GLUCOMTR-MCNC: 127 MG/DL (ref 70–130)
GLUCOSE BLDC GLUCOMTR-MCNC: 140 MG/DL (ref 70–130)
GLUCOSE BLDC GLUCOMTR-MCNC: 168 MG/DL (ref 70–130)
GLUCOSE SERPL-MCNC: 115 MG/DL (ref 65–99)
HCT VFR BLD AUTO: 38 % (ref 34–46.6)
HGB BLD-MCNC: 12.7 G/DL (ref 12–15.9)
MCH RBC QN AUTO: 30.2 PG (ref 26.6–33)
MCHC RBC AUTO-ENTMCNC: 33.4 G/DL (ref 31.5–35.7)
MCV RBC AUTO: 90.3 FL (ref 79–97)
PLATELET # BLD AUTO: 336 10*3/MM3 (ref 140–450)
PMV BLD AUTO: 9.6 FL (ref 6–12)
POTASSIUM SERPL-SCNC: 3.5 MMOL/L (ref 3.5–5.2)
RBC # BLD AUTO: 4.21 10*6/MM3 (ref 3.77–5.28)
SODIUM SERPL-SCNC: 135 MMOL/L (ref 136–145)
WBC NRBC COR # BLD AUTO: 19.86 10*3/MM3 (ref 3.4–10.8)

## 2024-09-21 PROCEDURE — 85027 COMPLETE CBC AUTOMATED: CPT | Performed by: NURSE PRACTITIONER

## 2024-09-21 PROCEDURE — 25010000002 PROCHLORPERAZINE 10 MG/2ML SOLUTION: Performed by: HOSPITALIST

## 2024-09-21 PROCEDURE — 80048 BASIC METABOLIC PNL TOTAL CA: CPT | Performed by: NURSE PRACTITIONER

## 2024-09-21 PROCEDURE — 63710000001 INSULIN GLARGINE PER 5 UNITS: Performed by: NURSE PRACTITIONER

## 2024-09-21 PROCEDURE — 99024 POSTOP FOLLOW-UP VISIT: CPT | Performed by: NURSE PRACTITIONER

## 2024-09-21 PROCEDURE — 82948 REAGENT STRIP/BLOOD GLUCOSE: CPT

## 2024-09-21 PROCEDURE — 25010000002 ENOXAPARIN PER 10 MG: Performed by: NURSE PRACTITIONER

## 2024-09-21 PROCEDURE — 97116 GAIT TRAINING THERAPY: CPT

## 2024-09-21 PROCEDURE — 94799 UNLISTED PULMONARY SVC/PX: CPT

## 2024-09-21 RX ORDER — METOPROLOL SUCCINATE 50 MG/1
50 TABLET, EXTENDED RELEASE ORAL EVERY 12 HOURS SCHEDULED
Status: DISCONTINUED | OUTPATIENT
Start: 2024-09-21 | End: 2024-09-23 | Stop reason: HOSPADM

## 2024-09-21 RX ORDER — INSULIN LISPRO 100 [IU]/ML
2-7 INJECTION, SOLUTION INTRAVENOUS; SUBCUTANEOUS
Status: DISCONTINUED | OUTPATIENT
Start: 2024-09-21 | End: 2024-09-23 | Stop reason: HOSPADM

## 2024-09-21 RX ORDER — CALCIUM CARBONATE 500 MG/1
2 TABLET, CHEWABLE ORAL 3 TIMES DAILY PRN
Status: DISCONTINUED | OUTPATIENT
Start: 2024-09-21 | End: 2024-09-23 | Stop reason: HOSPADM

## 2024-09-21 RX ORDER — GUAIFENESIN 600 MG/1
600 TABLET, EXTENDED RELEASE ORAL EVERY 12 HOURS SCHEDULED
Status: DISCONTINUED | OUTPATIENT
Start: 2024-09-21 | End: 2024-09-23 | Stop reason: HOSPADM

## 2024-09-21 RX ORDER — PROCHLORPERAZINE EDISYLATE 5 MG/ML
5 INJECTION INTRAMUSCULAR; INTRAVENOUS EVERY 6 HOURS PRN
Status: DISCONTINUED | OUTPATIENT
Start: 2024-09-21 | End: 2024-09-23 | Stop reason: HOSPADM

## 2024-09-21 RX ADMIN — HYDROCHLOROTHIAZIDE 12.5 MG: 12.5 TABLET ORAL at 08:44

## 2024-09-21 RX ADMIN — ANTACID TABLETS 2 TABLET: 500 TABLET, CHEWABLE ORAL at 09:43

## 2024-09-21 RX ADMIN — SENNOSIDES AND DOCUSATE SODIUM 2 TABLET: 50; 8.6 TABLET ORAL at 22:07

## 2024-09-21 RX ADMIN — METHOCARBAMOL TABLETS 500 MG: 500 TABLET, COATED ORAL at 06:02

## 2024-09-21 RX ADMIN — PROCHLORPERAZINE EDISYLATE 5 MG: 5 INJECTION INTRAMUSCULAR; INTRAVENOUS at 12:56

## 2024-09-21 RX ADMIN — PANTOPRAZOLE SODIUM 40 MG: 40 TABLET, DELAYED RELEASE ORAL at 06:02

## 2024-09-21 RX ADMIN — OXYCODONE AND ACETAMINOPHEN 1 TABLET: 7.5; 325 TABLET ORAL at 22:07

## 2024-09-21 RX ADMIN — DULOXETINE HYDROCHLORIDE 60 MG: 60 CAPSULE, DELAYED RELEASE ORAL at 08:44

## 2024-09-21 RX ADMIN — ENOXAPARIN SODIUM 40 MG: 100 INJECTION SUBCUTANEOUS at 22:07

## 2024-09-21 RX ADMIN — DEXAMETHASONE 3 MG: 6 TABLET ORAL at 18:00

## 2024-09-21 RX ADMIN — Medication 1 TABLET: at 08:43

## 2024-09-21 RX ADMIN — OXYCODONE AND ACETAMINOPHEN 1 TABLET: 7.5; 325 TABLET ORAL at 06:02

## 2024-09-21 RX ADMIN — FLUTICASONE PROPIONATE 2 SPRAY: 50 SPRAY, METERED NASAL at 08:45

## 2024-09-21 RX ADMIN — GUAIFENESIN 600 MG: 600 TABLET, EXTENDED RELEASE ORAL at 22:07

## 2024-09-21 RX ADMIN — GABAPENTIN 200 MG: 100 CAPSULE ORAL at 08:43

## 2024-09-21 RX ADMIN — OXYCODONE AND ACETAMINOPHEN 1 TABLET: 7.5; 325 TABLET ORAL at 14:10

## 2024-09-21 RX ADMIN — Medication 10 ML: at 08:46

## 2024-09-21 RX ADMIN — LOSARTAN POTASSIUM 100 MG: 100 TABLET, FILM COATED ORAL at 08:43

## 2024-09-21 RX ADMIN — METHOCARBAMOL TABLETS 500 MG: 500 TABLET, COATED ORAL at 12:23

## 2024-09-21 RX ADMIN — OXYCODONE AND ACETAMINOPHEN 1 TABLET: 7.5; 325 TABLET ORAL at 10:00

## 2024-09-21 RX ADMIN — OXYCODONE AND ACETAMINOPHEN 1 TABLET: 7.5; 325 TABLET ORAL at 01:45

## 2024-09-21 RX ADMIN — DEXAMETHASONE 3 MG: 6 TABLET ORAL at 12:23

## 2024-09-21 RX ADMIN — OXYCODONE AND ACETAMINOPHEN 1 TABLET: 7.5; 325 TABLET ORAL at 18:00

## 2024-09-21 RX ADMIN — METHOCARBAMOL TABLETS 500 MG: 500 TABLET, COATED ORAL at 18:00

## 2024-09-21 RX ADMIN — METHOCARBAMOL TABLETS 500 MG: 500 TABLET, COATED ORAL at 23:13

## 2024-09-21 RX ADMIN — PROCHLORPERAZINE EDISYLATE 5 MG: 5 INJECTION INTRAMUSCULAR; INTRAVENOUS at 22:07

## 2024-09-21 RX ADMIN — ATORVASTATIN CALCIUM 40 MG: 20 TABLET, FILM COATED ORAL at 22:07

## 2024-09-21 RX ADMIN — AMLODIPINE BESYLATE 5 MG: 5 TABLET ORAL at 22:10

## 2024-09-21 RX ADMIN — METOPROLOL SUCCINATE 50 MG: 50 TABLET, FILM COATED, EXTENDED RELEASE ORAL at 08:43

## 2024-09-21 RX ADMIN — GABAPENTIN 200 MG: 100 CAPSULE ORAL at 22:07

## 2024-09-21 RX ADMIN — GUAIFENESIN 600 MG: 600 TABLET, EXTENDED RELEASE ORAL at 08:50

## 2024-09-21 RX ADMIN — DULOXETINE HYDROCHLORIDE 30 MG: 30 CAPSULE, DELAYED RELEASE ORAL at 08:44

## 2024-09-21 RX ADMIN — INSULIN GLARGINE 5 UNITS: 100 INJECTION, SOLUTION SUBCUTANEOUS at 08:43

## 2024-09-21 RX ADMIN — Medication 10 ML: at 22:13

## 2024-09-21 NOTE — THERAPY TREATMENT NOTE
Patient Name: Kristina Yates  : 1960    MRN: [id number]                              Today's Date: 2024       Admit Date: 2024    Visit Dx: No diagnosis found.  Patient Active Problem List   Diagnosis    Cervical radiculopathy    Controlled type 2 diabetes mellitus without complication, without long-term current use of insulin    Diabetic peripheral neuropathy    Menopausal symptom    Adult acne    Hyperlipidemia    Arthritis    DJD (degenerative joint disease), cervical    Asthma    Spinal stenosis    Chronic pain    COPD (chronic obstructive pulmonary disease)    GERD (gastroesophageal reflux disease)    Alcohol abuse    Edmonds esophagus    Essential hypertension    Low back pain    Cauda equina syndrome with neurogenic bladder    Lumbar back pain    Tobacco abuse    Anxiety and depression    Chondromalacia of knee    IBRAHIMA exposure in utero    Impingement syndrome of right shoulder    Nephrolithiasis    Pes anserine bursitis    SI (sacroiliac) joint inflammation    Lumbar pseudoarthrosis    Cervicalgia    Acute neck pain    Spinal cord injury, cervical, without spinal bone injury     Past Medical History:   Diagnosis Date    Alcohol abuse     in recovery, sober since     Anxiety     Arthritis     Asthma     Edmonds esophagus     Blurred vision     Cervical disc disease     Cervical neuritis     Chronic pain     NECK AND BACK    COPD (chronic obstructive pulmonary disease)     STATES NEVER DIAGNOSED WITH    Depression     Diabetes mellitus     Type 2    DJD (degenerative joint disease), cervical     Foot spasms     FROM BACK SURGERY    GERD (gastroesophageal reflux disease)     Hiatal hernia     Hyperlipidemia     Hypertension     Persistent headaches     ?BLOOD PRESSURE OR NECK RELATED???    Seasonal allergies     Spinal headache     Spinal stenosis      Past Surgical History:   Procedure Laterality Date    ANTERIOR CERVICAL DISCECTOMY W/ FUSION  2003    C5-6, C6-7    CERCLAGE CERVIX       CERVICAL EPIDURAL  2007    CERVICAL FUSION      DILATATION AND CURETTAGE      ENDOMETRIAL ABLATION  2007    ENDOSCOPY      MULTIPLE    ENDOSCOPY N/A 07/05/2016    Procedure: ESOPHAGOGASTRODUODENOSCOPY WITH COLD BIOPSIES;  Surgeon: Jose Mcclellan MD;  Location: [facility] ENDOSCOPY;  Service:     ESOPHAGEAL DILATATION  2009    HAND SURGERY Bilateral     CARTILAGE    LUMBAR FUSION      LUMBAR LAMINECTOMY WITH FUSION N/A 06/30/2023    Procedure: OPEN LUMBAR FOUR TO FIVE TRANSFORAMINAL LUMBAR INTERBODY FUSION;  Surgeon: Alex Ortiz MD;  Location: [facility] OR;  Service: Neurosurgery;  Laterality: N/A;    NISSEN FUNDOPLICATION LAPAROSCOPIC  03/2012    SACROILIAC JOINT FUSION Right 06/04/2024    Procedure: RIGHT PERCUTANEOUS ARTHRODESIS SACROILIAC JOINT, NEUROMONITORING;  Surgeon: Alex Ortiz MD;  Location: [facility];  Service: Neurosurgery;  Laterality: Right;    TONSILLECTOMY        General Information       Row Name 09/21/24 1639          Physical Therapy Time and Intention    Document Type therapy note (daily note)  -EB     Mode of Treatment physical therapy  -EB       Row Name 09/21/24 1639          General Information    Patient Profile Reviewed yes  -EB     Existing Precautions/Restrictions fall;cervical collar;brace on at all times  -EB       Row Name 09/21/24 1639          Cognition    Orientation Status (Cognition) oriented x 3  -EB       Row Name 09/21/24 1639          Safety Issues, Functional Mobility    Safety Issues Affecting Function (Mobility) impulsivity;safety precautions follow-through/compliance;sequencing abilities  -EB     Impairments Affecting Function (Mobility) balance;coordination;sensation/sensory awareness;endurance/activity tolerance;strength  -EB               User Key  (r) = Recorded By, (t) = Taken By, (c) = Cosigned By      Initials Name Provider Type    EB Estefany Byrne PTA Physical Therapist Assistant                   Mobility       Row Name 09/21/24 1639          Bed  Mobility    Supine-Sit [place] (Bed Mobility) standby assist  -EB     Sit-Supine [place] (Bed Mobility) standby assist  -EB     Assistive Device (Bed Mobility) bed rails;head of bed elevated  -EB     Comment, (Bed Mobility) pt has impulsive movements. cues for log roll technique getting into and out of bed.  -EB       Row Name 09/21/24 1639          Sit-Stand Transfer    Sit-Stand [place] (Transfers) standby assist  -EB     Assistive Device (Sit-Stand Transfers) walker, front-wheeled  -EB     Comment, (Sit-Stand Transfer) pt impulsive, standing when gait belt is not donned or asked for pt to stand. cues for pt to sit and wait d/t safety.  -       Row Name 09/21/24 1639          Gait/Stairs (Locomotion)    [place] Level (Gait) contact guard;minimum assist (75% patient effort)  -EB     Assistive Device (Gait) walker, front-wheeled  -EB     Distance in Feet (Gait) 130  -EB     Deviations/Abnormal Patterns (Gait) base of support, narrow;stride length decreased;gait speed decreased;joe decreased  -EB     Left Sided Gait Deviations decreased knee extension  -EB     Right Sided Gait Deviations decreased knee extension  -EB     Comment, (Gait/Stairs) 30ft with walker but was  not using much. Trialed pt with out. a little unsteady but no LOB. cues for pt decrease gait speed.  -EB               User Key  (r) = Recorded By, (t) = Taken By, (c) = Cosigned By      Initials Name Provider Type    EB Estefany Byrne PTA Physical Therapist Assistant                   Obj/Interventions    No documentation.                  Goals/Plan    No documentation.                  Clinical Impression       Row Name 09/21/24 1643          Pain    Pretreatment Pain Rating 0/10 - no pain  -EB     Posttreatment Pain Rating 0/10 - no pain  -EB     Pain Location - neck  -EB       Row Name 09/21/24 164          Plan of Care Review    Plan of Care Reviewed With patient  -EB     Progress improving  -EB     Outcome Evaluation  Pt seen for PT tx today. Pt is improving with mobility but can be impulsive with movements. Pt needs cues to log roll when getting into and out of bed and to also wait to stand until asked to do so and gait belt donned for safety. Pt initial ambulated in hallway with walker but was not using it much. Trialed pt without walker and pt required CGA/Katlyn. Pt was a little unsteady with gait but no LOB. Pt has decreased murtaza knee extension and slight posterior lean during gait. Pt cued to decrease gait speed and correct posture. Will continue to follow and progress pt as able. Recommend IRF at d/c.  -       Row Name 09/21/24 1643          Therapy Assessment/Plan (PT)    Therapy Frequency (PT) 6 times/wk  -       Row Name 09/21/24 1643          Positioning and Restraints    Pre-Treatment Position in bed  -EB     Post Treatment Position bed  -EB     In Bed supine;call light within reach;encouraged to call for assist;exit alarm on  -EB               User Key  (r) = Recorded By, (t) = Taken By, (c) = Cosigned By      Initials Name Provider Type    Estefany Dudley PTA Physical Therapist Assistant                   Outcome Measures       Row Name 09/21/24 1646 09/21/24 0844       How much help from another person do you currently need...    Turning from your back to your side while in flat bed without using bedrails? 3  -EB 4  -EBA    Moving from lying on back to sitting on the side of a flat bed without bedrails? 3  -EB 4  -EBA    Moving to and from a bed to a chair (including a wheelchair)? 3  -EB 4  -EBA    Standing up from a chair using your arms (e.g., wheelchair, bedside chair)? 3  -EB 4  -EBA    Climbing 3-5 steps with a railing? 2  -EB 3  -EBA    To walk in hospital room? 3  -EB 4  -EBA    AM-PAC 6 Clicks Score (PT) 17  -EB 23  -EBA    Highest Level of Mobility Goal 5 --> Static standing  -EB 7 --> Walk 25 feet or more  -EBA              User Key  (r) = Recorded By, (t) = Taken By, (c) = Cosigned By      Initials  Name Provider Type    EB Estefany Byrne PTA Physical Therapist Assistant    Angeline Lau, RN Registered Nurse                                 Physical Therapy Education       Title: PT OT SLP Therapies (In Progress)       Topic: Physical Therapy (In Progress)       Point: Mobility training (In Progress)       Learning Progress Summary             Patient Acceptance, E, NR by EB at 9/21/2024 1646    Acceptance, E, VU by DG at 9/19/2024 1423    Acceptance, E, VU by EM at 9/18/2024 1516   Significant Other Acceptance, E, VU by DG at 9/19/2024 1423    Acceptance, E, VU by EM at 9/18/2024 1516                         Point: Home exercise program (Not Started)       Learner Progress:  Not documented in this visit.              Point: Body mechanics (Not Started)       Learner Progress:  Not documented in this visit.              Point: Precautions (In Progress)       Learning Progress Summary             Patient Acceptance, E, NR by EB at 9/21/2024 1646    Acceptance, E, VU by DG at 9/19/2024 1423   Significant Other Acceptance, E, VU by DG at 9/19/2024 1423                                         User Key       Initials Effective Dates Name Provider Type Discipline    EM 06/16/21 -  Daysi Soriano, PT Physical Therapist PT    EB 02/14/23 -  Estefany Byrne PTA Physical Therapist Assistant PT     08/22/24 -  Jung Leary PT Student PT Student PT                  PT Recommendation and Plan     Plan of Care Reviewed With: patient  Progress: improving  Outcome Evaluation: Pt seen for PT tx today. Pt is improving with mobility but can be impulsive with movements. Pt needs cues to log roll when getting into and out of bed and to also wait to stand until asked to do so and gait belt donned for safety. Pt initial ambulated in hallway with walker but was not using it much. Trialed pt without walker and pt required CGA/Katlyn. Pt was a little unsteady with gait but no LOB. Pt has decreased [place]za knee extension and  slight posterior lean during gait. Pt cued to decrease gait speed and correct posture. Will continue to follow and progress pt as able. Recommend IRF at d/c.     Time Calculation:         PT Charges       Row Name 09/21/24 1646             Time Calculation    Start Time 1513  -EB      Stop Time 1525  -EB      Time Calculation (min) 12 min  -EB      PT Received On 09/21/24  -EB      PT - Next Appointment 09/23/24  -EB         Time Calculation- PT    Total Timed Code Minutes- PT 12 minute(s)  -EB                User Key  (r) = Recorded By, (t) = Taken By, (c) = Cosigned By      Initials Name Provider Type    Estefany Dudley PTA Physical Therapist Assistant                  Therapy Charges for Today       Code Description Service Date Service Provider Modifiers Qty    87252386540 HC GAIT TRAINING EA 15 MIN 9/21/2024 Estefany Byrne PTA GP 1            PT G-Codes  Outcome Measure Options: AM-PAC 6 Clicks Daily Activity (OT)  AM-PAC 6 Clicks Score (PT): 17  AM-PAC 6 Clicks Score (OT): 14       Estefany Byrne PTA  9/21/2024

## 2024-09-21 NOTE — PLAN OF CARE
Goal Outcome Evaluation:  Plan of Care Reviewed With: patient        Progress: improving  Outcome Evaluation: Pt seen for PT tx today. Pt is improving with mobility but can be impulsive with movements. Pt needs cues to log roll when getting into and out of bed and to also wait to stand until asked to do so and gait belt donned for safety. Pt initial ambulated in hallway with walker but was not using it much. Trialed pt without walker and pt required CGA/Katlyn. Pt was a little unsteady with gait but no LOB. Pt has decreased murtaza knee extension and slight posterior lean during gait. Pt cued to decrease gait speed and correct posture. Will continue to follow and progress pt as able. Recommend IRF at d/c.

## 2024-09-21 NOTE — PROGRESS NOTES
[facility] NEUROSURGERY CERVICAL PROGRESS NOTE      CC: POD 5, sp C5 corpectomy C4/5, C6/7 decompression, removal of old hardware and C4-7 instrumentation, duroplasty      Subjective     Interval History:  No significant events overnight. Diet upgraded to regular     ROS:  Constitutional: No fever, chills  Neck: +neck pain  GI: No nausea, vomiting, no swallow difficulties  : no difficulty voiding, no incontinence    Objective     Vital signs in last 24 hours:  Temp:  [98.1 °F (36.7 °C)-99 °F (37.2 °C)] 98.6 °F (37 °C)  Heart Rate:  [81-92] 90  Resp:  [16-20] 20  BP: (166-198)/(80-91) 185/87    Intake/Output this shift:  No intake/output data recorded.    LABS:  Results from last 7 days   Lab Units 09/21/24  0729 09/20/24  0403 09/19/24  0337   WBC 10*3/mm3 19.86* 18.79* 19.26*   HEMOGLOBIN g/dL 12.7 11.9* 11.0*   HEMATOCRIT % 38.0 35.9 33.2*   PLATELETS 10*3/mm3 336 282 248      Results from last 7 days   Lab Units 09/21/24  0729 09/20/24  0403 09/19/24  0337   SODIUM mmol/L 135* 137 139   POTASSIUM mmol/L 3.5 3.9 4.1   CHLORIDE mmol/L 98 100 105   CO2 mmol/L 24.0 23.4 22.9   BUN mg/dL 17 18 16   CREATININE mg/dL 0.48* 0.60 0.51*   CALCIUM mg/dL 9.3 9.2 9.0   GLUCOSE mg/dL 115* 171* 143*        IMAGING STUDIES:  No new imaging     I personally viewed and interpreted the patient's chart.    Meds reviewed/changed: Yes  Norvasc 5 mg at night  Lipitor 40 mg at night  Decadron taper   Cymbalta 90mg daily  Lovenox 40mg at night  Flonase daily  Neurontin 200mg BID  Mucinex BID  Hydrochlorothiazide 12.5mg daily  Lantus 5 units daily  Insulin Sliding Scale  Cozaar 100mg daily  Toprol XL 50mg daily  MVI daily  Protonix 40mg daily  Pericolace BID  Oxycodoen 7.5mg-325mg every 4 hours prn-3 doses/12 hours     Physical Exam:    General:   Awake, alert.  Neck:    Wearing [place] J collar. Anterior incision well approximated with no redness, swelling, drainage   Motor:    No fasciculations, rigidity, spasticity, or abnormal  movements.  Reflexes:   No Roy, no clonus  Sensation:   Normal to light touch  Station and Gait:             Per PT note, patient performed bed mobility, supine-sit Min A, STS OOB Min A with rwx, ambulated 120' Min A with RWX. Patient sat in recliner briefly. Patient returned to bed sit-supine Min A  Extremities:   SCD in place    Assessment & Plan     ASSESSMENT:      Spinal cord injury, cervical, without spinal bone injury    Cervical radiculopathy    Controlled type 2 diabetes mellitus without complication, without long-term current use of insulin    Hyperlipidemia    Essential hypertension    Cervicalgia    The patient is POD 5, sp C5 corpectomy C4/5, C6/7 decompression, removal of old hardware and C4-7 instrumentation. Surgery complicated by hardware failure which resulted in durotomy repair and C4/5 spinal cord injury.     She has been tolerating the cervical collar. She has some neck pain. She denies neuropathic pain.     She passed the swallow study yesterday and her diet was upgraded and the feeding tube was removed.     She will be discharging to acute rehab.     PLAN:   Hard collar at all times except may be removed for showering   Continue dexamethasone wean  Continue therapy, OOB activity   Okay for discharge to acute rehab    I discussed the patient's findings and my recommendations with patient and family         LOS: 5 days       Jacinta Benton, APRN  9/21/2024  09:47 EDT

## 2024-09-21 NOTE — PLAN OF CARE
Problem: Adult Inpatient Plan of Care  Goal: Plan of Care Review  Outcome: Ongoing, Progressing  Flowsheets (Taken 9/21/2024 0354)  Plan of Care Reviewed With: patient  Goal: Patient-Specific Goal (Individualized)  Outcome: Ongoing, Progressing  Goal: Absence of Hospital-Acquired Illness or Injury  Outcome: Ongoing, Progressing  Intervention: Identify and Manage Fall Risk  Recent Flowsheet Documentation  Taken 9/21/2024 0352 by Brooklyn Barlow RN  Safety Promotion/Fall Prevention:   activity supervised   assistive device/personal items within reach   clutter free environment maintained   safety round/check completed  Taken 9/21/2024 0200 by Brooklyn Barlow RN  Safety Promotion/Fall Prevention:   activity supervised   assistive device/personal items within reach   clutter free environment maintained   safety round/check completed  Taken 9/21/2024 0000 by Brooklyn Barlow RN  Safety Promotion/Fall Prevention:   activity supervised   assistive device/personal items within reach   clutter free environment maintained   safety round/check completed  Taken 9/20/2024 2200 by Brooklyn Barlow RN  Safety Promotion/Fall Prevention:   activity supervised   assistive device/personal items within reach   clutter free environment maintained   safety round/check completed  Taken 9/20/2024 2000 by Brooklyn Barlow RN  Safety Promotion/Fall Prevention: safety round/check completed  Intervention: Prevent Skin Injury  Recent Flowsheet Documentation  Taken 9/21/2024 0352 by Brooklyn Barlow RN  Body Position: turned  Taken 9/21/2024 0200 by Brooklyn Barlow RN  Body Position: turned  Taken 9/21/2024 0103 by Brooklyn Barlow RN  Skin Protection: adhesive use limited  Taken 9/21/2024 0000 by Brooklyn Barlow RN  Body Position: turned  Taken 9/20/2024 2200 by Brooklyn Barlow RN  Body Position: turned  Taken 9/20/2024 2000 by Brooklyn Barlow RN  Body Position: turned  Skin Protection: adhesive use limited  Intervention: Prevent and Manage VTE  (Venous Thromboembolism) Risk  Recent Flowsheet Documentation  Taken 9/21/2024 0103 by Brooklyn Barlow RN  VTE Prevention/Management:   bilateral   sequential compression devices on  Range of Motion: active ROM (range of motion) encouraged  Taken 9/20/2024 2000 by Brooklyn Barlow RN  VTE Prevention/Management:   bilateral   sequential compression devices on  Range of Motion: active ROM (range of motion) encouraged  Intervention: Prevent Infection  Recent Flowsheet Documentation  Taken 9/20/2024 2000 by Brooklyn Barlow RN  Infection Prevention: single patient room provided  Goal: Optimal Comfort and Wellbeing  Outcome: Ongoing, Progressing  Intervention: Provide Person-Centered Care  Recent Flowsheet Documentation  Taken 9/21/2024 0103 by Brooklyn Barlow RN  Trust Relationship/Rapport:   care explained   choices provided  Taken 9/20/2024 2000 by Brooklyn Barlow RN  Trust Relationship/Rapport:   care explained   choices provided  Goal: Readiness for Transition of Care  Outcome: Ongoing, Progressing     Problem: Skin Injury Risk Increased  Goal: Skin Health and Integrity  Outcome: Ongoing, Progressing  Intervention: Optimize Skin Protection  Recent Flowsheet Documentation  Taken 9/21/2024 0352 by Brooklyn Barlow RN  Head of Bed (HOB) Positioning: HOB at 30 degrees  Taken 9/21/2024 0200 by Brooklyn Barlow RN  Head of Bed (HOB) Positioning: HOB at 30 degrees  Taken 9/21/2024 0103 by Brooklyn Barlow RN  Pressure Reduction Techniques: frequent weight shift encouraged  Pressure Reduction Devices: positioning supports utilized  Skin Protection: adhesive use limited  Taken 9/21/2024 0000 by Brooklyn Barlow RN  Head of Bed (HOB) Positioning: HOB at 30 degrees  Taken 9/20/2024 2200 by Brooklyn Barlow RN  Head of Bed (HOB) Positioning: HOB at 30 degrees  Taken 9/20/2024 2000 by Brooklyn Barlow RN  Pressure Reduction Techniques: frequent weight shift encouraged  Head of Bed (HOB) Positioning: HOB at 20 degrees  Pressure Reduction  Devices: positioning supports utilized  Skin Protection: adhesive use limited     Problem: COPD (Chronic Obstructive Pulmonary Disease) Comorbidity  Goal: Maintenance of COPD Symptom Control  Outcome: Ongoing, Progressing  Intervention: Maintain COPD-Symptom Control  Recent Flowsheet Documentation  Taken 9/21/2024 0352 by Brooklyn Barlow RN  Medication Review/Management: medications reviewed  Taken 9/21/2024 0200 by Brooklyn Barlow RN  Medication Review/Management: medications reviewed  Taken 9/21/2024 0103 by Brooklyn Barlow RN  Supportive Measures: active listening utilized  Taken 9/21/2024 0000 by Brooklyn Barlow RN  Medication Review/Management: medications reviewed  Taken 9/20/2024 2200 by Brooklyn Barlow RN  Medication Review/Management: medications reviewed  Taken 9/20/2024 2000 by Brooklyn Barlow RN  Supportive Measures: active listening utilized  Medication Review/Management: medications reviewed     Problem: Diabetes Comorbidity  Goal: Blood Glucose Level Within Targeted Range  Outcome: Ongoing, Progressing     Problem: Hypertension Comorbidity  Goal: Blood Pressure in Desired Range  Outcome: Ongoing, Progressing  Intervention: Maintain Blood Pressure Management  Recent Flowsheet Documentation  Taken 9/21/2024 0352 by Brooklyn Barlow RN  Medication Review/Management: medications reviewed  Taken 9/21/2024 0200 by Brooklyn Barlow RN  Medication Review/Management: medications reviewed  Taken 9/21/2024 0000 by Brooklyn Barlow RN  Medication Review/Management: medications reviewed  Taken 9/20/2024 2200 by Brooklyn Barlow RN  Medication Review/Management: medications reviewed  Taken 9/20/2024 2000 by Brooklyn Barlow RN  Syncope Management: position changed slowly  Medication Review/Management: medications reviewed     Problem: Fall Injury Risk  Goal: Absence of Fall and Fall-Related Injury  Outcome: Ongoing, Progressing  Intervention: Identify and Manage Contributors  Recent Flowsheet Documentation  Taken  9/21/2024 0352 by Brooklyn Barlow RN  Medication Review/Management: medications reviewed  Taken 9/21/2024 0200 by Brooklyn Barlow RN  Medication Review/Management: medications reviewed  Taken 9/21/2024 0000 by Brooklyn Barlow RN  Medication Review/Management: medications reviewed  Taken 9/20/2024 2200 by Brooklyn Barlow RN  Medication Review/Management: medications reviewed  Taken 9/20/2024 2000 by Brooklyn Barlow RN  Medication Review/Management: medications reviewed  Intervention: Promote Injury-Free Environment  Recent Flowsheet Documentation  Taken 9/21/2024 0352 by Brooklyn Barlow RN  Safety Promotion/Fall Prevention:   activity supervised   assistive device/personal items within reach   clutter free environment maintained   safety round/check completed  Taken 9/21/2024 0200 by Brooklyn Barlow RN  Safety Promotion/Fall Prevention:   activity supervised   assistive device/personal items within reach   clutter free environment maintained   safety round/check completed  Taken 9/21/2024 0000 by Brooklyn Barlow RN  Safety Promotion/Fall Prevention:   activity supervised   assistive device/personal items within reach   clutter free environment maintained   safety round/check completed  Taken 9/20/2024 2200 by Brooklyn Barlow RN  Safety Promotion/Fall Prevention:   activity supervised   assistive device/personal items within reach   clutter free environment maintained   safety round/check completed  Taken 9/20/2024 2000 by Brooklyn Barlow RN  Safety Promotion/Fall Prevention: safety round/check completed     Problem: Aspiration (Enteral Nutrition)  Goal: Absence of Aspiration Signs and Symptoms  Outcome: Ongoing, Progressing  Intervention: Minimize Aspiration Risk  Recent Flowsheet Documentation  Taken 9/21/2024 0352 by Brooklyn Barlow RN  Head of Bed (HOB) Positioning: HOB at 30 degrees  Taken 9/21/2024 0200 by Brooklyn Barlow RN  Head of Bed (HOB) Positioning: HOB at 30 degrees  Taken 9/21/2024 0000 by Yen  MAGGY Barahona  Head of Bed ([facility]) Positioning: HOB at 30 degrees  Taken 9/20/2024 2200 by Brooklyn Barlow RN  Head of Bed ([facility]) Positioning: HOB at 30 degrees  Taken 9/20/2024 2000 by Brooklyn Barlow RN  Head of Bed ([facility]) Positioning: HOB at 20 degrees     Problem: Device-Related Complication Risk (Enteral Nutrition)  Goal: Safe, Effective Therapy Delivery  Outcome: Ongoing, Progressing     Problem: Feeding Intolerance (Enteral Nutrition)  Goal: Feeding Tolerance  Outcome: Ongoing, Progressing     Problem: Bleeding (Spinal Surgery)  Goal: Absence of Bleeding  Outcome: Ongoing, Progressing     Problem: Bowel Motility Impaired (Spinal Surgery)  Goal: Effective Bowel Elimination  Outcome: Ongoing, Progressing     Problem: Fluid and Electrolyte Imbalance (Spinal Surgery)  Goal: Fluid and Electrolyte Balance  Outcome: Ongoing, Progressing     Problem: Functional Ability Impaired (Spinal Surgery)  Goal: Optimal Functional Ability  Outcome: Ongoing, Progressing  Intervention: Optimize Functional Status  Recent Flowsheet Documentation  Taken 9/21/2024 0352 by Brooklyn Barlow RN  Positioning/Transfer Devices:   pillows   in use  Taken 9/21/2024 0200 by Brooklyn Barlow RN  Positioning/Transfer Devices:   pillows   in use  Taken 9/21/2024 0000 by Brooklyn Barlow RN  Positioning/Transfer Devices:   pillows   in use  Taken 9/20/2024 2200 by Brooklyn Barlow RN  Positioning/Transfer Devices:   pillows   in use  Taken 9/20/2024 2000 by Brooklyn Barlow RN  Positioning/Transfer Devices:   pillows   in use     Problem: Infection (Spinal Surgery)  Goal: Absence of Infection Signs and Symptoms  Outcome: Ongoing, Progressing  Intervention: Prevent or Manage Infection  Recent Flowsheet Documentation  Taken 9/20/2024 2000 by Brooklyn Barlow RN  Infection Prevention: single patient room provided     Problem: Neurologic Impairment (Spinal Surgery)  Goal: Optimal Neurologic Function  Outcome: Ongoing, Progressing  Intervention: Optimize  Neurologic Function  Recent Flowsheet Documentation  Taken 9/21/2024 0352 by Brooklyn Barlow RN  Body Position: turned  Taken 9/21/2024 0200 by Brooklyn Barlow RN  Body Position: turned  Taken 9/21/2024 0103 by Brooklyn Barlow RN  Pressure Reduction Devices: positioning supports utilized  Range of Motion: active ROM (range of motion) encouraged  Taken 9/21/2024 0000 by Brooklyn Barlow RN  Body Position: turned  Taken 9/20/2024 2200 by Brooklyn Barlow RN  Body Position: turned  Taken 9/20/2024 2000 by Brooklyn Barlow RN  Body Position: turned  Pressure Reduction Devices: positioning supports utilized  Range of Motion: active ROM (range of motion) encouraged     Problem: Ongoing Anesthesia Effects (Spinal Surgery)  Goal: Anesthesia/Sedation Recovery  Outcome: Ongoing, Progressing  Intervention: Optimize Anesthesia Recovery  Recent Flowsheet Documentation  Taken 9/21/2024 0352 by Brooklyn Barlow RN  Safety Promotion/Fall Prevention:   activity supervised   assistive device/personal items within reach   clutter free environment maintained   safety round/check completed  Taken 9/21/2024 0200 by Brooklyn Barlow RN  Safety Promotion/Fall Prevention:   activity supervised   assistive device/personal items within reach   clutter free environment maintained   safety round/check completed  Taken 9/21/2024 0103 by Brooklyn Barlow RN  Reorientation Measures: clock in view  Taken 9/21/2024 0000 by Brooklyn Barlow RN  Safety Promotion/Fall Prevention:   activity supervised   assistive device/personal items within reach   clutter free environment maintained   safety round/check completed  Taken 9/20/2024 2200 by Brooklyn Barlow RN  Safety Promotion/Fall Prevention:   activity supervised   assistive device/personal items within reach   clutter free environment maintained   safety round/check completed  Taken 9/20/2024 2000 by Brooklyn Barlow RN  Safety Promotion/Fall Prevention: safety round/check completed  Reorientation Measures:  clock in view     Problem: Pain (Spinal Surgery)  Goal: Acceptable Pain Control  Outcome: Ongoing, Progressing  Intervention: Prevent or Manage Pain  Recent Flowsheet Documentation  Taken 9/21/2024 0103 by Brooklyn Barlow RN  Diversional Activities: television  Taken 9/20/2024 2000 by Brooklyn Barlow RN  Diversional Activities: television     Problem: Postoperative Nausea and Vomiting (Spinal Surgery)  Goal: Nausea and Vomiting Relief  Outcome: Ongoing, Progressing     Problem: Postoperative Urinary Retention (Spinal Surgery)  Goal: Effective Urinary Elimination  Outcome: Ongoing, Progressing     Problem: Respiratory Compromise (Spinal Surgery)  Goal: Effective Oxygenation and Ventilation  Outcome: Ongoing, Progressing  Intervention: Optimize Oxygenation and Ventilation  Recent Flowsheet Documentation  Taken 9/21/2024 0352 by Brooklyn Barlow RN  Head of Bed (HOB) Positioning: HOB at 30 degrees  Taken 9/21/2024 0200 by Brooklyn Barlow RN  Head of Bed (HOB) Positioning: HOB at 30 degrees  Taken 9/21/2024 0000 by Brooklyn Barlow RN  Head of Bed (HOB) Positioning: HOB at 30 degrees  Taken 9/20/2024 2200 by Brooklyn Barlow RN  Head of Bed (HOB) Positioning: HOB at 30 degrees  Taken 9/20/2024 2000 by Brooklyn Barlow RN  Head of Bed (HOB) Positioning: HOB at 20 degrees   Goal Outcome Evaluation:  Plan of Care Reviewed With: patient

## 2024-09-21 NOTE — PLAN OF CARE
Goal Outcome Evaluation:      BP elevated throughout shift, MD aware. Pain mildly controlled per MAR. Pt up walking throughout shift. Pt no complaints at this time.

## 2024-09-21 NOTE — PROGRESS NOTES
Name: Kristina Yates ADMIT: 2024   : 1960  PCP: Nyla Mejia APRN    MRN: [id number] LOS: 5 days   AGE/SEX: 64 y.o. female  ROOM: [facility]     Subjective   Subjective   Nasogastric tube has been removed.  Tolerating some liquids.  Having some cough, nausea and dry heaves.           Objective   Objective   Vital Signs  Temp:  [98.1 °F (36.7 °C)-99 °F (37.2 °C)] 98.6 °F (37 °C)  Heart Rate:  [81-92] 90  Resp:  [16-20] 20  BP: (166-198)/(80-91) 185/87  SpO2:  [95 %-99 %] 99 %  on   ;   Device (Oxygen Therapy): room air  Body mass index is 26.92 kg/m².  Physical Exam  Vitals and nursing note reviewed.   Constitutional:       General: She is not in acute distress.  HENT:      Nose:      Comments: Nasogastric feeding tube  Neck:      Comments: Hard collar in place  Cardiovascular:      Rate and Rhythm: Normal rate and regular rhythm.   Pulmonary:      Effort: Pulmonary effort is normal.      Breath sounds: Normal breath sounds.   Abdominal:      General: Bowel sounds are normal.      Palpations: Abdomen is soft.      Tenderness: There is no abdominal tenderness.   Musculoskeletal:         General: No swelling.   Skin:     General: Skin is warm and dry.   Neurological:      Mental Status: She is alert. Mental status is at baseline.       Results Review:       I reviewed the patient's new clinical results.  Results from last 7 days   Lab Units 24  0729 24  0403 24  0337 24  0316   WBC 10*3/mm3 19.86* 18.79* 19.26* 20.04*   HEMOGLOBIN g/dL 12.7 11.9* 11.0* 10.5*   PLATELETS 10*3/mm3 336 282 248 240     Results from last 7 days   Lab Units 24  0729 24  0403 24  0337 24  0316   SODIUM mmol/L 135* 137 139 140   POTASSIUM mmol/L 3.5 3.9 4.1 4.2   CHLORIDE mmol/L 98 100 105 105   CO2 mmol/L 24.0 23.4 22.9 24.0   BUN mg/dL 17 18 16 12   CREATININE mg/dL 0.48* 0.60 0.51* 0.51*   GLUCOSE mg/dL 115* 171* 143* 152*   EGFR mL/min/1.73 106.6 101.0 105.0 105.0       Results  from last 7 days   Lab Units 09/21/24  0729 09/20/24  0403 09/19/24  0337 09/18/24  0316   CALCIUM mg/dL 9.3 9.2 9.0 8.9       Glucose   Date/Time Value Ref Range Status   09/21/2024 0805 127 70 - 130 mg/dL Final   09/20/2024 2034 155 (H) 70 - 130 mg/dL Final   09/20/2024 1207 193 (H) 70 - 130 mg/dL Final   09/20/2024 0515 187 (H) 70 - 130 mg/dL Final   09/19/2024 2345 165 (H) 70 - 130 mg/dL Final   09/19/2024 1811 154 (H) 70 - 130 mg/dL Final   09/18/2024 2348 151 (H) 70 - 130 mg/dL Final       I have personally reviewed all medications:  Scheduled Medications  amLODIPine, 5 mg, Oral, Nightly  atorvastatin, 40 mg, Oral, Nightly  dexAMETHasone, 3 mg, Oral, BID With Meals   Followed by  [START ON 9/22/2024] dexAMETHasone, 1.5 mg, Oral, BID With Meals  DULoxetine, 30 mg, Oral, Daily  DULoxetine, 60 mg, Oral, Daily  enoxaparin, 40 mg, Subcutaneous, Nightly  fluticasone, 2 spray, Nasal, Daily  gabapentin, 200 mg, Oral, BID  guaiFENesin, 600 mg, Oral, Q12H  hydroCHLOROthiazide, 12.5 mg, Oral, Daily  insulin glargine, 5 Units, Subcutaneous, Daily  insulin regular, 2-7 Units, Subcutaneous, Q6H  losartan, 100 mg, Oral, Daily  methocarbamol, 500 mg, Oral, Q6H  metoprolol succinate XL, 50 mg, Oral, Daily  multivitamin with minerals, 1 tablet, Oral, Daily  pantoprazole, 40 mg, Oral, Q AM  senna-docusate sodium, 2 tablet, Oral, BID   And  polyethylene glycol, 17 g, Oral, Daily  sodium chloride, 10 mL, Intravenous, Q12H    Infusions  lactated ringers, 9 mL/hr, Last Rate: 9 mL/hr (09/16/24 0733)  Pharmacy Consult - Pharmacy to dose,     Diet  Diet: Regular/House; Fluid Consistency: Thin (IDDSI 0)    I have personally reviewed:  [x]  Laboratory   [x]  Microbiology   [x]  Radiology   []  EKG/Telemetry  []  Cardiology/Vascular   []  Pathology    []  Records       Assessment/Plan     Active Hospital Problems    Diagnosis  POA    **Spinal cord injury, cervical, without spinal bone injury [S14.109A]  Yes    Cervicalgia [M54.2]  Unknown     Essential hypertension [I10]  Yes    Hyperlipidemia [E78.5]  Yes    Controlled type 2 diabetes mellitus without complication, without long-term current use of insulin [E11.9]  Yes    Cervical radiculopathy [M54.12]  Unknown      Resolved Hospital Problems   No resolved problems to display.       64 y.o. female who is s/p CERVICAL FOUR TO SIX DISCECTOMY ANTERIOR WITH FUSION WITH CERVICAL FIVE CORPECTOMY AND REMOVAL OF PREVIOUS HARDWARE.    Labs are stable.  Blood sugars seem well-controlled.  She is having some nausea today and will order Compazine.  Patient states she is allergic to Zofran.  Otherwise seems to be stable medically.  Her blood pressure is moderately elevated but she is asymptomatic.  No cardiac history.  She is on multiple antihypertensive medications.  Suspect blood pressure made worse by pain, stress, steroids, etc. she does however tell me her blood pressure readings at home are uncontrolled as well.  Heart rate in the 90s.  Will change her Toprol to twice daily dosing.  Tube feedings have been discontinued.  Will change regular insulin to Humalog and stop Lantus.  Most recent A1c 6.0%.      Davy Jensen MD  [facility]  09/21/24  08:44 EDT

## 2024-09-22 LAB
ANION GAP SERPL CALCULATED.3IONS-SCNC: 16 MMOL/L (ref 5–15)
BUN SERPL-MCNC: 21 MG/DL (ref 8–23)
BUN/CREAT SERPL: 38.9 (ref 7–25)
CALCIUM SPEC-SCNC: 9.3 MG/DL (ref 8.6–10.5)
CHLORIDE SERPL-SCNC: 98 MMOL/L (ref 98–107)
CO2 SERPL-SCNC: 20 MMOL/L (ref 22–29)
CREAT SERPL-MCNC: 0.54 MG/DL (ref 0.57–1)
DEPRECATED RDW RBC AUTO: 45.7 FL (ref 37–54)
EGFRCR SERPLBLD CKD-EPI 2021: 103.6 ML/MIN/1.73
ERYTHROCYTE [DISTWIDTH] IN BLOOD BY AUTOMATED COUNT: 13.4 % (ref 12.3–15.4)
GLUCOSE BLDC GLUCOMTR-MCNC: 151 MG/DL (ref 70–130)
GLUCOSE BLDC GLUCOMTR-MCNC: 155 MG/DL (ref 70–130)
GLUCOSE BLDC GLUCOMTR-MCNC: 171 MG/DL (ref 70–130)
GLUCOSE BLDC GLUCOMTR-MCNC: 187 MG/DL (ref 70–130)
GLUCOSE SERPL-MCNC: 126 MG/DL (ref 65–99)
HCT VFR BLD AUTO: 40.8 % (ref 34–46.6)
HGB BLD-MCNC: 13.4 G/DL (ref 12–15.9)
MCH RBC QN AUTO: 30.6 PG (ref 26.6–33)
MCHC RBC AUTO-ENTMCNC: 32.8 G/DL (ref 31.5–35.7)
MCV RBC AUTO: 93.2 FL (ref 79–97)
PLATELET # BLD AUTO: 313 10*3/MM3 (ref 140–450)
PMV BLD AUTO: 9.9 FL (ref 6–12)
POTASSIUM SERPL-SCNC: 3.8 MMOL/L (ref 3.5–5.2)
RBC # BLD AUTO: 4.38 10*6/MM3 (ref 3.77–5.28)
SODIUM SERPL-SCNC: 134 MMOL/L (ref 136–145)
WBC NRBC COR # BLD AUTO: 18.09 10*3/MM3 (ref 3.4–10.8)

## 2024-09-22 PROCEDURE — 25010000002 ENOXAPARIN PER 10 MG: Performed by: NURSE PRACTITIONER

## 2024-09-22 PROCEDURE — 25010000002 PROCHLORPERAZINE 10 MG/2ML SOLUTION: Performed by: HOSPITALIST

## 2024-09-22 PROCEDURE — 82948 REAGENT STRIP/BLOOD GLUCOSE: CPT

## 2024-09-22 PROCEDURE — 80048 BASIC METABOLIC PNL TOTAL CA: CPT | Performed by: NURSE PRACTITIONER

## 2024-09-22 PROCEDURE — 85027 COMPLETE CBC AUTOMATED: CPT | Performed by: NURSE PRACTITIONER

## 2024-09-22 PROCEDURE — 99024 POSTOP FOLLOW-UP VISIT: CPT | Performed by: NURSE PRACTITIONER

## 2024-09-22 PROCEDURE — 63710000001 INSULIN LISPRO (HUMAN) PER 5 UNITS: Performed by: HOSPITALIST

## 2024-09-22 RX ORDER — METHOCARBAMOL 750 MG/1
750 TABLET, FILM COATED ORAL EVERY 6 HOURS SCHEDULED
Status: DISCONTINUED | OUTPATIENT
Start: 2024-09-22 | End: 2024-09-23 | Stop reason: HOSPADM

## 2024-09-22 RX ADMIN — INSULIN LISPRO 2 UNITS: 100 INJECTION, SOLUTION INTRAVENOUS; SUBCUTANEOUS at 20:33

## 2024-09-22 RX ADMIN — DULOXETINE HYDROCHLORIDE 60 MG: 60 CAPSULE, DELAYED RELEASE ORAL at 08:17

## 2024-09-22 RX ADMIN — PANTOPRAZOLE SODIUM 40 MG: 40 TABLET, DELAYED RELEASE ORAL at 06:33

## 2024-09-22 RX ADMIN — OXYCODONE AND ACETAMINOPHEN 1 TABLET: 7.5; 325 TABLET ORAL at 14:35

## 2024-09-22 RX ADMIN — METOPROLOL SUCCINATE 50 MG: 50 TABLET, FILM COATED, EXTENDED RELEASE ORAL at 20:34

## 2024-09-22 RX ADMIN — LOSARTAN POTASSIUM 100 MG: 100 TABLET, FILM COATED ORAL at 08:16

## 2024-09-22 RX ADMIN — GABAPENTIN 200 MG: 100 CAPSULE ORAL at 20:33

## 2024-09-22 RX ADMIN — SENNOSIDES AND DOCUSATE SODIUM 2 TABLET: 50; 8.6 TABLET ORAL at 08:02

## 2024-09-22 RX ADMIN — OXYCODONE AND ACETAMINOPHEN 1 TABLET: 7.5; 325 TABLET ORAL at 06:33

## 2024-09-22 RX ADMIN — Medication 1 TABLET: at 08:16

## 2024-09-22 RX ADMIN — METHOCARBAMOL TABLETS 750 MG: 750 TABLET, COATED ORAL at 12:08

## 2024-09-22 RX ADMIN — GABAPENTIN 200 MG: 100 CAPSULE ORAL at 08:16

## 2024-09-22 RX ADMIN — METOPROLOL SUCCINATE 50 MG: 50 TABLET, FILM COATED, EXTENDED RELEASE ORAL at 08:16

## 2024-09-22 RX ADMIN — AMLODIPINE BESYLATE 5 MG: 5 TABLET ORAL at 20:34

## 2024-09-22 RX ADMIN — GUAIFENESIN 600 MG: 600 TABLET, EXTENDED RELEASE ORAL at 20:33

## 2024-09-22 RX ADMIN — DEXAMETHASONE 3 MG: 6 TABLET ORAL at 12:09

## 2024-09-22 RX ADMIN — GUAIFENESIN 600 MG: 600 TABLET, EXTENDED RELEASE ORAL at 08:16

## 2024-09-22 RX ADMIN — OXYCODONE AND ACETAMINOPHEN 1 TABLET: 7.5; 325 TABLET ORAL at 02:46

## 2024-09-22 RX ADMIN — Medication 10 ML: at 20:33

## 2024-09-22 RX ADMIN — OXYCODONE AND ACETAMINOPHEN 1 TABLET: 7.5; 325 TABLET ORAL at 18:34

## 2024-09-22 RX ADMIN — ATORVASTATIN CALCIUM 40 MG: 20 TABLET, FILM COATED ORAL at 20:33

## 2024-09-22 RX ADMIN — ENOXAPARIN SODIUM 40 MG: 100 INJECTION SUBCUTANEOUS at 20:33

## 2024-09-22 RX ADMIN — OXYCODONE AND ACETAMINOPHEN 1 TABLET: 7.5; 325 TABLET ORAL at 10:37

## 2024-09-22 RX ADMIN — PROCHLORPERAZINE EDISYLATE 5 MG: 5 INJECTION INTRAMUSCULAR; INTRAVENOUS at 14:37

## 2024-09-22 RX ADMIN — DEXAMETHASONE 1.5 MG: 1.5 TABLET ORAL at 17:10

## 2024-09-22 RX ADMIN — FLUTICASONE PROPIONATE 2 SPRAY: 50 SPRAY, METERED NASAL at 08:16

## 2024-09-22 RX ADMIN — METHOCARBAMOL TABLETS 750 MG: 750 TABLET, COATED ORAL at 17:10

## 2024-09-22 RX ADMIN — INSULIN LISPRO 2 UNITS: 100 INJECTION, SOLUTION INTRAVENOUS; SUBCUTANEOUS at 12:33

## 2024-09-22 RX ADMIN — DULOXETINE HYDROCHLORIDE 30 MG: 30 CAPSULE, DELAYED RELEASE ORAL at 08:16

## 2024-09-22 RX ADMIN — PROCHLORPERAZINE EDISYLATE 5 MG: 5 INJECTION INTRAMUSCULAR; INTRAVENOUS at 08:15

## 2024-09-22 RX ADMIN — PROCHLORPERAZINE EDISYLATE 5 MG: 5 INJECTION INTRAMUSCULAR; INTRAVENOUS at 20:43

## 2024-09-22 RX ADMIN — SENNOSIDES AND DOCUSATE SODIUM 2 TABLET: 50; 8.6 TABLET ORAL at 20:33

## 2024-09-22 RX ADMIN — METHOCARBAMOL TABLETS 750 MG: 750 TABLET, COATED ORAL at 23:00

## 2024-09-22 RX ADMIN — Medication 10 ML: at 08:19

## 2024-09-22 RX ADMIN — HYDROCHLOROTHIAZIDE 12.5 MG: 12.5 TABLET ORAL at 08:16

## 2024-09-22 RX ADMIN — OXYCODONE AND ACETAMINOPHEN 1 TABLET: 7.5; 325 TABLET ORAL at 23:00

## 2024-09-22 RX ADMIN — METHOCARBAMOL TABLETS 500 MG: 500 TABLET, COATED ORAL at 06:33

## 2024-09-22 NOTE — PROGRESS NOTES
Name: Kristina Yates ADMIT: 2024   : 1960  PCP: Nyla Mejia APRN    MRN: [id number] LOS: 6 days   AGE/SEX: 64 y.o. female  ROOM: [facility]     Subjective   Subjective   Patient is seen at bedside, no new complaints.       Objective   Objective   Vital Signs  Temp:  [98.2 °F (36.8 °C)-99 °F (37.2 °C)] 98.4 °F (36.9 °C)  Heart Rate:  [80-98] 80  Resp:  [16-20] 16  BP: (154-181)/() 154/80  SpO2:  [95 %-99 %] 96 %  on   ;   Device (Oxygen Therapy): room air  Body mass index is 26.92 kg/m².  Physical Exam  General, awake and alert.  Head and ENT, normocephalic and atraumatic.  Lungs, symmetric expansion, equal air entry bilaterally.  Heart, regular rate and rhythm.  Abdomen, soft and nontender.  Extremities, no clubbing or cyanosis.  Neuro, no focal deficits.  Skin: Warm and no rash.  Psych, normal mood and affect.  Musculoskeletal, joint examination is grossly normal.      Results Review     I reviewed the patient's new clinical results.  Results from last 7 days   Lab Units 24  0506 24  0729 24  0403 09/19/24  0337   WBC 10*3/mm3 18.09* 19.86* 18.79* 19.26*   HEMOGLOBIN g/dL 13.4 12.7 11.9* 11.0*   PLATELETS 10*3/mm3 313 336 282 248     Results from last 7 days   Lab Units 24  0506 24  0729 24  0403 24  0337   SODIUM mmol/L 134* 135* 137 139   POTASSIUM mmol/L 3.8 3.5 3.9 4.1   CHLORIDE mmol/L 98 98 100 105   CO2 mmol/L 20.0* 24.0 23.4 22.9   BUN mg/dL  17 18 16   CREATININE mg/dL 0.54* 0.48* 0.60 0.51*   GLUCOSE mg/dL 126* 115* 171* 143*   EGFR mL/min/1.73 103.6 106.6 101.0 105.0       Results from last 7 days   Lab Units 24  0506 24  0729 24  0403 24  0337   CALCIUM mg/dL 9.3 9.3 9.2 9.0       Glucose   Date/Time Value Ref Range Status   2024 1631 171 (H) 70 - 130 mg/dL Final   2024 1226 187 (H) 70 - 130 mg/dL Final   2024 0815 155 (H) 70 - 130 mg/dL Final   2024 140 (H) 70 - 130 mg/dL Final    09/21/2024 1601 168 (H) 70 - 130 mg/dL Final   09/21/2024 1206 125 70 - 130 mg/dL Final   09/21/2024 0805 127 70 - 130 mg/dL Final       No radiology results for the last day    I have personally reviewed all medications:  Scheduled Medications  amLODIPine, 5 mg, Oral, Nightly  atorvastatin, 40 mg, Oral, Nightly  dexAMETHasone, 1.5 mg, Oral, BID With Meals  DULoxetine, 30 mg, Oral, Daily  DULoxetine, 60 mg, Oral, Daily  enoxaparin, 40 mg, Subcutaneous, Nightly  fluticasone, 2 spray, Nasal, Daily  gabapentin, 200 mg, Oral, BID  guaiFENesin, 600 mg, Oral, Q12H  hydroCHLOROthiazide, 12.5 mg, Oral, Daily  insulin lispro, 2-7 Units, Subcutaneous, 4x Daily AC & at Bedtime  losartan, 100 mg, Oral, Daily  methocarbamol, 750 mg, Oral, Q6H  metoprolol succinate XL, 50 mg, Oral, Q12H  multivitamin with minerals, 1 tablet, Oral, Daily  pantoprazole, 40 mg, Oral, Q AM  senna-docusate sodium, 2 tablet, Oral, BID   And  polyethylene glycol, 17 g, Oral, Daily  sodium chloride, 10 mL, Intravenous, Q12H    Infusions   Diet  Diet: Regular/House; Fluid Consistency: Thin (IDDSI 0)    I have personally reviewed:  [x]  Laboratory   [x]  Microbiology   [x]  Radiology   [x]  EKG/Telemetry  [x]  Cardiology/Vascular   []  Pathology    []  Records       Assessment/Plan     Active Hospital Problems    Diagnosis  POA    **Spinal cord injury, cervical, without spinal bone injury [S14.109A]  Yes    Cervicalgia [M54.2]  Unknown    Essential hypertension [I10]  Yes    Hyperlipidemia [E78.5]  Yes    Controlled type 2 diabetes mellitus without complication, without long-term current use of insulin [E11.9]  Yes    Cervical radiculopathy [M54.12]  Unknown      Resolved Hospital Problems   No resolved problems to display.       64 y.o. female admitted with Spinal cord injury, cervical, without spinal bone injury.    64 y.o. female who is s/p CERVICAL FOUR TO SIX DISCECTOMY ANTERIOR WITH FUSION WITH CERVICAL FIVE CORPECTOMY AND REMOVAL OF PREVIOUS  HARDWARE.     Labs are stable.  Blood sugars seem well-controlled.  She is having some nausea today and will order Compazine.  Patient states she is allergic to Zofran.  Otherwise seems to be stable medically.  Her blood pressure is moderately elevated but she is asymptomatic.  No cardiac history.  She is on multiple antihypertensive medications.  Suspect blood pressure made worse by pain, stress, steroids, etc. she does however tell me her blood pressure readings at home are uncontrolled as well.  Heart rate in the 90s.  Will change her Toprol to twice daily dosing.  Tube feedings have been discontinued.  Will change regular insulin to Humalog and stop Lantus.  Most recent A1c 6.0%.         Alek Pressley MD  [facility]  09/22/24  19:28 EDT

## 2024-09-22 NOTE — PLAN OF CARE
Goal Outcome Evaluation:      No acute events. Pain managed per MAR. A&Ox4. BP mildly elevated. MD's aware.

## 2024-09-22 NOTE — PROGRESS NOTES
[facility] CERVICAL PROGRESS NOTE      CC:  POD 6, sp C5 corpectomy C4/5, C6/7 decompression, removal of old hardware and C4-7 instrumentation, duroplasty       Subjective     Interval History:  No significant events overnight. Patient reports mid scapular pain    ROS:  Constitutional: No fever, chills  Neck: +neck pain  GI: No nausea, vomiting, no swallow difficulties  Neuro: +weakness,  +balance difficulties  : no difficulty voiding, no incontinence    Objective     Vital signs in last 24 hours:  Temp:  [98.1 °F (36.7 °C)-99 °F (37.2 °C)] 98.2 °F (36.8 °C)  Heart Rate:  [78-98] 98  Resp:  [16-20] 16  BP: (160-190)/() 181/86    Intake/Output this shift:  No intake/output data recorded.    LABS:  Results from last 7 days   Lab Units 09/22/24  0506 09/21/24  0729 09/20/24  0403   WBC 10*3/mm3 18.09* 19.86* 18.79*   HEMOGLOBIN g/dL 13.4 12.7 11.9*   HEMATOCRIT % 40.8 38.0 35.9   PLATELETS 10*3/mm3 313 336 282      Results from last 7 days   Lab Units 09/22/24  0506 09/21/24  0729 09/20/24  0403   SODIUM mmol/L 134* 135* 137   POTASSIUM mmol/L 3.8 3.5 3.9   CHLORIDE mmol/L 98 98 100   CO2 mmol/L 20.0* 24.0 23.4   BUN mg/dL 21 17 18   CREATININE mg/dL 0.54* 0.48* 0.60   CALCIUM mg/dL 9.3 9.3 9.2   GLUCOSE mg/dL 126* 115* 171*        IMAGING STUDIES:  No new imaging     I personally viewed and interpreted the patient's chart.    Meds reviewed/changed: Yes  Norvasc 5 mg at night  Lipitor 40 mg at night  Decadron taper   Cymbalta 90mg daily  Lovenox 40mg at night  Flonase daily  Neurontin 200mg BID  Mucinex BID  Hydrochlorothiazide 12.5mg daily  Insulin Sliding Scale  Cozaar 100mg daily  Robaxin 500mg every 6 hours   Toprol XL 50mg daily  MVI daily  Protonix 40mg daily  Pericolace BID  Oxycodoen 7.5mg-325mg every 4 hours prn-2 doses/12 hours     Physical Exam:    General:   Awake, alert.  Neck:    Wearing Banks J collar. Anterior incision well approximated with no redness, swelling, drainage    Motor:    No fasciculations, rigidity, spasticity, or abnormal movements.  Reflexes:   No Roy, no clonus  Sensation:   Normal to light touch  Station and Gait:             Per PT note, patient is improving with mobility but can be impulsive with movements. Patient needs cues to log roll when getting into and out of bed and to also wait to stand until asked to do so. Trailed patient without walker and pt required CGA/Katlyn. Patient was a  little unsteady with gait but no LOB.   Extremities:   SCD in place    Assessment & Plan     ASSESSMENT:      Spinal cord injury, cervical, without spinal bone injury    Cervical radiculopathy    Controlled type 2 diabetes mellitus without complication, without long-term current use of insulin    Hyperlipidemia    Essential hypertension    Cervicalgia    The patient is POD 6, sp C5 corpectomy C4/5, C6/7 decompression, removal of old hardware and C4-7 instrumentation. Surgery complicated by hardware failure which resulted in durotomy repair and C4/5 spinal cord injury.      She passed the swallow study on 9/20 and her diet was upgraded and the feeding tube was removed. She will be discharging to acute rehab.     She has been tolerating the cervical collar. Today, patient states that she is having mid scapular pain. She did ambulate with PT and feels that she is making improvements with mobility.     Blood pressure has been elevated and Toprol increased to BID on 9/21 per [facility].    PLAN:   Will increase the Robaxin to 750mg every 6 hours   Hard collar at all times except may be removed for showering   Continue dexamethasone wean  Continue therapy, OOB activity   Okay for discharge to acute rehab    I discussed the patient's findings and my recommendations with patient and nursing staff and Dr. Obrien.        LOS: 6 days       Jacinta Benton, APRN  9/22/2024  09:29 EDT

## 2024-09-22 NOTE — PLAN OF CARE
Goal Outcome Evaluation:      VSS throughout shift. Pt ambulated around unit multiple times during shift. Pain well controlled per MAR. Pt no complaints at this time.

## 2024-09-23 VITALS
HEIGHT: 66 IN | DIASTOLIC BLOOD PRESSURE: 80 MMHG | BODY MASS INDEX: 27.21 KG/M2 | SYSTOLIC BLOOD PRESSURE: 163 MMHG | OXYGEN SATURATION: 98 % | WEIGHT: 169.31 LBS | HEART RATE: 83 BPM | TEMPERATURE: 98.1 F | RESPIRATION RATE: 18 BRPM

## 2024-09-23 LAB
ANION GAP SERPL CALCULATED.3IONS-SCNC: 14 MMOL/L (ref 5–15)
BUN SERPL-MCNC: 21 MG/DL (ref 8–23)
BUN/CREAT SERPL: 34.4 (ref 7–25)
CALCIUM SPEC-SCNC: 9 MG/DL (ref 8.6–10.5)
CHLORIDE SERPL-SCNC: 96 MMOL/L (ref 98–107)
CO2 SERPL-SCNC: 22 MMOL/L (ref 22–29)
CREAT SERPL-MCNC: 0.61 MG/DL (ref 0.57–1)
DEPRECATED RDW RBC AUTO: 45.9 FL (ref 37–54)
EGFRCR SERPLBLD CKD-EPI 2021: 100.6 ML/MIN/1.73
ERYTHROCYTE [DISTWIDTH] IN BLOOD BY AUTOMATED COUNT: 13.6 % (ref 12.3–15.4)
GLUCOSE BLDC GLUCOMTR-MCNC: 148 MG/DL (ref 70–130)
GLUCOSE BLDC GLUCOMTR-MCNC: 173 MG/DL (ref 70–130)
GLUCOSE SERPL-MCNC: 107 MG/DL (ref 65–99)
HCT VFR BLD AUTO: 37.1 % (ref 34–46.6)
HGB BLD-MCNC: 12.6 G/DL (ref 12–15.9)
MCH RBC QN AUTO: 31 PG (ref 26.6–33)
MCHC RBC AUTO-ENTMCNC: 34 G/DL (ref 31.5–35.7)
MCV RBC AUTO: 91.2 FL (ref 79–97)
PLATELET # BLD AUTO: 301 10*3/MM3 (ref 140–450)
PMV BLD AUTO: 10.2 FL (ref 6–12)
POTASSIUM SERPL-SCNC: 3.6 MMOL/L (ref 3.5–5.2)
RBC # BLD AUTO: 4.07 10*6/MM3 (ref 3.77–5.28)
SODIUM SERPL-SCNC: 132 MMOL/L (ref 136–145)
WBC NRBC COR # BLD AUTO: 20.6 10*3/MM3 (ref 3.4–10.8)

## 2024-09-23 PROCEDURE — 80048 BASIC METABOLIC PNL TOTAL CA: CPT | Performed by: NURSE PRACTITIONER

## 2024-09-23 PROCEDURE — 63710000001 INSULIN LISPRO (HUMAN) PER 5 UNITS: Performed by: HOSPITALIST

## 2024-09-23 PROCEDURE — 25010000002 PROCHLORPERAZINE 10 MG/2ML SOLUTION: Performed by: HOSPITALIST

## 2024-09-23 PROCEDURE — 97116 GAIT TRAINING THERAPY: CPT

## 2024-09-23 PROCEDURE — 97530 THERAPEUTIC ACTIVITIES: CPT

## 2024-09-23 PROCEDURE — 85027 COMPLETE CBC AUTOMATED: CPT | Performed by: NURSE PRACTITIONER

## 2024-09-23 PROCEDURE — 82948 REAGENT STRIP/BLOOD GLUCOSE: CPT

## 2024-09-23 RX ORDER — BISACODYL 10 MG
10 SUPPOSITORY, RECTAL RECTAL DAILY PRN
Start: 2024-09-23

## 2024-09-23 RX ORDER — PANTOPRAZOLE SODIUM 40 MG/1
40 TABLET, DELAYED RELEASE ORAL
Start: 2024-09-24

## 2024-09-23 RX ORDER — POLYETHYLENE GLYCOL 3350 17 G/17G
17 POWDER, FOR SOLUTION ORAL DAILY
Start: 2024-09-24

## 2024-09-23 RX ORDER — DEXAMETHASONE 1.5 MG/1
1.5 TABLET ORAL 2 TIMES DAILY WITH MEALS
Qty: 2 TABLET | Refills: 0 | Status: SHIPPED | OUTPATIENT
Start: 2024-09-23 | End: 2024-09-24

## 2024-09-23 RX ORDER — CALCIUM CARBONATE 500 MG/1
2 TABLET, CHEWABLE ORAL 3 TIMES DAILY PRN
Start: 2024-09-23

## 2024-09-23 RX ORDER — BISACODYL 5 MG/1
5 TABLET, DELAYED RELEASE ORAL DAILY PRN
Start: 2024-09-23

## 2024-09-23 RX ORDER — LOSARTAN POTASSIUM 100 MG/1
100 TABLET ORAL DAILY
Qty: 90 TABLET | Refills: 1 | Status: SHIPPED | OUTPATIENT
Start: 2024-09-23

## 2024-09-23 RX ORDER — AMOXICILLIN 250 MG
2 CAPSULE ORAL 2 TIMES DAILY
Start: 2024-09-23

## 2024-09-23 RX ORDER — ENOXAPARIN SODIUM 100 MG/ML
40 INJECTION SUBCUTANEOUS NIGHTLY
Start: 2024-09-23

## 2024-09-23 RX ORDER — OXYCODONE AND ACETAMINOPHEN 7.5; 325 MG/1; MG/1
1 TABLET ORAL EVERY 4 HOURS PRN
Start: 2024-09-23 | End: 2024-09-30

## 2024-09-23 RX ORDER — METHOCARBAMOL 750 MG/1
750 TABLET, FILM COATED ORAL EVERY 6 HOURS SCHEDULED
Start: 2024-09-23

## 2024-09-23 RX ORDER — NALOXONE HCL 0.4 MG/ML
0.4 VIAL (ML) INJECTION
Start: 2024-09-23

## 2024-09-23 RX ORDER — GABAPENTIN 100 MG/1
200 CAPSULE ORAL 2 TIMES DAILY
Start: 2024-09-23

## 2024-09-23 RX ORDER — INSULIN LISPRO 100 [IU]/ML
2-7 INJECTION, SOLUTION INTRAVENOUS; SUBCUTANEOUS
Start: 2024-09-23

## 2024-09-23 RX ORDER — IBUPROFEN 600 MG/1
1 TABLET ORAL
Start: 2024-09-23

## 2024-09-23 RX ORDER — ATORVASTATIN CALCIUM 40 MG/1
40 TABLET, FILM COATED ORAL NIGHTLY
Start: 2024-09-23

## 2024-09-23 RX ORDER — NICOTINE POLACRILEX 4 MG
15 LOZENGE BUCCAL
Start: 2024-09-23

## 2024-09-23 RX ADMIN — OXYCODONE AND ACETAMINOPHEN 1 TABLET: 7.5; 325 TABLET ORAL at 02:52

## 2024-09-23 RX ADMIN — DEXAMETHASONE 1.5 MG: 1.5 TABLET ORAL at 08:37

## 2024-09-23 RX ADMIN — SENNOSIDES AND DOCUSATE SODIUM 2 TABLET: 50; 8.6 TABLET ORAL at 08:37

## 2024-09-23 RX ADMIN — Medication 1 TABLET: at 08:36

## 2024-09-23 RX ADMIN — GUAIFENESIN 600 MG: 600 TABLET, EXTENDED RELEASE ORAL at 08:37

## 2024-09-23 RX ADMIN — METHOCARBAMOL TABLETS 750 MG: 750 TABLET, COATED ORAL at 12:13

## 2024-09-23 RX ADMIN — METHOCARBAMOL TABLETS 750 MG: 750 TABLET, COATED ORAL at 05:56

## 2024-09-23 RX ADMIN — PROCHLORPERAZINE EDISYLATE 5 MG: 5 INJECTION INTRAMUSCULAR; INTRAVENOUS at 02:52

## 2024-09-23 RX ADMIN — FLUTICASONE PROPIONATE 2 SPRAY: 50 SPRAY, METERED NASAL at 08:38

## 2024-09-23 RX ADMIN — INSULIN LISPRO 2 UNITS: 100 INJECTION, SOLUTION INTRAVENOUS; SUBCUTANEOUS at 12:13

## 2024-09-23 RX ADMIN — LOSARTAN POTASSIUM 100 MG: 100 TABLET, FILM COATED ORAL at 08:36

## 2024-09-23 RX ADMIN — OXYCODONE AND ACETAMINOPHEN 1 TABLET: 7.5; 325 TABLET ORAL at 06:30

## 2024-09-23 RX ADMIN — OXYCODONE AND ACETAMINOPHEN 1 TABLET: 7.5; 325 TABLET ORAL at 16:34

## 2024-09-23 RX ADMIN — HYDROCHLOROTHIAZIDE 12.5 MG: 12.5 TABLET ORAL at 08:37

## 2024-09-23 RX ADMIN — DULOXETINE HYDROCHLORIDE 30 MG: 30 CAPSULE, DELAYED RELEASE ORAL at 08:36

## 2024-09-23 RX ADMIN — Medication 5 MG: at 01:14

## 2024-09-23 RX ADMIN — METOPROLOL SUCCINATE 50 MG: 50 TABLET, FILM COATED, EXTENDED RELEASE ORAL at 08:37

## 2024-09-23 RX ADMIN — OXYCODONE AND ACETAMINOPHEN 1 TABLET: 7.5; 325 TABLET ORAL at 11:25

## 2024-09-23 RX ADMIN — GABAPENTIN 200 MG: 100 CAPSULE ORAL at 08:37

## 2024-09-23 RX ADMIN — POLYETHYLENE GLYCOL 3350 17 G: 17 POWDER, FOR SOLUTION ORAL at 08:37

## 2024-09-23 RX ADMIN — Medication 10 ML: at 08:38

## 2024-09-23 RX ADMIN — PANTOPRAZOLE SODIUM 40 MG: 40 TABLET, DELAYED RELEASE ORAL at 05:56

## 2024-09-23 RX ADMIN — PROCHLORPERAZINE EDISYLATE 5 MG: 5 INJECTION INTRAMUSCULAR; INTRAVENOUS at 08:36

## 2024-09-23 RX ADMIN — DULOXETINE HYDROCHLORIDE 60 MG: 60 CAPSULE, DELAYED RELEASE ORAL at 08:37

## 2024-09-23 NOTE — NURSING NOTE
Pt d/c to rehab at this time. PRN pain med admin per pt request. Paperwork and med list reviewed c/ pt at bedside. Paperwork given to outside transport. Report given to Nithya at this time.

## 2024-09-23 NOTE — PLAN OF CARE
Problem: Adult Inpatient Plan of Care  Goal: Plan of Care Review  Outcome: Ongoing, Progressing  Flowsheets (Taken 9/23/2024 0601)  Plan of Care Reviewed With: patient  Goal: Patient-Specific Goal (Individualized)  Outcome: Ongoing, Progressing  Goal: Absence of Hospital-Acquired Illness or Injury  Outcome: Ongoing, Progressing  Intervention: Identify and Manage Fall Risk  Recent Flowsheet Documentation  Taken 9/23/2024 0400 by Brooklyn Barlow RN  Safety Promotion/Fall Prevention:   activity supervised   assistive device/personal items within reach   clutter free environment maintained   safety round/check completed  Taken 9/23/2024 0147 by Brooklyn Barlow RN  Safety Promotion/Fall Prevention:   activity supervised   assistive device/personal items within reach   clutter free environment maintained   safety round/check completed  Taken 9/22/2024 2345 by Brooklyn Barlow RN  Safety Promotion/Fall Prevention:   activity supervised   assistive device/personal items within reach   clutter free environment maintained   safety round/check completed  Taken 9/22/2024 2148 by Brooklyn Barlow RN  Safety Promotion/Fall Prevention:   activity supervised   assistive device/personal items within reach   clutter free environment maintained   safety round/check completed  Taken 9/22/2024 1954 by Brooklyn Barlow RN  Safety Promotion/Fall Prevention: safety round/check completed  Intervention: Prevent Skin Injury  Recent Flowsheet Documentation  Taken 9/23/2024 0400 by Brooklyn Barlow RN  Body Position: position changed independently  Taken 9/23/2024 0147 by Brooklyn Barlow RN  Body Position: position changed independently  Taken 9/22/2024 2345 by Brooklyn Barlow RN  Body Position: position changed independently  Taken 9/22/2024 2148 by Brooklyn Barlow RN  Body Position: position changed independently  Taken 9/22/2024 1954 by Brooklyn Barlow RN  Body Position: position changed independently  Skin Protection: adhesive use  limited  Intervention: Prevent and Manage VTE (Venous Thromboembolism) Risk  Recent Flowsheet Documentation  Taken 9/22/2024 1954 by Brooklyn Barlow RN  VTE Prevention/Management:   bilateral   sequential compression devices on  Range of Motion: active ROM (range of motion) encouraged  Intervention: Prevent Infection  Recent Flowsheet Documentation  Taken 9/22/2024 1954 by Brooklyn Barlow RN  Infection Prevention: single patient room provided  Goal: Optimal Comfort and Wellbeing  Outcome: Ongoing, Progressing  Intervention: Provide Person-Centered Care  Recent Flowsheet Documentation  Taken 9/22/2024 1954 by Brooklyn Barlow RN  Trust Relationship/Rapport:   care explained   choices provided  Goal: Readiness for Transition of Care  Outcome: Ongoing, Progressing     Problem: Skin Injury Risk Increased  Goal: Skin Health and Integrity  Outcome: Ongoing, Progressing  Intervention: Optimize Skin Protection  Recent Flowsheet Documentation  Taken 9/23/2024 0400 by Brooklyn Barlow RN  Head of Bed (HOB) Positioning: HOB at 30 degrees  Taken 9/23/2024 0147 by Brooklyn Barlow RN  Head of Bed (HOB) Positioning: HOB at 30 degrees  Taken 9/22/2024 2345 by Brooklyn Barlow RN  Head of Bed (HOB) Positioning: HOB at 30 degrees  Taken 9/22/2024 2148 by Brooklyn Barlow RN  Head of Bed (HOB) Positioning: HOB at 30 degrees  Taken 9/22/2024 1954 by Brooklyn Barlow RN  Pressure Reduction Techniques: frequent weight shift encouraged  Head of Bed (HOB) Positioning: HOB at 20 degrees  Pressure Reduction Devices: positioning supports utilized  Skin Protection: adhesive use limited     Problem: COPD (Chronic Obstructive Pulmonary Disease) Comorbidity  Goal: Maintenance of COPD Symptom Control  Outcome: Ongoing, Progressing  Intervention: Maintain COPD-Symptom Control  Recent Flowsheet Documentation  Taken 9/23/2024 0400 by Brooklyn Barlow RN  Medication Review/Management: medications reviewed  Taken 9/23/2024 0147 by Brooklyn Barlow  RN  Medication Review/Management: medications reviewed  Taken 9/22/2024 2345 by Brooklyn Barlow RN  Medication Review/Management: medications reviewed  Taken 9/22/2024 2148 by Brooklyn Barlow RN  Medication Review/Management: medications reviewed  Taken 9/22/2024 1954 by Brooklyn Barlow RN  Supportive Measures: active listening utilized  Medication Review/Management: medications reviewed     Problem: Diabetes Comorbidity  Goal: Blood Glucose Level Within Targeted Range  Outcome: Ongoing, Progressing     Problem: Hypertension Comorbidity  Goal: Blood Pressure in Desired Range  Outcome: Ongoing, Progressing  Intervention: Maintain Blood Pressure Management  Recent Flowsheet Documentation  Taken 9/23/2024 0400 by Brooklyn Barlow RN  Medication Review/Management: medications reviewed  Taken 9/23/2024 0147 by Brooklyn Barlow RN  Medication Review/Management: medications reviewed  Taken 9/22/2024 2345 by Brooklyn Barlow RN  Medication Review/Management: medications reviewed  Taken 9/22/2024 2148 by Brooklyn Barlow RN  Medication Review/Management: medications reviewed  Taken 9/22/2024 1954 by Brooklyn Barlow RN  Medication Review/Management: medications reviewed     Problem: Fall Injury Risk  Goal: Absence of Fall and Fall-Related Injury  Outcome: Ongoing, Progressing  Intervention: Identify and Manage Contributors  Recent Flowsheet Documentation  Taken 9/23/2024 0400 by Brooklyn Barlow RN  Medication Review/Management: medications reviewed  Taken 9/23/2024 0147 by Brooklyn Barlow RN  Medication Review/Management: medications reviewed  Taken 9/22/2024 2345 by Brooklyn aBrlow RN  Medication Review/Management: medications reviewed  Taken 9/22/2024 2148 by Brooklyn Barlow RN  Medication Review/Management: medications reviewed  Taken 9/22/2024 1954 by Brooklyn Barlow RN  Medication Review/Management: medications reviewed  Intervention: Promote Injury-Free Environment  Recent Flowsheet Documentation  Taken 9/23/2024 0400 by  Scalf, Brooklyn, RN  Safety Promotion/Fall Prevention:   activity supervised   assistive device/personal items within reach   clutter free environment maintained   safety round/check completed  Taken 9/23/2024 0147 by Brooklyn Barlow RN  Safety Promotion/Fall Prevention:   activity supervised   assistive device/personal items within reach   clutter free environment maintained   safety round/check completed  Taken 9/22/2024 2345 by Brooklyn Barlow RN  Safety Promotion/Fall Prevention:   activity supervised   assistive device/personal items within reach   clutter free environment maintained   safety round/check completed  Taken 9/22/2024 2148 by Brooklyn Barlow RN  Safety Promotion/Fall Prevention:   activity supervised   assistive device/personal items within reach   clutter free environment maintained   safety round/check completed  Taken 9/22/2024 1954 by Brooklyn Barlow RN  Safety Promotion/Fall Prevention: safety round/check completed     Problem: Aspiration (Enteral Nutrition)  Goal: Absence of Aspiration Signs and Symptoms  Outcome: Ongoing, Progressing  Intervention: Minimize Aspiration Risk  Recent Flowsheet Documentation  Taken 9/23/2024 0400 by Brooklyn Barlow RN  Head of Bed (HOB) Positioning: HOB at 30 degrees  Taken 9/23/2024 0147 by Brooklyn Barlow RN  Head of Bed (HOB) Positioning: HOB at 30 degrees  Taken 9/22/2024 2345 by Brooklyn Barlow RN  Head of Bed (HOB) Positioning: HOB at 30 degrees  Taken 9/22/2024 2148 by Brooklyn Barlow RN  Head of Bed (HOB) Positioning: HOB at 30 degrees  Taken 9/22/2024 1954 by Brooklyn Barlow RN  Head of Bed (HOB) Positioning: HOB at 20 degrees     Problem: Device-Related Complication Risk (Enteral Nutrition)  Goal: Safe, Effective Therapy Delivery  Outcome: Ongoing, Progressing     Problem: Feeding Intolerance (Enteral Nutrition)  Goal: Feeding Tolerance  Outcome: Ongoing, Progressing     Problem: Bleeding (Spinal Surgery)  Goal: Absence of Bleeding  Outcome: Ongoing,  Progressing     Problem: Bowel Motility Impaired (Spinal Surgery)  Goal: Effective Bowel Elimination  Outcome: Ongoing, Progressing     Problem: Fluid and Electrolyte Imbalance (Spinal Surgery)  Goal: Fluid and Electrolyte Balance  Outcome: Ongoing, Progressing     Problem: Functional Ability Impaired (Spinal Surgery)  Goal: Optimal Functional Ability  Outcome: Ongoing, Progressing  Intervention: Optimize Functional Status  Recent Flowsheet Documentation  Taken 9/23/2024 0400 by Brooklyn Barlow RN  Positioning/Transfer Devices:   pillows   in use  Taken 9/23/2024 0147 by Brooklyn Barlow RN  Positioning/Transfer Devices:   pillows   in use  Taken 9/22/2024 2345 by Brooklyn Barlow RN  Positioning/Transfer Devices:   pillows   in use  Taken 9/22/2024 2148 by Brooklyn Barlow RN  Positioning/Transfer Devices:   pillows   in use  Taken 9/22/2024 1954 by Brooklyn Barlow RN  Positioning/Transfer Devices:   pillows   in use     Problem: Infection (Spinal Surgery)  Goal: Absence of Infection Signs and Symptoms  Outcome: Ongoing, Progressing  Intervention: Prevent or Manage Infection  Recent Flowsheet Documentation  Taken 9/22/2024 1954 by Brooklyn Barlow RN  Infection Prevention: single patient room provided     Problem: Neurologic Impairment (Spinal Surgery)  Goal: Optimal Neurologic Function  Outcome: Ongoing, Progressing  Intervention: Optimize Neurologic Function  Recent Flowsheet Documentation  Taken 9/23/2024 0400 by Brooklyn Barlow RN  Body Position: position changed independently  Taken 9/23/2024 0147 by Brooklyn Barlow RN  Body Position: position changed independently  Taken 9/22/2024 2345 by Brooklyn Barlow RN  Body Position: position changed independently  Taken 9/22/2024 2148 by Brooklyn Barlow RN  Body Position: position changed independently  Taken 9/22/2024 1954 by Brooklyn Barlow RN  Body Position: position changed independently  Pressure Reduction Devices: positioning supports utilized  Range of Motion:  active ROM (range of motion) encouraged     Problem: Ongoing Anesthesia Effects (Spinal Surgery)  Goal: Anesthesia/Sedation Recovery  Outcome: Ongoing, Progressing  Intervention: Optimize Anesthesia Recovery  Recent Flowsheet Documentation  Taken 9/23/2024 0400 by Brooklyn Barlow RN  Safety Promotion/Fall Prevention:   activity supervised   assistive device/personal items within reach   clutter free environment maintained   safety round/check completed  Taken 9/23/2024 0147 by Brooklyn Barlow RN  Safety Promotion/Fall Prevention:   activity supervised   assistive device/personal items within reach   clutter free environment maintained   safety round/check completed  Taken 9/22/2024 2345 by Brooklyn Barlow RN  Safety Promotion/Fall Prevention:   activity supervised   assistive device/personal items within reach   clutter free environment maintained   safety round/check completed  Taken 9/22/2024 2148 by Brooklyn Barlow RN  Safety Promotion/Fall Prevention:   activity supervised   assistive device/personal items within reach   clutter free environment maintained   safety round/check completed  Taken 9/22/2024 1954 by Brooklyn Barlow RN  Safety Promotion/Fall Prevention: safety round/check completed  Reorientation Measures: clock in view     Problem: Pain (Spinal Surgery)  Goal: Acceptable Pain Control  Outcome: Ongoing, Progressing  Intervention: Prevent or Manage Pain  Recent Flowsheet Documentation  Taken 9/22/2024 1954 by Brooklyn Barlow RN  Diversional Activities: television     Problem: Postoperative Nausea and Vomiting (Spinal Surgery)  Goal: Nausea and Vomiting Relief  Outcome: Ongoing, Progressing     Problem: Postoperative Urinary Retention (Spinal Surgery)  Goal: Effective Urinary Elimination  Outcome: Ongoing, Progressing     Problem: Respiratory Compromise (Spinal Surgery)  Goal: Effective Oxygenation and Ventilation  Outcome: Ongoing, Progressing  Intervention: Optimize Oxygenation and Ventilation  Recent  Flowsheet Documentation  Taken 9/23/2024 0400 by Brooklyn Barlow RN  Head of Bed (HOB) Positioning: HOB at 30 degrees  Taken 9/23/2024 0147 by Brooklyn Barlow RN  Head of Bed (HOB) Positioning: HOB at 30 degrees  Taken 9/22/2024 2345 by Brooklyn Barlow RN  Head of Bed (HOB) Positioning: HOB at 30 degrees  Taken 9/22/2024 2148 by Brooklyn Barlow RN  Head of Bed (HOB) Positioning: HOB at 30 degrees  Taken 9/22/2024 1954 by Brooklyn Barlow RN  Head of Bed (HOB) Positioning: HOB at 20 degrees   Goal Outcome Evaluation:  Plan of Care Reviewed With: patient

## 2024-09-23 NOTE — THERAPY TREATMENT NOTE
Patient Name: Kristina Yates  : 1960    MRN: [id number]                              Today's Date: 2024       Admit Date: 2024    Visit Dx:     ICD-10-CM ICD-9-CM   1. Spinal stenosis in cervical region  M48.02 723.0   2. Controlled type 2 diabetes mellitus without complication, without long-term current use of insulin  E11.9 250.00   3. Diabetic peripheral neuropathy  E11.42 250.60     357.2   4. Hyperlipidemia, unspecified hyperlipidemia type  E78.5 272.4   5. Acute neck pain  M54.2 723.1   6. Cervicalgia  M54.2 723.1   7. Cervical spinal cord injury without spinal bone injury, initial encounter  S14.109A 952.00   8. Chronic pain syndrome  G89.4 338.4   9. Cervical pain (neck)  M54.2 723.1   10. Cervical radicular pain  M54.12 723.4     Patient Active Problem List   Diagnosis    Cervical radiculopathy    Controlled type 2 diabetes mellitus without complication, without long-term current use of insulin    Diabetic peripheral neuropathy    Menopausal symptom    Adult acne    Hyperlipidemia    Arthritis    DJD (degenerative joint disease), cervical    Asthma    Spinal stenosis    Chronic pain    COPD (chronic obstructive pulmonary disease)    GERD (gastroesophageal reflux disease)    Alcohol abuse    Edmonds esophagus    Essential hypertension    Low back pain    Cauda equina syndrome with neurogenic bladder    Lumbar back pain    Tobacco abuse    Anxiety and depression    Chondromalacia of knee    IBRAHIMA exposure in utero    Impingement syndrome of right shoulder    Nephrolithiasis    Pes anserine bursitis    SI (sacroiliac) joint inflammation    Lumbar pseudoarthrosis    Cervicalgia    Acute neck pain    Spinal cord injury, cervical, without spinal bone injury     Past Medical History:   Diagnosis Date    Alcohol abuse     in recovery, sober since     Anxiety     Arthritis     Asthma     Edmonds esophagus     Blurred vision     Cervical disc disease     Cervical neuritis     Chronic pain     NECK  AND BACK    COPD (chronic obstructive pulmonary disease)     STATES NEVER DIAGNOSED WITH    Depression     Diabetes mellitus     Type 2    DJD (degenerative joint disease), cervical     Foot spasms     FROM BACK SURGERY    GERD (gastroesophageal reflux disease)     Hiatal hernia     Hyperlipidemia     Hypertension     Persistent headaches     ?BLOOD PRESSURE OR NECK RELATED???    Seasonal allergies     Spinal headache     Spinal stenosis      Past Surgical History:   Procedure Laterality Date    ANTERIOR CERVICAL DISCECTOMY W/ FUSION  11/2003    C5-6, C6-7    CERCLAGE CERVIX      CERVICAL EPIDURAL  2007    CERVICAL FUSION      DILATATION AND CURETTAGE      ENDOMETRIAL ABLATION  2007    ENDOSCOPY      MULTIPLE    ENDOSCOPY N/A 07/05/2016    Procedure: ESOPHAGOGASTRODUODENOSCOPY WITH COLD BIOPSIES;  Surgeon: Jose Mcclellan MD;  Location: [facility] ENDOSCOPY;  Service:     ESOPHAGEAL DILATATION  2009    HAND SURGERY Bilateral     CARTILAGE    LUMBAR FUSION      LUMBAR LAMINECTOMY WITH FUSION N/A 06/30/2023    Procedure: OPEN LUMBAR FOUR TO FIVE TRANSFORAMINAL LUMBAR INTERBODY FUSION;  Surgeon: Alex Ortiz MD;  Location: [facility] OR;  Service: Neurosurgery;  Laterality: N/A;    NISSEN FUNDOPLICATION LAPAROSCOPIC  03/2012    SACROILIAC JOINT FUSION Right 06/04/2024    Procedure: RIGHT PERCUTANEOUS ARTHRODESIS SACROILIAC JOINT, NEUROMONITORING;  Surgeon: Alex Ortiz MD;  Location: [facility] OR;  Service: Neurosurgery;  Laterality: Right;    TONSILLECTOMY        General Information       Row Name 09/23/24 1512          OT Time and Intention    Document Type therapy note (daily note)  -JR     Mode of Treatment occupational therapy  -       Row Name 09/23/24 1516          General Information    Patient Profile Reviewed yes  -JR     Existing Precautions/Restrictions fall;cervical collar;brace on at all times  -       Row Name 09/23/24 151          Cognition    Orientation Status (Cognition) oriented x 3  -JR        Row Name 09/23/24 1512          Safety Issues, Functional Mobility    Impairments Affecting Function (Mobility) balance;coordination;sensation/sensory awareness;endurance/activity tolerance;strength  -JR     Comment, Safety Issues/Impairments (Mobility) gt belt and non skid socks donned  -JR               User Key  (r) = Recorded By, (t) = Taken By, (c) = Cosigned By      Initials Name Provider Type    Jonathan Camarena OT Occupational Therapist                     Mobility/ADL's       Row Name 09/23/24 1512          Bed Mobility    Bed Mobility supine-sit;sit-supine  -JR     Rolling Left [place] (Bed Mobility) standby assist  -JR     Supine-Sit [place] (Bed Mobility) standby assist  -JR     Sit-Supine [place] (Bed Mobility) standby assist  -JR     Assistive Device (Bed Mobility) bed rails;head of bed elevated  -JR       Row Name 09/23/24 1512          Sit-Stand Transfer    Sit-Stand [place] (Transfers) standby assist  -JR     Comment, (Sit-Stand Transfer) Did not require any AD  -JR       Row Name 09/23/24 1512          Stand-Sit Transfer    Stand-Sit [place] (Transfers) standby assist  -JR       Row Name 09/23/24 1512          Functional Mobility    Patient was able to Ambulate yes  -JR               User Key  (r) = Recorded By, (t) = Taken By, (c) = Cosigned By      Initials Name Provider Type    Jonathan Camarena OT Occupational Therapist                   Obj/Interventions       Row Name 09/23/24 1513          Balance    Balance Assessment sitting static balance;sitting dynamic balance;sit to stand dynamic balance;standing static balance;standing dynamic balance  -JR     Static Sitting Balance set-up  -JR     Dynamic Sitting Balance set-up  -JR     Position, Sitting Balance sitting edge of bed  -JR     Sit to Stand Dynamic Balance standby assist  -JR     Static Standing Balance standby assist  -JR     Dynamic Standing Balance standby assist  -JR               User Key  (r) = Recorded By,  (t) = Taken By, (c) = Cosigned By      Initials Name Provider Type    Jonathan Camarena, OT Occupational Therapist                   Goals/Plan    No documentation.                  Clinical Impression       Row Name 09/23/24 1514          Pain Assessment    Pretreatment Pain Rating 0/10 - no pain  -     Posttreatment Pain Rating 0/10 - no pain  -       Row Name 09/23/24 1514          Plan of Care Review    Progress improving  -     Outcome Evaluation Patient resting in bed upon arrival.  Agreeable to OT tx. Family present for tx.  Patient transitioned to EOB with SBA.  STS from EOB with SBA.  Patient performed household ambulation with SBA utilizing no AD.  Patient bumped into 1 corner.  Patient returned to bed with SBA.  Patient reported no dizziness, nauses,  SOA with functional tasks.  Patient is anxious to d/c to Acute Rehab,  Cont OT as tolerated.  -       Row Name 09/23/24 1514          Therapy Assessment/Plan (OT)    Rehab Potential (OT) good, to achieve stated therapy goals  -     Criteria for Skilled Therapeutic Interventions Met (OT) yes;skilled treatment is necessary  -     Therapy Frequency (OT) 5 times/wk  -       Row Name 09/23/24 1514          Therapy Plan Review/Discharge Plan (OT)    Anticipated Discharge Disposition (OT) inpatient rehabilitation facility  -       Row Name 09/23/24 1514          Vital Signs    O2 Delivery Pre Treatment room air  -JR     O2 Delivery Intra Treatment room air  -JR     O2 Delivery Post Treatment room air  -JR     Pre Patient Position Supine  -JR     Intra Patient Position Standing  -JR     Post Patient Position Supine  -JR       Row Name 09/23/24 1514          Positioning and Restraints    Pre-Treatment Position in bed  -JR     Post Treatment Position bed  -JR     In Bed notified nsg;supine;call light within reach;encouraged to call for assist;with family/caregiver  -               User Key  (r) = Recorded By, (t) = Taken By, (c) = Cosigned By       Initials Name Provider Type    Jonathan Camarena OT Occupational Therapist                   Outcome Measures       Row Name 09/23/24 1516          How much help from another is currently needed...    Putting on and taking off regular lower body clothing? 2  -JR     Bathing (including washing, rinsing, and drying) 2  -JR     Toileting (which includes using toilet bed pan or urinal) 3  -JR     Putting on and taking off regular upper body clothing 3  -JR     Taking care of personal grooming (such as brushing teeth) 3  -JR     Eating meals 1  -JR     AM-PAC 6 Clicks Score (OT) 14  -JR       Row Name 09/23/24 0825          How much help from another person do you currently need...    Turning from your back to your side while in flat bed without using bedrails? 4  -RD     Moving from lying on back to sitting on the side of a flat bed without bedrails? 4  -RD     Moving to and from a bed to a chair (including a wheelchair)? 4  -RD     Standing up from a chair using your arms (e.g., wheelchair, bedside chair)? 4  -RD     Climbing 3-5 steps with a railing? 3  -RD     To walk in hospital room? 4  -RD     AM-PAC 6 Clicks Score (PT) 23  -RD     Highest Level of Mobility Goal 7 --> Walk 25 feet or more  -RD       Row Name 09/23/24 1516          Functional Assessment    Outcome Measure Options AM-PAC 6 Clicks Daily Activity (OT)  -               User Key  (r) = Recorded By, (t) = Taken By, (c) = Cosigned By      Initials Name Provider Type    Jessica Page, RN Registered Nurse    Jonathan Camarena OT Occupational Therapist                    Occupational Therapy Education       Title: PT OT SLP Therapies (In Progress)       Topic: Occupational Therapy (In Progress)       Point: ADL training (Done)       Description:   Instruct learner(s) on proper safety adaptation and remediation techniques during self care or transfers.   Instruct in proper use of assistive devices.                  Learning Progress Summary             Patient  Acceptance, E, VU by  at 9/18/2024 0632    Comment: OT goals, POC, transfer safety                         Point: Home exercise program (Not Started)       Description:   Instruct learner(s) on appropriate technique for monitoring, assisting and/or progressing therapeutic exercises/activities.                  Learner Progress:  Not documented in this visit.              Point: Precautions (Not Started)       Description:   Instruct learner(s) on prescribed precautions during self-care and functional transfers.                  Learner Progress:  Not documented in this visit.              Point: Body mechanics (Not Started)       Description:   Instruct learner(s) on proper positioning and spine alignment during self-care, functional mobility activities and/or exercises.                  Learner Progress:  Not documented in this visit.                              User Key       Initials Effective Dates Name Provider Type Discipline     04/02/20 -  Lizy Simmons OT Occupational Therapist OT                  OT Recommendation and Plan  Therapy Frequency (OT): 5 times/wk  Plan of Care Review  Progress: improving  Outcome Evaluation: Patient resting in bed upon arrival.  Agreeable to OT tx. Family present for tx.  Patient transitioned to EOB with SBA.  STS from EOB with SBA.  Patient performed household ambulation with SBA utilizing no AD.  Patient bumped into 1 corner.  Patient returned to bed with SBA.  Patient reported no dizziness, nauses,  SOA with functional tasks.  Patient is anxious to d/c to Acute Rehab,  Cont OT as tolerated.     Time Calculation:         Time Calculation- OT       Row Name 09/23/24 9353             Time Calculation- OT    OT Start Time 1354  -JR      OT Stop Time 1406  -      OT Time Calculation (min) 12 min  -      Total Timed Code Minutes- OT 12 minute(s)  -      OT Received On 09/23/24  -      OT - Next Appointment 09/24/24  -         Timed Charges    06853 - OT  Therapeutic Activity Minutes 12  -JR         Total Minutes    Timed Charges Total Minutes 12  -JR       Total Minutes 12  -JR                User Key  (r) = Recorded By, (t) = Taken By, (c) = Cosigned By      Initials Name Provider Type    Jonathan Camarena OT Occupational Therapist                  Therapy Charges for Today       Code Description Service Date Service Provider Modifiers Qty    [id number]  OT THERAPEUTIC ACT EA 15 MIN 9/23/2024 Jonathan Askew OT GO 1                 Jonathan Askew OT  9/23/2024

## 2024-09-23 NOTE — PROGRESS NOTES
Name: Kristina Yates ADMIT: 2024   : 1960  PCP: Nyla Mejia APRN    MRN: [id number] LOS: 7 days   AGE/SEX: 64 y.o. female  ROOM: [facility]     Subjective   Subjective   Patient is seen at bedside, no new complaints.       Objective   Objective   Vital Signs  Temp:  [98.1 °F (36.7 °C)-98.8 °F (37.1 °C)] 98.1 °F (36.7 °C)  Heart Rate:  [75-85] 83  Resp:  [16-18] 18  BP: (152-173)/(78-85) 163/80  SpO2:  [96 %-99 %] 98 %  on   ;   Device (Oxygen Therapy): room air  Body mass index is 26.92 kg/m².  Physical Exam  General, awake and alert.  Head and ENT, normocephalic and atraumatic.  Lungs, symmetric expansion, equal air entry bilaterally.  Heart, regular rate and rhythm.  Abdomen, soft and nontender.  Extremities, no clubbing or cyanosis.  Neuro, no focal deficits.  Skin: Warm and no rash.  Psych, normal mood and affect.  Musculoskeletal, joint examination is grossly normal.      Copied text material from yesterday's note has been reviewed for appropriate changes and remains accurate as of 24.        Results Review     I reviewed the patient's new clinical results.  Results from last 7 days   Lab Units 24  0524  0506 24  0729 24  0403   WBC 10*3/mm3 20.60* 18.09* 19.86* 18.79*   HEMOGLOBIN g/dL 12.6 13.4 12.7 11.9*   PLATELETS 10*3/mm3 301 313 336 282     Results from last 7 days   Lab Units 24  0518 24  0506 24  0729 24  0403   SODIUM mmol/L 132* 134* 135* 137   POTASSIUM mmol/L 3.6 3.8 3.5 3.9   CHLORIDE mmol/L 96* 98 98 100   CO2 mmol/L 22.0 20.0* 24.0 23.4   BUN mg/dL  17 18   CREATININE mg/dL 0.61 0.54* 0.48* 0.60   GLUCOSE mg/dL 107* 126* 115* 171*   EGFR mL/min/1.73 100.6 103.6 106.6 101.0       Results from last 7 days   Lab Units 24  0518 24  0506 24  0729 24  0403   CALCIUM mg/dL 9.0 9.3 9.3 9.2       Glucose   Date/Time Value Ref Range Status   2024 1107 173 (H) 70 - 130 mg/dL Final   2024 0713 148  (H) 70 - 130 mg/dL Final   09/22/2024 2017 151 (H) 70 - 130 mg/dL Final   09/22/2024 1631 171 (H) 70 - 130 mg/dL Final   09/22/2024 1226 187 (H) 70 - 130 mg/dL Final   09/22/2024 0815 155 (H) 70 - 130 mg/dL Final   09/21/2024 2029 140 (H) 70 - 130 mg/dL Final       No radiology results for the last day    I have personally reviewed all medications:  Scheduled Medications  amLODIPine, 5 mg, Oral, Nightly  atorvastatin, 40 mg, Oral, Nightly  dexAMETHasone, 1.5 mg, Oral, BID With Meals  DULoxetine, 30 mg, Oral, Daily  DULoxetine, 60 mg, Oral, Daily  enoxaparin, 40 mg, Subcutaneous, Nightly  fluticasone, 2 spray, Nasal, Daily  gabapentin, 200 mg, Oral, BID  guaiFENesin, 600 mg, Oral, Q12H  hydroCHLOROthiazide, 12.5 mg, Oral, Daily  insulin lispro, 2-7 Units, Subcutaneous, 4x Daily AC & at Bedtime  losartan, 100 mg, Oral, Daily  methocarbamol, 750 mg, Oral, Q6H  metoprolol succinate XL, 50 mg, Oral, Q12H  multivitamin with minerals, 1 tablet, Oral, Daily  pantoprazole, 40 mg, Oral, Q AM  senna-docusate sodium, 2 tablet, Oral, BID   And  polyethylene glycol, 17 g, Oral, Daily  sodium chloride, 10 mL, Intravenous, Q12H    Infusions   Diet  Diet: Regular/House; Fluid Consistency: Thin (IDDSI 0)    I have personally reviewed:  [x]  Laboratory   [x]  Microbiology   [x]  Radiology   [x]  EKG/Telemetry  [x]  Cardiology/Vascular   []  Pathology    []  Records       Assessment/Plan     Active Hospital Problems    Diagnosis  POA    **Spinal cord injury, cervical, without spinal bone injury [S14.109A]  Yes    Cervicalgia [M54.2]  Yes    Essential hypertension [I10]  Yes    Hyperlipidemia [E78.5]  Yes    Controlled type 2 diabetes mellitus without complication, without long-term current use of insulin [E11.9]  Yes    Cervical radiculopathy [M54.12]  Yes      Resolved Hospital Problems   No resolved problems to display.       64 y.o. female admitted with Spinal cord injury, cervical, without spinal bone injury.    64 y.o. female who  is s/p CERVICAL FOUR TO SIX DISCECTOMY ANTERIOR WITH FUSION WITH CERVICAL FIVE CORPECTOMY AND REMOVAL OF PREVIOUS HARDWARE.     Labs are stable.  Blood sugars seem well-controlled.  She is having some nausea today and will order Compazine.  Patient states she is allergic to Zofran.  Otherwise seems to be stable medically.  Her blood pressure is moderately elevated but she is asymptomatic.  No cardiac history.  She is on multiple antihypertensive medications.  Suspect blood pressure made worse by pain, stress, steroids, etc. she does however tell me her blood pressure readings at home are uncontrolled as well.  Heart rate in the 90s.  Will change her Toprol to twice daily dosing.  Tube feedings have been discontinued.  Will change regular insulin to Humalog and stop Lantus.  Most recent A1c 6.0%.       Alek Pressley MD  [facility]  09/23/24  18:25 EDT

## 2024-09-23 NOTE — PLAN OF CARE
Goal Outcome Evaluation:  Plan of Care Reviewed With: patient        Progress: improving  Outcome Evaluation: Pt seen for PT tx today. Pt agreeable to participate. Pt is SBA with bed mobility and with stands. Pt able to ambulate out in hallway with CGA and no AD. Pt need cues for upright posture and increasing step length. Pt is a little unsteady but no LOB. Will continue to follow and prgress as able.      Anticipated Discharge Disposition (PT): inpatient rehabilitation facility

## 2024-09-23 NOTE — THERAPY TREATMENT NOTE
Patient Name: Kristina Yates  : 1960    MRN: [id number]                              Today's Date: 2024       Admit Date: 2024    Visit Dx:     ICD-10-CM ICD-9-CM   1. Spinal stenosis in cervical region  M48.02 723.0   2. Controlled type 2 diabetes mellitus without complication, without long-term current use of insulin  E11.9 250.00   3. Diabetic peripheral neuropathy  E11.42 250.60     357.2   4. Hyperlipidemia, unspecified hyperlipidemia type  E78.5 272.4   5. Acute neck pain  M54.2 723.1   6. Cervicalgia  M54.2 723.1   7. Cervical spinal cord injury without spinal bone injury, initial encounter  S14.109A 952.00   8. Chronic pain syndrome  G89.4 338.4   9. Cervical pain (neck)  M54.2 723.1   10. Cervical radicular pain  M54.12 723.4     Patient Active Problem List   Diagnosis    Cervical radiculopathy    Controlled type 2 diabetes mellitus without complication, without long-term current use of insulin    Diabetic peripheral neuropathy    Menopausal symptom    Adult acne    Hyperlipidemia    Arthritis    DJD (degenerative joint disease), cervical    Asthma    Spinal stenosis    Chronic pain    COPD (chronic obstructive pulmonary disease)    GERD (gastroesophageal reflux disease)    Alcohol abuse    Edmonds esophagus    Essential hypertension    Low back pain    Cauda equina syndrome with neurogenic bladder    Lumbar back pain    Tobacco abuse    Anxiety and depression    Chondromalacia of knee    IBRAHIMA exposure in utero    Impingement syndrome of right shoulder    Nephrolithiasis    Pes anserine bursitis    SI (sacroiliac) joint inflammation    Lumbar pseudoarthrosis    Cervicalgia    Acute neck pain    Spinal cord injury, cervical, without spinal bone injury     Past Medical History:   Diagnosis Date    Alcohol abuse     in recovery, sober since     Anxiety     Arthritis     Asthma     Edmonds esophagus     Blurred vision     Cervical disc disease     Cervical neuritis     Chronic pain     NECK  AND BACK    COPD (chronic obstructive pulmonary disease)     STATES NEVER DIAGNOSED WITH    Depression     Diabetes mellitus     Type 2    DJD (degenerative joint disease), cervical     Foot spasms     FROM BACK SURGERY    GERD (gastroesophageal reflux disease)     Hiatal hernia     Hyperlipidemia     Hypertension     Persistent headaches     ?BLOOD PRESSURE OR NECK RELATED???    Seasonal allergies     Spinal headache     Spinal stenosis      Past Surgical History:   Procedure Laterality Date    ANTERIOR CERVICAL DISCECTOMY W/ FUSION  11/2003    C5-6, C6-7    CERCLAGE CERVIX      CERVICAL EPIDURAL  2007    CERVICAL FUSION      DILATATION AND CURETTAGE      ENDOMETRIAL ABLATION  2007    ENDOSCOPY      MULTIPLE    ENDOSCOPY N/A 07/05/2016    Procedure: ESOPHAGOGASTRODUODENOSCOPY WITH COLD BIOPSIES;  Surgeon: Jose Mcclellan MD;  Location: [facility] ENDOSCOPY;  Service:     ESOPHAGEAL DILATATION  2009    HAND SURGERY Bilateral     CARTILAGE    LUMBAR FUSION      LUMBAR LAMINECTOMY WITH FUSION N/A 06/30/2023    Procedure: OPEN LUMBAR FOUR TO FIVE TRANSFORAMINAL LUMBAR INTERBODY FUSION;  Surgeon: Alex Ortiz MD;  Location: [facility] OR;  Service: Neurosurgery;  Laterality: N/A;    NISSEN FUNDOPLICATION LAPAROSCOPIC  03/2012    SACROILIAC JOINT FUSION Right 06/04/2024    Procedure: RIGHT PERCUTANEOUS ARTHRODESIS SACROILIAC JOINT, NEUROMONITORING;  Surgeon: Alex Ortiz MD;  Location: [facility] OR;  Service: Neurosurgery;  Laterality: Right;    TONSILLECTOMY        General Information       Row Name 09/23/24 1520          Physical Therapy Time and Intention    Document Type therapy note (daily note)  -EB     Mode of Treatment physical therapy  -EB       Row Name 09/23/24 1520          General Information    Patient Profile Reviewed yes  -EB     Existing Precautions/Restrictions fall;cervical collar;brace on at all times  -EB       Row Name 09/23/24 1520          Cognition    Orientation Status (Cognition) oriented x 3   -EB       Row Name 09/23/24 1520          Safety Issues, Functional Mobility    Impairments Affecting Function (Mobility) balance;endurance/activity tolerance;strength;coordination  -EB               User Key  (r) = Recorded By, (t) = Taken By, (c) = Cosigned By      Initials Name Provider Type    Estefany Dudley PTA Physical Therapist Assistant                   Mobility       Row Name 09/23/24 1520          Bed Mobility    Supine-Sit [place] (Bed Mobility) standby assist  -EB     Sit-Supine [place] (Bed Mobility) standby assist  -EB     Assistive Device (Bed Mobility) bed rails;head of bed elevated  -EB       Row Name 09/23/24 1520          Sit-Stand Transfer    Sit-Stand [place] (Transfers) standby assist  -EB     Assistive Device (Sit-Stand Transfers) --  no AD  -EB       Row Name 09/23/24 1520          Gait/Stairs (Locomotion)    [place] Level (Gait) contact guard  -EB     Assistive Device (Gait) --  no AD  -EB     Distance in Feet (Gait) --  [id number]  -EB     Deviations/Abnormal Patterns (Gait) stride length decreased;gait speed decreased;base of support, wide  -EB     Left Sided Gait Deviations decreased knee extension  -EB     Right Sided Gait Deviations decreased knee extension  -EB     Comment, (Gait/Stairs) slightly unsteady but no LOB  -EB               User Key  (r) = Recorded By, (t) = Taken By, (c) = Cosigned By      Initials Name Provider Type    Estefany Dudley PTA Physical Therapist Assistant                   Obj/Interventions    No documentation.                  Goals/Plan    No documentation.                  Clinical Impression       Row Name 09/23/24 1522          Pain    Pretreatment Pain Rating 0/10 - no pain  -EB     Posttreatment Pain Rating 0/10 - no pain  -EB       Row Name 09/23/24 1522          Plan of Care Review    Plan of Care Reviewed With patient  -EB     Progress improving  -EB     Outcome Evaluation Pt seen for PT tx today. Pt agreeable to participate.  Pt is SBA with bed mobility and with stands. Pt able to ambulate out in hallway with CGA and no AD. Pt need cues for upright posture and increasing step length. Pt is a little unsteady but no LOB. Will continue to follow and prgress as able.  -EB       Row Name 09/23/24 1522          Positioning and Restraints    Pre-Treatment Position in bed  -EB     Post Treatment Position bed  -EB     In Bed supine;call light within reach;encouraged to call for assist;exit alarm on  -EB               User Key  (r) = Recorded By, (t) = Taken By, (c) = Cosigned By      Initials Name Provider Type    Estefany Dudley PTA Physical Therapist Assistant                   Outcome Measures       Row Name 09/23/24 1524 09/23/24 0825       How much help from another person do you currently need...    Turning from your back to your side while in flat bed without using bedrails? 4  -EB 4  -RD    Moving from lying on back to sitting on the side of a flat bed without bedrails? 4  -EB 4  -RD    Moving to and from a bed to a chair (including a wheelchair)? 3  -EB 4  -RD    Standing up from a chair using your arms (e.g., wheelchair, bedside chair)? 3  -EB 4  -RD    Climbing 3-5 steps with a railing? 2  -EB 3  -RD    To walk in hospital room? 3  -EB 4  -RD    AM-PAC 6 Clicks Score (PT) 19  -EB 23  -RD    Highest Level of Mobility Goal 6 --> Walk 10 steps or more  -EB 7 --> Walk 25 feet or more  -RD      Row Name 09/23/24 1516          Functional Assessment    Outcome Measure Options AM-PAC 6 Clicks Daily Activity (OT)  -JR               User Key  (r) = Recorded By, (t) = Taken By, (c) = Cosigned By      Initials Name Provider Type    Estefany Dudley PTA Physical Therapist Assistant    Jessica Page, RN Registered Nurse    Jonathan Camarena OT Occupational Therapist                                 Physical Therapy Education       Title: PT OT SLP Therapies (In Progress)       Topic: Physical Therapy (In Progress)       Point: Mobility training  (Done)       Learning Progress Summary             Patient Acceptance, E, VU by EB at 9/23/2024 1524    Acceptance, E, NR by EB at 9/21/2024 1646    Acceptance, E, VU by DG at 9/19/2024 1423    Acceptance, E, VU by EM at 9/18/2024 1516   Significant Other Acceptance, E, VU by DG at 9/19/2024 1423    Acceptance, E, VU by EM at 9/18/2024 1516                         Point: Home exercise program (Not Started)       Learner Progress:  Not documented in this visit.              Point: Body mechanics (Not Started)       Learner Progress:  Not documented in this visit.              Point: Precautions (In Progress)       Learning Progress Summary             Patient Acceptance, E, NR by EB at 9/21/2024 1646    Acceptance, E, VU by DG at 9/19/2024 1423   Significant Other Acceptance, E, VU by DG at 9/19/2024 1423                                         User Key       Initials Effective Dates Name Provider Type Discipline    EM 06/16/21 -  Daysi Soriano, PT Physical Therapist PT    EB 02/14/23 -  Estefany Byrne, KORTNEY Physical Therapist Assistant PT     08/22/24 -  Jung Leary, PT Student PT Student PT                  PT Recommendation and Plan     Plan of Care Reviewed With: patient  Progress: improving  Outcome Evaluation: Pt seen for PT tx today. Pt agreeable to participate. Pt is SBA with bed mobility and with stands. Pt able to ambulate out in hallway with CGA and no AD. Pt need cues for upright posture and increasing step length. Pt is a little unsteady but no LOB. Will continue to follow and prgress as able.     Time Calculation:         PT Charges       Row Name 09/23/24 1525             Time Calculation    Start Time 1357  -EB      Stop Time 1406  -EB      Time Calculation (min) 9 min  -EB      PT Received On 09/23/24  -EB      PT - Next Appointment 09/24/24  -EB         Time Calculation- PT    Total Timed Code Minutes- PT 9 minute(s)  -EB                User Key  (r) = Recorded By, (t) = Taken By, (c) =  Cosigned By      Initials Name Provider Type    EB Estefany Byrne PTA Physical Therapist Assistant                  Therapy Charges for Today       Code Description Service Date Service Provider Modifiers Qty    [id number] HC GAIT TRAINING EA 15 MIN 9/23/2024 Estefany Bryne PTA GP 1            PT G-Codes  Outcome Measure Options: AM-PAC 6 Clicks Daily Activity (OT)  AM-PAC 6 Clicks Score (PT): 19  AM-PAC 6 Clicks Score (OT): 14  PT Discharge Summary  Anticipated Discharge Disposition (PT): inpatient rehabilitation facility    Estefany Byrne PTA  9/23/2024

## 2024-09-23 NOTE — DISCHARGE SUMMARY
Kristina Yates  1960    Patient Care Team:  Nyla Mejia APRN as PCP - General (Family Medicine)  Brooklyn Miles PA as Physician Assistant (Obstetrics and Gynecology)  Pedro Luis Zayas MD as Cardiologist (Cardiology)    Date of Admit: 9/16/2024    Date of Discharge:  9/23/2024    Discharge Diagnosis:  Spinal cord injury, cervical, without spinal bone injury    Cervical radiculopathy    Controlled type 2 diabetes mellitus without complication, without long-term current use of insulin    Hyperlipidemia    Essential hypertension    Cervicalgia     Spinal cord injury at the level of C4-5 secondary to hardware failure that occurred during removal of previous cervical fusion hardware.     Postoperative dysphagia - resolved     Postoperative leukocytosis secondary to steroids - resolved     Hypertension - exacerbated due to steroids; pain - improving      Type 2 diabetes mellitus; controlled -followed by hospitalist service    Procedures Performed  Procedure(s):  CERVICAL FOUR TO SIX DISCECTOMY ANTERIOR WITH FUSION WITH CERVICAL FIVE CORPECTOMY AND REMOVAL OF PREVIOUS HARDWARE     Anterior Cervical corpectomy and Fusion  of C5 vertebral body     Anterior cervical C5 corpectomy, including disc space preparation, discectomy, osteophytectomy and decompression of spinal cord and/or nerve roots at  C4-5, C6-7  Anterior titanium instrumentation at  C4-C6 ,   Morselized Vivigen allograft packed into interbody cages  Microdissection technique with the use of the operating microscope  Intraoperative duraplasty    Complications: Spinal cord injury at the level of C4-5 and directly below secondary to hardware failure during hardware removal    Consultants:   Consults       Date and Time Order Name Status Description    9/19/2024  4:33 PM Inpatient Internal Medicine Consult Completed     9/19/2024  9:14 AM Inpatient Physical Medicine Rehab Consult      9/16/2024 12:03 PM Inpatient Pulmonology Consult Completed            Condition on Discharge: stable    Discharge disposition: [facility]    Pertinent Test Results:     VIDEO SWALLOW STUDY 9/20/2024    Study performed by speech-language pathologist who had been following the patient for development of mild pharyngeal and esophageal dysphagia.  The VFSS revealed functional oropharyngeal swallow.  No aspiration visualized therefore speech therapy recommended regular diet with thin liquids and continuation of general aspiration/reflux precautions.    MRI OF THE CERVICAL SPINE WITHOUT CONTRAST 9/19/2024     Postoperative MRI performed 3 days after cervical spine surgery revealed corpectomy procedure at C5.  Strut graft in place.  Anterior plate and traversing screws within the C4 and C6 vertebral bodies noted.  Interval development of abnormal cord signal centered at the level of the C5 vertebrae measuring up to approximately 1.8 cm.  Cord signal abnormality measures 8 x 8 mm in dimension.  There is also a small focus of susceptibility artifact measures approximately 2 mm in diameter and is compatible with a small focus of hemorrhage.  The anterior cervical plate fusing C4 down to C6 is intact.    XR SPINE CERVICAL 2 VW-9/18/2024    Interval removal of prior C5-C7 anterior plate and screw fixation.  New plate and screw fixation extends from C4-C7.  Screws present at C4 and C7.  Intact cage at C5 and C6.  All hardware intact.  No subluxation.    Brief HPI: This is a 64-year-old female who has been followed by Dr. Ortiz and has undergone previous lumbar fusion surgery over a year ago and subsequent right percutaneous sacroiliac joint arthrodesis for severe  inflammation nearly 4 months ago.  The patient did well with these procedures.  She also had a history of cervical spinal stenosis and had been particularly bothered by radicular pain.  She had a history of prior C5-6, C6-7 ACDF fusion in 2003. 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  The patient had a known broken preoperative screw of the left superior C5 vertebral body on x-ray.  The other remaining screws were intact however the plate from the prior fusion was angled and tilted clockwise to the right superior to the C5 vertebral body screw in the middle of the vertebral body.  Dr. Ortiz reviewed these findings with the patient and also discussed the surgical procedure at C4-5 with the patient at length.  She agreed to proceed.  She therefore presented to the operating room for the above-stated elective surgical procedure.  Please see admission H&P for further details.    Hospital Course: The surgical procedure had been going well until a complication occurred due to malfunction of hardware.  The right superior C5 vertebral body screw head did not engage properly and the screw plunged through the vertebral body posteriorly.  Immediate x-ray was taken and the screw was localized. The cervical plate was removed and then C5 corpectomy was performed to better visualize the spinal cord and very carefully remove the foreign body.  Once this was accomplished the small durotomy was successfully closed and the surgical incision was closed in layers and the outer skin was eventually closed with Dermabond.  The patient was placed in a hard cervical collar and transferred to intensive care unit for observation.  Due to extensive manipulation, the patient experienced some difficulty with swallowing postoperatively.  She has been on IV steroids and then subsequently transferred to a 6-day dexamethasone pack which will be completed after 3 more doses.  She does have a history of hypertension which seems to have been aggravated by both the steroids and the patient's pain.  Right now her pain is being appropriately managed with Percocet 7.5/325 mg as well as Robaxin p.o. on a scheduled basis.   Her voice quality is normal.  She has been evaluated by speech therapy and subsequently cleared for a regular diet with thin liquids after VFSS performed 3 days ago.  She is tolerating food and liquids without difficulty.  She was also evaluated by physical therapy and Occupational Therapy.  She was deemed appropriate for acute inpatient rehab at [facility].  She was evaluated by Dr. Us who agrees with the plan for spinal cord injury program at [facility].  The patient has had some intermittent spikes in blood pressure and therefore A has increased the metoprolol dosage.  It seems as though the spikes in blood pressure occur after the morning dose of steroids.  The patient will be discharged to inpatient [facility] rehab today.  Both the patient, her  and Dr. Ortiz are in agreement with the plan. She will be seen 10/02/2024 in Dr. Ortiz's office for re-evaluation however if rehab stay will go beyond that time, please call our office and we will arrange the follow up after [facility].     Temp:  [98.1 °F (36.7 °C)-98.8 °F (37.1 °C)] 98.1 °F (36.7 °C)  Heart Rate:  [75-89] 83  Resp:  [16-18] 18  BP: (152-173)/(78-85) 163/80    Current labs:  Lab Results (last 24 hours)       Procedure Component Value Units Date/Time    POC Glucose Once [[id number]]  (Abnormal) Collected: 09/22/24 1631    Specimen: Blood Updated: 09/22/24 1632     Glucose 171 mg/dL     POC Glucose Once [[id number]]  (Abnormal) Collected: 09/22/24 2017    Specimen: Blood Updated: 09/22/24 2018     Glucose 151 mg/dL     CBC (No Diff) [[id number]]  (Abnormal) Collected: 09/23/24 0518    Specimen: Blood Updated: 09/23/24 0557     WBC 20.60 10*3/mm3      RBC 4.07 10*6/mm3      Hemoglobin 12.6 g/dL      Hematocrit 37.1 %      MCV 91.2 fL      MCH 31.0 pg      MCHC 34.0 g/dL      RDW 13.6 %      RDW-SD 45.9 fl      MPV 10.2 fL      Platelets 301 10*3/mm3     Basic Metabolic Panel [[id number]]  (Abnormal) Collected:  09/23/24 0518    Specimen: Blood Updated: 09/23/24 0605     Glucose 107 mg/dL      BUN 21 mg/dL      Creatinine 0.61 mg/dL      Sodium 132 mmol/L      Potassium 3.6 mmol/L      Chloride 96 mmol/L      CO2 22.0 mmol/L      Calcium 9.0 mg/dL      BUN/Creatinine Ratio 34.4     Anion Gap 14.0 mmol/L      eGFR 100.6 mL/min/1.73     Narrative:      GFR Normal >60  Chronic Kidney Disease <60  Kidney Failure <15      POC Glucose Once [[id number]]  (Abnormal) Collected: 09/23/24 0713    Specimen: Blood Updated: 09/23/24 0714     Glucose 148 mg/dL     POC Glucose Once [[id number]]  (Abnormal) Collected: 09/23/24 1107    Specimen: Blood Updated: 09/23/24 1108     Glucose 173 mg/dL               General Appearance No acute distress. Speech normal.    HEENT Atraumatic; normocephalic. Wearing hard cervical collar. Anterior cervical incision flat, soft. No oozing or drainage.    Neurological Awake, Alert, and oriented x 3   Cranial nerves Face symmetric. Sensation equal and intact throughout the face bilaterally in all three divisions. Tongue midline.    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 Normal tone.    Sensory Intact to light touch    Reflexes 1+ throughout. Negative Roy's' negative clonus   Neck  Supple, trachea midline, anterior incision dressing dry and intact.    Extremities Moving all extremities x 4. No calf swelling or tenderness to bilateral palpation.                      Discharge Medications  BEL has been reviewed and narcotic consent is on file in the patient's chart.     Your medication list        START taking these medications        Instructions Last Dose Given Next Dose Due   bisacodyl 5 MG EC tablet  Commonly known as: DULCOLAX      Take 1 tablet by mouth Daily As Needed for Constipation (Use if polyethylene glycol is ineffective).       bisacodyl 10 MG suppository  Commonly  known as: DULCOLAX      Insert 1 suppository into the rectum Daily As Needed for Constipation (Use if bisacodyl oral is ineffective).       calcium carbonate 500 MG chewable tablet  Commonly known as: TUMS      Chew 2 tablets 3 (Three) Times a Day As Needed for Indigestion or Heartburn.       dexAMETHasone 1.5 MG tablet  Commonly known as: DECADRON      Take 1 tablet by mouth 2 (Two) Times a Day With Meals for 2 doses.       dextrose 40 % gel  Commonly known as: GLUTOSE      Take 15 g by mouth Every 15 (Fifteen) Minutes As Needed for Low Blood Sugar (Blood sugar less than 70).       Enoxaparin Sodium 40 MG/0.4ML solution prefilled syringe syringe  Commonly known as: LOVENOX      Inject 0.4 mL under the skin into the appropriate area as directed Every Night. Indications: Prevention of Unwanted Clot in Veins       glucagon 1 MG injection  Commonly known as: GLUCAGEN      Inject 1 mg into the appropriate muscle as directed by prescriber Every 15 (Fifteen) Minutes As Needed (Blood Glucose Less Than 70).       insulin lispro 100 UNIT/ML injection  Commonly known as: HUMALOG/ADMELOG      Inject 2-7 Units under the skin into the appropriate area as directed 4 (Four) Times a Day Before Meals & at Bedtime.       melatonin 5 MG tablet tablet      Take 1 tablet by mouth At Night As Needed (insomnia).       methocarbamol 750 MG tablet  Commonly known as: ROBAXIN      Take 1 tablet by mouth Every 6 (Six) Hours.       naloxone 0.4 MG/ML injection  Commonly known as: NARCAN      Infuse 1 mL into a venous catheter Every 5 (Five) Minutes As Needed for Respiratory Depression.       oxyCODONE-acetaminophen 7.5-325 MG per tablet  Commonly known as: PERCOCET      Take 1 tablet by mouth Every 4 (Four) Hours As Needed for Moderate Pain or Severe Pain for up to 7 days.       pantoprazole 40 MG EC tablet  Commonly known as: PROTONIX  Start taking on: September 24, 2024      Take 1 tablet by mouth Every Morning.       polyethylene glycol 17  g packet  Commonly known as: MIRALAX  Start taking on: September 24, 2024      Take 17 g by mouth Daily.       sennosides-docusate 8.6-50 MG per tablet  Commonly known as: PERICOLACE      Take 2 tablets by mouth 2 (Two) Times a Day.              CHANGE how you take these medications        Instructions Last Dose Given Next Dose Due   atorvastatin 40 MG tablet  Commonly known as: LIPITOR  What changed:   when to take this  Another medication with the same name was removed. Continue taking this medication, and follow the directions you see here.      Take 1 tablet by mouth Every Night.       losartan 100 MG tablet  Commonly known as: COZAAR  What changed: additional instructions      Take 1 tablet by mouth Daily.              CONTINUE taking these medications        Instructions Last Dose Given Next Dose Due   albuterol sulfate  (90 Base) MCG/ACT inhaler  Commonly known as: PROVENTIL HFA;VENTOLIN HFA;PROAIR HFA      Inhale 2 puffs Every 4 (Four) Hours As Needed for Wheezing or Shortness of Air.       amLODIPine 5 MG tablet  Commonly known as: NORVASC      Take 1 tablet by mouth Every Night.       DULoxetine 30 MG capsule  Commonly known as: CYMBALTA      Take 1 capsule by mouth Daily. Take with 60 mg capsule for a TDD of 90 mg       DULoxetine 60 MG capsule  Commonly known as: CYMBALTA      Take 1 capsule by mouth Daily. Take with 30 mg capsule for a TDD of 90 mg       fluticasone 50 MCG/ACT nasal spray  Commonly known as: FLONASE      2 sprays into the nostril(s) as directed by provider Daily.       gabapentin 100 MG capsule  Commonly known as: NEURONTIN      Take 2 capsules by mouth 2 (Two) Times a Day.       hydroCHLOROthiazide 25 MG tablet      Take 0.5 tablets by mouth Daily.       metoprolol succinate XL 50 MG 24 hr tablet  Commonly known as: TOPROL-XL      Take 1 tablet by mouth Daily.       Multivitamin Adult tablet tablet  Generic drug: multivitamin with minerals      Take 1 tablet by mouth Daily.               STOP taking these medications      cyclobenzaprine 10 MG tablet  Commonly known as: FLEXERIL                  Where to Get Your Medications        You can get these medications from any pharmacy    Bring a paper prescription for each of these medications  dexAMETHasone 1.5 MG tablet  losartan 100 MG tablet       Information about where to get these medications is not yet available    Ask your nurse or doctor about these medications  atorvastatin 40 MG tablet  bisacodyl 10 MG suppository  bisacodyl 5 MG EC tablet  calcium carbonate 500 MG chewable tablet  dextrose 40 % gel  Enoxaparin Sodium 40 MG/0.4ML solution prefilled syringe syringe  gabapentin 100 MG capsule  glucagon 1 MG injection  insulin lispro 100 UNIT/ML injection  melatonin 5 MG tablet tablet  methocarbamol 750 MG tablet  naloxone 0.4 MG/ML injection  oxyCODONE-acetaminophen 7.5-325 MG per tablet  pantoprazole 40 MG EC tablet  polyethylene glycol 17 g packet  sennosides-docusate 8.6-50 MG per tablet         Discharge Diet:   Diet Instructions       Diet: Regular/House Diet; Thin (IDDSI 0)      Discharge Diet: Regular/House Diet    Fluid Consistency: Thin (IDDSI 0)          Diet Order   Procedures    Diet: Regular/House; Fluid Consistency: Thin (IDDSI 0)       Activity at Discharge:   Activity Instructions       Other Activity Instructions      Activity Instructions: See activity recommendations as documented in the attached instruction sheet from Dr. Ortiz's office.  Showering must be supervised.  Make sure patient is wearing [place] J collar at all times except to shower.  Reapply collar immediately after drying off.    Up WIth Assist      Ambulate with PT/OT nursing assistance. OK to shower with supervison. Ok to get incision wet. Gently pat incision dry with towel and replace cervical collar immediately. NO tub baths. Do not submerge incision in water.            Call for: questions or concerns    Follow-up Appointments  Future Appointments  "  Date Time Provider Department Center   10/2/2024  9:00 AM Alex Ortiz MD [facility]      Contact information for follow-up providers       Nyla Mejia APRN .    Specialties: Family Medicine, Nurse Practitioner  Contact information:  [address]  [facility] [id number] [phone]                       Contact information for after-discharge care       Destination       [facility] .    Service: Inpatient Rehabilitation  Contact information:  [address]  [facility] [address]  [phone]                                 Additional Instructions for the Follow-ups that You Need to Schedule       Call MD With Problems / Concerns   As directed      Instructions: Call Dr. Ortiz's office for any incisional redness, swelling, drainage, temperature greater than 100.5 degrees, worsening pain symptoms, weakness in upper or lower extremities, inability to walk, any questions or concerns not addressed in the attached instruction sheet from Dr. Ortiz's office.    Order Comments: Instructions: Call Dr. Ortiz's office for any incisional redness, swelling, drainage, temperature greater than 100.5 degrees, worsening pain symptoms, weakness in upper or lower extremities, inability to walk, any questions or concerns not addressed in the attached instruction sheet from Dr. Ortiz's office.         Discharge Follow-up with Specialty: Neurosurgery - Keep appointment with Dr. Ortiz 10/02/2024 at 9 am.   As directed      Specialty: Neurosurgery - Keep appointment with Dr. Ortiz 10/02/2024 at 9 am.                Test Results Pending at Discharge   None    I discussed the discharge instructions with patient, family, nursing staff, and Dr. Ortiz.     Heidi Malcolm, APRN  09/23/24  14:58 EDT     \"Dictated utilizing Dragon dictation\".    "

## 2024-09-23 NOTE — DISCHARGE INSTRUCTIONS
[facility]  [address], [address]  [place] [address]  Phone:  [phone]  Fax:  [phone]    Dr. Alex Ortiz MD          Anterior Cervical Surgery Post-Operative Instructions        It will be normal to have a sore throat and have some difficulty swallowing food for a couple of weeks following her surgery.  See your surgeon if this seems to be getting worse rather than better.  A few days of hoarseness is also typical.    You may resume your usual diet as you tolerate. Please alert your nurse of physician at the rehab center if you experience any difficulty swallowing or breathing.     No lifting overhead, although you may place your arms above your head.  DO NOT lift anything over five (5) pounds.  Walking is allowed and encouraged. There are no restrictions on sitting or climbing stairs, but refrain from overly strenuous activity.  You may notice that a constant position (standing or sitting) greater than 45 minutes may tend to make you more sore and therefore it is recommended that you change positions frequently. Do not drive until you are are no longer requiring narcotic pain medications for pain control.    If you were given a cervical collar, please wear it at all times except when in bed or in the shower.    Refrain from any sexual activity until after your first evaluation at the office.     Remove your dressing the day after your surgery and leave it off.  You may shower and pat the incision dry, but do not soak your wound in water such as a swimming pool, hot tub or lake. Keep your incision clean and dry at all times. Avoid direct sunlight to the wound for at least three (3) months.  You may apply ice to the surgical site for 15 to 20 minutes each hour while awake for the first few days following surgery.  Simply put the ice in a plastic bag in place a towel between the bag of ice and your skin.    You have skin glue applied to your incision, you do not need to peel this off.   It will gradually come away by itself over time or your surgeon may remove it at your follow-up appointment.    You will leave the hospital with prescriptions for pain medicine and a muscle relaxer.  These medications must be taken as prescribed only.  A hospital policy prevents us from prescribing more than 6 days worth of pain medications, so please call into the office for refills if needed.  Please allow for 72 hours to refill the pain medication and note that it may not be possible to refill pain medications over the weekend, so please contact our office prior to the weekend. After your first week post surgery, If tolerable, please wean yourself from the narcotic pain medications slowly but it is important that you do not stop taking the narcotics all at once.    A small amount of bleeding from the incision during the first few days is not unusual.       You should have a post-operative visit approximately 2 weeks after surgery.  If you do not have a scheduled appointment, call the office at [phone] to schedule one.  We will discuss return to work at your first post-operative visit, but you can expect to be off of work for an average of 6 weeks.     Notify the office if there are any problems, such as:    Excessive neck or arm pain; weakness in upper or lower extremities and/or difficulty with gait/balance.   Persistent temperature of 101.5° F (38.6 ° C) or greater that is not relieved by Tylenol.  Excessive bleeding, redness, heat, swelling or pus around the incision   If you cannot swallow, have difficulty breathing, have chest pain or shortness of breath, call [phone].   Your pain is not controlled with medication  You seem to be getting worse rather than better    Thank you.

## 2024-09-23 NOTE — PLAN OF CARE
Goal Outcome Evaluation:           Progress: improving  Outcome Evaluation: Patient resting in bed upon arrival.  Agreeable to OT tx. Family present for tx.  Patient transitioned to EOB with SBA.  STS from EOB with SBA.  Patient performed household ambulation with SBA utilizing no AD.  Patient bumped into 1 corner.  Patient returned to bed with SBA.  Patient reported no dizziness, nauses,  SOA with functional tasks.  Patient is anxious to d/c to Acute Rehab,  Cont OT as tolerated.      Anticipated Discharge Disposition (OT): inpatient rehabilitation facility

## 2024-09-24 NOTE — PAYOR COMM NOTE
"Kristina Sheriff (64 y.o. Female)     PLEASE SEE ATTACHED FOR DC NOTICE    AND REQUEST FOR INPT STAY FROM 9/20  TO 9/23      REF #  [id number]     THANK YOU  CRISTINA MIRANDA RN/ DEPT  [facility]   [phone]  [phone]        Date of Birth   1960    Social Security Number       Address   [address] [place] [address]    Home Phone   [phone]    MRN   [id number]       [facility]   [facility]    Marital Status                               Admission Date   9/16/24    Admission Type   Elective    Admitting Provider   Alex Ortiz MD    Attending Provider       Department, Room/Bed   [facility] [address] [place], P598/1       Discharge Date   9/23/2024    Discharge Disposition   Rehab Facility or Unit (DC - External)    Discharge Destination                                 Attending Provider: (none)   Allergies: Fluoxetine, Sulfa Antibiotics, Zofran [Ondansetron Hcl], Lisinopril    Isolation: None   Infection: None   Code Status: Prior    Ht: 168.9 cm (66.5\")   Wt: 76.8 kg (169 lb 5 oz)    Admission Cmt: None   Principal Problem: Spinal cord injury, cervical, without spinal bone injury [S14.109A]                   Active Insurance as of 9/16/2024       Primary Coverage       Payor Plan Insurance Group Employer/Plan Group    [employer] PPO [id number]       Payor Plan Address Payor Plan Phone Number Payor Plan Fax Number Effective Dates    [address]   10/1/2022 - None Entered    [facility] [id number]         Subscriber Name Subscriber Birth Date Member ID       KRISTINA SHERIFF 1960 [id number]                     Emergency Contacts        (Rel.) Home Phone Work Phone Mobile Phone    Kyle Sheriff (Spouse) [phone] -- [phone]    Kashif Sheriff (Son) -- -- [phone]              [place]: NPI [id number]  Tax ID [id number]         Discharge Summary        Heidi Malcolm, APRN at 09/23/24 Laverne Dudley " "R, APRN   Nurse Practitioner  Neurosurgery     Progress Notes     Attested     Date of Service: 09/20/24 0917  Creation Time: 09/20/24 0917     Attested           Attestation signed by Alex Ortiz MD at 09/24/24 0902     I have reviewed this documentation and agree.               Expand All Collapse All    [facility] NEUROSURGERY CERVICAL POSTOP NOTE        CC:POD 4 C5 corpectomy C4/5, C6/7 decompression, removal of old hardware and C4-7 instrumentation, duraplasty           Subjective  Interval History: Reports that she walked around part of the unit.  Anticipating repeat swallow eval and hoping to get Dobbhoff tube out.  Much more comfortable in a [place] J collar.  Voiding and had BM.  Feels \"anxious\".  She states this is something she has dealt with in the past and she feels that the steroids are contributing.  Seen by Dr. Us yesterday evening.           Objective  Vital signs in last 24 hours:  Temp:  [97.9 °F (36.6 °C)-98.8 °F (37.1 °C)] 98.1 °F (36.7 °C)  Heart Rate:  [61-99] 88  Resp:  [16] 16  BP: (144-192)/() 175/94     Intake/Output this shift:  No intake/output data recorded.     GHANSHYAM 10 cc x 24 hrs     LABS:         Results from last 7 days   Lab Units 09/20/24  0403 09/19/24  0337 09/18/24  0316   WBC 10*3/mm3 18.79* 19.26* 20.04*   HEMOGLOBIN g/dL 11.9* 11.0* 10.5*   HEMATOCRIT % 35.9 33.2* 31.3*   PLATELETS 10*3/mm3 282 248 240             Results from last 7 days   Lab Units 09/20/24  0403 09/19/24  0337 09/18/24  0316   SODIUM mmol/L 137 139 140   POTASSIUM mmol/L 3.9 4.1 4.2   CHLORIDE mmol/L 100 105 105   CO2 mmol/L 23.4 22.9 24.0   BUN mg/dL 18 16 12   CREATININE mg/dL 0.60 0.51* 0.51*   CALCIUM mg/dL 9.2 9.0 8.9   GLUCOSE mg/dL 171* 143* 152*            IMAGING STUDIES:  MRI cervical postoperative changes C4-7 with new hardware at C4-6 with corpectomy of C5.  There is interval development of C5/abnormal cord signal within the cervical spinal cord at the level of C5 and small focus of " susceptibility artifact consistent with small focus of hemorrhage.  Otherwise degenerative changes present.     I personally viewed and interpreted the patient's MRI cervical spine.  Also reviewed by and discussed with reading radiologist Dr. Olea and Dr. Ortiz     Meds reviewed/changed: Yes  Tylenol 650 mg p.o. every 4 hours  Decadron 6-day wean in progress  Cymbalta DR 30 mg +60 mg p.o. daily  Lovenox 40 mg subcu nightly  Gabapentin 200 mg p.o. twice daily  Robaxin 500 mg p.o. every 6 hours  Roxicodone 7.5 mg every 4 hours as needed-5 doses        Physical Exam:     General:                                 Awake, alert, oriented x3. Speech clear with no aphasia.  Hoarseness resolved.  Neck:                                      incision right anterior well-approximated with no redness drainage swelling.  No hematoma, swallow/trachea midline; wearing [place] J collar ROM deferred; GHANSHYAM to bulb suction with serosanguineous output  Motor:                                     5/5 bilateral upper and lower extremities except triceps 4+/5, left thumb extension 4/5, left wrist extension 4/5   reflexes:                                 No Roy, no clonus  Station and Gait:                   Per PT note 9/20-performed bed mobility supine-sit Min A, STS OOB Min A with rxw, ambulated 120' Min A with rxw. Pt sat in recliner briefly, had to use the restroom with OT then returned to bed. Pt returned to bed sit-supine Min A               Assessment & Plan  ASSESSMENT:       Spinal cord injury, cervical, without spinal bone injury    Cervical radiculopathy    Controlled type 2 diabetes mellitus without complication, without long-term current use of insulin    Hyperlipidemia    Essential hypertension    Cervicalgia     Patient is 4 days status post cervical 5 corpectomy with removal of prior instrumentation and new C4-6 instrumented fusion. Surgery complicated by hardware failure resulting in durotomy repair and C4/5 spinal cord  "injury.  Patient continues to do well with no neuropathic pain.  Typical neck discomfort although better with new collar.  Incision site is healing well.  Minimal output from GHANSHYAM.  Will be discontinued.  She is ambulating with therapy and has been seen by Dr. Us with physical medicine and rehab who recommends [facility].  She continues to have swallow difficulties and waiting reevaluation from speech therapy for possible nasogastric feeding tube removal.  She is on oral steroid wean and is scheduled muscle relaxant.  She is tolerating hard collar which should remain in place except for showers.  We look forward to transitioning her to acute rehab soon once her swallow issues resolved or she has temporary PEG placement.     PLAN:   Hard collar all times except may be removed for showering  DC GHANSHYAM  Cont therapy, OOB activity, rehab evals  Continue 6-day dexamethasone wean  Appreciate [facility] assistance with care of patient  Okay for discharge to acute rehab when nasal feeding tube out     I discussed the patient's findings and my recommendations with patient, family, nursing staff, and Dr Ortiz     During patient visit, I utilized appropriate personal protective equipment including  gloves.  Appropriate PPE was worn during the entire visit.  Hand hygiene was completed before and after.       LOS: 4 days         DAYNE Buckner  2024  09:17 EDT     \"Dictated utilizing Dragon dictation\".                       Cosigned by: Alex Ortiz MD at 24 0902     Revision History        Attestation signed by Alex Ortiz MD at 24 0901    I have reviewed this documentation and agree.                  Davy Jensen MD   Physician  Hospitalist     Progress Notes     Signed     Date of Service: 24 132  Creation Time: 24 132     Signed       Expand All Collapse All         Name: Kristina Yates ADMIT: 2024   : 1960  PCP: Nyla Mejia APRN    MRN: [id number] LOS: 4 days   AGE/SEX: " 64 y.o. female  ROOM: P598/1      Subjective      Subjective  Long comfortable with feeding tube otherwise doing okay.  No nausea or vomiting.  Having some diarrhea she thinks due to BMs.           Objective      Objective  Vital Signs  Temp:  [97.9 °F (36.6 °C)-98.8 °F (37.1 °C)] 98.1 °F (36.7 °C)  Heart Rate:  [61-93] 88  Resp:  [16-18] 18  BP: (144-192)/() 166/88  SpO2:  [93 %-99 %] 98 %  on   ;   Device (Oxygen Therapy): room air  Body mass index is 26.92 kg/m².  Physical Exam  Vitals and nursing note reviewed.   Constitutional:       General: She is not in acute distress.  HENT:      Nose:      Comments: Nasogastric feeding tube  Neck:      Comments: Hard collar in place  Cardiovascular:      Rate and Rhythm: Normal rate and regular rhythm.   Pulmonary:      Effort: Pulmonary effort is normal.      Breath sounds: Normal breath sounds.   Abdominal:      General: Bowel sounds are normal.      Palpations: Abdomen is soft.      Tenderness: There is no abdominal tenderness.   Musculoskeletal:         General: No swelling.   Skin:     General: Skin is warm and dry.   Neurological:      Mental Status: She is alert. Mental status is at baseline.         Results Review:       I reviewed the patient's new clinical results.          Results from last 7 days   Lab Units 09/20/24  0403 09/19/24  0337 09/18/24 0316 09/17/24  0430   WBC 10*3/mm3 18.79* 19.26* 20.04* 20.02*   HEMOGLOBIN g/dL 11.9* 11.0* 10.5* 11.5*   PLATELETS 10*3/mm3 282 248 240 267              Results from last 7 days   Lab Units 09/20/24  0403 09/19/24  0337 09/18/24  0316 09/17/24  0430   SODIUM mmol/L 137 139 140 136   POTASSIUM mmol/L 3.9 4.1 4.2 3.9   CHLORIDE mmol/L 100 105 105 104   CO2 mmol/L 23.4 22.9 24.0 23.0   BUN mg/dL 18 16 12 5*   CREATININE mg/dL 0.60 0.51* 0.51* 0.39*   GLUCOSE mg/dL 171* 143* 152* 153*   EGFR mL/min/1.73 101.0 105.0 105.0 112.1               Results from last 7 days   Lab Units 09/20/24  0403 09/19/24  0336  09/18/24  0316 09/17/24  0430   CALCIUM mg/dL 9.2 9.0 8.9 9.0             Glucose   Date/Time Value Ref Range Status   09/20/2024 1207 193 (H) 70 - 130 mg/dL Final   09/20/2024 0515 187 (H) 70 - 130 mg/dL Final   09/19/2024 2345 165 (H) 70 - 130 mg/dL Final   09/19/2024 1811 154 (H) 70 - 130 mg/dL Final   09/18/2024 2348 151 (H) 70 - 130 mg/dL Final   09/18/2024 1202 156 (H) 70 - 130 mg/dL Final   09/18/2024 0548 142 (H) 70 - 130 mg/dL Final         I have personally reviewed all medications:  Scheduled Medications    Scheduled Medication   acetaminophen, 650 mg, Oral, Q4H  amLODIPine, 5 mg, Oral, Nightly  atorvastatin, 40 mg, Oral, Nightly  dexAMETHasone, 4.5 mg, Oral, BID With Meals   Followed by  dexAMETHasone, 3 mg, Oral, BID With Meals   Followed by  [START ON 9/22/2024] dexAMETHasone, 1.5 mg, Oral, BID With Meals  DULoxetine, 30 mg, Oral, Daily  DULoxetine, 60 mg, Oral, Daily  enoxaparin, 40 mg, Subcutaneous, Nightly  fluticasone, 2 spray, Nasal, Daily  gabapentin, 200 mg, Oral, BID  hydroCHLOROthiazide, 12.5 mg, Oral, Daily  insulin glargine, 5 Units, Subcutaneous, Daily  insulin regular, 2-7 Units, Subcutaneous, Q6H  lansoprazole, 30 mg, Nasogastric, Q AM  losartan, 100 mg, Oral, Daily  methocarbamol, 500 mg, Oral, Q6H  metoprolol succinate XL, 50 mg, Oral, Daily  multivitamin with minerals, 1 tablet, Oral, Daily  senna-docusate sodium, 2 tablet, Oral, BID   And  polyethylene glycol, 17 g, Oral, Daily  sodium chloride, 10 mL, Intravenous, Q12H      Infusions    Infusion Medications   lactated ringers, 9 mL/hr, Last Rate: 9 mL/hr (09/16/24 0733)  Pharmacy Consult - Pharmacy to dose,       Diet  NPO Diet NPO Type: Tube Feeding     I have personally reviewed:  [x]  Laboratory   [x]  Microbiology   [x]  Radiology   []  EKG/Telemetry  []  Cardiology/Vascular   []  Pathology    []  Records           Assessment/Plan            Active Hospital Problems     Diagnosis   POA    **Spinal cord injury, cervical, without  spinal bone injury [S14.109A]   Yes    Cervicalgia [M54.2]   Unknown    Essential hypertension [I10]   Yes    Hyperlipidemia [E78.5]   Yes    Controlled type 2 diabetes mellitus without complication, without long-term current use of insulin [E11.9]   Yes    Cervical radiculopathy [M54.12]   Unknown       Resolved Hospital Problems   No resolved problems to display.         64 y.o. female who is s/p CERVICAL FOUR TO SIX DISCECTOMY ANTERIOR WITH FUSION WITH CERVICAL FIVE CORPECTOMY AND REMOVAL OF PREVIOUS HARDWARE.     Blood sugar slightly elevated as expected given postoperative state, steroids and tube feedings.  Continue current insulin.  Blood pressure also slightly elevated likely due to pain, stress, steroids etc.  Continue home antihypertensive regimen.  She has no known heart disease or stroke history.  Would prefer to avoid any abrupt changes in blood pressure therefore will discontinue as needed IV labetalol.        Davy Jensen MD  [facility]  24  13:25 EDT                        Davy Jensen MD   Physician  Hospitalist     Progress Notes     Addendum     Date of Service: 24  Creation Time: 24     Expand All Collapse All         Name: Kristina Yates ADMIT: 2024   : 1960  PCP: Nyla Mejia APRN    MRN: [id number] LOS: 5 days   AGE/SEX: 64 y.o. female  ROOM: [facility]      Subjective      Subjective  Nasogastric tube has been removed.  Tolerating some liquids.  Having some cough, nausea and dry heaves.               Objective      Objective  Vital Signs  Temp:  [98.1 °F (36.7 °C)-99 °F (37.2 °C)] 98.6 °F (37 °C)  Heart Rate:  [81-92] 90  Resp:  [16-20] 20  BP: (166-198)/(80-91) 185/87  SpO2:  [95 %-99 %] 99 %  on   ;   Device (Oxygen Therapy): room air  Body mass index is 26.92 kg/m².  Physical Exam  Vitals and nursing note reviewed.   Constitutional:       General: She is not in acute distress.  HENT:      Nose:      Comments: Nasogastric  feeding tube  Neck:      Comments: Hard collar in place  Cardiovascular:      Rate and Rhythm: Normal rate and regular rhythm.   Pulmonary:      Effort: Pulmonary effort is normal.      Breath sounds: Normal breath sounds.   Abdominal:      General: Bowel sounds are normal.      Palpations: Abdomen is soft.      Tenderness: There is no abdominal tenderness.   Musculoskeletal:         General: No swelling.   Skin:     General: Skin is warm and dry.   Neurological:      Mental Status: She is alert. Mental status is at baseline.         Results Review:       I reviewed the patient's new clinical results.          Results from last 7 days   Lab Units 09/21/24  0729 09/20/24  0403 09/19/24  0337 09/18/24  0316   WBC 10*3/mm3 19.86* 18.79* 19.26* 20.04*   HEMOGLOBIN g/dL 12.7 11.9* 11.0* 10.5*   PLATELETS 10*3/mm3 336 282 248 240              Results from last 7 days   Lab Units 09/21/24  0729 09/20/24 0403 09/19/24 0337 09/18/24  0316   SODIUM mmol/L 135* 137 139 140   POTASSIUM mmol/L 3.5 3.9 4.1 4.2   CHLORIDE mmol/L 98 100 105 105   CO2 mmol/L 24.0 23.4 22.9 24.0   BUN mg/dL 17 18 16 12   CREATININE mg/dL 0.48* 0.60 0.51* 0.51*   GLUCOSE mg/dL 115* 171* 143* 152*   EGFR mL/min/1.73 106.6 101.0 105.0 105.0               Results from last 7 days   Lab Units 09/21/24  0729 09/20/24 0403 09/19/24 0337 09/18/24  0316   CALCIUM mg/dL 9.3 9.2 9.0 8.9             Glucose   Date/Time Value Ref Range Status   09/21/2024 0805 127 70 - 130 mg/dL Final   09/20/2024 2034 155 (H) 70 - 130 mg/dL Final   09/20/2024 1207 193 (H) 70 - 130 mg/dL Final   09/20/2024 0515 187 (H) 70 - 130 mg/dL Final   09/19/2024 2345 165 (H) 70 - 130 mg/dL Final   09/19/2024 1811 154 (H) 70 - 130 mg/dL Final   09/18/2024 2348 151 (H) 70 - 130 mg/dL Final         I have personally reviewed all medications:  Scheduled Medications    Scheduled Medication   amLODIPine, 5 mg, Oral, Nightly  atorvastatin, 40 mg, Oral, Nightly  dexAMETHasone, 3 mg, Oral, BID  With Meals   Followed by  [START ON 9/22/2024] dexAMETHasone, 1.5 mg, Oral, BID With Meals  DULoxetine, 30 mg, Oral, Daily  DULoxetine, 60 mg, Oral, Daily  enoxaparin, 40 mg, Subcutaneous, Nightly  fluticasone, 2 spray, Nasal, Daily  gabapentin, 200 mg, Oral, BID  guaiFENesin, 600 mg, Oral, Q12H  hydroCHLOROthiazide, 12.5 mg, Oral, Daily  insulin glargine, 5 Units, Subcutaneous, Daily  insulin regular, 2-7 Units, Subcutaneous, Q6H  losartan, 100 mg, Oral, Daily  methocarbamol, 500 mg, Oral, Q6H  metoprolol succinate XL, 50 mg, Oral, Daily  multivitamin with minerals, 1 tablet, Oral, Daily  pantoprazole, 40 mg, Oral, Q AM  senna-docusate sodium, 2 tablet, Oral, BID   And  polyethylene glycol, 17 g, Oral, Daily  sodium chloride, 10 mL, Intravenous, Q12H      Infusions    Infusion Medications   lactated ringers, 9 mL/hr, Last Rate: 9 mL/hr (09/16/24 0733)  Pharmacy Consult - Pharmacy to dose,       Diet  Diet: Regular/House; Fluid Consistency: Thin (IDDSI 0)     I have personally reviewed:  [x]  Laboratory   [x]  Microbiology   [x]  Radiology   []  EKG/Telemetry  []  Cardiology/Vascular   []  Pathology    []  Records           Assessment/Plan            Active Hospital Problems     Diagnosis   POA    **Spinal cord injury, cervical, without spinal bone injury [S14.109A]   Yes    Cervicalgia [M54.2]   Unknown    Essential hypertension [I10]   Yes    Hyperlipidemia [E78.5]   Yes    Controlled type 2 diabetes mellitus without complication, without long-term current use of insulin [E11.9]   Yes    Cervical radiculopathy [M54.12]   Unknown       Resolved Hospital Problems   No resolved problems to display.         64 y.o. female who is s/p CERVICAL FOUR TO SIX DISCECTOMY ANTERIOR WITH FUSION WITH CERVICAL FIVE CORPECTOMY AND REMOVAL OF PREVIOUS HARDWARE.     Labs are stable.  Blood sugars seem well-controlled.  She is having some nausea today and will order Compazine.  Patient states she is allergic to Zofran.  Otherwise  seems to be stable medically.  Her blood pressure is moderately elevated but she is asymptomatic.  No cardiac history.  She is on multiple antihypertensive medications.  Suspect blood pressure made worse by pain, stress, steroids, etc. she does however tell me her blood pressure readings at home are uncontrolled as well.  Heart rate in the 90s.  Will change her Toprol to twice daily dosing.  Tube feedings have been discontinued.  Will change regular insulin to Humalog and stop Lantus.  Most recent A1c 6.0%.        Davy Jensen MD  [facility]  09/21/24  08:44 EDT              Jacinta Benton APRN   Nurse Practitioner  Neurosurgery     Progress Notes     Attested     Date of Service: 09/21/24 0947  Creation Time: 09/21/24 0947     Attested           Attestation signed by Estevan Obrien MD at 09/21/24 1258     I have reviewed this documentation and agree.               Expand All Collapse All    [facility] NEUROSURGERY CERVICAL PROGRESS NOTE        CC: POD 5, sp C5 corpectomy C4/5, C6/7 decompression, removal of old hardware and C4-7 instrumentation, duroplasty           Subjective  Interval History:  No significant events overnight. Diet upgraded to regular      ROS:  Constitutional: No fever, chills  Neck: +neck pain  GI: No nausea, vomiting, no swallow difficulties  : no difficulty voiding, no incontinence           Objective  Vital signs in last 24 hours:  Temp:  [98.1 °F (36.7 °C)-99 °F (37.2 °C)] 98.6 °F (37 °C)  Heart Rate:  [81-92] 90  Resp:  [16-20] 20  BP: (166-198)/(80-91) 185/87     Intake/Output this shift:  No intake/output data recorded.     LABS:         Results from last 7 days   Lab Units 09/21/24  0729 09/20/24  0403 09/19/24  0337   WBC 10*3/mm3 19.86* 18.79* 19.26*   HEMOGLOBIN g/dL 12.7 11.9* 11.0*   HEMATOCRIT % 38.0 35.9 33.2*   PLATELETS 10*3/mm3 336 282 248             Results from last 7 days   Lab Units 09/21/24  0729 09/20/24  0403 09/19/24  0337   SODIUM  mmol/L 135* 137 139   POTASSIUM mmol/L 3.5 3.9 4.1   CHLORIDE mmol/L 98 100 105   CO2 mmol/L 24.0 23.4 22.9   BUN mg/dL 17 18 16   CREATININE mg/dL 0.48* 0.60 0.51*   CALCIUM mg/dL 9.3 9.2 9.0   GLUCOSE mg/dL 115* 171* 143*         IMAGING STUDIES:  No new imaging      I personally viewed and interpreted the patient's chart.     Meds reviewed/changed: Yes  Norvasc 5 mg at night  Lipitor 40 mg at night  Decadron taper   Cymbalta 90mg daily  Lovenox 40mg at night  Flonase daily  Neurontin 200mg BID  Mucinex BID  Hydrochlorothiazide 12.5mg daily  Lantus 5 units daily  Insulin Sliding Scale  Cozaar 100mg daily  Toprol XL 50mg daily  MVI daily  Protonix 40mg daily  Pericolace BID  Oxycodoen 7.5mg-325mg every 4 hours prn-3 doses/12 hours      Physical Exam:     General:                                 Awake, alert.  Neck:                                      Wearing [place] J collar. Anterior incision well approximated with no redness, swelling, drainage   Motor:                                     No fasciculations, rigidity, spasticity, or abnormal movements.  Reflexes:                                No Roy, no clonus  Sensation:                              Normal to light touch  Station and Gait:                   Per PT note, patient performed bed mobility, supine-sit Min A, STS OOB Min A with rwx, ambulated 120' Min A with RWX. Patient sat in recliner briefly. Patient returned to bed sit-supine Min A  Extremities:                           SCD in place           Assessment & Plan  ASSESSMENT:       Spinal cord injury, cervical, without spinal bone injury    Cervical radiculopathy    Controlled type 2 diabetes mellitus without complication, without long-term current use of insulin    Hyperlipidemia    Essential hypertension    Cervicalgia     The patient is POD 5, sp C5 corpectomy C4/5, C6/7 decompression, removal of old hardware and C4-7 instrumentation. Surgery complicated by hardware failure which resulted in  durotomy repair and C4/5 spinal cord injury.      She has been tolerating the cervical collar. She has some neck pain. She denies neuropathic pain.      She passed the swallow study yesterday and her diet was upgraded and the feeding tube was removed.      She will be discharging to acute rehab.      PLAN:   Hard collar at all times except may be removed for showering   Continue dexamethasone wean  Continue therapy, OOB activity   Okay for discharge to acute rehab     I discussed the patient's findings and my recommendations with patient and family            LOS: 5 days         DAYNE Elizabeth  9/21/2024  09:47 EDT                          Cosigned by: Estevan Obrien MD at 09/21/24 1258           Jacinta Benton APRN   Nurse Practitioner  Neurosurgery     Progress Notes     Attested     Date of Service: 09/22/24 0928  Creation Time: 09/22/24 0928     Attested           Attestation signed by Estevan Obrien MD at 09/22/24 1329     I have reviewed this documentation and agree.               Expand All Collapse All    [facility] NEUROSURGERY CERVICAL PROGRESS NOTE        CC:  POD 6, sp C5 corpectomy C4/5, C6/7 decompression, removal of old hardware and C4-7 instrumentation, duroplasty            Subjective  Interval History:  No significant events overnight. Patient reports mid scapular pain     ROS:  Constitutional: No fever, chills  Neck: +neck pain  GI: No nausea, vomiting, no swallow difficulties  Neuro: +weakness,  +balance difficulties  : no difficulty voiding, no incontinence           Objective  Vital signs in last 24 hours:  Temp:  [98.1 °F (36.7 °C)-99 °F (37.2 °C)] 98.2 °F (36.8 °C)  Heart Rate:  [78-98] 98  Resp:  [16-20] 16  BP: (160-190)/() 181/86     Intake/Output this shift:  No intake/output data recorded.     LABS:         Results from last 7 days   Lab Units 09/22/24  0506 09/21/24  0729 09/20/24  0403   WBC 10*3/mm3 18.09* 19.86* 18.79*   HEMOGLOBIN g/dL 13.4 12.7  11.9*   HEMATOCRIT % 40.8 38.0 35.9   PLATELETS 10*3/mm3 313 336 282             Results from last 7 days   Lab Units 09/22/24  0506 09/21/24  0729 09/20/24  0403   SODIUM mmol/L 134* 135* 137   POTASSIUM mmol/L 3.8 3.5 3.9   CHLORIDE mmol/L 98 98 100   CO2 mmol/L 20.0* 24.0 23.4   BUN mg/dL 21 17 18   CREATININE mg/dL 0.54* 0.48* 0.60   CALCIUM mg/dL 9.3 9.3 9.2   GLUCOSE mg/dL 126* 115* 171*         IMAGING STUDIES:  No new imaging      I personally viewed and interpreted the patient's chart.     Meds reviewed/changed: Yes  Norvasc 5 mg at night  Lipitor 40 mg at night  Decadron taper   Cymbalta 90mg daily  Lovenox 40mg at night  Flonase daily  Neurontin 200mg BID  Mucinex BID  Hydrochlorothiazide 12.5mg daily  Insulin Sliding Scale  Cozaar 100mg daily  Robaxin 500mg every 6 hours   Toprol XL 50mg daily  MVI daily  Protonix 40mg daily  Pericolace BID  Oxycodoen 7.5mg-325mg every 4 hours prn-2 doses/12 hours      Physical Exam:     General:                                 Awake, alert.  Neck:                                      Wearing [place] J collar. Anterior incision well approximated with no redness, swelling, drainage   Motor:                                     No fasciculations, rigidity, spasticity, or abnormal movements.  Reflexes:                                No Roy, no clonus  Sensation:                              Normal to light touch  Station and Gait:                   Per PT note, patient is improving with mobility but can be impulsive with movements. Patient needs cues to log roll when getting into and out of bed and to also wait to stand until asked to do so. Trailed patient without walker and pt required CGA/Katlyn. Patient was a  little unsteady with gait but no LOB.   Extremities:                           SCD in place           Assessment & Plan  ASSESSMENT:       Spinal cord injury, cervical, without spinal bone injury    Cervical radiculopathy    Controlled type 2 diabetes mellitus  without complication, without long-term current use of insulin    Hyperlipidemia    Essential hypertension    Cervicalgia     The patient is POD 6, sp C5 corpectomy C4/5, C6/7 decompression, removal of old hardware and C4-7 instrumentation. Surgery complicated by hardware failure which resulted in durotomy repair and C4/5 spinal cord injury.      She passed the swallow study on 9/20 and her diet was upgraded and the feeding tube was removed. She will be discharging to acute rehab.      She has been tolerating the cervical collar. Today, patient states that she is having mid scapular pain. She did ambulate with PT and feels that she is making improvements with mobility.      Blood pressure has been elevated and Toprol increased to BID on 9/21 per A.     PLAN:   Will increase the Robaxin to 750mg every 6 hours   Hard collar at all times except may be removed for showering   Continue dexamethasone wean  Continue therapy, OOB activity   Okay for discharge to acute rehab     I discussed the patient's findings and my recommendations with patient and nursing staff and Dr. Obrien.          LOS: 6 days         DAYNE Elizabeth  9/22/2024  09:29 EDT                  Cosigned by: Estevan Obrien MD at 09/22/24 1329     Revision History     Revision History     Revision History           Kristina Yates  1960    Patient Care Team:  Nyla Mejia APRN as PCP - General (Family Medicine)  Brooklyn Miles PA as Physician Assistant (Obstetrics and Gynecology)  Pedro Luis Zayas MD as Cardiologist (Cardiology)    Date of Admit: 9/16/2024    Date of Discharge:  9/23/2024    Discharge Diagnosis:  Spinal cord injury, cervical, without spinal bone injury    Cervical radiculopathy    Controlled type 2 diabetes mellitus without complication, without long-term current use of insulin    Hyperlipidemia    Essential hypertension    Cervicalgia     Spinal cord injury at the level of C4-5 secondary to hardware failure that  occurred during removal of previous cervical fusion hardware.     Postoperative dysphagia - resolved     Postoperative leukocytosis secondary to steroids - resolved     Hypertension - exacerbated due to steroids; pain - improving      Type 2 diabetes mellitus; controlled -followed by hospitalist service    Procedures Performed  Procedure(s):  CERVICAL FOUR TO SIX DISCECTOMY ANTERIOR WITH FUSION WITH CERVICAL FIVE CORPECTOMY AND REMOVAL OF PREVIOUS HARDWARE     Anterior Cervical corpectomy and Fusion  of C5 vertebral body     Anterior cervical C5 corpectomy, including disc space preparation, discectomy, osteophytectomy and decompression of spinal cord and/or nerve roots at  C4-5, C6-7  Anterior titanium instrumentation at  C4-C6 ,   Morselized Vivigen allograft packed into interbody cages  Microdissection technique with the use of the operating microscope  Intraoperative duraplasty    Complications: Spinal cord injury at the level of C4-5 and directly below secondary to hardware failure during hardware removal    Consultants:   Consults       Date and Time Order Name Status Description    9/19/2024  4:33 PM Inpatient Internal Medicine Consult Completed     9/19/2024  9:14 AM Inpatient Physical Medicine Rehab Consult      9/16/2024 12:03 PM Inpatient Pulmonology Consult Completed           Condition on Discharge: stable    Discharge disposition: [facility] Acute Rehab    Pertinent Test Results:     VIDEO SWALLOW STUDY 9/20/2024    Study performed by speech-language pathologist who had been following the patient for development of mild pharyngeal and esophageal dysphagia.  The VFSS revealed functional oropharyngeal swallow.  No aspiration visualized therefore speech therapy recommended regular diet with thin liquids and continuation of general aspiration/reflux precautions.    MRI OF THE CERVICAL SPINE WITHOUT CONTRAST 9/19/2024     Postoperative MRI performed 3 days after cervical spine surgery revealed corpectomy  procedure at C5.  Strut graft in place.  Anterior plate and traversing screws within the C4 and C6 vertebral bodies noted.  Interval development of abnormal cord signal centered at the level of the C5 vertebrae measuring up to approximately 1.8 cm.  Cord signal abnormality measures 8 x 8 mm in dimension.  There is also a small focus of susceptibility artifact measures approximately 2 mm in diameter and is compatible with a small focus of hemorrhage.  The anterior cervical plate fusing C4 down to C6 is intact.    XR SPINE CERVICAL 2 VW-9/18/2024    Interval removal of prior C5-C7 anterior plate and screw fixation.  New plate and screw fixation extends from C4-C7.  Screws present at C4 and C7.  Intact cage at C5 and C6.  All hardware intact.  No subluxation.    Brief HPI: This is a 64-year-old female who has been followed by Dr. Ortiz and has undergone previous lumbar fusion surgery over a year ago and subsequent right percutaneous sacroiliac joint arthrodesis for severe  inflammation nearly 4 months ago.  The patient did well with these procedures.  She also had a history of cervical spinal stenosis and had been particularly bothered by radicular pain.  She had a history of prior C5-6, C6-7 ACDF fusion in 2003. 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  The patient had a known broken preoperative screw of the left superior C5 vertebral body on x-ray.  The other remaining screws were intact however the plate from the prior fusion was angled and tilted clockwise to the right superior to the C5 vertebral body screw in the middle of the vertebral body.  Dr. Ortiz reviewed these findings with the patient and also discussed the surgical procedure at C4-5 with the patient at length.  She agreed to proceed.  She therefore presented to the operating room for the above-stated  elective surgical procedure.  Please see admission H&P for further details.    Hospital Course: The surgical procedure had been going well until a complication occurred due to malfunction of hardware.  The right superior C5 vertebral body screw head did not engage properly and the screw plunged through the vertebral body posteriorly.  Immediate x-ray was taken and the screw was localized. The cervical plate was removed and then C5 corpectomy was performed to better visualize the spinal cord and very carefully remove the foreign body.  Once this was accomplished the small durotomy was successfully closed and the surgical incision was closed in layers and the outer skin was eventually closed with Dermabond.  The patient was placed in a hard cervical collar and transferred to intensive care unit for observation.  Due to extensive manipulation, the patient experienced some difficulty with swallowing postoperatively.  She has been on IV steroids and then subsequently transferred to a 6-day dexamethasone pack which will be completed after 3 more doses.  She does have a history of hypertension which seems to have been aggravated by both the steroids and the patient's pain.  Right now her pain is being appropriately managed with Percocet 7.5/325 mg as well as Robaxin p.o. on a scheduled basis.  Her voice quality is normal.  She has been evaluated by speech therapy and subsequently cleared for a regular diet with thin liquids after VFSS performed 3 days ago.  She is tolerating food and liquids without difficulty.  She was also evaluated by physical therapy and Occupational Therapy.  She was deemed appropriate for acute inpatient rehab at [facility].  She was evaluated by Dr. Us who agrees with the plan for spinal cord injury program at [facility] rehab.  The patient has had some intermittent spikes in blood pressure and therefore [facility] has increased the metoprolol dosage.  It seems as though the spikes in blood pressure occur  after the morning dose of steroids.  The patient will be discharged to inpatient [facility] rehab today.  Both the patient, her  and Dr. Ortiz are in agreement with the plan. She will be seen 10/02/2024 in Dr. Ortiz's office for re-evaluation however if rehab stay will go beyond that time, please call our office and we will arrange the follow up after the patient is discharged from [facility] in [place].     Temp:  [98.1 °F (36.7 °C)-98.8 °F (37.1 °C)] 98.1 °F (36.7 °C)  Heart Rate:  [75-89] 83  Resp:  [16-18] 18  BP: (152-173)/(78-85) 163/80    Current labs:  Lab Results (last 24 hours)       Procedure Component Value Units Date/Time    POC Glucose Once [[id number]]  (Abnormal) Collected: 09/22/24 1631    Specimen: Blood Updated: 09/22/24 1632     Glucose 171 mg/dL     POC Glucose Once [[id number]]  (Abnormal) Collected: 09/22/24 2017    Specimen: Blood Updated: 09/22/24 2018     Glucose 151 mg/dL     CBC (No Diff) [[id number]]  (Abnormal) Collected: 09/23/24 0518    Specimen: Blood Updated: 09/23/24 0557     WBC 20.60 10*3/mm3      RBC 4.07 10*6/mm3      Hemoglobin 12.6 g/dL      Hematocrit 37.1 %      MCV 91.2 fL      MCH 31.0 pg      MCHC 34.0 g/dL      RDW 13.6 %      RDW-SD 45.9 fl      MPV 10.2 fL      Platelets 301 10*3/mm3     Basic Metabolic Panel [[id number]]  (Abnormal) Collected: 09/23/24 0518    Specimen: Blood Updated: 09/23/24 0605     Glucose 107 mg/dL      BUN 21 mg/dL      Creatinine 0.61 mg/dL      Sodium 132 mmol/L      Potassium 3.6 mmol/L      Chloride 96 mmol/L      CO2 22.0 mmol/L      Calcium 9.0 mg/dL      BUN/Creatinine Ratio 34.4     Anion Gap 14.0 mmol/L      eGFR 100.6 mL/min/1.73     Narrative:      GFR Normal >60  Chronic Kidney Disease <60  Kidney Failure <15      POC Glucose Once [[id number]]  (Abnormal) Collected: 09/23/24 0713    Specimen: Blood Updated: 09/23/24 0714     Glucose 148 mg/dL     POC Glucose Once [[id number]]  (Abnormal) Collected: 09/23/24 1107    Specimen: Blood Updated:  09/23/24 1108     Glucose 173 mg/dL               General Appearance No acute distress. Speech normal.    HEENT Atraumatic; normocephalic. Wearing hard cervical collar. Anterior cervical incision flat, soft. No oozing or drainage.    Neurological Awake, Alert, and oriented x 3   Cranial nerves Face symmetric. Sensation equal and intact throughout the face bilaterally in all three divisions. Tongue midline.    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 Normal tone.    Sensory Intact to light touch    Reflexes 1+ throughout. Negative Roy's' negative clonus   Neck  Supple, trachea midline, anterior incision dressing dry and intact.    Extremities Moving all extremities x 4. No calf swelling or tenderness to bilateral palpation.                      Discharge Medications  BEL has been reviewed and narcotic consent is on file in the patient's chart.     Your medication list        START taking these medications        Instructions Last Dose Given Next Dose Due   bisacodyl 5 MG EC tablet  Commonly known as: DULCOLAX      Take 1 tablet by mouth Daily As Needed for Constipation (Use if polyethylene glycol is ineffective).       bisacodyl 10 MG suppository  Commonly known as: DULCOLAX      Insert 1 suppository into the rectum Daily As Needed for Constipation (Use if bisacodyl oral is ineffective).       calcium carbonate 500 MG chewable tablet  Commonly known as: TUMS      Chew 2 tablets 3 (Three) Times a Day As Needed for Indigestion or Heartburn.       dexAMETHasone 1.5 MG tablet  Commonly known as: DECADRON      Take 1 tablet by mouth 2 (Two) Times a Day With Meals for 2 doses.       dextrose 40 % gel  Commonly known as: GLUTOSE      Take 15 g by mouth Every 15 (Fifteen) Minutes As Needed for Low Blood Sugar (Blood sugar less than 70).       Enoxaparin Sodium 40 MG/0.4ML solution prefilled  syringe syringe  Commonly known as: LOVENOX      Inject 0.4 mL under the skin into the appropriate area as directed Every Night. Indications: Prevention of Unwanted Clot in Veins       glucagon 1 MG injection  Commonly known as: GLUCAGEN      Inject 1 mg into the appropriate muscle as directed by prescriber Every 15 (Fifteen) Minutes As Needed (Blood Glucose Less Than 70).       insulin lispro 100 UNIT/ML injection  Commonly known as: HUMALOG/ADMELOG      Inject 2-7 Units under the skin into the appropriate area as directed 4 (Four) Times a Day Before Meals & at Bedtime.       melatonin 5 MG tablet tablet      Take 1 tablet by mouth At Night As Needed (insomnia).       methocarbamol 750 MG tablet  Commonly known as: ROBAXIN      Take 1 tablet by mouth Every 6 (Six) Hours.       naloxone 0.4 MG/ML injection  Commonly known as: NARCAN      Infuse 1 mL into a venous catheter Every 5 (Five) Minutes As Needed for Respiratory Depression.       oxyCODONE-acetaminophen 7.5-325 MG per tablet  Commonly known as: PERCOCET      Take 1 tablet by mouth Every 4 (Four) Hours As Needed for Moderate Pain or Severe Pain for up to 7 days.       pantoprazole 40 MG EC tablet  Commonly known as: PROTONIX  Start taking on: September 24, 2024      Take 1 tablet by mouth Every Morning.       polyethylene glycol 17 g packet  Commonly known as: MIRALAX  Start taking on: September 24, 2024      Take 17 g by mouth Daily.       sennosides-docusate 8.6-50 MG per tablet  Commonly known as: PERICOLACE      Take 2 tablets by mouth 2 (Two) Times a Day.              CHANGE how you take these medications        Instructions Last Dose Given Next Dose Due   atorvastatin 40 MG tablet  Commonly known as: LIPITOR  What changed:   when to take this  Another medication with the same name was removed. Continue taking this medication, and follow the directions you see here.      Take 1 tablet by mouth Every Night.       losartan 100 MG tablet  Commonly known as:  COZAAR  What changed: additional instructions      Take 1 tablet by mouth Daily.              CONTINUE taking these medications        Instructions Last Dose Given Next Dose Due   albuterol sulfate  (90 Base) MCG/ACT inhaler  Commonly known as: PROVENTIL HFA;VENTOLIN HFA;PROAIR HFA      Inhale 2 puffs Every 4 (Four) Hours As Needed for Wheezing or Shortness of Air.       amLODIPine 5 MG tablet  Commonly known as: NORVASC      Take 1 tablet by mouth Every Night.       DULoxetine 30 MG capsule  Commonly known as: CYMBALTA      Take 1 capsule by mouth Daily. Take with 60 mg capsule for a TDD of 90 mg       DULoxetine 60 MG capsule  Commonly known as: CYMBALTA      Take 1 capsule by mouth Daily. Take with 30 mg capsule for a TDD of 90 mg       fluticasone 50 MCG/ACT nasal spray  Commonly known as: FLONASE      2 sprays into the nostril(s) as directed by provider Daily.       gabapentin 100 MG capsule  Commonly known as: NEURONTIN      Take 2 capsules by mouth 2 (Two) Times a Day.       hydroCHLOROthiazide 25 MG tablet      Take 0.5 tablets by mouth Daily.       metoprolol succinate XL 50 MG 24 hr tablet  Commonly known as: TOPROL-XL      Take 1 tablet by mouth Daily.       Multivitamin Adult tablet tablet  Generic drug: multivitamin with minerals      Take 1 tablet by mouth Daily.              STOP taking these medications      cyclobenzaprine 10 MG tablet  Commonly known as: FLEXERIL                  Where to Get Your Medications        You can get these medications from any pharmacy    Bring a paper prescription for each of these medications  dexAMETHasone 1.5 MG tablet  losartan 100 MG tablet       Information about where to get these medications is not yet available    Ask your nurse or doctor about these medications  atorvastatin 40 MG tablet  bisacodyl 10 MG suppository  bisacodyl 5 MG EC tablet  calcium carbonate 500 MG chewable tablet  dextrose 40 % gel  Enoxaparin Sodium 40 MG/0.4ML solution prefilled  syringe syringe  gabapentin 100 MG capsule  glucagon 1 MG injection  insulin lispro 100 UNIT/ML injection  melatonin 5 MG tablet tablet  methocarbamol 750 MG tablet  naloxone 0.4 MG/ML injection  oxyCODONE-acetaminophen 7.5-325 MG per tablet  pantoprazole 40 MG EC tablet  polyethylene glycol 17 g packet  sennosides-docusate 8.6-50 MG per tablet         Discharge Diet:   Diet Instructions       Diet: Regular/House Diet; Thin (IDDSI 0)      Discharge Diet: Regular/House Diet    Fluid Consistency: Thin (IDDSI 0)          Diet Order   Procedures    Diet: Regular/House; Fluid Consistency: Thin (IDDSI 0)       Activity at Discharge:   Activity Instructions       Other Activity Instructions      Activity Instructions: See activity recommendations as documented in the attached instruction sheet from Dr. Ortiz's office.  Showering must be supervised.  Make sure patient is wearing [place] J collar at all times except to shower.  Reapply collar immediately after drying off.    Up WIth Assist      Ambulate with PT/OT nursing assistance. OK to shower with supervison. Ok to get incision wet. Gently pat incision dry with towel and replace cervical collar immediately. NO tub baths. Do not submerge incision in water.            Call for: questions or concerns    Follow-up Appointments  Future Appointments   Date Time Provider Department Center   10/2/2024  9:00 AM Alex Ortiz MD [facility]      Contact information for follow-up providers       Nyla Mejia APRN .    Specialties: Family Medicine, Nurse Practitioner  Contact information:  [address] [facility] [address] [phone]                       Contact information for after-discharge care       Destination       [facility] .    Service: Inpatient Rehabilitation  Contact information:  [address] [facility] [address] [phone]                                 Additional Instructions for the Follow-ups that You Need to Schedule   "     Call MD With Problems / Concerns   As directed      Instructions: Call Dr. Ortiz's office for any incisional redness, swelling, drainage, temperature greater than 100.5 degrees, worsening pain symptoms, weakness in upper or lower extremities, inability to walk, any questions or concerns not addressed in the attached instruction sheet from Dr. Ortiz's office.    Order Comments: Instructions: Call Dr. Ortiz's office for any incisional redness, swelling, drainage, temperature greater than 100.5 degrees, worsening pain symptoms, weakness in upper or lower extremities, inability to walk, any questions or concerns not addressed in the attached instruction sheet from Dr. Ortiz's office.         Discharge Follow-up with Specialty: Neurosurgery - Keep appointment with Dr. Ortiz 10/02/2024 at 9 am.   As directed      Specialty: Neurosurgery - Keep appointment with Dr. Ortiz 10/02/2024 at 9 am.                Test Results Pending at Discharge   None    I discussed the discharge instructions with patient, family, nursing staff, and Dr. Ortiz.     Heidi Malcolm, APRN  09/23/24  14:58 EDT     \"Dictated utilizing Dragon dictation\".      Electronically signed by Alex Ortiz MD at 09/24/24 0901       "

## 2024-09-24 NOTE — CASE MANAGEMENT/SOCIAL WORK
Case Management Discharge Note      Final Note: [facility] per Luis A W/C Solitario         Selected Continued Care - Discharged on 9/23/2024 Admission date: 9/16/2024 - Discharge disposition: Rehab Facility or Unit (DC - External)      Destination Coordination complete.      Service Provider Selected Services Address Phone Fax Patient Preferred    [facility] [address] [facility] [address] [phone] --              Durable Medical Equipment    No services have been selected for the patient.                Dialysis/Infusion    No services have been selected for the patient.                Home Medical Care    No services have been selected for the patient.                Therapy    No services have been selected for the patient.                Community Resources    No services have been selected for the patient.                Community & DME    No services have been selected for the patient.                    Transportation Services  W/C Van: [facility]    Final Discharge Disposition Code: 62 - inpatient rehab facility

## 2024-09-30 NOTE — PROGRESS NOTES
Patient ID: Kristina Yates is a 64 y.o. female is an established patient with cervical radiculopathy who has previously undergone cervical four to six discectomy anterior with fusion and cervical five corpectomy and removal of previous hardware on 09/16/2024.    Subjective:     History of Present Illness  Kristina is doing remarkably well after [facility].  She had a small spinal cord injury as result of hardware malfunction from her old [employer] hardware during surgery.  Her postop MRI shows a very small area of injury.  She has bounced back from that remarkably well with normal strength and balance in all 4 extremities.  She has resolution of her previous cervical radiculopathy and now just has some numbness and temperature dysesthesias in her bilateral hands but that seems to be improving spontaneously.  Her major complaint is a posterior muscular pain as a result of the change of her cervical alignment.  She rates it as a 6/10.  She has no back or leg pain currently and her SI joint fusion seems to have alleviated the majority of her low back issues.  Extremity Pain   The pain is present in the neck, left hand, left fingers, right hand and right fingers. The problem has been comes and goes. The quality of the pain is described as aching and other. The pain is at a severity of 6/10. Associated symptoms include difficulty holding things. Pertinent negatives include no fever or numbness. Additional comments: Very cold      Review of Systems   Constitutional:  Negative for fever.   Musculoskeletal:  Positive for neck pain and neck stiffness.   Neurological:  Positive for headaches. Negative for dizziness, weakness, light-headedness and numbness.   Psychiatric/Behavioral:  Positive for decreased concentration.         While in the room and during my examination of the patient I wore a mask and eye protection.  I washed my hands before and after this patient encounter.  The patient was also  wearing a mask.    The following portions of the patient's history were reviewed and updated as appropriate: allergies, current medications, past family history, past medical history, past social history, past surgical history and problem list.     Objective:    Vitals:    10/02/24 0853   BP: 138/78   Pulse: 96   Resp: 16   Temp: 97 °F (36.1 °C)   SpO2: 98%     Body mass index is 26.92 kg/m².    Physical Exam  Constitutional:       Appearance: Normal appearance.   HENT:      Head: Normocephalic and atraumatic.   Eyes:      Extraocular Movements: Extraocular movements intact.      Conjunctiva/sclera: Conjunctivae normal.      Pupils: Pupils are equal, round, and reactive to light.   Cardiovascular:      Rate and Rhythm: Normal rate and regular rhythm.      Pulses: Normal pulses.   Pulmonary:      Breath sounds: Normal breath sounds.   Abdominal:      Palpations: Abdomen is soft.   Musculoskeletal:         General: Normal range of motion.      Cervical back: Normal range of motion and neck supple.   Skin:     General: Skin is warm and dry.   Neurological:      Mental Status: She is alert and oriented to person, place, and time.      Cranial Nerves: Cranial nerves 2-12 are intact.      Motor: Motor function is intact. No weakness or atrophy.      Coordination: Coordination is intact. Romberg sign negative. Romberg Test normal.      Gait: Gait is intact. Gait normal.      Deep Tendon Reflexes: Reflexes are normal and symmetric.      Reflex Scores:       Tricep reflexes are 2+ on the right side and 2+ on the left side.       Bicep reflexes are 2+ on the right side and 2+ on the left side.       Brachioradialis reflexes are 2+ on the right side and 2+ on the left side.       Patellar reflexes are 2+ on the right side and 2+ on the left side.       Achilles reflexes are 2+ on the right side and 2+ on the left side.  Psychiatric:         Speech: Speech normal.       Neurologic Exam     Mental Status   Oriented to person,  place, and time.   Attention: normal. Concentration: normal.   Speech: speech is normal   Level of consciousness: alert    Cranial Nerves   Cranial nerves II through XII intact.     CN III, IV, VI   Pupils are equal, round, and reactive to light.    Motor Exam   Muscle bulk: normal  Overall muscle tone: normal    Strength   Strength 5/5 except as noted.     Sensory Exam   Right arm light touch: decreased from fingers  Left arm light touch: decreased from fingers    Gait, Coordination, and Reflexes     Gait  Gait: normal    Coordination   Romberg: negative    Reflexes   Reflexes 2+ except as noted.   Right brachioradialis: 2+  Left brachioradialis: 2+  Right biceps: 2+  Left biceps: 2+  Right triceps: 2+  Left triceps: 2+  Right patellar: 2+  Left patellar: 2+  Right achilles: 2+  Left achilles: 2+       Results: No new neuroimaging    Assessment/Plan: She had a corpectomy at C5 and successful decompression of her C5 nerve roots.  I think that the majority of her cervical pain stems from facet arthropathy and she will benefit from facet injections.  I would also encourage her physical therapist to do manual massage and facet manipulations in addition to dry needling.       Diagnoses and all orders for this visit:    1. Cervical spinal cord injury without spinal bone injury, subsequent encounter (Primary)  -     XR spine cervical 2 or 3 vw; Future    2. Arthropathy of cervical facet joint  -     Ambulatory Referral to Pain Management  -     HYDROcodone-acetaminophen (NORCO) 5-325 MG per tablet; Take 1 tablet by mouth Every 6 (Six) Hours As Needed for Mild Pain.  Dispense: 20 tablet; Refill: 0  -     XR spine cervical 2 or 3 vw; Future    Other orders  -     Diclofenac Sodium (Voltaren) 1 % gel gel; Apply 4 g topically to the appropriate area as directed 4 (Four) Times a Day As Needed (Neck and muscle pain).  Dispense: 150 g; Refill: 1                Alex Ortiz MD  10/02/24  09:26 EDT          Answers submitted by  the patient for this visit:  Primary Reason for Visit (Submitted on 10/1/2024)  What is the primary reason for your visit?: Extremity Pain  Lower Extremity Injury Questionnaire (Submitted on 10/1/2024)  Chief Complaint: Extremity pain  Injury: No

## 2024-10-01 ENCOUNTER — TELEPHONE (OUTPATIENT)
Dept: FAMILY MEDICINE CLINIC | Facility: CLINIC | Age: 64
End: 2024-10-01

## 2024-10-01 NOTE — TELEPHONE ENCOUNTER
Caller: Kristina Yates    Relationship: Self    Best call back number: [phone]     SCHEDULED FOR HOSPITAL FOLLOW UP ON 10/7/2024. [facility] FYI ONLY

## 2024-10-02 ENCOUNTER — OFFICE VISIT (OUTPATIENT)
Dept: NEUROSURGERY | Facility: CLINIC | Age: 64
End: 2024-10-02
Payer: COMMERCIAL

## 2024-10-02 VITALS
SYSTOLIC BLOOD PRESSURE: 138 MMHG | BODY MASS INDEX: 26.57 KG/M2 | DIASTOLIC BLOOD PRESSURE: 78 MMHG | HEIGHT: 67 IN | TEMPERATURE: 97 F | OXYGEN SATURATION: 98 % | HEART RATE: 96 BPM | RESPIRATION RATE: 16 BRPM | WEIGHT: 169.31 LBS

## 2024-10-02 DIAGNOSIS — M47.812 ARTHROPATHY OF CERVICAL FACET JOINT: ICD-10-CM

## 2024-10-02 DIAGNOSIS — S14.109D: Primary | ICD-10-CM

## 2024-10-02 RX ORDER — HYDROCODONE BITARTRATE AND ACETAMINOPHEN 5; 325 MG/1; MG/1
1 TABLET ORAL EVERY 6 HOURS PRN
Qty: 20 TABLET | Refills: 0 | Status: SHIPPED | OUTPATIENT
Start: 2024-10-02

## 2024-10-07 ENCOUNTER — OFFICE VISIT (OUTPATIENT)
Dept: FAMILY MEDICINE CLINIC | Facility: CLINIC | Age: 64
End: 2024-10-07
Payer: COMMERCIAL

## 2024-10-07 VITALS
OXYGEN SATURATION: 96 % | WEIGHT: 168 LBS | BODY MASS INDEX: 27 KG/M2 | SYSTOLIC BLOOD PRESSURE: 132 MMHG | HEART RATE: 86 BPM | RESPIRATION RATE: 18 BRPM | DIASTOLIC BLOOD PRESSURE: 88 MMHG | HEIGHT: 66 IN

## 2024-10-07 DIAGNOSIS — G89.28 CHRONIC PAIN AFTER SPINAL SURGERY: ICD-10-CM

## 2024-10-07 DIAGNOSIS — M54.9 CHRONIC PAIN AFTER SPINAL SURGERY: ICD-10-CM

## 2024-10-07 DIAGNOSIS — E87.1 ACUTE HYPONATREMIA: ICD-10-CM

## 2024-10-07 DIAGNOSIS — Z98.1 S/P CERVICAL SPINAL FUSION: Primary | ICD-10-CM

## 2024-10-07 PROCEDURE — 99495 TRANSJ CARE MGMT MOD F2F 14D: CPT | Performed by: NURSE PRACTITIONER

## 2024-10-07 RX ORDER — HYDRALAZINE HYDROCHLORIDE 25 MG/1
25 TABLET, FILM COATED ORAL 3 TIMES DAILY
COMMUNITY
Start: 2024-09-30

## 2024-10-07 RX ORDER — METFORMIN HCL 500 MG
500 TABLET, EXTENDED RELEASE 24 HR ORAL
COMMUNITY
Start: 2024-09-30

## 2024-10-07 RX ORDER — GUAIFENESIN 600 MG/1
600 TABLET, EXTENDED RELEASE ORAL 2 TIMES DAILY
COMMUNITY
Start: 2024-09-30

## 2024-10-07 RX ORDER — ERGOCALCIFEROL 1.25 MG/1
CAPSULE ORAL
COMMUNITY
Start: 2024-09-30

## 2024-10-07 NOTE — PROGRESS NOTES
"Transitional Care Follow Up Visit  Subjective     Kristina Yates is a 64 y.o. female who presents for a transitional care management visit.    Within 48 business hours after discharge our office contacted her via telephone to coordinate her care and needs.      I reviewed and discussed the details of that call along with the discharge summary, hospital problems, inpatient lab results, inpatient diagnostic studies, and consultation reports with Kristina.     Current outpatient and discharge medications have been reconciled for the patient.  Reviewed by: DAYNE Garcias        Risk for Readmission (LACE) Score: 15 (9/23/2024  6:00 AM)  [facility], [place]  9/16-9/23/24  [facility]  Stable     History of Present Illness   Course During Hospital Stay:  9/16-9/23 Anterior Cervical corpectomy and Fusion  of C5 vertebral body (previous lumbar fusion . Previous cervical fusion), complication w screw puncturing dura  Saw Speech therapy and passed swallow study , in collar. No choking, eating well, taking meds     History of low sodium , last 132, is not drinking any plain/free water, K 3.6. No muscle spasms. Feeling much better.     Chronic radiculopathy and pain. Some numbness and cool temps to bilat hands. Had followed w neurosurg Dr Ortiz. is dressing self, feels much better overall. Pain has been more severe with this surgery. . She is taking hydrocodone 5 mg 1 as needed q6 hr, gabapentin 200 mg bid and methocarbamol 750 q6 hr . Denies constipation.        The following portions of the patient's history were reviewed and updated as appropriate: allergies, current medications, past family history, past medical history, past social history, past surgical history, and problem list.    Review of Systems    Objective   /88   Pulse 86   Resp 18   Ht 167.6 cm (66\")   Wt 76.2 kg (168 lb)   SpO2 96%   BMI 27.12 kg/m²   Physical Exam  Vitals reviewed.   Constitutional:       Appearance: Normal appearance. "   HENT:      Mouth/Throat:      Mouth: Mucous membranes are moist.   Neck:      Comments: Cervical Collar in place   Cardiovascular:      Rate and Rhythm: Normal rate and regular rhythm.   Pulmonary:      Effort: Pulmonary effort is normal.      Breath sounds: Normal breath sounds.   Abdominal:      General: Bowel sounds are normal.   Musculoskeletal:         General: No swelling or tenderness. Normal range of motion.   Skin:     General: Skin is warm.      Findings: No erythema.   Neurological:      General: No focal deficit present.      Mental Status: She is alert.      Sensory: No sensory deficit.      Motor: No weakness.         Assessment & Plan          S/p cervical fusion- cw PT, wear collar, follow Dr perdue   ER for fever, drainage/redness from post up site, numbness/worsening, or acute concerns    Hyponatremia- pt is feeling back to normal, not drinking free water, cw gatorade, occasional salty snacks    Chronic pain- cw meds as directed, follow w [facility], PT, keep drinking , eating fiber, stool softeners, dried fruit, miralax as needed

## 2024-10-10 DIAGNOSIS — M47.812 ARTHROPATHY OF CERVICAL FACET JOINT: ICD-10-CM

## 2024-10-10 NOTE — TELEPHONE ENCOUNTER
Any refills need to go through RX request from their pharmacy(by the patient calling their pharmacy and requesting the refill), unless they are calling to change dosage or change pharmacy.     Confirm pharmacy.[facility] [place]      Which prescription needs to be filled?Hydrocodone    How many days' worth does have the patient currently have left?5        Patient states still in a lot of pain and also doing PT.

## 2024-10-11 RX ORDER — HYDROCODONE BITARTRATE AND ACETAMINOPHEN 5; 325 MG/1; MG/1
1 TABLET ORAL EVERY 6 HOURS PRN
Qty: 20 TABLET | Refills: 0 | Status: SHIPPED | OUTPATIENT
Start: 2024-10-11

## 2024-10-18 DIAGNOSIS — M47.812 ARTHROPATHY OF CERVICAL FACET JOINT: ICD-10-CM

## 2024-10-18 NOTE — TELEPHONE ENCOUNTER
Any refills need to go through RX request from their pharmacy(by the patient calling their pharmacy and requesting the refill), unless they are calling to change dosage or change pharmacy.     Confirm pharmacy. [facility] [place]      Which prescription needs to be filled?Norco      How many days' worth does have the patient currently have left? 3 pills

## 2024-10-18 NOTE — TELEPHONE ENCOUNTER
Kristina called to get refill for [place], she can surgery on 09/16/2024 for CERVICAL FOUR TO SIX DISCECTOMY ANTERIOR WITH FUSION WITH CERVICAL FIVE CORPECTOMY AND REMOVAL OF PREVIOUS HARDWARE

## 2024-10-19 RX ORDER — HYDROCODONE BITARTRATE AND ACETAMINOPHEN 5; 325 MG/1; MG/1
1 TABLET ORAL EVERY 6 HOURS PRN
Qty: 20 TABLET | Refills: 0 | Status: SHIPPED | OUTPATIENT
Start: 2024-10-19

## 2024-10-21 ENCOUNTER — TELEPHONE (OUTPATIENT)
Dept: NEUROSURGERY | Facility: CLINIC | Age: 64
End: 2024-10-21
Payer: COMMERCIAL

## 2024-10-21 NOTE — TELEPHONE ENCOUNTER
Patient calling to report she is now going to outpatient PT with [facility] [place]    She is currently doing 2 pounds but [facility] wants to increase to 5 pounds. Patient wants approval from the doctor before changing just in case.    Please call and advise.

## 2024-10-25 ENCOUNTER — OFFICE VISIT (OUTPATIENT)
Age: 64
End: 2024-10-25
Payer: COMMERCIAL

## 2024-10-25 VITALS
WEIGHT: 161.6 LBS | DIASTOLIC BLOOD PRESSURE: 90 MMHG | HEIGHT: 66 IN | OXYGEN SATURATION: 100 % | HEART RATE: 82 BPM | BODY MASS INDEX: 25.97 KG/M2 | SYSTOLIC BLOOD PRESSURE: 170 MMHG

## 2024-10-25 DIAGNOSIS — E78.5 HYPERLIPIDEMIA, UNSPECIFIED HYPERLIPIDEMIA TYPE: ICD-10-CM

## 2024-10-25 DIAGNOSIS — I10 ESSENTIAL HYPERTENSION: Primary | ICD-10-CM

## 2024-10-25 RX ORDER — AMLODIPINE BESYLATE 5 MG/1
5 TABLET ORAL NIGHTLY
Qty: 90 TABLET | Refills: 3 | Status: SHIPPED | OUTPATIENT
Start: 2024-10-25

## 2024-10-25 NOTE — PROGRESS NOTES
Subjective:     Encounter Date:10/25/2024      Patient ID: Kristina Yates is a 64 y.o. female.    Chief Complaint:  HTN    HPI:   64 y.o. female, former smoker, with hypertension, diabetes, GERD, hyperlipidemia, COPD, cauda equina syndrome requiring emergent spinal fusion In June 2023 now status post anterior cervical spine fusion in September 2024 who presents for follow-up of hypertension.  She has been in significant pain since her surgery.  She is scheduled to see the pain clinic on Tuesday.  She thinks this is contributing to her elevated blood pressure.  She does also note that her amlodipine was discontinued.  She still has some at home.  She did not have any side effects from this but thinks it was discontinued during one of her hospitalizations.      does report dyspnea on exertion particularly with stairs.  She has been attributing this to COPD/asthma.    The following portions of the patient's history were reviewed and updated as appropriate: allergies, current medications, past family history, past medical history, past social history, past surgical history and problem list.     REVIEW OF SYSTEMS:   All systems reviewed.  Pertinent positives identified in HPI.  All other systems are negative.    Past Medical History:   Diagnosis Date    Alcohol abuse     in recovery, sober since 2012    Anxiety     Arthritis     Asthma     Edmonds esophagus     Blurred vision     Cervical disc disease     Cervical neuritis     Chronic pain     NECK AND BACK    COPD (chronic obstructive pulmonary disease)     STATES NEVER DIAGNOSED WITH    Depression     Diabetes mellitus     Type 2    DJD (degenerative joint disease), cervical     Foot spasms     FROM BACK SURGERY    GERD (gastroesophageal reflux disease)     Hiatal hernia     Hyperlipidemia     Hypertension     Persistent headaches     ?BLOOD PRESSURE OR NECK RELATED???    Seasonal allergies     Spinal headache     Spinal stenosis        Family History   Problem  Relation Age of Onset    Cancer Mother         nonhodkinds lymphoma    Heart disease Father     Drug abuse Sister     Heart disease Brother     Lupus Maternal Aunt     Malig Hyperthermia Neg Hx        Social History     Socioeconomic History    Marital status:     Number of children: 2   Tobacco Use    Smoking status: Former     Current packs/day: 0.00     Average packs/day: 0.5 packs/day for 18.3 years (9.2 ttl pk-yrs)     Types: Cigarettes     Start date: 2006     Quit date: 2024     Years since quittin.3    Smokeless tobacco: Never   Vaping Use    Vaping status: Never Used   Substance and Sexual Activity    Alcohol use: No     Comment: sober since , worsk 12 steps    Drug use: No    Sexual activity: Yes     Partners: Male       Allergies   Allergen Reactions    Fluoxetine Hives     Hives    Sulfa Antibiotics Hives     THROAT CLOSES    Zofran [Ondansetron Hcl] Hives     THROAT CLOSES    Lisinopril Cough       Past Surgical History:   Procedure Laterality Date    ANTERIOR CERVICAL DISCECTOMY W/ FUSION  2003    C5-6, C6-7    CERCLAGE CERVIX      CERVICAL EPIDURAL      CERVICAL FUSION      DILATATION AND CURETTAGE      ENDOMETRIAL ABLATION  2007    ENDOSCOPY      MULTIPLE    ENDOSCOPY N/A 2016    Procedure: ESOPHAGOGASTRODUODENOSCOPY WITH COLD BIOPSIES;  Surgeon: Jose Mcclellan MD;  Location: [facility] ENDOSCOPY;  Service:     ESOPHAGEAL DILATATION      HAND SURGERY Bilateral     CARTILAGE    LUMBAR FUSION      LUMBAR LAMINECTOMY WITH FUSION N/A 2023    Procedure: OPEN LUMBAR FOUR TO FIVE TRANSFORAMINAL LUMBAR INTERBODY FUSION;  Surgeon: Alex Ortiz MD;  Location: [facility] OR;  Service: Neurosurgery;  Laterality: N/A;    NISSEN FUNDOPLICATION LAPAROSCOPIC  2012    SACROILIAC JOINT FUSION Right 2024    Procedure: RIGHT PERCUTANEOUS ARTHRODESIS SACROILIAC JOINT, NEUROMONITORING;  Surgeon: Alex Ortiz MD;  Location: [facility] MAIN OR;  Service: Neurosurgery;   Laterality: Right;    TONSILLECTOMY         Procedures       Objective:         Vitals:    10/25/24 0938   BP: 170/90   Pulse: 82   SpO2: 100%       PHYSICAL EXAM:  GEN: well appearing, in NAD   HEENT: NCAT, EOMI, moist mucus membranes   Respiratory: CTAB, no wheezes, rales or rhonchi  CV: normal rate, regular rhythm, normal S1, S2, 2/6 systolic murmur in RUSB, +2 radial pulses b/l  GI: soft, nontender, nondistended  MSK: no edema  Skin: no rash, warm, dry  Heme/Lymph: no bruising or bleeding  Neuro: Alert and Oriented x 3, grossly normal motor function        Assessment:         (I10) Essential hypertension    (E78.5) Hyperlipidemia, unspecified hyperlipidemia type    64 y.o. female, former smoker, with hypertension, diabetes, GERD, hyperlipidemia, COPD, cauda equina syndrome requiring emergent spinal fusion In June 2023 now status post anterior cervical spine fusion in September 2024 who presents for follow-up of hypertension.       Plan:       #Hypertension  Currently uncontrolled  - Start amlodipine 5 mg daily, she has some at home.  - Continue losartan 100 mg daily, Toprol 50 mg twice daily, hydrochlorothiazide 25 mg daily  - She will continue to check her blood pressures daily and will let me know in 4 weeks if it is controlled.  If it does not we will double amlodipine to 10 mg    #Hyperlipidemia  Last lipid panel was August 2023 and her LDL was 182.  - needs repeat lipid panel  - Continue atorvastatin 40 mg daily    DAYNE Garcias, thank you very much for referring this kind patient to me. Please call me with any questions or concerns. I will see the patient again in the office in 3 months or earlier as needed.         Pedro Luis Alvarez MD, [facility]  10/25/24  [facility]    Outpatient Encounter Medications as of 10/25/2024   Medication Sig Dispense Refill    amLODIPine (NORVASC) 5 MG tablet Take 1 tablet by mouth Every Night. 90 tablet 3    atorvastatin (LIPITOR) 40 MG tablet Take 1 tablet by  mouth Every Night.      DULoxetine (CYMBALTA) 30 MG capsule Take 1 capsule by mouth Daily. Take with 60 mg capsule for a TDD of 90 mg      DULoxetine (CYMBALTA) 60 MG capsule Take 1 capsule by mouth Daily. Take with 30 mg capsule for a TDD of 90 mg      fluticasone (FLONASE) 50 MCG/ACT nasal spray 2 sprays into the nostril(s) as directed by provider Daily. 18.2 mL 5    gabapentin (NEURONTIN) 100 MG capsule Take 2 capsules by mouth 2 (Two) Times a Day.      guaiFENesin (MUCINEX) 600 MG 12 hr tablet Take 1 tablet by mouth 2 (Two) Times a Day.      hydrALAZINE (APRESOLINE) 25 MG tablet Take 1 tablet by mouth 3 (Three) Times a Day.      hydroCHLOROthiazide 25 MG tablet Take 0.5 tablets by mouth Daily.      HYDROcodone-acetaminophen (NORCO) 5-325 MG per tablet Take 1 tablet by mouth Every 6 (Six) Hours As Needed for Mild Pain. 20 tablet 0    losartan (COZAAR) 100 MG tablet Take 1 tablet by mouth Daily. 90 tablet 1    metFORMIN ER (GLUCOPHAGE-XR) 500 MG 24 hr tablet Take 1 tablet by mouth Daily With Breakfast.      methocarbamol (ROBAXIN) 750 MG tablet Take 1 tablet by mouth Every 6 (Six) Hours.      metoprolol succinate XL (TOPROL-XL) 50 MG 24 hr tablet Take 1 tablet by mouth Daily. (Patient taking differently: Take 1 tablet by mouth 2 (Two) Times a Day.) 90 tablet 0    Multiple Vitamins-Minerals (MULTIVITAMIN ADULT) tablet Take 1 tablet by mouth Daily.      pantoprazole (PROTONIX) 40 MG EC tablet Take 1 tablet by mouth Every Morning.      Vitamin D, Ergocalciferol, 33356 units capsule       melatonin 5 MG tablet tablet Take 1 tablet by mouth At Night As Needed (insomnia). (Patient not taking: Reported on 10/25/2024)      [DISCONTINUED] amLODIPine (NORVASC) 5 MG tablet Take 1 tablet by mouth Every Night. 30 tablet 0    [DISCONTINUED] Enoxaparin Sodium (LOVENOX) 40 MG/0.4ML solution prefilled syringe syringe Inject 0.4 mL under the skin into the appropriate area as directed Every Night. Indications: Prevention of Unwanted  Clot in Veins (Patient not taking: Reported on 10/2/2024)      [DISCONTINUED] polyethylene glycol (MIRALAX) 17 g packet Take 17 g by mouth Daily. (Patient not taking: Reported on 10/7/2024)       No facility-administered encounter medications on file as of 10/25/2024.

## 2024-10-28 RX ORDER — DULOXETIN HYDROCHLORIDE 30 MG/1
CAPSULE, DELAYED RELEASE ORAL
Qty: 30 CAPSULE | Refills: 0 | Status: SHIPPED | OUTPATIENT
Start: 2024-10-28

## 2024-10-29 ENCOUNTER — HOSPITAL ENCOUNTER (OUTPATIENT)
Dept: GENERAL RADIOLOGY | Facility: HOSPITAL | Age: 64
Discharge: HOME | End: 2024-10-29
Admitting: NEUROLOGICAL SURGERY
Payer: COMMERCIAL

## 2024-10-29 DIAGNOSIS — S14.109D: ICD-10-CM

## 2024-10-29 DIAGNOSIS — M47.812 ARTHROPATHY OF CERVICAL FACET JOINT: ICD-10-CM

## 2024-10-29 PROCEDURE — 72040 X-RAY EXAM NECK SPINE 2-3 VW: CPT

## 2024-10-29 NOTE — PROGRESS NOTES
Patient ID: Kristina Yates is a 64 y.o. female is here today for follow-up for cervical spinal injury.    Imaging: XR of the cervical completed on 10/29/2024    Subjective     The patient is here in regards to   Chief Complaint   Patient presents with    Neck Pain    Follow-up       History of Present Illness  Kristina is overall doing well.  She is scheduled to have a facet block this coming month.  She has right-sided trapezius pain that travels into her shoulder along the trapezius muscle.  Does not have any significant radiculopathy or nerve pain.  She feels that her numbness and dexterity issues in her hands have improved since surgery.  All of her pain is a dull ache in her muscle that is improved with massage.  Back Pain  This is a chronic problem. The current episode started more than 1 month ago. The problem occurs constantly. The problem is unchanged. The pain is present in the thoracic spine. The quality of the pain is described as aching and burning. The pain is at a severity of 7/10. The pain is The same all the time. The symptoms are aggravated by position, sitting and standing. Stiffness is present All day. Associated symptoms include headaches, numbness, paresthesias, tingling and weakness. Pertinent negatives include no abdominal pain, bladder incontinence, bowel incontinence, chest pain, dysuria, fever, leg pain, pelvic pain, perianal numbness or weight loss.   Additional comments: Neuropathy in hands Neck pain is debilitating      While in the room and during my examination of the patient I wore a mask and eye protection.  I washed my hands before and after this patient encounter.  The patient was also wearing a mask.    The following portions of the patient's history were reviewed and updated as appropriate: allergies, current medications, past family history, past medical history, past social history, past surgical history and problem list.    Review of Systems   Constitutional:  Negative for fever  and weight loss.   Eyes:  Negative for visual disturbance.   Cardiovascular:  Negative for chest pain.   Gastrointestinal:  Positive for nausea. Negative for abdominal pain, bowel incontinence and vomiting.   Genitourinary:  Negative for bladder incontinence, dysuria and pelvic pain.   Musculoskeletal:  Positive for back pain, neck pain and neck stiffness.   Neurological:  Positive for tingling, weakness, numbness, headaches and paresthesias. Negative for dizziness and light-headedness.   Psychiatric/Behavioral:  Positive for decreased concentration. Negative for confusion.         Past Medical History:   Diagnosis Date    Alcohol abuse     in recovery, sober since 2012    Anxiety     Arthritis     Asthma     Edmonds esophagus     Blurred vision     Cervical disc disease     Cervical neuritis     Chronic pain     NECK AND BACK    COPD (chronic obstructive pulmonary disease)     STATES NEVER DIAGNOSED WITH    Depression     Diabetes mellitus     Type 2    DJD (degenerative joint disease), cervical     Foot spasms     FROM BACK SURGERY    GERD (gastroesophageal reflux disease)     Hiatal hernia     Hyperlipidemia     Hypertension     Persistent headaches     ?BLOOD PRESSURE OR NECK RELATED???    Seasonal allergies     Spinal headache     Spinal stenosis        Allergies   Allergen Reactions    Fluoxetine Hives     Hives    Sulfa Antibiotics Hives     THROAT CLOSES    Zofran [Ondansetron Hcl] Hives     THROAT CLOSES    Lisinopril Cough       Family History   Problem Relation Age of Onset    Cancer Mother         nonhodkinds lymphoma    Heart disease Father     Drug abuse Sister     Heart disease Brother     Lupus Maternal Aunt     Malig Hyperthermia Neg Hx        Social History     Socioeconomic History    Marital status:     Number of children: 2   Tobacco Use    Smoking status: Some Days     Current packs/day: 0.00     Average packs/day: 0.5 packs/day for 18.3 years (9.2 ttl pk-yrs)     Types: Cigarettes      Start date: 2006     Last attempt to quit: 2024     Years since quittin.3    Smokeless tobacco: Never   Vaping Use    Vaping status: Never Used   Substance and Sexual Activity    Alcohol use: No     Comment: sober since , worsk 12 steps    Drug use: No    Sexual activity: Yes     Partners: Male       Past Surgical History:   Procedure Laterality Date    ANTERIOR CERVICAL DISCECTOMY W/ FUSION  2003    C5-6, C6-7    CERCLAGE CERVIX      CERVICAL EPIDURAL      CERVICAL FUSION      DILATATION AND CURETTAGE      ENDOMETRIAL ABLATION      ENDOSCOPY      MULTIPLE    ENDOSCOPY N/A 2016    Procedure: ESOPHAGOGASTRODUODENOSCOPY WITH COLD BIOPSIES;  Surgeon: Jose Mcclellan MD;  Location: [facility] ENDOSCOPY;  Service:     ESOPHAGEAL DILATATION      HAND SURGERY Bilateral     CARTILAGE    LUMBAR FUSION      LUMBAR LAMINECTOMY WITH FUSION N/A 2023    Procedure: OPEN LUMBAR FOUR TO FIVE TRANSFORAMINAL LUMBAR INTERBODY FUSION;  Surgeon: Alex Ortiz MD;  Location: [facility] OR;  Service: Neurosurgery;  Laterality: N/A;    NISSEN FUNDOPLICATION LAPAROSCOPIC  2012    SACROILIAC JOINT FUSION Right 2024    Procedure: RIGHT PERCUTANEOUS ARTHRODESIS SACROILIAC JOINT, NEUROMONITORING;  Surgeon: Alex Ortiz MD;  Location: [facility] OR;  Service: Neurosurgery;  Laterality: Right;    TONSILLECTOMY           Objective     Vitals:    10/30/24 0943   BP: 172/82   Pulse: 97   Resp: 16   Temp: 97 °F (36.1 °C)   SpO2: 95%     Body mass index is 26.08 kg/m².    Physical Exam  Constitutional:       General: She is awake.      Appearance: Normal appearance.   HENT:      Head: Normocephalic and atraumatic.   Eyes:      General: Lids are normal.      Extraocular Movements: Extraocular movements intact.      Conjunctiva/sclera: Conjunctivae normal.      Pupils: Pupils are equal, round, and reactive to light.   Cardiovascular:      Rate and Rhythm: Normal rate and regular rhythm.      Pulses:  Normal pulses.   Pulmonary:      Breath sounds: Normal breath sounds.   Abdominal:      Palpations: Abdomen is soft.   Musculoskeletal:         General: Normal range of motion.      Cervical back: Normal range of motion and neck supple.   Skin:     General: Skin is warm and dry.   Neurological:      Mental Status: She is alert and oriented to person, place, and time.      Motor: Motor function is intact. No weakness or atrophy.      Coordination: Coordination is intact. Romberg sign negative.      Gait: Gait is intact. Gait normal.      Deep Tendon Reflexes: Reflexes are normal and symmetric.      Reflex Scores:       Tricep reflexes are 2+ on the right side and 2+ on the left side.       Bicep reflexes are 2+ on the right side and 2+ on the left side.       Brachioradialis reflexes are 2+ on the right side and 2+ on the left side.       Patellar reflexes are 2+ on the right side and 2+ on the left side.       Achilles reflexes are 2+ on the right side and 2+ on the left side.        Neurological Exam  Mental Status  Awake and alert. Oriented to person, place and time. Oriented to person, place, and time.    Cranial Nerves  CN II: Visual acuity is normal. Visual fields full to confrontation.  CN III, IV, VI: Extraocular movements intact bilaterally. Normal lids and orbits bilaterally. Pupils equal round and reactive to light bilaterally.  CN V: Facial sensation is normal.  CN VII: Full and symmetric facial movement.  CN IX, X: Palate elevates symmetrically. Normal gag reflex.  CN XI: Shoulder shrug strength is normal.  CN XII: Tongue midline without atrophy or fasciculations.    Motor                                               Right                     Left  Deltoid                                   5                          5   Biceps                                   5                          5   Finger flexor                          4+                            Finger extensor                     4+                             Interossei                              4+                            Iliopsoas                               5                          5   Quadriceps                           5                          5   Hamstring                             5                          5   Gastrocnemius                     5                           5   Anterior tibialis                      5                          5    Sensory  Sensation is intact to light touch, pinprick, vibration and proprioception in all four extremities.    Reflexes  Deep tendon reflexes are 2+ and symmetric in all four extremities.                                            Right                      Left  Brachioradialis                    2+                         2+  Biceps                                 2+                         2+  Triceps                                2+                         2+  Patellar                                2+                         2+  Achilles                                2+                         2+    Coordination    Finger-to-nose, rapid alternating movements and heel-to-shin normal bilaterally without dysmetria.    Gait   Normal gait. Normal gait. Romberg is absent.      Assessment & Plan   Independent Review of Radiographic Studies:      I personally reviewed the images from the following studies.    INDICATION: Follow up from fusion    FINDINGS:    XR: XR of the cervical spine was reviewed and shows unchanged alignment and hardware compared to previous x-ray.  Evidence of beginning of bridging fusion across corpectomy cage    Assessment/Plan: Overall seems to be doing well from a neurological standpoint.  Does not seem to have a much sequela from her spinal cord injury.  She has no back pain currently.  Her radiculopathy has improved significantly.  Her major issue is reactive neck pain in her trapezius muscle.  She has been taking hydrocodone for this but I recommended that she  get massage from her physical therapist and her  with the facet tissue release massage and massage of her trapezius muscle and I think that we will give her much better relief and decrease her dependency on narcotics.    She is cleared to wear her collar only when traveling or in unsafe situations.  She can take her cervical collar off while sleeping or eating or resting at home.    Medical Decision Making:      X-ray cervical spine         Diagnoses and all orders for this visit:    1. Cervical radiculopathy (Primary)  -     XR spine cervical 2 or 3 vw; Future    2. Cervical spinal cord injury without spinal bone injury, subsequent encounter  -     XR spine cervical 2 or 3 vw; Future    3. Arthropathy of cervical facet joint  -     XR spine cervical 2 or 3 vw; Future  -     HYDROcodone-acetaminophen (NORCO) 5-325 MG per tablet; Take 1 tablet by mouth Every 6 (Six) Hours As Needed for Mild Pain.  Dispense: 20 tablet; Refill: 0             Patient Instructions/Recommendations:    Follow-up in 6 weeks with x-ray      Alex Ortiz MD  10/30/24  10:07 EDT      Answers submitted by the patient for this visit:  Primary Reason for Visit (Submitted on 10/29/2024)  What is the primary reason for your visit?: Back Pain

## 2024-10-30 ENCOUNTER — OFFICE VISIT (OUTPATIENT)
Dept: NEUROSURGERY | Facility: CLINIC | Age: 64
End: 2024-10-30
Payer: COMMERCIAL

## 2024-10-30 VITALS
HEART RATE: 97 BPM | DIASTOLIC BLOOD PRESSURE: 82 MMHG | TEMPERATURE: 97 F | OXYGEN SATURATION: 95 % | SYSTOLIC BLOOD PRESSURE: 172 MMHG | BODY MASS INDEX: 25.97 KG/M2 | HEIGHT: 66 IN | WEIGHT: 161.6 LBS | RESPIRATION RATE: 16 BRPM

## 2024-10-30 DIAGNOSIS — M47.812 ARTHROPATHY OF CERVICAL FACET JOINT: ICD-10-CM

## 2024-10-30 DIAGNOSIS — M54.12 CERVICAL RADICULOPATHY: Primary | ICD-10-CM

## 2024-10-30 DIAGNOSIS — S14.109D: ICD-10-CM

## 2024-10-30 PROCEDURE — 99024 POSTOP FOLLOW-UP VISIT: CPT | Performed by: NEUROLOGICAL SURGERY

## 2024-10-30 RX ORDER — CYCLOBENZAPRINE HCL 10 MG
TABLET ORAL
COMMUNITY
Start: 2024-10-25

## 2024-10-30 RX ORDER — HYDROCODONE BITARTRATE AND ACETAMINOPHEN 5; 325 MG/1; MG/1
1 TABLET ORAL EVERY 6 HOURS PRN
Qty: 20 TABLET | Refills: 0 | Status: SHIPPED | OUTPATIENT
Start: 2024-10-30

## 2024-11-21 ENCOUNTER — TELEPHONE (OUTPATIENT)
Dept: NEUROSURGERY | Facility: CLINIC | Age: 64
End: 2024-11-21
Payer: COMMERCIAL

## 2024-11-21 NOTE — TELEPHONE ENCOUNTER
I called and talked to Kristina to let her know he said it was ok to remove the brace to drive and at PT

## 2024-11-21 NOTE — TELEPHONE ENCOUNTER
Kristina called and stated she is currently going to PT through [facility] and PT has been wanting to take her neck brace off to do PT without the brace and wanted to know if that is ok? She was also wanted to know if she would be able to drive with herself to and from PT due to her  has run out of time off.  She stated she would be taking her brace off to drive.

## 2024-12-06 RX ORDER — DULOXETIN HYDROCHLORIDE 30 MG/1
CAPSULE, DELAYED RELEASE ORAL
Qty: 30 CAPSULE | Refills: 0 | Status: SHIPPED | OUTPATIENT
Start: 2024-12-06

## 2024-12-19 ENCOUNTER — HOSPITAL ENCOUNTER (OUTPATIENT)
Dept: GENERAL RADIOLOGY | Facility: HOSPITAL | Age: 64
Discharge: HOME | End: 2024-12-19
Admitting: NEUROLOGICAL SURGERY
Payer: COMMERCIAL

## 2024-12-19 DIAGNOSIS — S14.109D: ICD-10-CM

## 2024-12-19 DIAGNOSIS — M54.12 CERVICAL RADICULOPATHY: ICD-10-CM

## 2024-12-19 DIAGNOSIS — M47.812 ARTHROPATHY OF CERVICAL FACET JOINT: ICD-10-CM

## 2024-12-19 PROCEDURE — 72040 X-RAY EXAM NECK SPINE 2-3 VW: CPT

## 2024-12-19 NOTE — PROGRESS NOTES
Patient ID: Kristina Yates is a 64 y.o. female is here today for follow-up for cervical radiculopathy.    Imaging: XR of the cervical spine completed on 12/19/2024    Subjective     The patient is here in regards to   Chief Complaint   Patient presents with    Neck Pain    Follow-up       History of Present Illness  Kristina continues to make gradual progress.  The neuropathy that she has been experiencing seems to have lessened and now stops at her shoulders instead of going down her arms.  She feels that her strength has improved with physical therapy and Occupational Therapy using a TENS unit in terms of her hands and arms.  Her posterior neck and trapezius pain seems to have gradually improved as well and responds well to massage as given by her .      While in the room and during my examination of the patient I wore a mask and eye protection.  I washed my hands before and after this patient encounter.  The patient was also wearing a mask.    The following portions of the patient's history were reviewed and updated as appropriate: allergies, current medications, past family history, past medical history, past social history, past surgical history and problem list.    Review of Systems     Past Medical History:   Diagnosis Date    Alcohol abuse     in recovery, sober since 2012    Anxiety     Arthritis     Asthma     Edmonds esophagus     Blurred vision     Cervical disc disease     Cervical neuritis     Chronic pain     NECK AND BACK    COPD (chronic obstructive pulmonary disease)     STATES NEVER DIAGNOSED WITH    Depression     Diabetes mellitus     Type 2    DJD (degenerative joint disease), cervical     Foot spasms     FROM BACK SURGERY    GERD (gastroesophageal reflux disease)     Hiatal hernia     Hyperlipidemia     Hypertension     Persistent headaches     ?BLOOD PRESSURE OR NECK RELATED???    Seasonal allergies     Spinal headache     Spinal stenosis        Allergies   Allergen Reactions    Fluoxetine  Hives     Hives    Sulfa Antibiotics Hives     THROAT CLOSES    Zofran [Ondansetron Hcl] Hives     THROAT CLOSES    Lisinopril Cough       Family History   Problem Relation Age of Onset    Cancer Mother         nonhodkinds lymphoma    Heart disease Father     Drug abuse Sister     Heart disease Brother     Lupus Maternal Aunt     Malig Hyperthermia Neg Hx        Social History     Socioeconomic History    Marital status:     Number of children: 2   Tobacco Use    Smoking status: Some Days     Current packs/day: 0.00     Average packs/day: 0.5 packs/day for 18.3 years (9.2 ttl pk-yrs)     Types: Cigarettes     Start date: 2006     Last attempt to quit: 2024     Years since quittin.4    Smokeless tobacco: Never   Vaping Use    Vaping status: Never Used   Substance and Sexual Activity    Alcohol use: No     Comment: sober since , worsk 12 steps    Drug use: No    Sexual activity: Yes     Partners: Male       Past Surgical History:   Procedure Laterality Date    ANTERIOR CERVICAL DISCECTOMY W/ FUSION  2003    C5-6, C6-7    ANTERIOR CERVICAL DISCECTOMY W/ FUSION Right 2024    Procedure: CERVICAL FOUR TO SIX DISCECTOMY ANTERIOR WITH FUSION WITH CERVICAL FIVE CORPECTOMY AND REMOVAL OF PREVIOUS HARDWARE;  Surgeon: Alex Ortiz MD;  Location: [facility] OR;  Service: Neurosurgery;  Laterality: Right;    CERCLAGE CERVIX      CERVICAL EPIDURAL      CERVICAL FUSION      DILATATION AND CURETTAGE      ENDOMETRIAL ABLATION  2007    ENDOSCOPY      MULTIPLE    ENDOSCOPY N/A 2016    Procedure: ESOPHAGOGASTRODUODENOSCOPY WITH COLD BIOPSIES;  Surgeon: Jose Mcclellan MD;  Location: [facility] ENDOSCOPY;  Service:     ESOPHAGEAL DILATATION      HAND SURGERY Bilateral     CARTILAGE    LUMBAR FUSION      LUMBAR LAMINECTOMY WITH FUSION N/A 2023    Procedure: OPEN LUMBAR FOUR TO FIVE TRANSFORAMINAL LUMBAR INTERBODY FUSION;  Surgeon: Alex Ortiz MD;  Location: [facility] MAIN OR;  Service:  Neurosurgery;  Laterality: N/A;    NISSEN FUNDOPLICATION LAPAROSCOPIC  03/2012    SACROILIAC JOINT FUSION Right 06/04/2024    Procedure: RIGHT PERCUTANEOUS ARTHRODESIS SACROILIAC JOINT, NEUROMONITORING;  Surgeon: Alex Ortiz MD;  Location: [facility];  Service: Neurosurgery;  Laterality: Right;    TONSILLECTOMY           Objective     Vitals:    12/20/24 0929   BP: 152/78   Pulse: 110   Resp: 16   Temp: 97 °F (36.1 °C)   SpO2: 96%     Body mass index is 26.08 kg/m².    Physical Exam  Constitutional:       General: She is awake.      Appearance: Normal appearance.   HENT:      Head: Normocephalic and atraumatic.   Eyes:      General: Lids are normal.      Extraocular Movements: Extraocular movements intact.      Conjunctiva/sclera: Conjunctivae normal.      Pupils: Pupils are equal, round, and reactive to light.   Cardiovascular:      Rate and Rhythm: Normal rate and regular rhythm.      Pulses: Normal pulses.   Pulmonary:      Breath sounds: Normal breath sounds.   Abdominal:      Palpations: Abdomen is soft.   Musculoskeletal:         General: Normal range of motion.      Cervical back: Normal range of motion and neck supple.   Skin:     General: Skin is warm and dry.   Neurological:      Mental Status: She is alert and oriented to person, place, and time.      Motor: Motor function is intact. No weakness or atrophy.      Coordination: Coordination is intact. Romberg sign negative.      Gait: Gait is intact. Gait normal.      Deep Tendon Reflexes: Reflexes are normal and symmetric.      Reflex Scores:       Tricep reflexes are 2+ on the right side and 2+ on the left side.       Bicep reflexes are 2+ on the right side and 2+ on the left side.       Brachioradialis reflexes are 2+ on the right side and 2+ on the left side.       Patellar reflexes are 2+ on the right side and 2+ on the left side.       Achilles reflexes are 2+ on the right side and 2+ on the left side.        Neurological Exam  Mental  Status  Awake and alert. Oriented to person, place and time. Oriented to person, place, and time.    Cranial Nerves  CN II: Visual acuity is normal. Visual fields full to confrontation.  CN III, IV, VI: Extraocular movements intact bilaterally. Normal lids and orbits bilaterally. Pupils equal round and reactive to light bilaterally.  CN V: Facial sensation is normal.  CN VII: Full and symmetric facial movement.  CN IX, X: Palate elevates symmetrically. Normal gag reflex.  CN XI: Shoulder shrug strength is normal.  CN XII: Tongue midline without atrophy or fasciculations.    Motor                                               Right                     Left  Deltoid                                   5                          5   Biceps                                   5                          5   Iliopsoas                               5                          5   Quadriceps                           5                          5   Hamstring                             5                          5   Gastrocnemius                     5                           5   Anterior tibialis                      5                          5    Sensory  Sensation is intact to light touch, pinprick, vibration and proprioception in all four extremities.    Reflexes  Deep tendon reflexes are 2+ and symmetric in all four extremities.                                            Right                      Left  Brachioradialis                    2+                         2+  Biceps                                 2+                         2+  Triceps                                2+                         2+  Patellar                                2+                         2+  Achilles                                2+                         2+    Coordination    Finger-to-nose, rapid alternating movements and heel-to-shin normal bilaterally without dysmetria.    Gait   Normal gait. Normal gait. Romberg is  absent.      Assessment & Plan   Independent Review of Radiographic Studies:      I personally reviewed the images from the following studies.    INDICATION: Follow up from fusion    FINDINGS:    XR: XR of the cervical spine was reviewed and shows stable alignment compared to previous x-ray with bony growth visible through corpectomy cage    Assessment/Plan: X-ray looks good.  Incision looks good.  Her back is doing well.  She is recovering well from her injury and from surgery.  She still has some residual neuropathy and neck pain I think those are both making spontaneous improvement.  I think she will benefit from continued exercise and physical therapy.    Medical Decision Making:      X-ray cervical spine         Diagnoses and all orders for this visit:    1. Cervicalgia (Primary)  -     gabapentin (NEURONTIN) 300 MG capsule; Take 1 capsule by mouth 3 (Three) Times a Day.  Dispense: 90 capsule; Refill: 2  -     XR spine cervical 2 or 3 vw; Future             Patient Instructions/Recommendations:    Follow-up in 3 months with x-ray      Alex Ortiz MD  12/20/24  09:50 EST

## 2024-12-20 ENCOUNTER — OFFICE VISIT (OUTPATIENT)
Dept: NEUROSURGERY | Facility: CLINIC | Age: 64
End: 2024-12-20
Payer: COMMERCIAL

## 2024-12-20 VITALS
HEIGHT: 66 IN | HEART RATE: 110 BPM | OXYGEN SATURATION: 96 % | SYSTOLIC BLOOD PRESSURE: 152 MMHG | WEIGHT: 161.6 LBS | DIASTOLIC BLOOD PRESSURE: 78 MMHG | RESPIRATION RATE: 16 BRPM | TEMPERATURE: 97 F | BODY MASS INDEX: 25.97 KG/M2

## 2024-12-20 DIAGNOSIS — M54.2 CERVICALGIA: Primary | ICD-10-CM

## 2024-12-20 PROCEDURE — 99024 POSTOP FOLLOW-UP VISIT: CPT | Performed by: NEUROLOGICAL SURGERY

## 2024-12-20 RX ORDER — ALBUTEROL SULFATE 90 UG/1
INHALANT RESPIRATORY (INHALATION)
COMMUNITY

## 2024-12-20 RX ORDER — GABAPENTIN 300 MG/1
300 CAPSULE ORAL 3 TIMES DAILY
Qty: 90 CAPSULE | Refills: 2 | Status: SHIPPED | OUTPATIENT
Start: 2024-12-20

## 2024-12-20 RX ORDER — METHOCARBAMOL 500 MG/1
TABLET, FILM COATED ORAL
COMMUNITY

## 2024-12-20 RX ORDER — ACETAMINOPHEN 325 MG/1
650 TABLET ORAL
COMMUNITY
Start: 2024-09-29

## 2024-12-30 ENCOUNTER — TELEPHONE (OUTPATIENT)
Dept: NEUROSURGERY | Facility: CLINIC | Age: 64
End: 2024-12-30
Payer: COMMERCIAL

## 2024-12-30 RX ORDER — METHYLPREDNISOLONE 4 MG/1
TABLET ORAL
Qty: 21 TABLET | Refills: 0 | Status: SHIPPED | OUTPATIENT
Start: 2024-12-30

## 2024-12-30 NOTE — TELEPHONE ENCOUNTER
Tell her to use ice, skip physical therapy for now, rest and I will prescribe her a steroid pack.  Hopefully this should help her feel better.

## 2024-12-30 NOTE — TELEPHONE ENCOUNTER
Patient called stating that her neck pain has gotten worse. States that she is and has done everything possible but now the pain is unmanageable and wants to know what to do? Patient states that she is still in physical therapy but the pain is so bad that she can not complete it. Patient states that she can't take it any longer.Please call and advise

## 2025-01-15 ENCOUNTER — OFFICE VISIT (OUTPATIENT)
Dept: FAMILY MEDICINE CLINIC | Facility: CLINIC | Age: 65
End: 2025-01-15
Payer: COMMERCIAL

## 2025-01-15 VITALS
WEIGHT: 168 LBS | RESPIRATION RATE: 18 BRPM | BODY MASS INDEX: 27 KG/M2 | SYSTOLIC BLOOD PRESSURE: 164 MMHG | HEART RATE: 88 BPM | OXYGEN SATURATION: 95 % | HEIGHT: 66 IN | DIASTOLIC BLOOD PRESSURE: 88 MMHG

## 2025-01-15 DIAGNOSIS — F51.01 PRIMARY INSOMNIA: ICD-10-CM

## 2025-01-15 DIAGNOSIS — E78.2 MIXED HYPERLIPIDEMIA: ICD-10-CM

## 2025-01-15 DIAGNOSIS — I10 PRIMARY HYPERTENSION: ICD-10-CM

## 2025-01-15 DIAGNOSIS — F41.9 ANXIETY: ICD-10-CM

## 2025-01-15 DIAGNOSIS — M54.12 CERVICAL RADICULOPATHY: ICD-10-CM

## 2025-01-15 DIAGNOSIS — Z12.31 BREAST CANCER SCREENING BY MAMMOGRAM: ICD-10-CM

## 2025-01-15 DIAGNOSIS — Z23 NEED FOR VACCINATION: ICD-10-CM

## 2025-01-15 DIAGNOSIS — I10 ESSENTIAL HYPERTENSION: ICD-10-CM

## 2025-01-15 DIAGNOSIS — F43.21 GRIEF: ICD-10-CM

## 2025-01-15 DIAGNOSIS — Z00.00 ANNUAL PHYSICAL EXAM: Primary | ICD-10-CM

## 2025-01-15 DIAGNOSIS — Z72.0 TOBACCO USE: ICD-10-CM

## 2025-01-15 DIAGNOSIS — E11.9 CONTROLLED TYPE 2 DIABETES MELLITUS WITHOUT COMPLICATION, WITHOUT LONG-TERM CURRENT USE OF INSULIN: ICD-10-CM

## 2025-01-15 PROCEDURE — 99396 PREV VISIT EST AGE 40-64: CPT | Performed by: NURSE PRACTITIONER

## 2025-01-15 PROCEDURE — 99214 OFFICE O/P EST MOD 30 MIN: CPT | Performed by: NURSE PRACTITIONER

## 2025-01-15 PROCEDURE — 90472 IMMUNIZATION ADMIN EACH ADD: CPT | Performed by: NURSE PRACTITIONER

## 2025-01-15 PROCEDURE — 90677 PCV20 VACCINE IM: CPT | Performed by: NURSE PRACTITIONER

## 2025-01-15 PROCEDURE — 90656 IIV3 VACC NO PRSV 0.5 ML IM: CPT | Performed by: NURSE PRACTITIONER

## 2025-01-15 PROCEDURE — 90471 IMMUNIZATION ADMIN: CPT | Performed by: NURSE PRACTITIONER

## 2025-01-15 RX ORDER — BUSPIRONE HYDROCHLORIDE 5 MG/1
5 TABLET ORAL 3 TIMES DAILY
Qty: 90 TABLET | Refills: 3 | Status: SHIPPED | OUTPATIENT
Start: 2025-01-15

## 2025-01-15 RX ORDER — DULOXETIN HYDROCHLORIDE 60 MG/1
60 CAPSULE, DELAYED RELEASE ORAL DAILY
Qty: 90 CAPSULE | Refills: 0 | Status: SHIPPED | OUTPATIENT
Start: 2025-01-15 | End: 2025-04-15

## 2025-01-15 RX ORDER — DULOXETIN HYDROCHLORIDE 30 MG/1
30 CAPSULE, DELAYED RELEASE ORAL DAILY
Qty: 90 CAPSULE | Refills: 3 | Status: SHIPPED | OUTPATIENT
Start: 2025-01-15

## 2025-01-15 NOTE — PROGRESS NOTES
Subjective   Kristina Yates is a 64 y.o. female.   Patient here for annual physical exam, labs, chronic illness management and updated screening.      History of Present Illness     The following portions of the patient's history were reviewed and updated as appropriate: allergies, current medications, past family history, past medical history, past social history, past surgical history and problem list.       The patient is a 64-year-old female who presents for an annual exam.    She reports experiencing severe anxiety, which she attributes to 2 major surgeries and 3 deaths in her family over the past 2 years. She has been engaging in weekly therapy sessions since 04/2024 but notes that her therapist is unable to prescribe medication. She is currently on a regimen of duloxetine 90 mg, which she finds beneficial and is requesting a refill. She reports no thoughts of self-harm. She has previously tried amitriptyline and trazodone for her anxiety. She has a scheduled appointment with Dr. Zayas next week. She has previously tried buspirone and found it effective. She reports an increased appetite, which she perceives as positive, and rates her current pain level as 5 out of 10. She has a history of anemia post-pregnancy and during her college years.    She also reports constant neuropathy in her hands, characterized by tightness, coldness, and aching. She does not experience any swelling. She has been under the care of Dr. Ortiz, a neurosurgeon, for over a year. She underwent cauda equina surgery in 06/2023 and started experiencing neck pain in 03/2024. She had a cervical fusion 20 years ago. She has a scheduled appointment with Dr. Ortiz on 03/20/2025. She has a relationship established with [facility]. She went to them to see if they would do an injection in her neck that Dr. Ortiz had recommended, but insurance denied it. She has not established pain management as far as pills. She has seen a neurologist.  She has noticed changes in the structure of her hands due to neuropathy and has been using CBD roll-on gel for bone aches. She has never had gout. She has a knot in her hand that has been growing and has had x-rays taken of it. She has never broken her collarbone. She has limited her caffeine intake to 2 cups per day since her surgery. Her gabapentin dosage was increased from 400 mg to 900 mg daily in 2024, which she reports has been helpful.    She reports that her blood pressure is well-controlled, with readings typically around 125/84 in the morning. She has a scheduled appointment with Dr. Zayas next week. She is currently on a regimen of hydrochlorothiazide 12.5 mg daily, amlodipine 5 mg, losartan 100 mg, and metoprolol 50 mg twice daily.    She takes metformin nightly, which she reports makes her hungry. She maintains a diet of 3 meals per day.    She has a history of insomnia, which she reports has improved, but she still experiences severe episodes once or twice a week, which she attributes to her anxiety. She has not tried magnesium for her insomnia and reports that melatonin has not been effective for her.    She has a scheduled eye appointment on Friday and reports some blurred vision when her eyes are tired. She has a history of wearing reading glasses for years. She has a scheduled appointment with Dr. Zayas on 2025. She has a scheduled appointment with Dr. Ortiz on 2025. She has a history of cerclage and ablation in . She has a history of anemia post-pregnancy and during her college years. She has a history of stomach surgery in  and was advised to undergo endoscopy every other year but has not had one since before . She plans to schedule an endoscopy with Dr. Mcclellan. She regularly sees a dentist and reports no skin issues. She has a family history of lupus in her mother's sister, who  at the age of 18.    Supplemental Information  She has a history of heart murmur, which was  discovered when she was in the hospital with the fusion.    SOCIAL HISTORY  She has limited her caffeine intake to 2 cups per day since her surgery.    FAMILY HISTORY  Her mother's sister  from lupus at the age of 18.    MEDICATIONS  Current: duloxetine, gabapentin, amlodipine, losartan, metoprolol, metformin  Past: amitriptyline, trazodone, buspirone       Review of Systems   Constitutional:  Negative for activity change, fatigue, unexpected weight gain and unexpected weight loss.   HENT:  Negative for congestion and postnasal drip.         Dental exam is due    Eyes:  Negative for blurred vision and double vision.        Eye exam is due      Respiratory:  Negative for cough, chest tightness, shortness of breath and wheezing.    Cardiovascular:  Negative for chest pain, palpitations and leg swelling.   Gastrointestinal:  Negative for abdominal pain, constipation, diarrhea and GERD.   Endocrine: Negative for cold intolerance, heat intolerance, polydipsia, polyphagia and polyuria.   Genitourinary:  Negative for dysuria and frequency.   Musculoskeletal:  Positive for arthralgias and back pain.   Neurological:  Positive for numbness. Negative for dizziness and headache.   Hematological:  Does not bruise/bleed easily.   Psychiatric/Behavioral:  Positive for sleep disturbance and stress. Negative for depressed mood. The patient is nervous/anxious.          Current Outpatient Medications:     acetaminophen (TYLENOL) 325 MG tablet, 2 tablets., Disp: , Rfl:     albuterol sulfate  (90 Base) MCG/ACT inhaler, INHALE 1 TO 2 PUFFS INTO THE LUNGS FOUR TIMES DAILY AS DIRECTED, Disp: , Rfl:     amLODIPine (NORVASC) 5 MG tablet, Take 1 tablet by mouth Every Night., Disp: 90 tablet, Rfl: 3    atorvastatin (LIPITOR) 40 MG tablet, Take 1 tablet by mouth Every Night., Disp: , Rfl:     cyclobenzaprine (FLEXERIL) 10 MG tablet, , Disp: , Rfl:     DULoxetine (CYMBALTA) 30 MG capsule, Take 1 capsule by mouth Daily., Disp: 90  capsule, Rfl: 3    DULoxetine (CYMBALTA) 60 MG capsule, Take 1 capsule by mouth Daily for 90 days. Take with 30 mg capsule for a TDD of 90 mg, Disp: 90 capsule, Rfl: 0    fluticasone (FLONASE) 50 MCG/ACT nasal spray, 2 sprays into the nostril(s) as directed by provider Daily., Disp: 18.2 mL, Rfl: 5    gabapentin (NEURONTIN) 300 MG capsule, Take 1 capsule by mouth 3 (Three) Times a Day., Disp: 90 capsule, Rfl: 2    guaiFENesin (MUCINEX) 600 MG 12 hr tablet, Take 1 tablet by mouth 2 (Two) Times a Day., Disp: , Rfl:     hydrALAZINE (APRESOLINE) 25 MG tablet, Take 1 tablet by mouth 3 (Three) Times a Day., Disp: , Rfl:     hydroCHLOROthiazide 25 MG tablet, Take 0.5 tablets by mouth Daily., Disp: , Rfl:     losartan (COZAAR) 100 MG tablet, Take 1 tablet by mouth Daily., Disp: 90 tablet, Rfl: 1    metFORMIN ER (GLUCOPHAGE-XR) 500 MG 24 hr tablet, Take 1 tablet by mouth Daily With Breakfast., Disp: , Rfl:     methocarbamol (ROBAXIN) 500 MG tablet, TAKE 1 TABLET BY MOUTH FOUR TIMES DAILY FOR MUSCULOSKELETAL PAIN, Disp: , Rfl:     methylPREDNISolone (MEDROL) 4 MG dose pack, Take as directed on package instructions., Disp: 21 tablet, Rfl: 0    metoprolol succinate XL (TOPROL-XL) 50 MG 24 hr tablet, Take 1 tablet by mouth Daily. (Patient taking differently: Take 1 tablet by mouth 2 (Two) Times a Day.), Disp: 90 tablet, Rfl: 0    Multiple Vitamins-Minerals (MULTIVITAMIN ADULT) tablet, Take 1 tablet by mouth Daily., Disp: , Rfl:     pantoprazole (PROTONIX) 40 MG EC tablet, Take 1 tablet by mouth Every Morning., Disp: , Rfl:     Vitamin D, Ergocalciferol, 85115 units capsule, , Disp: , Rfl:     busPIRone (BUSPAR) 5 MG tablet, Take 1 tablet by mouth 3 (Three) Times a Day., Disp: 90 tablet, Rfl: 3    HYDROcodone-acetaminophen (NORCO) 5-325 MG per tablet, Take 1 tablet by mouth Every 6 (Six) Hours As Needed for Mild Pain. (Patient not taking: Reported on 1/15/2025), Disp: 20 tablet, Rfl: 0    Objective   Physical Exam  Vitals  reviewed.   Constitutional:       Appearance: Normal appearance. She is normal weight.   HENT:      Head: Normocephalic.      Right Ear: Tympanic membrane normal.      Left Ear: Tympanic membrane normal.      Nose: Nose normal.      Mouth/Throat:      Mouth: Mucous membranes are moist.   Eyes:      Pupils: Pupils are equal, round, and reactive to light.   Cardiovascular:      Rate and Rhythm: Normal rate and regular rhythm.      Pulses: Normal pulses.      Heart sounds: Normal heart sounds.   Pulmonary:      Effort: Pulmonary effort is normal.      Breath sounds: Normal breath sounds.   Abdominal:      General: Abdomen is flat. Bowel sounds are normal.      Palpations: Abdomen is soft.   Musculoskeletal:         General: Normal range of motion.        Arms:       Cervical back: Normal range of motion.      Comments: Neck, back pain   Skin:     General: Skin is warm.   Neurological:      General: No focal deficit present.      Mental Status: She is alert.   Psychiatric:         Mood and Affect: Mood normal. Mood is anxious.         Vitals:    01/15/25 1334   BP: 164/88   Pulse: 88   Resp: 18   SpO2: 95%     Body mass index is 27.12 kg/m².    Procedures    TSH   Date Value Ref Range Status   08/03/2023 0.418 0.270 - 4.200 uIU/mL Final     Hemoglobin A1C   Date Value Ref Range Status   08/03/2023 6.00 (H) 4.80 - 5.60 % Final     Comment:     Hemoglobin A1C Ranges:  Increased Risk for Diabetes  5.7% to 6.4%  Diabetes                     >= 6.5%  Diabetic Goal                < 7.0%              Over the past 2 weeks, how often have you been bothered by any of the following problems?  Little interest or pleasure in doing things: Not at all  Feeling down, depressed, or hopeless: Not at all      Assessment & Plan   Problems Addressed this Visit       Cervical radiculopathy    Relevant Orders    Vitamin B12    Folate    Controlled type 2 diabetes mellitus without complication, without long-term current use of insulin     Relevant Orders    Hemoglobin A1c    Microalbumin / Creatinine Urine Ratio - Urine, Clean Catch    Hyperlipidemia    Essential hypertension     Other Visit Diagnoses       Annual physical exam    -  Primary    Relevant Orders    Comprehensive Metabolic Panel    CBC & Differential    Lipid Panel With / Chol / HDL Ratio    Need for vaccination        Relevant Orders    Pneumococcal Conjugate Vaccine 20-Valent (PCV20) (Completed)    Fluzone >6mos (Completed)    Grief        Relevant Orders    Ambulatory Referral to Psychiatry    Anxiety        Relevant Orders    Ambulatory Referral to Psychiatry    TSH    T4, Free    Primary hypertension        Primary insomnia        Relevant Orders    Ambulatory Referral to Psychiatry    Breast cancer screening by mammogram        Relevant Orders    Mammo Screening Digital Tomosynthesis Bilateral With CAD    Tobacco use        Relevant Orders    CT Chest Low Dose Wo          Diagnoses         Codes Comments    Annual physical exam    -  Primary ICD-10-CM: Z00.00  ICD-9-CM: V70.0     Need for vaccination     ICD-10-CM: Z23  ICD-9-CM: V05.9     Essential hypertension     ICD-10-CM: I10  ICD-9-CM: 401.9     Mixed hyperlipidemia     ICD-10-CM: E78.2  ICD-9-CM: 272.2     Cervical radiculopathy     ICD-10-CM: M54.12  ICD-9-CM: 723.4     Grief     ICD-10-CM: F43.21  ICD-9-CM: 309.0     Anxiety     ICD-10-CM: F41.9  ICD-9-CM: 300.00     Primary hypertension     ICD-10-CM: I10  ICD-9-CM: 401.9     Primary insomnia     ICD-10-CM: F51.01  ICD-9-CM: 307.42     Breast cancer screening by mammogram     ICD-10-CM: Z12.31  ICD-9-CM: V76.12     Controlled type 2 diabetes mellitus without complication, without long-term current use of insulin     ICD-10-CM: E11.9  ICD-9-CM: 250.00     Tobacco use     ICD-10-CM: Z72.0  ICD-9-CM: 305.1           Orders Placed This Encounter   Procedures    Mammo Screening Digital Tomosynthesis Bilateral With CAD     Standing Status:   Future     Standing Expiration  Date:   1/15/2026     Order Specific Question:   Reason for Exam:     Answer:   breast cancer screen     Order Specific Question:   Release to patient     Answer:   Routine Release [[id number]]    CT Chest Low Dose Wo     Standing Status:   Future     Standing Expiration Date:   1/15/2026     Order Specific Question:   The patient is age 50-80 (Medicare coverage 50-77)     Answer:   64     Order Specific Question:   The patient is a current smoker?     Answer:   Yes     Order Specific Question:   Does the patient have a smoking history of 20 pack-years or greater? (If the answer to this is no they do not meet criteria for this exam)     Answer:   Yes     Order Specific Question:   Actual pack - year smoking history (number):     Answer:   20     Order Specific Question:   Does the patient have any clinical signs/symptoms of lung cancer?     Answer:   No     Order Specific Question:   The patient was engaged in shared decision-making for this test:     Answer:   Yes     Order Specific Question:   Reason for Exam:     Answer:   smoker     Order Specific Question:   Release to patient     Answer:   Routine Release [[id number]]    Pneumococcal Conjugate Vaccine 20-Valent (PCV20)    Fluzone >6mos    Comprehensive Metabolic Panel     Order Specific Question:   Release to patient     Answer:   Routine Release [[id number]]    Lipid Panel With / Chol / HDL Ratio     Order Specific Question:   Release to patient     Answer:   Routine Release [[id number]]    Hemoglobin A1c     Order Specific Question:   Release to patient     Answer:   Routine Release [[id number]]    Microalbumin / Creatinine Urine Ratio - Urine, Clean Catch     Order Specific Question:   Release to patient     Answer:   Routine Release [[id number]]    TSH     Order Specific Question:   Release to patient     Answer:   Routine Release [[id number]]    T4, Free     Order Specific Question:   Release to patient     Answer:   Routine Release [[id number]]     Vitamin B12     Order Specific Question:   Release to patient     Answer:   Routine Release [[id number]]    Folate     Order Specific Question:   Release to patient     Answer:   Routine Release [[id number]]    Ambulatory Referral to Psychiatry     Referral Priority:   Routine     Referral Type:   Behavorial Health/Psych     Referral Reason:   Specialty Services Required     Requested Specialty:   Psychiatry     Number of Visits Requested:   1    CBC & Differential     Order Specific Question:   Release to patient     Answer:   Routine Release [[id number]]       Assessment & Plan  1. Anxiety.  Her anxiety appears to be a significant contributing factor to her elevated blood pressure. She has been through a lot, including 2 major surgeries and 3 deaths in 2 years, which have exacerbated her anxiety. She will continue her current regimen of duloxetine 90 mg. A prescription for buspirone 5 mg, to be taken 3 times daily, has been provided. She is advised to monitor her response to this medication over the next 1 to 2 weeks and provide feedback. A referral to psychiatry has been made for further evaluation and potential adjustment of her medication regimen.    2. Chronic pain.  She reports chronic neuropathy in her hands, described as tight, cold, and aching. She is currently on gabapentin 900 mg daily, which has provided some relief. She is advised to continue with her current gabapentin regimen. She is also using CBD roll-on gel for pain management. She is encouraged to follow up with her pain management specialist for further evaluation and management.    3. Hypertension.  Her blood pressure is elevated today, likely due to anxiety. She is currently on hydrochlorothiazide 12.5 mg daily, amlodipine 5 mg, losartan 100 mg, and metoprolol 50 mg twice daily. She is advised to continue her current antihypertensive regimen. She will see her cardiologist, Dr. Zayas, on 01/28/2025, for further evaluation and management of her  blood pressure.    4. Insomnia.  She reports improved insomnia but still experiences episodes once or twice a week, likely related to anxiety. She is advised to try melatonin in combination with magnesium to help with sleep.    5. Health maintenance.  She is scheduled to receive her influenza vaccine today and will return for her shingles vaccine. A mammogram has been ordered, and a Pap smear will be conducted during this visit. She is advised to schedule an endoscopy with Dr. Mcclellan, as she has not had one since before 2020. A comprehensive blood work panel, including B12, thyroid, kidney, liver, lipid panel, A1c, and CBC, has been ordered to rule out any underlying conditions contributing to her symptoms.    PROCEDURE  The patient underwent cauda equina surgery in 06/2023 and had a cervical fusion 20 years ago.    Additional time on acute concern, referral, Rx  Preventative care- Follow heart healthy diet, drink water, walk daily. Wear seatbelts, wear helmets, wear sunscreens. Follow CDC guidelines for covid and flu .          Education provided in AVS   Return in about 6 months (around 7/15/2025) for Recheck.    Patient or patient representative verbalized consent for the use of Ambient Listening during the visit with  DAYNE Garcias for chart documentation. 1/15/2025  14:17 EST

## 2025-01-16 LAB
ALBUMIN SERPL-MCNC: 4.6 G/DL (ref 3.9–4.9)
ALBUMIN/CREAT UR: 26 MG/G CREAT (ref 0–29)
ALP SERPL-CCNC: 96 IU/L (ref 44–121)
ALT SERPL-CCNC: 9 IU/L (ref 0–32)
AST SERPL-CCNC: 14 IU/L (ref 0–40)
BASOPHILS # BLD AUTO: 0.1 X10E3/UL (ref 0–0.2)
BASOPHILS NFR BLD AUTO: 1 %
BILIRUB SERPL-MCNC: <0.2 MG/DL (ref 0–1.2)
BUN SERPL-MCNC: 12 MG/DL (ref 8–27)
BUN/CREAT SERPL: 19 (ref 12–28)
CALCIUM SERPL-MCNC: 9.5 MG/DL (ref 8.7–10.3)
CHLORIDE SERPL-SCNC: 103 MMOL/L (ref 96–106)
CHOLEST SERPL-MCNC: 206 MG/DL (ref 100–199)
CHOLEST/HDLC SERPL: 3.6 RATIO (ref 0–4.4)
CO2 SERPL-SCNC: 21 MMOL/L (ref 20–29)
CREAT SERPL-MCNC: 0.64 MG/DL (ref 0.57–1)
CREAT UR-MCNC: 36.4 MG/DL
EGFRCR SERPLBLD CKD-EPI 2021: 99 ML/MIN/1.73
EOSINOPHIL # BLD AUTO: 0.2 X10E3/UL (ref 0–0.4)
EOSINOPHIL NFR BLD AUTO: 2 %
ERYTHROCYTE [DISTWIDTH] IN BLOOD BY AUTOMATED COUNT: 12.5 % (ref 11.7–15.4)
FOLATE SERPL-MCNC: 6.5 NG/ML
GLOBULIN SER CALC-MCNC: 2.2 G/DL (ref 1.5–4.5)
GLUCOSE SERPL-MCNC: 81 MG/DL (ref 70–99)
HBA1C MFR BLD: 6.6 % (ref 4.8–5.6)
HCT VFR BLD AUTO: 39.3 % (ref 34–46.6)
HDLC SERPL-MCNC: 57 MG/DL
HGB BLD-MCNC: 12.8 G/DL (ref 11.1–15.9)
IMM GRANULOCYTES # BLD AUTO: 0 X10E3/UL (ref 0–0.1)
IMM GRANULOCYTES NFR BLD AUTO: 0 %
LDLC SERPL CALC-MCNC: 99 MG/DL (ref 0–99)
LYMPHOCYTES # BLD AUTO: 3.4 X10E3/UL (ref 0.7–3.1)
LYMPHOCYTES NFR BLD AUTO: 32 %
MCH RBC QN AUTO: 29.3 PG (ref 26.6–33)
MCHC RBC AUTO-ENTMCNC: 32.6 G/DL (ref 31.5–35.7)
MCV RBC AUTO: 90 FL (ref 79–97)
MICROALBUMIN UR-MCNC: 9.5 UG/ML
MONOCYTES # BLD AUTO: 0.9 X10E3/UL (ref 0.1–0.9)
MONOCYTES NFR BLD AUTO: 8 %
NEUTROPHILS # BLD AUTO: 6.1 X10E3/UL (ref 1.4–7)
NEUTROPHILS NFR BLD AUTO: 57 %
PLATELET # BLD AUTO: 330 X10E3/UL (ref 150–450)
POTASSIUM SERPL-SCNC: 4.8 MMOL/L (ref 3.5–5.2)
PROT SERPL-MCNC: 6.8 G/DL (ref 6–8.5)
RBC # BLD AUTO: 4.37 X10E6/UL (ref 3.77–5.28)
SODIUM SERPL-SCNC: 140 MMOL/L (ref 134–144)
T4 FREE SERPL-MCNC: 1.1 NG/DL (ref 0.82–1.77)
TRIGL SERPL-MCNC: 299 MG/DL (ref 0–149)
TSH SERPL DL<=0.005 MIU/L-ACNC: 0.48 UIU/ML (ref 0.45–4.5)
VIT B12 SERPL-MCNC: 719 PG/ML (ref 232–1245)
VLDLC SERPL CALC-MCNC: 50 MG/DL (ref 5–40)
WBC # BLD AUTO: 10.6 X10E3/UL (ref 3.4–10.8)

## 2025-01-24 ENCOUNTER — HOSPITAL ENCOUNTER (OUTPATIENT)
Facility: HOSPITAL | Age: 65
Setting detail: OBSERVATION
Discharge: HOME | End: 2025-01-25
Attending: EMERGENCY MEDICINE | Admitting: EMERGENCY MEDICINE
Payer: COMMERCIAL

## 2025-01-24 ENCOUNTER — TELEPHONE (OUTPATIENT)
Dept: NEUROSURGERY | Facility: CLINIC | Age: 65
End: 2025-01-24
Payer: COMMERCIAL

## 2025-01-24 ENCOUNTER — APPOINTMENT (OUTPATIENT)
Dept: CT IMAGING | Facility: HOSPITAL | Age: 65
End: 2025-01-24
Payer: COMMERCIAL

## 2025-01-24 DIAGNOSIS — M54.50 ACUTE MIDLINE LOW BACK PAIN WITHOUT SCIATICA: ICD-10-CM

## 2025-01-24 DIAGNOSIS — R32 INTERMITTENT URINARY INCONTINENCE: ICD-10-CM

## 2025-01-24 DIAGNOSIS — M54.2 ACUTE NECK PAIN: ICD-10-CM

## 2025-01-24 DIAGNOSIS — G89.29 ACUTE ON CHRONIC BACK PAIN: Primary | ICD-10-CM

## 2025-01-24 DIAGNOSIS — M54.9 ACUTE ON CHRONIC BACK PAIN: Primary | ICD-10-CM

## 2025-01-24 DIAGNOSIS — M54.2 CERVICALGIA: ICD-10-CM

## 2025-01-24 PROBLEM — N39.42 URINARY INCONTINENCE WITHOUT SENSORY AWARENESS: Status: ACTIVE | Noted: 2025-01-24

## 2025-01-24 LAB
ALBUMIN SERPL-MCNC: 4.5 G/DL (ref 3.5–5.2)
ALBUMIN/GLOB SERPL: 1.7 G/DL
ALP SERPL-CCNC: 83 U/L (ref 39–117)
ALT SERPL W P-5'-P-CCNC: 14 U/L (ref 1–33)
ANION GAP SERPL CALCULATED.3IONS-SCNC: 12 MMOL/L (ref 5–15)
APTT PPP: 50.9 SECONDS (ref 22.7–35.4)
AST SERPL-CCNC: 17 U/L (ref 1–32)
BACTERIA UR QL AUTO: ABNORMAL /HPF
BASOPHILS # BLD AUTO: 0.03 10*3/MM3 (ref 0–0.2)
BASOPHILS NFR BLD AUTO: 0.3 % (ref 0–1.5)
BILIRUB SERPL-MCNC: 0.2 MG/DL (ref 0–1.2)
BILIRUB UR QL STRIP: NEGATIVE
BUN SERPL-MCNC: 9 MG/DL (ref 8–23)
BUN/CREAT SERPL: 15 (ref 7–25)
CALCIUM SPEC-SCNC: 9.3 MG/DL (ref 8.6–10.5)
CHLORIDE SERPL-SCNC: 100 MMOL/L (ref 98–107)
CLARITY UR: CLEAR
CO2 SERPL-SCNC: 26 MMOL/L (ref 22–29)
COLOR UR: YELLOW
CREAT SERPL-MCNC: 0.6 MG/DL (ref 0.57–1)
DEPRECATED RDW RBC AUTO: 42.3 FL (ref 37–54)
EGFRCR SERPLBLD CKD-EPI 2021: 101 ML/MIN/1.73
EOSINOPHIL # BLD AUTO: 0.14 10*3/MM3 (ref 0–0.4)
EOSINOPHIL NFR BLD AUTO: 1.6 % (ref 0.3–6.2)
ERYTHROCYTE [DISTWIDTH] IN BLOOD BY AUTOMATED COUNT: 12.8 % (ref 12.3–15.4)
GLOBULIN UR ELPH-MCNC: 2.6 GM/DL
GLUCOSE BLDC GLUCOMTR-MCNC: 232 MG/DL (ref 70–130)
GLUCOSE SERPL-MCNC: 104 MG/DL (ref 65–99)
GLUCOSE UR STRIP-MCNC: NEGATIVE MG/DL
HCT VFR BLD AUTO: 39 % (ref 34–46.6)
HGB BLD-MCNC: 13 G/DL (ref 12–15.9)
HGB UR QL STRIP.AUTO: NEGATIVE
HYALINE CASTS UR QL AUTO: ABNORMAL /LPF
IMM GRANULOCYTES # BLD AUTO: 0.01 10*3/MM3 (ref 0–0.05)
IMM GRANULOCYTES NFR BLD AUTO: 0.1 % (ref 0–0.5)
INR PPP: 1.03 (ref 0.9–1.1)
KETONES UR QL STRIP: NEGATIVE
LEUKOCYTE ESTERASE UR QL STRIP.AUTO: ABNORMAL
LYMPHOCYTES # BLD AUTO: 2.9 10*3/MM3 (ref 0.7–3.1)
LYMPHOCYTES NFR BLD AUTO: 33.6 % (ref 19.6–45.3)
MAGNESIUM SERPL-MCNC: 2.4 MG/DL (ref 1.6–2.4)
MCH RBC QN AUTO: 30.2 PG (ref 26.6–33)
MCHC RBC AUTO-ENTMCNC: 33.3 G/DL (ref 31.5–35.7)
MCV RBC AUTO: 90.7 FL (ref 79–97)
MONOCYTES # BLD AUTO: 0.5 10*3/MM3 (ref 0.1–0.9)
MONOCYTES NFR BLD AUTO: 5.8 % (ref 5–12)
NEUTROPHILS NFR BLD AUTO: 5.06 10*3/MM3 (ref 1.7–7)
NEUTROPHILS NFR BLD AUTO: 58.6 % (ref 42.7–76)
NITRITE UR QL STRIP: NEGATIVE
NRBC BLD AUTO-RTO: 0 /100 WBC (ref 0–0.2)
PH UR STRIP.AUTO: 6.5 [PH] (ref 5–8)
PLATELET # BLD AUTO: 233 10*3/MM3 (ref 140–450)
PMV BLD AUTO: 10.1 FL (ref 6–12)
POTASSIUM SERPL-SCNC: 4.1 MMOL/L (ref 3.5–5.2)
PROT SERPL-MCNC: 7.1 G/DL (ref 6–8.5)
PROT UR QL STRIP: NEGATIVE
PROTHROMBIN TIME: 13.5 SECONDS (ref 11.7–14.2)
RBC # BLD AUTO: 4.3 10*6/MM3 (ref 3.77–5.28)
RBC # UR STRIP: ABNORMAL /HPF
REF LAB TEST METHOD: ABNORMAL
SODIUM SERPL-SCNC: 138 MMOL/L (ref 136–145)
SP GR UR STRIP: <=1.005 (ref 1–1.03)
SQUAMOUS #/AREA URNS HPF: ABNORMAL /HPF
UROBILINOGEN UR QL STRIP: ABNORMAL
WBC # UR STRIP: ABNORMAL /HPF
WBC NRBC COR # BLD AUTO: 8.64 10*3/MM3 (ref 3.4–10.8)

## 2025-01-24 PROCEDURE — 25010000002 HYDROMORPHONE PER 4 MG: Performed by: NURSE PRACTITIONER

## 2025-01-24 PROCEDURE — 85025 COMPLETE CBC W/AUTO DIFF WBC: CPT | Performed by: PHYSICIAN ASSISTANT

## 2025-01-24 PROCEDURE — 85730 THROMBOPLASTIN TIME PARTIAL: CPT | Performed by: PHYSICIAN ASSISTANT

## 2025-01-24 PROCEDURE — 96374 THER/PROPH/DIAG INJ IV PUSH: CPT

## 2025-01-24 PROCEDURE — G0378 HOSPITAL OBSERVATION PER HR: HCPCS

## 2025-01-24 PROCEDURE — 80053 COMPREHEN METABOLIC PANEL: CPT | Performed by: PHYSICIAN ASSISTANT

## 2025-01-24 PROCEDURE — 25010000002 HYDROMORPHONE 1 MG/ML SOLUTION: Performed by: EMERGENCY MEDICINE

## 2025-01-24 PROCEDURE — 96376 TX/PRO/DX INJ SAME DRUG ADON: CPT

## 2025-01-24 PROCEDURE — 81001 URINALYSIS AUTO W/SCOPE: CPT | Performed by: PHYSICIAN ASSISTANT

## 2025-01-24 PROCEDURE — 63710000001 PROMETHAZINE PER 25 MG: Performed by: EMERGENCY MEDICINE

## 2025-01-24 PROCEDURE — 99222 1ST HOSP IP/OBS MODERATE 55: CPT | Performed by: NURSE PRACTITIONER

## 2025-01-24 PROCEDURE — 82948 REAGENT STRIP/BLOOD GLUCOSE: CPT

## 2025-01-24 PROCEDURE — 36415 COLL VENOUS BLD VENIPUNCTURE: CPT

## 2025-01-24 PROCEDURE — 85610 PROTHROMBIN TIME: CPT | Performed by: PHYSICIAN ASSISTANT

## 2025-01-24 PROCEDURE — 72125 CT NECK SPINE W/O DYE: CPT

## 2025-01-24 PROCEDURE — 83735 ASSAY OF MAGNESIUM: CPT | Performed by: NURSE PRACTITIONER

## 2025-01-24 PROCEDURE — 96375 TX/PRO/DX INJ NEW DRUG ADDON: CPT

## 2025-01-24 PROCEDURE — 25010000002 DEXAMETHASONE PER 1 MG: Performed by: NURSE PRACTITIONER

## 2025-01-24 PROCEDURE — 63710000001 INSULIN LISPRO (HUMAN) PER 5 UNITS: Performed by: NURSE PRACTITIONER

## 2025-01-24 PROCEDURE — 25010000002 KETOROLAC TROMETHAMINE PER 15 MG: Performed by: NURSE PRACTITIONER

## 2025-01-24 PROCEDURE — 25010000002 DEXAMETHASONE PER 1 MG: Performed by: EMERGENCY MEDICINE

## 2025-01-24 PROCEDURE — 25010000002 MORPHINE PER 10 MG: Performed by: EMERGENCY MEDICINE

## 2025-01-24 PROCEDURE — 99285 EMERGENCY DEPT VISIT HI MDM: CPT

## 2025-01-24 RX ORDER — DEXTROSE MONOHYDRATE 25 G/50ML
25 INJECTION, SOLUTION INTRAVENOUS
Status: DISCONTINUED | OUTPATIENT
Start: 2025-01-24 | End: 2025-01-25 | Stop reason: HOSPADM

## 2025-01-24 RX ORDER — DULOXETIN HYDROCHLORIDE 60 MG/1
60 CAPSULE, DELAYED RELEASE ORAL DAILY
Status: DISCONTINUED | OUTPATIENT
Start: 2025-01-25 | End: 2025-01-25 | Stop reason: HOSPADM

## 2025-01-24 RX ORDER — CYCLOBENZAPRINE HCL 10 MG
10 TABLET ORAL EVERY 8 HOURS SCHEDULED
Status: DISCONTINUED | OUTPATIENT
Start: 2025-01-24 | End: 2025-01-25 | Stop reason: HOSPADM

## 2025-01-24 RX ORDER — DULOXETIN HYDROCHLORIDE 30 MG/1
30 CAPSULE, DELAYED RELEASE ORAL DAILY
Status: DISCONTINUED | OUTPATIENT
Start: 2025-01-25 | End: 2025-01-25 | Stop reason: HOSPADM

## 2025-01-24 RX ORDER — LOSARTAN POTASSIUM 100 MG/1
100 TABLET ORAL DAILY
Status: DISCONTINUED | OUTPATIENT
Start: 2025-01-25 | End: 2025-01-25 | Stop reason: HOSPADM

## 2025-01-24 RX ORDER — HYDRALAZINE HYDROCHLORIDE 50 MG/1
25 TABLET, FILM COATED ORAL 3 TIMES DAILY
Status: DISCONTINUED | OUTPATIENT
Start: 2025-01-24 | End: 2025-01-25 | Stop reason: HOSPADM

## 2025-01-24 RX ORDER — METOPROLOL SUCCINATE 50 MG/1
50 TABLET, EXTENDED RELEASE ORAL DAILY
Status: DISCONTINUED | OUTPATIENT
Start: 2025-01-25 | End: 2025-01-25 | Stop reason: HOSPADM

## 2025-01-24 RX ORDER — GUAIFENESIN 600 MG/1
600 TABLET, EXTENDED RELEASE ORAL 2 TIMES DAILY
Status: DISCONTINUED | OUTPATIENT
Start: 2025-01-24 | End: 2025-01-25 | Stop reason: HOSPADM

## 2025-01-24 RX ORDER — DEXAMETHASONE SODIUM PHOSPHATE 10 MG/ML
10 INJECTION INTRAMUSCULAR; INTRAVENOUS ONCE
Status: COMPLETED | OUTPATIENT
Start: 2025-01-24 | End: 2025-01-24

## 2025-01-24 RX ORDER — DEXAMETHASONE SODIUM PHOSPHATE 4 MG/ML
4 INJECTION, SOLUTION INTRA-ARTICULAR; INTRALESIONAL; INTRAMUSCULAR; INTRAVENOUS; SOFT TISSUE EVERY 6 HOURS
Status: DISCONTINUED | OUTPATIENT
Start: 2025-01-24 | End: 2025-01-25 | Stop reason: HOSPADM

## 2025-01-24 RX ORDER — INSULIN LISPRO 100 [IU]/ML
2-7 INJECTION, SOLUTION INTRAVENOUS; SUBCUTANEOUS
Status: DISCONTINUED | OUTPATIENT
Start: 2025-01-25 | End: 2025-01-24

## 2025-01-24 RX ORDER — BUSPIRONE HYDROCHLORIDE 10 MG/1
5 TABLET ORAL 3 TIMES DAILY
Status: DISCONTINUED | OUTPATIENT
Start: 2025-01-24 | End: 2025-01-25 | Stop reason: HOSPADM

## 2025-01-24 RX ORDER — SODIUM CHLORIDE 0.9 % (FLUSH) 0.9 %
10 SYRINGE (ML) INJECTION EVERY 12 HOURS SCHEDULED
Status: DISCONTINUED | OUTPATIENT
Start: 2025-01-24 | End: 2025-01-25 | Stop reason: HOSPADM

## 2025-01-24 RX ORDER — SODIUM CHLORIDE 0.9 % (FLUSH) 0.9 %
10 SYRINGE (ML) INJECTION AS NEEDED
Status: DISCONTINUED | OUTPATIENT
Start: 2025-01-24 | End: 2025-01-25 | Stop reason: HOSPADM

## 2025-01-24 RX ORDER — PROMETHAZINE HYDROCHLORIDE 25 MG/1
25 TABLET ORAL ONCE
Status: COMPLETED | OUTPATIENT
Start: 2025-01-24 | End: 2025-01-24

## 2025-01-24 RX ORDER — HYDROMORPHONE HYDROCHLORIDE 1 MG/ML
0.5 INJECTION, SOLUTION INTRAMUSCULAR; INTRAVENOUS; SUBCUTANEOUS
Status: DISCONTINUED | OUTPATIENT
Start: 2025-01-24 | End: 2025-01-25 | Stop reason: HOSPADM

## 2025-01-24 RX ORDER — NICOTINE POLACRILEX 4 MG
15 LOZENGE BUCCAL
Status: DISCONTINUED | OUTPATIENT
Start: 2025-01-24 | End: 2025-01-25 | Stop reason: HOSPADM

## 2025-01-24 RX ORDER — SODIUM CHLORIDE 9 MG/ML
40 INJECTION, SOLUTION INTRAVENOUS AS NEEDED
Status: DISCONTINUED | OUTPATIENT
Start: 2025-01-24 | End: 2025-01-25 | Stop reason: HOSPADM

## 2025-01-24 RX ORDER — IBUPROFEN 600 MG/1
1 TABLET ORAL
Status: DISCONTINUED | OUTPATIENT
Start: 2025-01-24 | End: 2025-01-25 | Stop reason: HOSPADM

## 2025-01-24 RX ORDER — HYDROCODONE BITARTRATE AND ACETAMINOPHEN 5; 325 MG/1; MG/1
1 TABLET ORAL EVERY 6 HOURS PRN
Status: DISCONTINUED | OUTPATIENT
Start: 2025-01-24 | End: 2025-01-25 | Stop reason: HOSPADM

## 2025-01-24 RX ORDER — KETOROLAC TROMETHAMINE 15 MG/ML
15 INJECTION, SOLUTION INTRAMUSCULAR; INTRAVENOUS ONCE
Status: COMPLETED | OUTPATIENT
Start: 2025-01-24 | End: 2025-01-24

## 2025-01-24 RX ORDER — LIDOCAINE 4 G/G
1 PATCH TOPICAL
Status: DISCONTINUED | OUTPATIENT
Start: 2025-01-24 | End: 2025-01-25 | Stop reason: HOSPADM

## 2025-01-24 RX ORDER — GABAPENTIN 300 MG/1
300 CAPSULE ORAL 3 TIMES DAILY
Status: DISCONTINUED | OUTPATIENT
Start: 2025-01-24 | End: 2025-01-25

## 2025-01-24 RX ORDER — DIAZEPAM 10 MG/2ML
5 INJECTION, SOLUTION INTRAMUSCULAR; INTRAVENOUS ONCE
Status: COMPLETED | OUTPATIENT
Start: 2025-01-24 | End: 2025-01-25

## 2025-01-24 RX ORDER — HYDROCHLOROTHIAZIDE 12.5 MG/1
12.5 TABLET ORAL DAILY
Status: DISCONTINUED | OUTPATIENT
Start: 2025-01-25 | End: 2025-01-25 | Stop reason: HOSPADM

## 2025-01-24 RX ORDER — HYDROMORPHONE HYDROCHLORIDE 1 MG/ML
0.5 INJECTION, SOLUTION INTRAMUSCULAR; INTRAVENOUS; SUBCUTANEOUS ONCE
Status: COMPLETED | OUTPATIENT
Start: 2025-01-24 | End: 2025-01-24

## 2025-01-24 RX ORDER — INSULIN LISPRO 100 [IU]/ML
2-7 INJECTION, SOLUTION INTRAVENOUS; SUBCUTANEOUS
Status: DISCONTINUED | OUTPATIENT
Start: 2025-01-25 | End: 2025-01-25 | Stop reason: HOSPADM

## 2025-01-24 RX ORDER — PANTOPRAZOLE SODIUM 40 MG/1
40 TABLET, DELAYED RELEASE ORAL
Status: DISCONTINUED | OUTPATIENT
Start: 2025-01-25 | End: 2025-01-25 | Stop reason: HOSPADM

## 2025-01-24 RX ORDER — MORPHINE SULFATE 2 MG/ML
2 INJECTION, SOLUTION INTRAMUSCULAR; INTRAVENOUS ONCE
Status: COMPLETED | OUTPATIENT
Start: 2025-01-24 | End: 2025-01-24

## 2025-01-24 RX ORDER — ACETAMINOPHEN 500 MG
1000 TABLET ORAL EVERY 8 HOURS PRN
Status: DISCONTINUED | OUTPATIENT
Start: 2025-01-24 | End: 2025-01-25 | Stop reason: HOSPADM

## 2025-01-24 RX ORDER — ATORVASTATIN CALCIUM 20 MG/1
40 TABLET, FILM COATED ORAL NIGHTLY
Status: DISCONTINUED | OUTPATIENT
Start: 2025-01-24 | End: 2025-01-25 | Stop reason: HOSPADM

## 2025-01-24 RX ORDER — AMLODIPINE BESYLATE 5 MG/1
5 TABLET ORAL NIGHTLY
Status: DISCONTINUED | OUTPATIENT
Start: 2025-01-24 | End: 2025-01-25 | Stop reason: HOSPADM

## 2025-01-24 RX ADMIN — DEXAMETHASONE SODIUM PHOSPHATE 10 MG: 10 INJECTION INTRAMUSCULAR; INTRAVENOUS at 13:10

## 2025-01-24 RX ADMIN — HYDRALAZINE HYDROCHLORIDE 25 MG: 50 TABLET ORAL at 20:24

## 2025-01-24 RX ADMIN — HYDROCODONE BITARTRATE AND ACETAMINOPHEN 1 TABLET: 5; 325 TABLET ORAL at 19:36

## 2025-01-24 RX ADMIN — BUSPIRONE HYDROCHLORIDE 5 MG: 10 TABLET ORAL at 20:24

## 2025-01-24 RX ADMIN — HYDROMORPHONE HYDROCHLORIDE 1 MG: 1 INJECTION, SOLUTION INTRAMUSCULAR; INTRAVENOUS; SUBCUTANEOUS at 13:10

## 2025-01-24 RX ADMIN — HYDROMORPHONE HYDROCHLORIDE 0.5 MG: 1 INJECTION, SOLUTION INTRAMUSCULAR; INTRAVENOUS; SUBCUTANEOUS at 23:19

## 2025-01-24 RX ADMIN — Medication 10 ML: at 20:25

## 2025-01-24 RX ADMIN — GABAPENTIN 300 MG: 300 CAPSULE ORAL at 20:24

## 2025-01-24 RX ADMIN — HYDROMORPHONE HYDROCHLORIDE 0.5 MG: 1 INJECTION, SOLUTION INTRAMUSCULAR; INTRAVENOUS; SUBCUTANEOUS at 20:24

## 2025-01-24 RX ADMIN — KETOROLAC TROMETHAMINE 15 MG: 15 INJECTION, SOLUTION INTRAMUSCULAR; INTRAVENOUS at 18:08

## 2025-01-24 RX ADMIN — HYDROMORPHONE HYDROCHLORIDE 0.5 MG: 1 INJECTION, SOLUTION INTRAMUSCULAR; INTRAVENOUS; SUBCUTANEOUS at 17:18

## 2025-01-24 RX ADMIN — Medication 10 ML: at 21:17

## 2025-01-24 RX ADMIN — MORPHINE SULFATE 2 MG: 2 INJECTION, SOLUTION INTRAMUSCULAR; INTRAVENOUS at 15:24

## 2025-01-24 RX ADMIN — INSULIN LISPRO 3 UNITS: 100 INJECTION, SOLUTION INTRAVENOUS; SUBCUTANEOUS at 23:18

## 2025-01-24 RX ADMIN — CYCLOBENZAPRINE HYDROCHLORIDE 10 MG: 10 TABLET, FILM COATED ORAL at 21:17

## 2025-01-24 RX ADMIN — PROMETHAZINE HYDROCHLORIDE 25 MG: 25 TABLET ORAL at 20:23

## 2025-01-24 RX ADMIN — ATORVASTATIN CALCIUM 40 MG: 20 TABLET, FILM COATED ORAL at 20:24

## 2025-01-24 RX ADMIN — GUAIFENESIN 600 MG: 600 TABLET, EXTENDED RELEASE ORAL at 20:24

## 2025-01-24 RX ADMIN — DEXAMETHASONE SODIUM PHOSPHATE 4 MG: 4 INJECTION, SOLUTION INTRA-ARTICULAR; INTRALESIONAL; INTRAMUSCULAR; INTRAVENOUS; SOFT TISSUE at 19:11

## 2025-01-24 RX ADMIN — AMLODIPINE BESYLATE 5 MG: 5 TABLET ORAL at 20:24

## 2025-01-24 NOTE — ED PROVIDER NOTES
EMERGENCY DEPARTMENT ENCOUNTER      Room Number:  S07/07  PCP: Nyla Mejia APRN  Independent Historians: Patient  Patient Care Team:  Nyla Mejia APRN as PCP - General (Family Medicine)  Brooklyn Miles PA as Physician Assistant (Obstetrics and Gynecology)  Pedro Luis Zayas MD as Cardiologist (Cardiology)       HPI:  Chief Complaint: Neck pain, Low back pain    A complete HPI/ROS/PMH/PSH/SH/FH are unobtainable due to: None    Chronic or social conditions impacting patient care (Social Determinants of Health): None      Context: Kristina Yates is a 64 y.o. female with a PMH significant for cervical radiculopathy, diabetic peripheral neuropathy, type 2 diabetes, spinal stenosis, chronic pain, Edmonds's esophagus who presents to the ED c/o acute neck pain and low back pain.  Her primary concern is for increased neck discomfort over the past several days despite the use of her home medications including gabapentin and Norco.  She has tingling paresthesias bilaterally in her hands from multiple cervical spine surgeries which is at baseline.  No new weakness or numbness to the upper extremities.  She also reports a slight increase in low back pain diffusely over the past few weeks and over the past 1 week has had 2 episodes of urinary incontinence when she describes not being able to feel herself urinating on herself.  No bowel incontinence.  No numbness or weakness to the legs, fever, chills.  She is followed by Dr. Ortiz with neurosurgery.      Upon review of prior external notes (non-ED) -and- Review of prior external test results outside of this encounter it appears the patient was evaluated in the office with family medicine for annual physical exam on January 15, 2025.  The patient had a normal CMP on 1/15/2025 and a normal TSH on that date.      PAST MEDICAL HISTORY  Active Ambulatory Problems     Diagnosis Date Noted    Cervical radiculopathy 01/26/2017    Controlled type 2 diabetes mellitus without  complication, without long-term current use of insulin 02/27/2018    Diabetic peripheral neuropathy 02/27/2018    Menopausal symptom 02/27/2018    Adult acne 02/27/2018    Hyperlipidemia 02/27/2018    Arthritis     Arthropathy of cervical facet joint     Asthma     Spinal stenosis     Chronic pain     COPD (chronic obstructive pulmonary disease)     GERD (gastroesophageal reflux disease)     Edmonds esophagus     Essential hypertension 04/06/2021    Low back pain 06/28/2023    Cauda equina syndrome with neurogenic bladder 06/28/2023    Lumbar back pain 06/29/2023    Tobacco abuse 06/29/2023    Anxiety and depression 06/01/2018    Chondromalacia of knee 11/15/2019    IBRAHIMA exposure in utero 08/03/2023    Impingement syndrome of right shoulder 05/04/2020    Nephrolithiasis 06/01/2018    Pes anserine bursitis 11/15/2019    SI (sacroiliac) joint inflammation 08/10/2023    Lumbar pseudoarthrosis 12/28/2023    Cervicalgia 07/30/2024    Acute neck pain 08/14/2024    Spinal cord injury, cervical, without spinal bone injury 09/16/2024     Resolved Ambulatory Problems     Diagnosis Date Noted    Alcohol abuse     Drug-induced constipation 07/05/2023     Past Medical History:   Diagnosis Date    Anxiety     Blurred vision     Cervical disc disease     Cervical neuritis     Depression     Diabetes mellitus     DJD (degenerative joint disease), cervical     Foot spasms     Hiatal hernia     Hypertension     Persistent headaches     Seasonal allergies     Spinal headache          PAST SURGICAL HISTORY  Past Surgical History:   Procedure Laterality Date    ANTERIOR CERVICAL DISCECTOMY W/ FUSION  11/2003    C5-6, C6-7    ANTERIOR CERVICAL DISCECTOMY W/ FUSION Right 9/16/2024    Procedure: CERVICAL FOUR TO SIX DISCECTOMY ANTERIOR WITH FUSION WITH CERVICAL FIVE CORPECTOMY AND REMOVAL OF PREVIOUS HARDWARE;  Surgeon: Alex Ortiz MD;  Location: [facility];  Service: Neurosurgery;  Laterality: Right;    CERCLAGE CERVIX      CERVICAL  EPIDURAL  2007    CERVICAL FUSION      DILATATION AND CURETTAGE      ENDOMETRIAL ABLATION  2007    ENDOSCOPY      MULTIPLE    ENDOSCOPY N/A 07/05/2016    Procedure: ESOPHAGOGASTRODUODENOSCOPY WITH COLD BIOPSIES;  Surgeon: Jose Mcclellan MD;  Location: [facility] ENDOSCOPY;  Service:     ESOPHAGEAL DILATATION  2009    HAND SURGERY Bilateral     CARTILAGE    LUMBAR FUSION      LUMBAR LAMINECTOMY WITH FUSION N/A 06/30/2023    Procedure: OPEN LUMBAR FOUR TO FIVE TRANSFORAMINAL LUMBAR INTERBODY FUSION;  Surgeon: Alex Ortiz MD;  Location: [facility] MAIN OR;  Service: Neurosurgery;  Laterality: N/A;    NISSEN FUNDOPLICATION LAPAROSCOPIC  03/2012    SACROILIAC JOINT FUSION Right 06/04/2024    Procedure: RIGHT PERCUTANEOUS ARTHRODESIS SACROILIAC JOINT, NEUROMONITORING;  Surgeon: Alex Ortiz MD;  Location: [facility] OR;  Service: Neurosurgery;  Laterality: Right;    TONSILLECTOMY           FAMILY HISTORY  Family History   Problem Relation Age of Onset    Cancer Mother         nonhodkinds lymphoma    Heart disease Father     Drug abuse Sister     Heart disease Brother     Lupus Maternal Aunt     Malig Hyperthermia Neg Hx          SOCIAL HISTORY  Social History     Socioeconomic History    Marital status:     Number of children: 2   Tobacco Use    Smoking status: Some Days     Current packs/day: 0.50     Average packs/day: 0.5 packs/day for 20.1 years (10.0 ttl pk-yrs)     Types: Cigarettes     Start date: 1/1/2005    Smokeless tobacco: Never   Vaping Use    Vaping status: Never Used   Substance and Sexual Activity    Alcohol use: No     Comment: sober since 2012, worsk 12 steps    Drug use: No    Sexual activity: Yes     Partners: Male         ALLERGIES  Fluoxetine, Sulfa antibiotics, Zofran [ondansetron hcl], and Lisinopril      REVIEW OF SYSTEMS  Included in HPI  All systems reviewed and negative except for those discussed in HPI.      PHYSICAL EXAM    I have reviewed the triage vital signs and nursing notes.    ED  Triage Vitals   Temp Heart Rate Resp BP SpO2   01/24/25 1200 01/24/25 1200 01/24/25 1200 01/24/25 1203 01/24/25 1200   97.8 °F (36.6 °C) 117 18 (!) 205/97 97 %      Temp src Heart Rate Source Patient Position BP Location FiO2 (%)   -- -- 01/24/25 1203 01/24/25 1203 --     Sitting Right arm        Physical Exam  Constitutional:       General: She is not in acute distress.     Appearance: She is well-developed.   HENT:      Head: Normocephalic and atraumatic.   Eyes:      General: No scleral icterus.     Conjunctiva/sclera: Conjunctivae normal.   Neck:      Trachea: No tracheal deviation.   Cardiovascular:      Rate and Rhythm: Normal rate and regular rhythm.   Pulmonary:      Effort: Pulmonary effort is normal.      Breath sounds: Normal breath sounds.   Abdominal:      Palpations: Abdomen is soft.      Tenderness: There is no abdominal tenderness.   Musculoskeletal:         General: No deformity.      Cervical back: Normal range of motion. Muscular tenderness present. No spinous process tenderness. Normal range of motion.      Lumbar back: Tenderness present. No bony tenderness. Normal range of motion. Negative right straight leg raise test and negative left straight leg raise test.   Lymphadenopathy:      Cervical: No cervical adenopathy.   Skin:     General: Skin is warm and dry.   Neurological:      Mental Status: She is alert and oriented to person, place, and time.      Sensory: Sensation is intact.      Motor: Motor function is intact.   Psychiatric:         Behavior: Behavior normal.         Vital signs and nursing notes reviewed.      PPE: I wore a surgical mask throughout my encounters with the pt. I performed hand hygiene on entry into the pt room and upon exit.     LAB RESULTS  Recent Results (from the past 24 hours)   Urinalysis With Microscopic If Indicated (No Culture) - Urine, Clean Catch    Collection Time: 01/24/25 12:51 PM    Specimen: Urine, Clean Catch   Result Value Ref Range    Color, UA Yellow  Yellow, Straw    Appearance, UA Clear Clear    pH, UA 6.5 5.0 - 8.0    Specific Gravity, UA <=1.005 1.005 - 1.030    Glucose, UA Negative Negative    Ketones, UA Negative Negative    Bilirubin, UA Negative Negative    Blood, UA Negative Negative    Protein, UA Negative Negative    Leuk Esterase, UA Small (1+) (A) Negative    Nitrite, UA Negative Negative    Urobilinogen, UA 0.2 E.U./dL 0.2 - 1.0 E.U./dL   Urinalysis, Microscopic Only - Urine, Clean Catch    Collection Time: 01/24/25 12:51 PM    Specimen: Urine, Clean Catch   Result Value Ref Range    RBC, UA 0-2 None Seen, 0-2 /HPF    WBC, UA 3-5 (A) None Seen, 0-2 /HPF    Bacteria, UA None Seen None Seen /HPF    Squamous Epithelial Cells, UA 0-2 None Seen, 0-2 /HPF    Hyaline Casts, UA None Seen None Seen /LPF    Methodology Automated Microscopy    Comprehensive Metabolic Panel    Collection Time: 01/24/25  1:04 PM    Specimen: Blood   Result Value Ref Range    Glucose 104 (H) 65 - 99 mg/dL    BUN 9 8 - 23 mg/dL    Creatinine 0.60 0.57 - 1.00 mg/dL    Sodium 138 136 - 145 mmol/L    Potassium 4.1 3.5 - 5.2 mmol/L    Chloride 100 98 - 107 mmol/L    CO2 26.0 22.0 - 29.0 mmol/L    Calcium 9.3 8.6 - 10.5 mg/dL    Total Protein 7.1 6.0 - 8.5 g/dL    Albumin 4.5 3.5 - 5.2 g/dL    ALT (SGPT) 14 1 - 33 U/L    AST (SGOT) 17 1 - 32 U/L    Alkaline Phosphatase 83 39 - 117 U/L    Total Bilirubin 0.2 0.0 - 1.2 mg/dL    Globulin 2.6 gm/dL    A/G Ratio 1.7 g/dL    BUN/Creatinine Ratio 15.0 7.0 - 25.0    Anion Gap 12.0 5.0 - 15.0 mmol/L    eGFR 101.0 >60.0 mL/min/1.73   Protime-INR    Collection Time: 01/24/25  1:04 PM    Specimen: Blood   Result Value Ref Range    Protime 13.5 11.7 - 14.2 Seconds    INR 1.03 0.90 - 1.10   aPTT    Collection Time: 01/24/25  1:04 PM    Specimen: Blood   Result Value Ref Range    PTT 50.9 (H) 22.7 - 35.4 seconds   CBC Auto Differential    Collection Time: 01/24/25  1:04 PM    Specimen: Blood   Result Value Ref Range    WBC 8.64 3.40 - 10.80 10*3/mm3     RBC 4.30 3.77 - 5.28 10*6/mm3    Hemoglobin 13.0 12.0 - 15.9 g/dL    Hematocrit 39.0 34.0 - 46.6 %    MCV 90.7 79.0 - 97.0 fL    MCH 30.2 26.6 - 33.0 pg    MCHC 33.3 31.5 - 35.7 g/dL    RDW 12.8 12.3 - 15.4 %    RDW-SD 42.3 37.0 - 54.0 fl    MPV 10.1 6.0 - 12.0 fL    Platelets 233 140 - 450 10*3/mm3    Neutrophil % 58.6 42.7 - 76.0 %    Lymphocyte % 33.6 19.6 - 45.3 %    Monocyte % 5.8 5.0 - 12.0 %    Eosinophil % 1.6 0.3 - 6.2 %    Basophil % 0.3 0.0 - 1.5 %    Immature Grans % 0.1 0.0 - 0.5 %    Neutrophils, Absolute 5.06 1.70 - 7.00 10*3/mm3    Lymphocytes, Absolute 2.90 0.70 - 3.10 10*3/mm3    Monocytes, Absolute 0.50 0.10 - 0.90 10*3/mm3    Eosinophils, Absolute 0.14 0.00 - 0.40 10*3/mm3    Basophils, Absolute 0.03 0.00 - 0.20 10*3/mm3    Immature Grans, Absolute 0.01 0.00 - 0.05 10*3/mm3    nRBC 0.0 0.0 - 0.2 /100 WBC         RADIOLOGY  No Radiology Exams Resulted Within Past 24 Hours      MEDICATIONS GIVEN IN ER  Medications   sodium chloride 0.9 % flush 10 mL (has no administration in time range)   HYDROmorphone (DILAUDID) injection 1 mg (1 mg Intravenous Given 1/24/25 1310)   dexAMETHasone (DECADRON) injection 10 mg (10 mg Intravenous Given 1/24/25 1310)         ORDERS PLACED DURING THIS VISIT:  Orders Placed This Encounter   Procedures    MRI Cervical Spine Without Contrast    MRI Lumbar Spine Without Contrast    Comprehensive Metabolic Panel    Urinalysis With Microscopic If Indicated (No Culture) - Urine, Clean Catch    Protime-INR    aPTT    CBC Auto Differential    Urinalysis, Microscopic Only - Urine, Clean Catch    Neurosurgery (on-call MD unless specified)    Insert Peripheral IV    Initiate ED Observation Status    CBC & Differential         OUTPATIENT MEDICATION MANAGEMENT:  Current Facility-Administered Medications Ordered in Epic   Medication Dose Route Frequency Provider Last Rate Last Admin    sodium chloride 0.9 % flush 10 mL  10 mL Intravenous PRN Niko Khanna PA         Current  Outpatient Medications Ordered in Epic   Medication Sig Dispense Refill    acetaminophen (TYLENOL) 325 MG tablet 2 tablets.      albuterol sulfate  (90 Base) MCG/ACT inhaler INHALE 1 TO 2 PUFFS INTO THE LUNGS FOUR TIMES DAILY AS DIRECTED      amLODIPine (NORVASC) 5 MG tablet Take 1 tablet by mouth Every Night. 90 tablet 3    atorvastatin (LIPITOR) 40 MG tablet Take 1 tablet by mouth Every Night.      busPIRone (BUSPAR) 5 MG tablet Take 1 tablet by mouth 3 (Three) Times a Day. 90 tablet 3    cyclobenzaprine (FLEXERIL) 10 MG tablet       DULoxetine (CYMBALTA) 30 MG capsule Take 1 capsule by mouth Daily. 90 capsule 3    DULoxetine (CYMBALTA) 60 MG capsule Take 1 capsule by mouth Daily for 90 days. Take with 30 mg capsule for a TDD of 90 mg 90 capsule 0    fluticasone (FLONASE) 50 MCG/ACT nasal spray 2 sprays into the nostril(s) as directed by provider Daily. 18.2 mL 5    gabapentin (NEURONTIN) 300 MG capsule Take 1 capsule by mouth 3 (Three) Times a Day. 90 capsule 2    guaiFENesin (MUCINEX) 600 MG 12 hr tablet Take 1 tablet by mouth 2 (Two) Times a Day.      hydrALAZINE (APRESOLINE) 25 MG tablet Take 1 tablet by mouth 3 (Three) Times a Day.      hydroCHLOROthiazide 25 MG tablet Take 0.5 tablets by mouth Daily.      HYDROcodone-acetaminophen (NORCO) 5-325 MG per tablet Take 1 tablet by mouth Every 6 (Six) Hours As Needed for Mild Pain. (Patient not taking: Reported on 1/15/2025) 20 tablet 0    losartan (COZAAR) 100 MG tablet Take 1 tablet by mouth Daily. 90 tablet 1    metFORMIN ER (GLUCOPHAGE-XR) 500 MG 24 hr tablet Take 1 tablet by mouth Daily With Breakfast.      methocarbamol (ROBAXIN) 500 MG tablet TAKE 1 TABLET BY MOUTH FOUR TIMES DAILY FOR MUSCULOSKELETAL PAIN      methylPREDNISolone (MEDROL) 4 MG dose pack Take as directed on package instructions. 21 tablet 0    metoprolol succinate XL (TOPROL-XL) 50 MG 24 hr tablet Take 1 tablet by mouth Daily. (Patient taking differently: Take 1 tablet by mouth 2 (Two)  Times a Day.) 90 tablet 0    Multiple Vitamins-Minerals (MULTIVITAMIN ADULT) tablet Take 1 tablet by mouth Daily.      pantoprazole (PROTONIX) 40 MG EC tablet Take 1 tablet by mouth Every Morning.      Vitamin D, Ergocalciferol, 39555 units capsule                 PROGRESS, DATA ANALYSIS, CONSULTS, AND MEDICAL DECISION MAKING  All labs have been independently interpreted by me.  All radiology studies have been reviewed by me. All EKG's have been independently viewed and interpreted by me.  Discussion below represents my analysis of pertinent findings related to patient's condition, differential diagnosis, treatment plan and final disposition.      DIFFERENTIAL DIAGNOSIS INCLUDE BUT NOT LIMITED TO:     Differential diagnosis includes but is not limited to:    - Lumbar strain  - Lumbar radiculopathy  - Lumbar disc bulge/herniation  - Lumbar spinal stenosis  - Vertebral fracture  - Cauda equina syndrome  - Vertebral osteomyelitis  - Kidney stone  - Shingles      Clinical Scores: N/A      ED Course as of 01/24/25 1436   Fri Jan 24, 2025   1429 I discussed the case with DAYNE Galloway with NS at this time regarding the patient.  I discussed work-up, results, concerns.  I discussed the consulting provider's desire for obs admit.    [DC]   1431 I discussed the case with DAYNE Jarquin with [facility] at this time regarding the patient.  I discussed work-up, results, concerns.  I discussed the consulting provider's desire for obs admit.    [DC]   1432 Patient presentation and evaluation consistent with acute on chronic neck pain requiring admission for further pain control and evaluation with neurosurgery with more Corey imaging.  She also has had some intermittent urinary incontinence issues this week with new/worsening low back pain with history of cauda equina syndrome.  No worrisome findings on exam today to indicate the need for emergent cauda equina.  No urinary retention or incontinence today and no leg weakness or numbness or  saddle paresthesias.  Plan for observation admission for further management. [DC]      ED Course User Index  [DC] Niko Khanna PA           1193 I rechecked the patient.  I discussed the patient's labs, radiology findings (including all incidental findings), diagnosis, and plan for admission. The patient understands and agrees with the plan.      AS OF 14:36 EST VITALS:    BP - 175/88  HR - 102  TEMP - 97.8 °F (36.6 °C)  O2 SATS - 97%    COMPLEXITY OF CARE  The patient requires admission.      DIAGNOSIS  Final diagnoses:   Acute on chronic back pain   Intermittent urinary incontinence   Acute neck pain         DISPOSITION  ED Disposition       ED Disposition   Decision to Admit    Condition   --    Comment   --                Please note that portions of this document were completed with a voice recognition program.    Note Disclaimer: At [facility], we believe that sharing information builds trust and better relationships. You are receiving this note because you recently visited [facility]. It is possible you will see health information before a provider has talked with you about it. This kind of information can be easy to misunderstand. To help you fully understand what it means for your health, we urge you to discuss this note with your provider.         Niko Khanna PA  01/26/25 9171

## 2025-01-24 NOTE — TELEPHONE ENCOUNTER
Patient call with increased pain and advised she is miserable.     Best call back number is [phone]      When did this start? Patient could not really say but states today she is in severe pain    Any injury? (Details to what happened) No     Any arm pain? (Left/Right/Bilateral) Upper right arm and some hand numbness     Numbness, tingling or weakness present?  Tingling sensation in hands    Any imbalance issues? Yes   Any incontinence issues? No     Have you had any recent imaging? XR Spine cervical on 12-    Any recent surgery? (If so, when and who's the surgeon) 9- CERVICAL FOUR TO SIX DISCECTOMY ANTERIOR WITH FUSION WITH CERVICAL FIVE CORPECTOMY AND REMOVAL OF PREVIOUS HARDWARE    Are you taking any medications for this issue? Taking Gabapentin at this time

## 2025-01-24 NOTE — PLAN OF CARE
Goal Outcome Evaluation: pt admitted for acute/chronic neck and back pain. Pt has had 2 cervical fusions in the past and states the pain is similar to pre surgical pain. Pt has neuropathy in her extremities at baseline and is independent with her cane at baseline. MRI ordered, MRI paperwork faxed and in chart; pre medication ordered. See mar for pain interventions. Neurosurgery following. Pt is A/O x4, is agreeable to the plan of care and verbalizes understanding.       Problem: Adult Inpatient Plan of Care  Goal: Plan of Care Review  1/24/2025 1838 by Karina Shankar RN  Outcome: Progressing  1/24/2025 1838 by Karina Shankar RN  Outcome: Progressing  Goal: Patient-Specific Goal (Individualized)  1/24/2025 1838 by Karina Shankar RN  Outcome: Progressing  1/24/2025 1838 by Karina Shankar RN  Outcome: Progressing  Goal: Absence of Hospital-Acquired Illness or Injury  1/24/2025 1838 by Karina Shankar RN  Outcome: Progressing  1/24/2025 1838 by Karina Shankar RN  Outcome: Progressing  Intervention: Identify and Manage Fall Risk  Recent Flowsheet Documentation  Taken 1/24/2025 1808 by Karina Shankar RN  Safety Promotion/Fall Prevention:   room organization consistent   safety round/check completed  Taken 1/24/2025 1718 by Karina Shankar RN  Safety Promotion/Fall Prevention:   room organization consistent   safety round/check completed  Taken 1/24/2025 1631 by Karina Shankar RN  Safety Promotion/Fall Prevention:   room organization consistent   safety round/check completed  Intervention: Prevent Skin Injury  Recent Flowsheet Documentation  Taken 1/24/2025 1808 by Karina Shankar RN  Body Position: position changed independently  Taken 1/24/2025 1718 by Karina Shankar RN  Body Position: position changed independently  Taken 1/24/2025 1631 by Karina Shankar RN  Body Position: position changed independently  Intervention: Prevent and Manage VTE (Venous Thromboembolism) Risk  Recent Flowsheet  Documentation  Taken 1/24/2025 1631 by Karina Shankar RN  VTE Prevention/Management: SCDs (sequential compression devices) off  Intervention: Prevent Infection  Recent Flowsheet Documentation  Taken 1/24/2025 1808 by Karina Shankar RN  Infection Prevention: single patient room provided  Taken 1/24/2025 1718 by Karina Shankar RN  Infection Prevention: single patient room provided  Taken 1/24/2025 1631 by Karina Shankar RN  Infection Prevention: single patient room provided  Goal: Optimal Comfort and Wellbeing  1/24/2025 1838 by Karina Shankar RN  Outcome: Progressing  1/24/2025 1838 by Karina Shankar RN  Outcome: Progressing  Intervention: Provide Person-Centered Care  Recent Flowsheet Documentation  Taken 1/24/2025 1631 by Karina Shankar RN  Trust Relationship/Rapport:   care explained   choices provided  Goal: Readiness for Transition of Care  1/24/2025 1838 by Karina Shankar RN  Outcome: Progressing  1/24/2025 1838 by Karina Shankar RN  Outcome: Progressing

## 2025-01-24 NOTE — TELEPHONE ENCOUNTER
I reached out to Kristina to let her know that we did receive her messages and I saw was in the ER.  I explained to her that the ER should contact us if they think it is related to neurosurgery.  She stated they already thought it was and she is getting MRIs.  I let her know once those are done then they will probably call us. She stated she understood.

## 2025-01-24 NOTE — ED NOTES
"Nursing report ED to floor  Kristina Yates  64 y.o.  female    HPI :  HPI  Stated Reason for Visit: Pt she is having increased pain to the neck, balance trouble, incontinence, pt had surgery 9/16/24.  History Obtained From: patient    Chief Complaint  Chief Complaint   Patient presents with    Neck Pain       Admitting doctor:   Irlanda Boswell MD    Admitting diagnosis:   The primary encounter diagnosis was Acute on chronic back pain. Diagnoses of Intermittent urinary incontinence and Acute neck pain were also pertinent to this visit.    Code status:   Current Code Status       Date Active Code Status Order ID Comments User Context       Prior            Allergies:   Fluoxetine, Sulfa antibiotics, Zofran [ondansetron hcl], and Lisinopril    Isolation:   No active isolations    Intake and Output  No intake or output data in the 24 hours ending 01/24/25 1447    Weight:       01/24/25  1203   Weight: 77.1 kg (170 lb)       Most recent vitals:   Vitals:    01/24/25 1200 01/24/25 1203 01/24/25 1225   BP:  (!) 205/97 175/88   BP Location:  Right arm Right arm   Patient Position:  Sitting Sitting   Pulse: 117  102   Resp: 18     Temp: 97.8 °F (36.6 °C)     SpO2: 97%  97%   Weight:  77.1 kg (170 lb)    Height:  168.9 cm (66.5\")        Active LDAs/IV Access:   Lines, Drains & Airways       Active LDAs       Name Placement date Placement time Site Days    Peripheral IV 01/24/25 1309 Posterior;Left Hand 01/24/25  1309  Hand  less than 1    External Urinary Catheter 09/20/24 2000  --  125                    Labs (abnormal labs have a star):   Labs Reviewed   COMPREHENSIVE METABOLIC PANEL - Abnormal; Notable for the following components:       Result Value    Glucose 104 (*)     All other components within normal limits    Narrative:     GFR Categories in Chronic Kidney Disease (CKD)      GFR Category          GFR (mL/min/1.73)    Interpretation  G1                     90 or greater         Normal or high (1)  G2            "           60-89                Mild decrease (1)  G3a                   45-59                Mild to moderate decrease  G3b                   30-44                Moderate to severe decrease  G4                    15-29                Severe decrease  G5                    14 or less           Kidney failure          (1)In the absence of evidence of kidney disease, neither GFR category G1 or G2 fulfill the criteria for CKD.    eGFR calculation 2021 CKD-EPI creatinine equation, which does not include race as a factor   URINALYSIS W/ MICROSCOPIC IF INDICATED (NO CULTURE) - Abnormal; Notable for the following components:    Leuk Esterase, UA Small (1+) (*)     All other components within normal limits   APTT - Abnormal; Notable for the following components:    PTT 50.9 (*)     All other components within normal limits   URINALYSIS, MICROSCOPIC ONLY - Abnormal; Notable for the following components:    WBC, UA 3-5 (*)     All other components within normal limits   PROTIME-INR - Normal   CBC WITH AUTO DIFFERENTIAL - Normal   CBC AND DIFFERENTIAL    Narrative:     The following orders were created for panel order CBC & Differential.  Procedure                               Abnormality         Status                     ---------                               -----------         ------                     CBC Auto Differential[[id number]]        Normal              Final result                 Please view results for these tests on the individual orders.       EKG:   No orders to display       Meds given in ED:   Medications   sodium chloride 0.9 % flush 10 mL (has no administration in time range)   HYDROmorphone (DILAUDID) injection 1 mg (1 mg Intravenous Given 1/24/25 1310)   dexAMETHasone (DECADRON) injection 10 mg (10 mg Intravenous Given 1/24/25 1310)       Imaging results:  No radiology results for the last day    Ambulatory status:   - ad margaret    Social issues:   Social History     Socioeconomic History    Marital  status:     Number of children: 2   Tobacco Use    Smoking status: Some Days     Current packs/day: 0.50     Average packs/day: 0.5 packs/day for 20.1 years (10.0 ttl pk-yrs)     Types: Cigarettes     Start date: 1/1/2005    Smokeless tobacco: Never   Vaping Use    Vaping status: Never Used   Substance and Sexual Activity    Alcohol use: No     Comment: sober since 2012, worsk 12 steps    Drug use: No    Sexual activity: Yes     Partners: Male       Peripheral Neurovascular  Peripheral Neurovascular (Adult)  Peripheral Neurovascular WDL: .WDL except, neurovascular assessment upper  LUE Neurovascular Assessment  Temperature LUE: warm  Color LUE: no discoloration  Sensation LUE: tingling present  RUE Neurovascular Assessment  Temperature RUE: warm  Color RUE: no discoloration  Sensation RUE: tingling present    Neuro Cognitive  Neuro Cognitive (Adult)  Cognitive/Neuro/Behavioral WDL: WDL, orientation, level of consciousness, speech  Level of Consciousness: Alert  Orientation: oriented x 4  Speech: clear, spontaneous, logical    Learning  Learning Assessment  Learning Readiness and Ability: no barriers identified  Education Provided  Person Taught: patient  Teaching Method: verbal instruction  Teaching Focus: symptom/problem overview  Education Outcome Evaluation: verbalizes understanding    Respiratory  Respiratory WDL  Respiratory WDL: WDL    Abdominal Pain       Pain Assessments  Pain (Adult)  (0-10) Pain Rating: Rest: 7  (0-10) Pain Rating: Activity: 9  Pain Location: neck    NIH Stroke Scale       Marsha Balderrama RN  01/24/25 14:47 EST

## 2025-01-24 NOTE — H&P
[facility]   HISTORY AND PHYSICAL    Patient Name: Kristina Yates  : 1960  MRN: [id number]  Primary Care Physician:  Nyla Mejia APRN  Date of admission: 2025    Subjective   Subjective     Chief Complaint:   Chief Complaint   Patient presents with    Neck Pain         HPI:    Kristina Yates is a 64 y.o. female hypertension, hyperlipidemia, type 2 diabetes, diabetic neuropathy, cervical radiculopathy, depression who presents to the emergency department with acute on chronic neck pain and low back pain.  She states that she was just discharged from physical therapy and denies any injury, trauma, falls.  She states that she has chronic paresthesias in her bilateral upper extremity secondary to complication from a cervical fusion that is at its baseline.  She states that she has taken her home pain medications and they are not helping.  Additionally, she states that she has had low back pain this week.  She states that it is mostly on the left side and does radiate down her left leg.  She states that she does have paresthesias chronically and does not have any new weakness to any of her extremities.  She does note that she has had 2 episodes of urinary incontinence this week.  She denies any dysuria or hematuria.  She has no saddle anesthesia.  She has had no urinary incontinence today.  She is followed with Dr. Ortiz with neurosurgery.  She is not on any anticoagulation.    Review of Systems   All systems were reviewed and negative except for: neck and back pain with chronic paresthesias.    Personal History     Past Medical History:   Diagnosis Date    Alcohol abuse     in recovery, sober since     Anxiety     Arthritis     Asthma     Edmonds esophagus     Blurred vision     Cervical disc disease     Cervical neuritis     Chronic pain     NECK AND BACK    COPD (chronic obstructive pulmonary disease)     STATES NEVER DIAGNOSED WITH    Depression     Diabetes mellitus     Type 2    DJD  (degenerative joint disease), cervical     Foot spasms     FROM BACK SURGERY    GERD (gastroesophageal reflux disease)     Hiatal hernia     Hyperlipidemia     Hypertension     Persistent headaches     ?BLOOD PRESSURE OR NECK RELATED???    Seasonal allergies     Spinal headache     Spinal stenosis        Past Surgical History:   Procedure Laterality Date    ANTERIOR CERVICAL DISCECTOMY W/ FUSION  11/2003    C5-6, C6-7    ANTERIOR CERVICAL DISCECTOMY W/ FUSION Right 9/16/2024    Procedure: CERVICAL FOUR TO SIX DISCECTOMY ANTERIOR WITH FUSION WITH CERVICAL FIVE CORPECTOMY AND REMOVAL OF PREVIOUS HARDWARE;  Surgeon: Alex Ortiz MD;  Location: [facility];  Service: Neurosurgery;  Laterality: Right;    CERCLAGE CERVIX      CERVICAL EPIDURAL  2007    CERVICAL FUSION      DILATATION AND CURETTAGE      ENDOMETRIAL ABLATION  2007    ENDOSCOPY      MULTIPLE    ENDOSCOPY N/A 07/05/2016    Procedure: ESOPHAGOGASTRODUODENOSCOPY WITH COLD BIOPSIES;  Surgeon: Jose Mcclellan MD;  Location: [facility] ENDOSCOPY;  Service:     ESOPHAGEAL DILATATION  2009    HAND SURGERY Bilateral     CARTILAGE    LUMBAR FUSION      LUMBAR LAMINECTOMY WITH FUSION N/A 06/30/2023    Procedure: OPEN LUMBAR FOUR TO FIVE TRANSFORAMINAL LUMBAR INTERBODY FUSION;  Surgeon: Alex Ortiz MD;  Location: [facility];  Service: Neurosurgery;  Laterality: N/A;    NISSEN FUNDOPLICATION LAPAROSCOPIC  03/2012    SACROILIAC JOINT FUSION Right 06/04/2024    Procedure: RIGHT PERCUTANEOUS ARTHRODESIS SACROILIAC JOINT, NEUROMONITORING;  Surgeon: Alex Ortiz MD;  Location: [facility];  Service: Neurosurgery;  Laterality: Right;    TONSILLECTOMY         Family History: family history includes Cancer in her mother; Drug abuse in her sister; Heart disease in her brother and father; Lupus in her maternal aunt. Otherwise pertinent FHx was reviewed and not pertinent to current issue.    Social History:  reports that she has quit smoking. Her smoking use included  cigarettes. She started smoking about 20 years ago. She has a 10 pack-year smoking history. She has never used smokeless tobacco. She reports that she does not drink alcohol and does not use drugs.    Home Medications:  DULoxetine, HYDROcodone-acetaminophen, Vitamin D (Ergocalciferol), acetaminophen, albuterol sulfate HFA, amLODIPine, atorvastatin, busPIRone, cyclobenzaprine, fluticasone, gabapentin, guaiFENesin, hydrALAZINE, hydroCHLOROthiazide, losartan, metFORMIN ER, methocarbamol, methylPREDNISolone, metoprolol succinate XL, multivitamin with minerals, and pantoprazole    Allergies:  Allergies   Allergen Reactions    Fluoxetine Hives     Hives    Sulfa Antibiotics Hives     THROAT CLOSES    Zofran [Ondansetron Hcl] Hives     THROAT CLOSES    Lisinopril Cough       Objective   Objective     Vitals:   Temp:  [97.8 °F (36.6 °C)-98.5 °F (36.9 °C)] 98.5 °F (36.9 °C)  Heart Rate:  [] 101  Resp:  [18] 18  BP: (157-205)/(75-97) 182/75  Physical Exam    Constitutional: Awake, alert   Eyes: PERRLA, sclerae anicteric, no conjunctival injection   HENT: NCAT, mucous membranes moist   Neck: Supple, no thyromegaly, no lymphadenopathy, trachea midline   Respiratory: Clear to auscultation bilaterally, nonlabored respirations    Cardiovascular: RRR, no murmurs, rubs, or gallops, palpable pedal pulses bilaterally   Gastrointestinal: Positive bowel sounds, soft, nontender, nondistended   Musculoskeletal: No midline cervical spine tenderness. Diffuse bilateral paraspinal tenderness. No obvious step-offs or deformities. Strength 5/5 equal to BUE. FROM.    No midline thoracic spine tenderness. No obvious step-off or deformities.     Mild, diffuse lumbar spine tenderness. Positive, left-sided straight-leg exam. Strength 5/5 and equal to BLE. Sensation intact to light touch. FROM.     Psychiatric: Appropriate affect, cooperative   Neurologic: Oriented x 3, strength symmetric in all extremities, Cranial Nerves grossly intact to  confrontation, speech clear   Skin: No rashes     Result Review    Result Review:  I have personally reviewed the results from the time of this admission to 1/24/2025 17:24 EST and agree with these findings:  [x]  Laboratory list / accordion  []  Microbiology  [x]  Radiology  []  EKG/Telemetry   []  Cardiology/Vascular   []  Pathology  [x]  Old records  []  Other:  Most notable findings include: Labs are unremarkable.  No imaging performed in the ER she is pending CT cervical spine and MRI lumbar and cervical spine.      Assessment & Plan   Assessment / Plan     Brief Patient Summary:  Kristina Yates is a 64 y.o. female who acute on chronic low back pain with 2 episodes of urinary incontinence this week with negative UA in ED.  Additionally she is having severe, acute on chronic neck pain.  She has no focal deficits.  She is followed with Dr. Ortiz who recommended pain control and imaging.     Active Hospital Problems:  Active Hospital Problems    Diagnosis     **Acute low back pain     Urinary incontinence without sensory awareness     Acute neck pain      Plan:   Acute low back pain:  Pain control  JACQUE consult  MRI lumbar spine    Acute Neck Pain:  Pain control  JACQUE consult  MRI cervical spine  CT cervical spine    HTN:  Continue home meds    HLD:  Continue home meds    Depression/ anxiety:  Continue home meds      VTE Prophylaxis:  Mechanical VTE prophylaxis orders are present.        CODE STATUS:    Level Of Support Discussed With: Patient  Code Status (Patient has no pulse and is not breathing): CPR (Attempt to Resuscitate)  Medical Interventions (Patient has pulse or is breathing): Full Support    Admission Status:  I believe this patient meets observation status.    Electronically signed by DAYNE Tsang, 01/24/25, 2:57 PM EST.        75 minutes has been spent by [facility] providers in the care of this patient while under observation status      I have worn  appropriate PPE during this patient encounter, sanitized my hands both with entering and exiting patient's room.

## 2025-01-24 NOTE — CONSULTS
[facility] NEUROSURGERY CONSULT NOTE    Patient name: Kristina Yates  Referring Provider: KELIN Siegel  Reason for Consultation: acute neck and low back pain, urinary incontinence    Patient Care Team:  Nyla Mejia APRN as PCP - General (Family Medicine)  Brooklyn Miles PA as Physician Assistant (Obstetrics and Gynecology)  Pedro Luis Zayas MD as Cardiologist (Cardiology)    Chief complaint: neck and back pain    Subjective .     History of present illness:    Patient is a 64 y.o.  female  with ch neck and back pain. Hx C5-7 ACDF (2003), C4-6 ACDF 9/2024, L4/5 TLIF 6/2023, R SIJ fusion 6/2024.  She presents to ER today with acute neck pain that is progressively worsened.  She describes it as midline aching pain.  She has not been able to manage at home with muscle relaxants and over-the-counter pain reliever.  She is on gabapentin chronically.  She has had chronic neuropathy in her hands since her cervical fusion in September and chronic neuropathy in her left foot since her lumbar surgery in 2023.  She does report some new numbness and tingling in her right upper arm.  She was in physical therapy and had great success.  She was discharged from therapy this week.  She has noticed some mild imbalance.  She has had 2 episodes of urinary incontinence without warning recently.  She has been using a cane.  She states she was to see pain management for some type of injection but insurance denied.  She has not otherwise established with pain management.    No cancer history.  Smoker half pack per day. prior alcohol intake-sober since 2012.  No blood thinner    Review of Systems  Review of Systems   Genitourinary:  Positive for enuresis.   Musculoskeletal:  Positive for back pain, gait problem and neck pain.   Neurological:  Positive for numbness. Negative for weakness.   Psychiatric/Behavioral:  Negative for confusion.        History  PAST MEDICAL HISTORY  Past Medical History:   Diagnosis Date    Alcohol abuse     in  recovery, sober since 2012    Anxiety     Arthritis     Asthma     Edmonds esophagus     Blurred vision     Cervical disc disease     Cervical neuritis     Chronic pain     NECK AND BACK    COPD (chronic obstructive pulmonary disease)     STATES NEVER DIAGNOSED WITH    Depression     Diabetes mellitus     Type 2    DJD (degenerative joint disease), cervical     Foot spasms     FROM BACK SURGERY    GERD (gastroesophageal reflux disease)     Hiatal hernia     Hyperlipidemia     Hypertension     Persistent headaches     ?BLOOD PRESSURE OR NECK RELATED???    Seasonal allergies     Spinal headache     Spinal stenosis        PAST SURGICAL HISTORY  Past Surgical History:   Procedure Laterality Date    ANTERIOR CERVICAL DISCECTOMY W/ FUSION  11/2003    C5-6, C6-7    ANTERIOR CERVICAL DISCECTOMY W/ FUSION Right 9/16/2024    Procedure: CERVICAL FOUR TO SIX DISCECTOMY ANTERIOR WITH FUSION WITH CERVICAL FIVE CORPECTOMY AND REMOVAL OF PREVIOUS HARDWARE;  Surgeon: Alex Ortiz MD;  Location: [facility] OR;  Service: Neurosurgery;  Laterality: Right;    CERCLAGE CERVIX      CERVICAL EPIDURAL  2007    CERVICAL FUSION      DILATATION AND CURETTAGE      ENDOMETRIAL ABLATION  2007    ENDOSCOPY      MULTIPLE    ENDOSCOPY N/A 07/05/2016    Procedure: ESOPHAGOGASTRODUODENOSCOPY WITH COLD BIOPSIES;  Surgeon: Jose Mcclellan MD;  Location: [facility] ENDOSCOPY;  Service:     ESOPHAGEAL DILATATION  2009    HAND SURGERY Bilateral     CARTILAGE    LUMBAR FUSION      LUMBAR LAMINECTOMY WITH FUSION N/A 06/30/2023    Procedure: OPEN LUMBAR FOUR TO FIVE TRANSFORAMINAL LUMBAR INTERBODY FUSION;  Surgeon: Alex Ortiz MD;  Location: [facility] OR;  Service: Neurosurgery;  Laterality: N/A;    NISSEN FUNDOPLICATION LAPAROSCOPIC  03/2012    SACROILIAC JOINT FUSION Right 06/04/2024    Procedure: RIGHT PERCUTANEOUS ARTHRODESIS SACROILIAC JOINT, NEUROMONITORING;  Surgeon: Alex Ortiz MD;  Location: [facility] OR;  Service: Neurosurgery;  Laterality:  Right;    TONSILLECTOMY         FAMILY HISTORY  Family History   Problem Relation Age of Onset    Cancer Mother         nonhodkinds lymphoma    Heart disease Father     Drug abuse Sister     Heart disease Brother     Lupus Maternal Aunt     Malig Hyperthermia Neg Hx        SOCIAL HISTORY  Social History     Tobacco Use    Smoking status: Former     Current packs/day: 0.50     Average packs/day: 0.5 packs/day for 20.1 years (10.0 ttl pk-yrs)     Types: Cigarettes     Start date: 1/1/2005    Smokeless tobacco: Never   Vaping Use    Vaping status: Never Used   Substance Use Topics    Alcohol use: No     Comment: sober since 2012, worsk 12 steps    Drug use: No       unemployed  Lives with  and son    Allergies:  Fluoxetine, Sulfa antibiotics, Zofran [ondansetron hcl], and Lisinopril    MEDICATIONS:  Medications Prior to Admission   Medication Sig Dispense Refill Last Dose/Taking    acetaminophen (TYLENOL) 325 MG tablet 2 tablets.   Past Week    albuterol sulfate  (90 Base) MCG/ACT inhaler INHALE 1 TO 2 PUFFS INTO THE LUNGS FOUR TIMES DAILY AS DIRECTED   Past Week    amLODIPine (NORVASC) 5 MG tablet Take 1 tablet by mouth Every Night. 90 tablet 3 1/23/2025    atorvastatin (LIPITOR) 40 MG tablet Take 1 tablet by mouth Every Night.   1/23/2025    busPIRone (BUSPAR) 5 MG tablet Take 1 tablet by mouth 3 (Three) Times a Day. 90 tablet 3 1/23/2025    cyclobenzaprine (FLEXERIL) 10 MG tablet    Past Week    DULoxetine (CYMBALTA) 30 MG capsule Take 1 capsule by mouth Daily. 90 capsule 3 1/24/2025    DULoxetine (CYMBALTA) 60 MG capsule Take 1 capsule by mouth Daily for 90 days. Take with 30 mg capsule for a TDD of 90 mg 90 capsule 0 1/24/2025    gabapentin (NEURONTIN) 300 MG capsule Take 1 capsule by mouth 3 (Three) Times a Day. 90 capsule 2 1/24/2025    guaiFENesin (MUCINEX) 600 MG 12 hr tablet Take 1 tablet by mouth 2 (Two) Times a Day.   1/24/2025    hydrALAZINE (APRESOLINE) 25 MG tablet Take 1 tablet by  mouth 3 (Three) Times a Day.   1/24/2025    hydroCHLOROthiazide 25 MG tablet Take 0.5 tablets by mouth Daily.   1/24/2025    HYDROcodone-acetaminophen (NORCO) 5-325 MG per tablet Take 1 tablet by mouth Every 6 (Six) Hours As Needed for Mild Pain. 20 tablet 0 Past Week    losartan (COZAAR) 100 MG tablet Take 1 tablet by mouth Daily. 90 tablet 1 1/24/2025    metFORMIN ER (GLUCOPHAGE-XR) 500 MG 24 hr tablet Take 1 tablet by mouth Every Night.   1/23/2025    methocarbamol (ROBAXIN) 500 MG tablet TAKE 1 TABLET BY MOUTH FOUR TIMES DAILY FOR MUSCULOSKELETAL PAIN   Past Week    metoprolol succinate XL (TOPROL-XL) 50 MG 24 hr tablet Take 1 tablet by mouth Daily. (Patient taking differently: Take 1 tablet by mouth 2 (Two) Times a Day.) 90 tablet 0 1/24/2025    Multiple Vitamins-Minerals (MULTIVITAMIN ADULT) tablet Take 1 tablet by mouth Daily.   1/24/2025    pantoprazole (PROTONIX) 40 MG EC tablet Take 1 tablet by mouth Every Morning.   1/24/2025    Vitamin D, Ergocalciferol, 61449 units capsule    1/24/2025    fluticasone (FLONASE) 50 MCG/ACT nasal spray 2 sprays into the nostril(s) as directed by provider Daily. 18.2 mL 5     methylPREDNISolone (MEDROL) 4 MG dose pack Take as directed on package instructions. 21 tablet 0 Unknown         Current Facility-Administered Medications:     acetaminophen (TYLENOL) tablet 1,000 mg, 1,000 mg, Oral, Q8H PRN, Jaclyn Chairez APRN    amLODIPine (NORVASC) tablet 5 mg, 5 mg, Oral, Nightly, StevetelJaclyn valverde APRN    atorvastatin (LIPITOR) tablet 40 mg, 40 mg, Oral, Nightly, Jaclyn Chairez, DAYNE    busPIRone (BUSPAR) tablet 5 mg, 5 mg, Oral, TID, GettelJaclyn valverde, APRYOVANI    cyclobenzaprine (FLEXERIL) tablet 10 mg, 10 mg, Oral, Q8H, Laverne Knox APRN    diazePAM (VALIUM) injection 5 mg, 5 mg, Intravenous, Once, Jaclyn Chairez APRN    [START ON 1/25/2025] DULoxetine (CYMBALTA) DR capsule 30 mg, 30 mg, Oral, Daily, Jaclyn Chairez APRN    [START ON  1/25/2025] DULoxetine (CYMBALTA) DR capsule 60 mg, 60 mg, Oral, Daily, Jaclyn Chairez APRN    gabapentin (NEURONTIN) capsule 300 mg, 300 mg, Oral, TID, Jaclyn Chairez APRN    guaiFENesin (MUCINEX) 12 hr tablet 600 mg, 600 mg, Oral, BID, Jaclyn Chairez APRN    hydrALAZINE (APRESOLINE) tablet 25 mg, 25 mg, Oral, TID, Jaclyn Chairez APRN    [START ON 1/25/2025] hydroCHLOROthiazide tablet 12.5 mg, 12.5 mg, Oral, Daily, Jaclyn Chairez APRN    HYDROcodone-acetaminophen (NORCO) 5-325 MG per tablet 1 tablet, 1 tablet, Oral, Q6H PRN, Jaclyn Chairez APRN    ketorolac (TORADOL) injection 15 mg, 15 mg, Intravenous, Once, Jaclyn Chairez APRN    Lidocaine 4 % 1 patch, 1 patch, Transdermal, Q24H, Laverne Knox APRN    [START ON 1/25/2025] losartan (COZAAR) tablet 100 mg, 100 mg, Oral, Daily, Jaclyn Chairez APRN    [START ON 1/25/2025] metoprolol succinate XL (TOPROL-XL) 24 hr tablet 50 mg, 50 mg, Oral, Daily, Jaclyn Chairez APRN    [START ON 1/25/2025] pantoprazole (PROTONIX) EC tablet 40 mg, 40 mg, Oral, Q AM, Jaclyn Chairez APRN    [COMPLETED] Insert Peripheral IV, , , Once **AND** sodium chloride 0.9 % flush 10 mL, 10 mL, Intravenous, PRN, Niko Khanna PA    sodium chloride 0.9 % flush 10 mL, 10 mL, Intravenous, Q12H, Jaclyn Chariez APRN    sodium chloride 0.9 % flush 10 mL, 10 mL, Intravenous, PRN, BrisalJaclyn valverde APRN    sodium chloride 0.9 % flush 10 mL, 10 mL, Intravenous, Q12H, Gettelfinger, Jaclyn, APRN    sodium chloride 0.9 % flush 10 mL, 10 mL, Intravenous, PRN, Gettelfinger, Jaclyn, APRN    sodium chloride 0.9 % infusion 40 mL, 40 mL, Intravenous, PRN, Gettelfinger, Jaclyn, APRN    sodium chloride 0.9 % infusion 40 mL, 40 mL, Intravenous, PRN, Gettelfinger, Jaclyn, APRN      Objective     Results Review:  LABS:  Results from last 7 days   Lab Units 01/24/25  1304   WBC 10*3/mm3 8.64   HEMOGLOBIN g/dL 13.0    HEMATOCRIT % 39.0   PLATELETS 10*3/mm3 233     Results from last 7 days   Lab Units 01/24/25  1304   SODIUM mmol/L 138   POTASSIUM mmol/L 4.1   CHLORIDE mmol/L 100   CO2 mmol/L 26.0   BUN mg/dL 9   CREATININE mg/dL 0.60   CALCIUM mg/dL 9.3   BILIRUBIN mg/dL 0.2   ALK PHOS U/L 83   ALT (SGPT) U/L 14   AST (SGOT) U/L 17   GLUCOSE mg/dL 104*     Results from last 7 days   Lab Units 01/24/25  1304   INR  1.03     Lab Results   Lab Value Date/Time    PTT 50.9 (H) 01/24/2025 1304    PTT 56.1 (H) 09/09/2024 0648    PTT 49.7 (H) 08/14/2024 1013    PTT 51.6 (H) 05/24/2024 0958     Urinalysis small leuk esterase, 3-5 WBC otherwise unremarkable    DIAGNOSTICS:  No current neurosurgical imaging    Results Review:   I reviewed the patient's new clinical results.  I personally viewed and interpreted the patient's chart    Vital Signs   Temp:  [97.8 °F (36.6 °C)-98.5 °F (36.9 °C)] 98.5 °F (36.9 °C)  Heart Rate:  [] 101  Resp:  [18] 18  BP: (157-205)/(75-97) 182/75    Physical Exam:  Physical Exam  Vitals reviewed.   Constitutional:       Appearance: Normal appearance.   Pulmonary:      Effort: Pulmonary effort is normal.   Musculoskeletal:      Cervical back: Bony tenderness present. No pain with movement. Normal range of motion.      Lumbar back: Negative right straight leg raise test and negative left straight leg raise test.   Skin:     General: Skin is warm and dry.      Comments: Well-healed anterior neck incision   Neurological:      General: No focal deficit present.      Deep Tendon Reflexes:      Reflex Scores:       Tricep reflexes are 1+ on the right side and 1+ on the left side.       Bicep reflexes are 1+ on the right side and 1+ on the left side.       Brachioradialis reflexes are 1+ on the right side and 2+ on the left side.       Patellar reflexes are 2+ on the right side and 2+ on the left side.       Achilles reflexes are 2+ on the right side and 2+ on the left side.  Psychiatric:         Mood and Affect:  Mood normal.         Thought Content: Thought content normal.       Neurological Exam  Mental Status  Awake, alert and oriented to person, place and time.    Motor  Normal muscle bulk throughout. No fasciculations present. Normal muscle tone. No abnormal involuntary movements. Strength is 5/5 in all four extremities except as noted.  4/5  weakness bilaterally.    Sensory  Light touch abnormality: Diminished in hands.     Reflexes                                            Right                      Left  Brachioradialis                    1+                         2+  Biceps                                 1+                         1+  Triceps                                1+                         1+  Patellar                                2+                         2+  Achilles                                2+                         2+    Right pathological reflexes: Natividad's absent. Ankle clonus absent.  Left pathological reflexes: Natividad's absent. Ankle clonus absent.    Gait    Observed patient go from sit to stand position unassisted with stable station.  Using cane for ambulation..      Assessment & Plan       Acute low back pain    Acute neck pain    Urinary incontinence without sensory awareness      Problem List Items Addressed This Visit          Unprioritized    Acute neck pain     Other Visit Diagnoses       Acute on chronic back pain    -  Primary    Intermittent urinary incontinence                 COMORBID CONDITIONS:  COPD, Diabetes Type 2, Hypertension, and prior cervical and lumbar surgery, chronic pain    Patient presents with acute on chronic neck and low back pain.  Her pain in her neck is midline cervical.  Dr. Ortiz somewhat concerned about pseudoarthrosis.  Will get CT cervical spine.  She does have some right upper extremity numbness and tingling that is new.  Bilateral hand numbness is chronic.  Will get MRI cervical for further evaluation.  Also reports 2 episodes of urinary  "incontinence.  She does have known history of cauda equina syndrome and neurogenic bladder but she states these issues are new.  She is having back pain and she has chronic numbness in her left foot.  Will obtain MRI lumbar spine.  On exam she has no weakness or pathologic reflexes.  She does not appear in severe pain.  She has normal range of motion of her neck but tenderness along the posterior cervical spine.  She will continue on the gabapentin.  About lidocaine patch and scheduled Flexeril.  She does not feel like the Robaxin has done much for her.  Will defer to the primary service for any opioid pain management.  We will need to arrange outpatient pain management to manage her gabapentin and any other medications/injections.  She states her PCP will not assume management of her gabapentin.    PLAN:   MRI cervical and lumbar spine with and without contrast  CT cervical spine  Will need outpatient referral to pain management    I discussed the patient's findings and my recommendations with patient, family, and Dr. Ortiz    During patient visit, I utilized appropriate personal protective equipment including gloves. Appropriate PPE was worn during the entire visit.  Hand hygiene was completed before and after.     Laverne Knox, APRN  01/24/25  17:50 EST    \"Dictated utilizing Dragon dictation\".      "

## 2025-01-24 NOTE — TELEPHONE ENCOUNTER
Patient called back and said her blood pressure has risen due to pain.  She also wanted to inform that twice this week she had loss bladder control.  She doesn't know if it is related.  She said she is headed to the ER.

## 2025-01-25 ENCOUNTER — APPOINTMENT (OUTPATIENT)
Dept: GENERAL RADIOLOGY | Facility: HOSPITAL | Age: 65
End: 2025-01-25
Payer: COMMERCIAL

## 2025-01-25 ENCOUNTER — APPOINTMENT (OUTPATIENT)
Dept: MRI IMAGING | Facility: HOSPITAL | Age: 65
End: 2025-01-25
Payer: COMMERCIAL

## 2025-01-25 ENCOUNTER — READMISSION MANAGEMENT (OUTPATIENT)
Dept: CALL CENTER | Facility: HOSPITAL | Age: 65
End: 2025-01-25
Payer: COMMERCIAL

## 2025-01-25 VITALS
HEART RATE: 109 BPM | WEIGHT: 172 LBS | RESPIRATION RATE: 16 BRPM | SYSTOLIC BLOOD PRESSURE: 133 MMHG | DIASTOLIC BLOOD PRESSURE: 74 MMHG | TEMPERATURE: 97.8 F | OXYGEN SATURATION: 95 % | BODY MASS INDEX: 27 KG/M2 | HEIGHT: 67 IN

## 2025-01-25 LAB
ANION GAP SERPL CALCULATED.3IONS-SCNC: 16 MMOL/L (ref 5–15)
BASOPHILS # BLD AUTO: 0.01 10*3/MM3 (ref 0–0.2)
BASOPHILS NFR BLD AUTO: 0.1 % (ref 0–1.5)
BUN SERPL-MCNC: 12 MG/DL (ref 8–23)
BUN/CREAT SERPL: 18.5 (ref 7–25)
CALCIUM SPEC-SCNC: 9 MG/DL (ref 8.6–10.5)
CHLORIDE SERPL-SCNC: 94 MMOL/L (ref 98–107)
CO2 SERPL-SCNC: 22 MMOL/L (ref 22–29)
CREAT SERPL-MCNC: 0.65 MG/DL (ref 0.57–1)
DEPRECATED RDW RBC AUTO: 43.4 FL (ref 37–54)
EGFRCR SERPLBLD CKD-EPI 2021: 99.1 ML/MIN/1.73
EOSINOPHIL # BLD AUTO: 0 10*3/MM3 (ref 0–0.4)
EOSINOPHIL NFR BLD AUTO: 0 % (ref 0.3–6.2)
ERYTHROCYTE [DISTWIDTH] IN BLOOD BY AUTOMATED COUNT: 12.5 % (ref 12.3–15.4)
GLUCOSE BLDC GLUCOMTR-MCNC: 171 MG/DL (ref 70–130)
GLUCOSE BLDC GLUCOMTR-MCNC: 312 MG/DL (ref 70–130)
GLUCOSE SERPL-MCNC: 238 MG/DL (ref 65–99)
HCT VFR BLD AUTO: 36.6 % (ref 34–46.6)
HGB BLD-MCNC: 11.5 G/DL (ref 12–15.9)
IMM GRANULOCYTES # BLD AUTO: 0.07 10*3/MM3 (ref 0–0.05)
IMM GRANULOCYTES NFR BLD AUTO: 0.5 % (ref 0–0.5)
LYMPHOCYTES # BLD AUTO: 1.19 10*3/MM3 (ref 0.7–3.1)
LYMPHOCYTES NFR BLD AUTO: 8.5 % (ref 19.6–45.3)
MCH RBC QN AUTO: 29.5 PG (ref 26.6–33)
MCHC RBC AUTO-ENTMCNC: 31.4 G/DL (ref 31.5–35.7)
MCV RBC AUTO: 93.8 FL (ref 79–97)
MONOCYTES # BLD AUTO: 0.2 10*3/MM3 (ref 0.1–0.9)
MONOCYTES NFR BLD AUTO: 1.4 % (ref 5–12)
NEUTROPHILS NFR BLD AUTO: 12.45 10*3/MM3 (ref 1.7–7)
NEUTROPHILS NFR BLD AUTO: 89.5 % (ref 42.7–76)
NRBC BLD AUTO-RTO: 0 /100 WBC (ref 0–0.2)
PLATELET # BLD AUTO: 231 10*3/MM3 (ref 140–450)
PMV BLD AUTO: 10.4 FL (ref 6–12)
POTASSIUM SERPL-SCNC: 4 MMOL/L (ref 3.5–5.2)
RBC # BLD AUTO: 3.9 10*6/MM3 (ref 3.77–5.28)
SODIUM SERPL-SCNC: 132 MMOL/L (ref 136–145)
WBC NRBC COR # BLD AUTO: 13.92 10*3/MM3 (ref 3.4–10.8)

## 2025-01-25 PROCEDURE — 25510000002 GADOBENATE DIMEGLUMINE 529 MG/ML SOLUTION: Performed by: EMERGENCY MEDICINE

## 2025-01-25 PROCEDURE — 25010000002 DIAZEPAM PER 5 MG: Performed by: NURSE PRACTITIONER

## 2025-01-25 PROCEDURE — 96375 TX/PRO/DX INJ NEW DRUG ADDON: CPT

## 2025-01-25 PROCEDURE — 99232 SBSQ HOSP IP/OBS MODERATE 35: CPT | Performed by: NURSE PRACTITIONER

## 2025-01-25 PROCEDURE — 25810000003 SODIUM CHLORIDE 0.9 % SOLUTION: Performed by: NURSE PRACTITIONER

## 2025-01-25 PROCEDURE — 85025 COMPLETE CBC W/AUTO DIFF WBC: CPT | Performed by: NURSE PRACTITIONER

## 2025-01-25 PROCEDURE — 72050 X-RAY EXAM NECK SPINE 4/5VWS: CPT

## 2025-01-25 PROCEDURE — 72158 MRI LUMBAR SPINE W/O & W/DYE: CPT

## 2025-01-25 PROCEDURE — 25010000002 DEXAMETHASONE PER 1 MG: Performed by: NURSE PRACTITIONER

## 2025-01-25 PROCEDURE — A9577 INJ MULTIHANCE: HCPCS | Performed by: EMERGENCY MEDICINE

## 2025-01-25 PROCEDURE — 63710000001 INSULIN LISPRO (HUMAN) PER 5 UNITS: Performed by: NURSE PRACTITIONER

## 2025-01-25 PROCEDURE — G0378 HOSPITAL OBSERVATION PER HR: HCPCS

## 2025-01-25 PROCEDURE — 80048 BASIC METABOLIC PNL TOTAL CA: CPT | Performed by: NURSE PRACTITIONER

## 2025-01-25 PROCEDURE — 96376 TX/PRO/DX INJ SAME DRUG ADON: CPT

## 2025-01-25 PROCEDURE — 25010000002 HYDROMORPHONE PER 4 MG: Performed by: NURSE PRACTITIONER

## 2025-01-25 PROCEDURE — 72156 MRI NECK SPINE W/O & W/DYE: CPT

## 2025-01-25 PROCEDURE — 82948 REAGENT STRIP/BLOOD GLUCOSE: CPT

## 2025-01-25 RX ORDER — GABAPENTIN 300 MG/1
600 CAPSULE ORAL NIGHTLY
Status: DISCONTINUED | OUTPATIENT
Start: 2025-01-25 | End: 2025-01-25 | Stop reason: HOSPADM

## 2025-01-25 RX ORDER — GABAPENTIN 300 MG/1
CAPSULE ORAL
Qty: 180 CAPSULE | Refills: 0 | Status: SHIPPED | OUTPATIENT
Start: 2025-01-25 | End: 2025-02-26

## 2025-01-25 RX ORDER — OXYCODONE AND ACETAMINOPHEN 5; 325 MG/1; MG/1
1 TABLET ORAL EVERY 4 HOURS PRN
Qty: 12 TABLET | Refills: 0 | Status: SHIPPED | OUTPATIENT
Start: 2025-01-25

## 2025-01-25 RX ORDER — METHOCARBAMOL 500 MG/1
500 TABLET, FILM COATED ORAL 4 TIMES DAILY PRN
Qty: 30 TABLET | Refills: 0 | Status: SHIPPED | OUTPATIENT
Start: 2025-01-25 | End: 2025-01-30

## 2025-01-25 RX ORDER — GABAPENTIN 300 MG/1
300 CAPSULE ORAL
Status: DISCONTINUED | OUTPATIENT
Start: 2025-01-25 | End: 2025-01-25 | Stop reason: HOSPADM

## 2025-01-25 RX ADMIN — HYDROMORPHONE HYDROCHLORIDE 0.5 MG: 1 INJECTION, SOLUTION INTRAMUSCULAR; INTRAVENOUS; SUBCUTANEOUS at 01:15

## 2025-01-25 RX ADMIN — BUSPIRONE HYDROCHLORIDE 5 MG: 10 TABLET ORAL at 08:27

## 2025-01-25 RX ADMIN — GABAPENTIN 300 MG: 300 CAPSULE ORAL at 08:30

## 2025-01-25 RX ADMIN — INSULIN LISPRO 5 UNITS: 100 INJECTION, SOLUTION INTRAVENOUS; SUBCUTANEOUS at 12:52

## 2025-01-25 RX ADMIN — GADOBENATE DIMEGLUMINE 16 ML: 529 INJECTION, SOLUTION INTRAVENOUS at 05:08

## 2025-01-25 RX ADMIN — HYDROMORPHONE HYDROCHLORIDE 0.5 MG: 1 INJECTION, SOLUTION INTRAMUSCULAR; INTRAVENOUS; SUBCUTANEOUS at 08:39

## 2025-01-25 RX ADMIN — DIAZEPAM 5 MG: 5 INJECTION INTRAMUSCULAR; INTRAVENOUS at 03:27

## 2025-01-25 RX ADMIN — LIDOCAINE 1 PATCH: 4 PATCH TOPICAL at 08:27

## 2025-01-25 RX ADMIN — METOPROLOL SUCCINATE 50 MG: 50 TABLET, EXTENDED RELEASE ORAL at 08:27

## 2025-01-25 RX ADMIN — Medication 10 ML: at 08:38

## 2025-01-25 RX ADMIN — INSULIN LISPRO 2 UNITS: 100 INJECTION, SOLUTION INTRAVENOUS; SUBCUTANEOUS at 08:30

## 2025-01-25 RX ADMIN — HYDROCHLOROTHIAZIDE 12.5 MG: 12.5 TABLET ORAL at 08:28

## 2025-01-25 RX ADMIN — DEXAMETHASONE SODIUM PHOSPHATE 4 MG: 4 INJECTION, SOLUTION INTRA-ARTICULAR; INTRALESIONAL; INTRAMUSCULAR; INTRAVENOUS; SOFT TISSUE at 12:51

## 2025-01-25 RX ADMIN — HYDROMORPHONE HYDROCHLORIDE 0.5 MG: 1 INJECTION, SOLUTION INTRAMUSCULAR; INTRAVENOUS; SUBCUTANEOUS at 05:49

## 2025-01-25 RX ADMIN — HYDRALAZINE HYDROCHLORIDE 25 MG: 50 TABLET ORAL at 08:28

## 2025-01-25 RX ADMIN — DULOXETINE 60 MG: 60 CAPSULE, DELAYED RELEASE ORAL at 08:29

## 2025-01-25 RX ADMIN — HYDROCODONE BITARTRATE AND ACETAMINOPHEN 1 TABLET: 5; 325 TABLET ORAL at 12:04

## 2025-01-25 RX ADMIN — DEXAMETHASONE SODIUM PHOSPHATE 4 MG: 4 INJECTION, SOLUTION INTRA-ARTICULAR; INTRALESIONAL; INTRAMUSCULAR; INTRAVENOUS; SOFT TISSUE at 01:15

## 2025-01-25 RX ADMIN — CYCLOBENZAPRINE HYDROCHLORIDE 10 MG: 10 TABLET, FILM COATED ORAL at 08:29

## 2025-01-25 RX ADMIN — LOSARTAN POTASSIUM 100 MG: 100 TABLET, FILM COATED ORAL at 08:29

## 2025-01-25 RX ADMIN — Medication 10 ML: at 01:19

## 2025-01-25 RX ADMIN — SODIUM CHLORIDE 500 ML: 9 INJECTION, SOLUTION INTRAVENOUS at 05:46

## 2025-01-25 RX ADMIN — DEXAMETHASONE SODIUM PHOSPHATE 4 MG: 4 INJECTION, SOLUTION INTRA-ARTICULAR; INTRALESIONAL; INTRAMUSCULAR; INTRAVENOUS; SOFT TISSUE at 10:05

## 2025-01-25 RX ADMIN — GUAIFENESIN 600 MG: 600 TABLET, EXTENDED RELEASE ORAL at 08:29

## 2025-01-25 RX ADMIN — DULOXETINE HYDROCHLORIDE 30 MG: 30 CAPSULE, DELAYED RELEASE ORAL at 08:27

## 2025-01-25 RX ADMIN — PANTOPRAZOLE SODIUM 40 MG: 40 TABLET, DELAYED RELEASE ORAL at 08:28

## 2025-01-25 NOTE — OUTREACH NOTE
Prep Survey      Flowsheet Row Responses   [facility] patient discharged from? [facility]   Is LACE score < 7 ? No   Eligibility [facility]   Date of Admission 01/24/25   Date of Discharge 01/25/25   Discharge Disposition Home or Self Care   Discharge diagnosis Acute low back pain   Does the patient have one of the following disease processes/diagnoses(primary or secondary)? Other   Does the patient have Home health ordered? No   Is there a DME ordered? No   Medication alerts for this patient see AVS   Prep survey completed? Yes            Wanda LUCAS - Registered Nurse

## 2025-01-25 NOTE — SIGNIFICANT NOTE
01/25/25 2121   OTHER   Discipline physical therapist   Rehab Time/Intention   Session Not Performed   (PT eval not warranted.  Patient was observed ambulating independently with single tip cane in the hallway as she returned from the bathroom.  Patient reports she is independently mobile despite pain and denies any PT needs.  PT will sign off)

## 2025-01-25 NOTE — PROGRESS NOTES
[facility] NEUROSURGERY CERVICAL PROGRESS NOTE      CC:neck and back pain      Subjective     Interval History: Reports ongoing significant pain in the midline to right side of her neck.  Some pain into the top of the shoulder and upper biceps.  Has had continent urine since admission.  Reports that she did not have sense of voiding when she had the 2 incontinent episodes, but denies any saddle paresthesia.  Today she reports that she is on a diuretic and wonders if that is why she had the incontinence issues.  She also states that she has been waiting for follow-up from [facility] pain management regarding appeal for cervical injections which were initially denied by insurance.  She last spoke to them about a month ago.  Completed cervical CT and MRI cervical and lumbar spine.     ROS:  Neck: neck pain  Back: Low back pain  Neuro: Chronic numbness bilateral hands and left foot  : no difficulty voiding, no incontinence    Objective     Vital signs in last 24 hours:  Temp:  [97.6 °F (36.4 °C)-98.5 °F (36.9 °C)] 97.8 °F (36.6 °C)  Heart Rate:  [] 109  Resp:  [16-18] 16  BP: (133-194)/(73-87) 133/74    Intake/Output this shift:  No intake/output data recorded.    LABS:  Results from last 7 days   Lab Units 01/25/25  0336 01/24/25  1304   WBC 10*3/mm3 13.92* 8.64   HEMOGLOBIN g/dL 11.5* 13.0   HEMATOCRIT % 36.6 39.0   PLATELETS 10*3/mm3 231 233     Results from last 7 days   Lab Units 01/25/25  0336 01/24/25  1304   SODIUM mmol/L 132* 138   POTASSIUM mmol/L 4.0 4.1   CHLORIDE mmol/L 94* 100   CO2 mmol/L 22.0 26.0   BUN mg/dL 12 9   CREATININE mg/dL 0.65 0.60   CALCIUM mg/dL 9.0 9.3   BILIRUBIN mg/dL  --  0.2   ALK PHOS U/L  --  83   ALT (SGPT) U/L  --  14   AST (SGOT) U/L  --  17   GLUCOSE mg/dL 238* 104*     IMAGING STUDIES:  Cervical x-rays reveal postoperative change C4-6.  There is mild anterolisthesis of C3 on 4 which has about 2.5 mm of progression with flexion but stable from neutral to extension.   Multilevel facet arthropathy    Cervical spine-no evidence of acute fracture or failure of hardware, loosening, or malalignment.  Corpectomy at C5 with anterior instrumentation C4-6.  Mild canal stenosis at C3/4, C6/7, C7/T1.  There is severe right and moderate to severe left foraminal narrowing at C3/4.  No significant change from prior study in September 2024.    MRI cervical spine reveals mild anterolisthesis C3 on 4.  There is umbilicated area of the dorsal cord at C5 with cord signal change which is less intense than on the prior MRI.  No abnormal enhancement of the cord.  There is moderate canal stenosis at C3/4 due to uncovertebral joint hypertrophy and thickening of the PLL, left greater than right neuroforaminal narrowing.  At C4/5 there is uncovertebral joint hypertrophy right greater than left and results in severe right neuroforaminal narrowing and moderate canal stenosis but no cord compression.  At C5/6 there is a midline bone spur abutting the ventral cord and a right osteophyte just caudal to this, no significant right foraminal stenosis.  At C7/T1 disc osteophyte complex with left greater than right facet arthropathy and moderate foraminal narrowing bilaterally.  No significant canal stenosis.    MRI lumbar spine-postoperative change L4/5.  No significant canal stenosis at any level.  There is moderate foraminal stenosis at L5/S1 bilaterally due to facet arthropathy and degenerative disc disease. Disc bulge and bilateral facet arthropathy at L3/4 results in flattening of the thecal sac in mild to moderate canal stenosis.    I personally viewed and interpreted the patient's x-rays, CTs and MRI.  Also reviewed CT with Dr. Jarvis who saw no evidence of obvious pseudoarthrosis..    Meds reviewed/changed: Yes    Current Facility-Administered Medications:     acetaminophen (TYLENOL) tablet 1,000 mg, 1,000 mg, Oral, Q8H PRN, Jaclyn Chairez APRN    amLODIPine (NORVASC) tablet 5 mg, 5 mg, Oral,  Nightly, Jaclyn Chairez APRN, 5 mg at 01/24/25 2024    atorvastatin (LIPITOR) tablet 40 mg, 40 mg, Oral, Nightly, Jaclyn Chairez APRN, 40 mg at 01/24/25 2024    busPIRone (BUSPAR) tablet 5 mg, 5 mg, Oral, TID, Jaclyn Chairez APRN, 5 mg at 01/25/25 0827    cyclobenzaprine (FLEXERIL) tablet 10 mg, 10 mg, Oral, Q8H, Laverne Knox APRN, 10 mg at 01/25/25 0829    dexAMETHasone (DECADRON) injection 4 mg, 4 mg, Intravenous, Q6H, Jaclyn Chairez APRN, 4 mg at 01/25/25 1251    dextrose (D50W) (25 g/50 mL) IV injection 25 g, 25 g, Intravenous, Q15 Min PRN, Erin Aguillon APRN    dextrose (GLUTOSE) oral gel 15 g, 15 g, Oral, Q15 Min PRN, Erin Aguillon APRN    DULoxetine (CYMBALTA) DR capsule 30 mg, 30 mg, Oral, Daily, Jaclyn Chairez APRN, 30 mg at 01/25/25 0827    DULoxetine (CYMBALTA) DR capsule 60 mg, 60 mg, Oral, Daily, Jaclyn Chairez APRN, 60 mg at 01/25/25 0829    gabapentin (NEURONTIN) capsule 300 mg, 300 mg, Oral, Daily With Breakfast & Dinner, Laverne Knox APRN    gabapentin (NEURONTIN) capsule 600 mg, 600 mg, Oral, Nightly, Laverne Knox APRN    glucagon (GLUCAGEN) injection 1 mg, 1 mg, Intramuscular, Q15 Min PRN, Erin Aguillon APRN    guaiFENesin (MUCINEX) 12 hr tablet 600 mg, 600 mg, Oral, BID, Jaclyn Chairez APRN, 600 mg at 01/25/25 0829    hydrALAZINE (APRESOLINE) tablet 25 mg, 25 mg, Oral, TID, Jaclyn Chairez APRN, 25 mg at 01/25/25 0828    hydroCHLOROthiazide tablet 12.5 mg, 12.5 mg, Oral, Daily, Jaclyn Chairez APRN, 12.5 mg at 01/25/25 0828    HYDROcodone-acetaminophen (NORCO) 5-325 MG per tablet 1 tablet, 1 tablet, Oral, Q6H PRN, Jaclyn Chairez APRN, 1 tablet at 01/25/25 1204    HYDROmorphone (DILAUDID) injection 0.5 mg, 0.5 mg, Intravenous, Q2H PRN, Erin Aguillon APRN, 0.5 mg at 01/25/25 0839    insulin lispro (HUMALOG/ADMELOG) injection 2-7 Units, 2-7 Units, Subcutaneous, 4x Daily AC & at Bedtime, Pal  Erin QUINTANILLA, APRN, 5 Units at 01/25/25 1252    Lidocaine 4 % 1 patch, 1 patch, Transdermal, Q24H, Laverne Knox, APRN, 1 patch at 01/25/25 0827    losartan (COZAAR) tablet 100 mg, 100 mg, Oral, Daily, Gettelfinger, Jaclyn, APRN, 100 mg at 01/25/25 0829    metoprolol succinate XL (TOPROL-XL) 24 hr tablet 50 mg, 50 mg, Oral, Daily, Gettelfinger, Jaclyn, APRN, 50 mg at 01/25/25 0827    pantoprazole (PROTONIX) EC tablet 40 mg, 40 mg, Oral, Q AM, Gettelfinger, Jaclyn, APRN, 40 mg at 01/25/25 0828    [COMPLETED] Insert Peripheral IV, , , Once **AND** sodium chloride 0.9 % flush 10 mL, 10 mL, Intravenous, PRN, Niko Khanna, PA    sodium chloride 0.9 % flush 10 mL, 10 mL, Intravenous, Q12H, Gettelfinger, Jaclyn, APRN, 10 mL at 01/24/25 2117    sodium chloride 0.9 % flush 10 mL, 10 mL, Intravenous, PRN, Gettelfinger, Jaclyn, APRN    sodium chloride 0.9 % flush 10 mL, 10 mL, Intravenous, Q12H, Gettelfinger, Jaclyn, APRN, 10 mL at 01/25/25 0838    sodium chloride 0.9 % flush 10 mL, 10 mL, Intravenous, PRN, Gettelfinger, Jaclyn, APRN, 10 mL at 01/25/25 0119    sodium chloride 0.9 % infusion 40 mL, 40 mL, Intravenous, PRN, Gettelfinger, Jaclyn, APRN    sodium chloride 0.9 % infusion 40 mL, 40 mL, Intravenous, PRN, Gettelfinger, Jaclyn, APRN      Physical Exam:    General:   Awake, alert.  Resting in bed.  No acute distress.  Flat affect.   at bedside.  Neck:    Tenderness midline and right of midline mid to lower cervical region.  Motor:    Full strength bilateral upper and lower extremities except  4/5 bilaterally  Reflexes:   No Roy, no clonus  Station and Gait:            Up with cane  Extremities:   SCD in place    Assessment & Plan     ASSESSMENT:      Acute low back pain    Acute neck pain    Urinary incontinence without sensory awareness    Patient presented with progressive right-sided neck pain which has been ongoing.  She was to see outpatient pain management, but apparently there was some  insurance issue with her getting an injection.  She states she signed some additional paperwork which I assume is an appeal but she has not heard back yet.  That was about a month ago.  She has been on gabapentin 300 mg 3 times daily.  This was not controlling her pain.  Dr. Ortiz was concerned about midline cervical pain being due to pseudoarthrosis.  Does not appear that there is obvious evidence of this on the CT cervical spine and the MRI cervical spine shows to be known cord did not change at C5 but there is some bone spur at that level where she had her fusion.  I do not know that this really looks much different than the postoperative MRI study but may be slightly more prominent.  The cord edema looks resolved.  She is also complaining of urinary incontinence but tells me today that she is on a water pill and is wondering if that is what is precipitating her issue.  She denies any true saddle paresthesia.  She has good strength in her legs and no pathologic reflexes.  Lumbar MRI does not show any evidence of cauda equina or any significant canal stenosis.  There is adjacent level disease at L3/4 with moderate canal stenosis.  She obtained some cervical x-rays flexion-extension which do not show any significant motion although there is some mild anterolisthesis that worsens slightly with flexion but may just be physiologic as it is less than 3 mm.  We discussed treatment which would include staying in the hospital and having possible pain management injection on Monday or going home with some pain medications over the weekend if outpatient basis.  She does describe some pain into the right biceps and shoulder area, she may have some right foraminal stenosis causing this.  I will increase her gabapentin.  She is interested in going home.  The observation provider will some short course of pain medication.  I will send a message to the office to get in contact with patient to get her scheduled as an outpatient  "with Dr. Ortiz.  I think she would benefit from some type of injections or pain management and it sounds like that is under way through [facility].  She will also need a bone growth stimulator which we will have our  work on    PLAN:   Increase gabapentin to 600 mg p.o. 3 times daily with escalating dose over the next 10 days.  If patient is comfortable, okay for discharge and outpatient follow-up with Dr. Ortiz.  If pain uncontrolled, keep in house and will consider possible cervical epidural injection on Monday  Bone growth stimulator-will advise Milka    I discussed the patient's findings and my recommendations with patient, family, nursing staff, and primary care team    During patient visit, I utilized appropriate personal protective equipment including gloves. Appropriate PPE was worn during the entire visit.  Hand hygiene was completed before and after.      LOS: 0 days       Laverne Knox, APRN  1/25/2025  14:02 EST    \"Dictated utilizing Dragon dictation\".      "

## 2025-01-25 NOTE — PLAN OF CARE
Goal Outcome Evaluation:  Plan of Care Reviewed With: patient        Progress: improving  Outcome Evaluation: Pt. D/C today. To follow up with neurosurgey outpatient and to see PCP as needed. At this time VSS, A&OX4, and all questions answered at this time.       Problem: Adult Inpatient Plan of Care  Goal: Plan of Care Review  Outcome: Met  Flowsheets (Taken 1/25/2025 1408)  Progress: improving  Outcome Evaluation: Pt. D/C today. To follow up with neurosurgey outpatient and to see PCP as needed. At this time VSS, A&OX4, and all questions answered at this time.  Plan of Care Reviewed With: patient  Goal: Patient-Specific Goal (Individualized)  Outcome: Met  Goal: Absence of Hospital-Acquired Illness or Injury  Outcome: Met  Intervention: Identify and Manage Fall Risk  Recent Flowsheet Documentation  Taken 1/25/2025 0839 by Kashif Cheung RN  Safety Promotion/Fall Prevention:   activity supervised   fall prevention program maintained   lighting adjusted   safety round/check completed  Intervention: Prevent Skin Injury  Recent Flowsheet Documentation  Taken 1/25/2025 0839 by Kashif Cheung RN  Body Position: position changed independently  Intervention: Prevent and Manage VTE (Venous Thromboembolism) Risk  Recent Flowsheet Documentation  Taken 1/25/2025 0839 by Kashif Cheung RN  VTE Prevention/Management:   SCDs (sequential compression devices) off   patient refused intervention  Intervention: Prevent Infection  Recent Flowsheet Documentation  Taken 1/25/2025 0839 by Kashif Cheung RN  Infection Prevention:   hand hygiene promoted   single patient room provided   rest/sleep promoted  Goal: Optimal Comfort and Wellbeing  Outcome: Met  Intervention: Monitor Pain and Promote Comfort  Recent Flowsheet Documentation  Taken 1/25/2025 0839 by Kashif Cheung RN  Pain Management Interventions: pain medication given  Intervention: Provide Person-Centered Care  Recent Flowsheet Documentation  Taken 1/25/2025 0839 by Jonatan  Kashif, RN  Trust Relationship/Rapport:   care explained   questions answered   questions encouraged  Goal: Readiness for Transition of Care  Outcome: Met     Problem: Skin Injury Risk Increased  Goal: Skin Health and Integrity  Outcome: Met  Intervention: Optimize Skin Protection  Recent Flowsheet Documentation  Taken 1/25/2025 3844 by Kashif Cheung, RN  Activity Management:   activity encouraged   back to bed  Pressure Reduction Techniques: frequent weight shift encouraged  Head of Bed (HOB) Positioning: HOB elevated

## 2025-01-25 NOTE — PROGRESS NOTES
ED OBSERVATION PROGRESS/DISCHARGE SUMMARY    Date of Admission: 1/24/2025   LOS: 0 days   PCP: Nyla Mejia APRN    Final Diagnosis right-sided cervical radiculopathy      Subjective     Hospital Outcome:   Kristina Yates is a 64 y.o. female who was admitted to the ED observation unit for further evaluation and workup with a complaint of acute on chronic neck pain and low back pain. Reports she has taken her home pain medications without relief. Reports chronic paresthesias in her bilateral upper extremity secondary to complication from a cervical fusion that is at its baseline.  She does note that she has had 2 episodes of urinary incontinence this week. She is followed with Dr. Ortiz with neurosurgery. IV steroids and multimodal analgesic medication given for pain control.     MRI of C-Spine and L-Spine are pending. Neurosurgery consult pending.      1/25:  As morning patient endorses persistent right-sided neck pain with right arm radiation.  MRI of her cervical spine shows osteophyte at C5, lumbar spine reassuring.  She was seen and evaluated by the neurosurgery team for patient pain injection on Monday which she declines, would prefer to go home with pain medication and outpatient follow-up.  Plain films obtained today to ensure stability, no instability noted or acute findings on x-ray.    ROS:  General: no fevers, chills  Respiratory: no cough, dyspnea  Cardiovascular: no chest pain, palpitations  Abdomen: No abdominal pain, nausea, vomiting, or diarrhea  Neurologic: No focal weakness    Objective   Physical Exam:  I have reviewed the vital signs.  Temp:  [97.6 °F (36.4 °C)-98.5 °F (36.9 °C)] 97.8 °F (36.6 °C)  Heart Rate:  [] 105  Resp:  [17-18] 17  BP: (157-205)/(73-97) 157/74  General Appearance:    Alert, cooperative, no distress, chronically ill appearing  Head:    Normocephalic, atraumatic  Eyes:    Sclerae anicteric  Neck:   Supple, no mass  Lungs: Clear to auscultation bilaterally, respirations  unlabored  Heart: Regular rate and rhythm, S1 and S2 normal, no murmur, rub or gallop  Abdomen:  Soft, nontender, bowel sounds active, nondistended  Extremities: No clubbing, cyanosis, or edema to lower extremities, active range of motion all 4 extremities  Pulses:  2+ and symmetric in distal lower extremities  Skin: No rashes   Neurologic: Oriented x3, Normal strength to extremities    Results Review:    I have reviewed the labs, radiology results and diagnostic studies.    Results from last 7 days   Lab Units 01/25/25  0336   WBC 10*3/mm3 13.92*   HEMOGLOBIN g/dL 11.5*   HEMATOCRIT % 36.6   PLATELETS 10*3/mm3 231     Results from last 7 days   Lab Units 01/25/25  0336 01/24/25  1304   SODIUM mmol/L 132* 138   POTASSIUM mmol/L 4.0 4.1   CHLORIDE mmol/L 94* 100   CO2 mmol/L 22.0 26.0   BUN mg/dL 12 9   CREATININE mg/dL 0.65 0.60   CALCIUM mg/dL 9.0 9.3   BILIRUBIN mg/dL  --  0.2   ALK PHOS U/L  --  83   ALT (SGPT) U/L  --  14   AST (SGOT) U/L  --  17   GLUCOSE mg/dL 238* 104*     Imaging Results (Last 24 Hours)       Procedure Component Value Units Date/Time    MRI Cervical Spine With & Without Contrast [[id number]] Resulted: 01/25/25 0422     Updated: 01/25/25 0422    MRI Lumbar Spine With & Without Contrast [[id number]] Resulted: 01/25/25 0422     Updated: 01/25/25 0422    CT Cervical Spine Without Contrast [[id number]] Collected: 01/25/25 0112     Updated: 01/25/25 0123    Narrative:      CERVICAL SPINE CT WITHOUT CONTRAST     INDICATION: neck pain, evaluate for pseudoarthrosis; M54.9-Dorsalgia,  unspecified; G89.29-Other chronic pain; R32-Unspecified urinary  incontinence; M54.2-Cervicalgia     COMPARISON: Cervical spine MRI 9/19/2024.     TECHNIQUE: Non-contrast cervical spine CT protocol with sagittal and  coronal reconstructions of the cervical spine.   Radiation dose  reduction techniques were utilized, including automated exposure  control, and exposure modulation based on body size.     FINDINGS:      Redemonstrated loss and slight reversal of cervical lordosis, and postop  changes from C5 corpectomy and anterior fusion from C4-C6. No convincing  evidence of acute alignment abnormality.     There is no evidence of acute cervical spine fracture, or of anterior  fusion device loosening or failure. No CT evidence of lytic or blastic  bone lesion.  The adjacent soft tissue structures and lung apices  demonstrate no acute abnormality.     C2-3: No substantial canal or foraminal stenosis.     C3-4: Mild degenerative canal stenosis, and severe right and moderate to  severe left foraminal stenosis.     C4-5: Prior corpectomy and strut grafting, no substantial canal  stenosis. There is mild to moderate right and no left foraminal  stenosis.     C5-6: Postoperative change with no evidence of substantial canal or  foraminal stenosis.     C6-7: Mild degenerative canal stenosis, and mild to moderate bilateral  degenerative foraminal compromise.     C7-T1: There is mild degenerative canal stenosis, and mild left, and  mild to moderate right foraminal narrowing.       Impression:         Degenerative and postoperative changes, no evidence of acute  abnormality. No convincing substantial interval change since cervical  spine MRI 9/19/2024.                       This report was finalized on 1/25/2025 1:19 AM by Dr. Estevan Owen M.D on Workstation: [id number]               I have reviewed the medications.     Discharge Medications        ASK your doctor about these medications        Instructions Start Date   acetaminophen 325 MG tablet  Commonly known as: TYLENOL   650 mg      albuterol sulfate  (90 Base) MCG/ACT inhaler  Commonly known as: PROVENTIL HFA;VENTOLIN HFA;PROAIR HFA   INHALE 1 TO 2 PUFFS INTO THE LUNGS FOUR TIMES DAILY AS DIRECTED      amLODIPine 5 MG tablet  Commonly known as: NORVASC   5 mg, Oral, Nightly      atorvastatin 40 MG tablet  Commonly known as: LIPITOR   40 mg, Oral, Nightly      busPIRone  5 MG tablet  Commonly known as: BUSPAR   5 mg, Oral, 3 Times Daily      cyclobenzaprine 10 MG tablet  Commonly known as: FLEXERIL       DULoxetine 30 MG capsule  Commonly known as: CYMBALTA   30 mg, Oral, Daily      DULoxetine 60 MG capsule  Commonly known as: CYMBALTA   60 mg, Oral, Daily, Take with 30 mg capsule for a TDD of 90 mg      fluticasone 50 MCG/ACT nasal spray  Commonly known as: FLONASE   2 sprays, Nasal, Daily      gabapentin 300 MG capsule  Commonly known as: NEURONTIN   300 mg, Oral, 3 Times Daily      guaiFENesin 600 MG 12 hr tablet  Commonly known as: MUCINEX   600 mg, 2 Times Daily      hydrALAZINE 25 MG tablet  Commonly known as: APRESOLINE   25 mg, 3 Times Daily      hydroCHLOROthiazide 25 MG tablet   12.5 mg, Daily      HYDROcodone-acetaminophen 5-325 MG per tablet  Commonly known as: NORCO   1 tablet, Oral, Every 6 Hours PRN      losartan 100 MG tablet  Commonly known as: COZAAR   100 mg, Oral, Daily      metFORMIN  MG 24 hr tablet  Commonly known as: GLUCOPHAGE-XR   500 mg, Nightly      methocarbamol 500 MG tablet  Commonly known as: ROBAXIN   TAKE 1 TABLET BY MOUTH FOUR TIMES DAILY FOR MUSCULOSKELETAL PAIN      methylPREDNISolone 4 MG dose pack  Commonly known as: MEDROL   Take as directed on package instructions.      metoprolol succinate XL 50 MG 24 hr tablet  Commonly known as: TOPROL-XL   50 mg, Oral, Daily      Multivitamin Adult tablet tablet  Generic drug: multivitamin with minerals   1 tablet, Daily      pantoprazole 40 MG EC tablet  Commonly known as: PROTONIX   40 mg, Oral, Every Early Morning      Vitamin D (Ergocalciferol) 90988 units capsule               ---------------------------------------------------------------------------------------------  Assessment & Plan   Assessment/Problem List    Acute low back pain    Acute neck pain    Urinary incontinence without sensory awareness      Plan:    Right-sided cervical radiculopathy  Acute low back pain:  JACQUE consult, cleared  for discharge home with outpatient follow-up  MRI cervical and lumbar spine show bone spurring but no acute fracture, malalignment or infectious process appreciated       HTN:  Continue home meds     HLD:  Continue home meds     Depression/ anxiety:  Continue home meds       Disposition: Home    Follow-up after Discharge: PCP & JACQUE    This note will serve as a discharge summary    Erin Aguillon, APRN 01/25/25 04:45 EST    I have worn appropriate PPE during this patient encounter, sanitized my hands both with entering and exiting patient's room.      35 minutes has been spent by [facility] providers in the care of this patient while under observation status

## 2025-01-25 NOTE — DISCHARGE SUMMARY
Evita Alcaraz APRN  Nurse Practitioner  Emergency Medicine     Progress Notes      Cosign Needed     Date of Service: 01/25/25 0620  Creation Time: 01/25/25 0444     Cosign Needed       Expand All Collapse All    ED OBSERVATION PROGRESS/DISCHARGE SUMMARY     Date of Admission: 1/24/2025   LOS: 0 days   PCP: Nyla Mejia APRN     Final Diagnosis right-sided cervical radiculopathy           Subjective  Hospital Outcome:   Kristina Yates is a 64 y.o. female who was admitted to the ED observation unit for further evaluation and workup with a complaint of acute on chronic neck pain and low back pain. Reports she has taken her home pain medications without relief. Reports chronic paresthesias in her bilateral upper extremity secondary to complication from a cervical fusion that is at its baseline.  She does note that she has had 2 episodes of urinary incontinence this week. She is followed with Dr. Ortiz with neurosurgery. IV steroids and multimodal analgesic medication given for pain control.      MRI of C-Spine and L-Spine are pending. Neurosurgery consult pending.        1/25:  As morning patient endorses persistent right-sided neck pain with right arm radiation.  MRI of her cervical spine shows osteophyte at C5, lumbar spine reassuring.  She was seen and evaluated by the neurosurgery team for patient pain injection on Monday which she declines, would prefer to go home with pain medication and outpatient follow-up.  Plain films obtained today to ensure stability, no instability noted or acute findings on x-ray.     ROS:  General: no fevers, chills  Respiratory: no cough, dyspnea  Cardiovascular: no chest pain, palpitations  Abdomen: No abdominal pain, nausea, vomiting, or diarrhea  Neurologic: No focal weakness        Objective  Physical Exam:  I have reviewed the vital signs.  Temp:  [97.6 °F (36.4 °C)-98.5 °F (36.9 °C)] 97.8 °F (36.6 °C)  Heart Rate:  [] 105  Resp:  [17-18] 17  BP: (157-205)/(73-97)  157/74  General Appearance:    Alert, cooperative, no distress, chronically ill appearing  Head:     Normocephalic, atraumatic  Eyes:     Sclerae anicteric  Neck:     Supple, no mass  Lungs: Clear to auscultation bilaterally, respirations unlabored  Heart: Regular rate and rhythm, S1 and S2 normal, no murmur, rub or gallop  Abdomen:  Soft, nontender, bowel sounds active, nondistended  Extremities: No clubbing, cyanosis, or edema to lower extremities, active range of motion all 4 extremities  Pulses:  2+ and symmetric in distal lower extremities  Skin: No rashes   Neurologic: Oriented x3, Normal strength to extremities     Results Review:    I have reviewed the labs, radiology results and diagnostic studies.          Results from last 7 days   Lab Units 01/25/25  0336   WBC 10*3/mm3 13.92*   HEMOGLOBIN g/dL 11.5*   HEMATOCRIT % 36.6   PLATELETS 10*3/mm3 231            Results from last 7 days   Lab Units 01/25/25  0336 01/24/25  1304   SODIUM mmol/L 132* 138   POTASSIUM mmol/L 4.0 4.1   CHLORIDE mmol/L 94* 100   CO2 mmol/L 22.0 26.0   BUN mg/dL 12 9   CREATININE mg/dL 0.65 0.60   CALCIUM mg/dL 9.0 9.3   BILIRUBIN mg/dL  --  0.2   ALK PHOS U/L  --  83   ALT (SGPT) U/L  --  14   AST (SGOT) U/L  --  17   GLUCOSE mg/dL 238* 104*      Imaging Results (Last 24 Hours)         Procedure Component Value Units Date/Time     MRI Cervical Spine With & Without Contrast [[id number]] Resulted: 01/25/25 0422       Updated: 01/25/25 0422     MRI Lumbar Spine With & Without Contrast [[id number]] Resulted: 01/25/25 0422       Updated: 01/25/25 0422     CT Cervical Spine Without Contrast [[id number]] Collected: 01/25/25 0112       Updated: 01/25/25 0123     Narrative:       CERVICAL SPINE CT WITHOUT CONTRAST     INDICATION: neck pain, evaluate for pseudoarthrosis; M54.9-Dorsalgia,  unspecified; G89.29-Other chronic pain; R32-Unspecified urinary  incontinence; M54.2-Cervicalgia     COMPARISON: Cervical spine MRI 9/19/2024.     TECHNIQUE:  Non-contrast cervical spine CT protocol with sagittal and  coronal reconstructions of the cervical spine.   Radiation dose  reduction techniques were utilized, including automated exposure  control, and exposure modulation based on body size.     FINDINGS:     Redemonstrated loss and slight reversal of cervical lordosis, and postop  changes from C5 corpectomy and anterior fusion from C4-C6. No convincing  evidence of acute alignment abnormality.     There is no evidence of acute cervical spine fracture, or of anterior  fusion device loosening or failure. No CT evidence of lytic or blastic  bone lesion.  The adjacent soft tissue structures and lung apices  demonstrate no acute abnormality.     C2-3: No substantial canal or foraminal stenosis.     C3-4: Mild degenerative canal stenosis, and severe right and moderate to  severe left foraminal stenosis.     C4-5: Prior corpectomy and strut grafting, no substantial canal  stenosis. There is mild to moderate right and no left foraminal  stenosis.     C5-6: Postoperative change with no evidence of substantial canal or  foraminal stenosis.     C6-7: Mild degenerative canal stenosis, and mild to moderate bilateral  degenerative foraminal compromise.     C7-T1: There is mild degenerative canal stenosis, and mild left, and  mild to moderate right foraminal narrowing.        Impression:          Degenerative and postoperative changes, no evidence of acute  abnormality. No convincing substantial interval change since cervical  spine MRI 9/19/2024.                       This report was finalized on 1/25/2025 1:19 AM by Dr. Estevan Owen M.D on Workstation: [id number]                   I have reviewed the medications.      Discharge Medications          ASK your doctor about these medications         Instructions Start Date   acetaminophen 325 MG tablet  Commonly known as: TYLENOL    650 mg        albuterol sulfate  (90 Base) MCG/ACT inhaler  Commonly known as:  PROVENTIL HFA;VENTOLIN HFA;PROAIR HFA    INHALE 1 TO 2 PUFFS INTO THE LUNGS FOUR TIMES DAILY AS DIRECTED        amLODIPine 5 MG tablet  Commonly known as: NORVASC    5 mg, Oral, Nightly        atorvastatin 40 MG tablet  Commonly known as: LIPITOR    40 mg, Oral, Nightly        busPIRone 5 MG tablet  Commonly known as: BUSPAR    5 mg, Oral, 3 Times Daily        cyclobenzaprine 10 MG tablet  Commonly known as: FLEXERIL          DULoxetine 30 MG capsule  Commonly known as: CYMBALTA    30 mg, Oral, Daily        DULoxetine 60 MG capsule  Commonly known as: CYMBALTA    60 mg, Oral, Daily, Take with 30 mg capsule for a TDD of 90 mg        fluticasone 50 MCG/ACT nasal spray  Commonly known as: FLONASE    2 sprays, Nasal, Daily        gabapentin 300 MG capsule  Commonly known as: NEURONTIN    300 mg, Oral, 3 Times Daily        guaiFENesin 600 MG 12 hr tablet  Commonly known as: MUCINEX    600 mg, 2 Times Daily        hydrALAZINE 25 MG tablet  Commonly known as: APRESOLINE    25 mg, 3 Times Daily        hydroCHLOROthiazide 25 MG tablet    12.5 mg, Daily        HYDROcodone-acetaminophen 5-325 MG per tablet  Commonly known as: NORCO    1 tablet, Oral, Every 6 Hours PRN        losartan 100 MG tablet  Commonly known as: COZAAR    100 mg, Oral, Daily        metFORMIN  MG 24 hr tablet  Commonly known as: GLUCOPHAGE-XR    500 mg, Nightly        methocarbamol 500 MG tablet  Commonly known as: ROBAXIN    TAKE 1 TABLET BY MOUTH FOUR TIMES DAILY FOR MUSCULOSKELETAL PAIN        methylPREDNISolone 4 MG dose pack  Commonly known as: MEDROL    Take as directed on package instructions.        metoprolol succinate XL 50 MG 24 hr tablet  Commonly known as: TOPROL-XL    50 mg, Oral, Daily        Multivitamin Adult tablet tablet  Generic drug: multivitamin with minerals    1 tablet, Daily        pantoprazole 40 MG EC tablet  Commonly known as: PROTONIX    40 mg, Oral, Every Early Morning        Vitamin D (Ergocalciferol) 09366 units  capsule                    ---------------------------------------------------------------------------------------------     Assessment & Plan  Assessment/Problem List    Acute low back pain    Acute neck pain    Urinary incontinence without sensory awareness        Plan:     Right-sided cervical radiculopathy  Acute low back pain:  JACQUE consult, cleared for discharge home with outpatient follow-up  MRI cervical and lumbar spine show bone spurring but no acute fracture, malalignment or infectious process appreciated        HTN:  Continue home meds     HLD:  Continue home meds     Depression/ anxiety:  Continue home meds        Disposition: Home     Follow-up after Discharge: PCP & JACQUE     This note will serve as a discharge summary     Erin Aguillon, APRN 01/25/25 04:45 EST     I have worn appropriate PPE during this patient encounter, sanitized my hands both with entering and exiting patient's room.        35 minutes has been spent by [facility] providers in the care of this patient while under observation status                                Revision History

## 2025-01-25 NOTE — PLAN OF CARE
Problem: Adult Inpatient Plan of Care  Goal: Patient-Specific Goal (Individualized)  Outcome: Not Progressing  Flowsheets (Taken 1/25/2025 0614)  Anxieties, Fears or Concerns: pt reports feelings of anxiety and fear over what is causing increased neck pain and is concerned about the possibilty of another surgery  Goal: Optimal Comfort and Wellbeing  Outcome: Not Progressing  Intervention: Monitor Pain and Promote Comfort  Recent Flowsheet Documentation  Taken 1/25/2025 0115 by Michelle Bey RN  Pain Management Interventions:   awakened for pain meds per patient request   pain management plan reviewed with patient/caregiver   pain medication given   position adjusted   quiet environment facilitated  Taken 1/24/2025 2117 by Michelle Bey RN  Pain Management Interventions:   care clustered   quiet environment facilitated   relaxation techniques promoted  Taken 1/24/2025 2024 by Michelle Bey RN  Pain Management Interventions:   care clustered   pain medication given   pillow support provided   position adjusted  Taken 1/24/2025 1930 by Michelle Bey RN  Pain Management Interventions:   around-the-clock dosing utilized   care clustered   diversional activity provided   pain management plan reviewed with patient/caregiver   pain medication given   pillow support provided   position adjusted   quiet environment facilitated   relaxation techniques promoted  Intervention: Provide Person-Centered Care  Recent Flowsheet Documentation  Taken 1/25/2025 0115 by Michelle Bey RN  Trust Relationship/Rapport:   care explained   choices provided   emotional support provided   empathic listening provided   questions answered   questions encouraged   reassurance provided   thoughts/feelings acknowledged  Taken 1/24/2025 1930 by Michelle Bey RN  Trust Relationship/Rapport:   care explained   choices provided   emotional support provided   empathic listening provided   questions answered   questions  encouraged   reassurance provided   thoughts/feelings acknowledged     Problem: Adult Inpatient Plan of Care  Goal: Optimal Comfort and Wellbeing  Outcome: Not Progressing  Intervention: Monitor Pain and Promote Comfort  Recent Flowsheet Documentation  Taken 1/25/2025 0115 by Michelle Bey RN  Pain Management Interventions:   awakened for pain meds per patient request   pain management plan reviewed with patient/caregiver   pain medication given   position adjusted   quiet environment facilitated  Taken 1/24/2025 2117 by Michelle Bey RN  Pain Management Interventions:   care clustered   quiet environment facilitated   relaxation techniques promoted  Taken 1/24/2025 2024 by Michelle Bey RN  Pain Management Interventions:   care clustered   pain medication given   pillow support provided   position adjusted  Taken 1/24/2025 1930 by Michelle Bey RN  Pain Management Interventions:   around-the-clock dosing utilized   care clustered   diversional activity provided   pain management plan reviewed with patient/caregiver   pain medication given   pillow support provided   position adjusted   quiet environment facilitated   relaxation techniques promoted  Intervention: Provide Person-Centered Care  Recent Flowsheet Documentation  Taken 1/25/2025 0115 by Michelle Bey RN  Trust Relationship/Rapport:   care explained   choices provided   emotional support provided   empathic listening provided   questions answered   questions encouraged   reassurance provided   thoughts/feelings acknowledged  Taken 1/24/2025 1930 by Michelle Bey RN  Trust Relationship/Rapport:   care explained   choices provided   emotional support provided   empathic listening provided   questions answered   questions encouraged   reassurance provided   thoughts/feelings acknowledged   Goal Outcome Evaluation:  Plan of Care Reviewed With: patient        Progress: improving     Pt admitted for increased neck pain s/p cervical  fusion. Has reported pain 9-10 all shift despite Q2H dilaudid administration. Reports anxiety and fear over new pain and possibility of needing surgery again. No changes in baseline neuropathy, no urinary incontinence. Pt a/ox4, on RA, VSS. Able to get out of bed unassisted to walk to the bathroom. MRI results pending. Neurosurgery consult planned for this am.

## 2025-01-25 NOTE — PROGRESS NOTES
YAQUELIN KUMAR Attestation Note    I supervised care provided by the midlevel provider.    The WILSON and I have discussed this patient's history, physical exam, and treatment plan. I have reviewed the documentation and personally had a face to face interaction with the patient  I affirm the documentation and agree with the treatment and plan. I provided a substantive portion of the care of this patient.  I personally performed the physical exam, in its entirety.  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  She says the pain persists this morning.  Predominantly on the left lower back and radiating towards the left leg.  No new change in sensation to the left leg but has chronic paresthesias.  Denies fevers.  No other new complaints this morning.    Physical Exam:  General: No acute distress.  HENT: NCAT  Eyes: no scleral icterus.  Neck: Painless range of motion  CV: Pink warm and well-perfused throughout  Respiratory: No distress or increased work of breathing  Abdomen: soft, no focal tenderness or rigidity  Musculoskeletal: no deformity.  Neuro: Alert, speech fluent and easily intelligible  Skin: warm, dry.    Assessment and Plan:  Acute low back pain: MRI cervical spine and lumbar spine are completed but report is pending.  Neurosurgery consulted.  Multimodal analgesia    Hypertension/hyperlipidemia: Continue home medications, monitor vital signs

## 2025-01-27 ENCOUNTER — TELEPHONE (OUTPATIENT)
Dept: NEUROSURGERY | Facility: CLINIC | Age: 65
End: 2025-01-27

## 2025-01-27 ENCOUNTER — TRANSITIONAL CARE MANAGEMENT TELEPHONE ENCOUNTER (OUTPATIENT)
Dept: CALL CENTER | Facility: HOSPITAL | Age: 65
End: 2025-01-27
Payer: COMMERCIAL

## 2025-01-27 NOTE — TELEPHONE ENCOUNTER
"  Caller: NUVIA    Relationship: SELF    Best call back number: [phone]    What is the best time to reach you: ANYTIME    What was the call regarding: PATIENT CALLED TO SCHEDULE HOSPITAL F/U W DR CRAIG - 1ST AVAILABLE 02.26.25 - WANTS TO KNOW IF SHE CAN BE SCHEDULED SOONER -STATES SHE DOES NOT THINK SHE CAN WAIT UNTIL THEN - WAS SEEN IN THE HOSPITAL OVER THE [facility] BY EVELIN 01.1.25.25 \"okay for discharge and outpatient follow-up with Dr. Craig.\"    PLEASE ADVISE IF PATIENT IS NEEDED TO BE SCHEDULED SOONER  THANK YOU  "

## 2025-01-27 NOTE — OUTREACH NOTE
Call Center TCM Note      Flowsheet Row Responses   [facility] patient discharged from? [place]   Does the patient have one of the following disease processes/diagnoses(primary or secondary)? Other   TCM attempt successful? Yes   Call start time 1509   Call end time 1510   Discharge diagnosis Acute low back pain   Person spoke with today (if not patient) and relationship pt   Meds reviewed with patient/caregiver? Yes   Is the patient having any side effects they believe may be caused by any medication additions or changes? No   Does the patient have all medications ordered at discharge? Yes   Is the patient taking all medications as directed (includes completed medication regime)? Yes   Comments Neurosurgery 1/30/25   Does the patient have an appointment with their PCP within 7-14 days of discharge? No   Nursing Interventions Patient declined scheduling/rescheduling appointment at this time   Psychosocial issues? No   Did the patient receive a copy of their discharge instructions? Yes   Nursing interventions Reviewed instructions with patient   What is the patient's perception of their health status since discharge? Improving   Is the patient/caregiver able to teach back signs and symptoms related to disease process for when to call PCP? Yes   Is the patient/caregiver able to teach back signs and symptoms related to disease process for when to call 911? Yes   Is the patient/caregiver able to teach back the hierarchy of who to call/visit for symptoms/problems? PCP, Specialist, Home health nurse, Urgent Care, ED, 911 Yes   If the patient is a current smoker, are they able to teach back resources for cessation? Not a smoker   TCM call completed? Yes   Wrap up additional comments Pt reports no change in back pain. Reviewed AVS/meds with pt. Pt has a fu appt with Neurosurgery 1/30/25, and pt wants to wait until she sees surgeon before making appt with PCP   Call end time 1510   Would this patient benefit from a  Referral to [facility] Social Work? No   Is the patient interested in additional calls from an ambulatory ? No            Brenda Atkinson RN    1/27/2025, 15:12 EST

## 2025-01-28 ENCOUNTER — OFFICE VISIT (OUTPATIENT)
Age: 65
End: 2025-01-28
Payer: COMMERCIAL

## 2025-01-28 VITALS
OXYGEN SATURATION: 97 % | BODY MASS INDEX: 27.8 KG/M2 | DIASTOLIC BLOOD PRESSURE: 80 MMHG | SYSTOLIC BLOOD PRESSURE: 140 MMHG | HEIGHT: 66 IN | WEIGHT: 173 LBS | HEART RATE: 79 BPM

## 2025-01-28 DIAGNOSIS — E78.5 HYPERLIPIDEMIA, UNSPECIFIED HYPERLIPIDEMIA TYPE: ICD-10-CM

## 2025-01-28 DIAGNOSIS — I10 ESSENTIAL HYPERTENSION: Primary | ICD-10-CM

## 2025-01-28 PROCEDURE — 99214 OFFICE O/P EST MOD 30 MIN: CPT | Performed by: STUDENT IN AN ORGANIZED HEALTH CARE EDUCATION/TRAINING PROGRAM

## 2025-01-28 RX ORDER — METOPROLOL SUCCINATE 50 MG/1
50 TABLET, EXTENDED RELEASE ORAL DAILY
Qty: 90 TABLET | Refills: 3 | Status: SHIPPED | OUTPATIENT
Start: 2025-01-28

## 2025-01-28 RX ORDER — AMLODIPINE BESYLATE 10 MG/1
10 TABLET ORAL NIGHTLY
Qty: 90 TABLET | Refills: 3 | Status: SHIPPED | OUTPATIENT
Start: 2025-01-28

## 2025-01-28 NOTE — PROGRESS NOTES
Subjective:     Encounter Date:1/28/2025      Patient ID: Kristina Yates is a 64 y.o. female.    Chief Complaint:  HTN    HPI:   64 y.o. female, former smoker, with hypertension, diabetes, GERD, hyperlipidemia, COPD, cauda equina syndrome requiring emergent spinal fusion in June 2023 now status post anterior cervical spine fusion in September 2024 who presents for follow-up of hypertension.  Patient was last seen in October and her blood pressure was elevated at that time so we started amlodipine 5 mg daily in addition to her other medications.  She had been doing relatively well however over the past weekend had to go to the ED due to significant back pain.  Her blood pressure was elevated at that time likely secondary to her discomfort.  She was treated with pain medications and discharged home.  Currently her pain is better controlled.  Her blood pressure today is 140/80.  With respect to her medications, she has been on amlodipine 5 mg daily, Toprol 50 mg twice daily, losartan 100 mg daily, hydrochlorothiazide 12.5 mg daily, hydralazine 25 mg 3 times daily.  She has not had any issues with this regimen.      The following portions of the patient's history were reviewed and updated as appropriate: allergies, current medications, past family history, past medical history, past social history, past surgical history and problem list.     REVIEW OF SYSTEMS:   All systems reviewed.  Pertinent positives identified in HPI.  All other systems are negative.    Past Medical History:   Diagnosis Date    Alcohol abuse     in recovery, sober since 2012    Anxiety     Arthritis     Asthma     Edmonsd esophagus     Blurred vision     Cervical disc disease     Cervical neuritis     Chronic pain     NECK AND BACK    COPD (chronic obstructive pulmonary disease)     STATES NEVER DIAGNOSED WITH    Depression     Diabetes mellitus     Type 2    DJD (degenerative joint disease), cervical     Foot spasms     FROM BACK SURGERY    GERD  (gastroesophageal reflux disease)     Hiatal hernia     Hyperlipidemia     Hypertension     Persistent headaches     ?BLOOD PRESSURE OR NECK RELATED???    Seasonal allergies     Spinal headache     Spinal stenosis        Family History   Problem Relation Age of Onset    Cancer Mother         nonhodkinds lymphoma    Heart disease Father     Drug abuse Sister     Heart disease Brother     Lupus Maternal Aunt     Malig Hyperthermia Neg Hx        Social History     Socioeconomic History    Marital status:     Number of children: 2   Tobacco Use    Smoking status: Former     Current packs/day: 0.50     Average packs/day: 0.5 packs/day for 20.1 years (10.0 ttl pk-yrs)     Types: Cigarettes     Start date: 1/1/2005    Smokeless tobacco: Never   Vaping Use    Vaping status: Never Used   Substance and Sexual Activity    Alcohol use: No     Comment: sober since 2012, worsk 12 steps    Drug use: No    Sexual activity: Yes     Partners: Male       Allergies   Allergen Reactions    Fluoxetine Hives     Hives    Sulfa Antibiotics Hives     THROAT CLOSES    Zofran [Ondansetron Hcl] Hives     THROAT CLOSES    Lisinopril Cough       Past Surgical History:   Procedure Laterality Date    ANTERIOR CERVICAL DISCECTOMY W/ FUSION  11/2003    C5-6, C6-7    ANTERIOR CERVICAL DISCECTOMY W/ FUSION Right 9/16/2024    Procedure: CERVICAL FOUR TO SIX DISCECTOMY ANTERIOR WITH FUSION WITH CERVICAL FIVE CORPECTOMY AND REMOVAL OF PREVIOUS HARDWARE;  Surgeon: Alex Ortiz MD;  Location: [facility] MAIN OR;  Service: Neurosurgery;  Laterality: Right;    CERCLAGE CERVIX      CERVICAL EPIDURAL  2007    CERVICAL FUSION      DILATATION AND CURETTAGE      ENDOMETRIAL ABLATION  2007    ENDOSCOPY      MULTIPLE    ENDOSCOPY N/A 07/05/2016    Procedure: ESOPHAGOGASTRODUODENOSCOPY WITH COLD BIOPSIES;  Surgeon: Jose Mcclellan MD;  Location: [facility] ENDOSCOPY;  Service:     ESOPHAGEAL DILATATION  2009    HAND SURGERY Bilateral     CARTILAGE    LUMBAR FUSION       LUMBAR LAMINECTOMY WITH FUSION N/A 06/30/2023    Procedure: OPEN LUMBAR FOUR TO FIVE TRANSFORAMINAL LUMBAR INTERBODY FUSION;  Surgeon: Alex Ortiz MD;  Location: [facility];  Service: Neurosurgery;  Laterality: N/A;    NISSEN FUNDOPLICATION LAPAROSCOPIC  03/2012    SACROILIAC JOINT FUSION Right 06/04/2024    Procedure: RIGHT PERCUTANEOUS ARTHRODESIS SACROILIAC JOINT, NEUROMONITORING;  Surgeon: Alex Ortiz MD;  Location: [facility];  Service: Neurosurgery;  Laterality: Right;    TONSILLECTOMY         Procedures       Objective:         Vitals:    01/28/25 1115   BP: 140/80   Pulse: 79   SpO2: 97%       PHYSICAL EXAM:  GEN: well appearing, in NAD   HEENT: NCAT, EOMI, moist mucus membranes   Respiratory: CTAB, no wheezes, rales or rhonchi  CV: normal rate, regular rhythm, normal S1, S2, 2/6 systolic murmur in RUSB, +2 radial pulses b/l  GI: soft, nontender, nondistended  MSK: no edema  Skin: no rash, warm, dry  Heme/Lymph: no bruising or bleeding  Neuro: Alert and Oriented x 3, grossly normal motor function        Assessment:         (I10) Essential hypertension    (E78.5) Hyperlipidemia, unspecified hyperlipidemia type    64 y.o. female, former smoker, with hypertension, diabetes, GERD, hyperlipidemia, COPD, cauda equina syndrome requiring emergent spinal fusion in June 2023 now status post anterior cervical spine fusion in September 2024 who presents for follow-up of hypertension.       Plan:       #Hypertension  Currently controlled.  - increase amlodipine to 10 mg daily  - decrease Toprol back to 50 mg daily  - Continue losartan 100 mg daily, hydrochlorothiazide 12.5 mg daily, hydralazine 25 mg TID      #Hyperlipidemia  Last lipid panel about 2 weeks ago with total cholesterol 206, LDL 99, HDL 57  - Continue atorvastatin 40 mg daily    DAYNE Garcias, thank you very much for referring this kind patient to me. Please call me with any questions or concerns. I will see the patient again in the  office in 6 months or earlier as needed.         Pedro Luis Alvarez MD, FSCAI  01/28/25  [facility]    Outpatient Encounter Medications as of 1/28/2025   Medication Sig Dispense Refill    acetaminophen (TYLENOL) 325 MG tablet 2 tablets.      albuterol sulfate  (90 Base) MCG/ACT inhaler INHALE 1 TO 2 PUFFS INTO THE LUNGS FOUR TIMES DAILY AS DIRECTED      amLODIPine (NORVASC) 10 MG tablet Take 1 tablet by mouth Every Night. 90 tablet 3    atorvastatin (LIPITOR) 40 MG tablet Take 1 tablet by mouth Every Night.      busPIRone (BUSPAR) 5 MG tablet Take 1 tablet by mouth 3 (Three) Times a Day. 90 tablet 3    DULoxetine (CYMBALTA) 30 MG capsule Take 1 capsule by mouth Daily. 90 capsule 3    DULoxetine (CYMBALTA) 60 MG capsule Take 1 capsule by mouth Daily for 90 days. Take with 30 mg capsule for a TDD of 90 mg 90 capsule 0    fluticasone (FLONASE) 50 MCG/ACT nasal spray 2 sprays into the nostril(s) as directed by provider Daily. 18.2 mL 5    gabapentin (NEURONTIN) 300 MG capsule Take 1 capsule by mouth Daily with breakfast and dinner and 2 capsules at bedtime for 3 days, THEN 2 capsules Daily with breakfast and at bedtime and 1 capsule at dinner for 3 days, THEN 2 capsules capsules three times daily. 180 capsule 0    guaiFENesin (MUCINEX) 600 MG 12 hr tablet Take 1 tablet by mouth 2 (Two) Times a Day.      hydrALAZINE (APRESOLINE) 25 MG tablet Take 1 tablet by mouth 3 (Three) Times a Day.      hydroCHLOROthiazide 25 MG tablet Take 0.5 tablets by mouth Daily.      losartan (COZAAR) 100 MG tablet Take 1 tablet by mouth Daily. 90 tablet 1    metFORMIN ER (GLUCOPHAGE-XR) 500 MG 24 hr tablet Take 1 tablet by mouth Every Night.      methocarbamol (ROBAXIN) 500 MG tablet TAKE 1 TABLET BY MOUTH FOUR TIMES DAILY FOR MUSCULOSKELETAL PAIN      methocarbamol (ROBAXIN) 500 MG tablet Take 1 tablet by mouth 4 (Four) Times a Day As Needed for Muscle Spasms. 30 tablet 0    methylPREDNISolone (MEDROL) 4 MG dose pack Take as  directed on package instructions. 21 tablet 0    metoprolol succinate XL (TOPROL-XL) 50 MG 24 hr tablet Take 1 tablet by mouth Daily. 90 tablet 3    Multiple Vitamins-Minerals (MULTIVITAMIN ADULT) tablet Take 1 tablet by mouth Daily.      oxyCODONE-acetaminophen (PERCOCET) 5-325 MG per tablet Take 1 tablet by mouth Every 4 (Four) Hours As Needed for Moderate Pain. 12 tablet 0    pantoprazole (PROTONIX) 40 MG EC tablet Take 1 tablet by mouth Every Morning.      Vitamin D, Ergocalciferol, 61512 units capsule       [DISCONTINUED] amLODIPine (NORVASC) 5 MG tablet Take 1 tablet by mouth Every Night. 90 tablet 3    [DISCONTINUED] metoprolol succinate XL (TOPROL-XL) 50 MG 24 hr tablet Take 1 tablet by mouth Daily. (Patient taking differently: Take 1 tablet by mouth 2 (Two) Times a Day.) 90 tablet 0     No facility-administered encounter medications on file as of 1/28/2025.

## 2025-01-29 NOTE — PROGRESS NOTES
Patient ID: Kristina Yates is a 64 y.o. female is here today for a hospital follow-up on her ongoing neck pain.    Subjective     The patient is here in regards to   Chief Complaint   Patient presents with    Neck Pain    Follow-up       History of Present Illness  Doing better after steroid pack and increase in gabapentin after brief hospitalization last week.  Her neck pain slightly improved as well as her neuropathy in her hands.  She still continues to have pain and neuropathy daily      While in the room and during my examination of the patient I wore a mask and eye protection.  I washed my hands before and after this patient encounter.  The patient was also wearing a mask.    The following portions of the patient's history were reviewed and updated as appropriate: allergies, current medications, past family history, past medical history, past social history, past surgical history and problem list.    Review of Systems   Eyes:  Negative for visual disturbance.   Gastrointestinal:  Positive for nausea. Negative for vomiting.   Musculoskeletal:  Positive for neck pain and neck stiffness.   Neurological:  Positive for weakness, light-headedness (balance issues), numbness and headaches. Negative for dizziness.        Past Medical History:   Diagnosis Date    Alcohol abuse     in recovery, sober since 2012    Anxiety     Arthritis     Asthma     Edmonds esophagus     Blurred vision     Cervical disc disease     Cervical neuritis     Chronic pain     NECK AND BACK    COPD (chronic obstructive pulmonary disease)     STATES NEVER DIAGNOSED WITH    Depression     Diabetes mellitus     Type 2    DJD (degenerative joint disease), cervical     Foot spasms     FROM BACK SURGERY    GERD (gastroesophageal reflux disease)     Hiatal hernia     Hyperlipidemia     Hypertension     Persistent headaches     ?BLOOD PRESSURE OR NECK RELATED???    Seasonal allergies     Spinal headache     Spinal stenosis        Allergies   Allergen  Reactions    Fluoxetine Hives     Hives    Sulfa Antibiotics Hives     THROAT CLOSES    Zofran [Ondansetron Hcl] Hives     THROAT CLOSES    Lisinopril Cough       Family History   Problem Relation Age of Onset    Cancer Mother         nonhodkinds lymphoma    Heart disease Father     Drug abuse Sister     Heart disease Brother     Lupus Maternal Aunt     Malig Hyperthermia Neg Hx        Social History     Socioeconomic History    Marital status:     Number of children: 2   Tobacco Use    Smoking status: Some Days     Current packs/day: 0.50     Average packs/day: 0.5 packs/day for 20.1 years (10.0 ttl pk-yrs)     Types: Cigarettes     Start date: 1/1/2005    Smokeless tobacco: Never   Vaping Use    Vaping status: Never Used   Substance and Sexual Activity    Alcohol use: No     Comment: sober since 2012, worsk 12 steps    Drug use: No    Sexual activity: Yes     Partners: Male       Past Surgical History:   Procedure Laterality Date    ANTERIOR CERVICAL DISCECTOMY W/ FUSION  11/2003    C5-6, C6-7    ANTERIOR CERVICAL DISCECTOMY W/ FUSION Right 9/16/2024    Procedure: CERVICAL FOUR TO SIX DISCECTOMY ANTERIOR WITH FUSION WITH CERVICAL FIVE CORPECTOMY AND REMOVAL OF PREVIOUS HARDWARE;  Surgeon: Alex Ortiz MD;  Location: [facility] OR;  Service: Neurosurgery;  Laterality: Right;    CERCLAGE CERVIX      CERVICAL EPIDURAL  2007    CERVICAL FUSION      DILATATION AND CURETTAGE      ENDOMETRIAL ABLATION  2007    ENDOSCOPY      MULTIPLE    ENDOSCOPY N/A 07/05/2016    Procedure: ESOPHAGOGASTRODUODENOSCOPY WITH COLD BIOPSIES;  Surgeon: Jose Mcclellan MD;  Location: [facility] ENDOSCOPY;  Service:     ESOPHAGEAL DILATATION  2009    HAND SURGERY Bilateral     CARTILAGE    LUMBAR FUSION      LUMBAR LAMINECTOMY WITH FUSION N/A 06/30/2023    Procedure: OPEN LUMBAR FOUR TO FIVE TRANSFORAMINAL LUMBAR INTERBODY FUSION;  Surgeon: Alex Ortiz MD;  Location: [facility] MAIN OR;  Service: Neurosurgery;  Laterality: N/A;    NISSEN  FUNDOPLICATION LAPAROSCOPIC  03/2012    SACROILIAC JOINT FUSION Right 06/04/2024    Procedure: RIGHT PERCUTANEOUS ARTHRODESIS SACROILIAC JOINT, NEUROMONITORING;  Surgeon: Alex Ortiz MD;  Location: [facility];  Service: Neurosurgery;  Laterality: Right;    TONSILLECTOMY           Objective     Vitals:    01/30/25 1103   BP: 168/92   Pulse: 104   Resp: 18   Temp: 97 °F (36.1 °C)   SpO2: 94%     Body mass index is 27.47 kg/m².    Physical Exam  Constitutional:       General: She is awake.   HENT:      Head: Normocephalic and atraumatic.   Eyes:      General: Lids are normal.      Extraocular Movements: Extraocular movements intact.      Conjunctiva/sclera: Conjunctivae normal.      Pupils: Pupils are equal, round, and reactive to light.   Pulmonary:      Breath sounds: Normal breath sounds.   Abdominal:      Palpations: Abdomen is soft.   Musculoskeletal:         General: Normal range of motion.   Skin:     General: Skin is warm and dry.   Neurological:      Mental Status: She is alert.      Coordination: Coordination is intact.      Deep Tendon Reflexes: Reflexes are normal and symmetric.   Psychiatric:         Behavior: Behavior is cooperative.         Neurological Exam  Mental Status  Awake and alert. Oriented to person, place and time.    Cranial Nerves  CN II: Visual acuity is normal. Visual fields full to confrontation.  CN III, IV, VI: Extraocular movements intact bilaterally. Normal lids and orbits bilaterally. Pupils equal round and reactive to light bilaterally.  CN V: Facial sensation is normal.  CN VII: Full and symmetric facial movement.  CN IX, X: Palate elevates symmetrically. Normal gag reflex.  CN XI: Shoulder shrug strength is normal.  CN XII: Tongue midline without atrophy or fasciculations.    Motor                                               Right                     Left  Deltoid                                   5                          5   Biceps                                   5                           5   Triceps                                  5                          5   Iliopsoas                               5                          5   Quadriceps                           5                          5   Hamstring                             5                          5   Gastrocnemius                     5                           5   Anterior tibialis                      5                          5    Sensory  Light touch abnormality: Pinprick abnormality: Temperature abnormality:     Reflexes  Deep tendon reflexes are 2+ and symmetric in all four extremities.    Coordination    Finger-to-nose, rapid alternating movements and heel-to-shin normal bilaterally without dysmetria.    Gait  Normal casual, toe, heel and tandem gait.      Assessment & Plan   Independent Review of Radiographic Studies:      I personally reviewed the images from the following studies    FINDINGS:    CT: CT of the cervical spine was reviewed and shows bony bridging across corpectomy cage without obvious solid fusion although there is some bony growth within the cage.  No evidence of posterior lateral fusion  MR: MRI of the cervical spine wo contrast was reviewed and shows disc osteophyte complexes that are residual and unchanged compared to previous MRI, there is unchanged encephalomalacia within the spinal cord secondary to spinal cord injury after previous corpectomy procedure    Assessment/Plan: Has stable CT and MRI findings.  I do worry that she has pseudoarthrosis secondary to her previous smoking history and although she is not a current smoker her blood supply may be somewhat compromised as well as her bone density.  I recommend that she start with calcium supplements and D3 K2 supplementation as and I think she may benefit from a bone growth stimulator.    In addition her neuropathy continues to be a problem and I think that is going to take a very long time to heal from if at all.  I do think  that it is in her best interest to continue participating with physical therapy, Occupational Therapy and return to the gym without activity striction and participate in CSW.    Medical Decision Making:      Follow-up in 3 months         Diagnoses and all orders for this visit:    1. Cervical spinal cord injury without spinal bone injury, sequela (Primary)  -     Ambulatory Referral to Occupational Therapy for Evaluation & Treatment  -     Ambulatory Referral to Physical Therapy for Evaluation & Treatment    2. Cervicalgia  -     Ambulatory Referral to Occupational Therapy for Evaluation & Treatment  -     Ambulatory Referral to Physical Therapy for Evaluation & Treatment             Patient Instructions/Recommendations:    Call with any questions or concerns      Alex Ortiz MD  01/30/25  11:49 EST

## 2025-01-30 ENCOUNTER — OFFICE VISIT (OUTPATIENT)
Dept: NEUROSURGERY | Facility: CLINIC | Age: 65
End: 2025-01-30
Payer: COMMERCIAL

## 2025-01-30 VITALS
BODY MASS INDEX: 27.35 KG/M2 | RESPIRATION RATE: 18 BRPM | HEART RATE: 104 BPM | DIASTOLIC BLOOD PRESSURE: 92 MMHG | TEMPERATURE: 97 F | SYSTOLIC BLOOD PRESSURE: 168 MMHG | OXYGEN SATURATION: 94 % | HEIGHT: 66 IN | WEIGHT: 170.2 LBS

## 2025-01-30 DIAGNOSIS — M54.2 CERVICALGIA: ICD-10-CM

## 2025-01-30 DIAGNOSIS — S14.109S: Primary | ICD-10-CM

## 2025-02-06 ENCOUNTER — APPOINTMENT (OUTPATIENT)
Dept: CT IMAGING | Facility: HOSPITAL | Age: 65
End: 2025-02-06
Payer: COMMERCIAL

## 2025-02-06 ENCOUNTER — TELEPHONE (OUTPATIENT)
Dept: NEUROSURGERY | Facility: CLINIC | Age: 65
End: 2025-02-06
Payer: COMMERCIAL

## 2025-02-06 ENCOUNTER — HOSPITAL ENCOUNTER (EMERGENCY)
Facility: HOSPITAL | Age: 65
Discharge: HOME | End: 2025-02-06
Attending: EMERGENCY MEDICINE
Payer: COMMERCIAL

## 2025-02-06 VITALS
DIASTOLIC BLOOD PRESSURE: 77 MMHG | HEART RATE: 86 BPM | HEIGHT: 66 IN | OXYGEN SATURATION: 97 % | RESPIRATION RATE: 16 BRPM | TEMPERATURE: 97.7 F | WEIGHT: 170.19 LBS | BODY MASS INDEX: 27.35 KG/M2 | SYSTOLIC BLOOD PRESSURE: 147 MMHG

## 2025-02-06 DIAGNOSIS — M54.2 ACUTE NECK PAIN: Primary | ICD-10-CM

## 2025-02-06 PROCEDURE — 96374 THER/PROPH/DIAG INJ IV PUSH: CPT

## 2025-02-06 PROCEDURE — 72125 CT NECK SPINE W/O DYE: CPT

## 2025-02-06 PROCEDURE — 25010000002 HYDROMORPHONE PER 4 MG: Performed by: EMERGENCY MEDICINE

## 2025-02-06 PROCEDURE — 70450 CT HEAD/BRAIN W/O DYE: CPT

## 2025-02-06 PROCEDURE — 99284 EMERGENCY DEPT VISIT MOD MDM: CPT

## 2025-02-06 PROCEDURE — 96376 TX/PRO/DX INJ SAME DRUG ADON: CPT

## 2025-02-06 RX ORDER — CYCLOBENZAPRINE HCL 5 MG
5-10 TABLET ORAL EVERY 8 HOURS PRN
Qty: 15 TABLET | Refills: 0 | Status: SHIPPED | OUTPATIENT
Start: 2025-02-06

## 2025-02-06 RX ORDER — HYDROMORPHONE HYDROCHLORIDE 1 MG/ML
0.5 INJECTION, SOLUTION INTRAMUSCULAR; INTRAVENOUS; SUBCUTANEOUS ONCE
Status: COMPLETED | OUTPATIENT
Start: 2025-02-06 | End: 2025-02-06

## 2025-02-06 RX ADMIN — HYDROMORPHONE HYDROCHLORIDE 0.5 MG: 1 INJECTION, SOLUTION INTRAMUSCULAR; INTRAVENOUS; SUBCUTANEOUS at 18:18

## 2025-02-06 RX ADMIN — HYDROMORPHONE HYDROCHLORIDE 0.5 MG: 1 INJECTION, SOLUTION INTRAMUSCULAR; INTRAVENOUS; SUBCUTANEOUS at 20:02

## 2025-02-06 NOTE — ED PROVIDER NOTES
EMERGENCY DEPARTMENT MD ATTESTATION NOTE    Room Number:  05/05  PCP: Nyla Mejia APRN  Independent Historians: Patient    HPI:  A complete HPI/ROS/PMH/PSH/SH/FH are unobtainable due to: None    Chronic or social conditions impacting patient care (Social Determinants of Health): None      Context: Kristina Yates is a 64 y.o. female with a medical history of diabetes, arthritis, hypertension, spinal stenosis, cervical radiculopathy and peripheral neuropathy who presents to the ED c/o acute severe pain in the right sided neck as well as some lightheadedness and difficulties walking.  Patient has a significant neurosurgical history and has had prior cervical spine operation.  She follows with Dr. Ortiz in the office.  Today she completed her usual OT therapy session.  However her pain seems to be worsening in her right sided neck and she is having worsening numbness in the bilateral arms.  She denies any fevers or headaches.  Denies any bowel or bladder incontinence.      Review of prior external notes (non-ED) -and- Review of prior external test results outside of this encounter: I independently reviewed the neurosurgery progress note from January 30, 2025.  She had follow-up care for neck pain at that time.  Assessment and plan indicates that she had stable CT and MRI findings.  There was concerned that she has pseudoarthrosis second to her previous smoking history.  He recommended that she start with calcium supplements and D3 K2 supplementation.  Neuropathy was also noted to be a continued problem.    Prescription drug monitoring program review: BEL reviewed by Jaden Ambrocio MD   N/A and BEL query complete and reviewed. Patient receives regular prescriptions for controlled substances.      PHYSICAL EXAM    I have reviewed the triage vital signs and nursing notes.    ED Triage Vitals   Temp Heart Rate Resp BP SpO2   02/06/25 1717 02/06/25 1717 02/06/25 1717 02/06/25 1720 02/06/25 1717   97.7 °F (36.5 °C)  104 18 166/89 96 %      Temp src Heart Rate Source Patient Position BP Location FiO2 (%)   -- -- -- -- --              Physical Exam  GENERAL: alert, no acute distress  SKIN: Warm, dry, no rashes  HENT: Normocephalic, atraumatic  EYES: no scleral icterus, EOMI  Neck: Soft collar immobilized.  CV: regular rhythm, regular rate  RESPIRATORY: normal effort, lungs clear  ABDOMEN: soft, nondistended, nontender  MUSCULOSKELETAL: no deformity  NEURO: alert, moves all extremities, follows commands      MEDICATIONS GIVEN IN ER  Medications   HYDROmorphone (DILAUDID) injection 0.5 mg (0.5 mg Intravenous Given 2/6/25 1818)   HYDROmorphone (DILAUDID) injection 0.5 mg (0.5 mg Intravenous Given 2/6/25 2002)         ORDERS PLACED DURING THIS VISIT:  Orders Placed This Encounter   Procedures    CT Head Without Contrast    CT Cervical Spine Without Contrast         PROCEDURES  Procedures        PROGRESS, DATA ANALYSIS, CONSULTS, AND MEDICAL DECISION MAKING  All labs have been independently interpreted by me.  All radiology studies have been reviewed by me. All EKG's have been independently viewed and interpreted by me.  Discussion below represents my analysis of pertinent findings related to patient's condition, differential diagnosis, treatment plan and final disposition.    Differential diagnosis includes but is not limited to cervical degenerative disc disease, cervical radiculopathy, peripheral neuropathy, meningitis.    Clinical Scores:                 MDM:       I independently interpreted the Head CT w/o Contrast and my findings are: No acute hemorrhage, no midline shift    I reviewed the radiology reports from today's ED visit.  There do not appear to be any acute neurosurgical emergent findings from a radiographic standpoint.  Physical exam is rather stable and reassuring based on what we know from her past records as well.  Patient is feeling a bit better after administration of medications here.  I think she is okay for  "discharge home with ongoing outpatient management of her neck pain complaint.  Will review with her the usual \"return to ER \"instructions prior to discharge.      COMPLEXITY OF CARE  Admission was considered but after careful review of the patient's presentation, physical examination, diagnostic results, and response to treatment the patient may be safely discharged with outpatient follow-up.    Please note that portions of this document were completed with a voice recognition program.    Note Disclaimer: At [facility], we believe that sharing information builds trust and better relationships. You are receiving this note because you recently visited [facility]. It is possible you will see health information before a provider has talked with you about it. This kind of information can be easy to misunderstand. To help you fully understand what it means for your health, we urge you to discuss this note with your provider.       Jaden Ambrocio MD  02/07/25 0026    "

## 2025-02-06 NOTE — TELEPHONE ENCOUNTER
I was calling and left  for Kristina that if she feels like she needs to go to ER due her pain then she should go to the ER.  I was calling her for f/u questions however since she did not answer then I will call her again in the morning.

## 2025-02-06 NOTE — TELEPHONE ENCOUNTER
Patient is calling back and is really concerned because her pain is so severe (9 1/2 out of 10) and her balance has been very off since Monday. She states she is scared and wanted him to be aware that she feels she needs to go to the ER and will probably be going soon. She states that the pain and the balance issues are making her very worried.

## 2025-02-06 NOTE — TELEPHONE ENCOUNTER
Patient called stating that she is in severe pain and she is trying to not to take her pain meds due to her being in recovery, she states that she is not able to do the OT/PT without being in severe pain. Patient wants to know what she can do

## 2025-02-07 NOTE — TELEPHONE ENCOUNTER
Kristina called back so Steven and I talked to Kristina, about her ongoing pain.  She stated she went to the ER and they gave her pain medication.  I advised that she alternate taking pain medication and her muscle relaxers to make sure something is always in her system.  I also advised that when she goes to PT/OT to take her pain medication 30 min to an hour before hand and then to take her muscle relaxers after her sessions to help mitigate and alleviate her pain before and after.  Steven and I also advised her that when she needs a refill for muscle relaxers to call her pain management doctor.       Steven helped to relax Kristina by letting her know that PT and OT will take time for her to get better and that this not an overnight fix.   She stated she understood.

## 2025-02-07 NOTE — ED PROVIDER NOTES
EMERGENCY DEPARTMENT ENCOUNTER  Room Number:  05/05  PCP: Nyla Mejia APRN  Independent Historians: Patient      HPI:  Chief Complaint: had concerns including Neck Pain.     A complete HPI/ROS/PMH/PSH/SH/FH are unobtainable due to: None    Chronic or social conditions impacting patient care (Social Determinants of Health): None      Context: The patient is a 64 y.o. female with a medical history of cervical radiculopathy, peripheral neuropathy, type 2 diabetes, arthritis, spinal stenosis, hypertension who presents to the ED c/o acute neck pain and trouble walking.  She has a history of chronic neck pain, had a fusion in September 2024.  States she had a spinal cord injury from the procedure and has chronic right sided weakness and intermittent difficulty with walking and balance since.  She spent some time in [facility] rehab after her surgery and got a lot of function back.  3 days ago she started having neck pain similar to prior.  She is prescribed Percocet 5 mg every 6 hours but only takes 1 or 2 a day because she is in recovery for alcohol abuse.  She is also prescribed Robaxin which she states she has tried and does not feel she has any benefit from and no longer takes.  Her neurosurgeon is Dr. Ortiz.  During a recent appointment with him they decided on conservative management and PT and OT were ordered.  She had OT today, is waiting for PT to start.  She denies any fevers headache numbness tingling or weakness greater than her chronic tingling.  Denies any saddle paresthesias or bowel or bladder dysfunction      Review of prior external notes (non-ED) -and- Review of prior external test results outside of this encounter:  Patient was admitted on 1/24/2025 for back pain, neck pain and urinary incontinence.  She underwent MRI of the L-spine and C-spine which did not show any evidence of cauda equina and no acute abnormality.  It was felt that she likely needed a bone growth stimulator which the office was  going to work on as an outpatient.  Gabapentin was increased and she was discharged.  She had an outpatient evaluation with neurosurgery on 1/30/2025 and there was concern she had pseudoarthrosis secondary to previous smoking history and calcium and D3 K2 supplementation recommended as well as bone growth stimulator and she was referred to PT and OT.        PAST MEDICAL HISTORY  Active Ambulatory Problems     Diagnosis Date Noted    Cervical radiculopathy 01/26/2017    Controlled type 2 diabetes mellitus without complication, without long-term current use of insulin 02/27/2018    Diabetic peripheral neuropathy 02/27/2018    Menopausal symptom 02/27/2018    Adult acne 02/27/2018    Hyperlipidemia 02/27/2018    Arthritis     Arthropathy of cervical facet joint     Asthma     Spinal stenosis     Chronic pain     COPD (chronic obstructive pulmonary disease)     GERD (gastroesophageal reflux disease)     Edmonds esophagus     Essential hypertension 04/06/2021    Low back pain 06/28/2023    Cauda equina syndrome with neurogenic bladder 06/28/2023    Lumbar back pain 06/29/2023    Tobacco abuse 06/29/2023    Anxiety and depression 06/01/2018    Chondromalacia of knee 11/15/2019    IBRAHIMA exposure in utero 08/03/2023    Impingement syndrome of right shoulder 05/04/2020    Nephrolithiasis 06/01/2018    Pes anserine bursitis 11/15/2019    SI (sacroiliac) joint inflammation 08/10/2023    Lumbar pseudoarthrosis 12/28/2023    Cervicalgia 07/30/2024    Acute neck pain 08/14/2024    Spinal cord injury, cervical, without spinal bone injury 09/16/2024    Acute low back pain 01/24/2025    Urinary incontinence without sensory awareness 01/24/2025     Resolved Ambulatory Problems     Diagnosis Date Noted    Alcohol abuse     Drug-induced constipation 07/05/2023     Past Medical History:   Diagnosis Date    Anxiety     Blurred vision     Cervical disc disease     Cervical neuritis     Depression     Diabetes mellitus     DJD (degenerative  joint disease), cervical     Foot spasms     Hiatal hernia     Hypertension     Persistent headaches     Seasonal allergies     Spinal headache          PAST SURGICAL HISTORY  Past Surgical History:   Procedure Laterality Date    ANTERIOR CERVICAL DISCECTOMY W/ FUSION  11/2003    C5-6, C6-7    ANTERIOR CERVICAL DISCECTOMY W/ FUSION Right 9/16/2024    Procedure: CERVICAL FOUR TO SIX DISCECTOMY ANTERIOR WITH FUSION WITH CERVICAL FIVE CORPECTOMY AND REMOVAL OF PREVIOUS HARDWARE;  Surgeon: Alex Ortiz MD;  Location: [facility] OR;  Service: Neurosurgery;  Laterality: Right;    CERCLAGE CERVIX      CERVICAL EPIDURAL  2007    CERVICAL FUSION      DILATATION AND CURETTAGE      ENDOMETRIAL ABLATION  2007    ENDOSCOPY      MULTIPLE    ENDOSCOPY N/A 07/05/2016    Procedure: ESOPHAGOGASTRODUODENOSCOPY WITH COLD BIOPSIES;  Surgeon: Jose Mcclellan MD;  Location: [facility] ENDOSCOPY;  Service:     ESOPHAGEAL DILATATION  2009    HAND SURGERY Bilateral     CARTILAGE    LUMBAR FUSION      LUMBAR LAMINECTOMY WITH FUSION N/A 06/30/2023    Procedure: OPEN LUMBAR FOUR TO FIVE TRANSFORAMINAL LUMBAR INTERBODY FUSION;  Surgeon: Alex Ortiz MD;  Location: [facility] OR;  Service: Neurosurgery;  Laterality: N/A;    NISSEN FUNDOPLICATION LAPAROSCOPIC  03/2012    SACROILIAC JOINT FUSION Right 06/04/2024    Procedure: RIGHT PERCUTANEOUS ARTHRODESIS SACROILIAC JOINT, NEUROMONITORING;  Surgeon: Alex Ortiz MD;  Location: [facility] OR;  Service: Neurosurgery;  Laterality: Right;    TONSILLECTOMY           FAMILY HISTORY  Family History   Problem Relation Age of Onset    Cancer Mother         nonhodkinds lymphoma    Heart disease Father     Drug abuse Sister     Heart disease Brother     Lupus Maternal Aunt     Malig Hyperthermia Neg Hx          SOCIAL HISTORY  Social History     Socioeconomic History    Marital status:     Number of children: 2   Tobacco Use    Smoking status: Some Days     Current packs/day: 0.50     Average  packs/day: 0.5 packs/day for 20.1 years (10.0 ttl pk-yrs)     Types: Cigarettes     Start date: 1/1/2005    Smokeless tobacco: Never   Vaping Use    Vaping status: Never Used   Substance and Sexual Activity    Alcohol use: No     Comment: sober since 2012, worsk 12 steps    Drug use: No    Sexual activity: Yes     Partners: Male         ALLERGIES  Fluoxetine, Sulfa antibiotics, Zofran [ondansetron hcl], and Lisinopril      REVIEW OF SYSTEMS  Review of Systems  Included in HPI  All systems reviewed and negative except for those discussed in HPI.      PHYSICAL EXAM    I have reviewed the triage vital signs and nursing notes.    ED Triage Vitals   Temp Heart Rate Resp BP SpO2   02/06/25 1717 02/06/25 1717 02/06/25 1717 02/06/25 1720 02/06/25 1717   97.7 °F (36.5 °C) 104 18 166/89 96 %      Temp src Heart Rate Source Patient Position BP Location FiO2 (%)   -- -- -- -- --              Physical Exam  GENERAL: alert, no acute distress  SKIN: Warm, dry  HENT: Normocephalic, atraumatic  EYES: no scleral icterus  CV: regular rhythm, regular rate  RESPIRATORY: normal effort, lungs clear  ABDOMEN: nondistended  MUSCULOSKELETAL: no deformity.  There is diffuse tenderness to the bilateral trapezius and paracervical musculature, no focal midline tenderness of the neck.  She does have some discomfort with range of motion in all directions.  Muscle strength in the right extremities is 4+ out of 5 which she states is baseline, 5 out of 5 in the left upper and lower extremities.  Sensation intact distally throughout.    NEURO: alert, moves all extremities, follows commands            LAB RESULTS  No results found for this or any previous visit (from the past 24 hours).      RADIOLOGY  CT Head Without Contrast, CT Cervical Spine Without Contrast    Result Date: 2/6/2025  CT HEAD WO CONTRAST-, CT CERVICAL SPINE WO CONTRAST-  INDICATIONS: Neck pain, off balance  TECHNIQUE: Radiation dose reduction techniques were utilized, including  automated exposure control and exposure modulation based on body size. Noncontrast head CT  COMPARISON: 1/24/2025  FINDINGS:  Head CT:  No acute intracranial hemorrhage, midline shift or mass effect. No acute territorial infarct is identified. Basal ganglia mineralization is present.  Arterial calcifications are seen at the base of the brain.  Ventricles, cisterns, cerebral sulci are unremarkable for patient age.  The visualized paranasal sinuses, orbits, mastoid air cells are unremarkable.  Cervical spine CT:  No acute fracture is identified.  Mild anterolisthesis of C3 on C4. Anterior fusion hardware is again demonstrated at C4, C6, with corpectomy change/strut grafting at C5.  Facet and uncovertebral joint hypertrophy contribute to neuroforaminal narrowing, more prominent on the left at C3/4  Carotid arterial calcifications are present. Bilateral provide subcentimeter nodular densities may be lymph nodes or parotid nodules, ultrasound correlation could be obtained as indicated.  Tiny nodular densities at the lung apices appear stable, remain indeterminate, continued follow-up could characterize continued stability.  Dental caries are apparent, correlate with clinical exam left maxillary molar periapical lucency, for example sagittal image 101, could be evidence of periapical abscess.       No acute intracranial hemorrhage or hydrocephalus. No acute cervical fracture identified; degenerative and surgical changes in the cervical spine. If there is further clinical concern, MRI could be considered for further evaluation.  Incidental findings, as noted above.  This report was finalized on 2/6/2025 7:49 PM by Dr. Vicente Sainz M.D on Workstation: [id number]         MEDICATIONS GIVEN IN ER  Medications   HYDROmorphone (DILAUDID) injection 0.5 mg (0.5 mg Intravenous Given 2/6/25 1818)   HYDROmorphone (DILAUDID) injection 0.5 mg (0.5 mg Intravenous Given 2/6/25 2002)         ORDERS PLACED DURING THIS VISIT:  Orders  Placed This Encounter   Procedures    CT Head Without Contrast    CT Cervical Spine Without Contrast         OUTPATIENT MEDICATION MANAGEMENT:  No current Epic-ordered facility-administered medications on file.     Current Outpatient Medications Ordered in Epic   Medication Sig Dispense Refill    acetaminophen (TYLENOL) 325 MG tablet 2 tablets.      albuterol sulfate  (90 Base) MCG/ACT inhaler INHALE 1 TO 2 PUFFS INTO THE LUNGS FOUR TIMES DAILY AS DIRECTED      amLODIPine (NORVASC) 10 MG tablet Take 1 tablet by mouth Every Night. 90 tablet 3    atorvastatin (LIPITOR) 40 MG tablet Take 1 tablet by mouth Every Night.      busPIRone (BUSPAR) 5 MG tablet Take 1 tablet by mouth 3 (Three) Times a Day. 90 tablet 3    cyclobenzaprine (FLEXERIL) 5 MG tablet Take 1-2 tablets by mouth Every 8 (Eight) Hours As Needed (neck pain). 15 tablet 0    DULoxetine (CYMBALTA) 30 MG capsule Take 1 capsule by mouth Daily. 90 capsule 3    DULoxetine (CYMBALTA) 60 MG capsule Take 1 capsule by mouth Daily for 90 days. Take with 30 mg capsule for a TDD of 90 mg 90 capsule 0    fluticasone (FLONASE) 50 MCG/ACT nasal spray 2 sprays into the nostril(s) as directed by provider Daily. 18.2 mL 5    gabapentin (NEURONTIN) 300 MG capsule Take 1 capsule by mouth Daily with breakfast and dinner and 2 capsules at bedtime for 3 days, THEN 2 capsules Daily with breakfast and at bedtime and 1 capsule at dinner for 3 days, THEN 2 capsules capsules three times daily. 180 capsule 0    guaiFENesin (MUCINEX) 600 MG 12 hr tablet Take 1 tablet by mouth 2 (Two) Times a Day.      hydrALAZINE (APRESOLINE) 25 MG tablet Take 1 tablet by mouth 3 (Three) Times a Day.      hydroCHLOROthiazide 25 MG tablet Take 0.5 tablets by mouth Daily.      losartan (COZAAR) 100 MG tablet Take 1 tablet by mouth Daily. 90 tablet 1    metFORMIN ER (GLUCOPHAGE-XR) 500 MG 24 hr tablet Take 1 tablet by mouth Every Night.      metoprolol succinate XL (TOPROL-XL) 50 MG 24 hr tablet Take  1 tablet by mouth Daily. 90 tablet 3    Multiple Vitamins-Minerals (MULTIVITAMIN ADULT) tablet Take 1 tablet by mouth Daily.      oxyCODONE-acetaminophen (PERCOCET) 5-325 MG per tablet Take 1 tablet by mouth Every 4 (Four) Hours As Needed for Moderate Pain. 12 tablet 0    pantoprazole (PROTONIX) 40 MG EC tablet Take 1 tablet by mouth Every Morning.           PROCEDURES  Procedures            PROGRESS, DATA ANALYSIS, CONSULTS, AND MEDICAL DECISION MAKING  All labs have been independently interpreted by me.  All radiology studies have been reviewed by me. All EKG's have been independently viewed and interpreted by me.  Discussion below represents my analysis of pertinent findings related to patient's condition, differential diagnosis, treatment plan and final disposition.    DIFFERENTIAL    My differential diagnosis includes but is not limited to muscle spasm, muscle strain, hardware failure, chronic neck pain, spinal stenosis    Clinical Scores:                       I reassessed the patient, counseled her on her imaging results.  No acute findings.  No significant change from prior imaging.  She has no new neurologic deficits.  She is ambulatory though is using her cane as needed.  No ataxia.  She got good relief of her pain in the emergency department.  Per patient preference will discontinue Robaxin and prescribe a short course of Flexeril for her to trial, recommended that she take her pain medication as prescribed as under utilization could be causing or at least contributing to her exacerbations.  Recommended close follow-up with her neurosurgeon and return precautions discussed.      AS OF 21:26 EST VITALS:    BP - 147/77  HR - 86  TEMP - 97.7 °F (36.5 °C)  O2 SATS - 97%    COMPLEXITY OF CARE  Admission was considered but after careful review of the patient's presentation, physical examination, diagnostic results, and response to treatment the patient may be safely discharged with outpatient  follow-up.      DIAGNOSIS  Final diagnoses:   Acute neck pain         DISPOSITION  ED Disposition       ED Disposition   Discharge    Condition   Good    Comment   --                  FOLLOW UP  Alex Ortiz MD  [address]  [address]  [facility] [id number] [phone]    Schedule an appointment as soon as possible for a visit       Nyla Mejia APRN  [address]  [facility] [address]  [phone]          [facility] EMERGENCY DEPARTMENT  [address] [facility] [address] [phone]    If symptoms worsen or any concerns        Prescribed Medications     Medication List        New Prescriptions      cyclobenzaprine 5 MG tablet  Commonly known as: FLEXERIL  Take 1-2 tablets by mouth Every 8 (Eight) Hours As Needed (neck pain).            Stop      methocarbamol 500 MG tablet  Commonly known as: ROBAXIN               Where to Get Your Medications        These medications were sent to [facility] [id number] [facility] [address] [facility] [address] - [phone] [facility] [phone] [address], [facility] [address]      Phone: [phone]   cyclobenzaprine 5 MG tablet                   Please note that portions of this document were completed with a voice recognition program.    Note Disclaimer: At [facility], we believe that sharing information builds trust and better relationships. You are receiving this note because you recently visited [facility]. It is possible you will see health information before a provider has talked with you about it. This kind of information can be easy to misunderstand. To help you fully understand what it means for your health, we urge you to discuss this note with your provider.         Rosalee Jin PA-C  02/06/25 [id number]

## 2025-02-07 NOTE — DISCHARGE INSTRUCTIONS
Gentle activities as tolerated  warm moist compresses to the neck 2 or 3 times daily to help with muscle pain

## 2025-02-19 DIAGNOSIS — E78.5 HYPERLIPIDEMIA, UNSPECIFIED HYPERLIPIDEMIA TYPE: ICD-10-CM

## 2025-02-19 DIAGNOSIS — E11.42 DIABETIC PERIPHERAL NEUROPATHY: ICD-10-CM

## 2025-02-19 DIAGNOSIS — E11.9 CONTROLLED TYPE 2 DIABETES MELLITUS WITHOUT COMPLICATION, WITHOUT LONG-TERM CURRENT USE OF INSULIN: ICD-10-CM

## 2025-02-19 RX ORDER — ATORVASTATIN CALCIUM 40 MG/1
40 TABLET, FILM COATED ORAL DAILY
Qty: 90 TABLET | Refills: 1 | Status: SHIPPED | OUTPATIENT
Start: 2025-02-19

## 2025-02-19 RX ORDER — LOSARTAN POTASSIUM 100 MG/1
100 TABLET ORAL DAILY
Qty: 90 TABLET | Refills: 1 | Status: SHIPPED | OUTPATIENT
Start: 2025-02-19

## 2025-02-19 RX ORDER — HYDROCHLOROTHIAZIDE 25 MG/1
25 TABLET ORAL DAILY
Qty: 90 TABLET | Refills: 1 | Status: SHIPPED | OUTPATIENT
Start: 2025-02-19

## 2025-03-07 ENCOUNTER — HOSPITAL ENCOUNTER (OUTPATIENT)
Dept: MAMMOGRAPHY | Facility: HOSPITAL | Age: 65
Discharge: HOME | End: 2025-03-07
Payer: COMMERCIAL

## 2025-03-28 ENCOUNTER — OFFICE VISIT (OUTPATIENT)
Age: 65
End: 2025-03-28
Payer: COMMERCIAL

## 2025-03-28 VITALS
WEIGHT: 166 LBS | HEIGHT: 66 IN | HEART RATE: 94 BPM | SYSTOLIC BLOOD PRESSURE: 181 MMHG | BODY MASS INDEX: 26.68 KG/M2 | OXYGEN SATURATION: 98 % | DIASTOLIC BLOOD PRESSURE: 85 MMHG

## 2025-03-28 DIAGNOSIS — F33.2 SEVERE EPISODE OF RECURRENT MAJOR DEPRESSIVE DISORDER, WITHOUT PSYCHOTIC FEATURES: Primary | ICD-10-CM

## 2025-03-28 DIAGNOSIS — F41.1 GENERALIZED ANXIETY DISORDER: ICD-10-CM

## 2025-03-28 DIAGNOSIS — F10.21 ALCOHOL USE DISORDER, SEVERE, IN SUSTAINED REMISSION: ICD-10-CM

## 2025-03-28 PROBLEM — F41.9 ANXIETY AND DEPRESSION: Status: RESOLVED | Noted: 2018-06-01 | Resolved: 2025-03-28

## 2025-03-28 PROBLEM — F32.A ANXIETY AND DEPRESSION: Status: RESOLVED | Noted: 2018-06-01 | Resolved: 2025-03-28

## 2025-03-28 RX ORDER — BUSPIRONE HYDROCHLORIDE 5 MG/1
5 TABLET ORAL 3 TIMES DAILY
Qty: 90 TABLET | Refills: 0 | Status: SHIPPED | OUTPATIENT
Start: 2025-03-28

## 2025-03-28 RX ORDER — DULOXETIN HYDROCHLORIDE 60 MG/1
120 CAPSULE, DELAYED RELEASE ORAL DAILY
Qty: 90 CAPSULE | Refills: 0 | Status: SHIPPED | OUTPATIENT
Start: 2025-03-28 | End: 2025-06-26

## 2025-03-28 NOTE — PROGRESS NOTES
"Subjective   Kristina Yates is a 64 y.o. female who presents today for initial evaluation     Chief Complaint: \"Anxiety and depression.\"    History of Present Illness:   Mrs. Kristina Yates is a 64-year-old female seen as a new patient to establish care after being referred for further psychiatric evaluation and management.    Patient reports that she has experienced a progressive worsening of numerous depressive and anxious symptoms experienced over the past several months to 1 year, stating that she did see a psychiatrist over 10 years ago for management of her depression and anxiety but has not felt the need to seek psychiatric care until more recently.  More specifically, patient currently endorses a significantly depressed mood associated with persistent anhedonia, difficulties initiating/maintaining sleep, decreased levels of energy/fatigue, a decreased appetite, low self worth, excessive feelings of guilt, difficulty sustaining her attention/concentration, and noticeable psychomotor retardation.  Patient denies experiencing any suicidal ideation, intent or plan but does endorse experiencing fleeting thoughts of death/suicide in the past associated with severe periods of depression.  Patient currently denies any active suicidal ideation, intent or plan.  Patient does also consider herself a worrier and states that she worries excessively about most things on most days.  She reports a recent increase in her levels of anxiety overall associated with increased difficulties controlling/stopping her anxiety and worry.  She also endorses frequently catastrophizing/thinking of worst case scenarios as well as near constant feelings of restlessness/feeling on edge.  Patient further explains that while she has struggled with these psychiatric symptoms for most of her adult life she experienced a progressive worsening of the symptoms approximately 2 years ago following the tragic and unexpected deaths of both her brother and " sister followed by the more recent death of her nephew with whom she was very close.  Patient is currently seeing a psychotherapist.  It is of note the patient also endorses a history of alcohol use disorder stating that she has been completely sober from alcohol since 2000 and remains active in AA since starting in 2012.  Patient does endorse experiencing increased cravings for alcohol over the past several months but denies any use of alcohol despite her worsening psychiatric symptoms.    Patient otherwise denies ever experiencing any hypomanic/manic symptoms such as a persistently elevated mood lasting 4 days or more at a time associated with a decreased need for sleep, increased levels of energy, increased goal-directed behaviors, racing thoughts, pressured speech, increased grandiosity or uncharacteristic behaviors such as excessive spending on unnecessary items or increased sex drive/promiscuity.  Patient also denies ever experiencing any auditory, visual, or tactile hallucinations and does not currently appear to be responding to internal stimuli.  She denies any history of generalized paranoia and displays no evidence of delusional thinking.    Past Psychiatric History:  As detailed above patient has seen psychiatry in the past and states that she saw Dr. Jaramillo here in [facility] for approximately 5 years over 10 years ago.  She states that she was previously prescribed maximum dose of duloxetine at 120 mg and does report rather significant improvement psychiatric symptoms associated with this dose.  Patient is currently prescribed duloxetine 90 mg p.o. once daily in combination with buspirone 5 mg p.o. 3 times daily.  She does report previous trials of Lexapro, lithium, and amitriptyline but is unsure of durations of treatment or exact dosages of these medications.  Patient denies any history of inpatient psychiatric hospitalizations.  She does report inpatient alcohol detoxification/substance use  "rehabilitation in 2012 here at [facility] when they had an inpatient unit.  She denies any history of suicide attempts or self-injurious behavior.  Patient is currently seeing a psychotherapist.    Family Psychiatric History:  Patient reports an extensive history of substance use disorder in numerous family members including her biological parents and biological siblings.    Medical History:  Reviewed via EMR.  Patient reports a history of rather severe and chronic back pain associated with cauda equina syndrome and multiple cervical spinal fusions.  Patient is currently prescribed opioid pain medication via pain management.  She denies any history of seizure disorders or traumatic brain injuries.    Substance Abuse History:  Patient's history of alcohol use disorder as detailed above.  She does also report a history of excessive use of prescription opioids and states that she has been \"approved\" and is being monitored by AA who did reportedly clean her to begin pain management due to her chronic and severely debilitating pain.  Patient is active in  and reports over 25 years sobriety.    Social History:  Patient reports that she was born in [facility] and moved to [place], [place] at the age of 4.  She reports a relatively difficult childhood growing up due to the substance use issues and other potential psychiatric diagnoses experienced by numerous members of her family.  She reports that her father passed away at the age of 37 due to complications associated with his alcohol use and her mom passed away at the age of 48 due to 9 Hodgkin's lymphoma.  Patient reports that she always did well in school and has worked numerous jobs as an adult.  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  She has been  for approximately 40 years and has 2 adult children aged 27 and 25.  She does report a single DUI obtained in 2012 but denies any history of  experience.    The following portions of " the patient's history were reviewed and updated as appropriate: allergies, current medications, past family history, past medical history, past social history, past surgical history and problem list.       Past Medical History:  Past Medical History:   Diagnosis Date    Alcohol abuse     in recovery, sober since 2012    Anxiety     Arthritis     Asthma     Edmonds esophagus     Blurred vision     Cervical disc disease     Cervical neuritis     Chronic pain     NECK AND BACK    COPD (chronic obstructive pulmonary disease)     STATES NEVER DIAGNOSED WITH    Depression     Diabetes mellitus     Type 2    DJD (degenerative joint disease), cervical     Foot spasms     FROM BACK SURGERY    GERD (gastroesophageal reflux disease)     Hiatal hernia     Hyperlipidemia     Hypertension     Persistent headaches     ?BLOOD PRESSURE OR NECK RELATED???    Seasonal allergies     Spinal headache     Spinal stenosis        Social History:  Social History     Socioeconomic History    Marital status:     Number of children: 2   Tobacco Use    Smoking status: Some Days     Current packs/day: 0.50     Average packs/day: 0.5 packs/day for 20.2 years (10.1 ttl pk-yrs)     Types: Cigarettes     Start date: 1/1/2005    Smokeless tobacco: Never   Vaping Use    Vaping status: Never Used   Substance and Sexual Activity    Alcohol use: No     Comment: sober since 2012, worsk 12 steps    Drug use: No    Sexual activity: Yes     Partners: Male       Family History:  Family History   Problem Relation Age of Onset    Cancer Mother         nonhodkinds lymphoma    Heart disease Father     Drug abuse Sister     Heart disease Brother     Lupus Maternal Aunt     Malig Hyperthermia Neg Hx        Past Surgical History:  Past Surgical History:   Procedure Laterality Date    ANTERIOR CERVICAL DISCECTOMY W/ FUSION  11/2003    C5-6, C6-7    ANTERIOR CERVICAL DISCECTOMY W/ FUSION Right 9/16/2024    Procedure: CERVICAL FOUR TO SIX DISCECTOMY ANTERIOR WITH  FUSION WITH CERVICAL FIVE CORPECTOMY AND REMOVAL OF PREVIOUS HARDWARE;  Surgeon: Alex Ortiz MD;  Location: [facility] MAIN OR;  Service: Neurosurgery;  Laterality: Right;    CERCLAGE CERVIX      CERVICAL EPIDURAL  2007    CERVICAL FUSION      DILATATION AND CURETTAGE      ENDOMETRIAL ABLATION  2007    ENDOSCOPY      MULTIPLE    ENDOSCOPY N/A 07/05/2016    Procedure: ESOPHAGOGASTRODUODENOSCOPY WITH COLD BIOPSIES;  Surgeon: Jose Mcclellan MD;  Location: [facility] ENDOSCOPY;  Service:     ESOPHAGEAL DILATATION  2009    HAND SURGERY Bilateral     CARTILAGE    LUMBAR FUSION      LUMBAR LAMINECTOMY WITH FUSION N/A 06/30/2023    Procedure: OPEN LUMBAR FOUR TO FIVE TRANSFORAMINAL LUMBAR INTERBODY FUSION;  Surgeon: Alex Ortiz MD;  Location: [facility] MAIN OR;  Service: Neurosurgery;  Laterality: N/A;    NISSEN FUNDOPLICATION LAPAROSCOPIC  03/2012    SACROILIAC JOINT FUSION Right 06/04/2024    Procedure: RIGHT PERCUTANEOUS ARTHRODESIS SACROILIAC JOINT, NEUROMONITORING;  Surgeon: Alex Ortiz MD;  Location: [facility] MAIN OR;  Service: Neurosurgery;  Laterality: Right;    TONSILLECTOMY         Problem List:  Patient Active Problem List   Diagnosis    Cervical radiculopathy    Controlled type 2 diabetes mellitus without complication, without long-term current use of insulin    Diabetic peripheral neuropathy    Menopausal symptom    Adult acne    Hyperlipidemia    Arthritis    Arthropathy of cervical facet joint    Asthma    Spinal stenosis    Chronic pain    COPD (chronic obstructive pulmonary disease)    GERD (gastroesophageal reflux disease)    Alcohol use disorder, severe, in sustained remission    Edmonds esophagus    Essential hypertension    Low back pain    Cauda equina syndrome with neurogenic bladder    Lumbar back pain    Tobacco abuse    Chondromalacia of knee    IBRAHIMA exposure in utero    Impingement syndrome of right shoulder    Nephrolithiasis    Pes anserine bursitis    SI (sacroiliac) joint inflammation    Lumbar  pseudoarthrosis    Cervicalgia    Acute neck pain    Spinal cord injury, cervical, without spinal bone injury    Acute low back pain    Urinary incontinence without sensory awareness    Severe episode of recurrent major depressive disorder, without psychotic features    Generalized anxiety disorder       Allergy:   Allergies   Allergen Reactions    Fluoxetine Hives     Hives    Sulfa Antibiotics Hives     THROAT CLOSES    Zofran [Ondansetron Hcl] Hives     THROAT CLOSES    Lisinopril Cough        Current Medications:   Current Outpatient Medications   Medication Sig Dispense Refill    busPIRone (BUSPAR) 5 MG tablet Take 1 tablet by mouth 3 (Three) Times a Day. 90 tablet 0    DULoxetine (CYMBALTA) 60 MG capsule Take 2 capsules by mouth Daily for 90 days. 90 capsule 0    acetaminophen (TYLENOL) 325 MG tablet 2 tablets.      albuterol sulfate  (90 Base) MCG/ACT inhaler INHALE 1 TO 2 PUFFS INTO THE LUNGS FOUR TIMES DAILY AS DIRECTED      amLODIPine (NORVASC) 10 MG tablet Take 1 tablet by mouth Every Night. 90 tablet 3    atorvastatin (LIPITOR) 40 MG tablet TAKE 1 TABLET BY MOUTH DAILY 90 tablet 1    cyclobenzaprine (FLEXERIL) 5 MG tablet Take 1-2 tablets by mouth Every 8 (Eight) Hours As Needed (neck pain). 15 tablet 0    fluticasone (FLONASE) 50 MCG/ACT nasal spray 2 sprays into the nostril(s) as directed by provider Daily. 18.2 mL 5    gabapentin (NEURONTIN) 300 MG capsule Take 1 capsule by mouth Daily with breakfast and dinner and 2 capsules at bedtime for 3 days, THEN 2 capsules Daily with breakfast and at bedtime and 1 capsule at dinner for 3 days, THEN 2 capsules capsules three times daily. 180 capsule 0    guaiFENesin (MUCINEX) 600 MG 12 hr tablet Take 1 tablet by mouth 2 (Two) Times a Day.      hydrALAZINE (APRESOLINE) 25 MG tablet Take 1 tablet by mouth 3 (Three) Times a Day.      hydroCHLOROthiazide 25 MG tablet TAKE 1 TABLET BY MOUTH DAILY 90 tablet 1    losartan (COZAAR) 100 MG tablet TAKE 1 TABLET BY  "MOUTH DAILY 90 tablet 1    metFORMIN ER (GLUCOPHAGE-XR) 500 MG 24 hr tablet Take 1 tablet by mouth Every Night.      metoprolol succinate XL (TOPROL-XL) 50 MG 24 hr tablet Take 1 tablet by mouth Daily. 90 tablet 3    Multiple Vitamins-Minerals (MULTIVITAMIN ADULT) tablet Take 1 tablet by mouth Daily.      oxyCODONE-acetaminophen (PERCOCET) 5-325 MG per tablet Take 1 tablet by mouth Every 4 (Four) Hours As Needed for Moderate Pain. 12 tablet 0    pantoprazole (PROTONIX) 40 MG EC tablet Take 1 tablet by mouth Every Morning.       No current facility-administered medications for this visit.       Review of Symptoms:    Review of Systems   Constitutional:  Positive for activity change and fatigue. Negative for fever.   HENT:  Negative for tinnitus.    Endocrine: Negative for cold intolerance and heat intolerance.   Musculoskeletal:  Positive for back pain and neck pain.   Skin:  Negative for rash.   Neurological:  Negative for seizures and confusion.   Psychiatric/Behavioral:  Positive for decreased concentration, sleep disturbance and depressed mood. Negative for hallucinations, self-injury and suicidal ideas. The patient is nervous/anxious.          Physical Exam:   Blood pressure (!) 181/85, pulse 94, height 167.6 cm (65.98\"), weight 75.3 kg (166 lb), SpO2 98%, not currently breastfeeding.  Appearance: Appears documented age, appropriate hygiene and grooming.  Gait, Station, Strength: Normal gait, station and strength.       Mental Status Exam:   Hygiene:   good  Cooperation:  Cooperative  Eye Contact:  Good  Psychomotor Behavior:  Slow  Affect:  Full range and Appropriate  Mood: sad, depressed, and anxious  Hopelessness: Denies  Speech:  Normal  Thought Process:  Goal directed and Linear  Thought Content:  Normal  Suicidal:  None  Homicidal:  None  Hallucinations:  None  Delusion:  None  Memory:  Intact  Orientation:  Person, Place, Time, and Situation  Reliability:  good  Insight:  Good  Judgement:  Good  Impulse " Control:  Good      Lab Results:   No visits with results within 1 Month(s) from this visit.   Latest known visit with results is:   Admission on 01/24/2025, Discharged on 01/25/2025   Component Date Value Ref Range Status    Glucose 01/24/2025 104 (H)  65 - 99 mg/dL Final    BUN 01/24/2025 9  8 - 23 mg/dL Final    Creatinine 01/24/2025 0.60  0.57 - 1.00 mg/dL Final    Sodium 01/24/2025 138  136 - 145 mmol/L Final    Potassium 01/24/2025 4.1  3.5 - 5.2 mmol/L Final    Chloride 01/24/2025 100  98 - 107 mmol/L Final    CO2 01/24/2025 26.0  22.0 - 29.0 mmol/L Final    Calcium 01/24/2025 9.3  8.6 - 10.5 mg/dL Final    Total Protein 01/24/2025 7.1  6.0 - 8.5 g/dL Final    Albumin 01/24/2025 4.5  3.5 - 5.2 g/dL Final    ALT (SGPT) 01/24/2025 14  1 - 33 U/L Final    AST (SGOT) 01/24/2025 17  1 - 32 U/L Final    Alkaline Phosphatase 01/24/2025 83  39 - 117 U/L Final    Total Bilirubin 01/24/2025 0.2  0.0 - 1.2 mg/dL Final    Globulin 01/24/2025 2.6  gm/dL Final    A/G Ratio 01/24/2025 1.7  g/dL Final    BUN/Creatinine Ratio 01/24/2025 15.0  7.0 - 25.0 Final    Anion Gap 01/24/2025 12.0  5.0 - 15.0 mmol/L Final    eGFR 01/24/2025 101.0  >60.0 mL/min/1.73 Final    Color, UA 01/24/2025 Yellow  Yellow, Straw Final    Appearance, UA 01/24/2025 Clear  Clear Final    pH, UA 01/24/2025 6.5  5.0 - 8.0 Final    Specific Gravity, UA 01/24/2025 <=1.005  1.005 - 1.030 Final    Glucose, UA 01/24/2025 Negative  Negative Final    Ketones, UA 01/24/2025 Negative  Negative Final    Bilirubin, UA 01/24/2025 Negative  Negative Final    Blood, UA 01/24/2025 Negative  Negative Final    Protein, UA 01/24/2025 Negative  Negative Final    Leuk Esterase, UA 01/24/2025 Small (1+) (A)  Negative Final    Nitrite, UA 01/24/2025 Negative  Negative Final    Urobilinogen, UA 01/24/2025 0.2 E.U./dL  0.2 - 1.0 E.U./dL Final    Protime 01/24/2025 13.5  11.7 - 14.2 Seconds Final    INR 01/24/2025 1.03  0.90 - 1.10 Final    PTT 01/24/2025 50.9 (H)  22.7 - 35.4  seconds Final    WBC 01/24/2025 8.64  3.40 - 10.80 10*3/mm3 Final    RBC 01/24/2025 4.30  3.77 - 5.28 10*6/mm3 Final    Hemoglobin 01/24/2025 13.0  12.0 - 15.9 g/dL Final    Hematocrit 01/24/2025 39.0  34.0 - 46.6 % Final    MCV 01/24/2025 90.7  79.0 - 97.0 fL Final    MCH 01/24/2025 30.2  26.6 - 33.0 pg Final    MCHC 01/24/2025 33.3  31.5 - 35.7 g/dL Final    RDW 01/24/2025 12.8  12.3 - 15.4 % Final    RDW-SD 01/24/2025 42.3  37.0 - 54.0 fl Final    MPV 01/24/2025 10.1  6.0 - 12.0 fL Final    Platelets 01/24/2025 233  140 - 450 10*3/mm3 Final    Neutrophil % 01/24/2025 58.6  42.7 - 76.0 % Final    Lymphocyte % 01/24/2025 33.6  19.6 - 45.3 % Final    Monocyte % 01/24/2025 5.8  5.0 - 12.0 % Final    Eosinophil % 01/24/2025 1.6  0.3 - 6.2 % Final    Basophil % 01/24/2025 0.3  0.0 - 1.5 % Final    Immature Grans % 01/24/2025 0.1  0.0 - 0.5 % Final    Neutrophils, Absolute 01/24/2025 5.06  1.70 - 7.00 10*3/mm3 Final    Lymphocytes, Absolute 01/24/2025 2.90  0.70 - 3.10 10*3/mm3 Final    Monocytes, Absolute 01/24/2025 0.50  0.10 - 0.90 10*3/mm3 Final    Eosinophils, Absolute 01/24/2025 0.14  0.00 - 0.40 10*3/mm3 Final    Basophils, Absolute 01/24/2025 0.03  0.00 - 0.20 10*3/mm3 Final    Immature Grans, Absolute 01/24/2025 0.01  0.00 - 0.05 10*3/mm3 Final    nRBC 01/24/2025 0.0  0.0 - 0.2 /100 WBC Final    RBC, UA 01/24/2025 0-2  None Seen, 0-2 /HPF Final    WBC, UA 01/24/2025 3-5 (A)  None Seen, 0-2 /HPF Final    Bacteria, UA 01/24/2025 None Seen  None Seen /HPF Final    Squamous Epithelial Cells, UA 01/24/2025 0-2  None Seen, 0-2 /HPF Final    Hyaline Casts, UA 01/24/2025 None Seen  None Seen /LPF Final    Methodology 01/24/2025 Automated Microscopy   Final    Magnesium 01/24/2025 2.4  1.6 - 2.4 mg/dL Final    Glucose 01/25/2025 238 (H)  65 - 99 mg/dL Final    BUN 01/25/2025 12  8 - 23 mg/dL Final    Creatinine 01/25/2025 0.65  0.57 - 1.00 mg/dL Final    Sodium 01/25/2025 132 (L)  136 - 145 mmol/L Final    Potassium  01/25/2025 4.0  3.5 - 5.2 mmol/L Final    Chloride 01/25/2025 94 (L)  98 - 107 mmol/L Final    CO2 01/25/2025 22.0  22.0 - 29.0 mmol/L Final    Calcium 01/25/2025 9.0  8.6 - 10.5 mg/dL Final    BUN/Creatinine Ratio 01/25/2025 18.5  7.0 - 25.0 Final    Anion Gap 01/25/2025 16.0 (H)  5.0 - 15.0 mmol/L Final    eGFR 01/25/2025 99.1  >60.0 mL/min/1.73 Final    WBC 01/25/2025 13.92 (H)  3.40 - 10.80 10*3/mm3 Final    RBC 01/25/2025 3.90  3.77 - 5.28 10*6/mm3 Final    Hemoglobin 01/25/2025 11.5 (L)  12.0 - 15.9 g/dL Final    Hematocrit 01/25/2025 36.6  34.0 - 46.6 % Final    MCV 01/25/2025 93.8  79.0 - 97.0 fL Final    MCH 01/25/2025 29.5  26.6 - 33.0 pg Final    MCHC 01/25/2025 31.4 (L)  31.5 - 35.7 g/dL Final    RDW 01/25/2025 12.5  12.3 - 15.4 % Final    RDW-SD 01/25/2025 43.4  37.0 - 54.0 fl Final    MPV 01/25/2025 10.4  6.0 - 12.0 fL Final    Platelets 01/25/2025 231  140 - 450 10*3/mm3 Final    Neutrophil % 01/25/2025 89.5 (H)  42.7 - 76.0 % Final    Lymphocyte % 01/25/2025 8.5 (L)  19.6 - 45.3 % Final    Monocyte % 01/25/2025 1.4 (L)  5.0 - 12.0 % Final    Eosinophil % 01/25/2025 0.0 (L)  0.3 - 6.2 % Final    Basophil % 01/25/2025 0.1  0.0 - 1.5 % Final    Immature Grans % 01/25/2025 0.5  0.0 - 0.5 % Final    Neutrophils, Absolute 01/25/2025 12.45 (H)  1.70 - 7.00 10*3/mm3 Final    Lymphocytes, Absolute 01/25/2025 1.19  0.70 - 3.10 10*3/mm3 Final    Monocytes, Absolute 01/25/2025 0.20  0.10 - 0.90 10*3/mm3 Final    Eosinophils, Absolute 01/25/2025 0.00  0.00 - 0.40 10*3/mm3 Final    Basophils, Absolute 01/25/2025 0.01  0.00 - 0.20 10*3/mm3 Final    Immature Grans, Absolute 01/25/2025 0.07 (H)  0.00 - 0.05 10*3/mm3 Final    nRBC 01/25/2025 0.0  0.0 - 0.2 /100 WBC Final    Glucose 01/24/2025 232 (H)  70 - 130 mg/dL Final    Glucose 01/25/2025 171 (H)  70 - 130 mg/dL Final    Glucose 01/25/2025 312 (H)  70 - 130 mg/dL Final       PHQ-9 Total Score:     RIANA-7 Total Score:      Assessment & Plan    Diagnoses and all  orders for this visit:    1. Severe episode of recurrent major depressive disorder, without psychotic features (Primary)  -     DULoxetine (CYMBALTA) 60 MG capsule; Take 2 capsules by mouth Daily for 90 days.  Dispense: 90 capsule; Refill: 0  -     busPIRone (BUSPAR) 5 MG tablet; Take 1 tablet by mouth 3 (Three) Times a Day.  Dispense: 90 tablet; Refill: 0    2. Generalized anxiety disorder  -     DULoxetine (CYMBALTA) 60 MG capsule; Take 2 capsules by mouth Daily for 90 days.  Dispense: 90 capsule; Refill: 0  -     busPIRone (BUSPAR) 5 MG tablet; Take 1 tablet by mouth 3 (Three) Times a Day.  Dispense: 90 tablet; Refill: 0    3. Alcohol use disorder, severe, in sustained remission  Overview:  in recovery, sober since 2012         Mrs. Kristina Yates is a 64-year-old female seen as a new patient to establish care after being referred for further psychiatric evaluation and management.    Upon today's evaluation patient rather clearly reports and displays numerous signs/symptoms most consistent with that of major depressive disorder, generalized anxiety disorder, and alcohol use disorder in sustained remission.  As detailed above patient reports a near lifelong history of numerous depressive and anxious symptoms stating that she did see a psychiatrist rather consistently over 10 years ago, and only more recently felt as if she needed to seek psychiatric care due to the progressive worsening of her psychiatric symptoms.  Patient has experienced numerous and rather significant psychosocial stressors over the past 2 years or so which very likely are contributing to the patient's persistence and increased severity of psychiatric symptoms, and as such I do feel as if patient would benefit from reinitiating psychotherapy at this time and in particular would benefit from the use of EMDR and as such she will be placed on the waiting list for Carla grullon in our office.  Otherwise, I do feel as if patient would benefit from  maximizing daily dose of duloxetine at this time in hopes of addressing her persistent depressive and anxious symptoms.  I recommend increasing daily dose of duloxetine to 120 mg p.o. once daily in addition to continuing daily dose of buspirone at 5 mg p.o. 3 times daily.  We will further discuss potentially maximizing daily dose of buspirone in the future if necessary.  Otherwise, patient was highly encouraged to remain active in AA and to continue doing all aspects of care that have managed her sobriety over the past 20 years.  She was praised for her ongoing sobriety and encouraged to lean into her support systems at this time due to the severity of her psychiatric symptoms.  Patient will be seen again here in the office in approximately 4 weeks for reassessment.  Patient voices understanding of this and is agreeable to today's plan.    Medications:  -Increase daily dose of duloxetine from 90 to 120 mg p.o. once daily for management of major depressive disorder and generalized anxiety disorder.  -Continue buspirone 5 mg p.o. 3 times daily as an adjunctive treatment for major depressive disorder and generalized anxiety disorder.      TREATMENT PLAN - SHORT AND LONG-TERM GOALS:   -Continue supportive psychotherapy efforts and medications as indicated. Treatment and medication options discussed during today's visit.   -Patient acknowledged and verbally consented to continue with current treatment plan and was educated on the importance of compliance with treatment and follow-up appointments.    SUMMARY/EDUCATION/DISCUSSION:  -Pt was given appropriate time to ask questions and concerns were addressed. A thorough discussion was had that included review of disease process, need for continued monitoring and additional treatment options including use of pharmacological and non-pharmacological approaches to care, decisions were made and agreed upon by patient and provider.   -Discussed medication options and treatment  plan of prescribed medication as well as the risks, benefits, and side effects including potential falls, possible impaired driving and metabolic adversities among others; patient acknowledged and provided verbal consent.   -Patient has been educated regarding multimodal approach with healthy nutrition, healthy sleep, regular physical activity, social activities, counseling, and medications.  -Please call the office at [phone] within normal business hours (Monday-Friday, 8:00 AM - 4:30 PM) with any worsening of symptoms or onset of intolerable side effects. Please ask to leave a message with office staff.  Please allow up to 24-48 hours for response to a patient call/question/refill request.  -Safety plan has been established and discussed in detail with the patient, who is agreeable to contact support system and/or call 911 or go to the nearest ER should he/she/they have any thoughts of harm to self or others.    MEDS ORDERED DURING VISIT:  New Medications Ordered This Visit   Medications    DULoxetine (CYMBALTA) 60 MG capsule     Sig: Take 2 capsules by mouth Daily for 90 days.     Dispense:  90 capsule     Refill:  0    busPIRone (BUSPAR) 5 MG tablet     Sig: Take 1 tablet by mouth 3 (Three) Times a Day.     Dispense:  90 tablet     Refill:  0       FOLLOW UP:  Return in about 4 weeks (around 4/25/2025) for Next scheduled follow up.      Michael Huntley DO    This document has been electronically signed by Michael Huntley DO  March 28, 2025 10:19 EDT    Part of this note may be an electronic transcription/translation of spoken language to printed text using the Dragon Dictation System. Some of the data in this electronic note has been brought forward from a previous encounter, any necessary changes have been made, it has been reviewed by this provider, and it is accurate.

## 2025-05-07 NOTE — PROGRESS NOTES
Patient ID: Kristina Yates is a 64 y.o. female is here today for follow-up for cervical spinal cord injury.    [facility]    Subjective     The patient is here in regards to   Chief Complaint   Patient presents with    Neck Pain       History of Present Illness  Amrit still continues to have some neck pain, her arm pain and neuropathy have improved since adjusting her Cymbalta medication.  She has developed some arthritis in her fingertips especially the distal interphalangeal joints and the metacarpal joints and wrists.  She had a facet injection which gave her some significant relief although not complete relief and she is scheduled for a radiofrequency ablation in the future.      While in the room and during my examination of the patient I wore a mask and eye protection.  I washed my hands before and after this patient encounter.  The patient was also wearing a mask.    The following portions of the patient's history were reviewed and updated as appropriate: allergies, current medications, past family history, past medical history, past social history, past surgical history and problem list.    Review of Systems   Constitutional:  Negative for fever.   Musculoskeletal:  Positive for back pain, neck pain and neck stiffness.   Neurological:  Positive for weakness, numbness and headaches. Negative for dizziness and light-headedness.        Past Medical History:   Diagnosis Date    Alcohol abuse     in recovery, sober since 2012    Anxiety     Arthritis     Asthma     Edmonds esophagus     Blurred vision     Cervical disc disease     Cervical neuritis     Chronic pain     NECK AND BACK    COPD (chronic obstructive pulmonary disease)     STATES NEVER DIAGNOSED WITH    Depression     Diabetes mellitus     Type 2    DJD (degenerative joint disease), cervical     Foot spasms     FROM BACK SURGERY    GERD (gastroesophageal reflux disease)     Hiatal hernia     Hyperlipidemia      Hypertension     Persistent headaches     ?BLOOD PRESSURE OR NECK RELATED???    Seasonal allergies     Spinal headache     Spinal stenosis        Allergies   Allergen Reactions    Fluoxetine Hives     Hives    Sulfa Antibiotics Hives     THROAT CLOSES    Zofran [Ondansetron Hcl] Hives     THROAT CLOSES    Lisinopril Cough       Family History   Problem Relation Age of Onset    Cancer Mother         nonhodkinds lymphoma    Heart disease Father     Drug abuse Sister     Heart disease Brother     Lupus Maternal Aunt     Malig Hyperthermia Neg Hx        Social History     Socioeconomic History    Marital status:     Number of children: 2   Tobacco Use    Smoking status: Some Days     Current packs/day: 0.50     Average packs/day: 0.5 packs/day for 20.3 years (10.2 ttl pk-yrs)     Types: Cigarettes     Start date: 1/1/2005    Smokeless tobacco: Never   Vaping Use    Vaping status: Never Used   Substance and Sexual Activity    Alcohol use: No     Comment: sober since 2012, worsk 12 steps    Drug use: No    Sexual activity: Yes     Partners: Male       Past Surgical History:   Procedure Laterality Date    ANTERIOR CERVICAL DISCECTOMY W/ FUSION  11/2003    C5-6, C6-7    ANTERIOR CERVICAL DISCECTOMY W/ FUSION Right 9/16/2024    Procedure: CERVICAL FOUR TO SIX DISCECTOMY ANTERIOR WITH FUSION WITH CERVICAL FIVE CORPECTOMY AND REMOVAL OF PREVIOUS HARDWARE;  Surgeon: Alex Ortiz MD;  Location: [facility] MAIN OR;  Service: Neurosurgery;  Laterality: Right;    CERCLAGE CERVIX      CERVICAL EPIDURAL  2007    CERVICAL FUSION      DILATATION AND CURETTAGE      ENDOMETRIAL ABLATION  2007    ENDOSCOPY      MULTIPLE    ENDOSCOPY N/A 07/05/2016    Procedure: ESOPHAGOGASTRODUODENOSCOPY WITH COLD BIOPSIES;  Surgeon: Jose Mcclellan MD;  Location: [facility] ENDOSCOPY;  Service:     ESOPHAGEAL DILATATION  2009    HAND SURGERY Bilateral     CARTILAGE    LUMBAR FUSION      LUMBAR LAMINECTOMY WITH FUSION N/A 06/30/2023    Procedure: OPEN  LUMBAR FOUR TO FIVE TRANSFORAMINAL LUMBAR INTERBODY FUSION;  Surgeon: Alex Ortiz MD;  Location: [facility];  Service: Neurosurgery;  Laterality: N/A;    NISSEN FUNDOPLICATION LAPAROSCOPIC  03/2012    SACROILIAC JOINT FUSION Right 06/04/2024    Procedure: RIGHT PERCUTANEOUS ARTHRODESIS SACROILIAC JOINT, NEUROMONITORING;  Surgeon: Alex Ortiz MD;  Location: [facility];  Service: Neurosurgery;  Laterality: Right;    TONSILLECTOMY           Objective     Vitals:    05/08/25 0909   BP: 136/70   Pulse: 89   Temp: 96.6 °F (35.9 °C)   SpO2: 98%     Body mass index is 26.81 kg/m².    Physical Exam  Constitutional:       General: She is awake.   HENT:      Head: Normocephalic and atraumatic.   Eyes:      General: Lids are normal.      Extraocular Movements: Extraocular movements intact.      Conjunctiva/sclera: Conjunctivae normal.      Pupils: Pupils are equal, round, and reactive to light.   Pulmonary:      Breath sounds: Normal breath sounds.   Abdominal:      Palpations: Abdomen is soft.   Musculoskeletal:         General: Normal range of motion.   Skin:     General: Skin is warm and dry.   Neurological:      Mental Status: She is alert.      Coordination: Coordination is intact.      Deep Tendon Reflexes: Reflexes are normal and symmetric.   Psychiatric:         Behavior: Behavior is cooperative.         Neurological Exam  Mental Status  Awake and alert. Oriented to person, place and time.    Cranial Nerves  CN II: Visual acuity is normal. Visual fields full to confrontation.  CN III, IV, VI: Extraocular movements intact bilaterally. Normal lids and orbits bilaterally. Pupils equal round and reactive to light bilaterally.  CN V: Facial sensation is normal.  CN VII: Full and symmetric facial movement.  CN IX, X: Palate elevates symmetrically. Normal gag reflex.  CN XI: Shoulder shrug strength is normal.  CN XII: Tongue midline without atrophy or fasciculations.    Motor                                                Right                     Left  Deltoid                                   5                          5   Biceps                                   5                          5   Triceps                                  5                          5   Iliopsoas                               5                          5   Quadriceps                           5                          5   Hamstring                             5                          5   Gastrocnemius                     5                           5   Anterior tibialis                      5                          5    Sensory  Sensation is intact to light touch, pinprick, vibration and proprioception in all four extremities.    Reflexes  Deep tendon reflexes are 2+ and symmetric in all four extremities.    Coordination    Finger-to-nose, rapid alternating movements and heel-to-shin normal bilaterally without dysmetria.    Gait  Normal casual, toe, heel and tandem gait.      Assessment & Plan   Independent Review of Radiographic Studies:      I personally reviewed the images from the following studies.    INDICATION: Follow up from fusion    FINDINGS:    XR: XR of the cervical spine was reviewed and shows fusion across her corpectomy cage with stable instrumentation and unchanged hardware.  Beginning of fusion of the posterior facet joints at these levels.    Assessment/Plan: Kristina is slowly recovering from her cervical spondylolisthesis and spinal cord injury.  I do think that her neuropathy is now minimal and mostly managed with medication.  She has developed arthritis in her fingers and wrists and I think it would be beneficial for her to see a rheumatologist.  I have no further activity restrictions for her given that she has solid fusion across her corpectomy cage.    Medical Decision Making:      I am glad that her facet injection was helpful for her.  I think that RFA would be helpful.  We are waiting for facets to fuse and would like  to obtain a CT in 1 year to ensure that fusion.  In the meantime, she may benefit from a referral to a rheumatologist for her arthritis of the hands and wrist.         Diagnoses and all orders for this visit:    1. Cervical spinal cord injury without spinal bone injury, subsequent encounter (Primary)  -     CT Cervical Spine Without Contrast; Future    2. Arthritis of both wrists  -     Ambulatory Referral to Rheumatology             Patient Instructions/Recommendations:    Follow-up in 1 year with CT scan of the cervical spine      Alex Ortiz MD  05/08/25  09:46 EDT

## 2025-05-08 ENCOUNTER — OFFICE VISIT (OUTPATIENT)
Dept: NEUROSURGERY | Facility: CLINIC | Age: 65
End: 2025-05-08
Payer: COMMERCIAL

## 2025-05-08 VITALS
WEIGHT: 166 LBS | SYSTOLIC BLOOD PRESSURE: 136 MMHG | BODY MASS INDEX: 26.68 KG/M2 | OXYGEN SATURATION: 98 % | HEIGHT: 66 IN | HEART RATE: 89 BPM | TEMPERATURE: 96.6 F | DIASTOLIC BLOOD PRESSURE: 70 MMHG

## 2025-05-08 DIAGNOSIS — M19.031 ARTHRITIS OF BOTH WRISTS: ICD-10-CM

## 2025-05-08 DIAGNOSIS — M19.032 ARTHRITIS OF BOTH WRISTS: ICD-10-CM

## 2025-05-08 DIAGNOSIS — S14.109D: Primary | ICD-10-CM

## 2025-05-08 RX ORDER — METFORMIN HYDROCHLORIDE 500 MG/1
500 TABLET, EXTENDED RELEASE ORAL NIGHTLY
Qty: 90 TABLET | Refills: 0 | Status: SHIPPED | OUTPATIENT
Start: 2025-05-08

## 2025-06-05 ENCOUNTER — OFFICE VISIT (OUTPATIENT)
Age: 65
End: 2025-06-05
Payer: COMMERCIAL

## 2025-06-05 VITALS
SYSTOLIC BLOOD PRESSURE: 138 MMHG | DIASTOLIC BLOOD PRESSURE: 74 MMHG | OXYGEN SATURATION: 92 % | WEIGHT: 179 LBS | HEART RATE: 97 BPM | BODY MASS INDEX: 28.77 KG/M2 | HEIGHT: 66 IN

## 2025-06-05 DIAGNOSIS — F10.21 ALCOHOL USE DISORDER, SEVERE, IN SUSTAINED REMISSION: ICD-10-CM

## 2025-06-05 DIAGNOSIS — F41.1 GENERALIZED ANXIETY DISORDER: Primary | ICD-10-CM

## 2025-06-05 DIAGNOSIS — F33.2 SEVERE EPISODE OF RECURRENT MAJOR DEPRESSIVE DISORDER, WITHOUT PSYCHOTIC FEATURES: ICD-10-CM

## 2025-06-05 RX ORDER — HYDROXYZINE HYDROCHLORIDE 25 MG/1
25-50 TABLET, FILM COATED ORAL DAILY PRN
Qty: 60 TABLET | Refills: 1 | Status: SHIPPED | OUTPATIENT
Start: 2025-06-05

## 2025-06-05 RX ORDER — DULOXETIN HYDROCHLORIDE 60 MG/1
120 CAPSULE, DELAYED RELEASE ORAL DAILY
Qty: 60 CAPSULE | Refills: 2 | Status: SHIPPED | OUTPATIENT
Start: 2025-06-05 | End: 2025-09-03

## 2025-06-05 RX ORDER — BUSPIRONE HYDROCHLORIDE 10 MG/1
TABLET ORAL
Qty: 147 TABLET | Refills: 0 | Status: SHIPPED | OUTPATIENT
Start: 2025-06-05 | End: 2025-07-17

## 2025-06-05 NOTE — PROGRESS NOTES
Subjective   Kristina Yates is a 64 y.o. female who presents today in follow up for management of major depressive disorder, generalized anxiety disorder and alcohol use disorder in sustained remission.    Patient reports little to no change in psychiatric symptoms when compared to clinical status at our initial visit in late March 2025.  More specifically, she continues to endorse a significantly depressed mood associated with persistent anhedonia, decreased levels of energy/fatigue, difficulties initiating/maintaining sleep, low self worth, excessive feelings of guilt, difficulties directing her attention/focus, and noticeable psychomotor retardation.  Patient currently denies any active suicidal ideation, intent or plan.  Patient also continues to endorse high levels of anxiety associated with near constant feelings of restlessness/feeling on edge and difficulties winding down/relaxing.  She continues to worry excessively about most things on most days and reports significant difficulties controlling/stopping her anxiety and worry.  Patient also reports intermittent periods of irritability associated with short-term memory issues and her mind frequently going blank.  She does continue to catastrophize and think of worst case scenarios as well as experience a frequent fear that something bad or awful is going to happen.  She reports compliance with recently increased dose of duloxetine, denies any associated side effects and appears to be tolerating this medication well.  The patient continues to endorse a positive therapeutic benefit associated with this medication especially compared to her clinical status before its initiation she does not feel as if recently maximizing her daily dose of duloxetine has appropriately addressed her psychiatric symptoms.  She also remains compliant with prescribed buspirone and denies any side effects associated with this medication.  Patient reports ongoing sobriety from alcohol or  any other illegal/illicit substance and remains active in AA. Patient otherwise denies any auditory, visual, or tactile hallucinations and does not currently appear to be responding to internal stimuli.  Patient denies any generalized paranoia and displays no evidence of delusional thinking.    The following portions of the patient's history were reviewed and updated as appropriate: allergies, current medications, past family history, past medical history, past social history, past surgical history and problem list.  History of Present Illness               Past Medical History:  Past Medical History:   Diagnosis Date    Alcohol abuse     in recovery, sober since 2012    Anxiety     Arthritis     Asthma     Edmonds esophagus     Blurred vision     Cervical disc disease     Cervical neuritis     Chronic pain     NECK AND BACK    COPD (chronic obstructive pulmonary disease)     STATES NEVER DIAGNOSED WITH    Depression     Diabetes mellitus     Type 2    DJD (degenerative joint disease), cervical     Foot spasms     FROM BACK SURGERY    GERD (gastroesophageal reflux disease)     Hiatal hernia     Hyperlipidemia     Hypertension     Persistent headaches     ?BLOOD PRESSURE OR NECK RELATED???    Seasonal allergies     Spinal headache     Spinal stenosis        Social History:  Social History     Socioeconomic History    Marital status:     Number of children: 2   Tobacco Use    Smoking status: Some Days     Current packs/day: 0.50     Average packs/day: 0.5 packs/day for 20.4 years (10.2 ttl pk-yrs)     Types: Cigarettes     Start date: 1/1/2005    Smokeless tobacco: Never   Vaping Use    Vaping status: Never Used   Substance and Sexual Activity    Alcohol use: No     Comment: sober since 2012, worsk 12 steps    Drug use: No    Sexual activity: Yes     Partners: Male       Family History:  Family History   Problem Relation Age of Onset    Cancer Mother         nonhodkinds lymphoma    Heart disease Father     Drug  abuse Sister     Heart disease Brother     Lupus Maternal Aunt     Malig Hyperthermia Neg Hx        Past Surgical History:  Past Surgical History:   Procedure Laterality Date    ANTERIOR CERVICAL DISCECTOMY W/ FUSION  11/2003    C5-6, C6-7    ANTERIOR CERVICAL DISCECTOMY W/ FUSION Right 9/16/2024    Procedure: CERVICAL FOUR TO SIX DISCECTOMY ANTERIOR WITH FUSION WITH CERVICAL FIVE CORPECTOMY AND REMOVAL OF PREVIOUS HARDWARE;  Surgeon: Alex Ortiz MD;  Location: [facility] OR;  Service: Neurosurgery;  Laterality: Right;    CERCLAGE CERVIX      CERVICAL EPIDURAL  2007    CERVICAL FUSION      DILATATION AND CURETTAGE      ENDOMETRIAL ABLATION  2007    ENDOSCOPY      MULTIPLE    ENDOSCOPY N/A 07/05/2016    Procedure: ESOPHAGOGASTRODUODENOSCOPY WITH COLD BIOPSIES;  Surgeon: Jose Mcclellan MD;  Location: [facility] ENDOSCOPY;  Service:     ESOPHAGEAL DILATATION  2009    HAND SURGERY Bilateral     CARTILAGE    LUMBAR FUSION      LUMBAR LAMINECTOMY WITH FUSION N/A 06/30/2023    Procedure: OPEN LUMBAR FOUR TO FIVE TRANSFORAMINAL LUMBAR INTERBODY FUSION;  Surgeon: Alex Ortiz MD;  Location: [facility] OR;  Service: Neurosurgery;  Laterality: N/A;    NISSEN FUNDOPLICATION LAPAROSCOPIC  03/2012    SACROILIAC JOINT FUSION Right 06/04/2024    Procedure: RIGHT PERCUTANEOUS ARTHRODESIS SACROILIAC JOINT, NEUROMONITORING;  Surgeon: Alex Ortiz MD;  Location: [facility] OR;  Service: Neurosurgery;  Laterality: Right;    TONSILLECTOMY         Problem List:  Patient Active Problem List   Diagnosis    Cervical radiculopathy    Controlled type 2 diabetes mellitus without complication, without long-term current use of insulin    Diabetic peripheral neuropathy    Menopausal symptom    Adult acne    Hyperlipidemia    Arthritis    Arthropathy of cervical facet joint    Asthma    Spinal stenosis    Chronic pain    COPD (chronic obstructive pulmonary disease)    GERD (gastroesophageal reflux disease)    Alcohol use disorder, severe, in  sustained remission    Edmonds esophagus    Essential hypertension    Low back pain    Cauda equina syndrome with neurogenic bladder    Lumbar back pain    Tobacco abuse    Chondromalacia of knee    IBRAHIMA exposure in utero    Impingement syndrome of right shoulder    Nephrolithiasis    Pes anserine bursitis    SI (sacroiliac) joint inflammation    Lumbar pseudoarthrosis    Cervicalgia    Acute neck pain    Spinal cord injury, cervical, without spinal bone injury    Acute low back pain    Urinary incontinence without sensory awareness    Severe episode of recurrent major depressive disorder, without psychotic features    Generalized anxiety disorder       Allergy:   Allergies   Allergen Reactions    Fluoxetine Hives     Hives    Sulfa Antibiotics Hives     THROAT CLOSES    Zofran [Ondansetron Hcl] Hives     THROAT CLOSES    Lisinopril Cough        Current Medications:   Current Outpatient Medications   Medication Sig Dispense Refill    busPIRone (BUSPAR) 10 MG tablet Take 1 tablet by mouth 2 (Two) Times a Day for 7 days, THEN 1 tablet 3 (Three) Times a Day for 7 days, THEN 2 tablets 2 (Two) Times a Day for 28 days. 147 tablet 0    DULoxetine (CYMBALTA) 60 MG capsule Take 2 capsules by mouth Daily for 90 days. 60 capsule 2    acetaminophen (TYLENOL) 325 MG tablet 2 tablets.      albuterol sulfate  (90 Base) MCG/ACT inhaler INHALE 1 TO 2 PUFFS INTO THE LUNGS FOUR TIMES DAILY AS DIRECTED      amLODIPine (NORVASC) 10 MG tablet Take 1 tablet by mouth Every Night. 90 tablet 3    atorvastatin (LIPITOR) 40 MG tablet TAKE 1 TABLET BY MOUTH DAILY 90 tablet 1    cyclobenzaprine (FLEXERIL) 5 MG tablet Take 1-2 tablets by mouth Every 8 (Eight) Hours As Needed (neck pain). 15 tablet 0    fluticasone (FLONASE) 50 MCG/ACT nasal spray 2 sprays into the nostril(s) as directed by provider Daily. 18.2 mL 5    gabapentin (NEURONTIN) 300 MG capsule Take 1 capsule by mouth Daily with breakfast and dinner and 2 capsules at bedtime for  3 days, THEN 2 capsules Daily with breakfast and at bedtime and 1 capsule at dinner for 3 days, THEN 2 capsules capsules three times daily. 180 capsule 0    guaiFENesin (MUCINEX) 600 MG 12 hr tablet Take 1 tablet by mouth 2 (Two) Times a Day.      hydrALAZINE (APRESOLINE) 25 MG tablet Take 1 tablet by mouth 3 (Three) Times a Day.      hydroCHLOROthiazide 25 MG tablet TAKE 1 TABLET BY MOUTH DAILY 90 tablet 1    hydrOXYzine (ATARAX) 25 MG tablet Take 1-2 tablets by mouth Daily As Needed for Anxiety (For acute insomnia/anxiety.). 60 tablet 1    losartan (COZAAR) 100 MG tablet TAKE 1 TABLET BY MOUTH DAILY 90 tablet 1    metFORMIN ER (GLUCOPHAGE-XR) 500 MG 24 hr tablet TAKE ONE TABLET BY MOUTH ONCE NIGHTLY 90 tablet 0    metoprolol succinate XL (TOPROL-XL) 50 MG 24 hr tablet Take 1 tablet by mouth Daily. 90 tablet 3    Multiple Vitamins-Minerals (MULTIVITAMIN ADULT) tablet Take 1 tablet by mouth Daily.      oxyCODONE-acetaminophen (PERCOCET) 5-325 MG per tablet Take 1 tablet by mouth Every 4 (Four) Hours As Needed for Moderate Pain. 12 tablet 0    pantoprazole (PROTONIX) 40 MG EC tablet Take 1 tablet by mouth Every Morning.       No current facility-administered medications for this visit.       Review of Symptoms:    Review of Systems   Constitutional:  Positive for fatigue. Negative for activity change.   HENT:  Negative for tinnitus.    Eyes:  Negative for visual disturbance.   Respiratory:  Negative for shortness of breath.    Cardiovascular:  Positive for palpitations. Negative for chest pain.   Endocrine: Negative for cold intolerance and heat intolerance.   Skin:  Negative for rash.   Neurological:  Negative for dizziness, seizures and confusion.   Psychiatric/Behavioral:  Positive for decreased concentration, sleep disturbance, depressed mood and stress. Negative for hallucinations, self-injury and suicidal ideas. The patient is nervous/anxious.          Physical Exam:   Blood pressure 138/74, pulse 97, height  "167.6 cm (65.98\"), weight 81.2 kg (179 lb), SpO2 92%, not currently breastfeeding.  Appearance: Appears documented age, appropriate hygiene and grooming.  Gait, Station, Strength: Normal gait, station and strength.  Physical Exam               Mental Status Exam:   Hygiene:   good  Cooperation:  Cooperative  Eye Contact:  Good  Psychomotor Behavior:  Appropriate  Affect:  Full range and Appropriate  Mood: depressed and anxious  Hopelessness: Denies  Speech:  Normal  Thought Process:  Goal directed and Linear  Thought Content:  Normal  Suicidal:  None  Homicidal:  None  Hallucinations:  None  Delusion:  None  Memory:  Intact  Orientation:  Person, Place, Time, and Situation  Reliability:  good  Insight:  Good  Judgement:  Good  Impulse Control:  Good      Lab Results:   No visits with results within 1 Month(s) from this visit.   Latest known visit with results is:   Admission on 01/24/2025, Discharged on 01/25/2025   Component Date Value Ref Range Status    Glucose 01/24/2025 104 (H)  65 - 99 mg/dL Final    BUN 01/24/2025 9  8 - 23 mg/dL Final    Creatinine 01/24/2025 0.60  0.57 - 1.00 mg/dL Final    Sodium 01/24/2025 138  136 - 145 mmol/L Final    Potassium 01/24/2025 4.1  3.5 - 5.2 mmol/L Final    Chloride 01/24/2025 100  98 - 107 mmol/L Final    CO2 01/24/2025 26.0  22.0 - 29.0 mmol/L Final    Calcium 01/24/2025 9.3  8.6 - 10.5 mg/dL Final    Total Protein 01/24/2025 7.1  6.0 - 8.5 g/dL Final    Albumin 01/24/2025 4.5  3.5 - 5.2 g/dL Final    ALT (SGPT) 01/24/2025 14  1 - 33 U/L Final    AST (SGOT) 01/24/2025 17  1 - 32 U/L Final    Alkaline Phosphatase 01/24/2025 83  39 - 117 U/L Final    Total Bilirubin 01/24/2025 0.2  0.0 - 1.2 mg/dL Final    Globulin 01/24/2025 2.6  gm/dL Final    A/G Ratio 01/24/2025 1.7  g/dL Final    BUN/Creatinine Ratio 01/24/2025 15.0  7.0 - 25.0 Final    Anion Gap 01/24/2025 12.0  5.0 - 15.0 mmol/L Final    eGFR 01/24/2025 101.0  >60.0 mL/min/1.73 Final    Color, UA 01/24/2025 Yellow  " Yellow, Straw Final    Appearance, UA 01/24/2025 Clear  Clear Final    pH, UA 01/24/2025 6.5  5.0 - 8.0 Final    Specific Gravity, UA 01/24/2025 <=1.005  1.005 - 1.030 Final    Glucose, UA 01/24/2025 Negative  Negative Final    Ketones, UA 01/24/2025 Negative  Negative Final    Bilirubin, UA 01/24/2025 Negative  Negative Final    Blood, UA 01/24/2025 Negative  Negative Final    Protein, UA 01/24/2025 Negative  Negative Final    Leuk Esterase, UA 01/24/2025 Small (1+) (A)  Negative Final    Nitrite, UA 01/24/2025 Negative  Negative Final    Urobilinogen, UA 01/24/2025 0.2 E.U./dL  0.2 - 1.0 E.U./dL Final    Protime 01/24/2025 13.5  11.7 - 14.2 Seconds Final    INR 01/24/2025 1.03  0.90 - 1.10 Final    PTT 01/24/2025 50.9 (H)  22.7 - 35.4 seconds Final    WBC 01/24/2025 8.64  3.40 - 10.80 10*3/mm3 Final    RBC 01/24/2025 4.30  3.77 - 5.28 10*6/mm3 Final    Hemoglobin 01/24/2025 13.0  12.0 - 15.9 g/dL Final    Hematocrit 01/24/2025 39.0  34.0 - 46.6 % Final    MCV 01/24/2025 90.7  79.0 - 97.0 fL Final    MCH 01/24/2025 30.2  26.6 - 33.0 pg Final    MCHC 01/24/2025 33.3  31.5 - 35.7 g/dL Final    RDW 01/24/2025 12.8  12.3 - 15.4 % Final    RDW-SD 01/24/2025 42.3  37.0 - 54.0 fl Final    MPV 01/24/2025 10.1  6.0 - 12.0 fL Final    Platelets 01/24/2025 233  140 - 450 10*3/mm3 Final    Neutrophil % 01/24/2025 58.6  42.7 - 76.0 % Final    Lymphocyte % 01/24/2025 33.6  19.6 - 45.3 % Final    Monocyte % 01/24/2025 5.8  5.0 - 12.0 % Final    Eosinophil % 01/24/2025 1.6  0.3 - 6.2 % Final    Basophil % 01/24/2025 0.3  0.0 - 1.5 % Final    Immature Grans % 01/24/2025 0.1  0.0 - 0.5 % Final    Neutrophils, Absolute 01/24/2025 5.06  1.70 - 7.00 10*3/mm3 Final    Lymphocytes, Absolute 01/24/2025 2.90  0.70 - 3.10 10*3/mm3 Final    Monocytes, Absolute 01/24/2025 0.50  0.10 - 0.90 10*3/mm3 Final    Eosinophils, Absolute 01/24/2025 0.14  0.00 - 0.40 10*3/mm3 Final    Basophils, Absolute 01/24/2025 0.03  0.00 - 0.20 10*3/mm3 Final     Immature Grans, Absolute 01/24/2025 0.01  0.00 - 0.05 10*3/mm3 Final    nRBC 01/24/2025 0.0  0.0 - 0.2 /100 WBC Final    RBC, UA 01/24/2025 0-2  None Seen, 0-2 /HPF Final    WBC, UA 01/24/2025 3-5 (A)  None Seen, 0-2 /HPF Final    Bacteria, UA 01/24/2025 None Seen  None Seen /HPF Final    Squamous Epithelial Cells, UA 01/24/2025 0-2  None Seen, 0-2 /HPF Final    Hyaline Casts, UA 01/24/2025 None Seen  None Seen /LPF Final    Methodology 01/24/2025 Automated Microscopy   Final    Magnesium 01/24/2025 2.4  1.6 - 2.4 mg/dL Final    Glucose 01/25/2025 238 (H)  65 - 99 mg/dL Final    BUN 01/25/2025 12  8 - 23 mg/dL Final    Creatinine 01/25/2025 0.65  0.57 - 1.00 mg/dL Final    Sodium 01/25/2025 132 (L)  136 - 145 mmol/L Final    Potassium 01/25/2025 4.0  3.5 - 5.2 mmol/L Final    Chloride 01/25/2025 94 (L)  98 - 107 mmol/L Final    CO2 01/25/2025 22.0  22.0 - 29.0 mmol/L Final    Calcium 01/25/2025 9.0  8.6 - 10.5 mg/dL Final    BUN/Creatinine Ratio 01/25/2025 18.5  7.0 - 25.0 Final    Anion Gap 01/25/2025 16.0 (H)  5.0 - 15.0 mmol/L Final    eGFR 01/25/2025 99.1  >60.0 mL/min/1.73 Final    WBC 01/25/2025 13.92 (H)  3.40 - 10.80 10*3/mm3 Final    RBC 01/25/2025 3.90  3.77 - 5.28 10*6/mm3 Final    Hemoglobin 01/25/2025 11.5 (L)  12.0 - 15.9 g/dL Final    Hematocrit 01/25/2025 36.6  34.0 - 46.6 % Final    MCV 01/25/2025 93.8  79.0 - 97.0 fL Final    MCH 01/25/2025 29.5  26.6 - 33.0 pg Final    MCHC 01/25/2025 31.4 (L)  31.5 - 35.7 g/dL Final    RDW 01/25/2025 12.5  12.3 - 15.4 % Final    RDW-SD 01/25/2025 43.4  37.0 - 54.0 fl Final    MPV 01/25/2025 10.4  6.0 - 12.0 fL Final    Platelets 01/25/2025 231  140 - 450 10*3/mm3 Final    Neutrophil % 01/25/2025 89.5 (H)  42.7 - 76.0 % Final    Lymphocyte % 01/25/2025 8.5 (L)  19.6 - 45.3 % Final    Monocyte % 01/25/2025 1.4 (L)  5.0 - 12.0 % Final    Eosinophil % 01/25/2025 0.0 (L)  0.3 - 6.2 % Final    Basophil % 01/25/2025 0.1  0.0 - 1.5 % Final    Immature Grans % 01/25/2025  0.5  0.0 - 0.5 % Final    Neutrophils, Absolute 01/25/2025 12.45 (H)  1.70 - 7.00 10*3/mm3 Final    Lymphocytes, Absolute 01/25/2025 1.19  0.70 - 3.10 10*3/mm3 Final    Monocytes, Absolute 01/25/2025 0.20  0.10 - 0.90 10*3/mm3 Final    Eosinophils, Absolute 01/25/2025 0.00  0.00 - 0.40 10*3/mm3 Final    Basophils, Absolute 01/25/2025 0.01  0.00 - 0.20 10*3/mm3 Final    Immature Grans, Absolute 01/25/2025 0.07 (H)  0.00 - 0.05 10*3/mm3 Final    nRBC 01/25/2025 0.0  0.0 - 0.2 /100 WBC Final    Glucose 01/24/2025 232 (H)  70 - 130 mg/dL Final    Glucose 01/25/2025 171 (H)  70 - 130 mg/dL Final    Glucose 01/25/2025 312 (H)  70 - 130 mg/dL Final     Results            PHQ-9 Total Score: 18    RIANA-7 Total Score: 18     Assessment & Plan    Diagnoses and all orders for this visit:    1. Generalized anxiety disorder (Primary)  -     busPIRone (BUSPAR) 10 MG tablet; Take 1 tablet by mouth 2 (Two) Times a Day for 7 days, THEN 1 tablet 3 (Three) Times a Day for 7 days, THEN 2 tablets 2 (Two) Times a Day for 28 days.  Dispense: 147 tablet; Refill: 0  -     DULoxetine (CYMBALTA) 60 MG capsule; Take 2 capsules by mouth Daily for 90 days.  Dispense: 60 capsule; Refill: 2  -     hydrOXYzine (ATARAX) 25 MG tablet; Take 1-2 tablets by mouth Daily As Needed for Anxiety (For acute insomnia/anxiety.).  Dispense: 60 tablet; Refill: 1    2. Severe episode of recurrent major depressive disorder, without psychotic features  -     busPIRone (BUSPAR) 10 MG tablet; Take 1 tablet by mouth 2 (Two) Times a Day for 7 days, THEN 1 tablet 3 (Three) Times a Day for 7 days, THEN 2 tablets 2 (Two) Times a Day for 28 days.  Dispense: 147 tablet; Refill: 0  -     DULoxetine (CYMBALTA) 60 MG capsule; Take 2 capsules by mouth Daily for 90 days.  Dispense: 60 capsule; Refill: 2    3. Alcohol use disorder, severe, in sustained remission  Overview:  in recovery, sober since 2012           Kristina Yates is a 64 y.o. female who presents today in follow up for  management of major depressive disorder, generalized anxiety disorder and alcohol use disorder in sustained remission.    As detailed above patient reports the persistence of various psychiatric symptoms that do continue to negatively impact both the patient's social and occupational functioning.  She reports consistent compliance with all psychiatric medications, denies any associated side effects and appears to be tolerating these medications well.  Despite reporting a positive therapeutic response to maximum daily dosage of duloxetine in the past she reports experiencing little to no change in psychiatric symptoms following increase of duloxetine at our initial visit as evidenced by current PHQ-9 and RIANA-7 scores..  It was explained to the patient at today's visit that there are not many options left for management of her anxiety due to the fact that she is currently prescribed gabapentin for neuropathic pain which is frequently used as an adjunctive agent for management of anxiety when necessary.  Patient also suffers from hypertension and is prescribed multiple antihypertensive medications may can be reluctant to prescribe an as needed medication such as propranolol.  Similarly, I do not feel as if prescribing a benzodiazepine was appropriate given patient's history of alcohol use disorder and the fact that she is prescribed opioid pain medication.  As such I do feel as if patient's best option at this time is to attempt to maximize daily dosage of buspirone.  Patient advised to increase daily dose to buspirone to 10 mg p.o. twice daily and continue for 7 days.  We will increase on a 10 mg basis every 7 days until reaching a daily dose of buspirone 40 mg.  Patient advised to continue this dose until our next scheduled appointment.  I will also initiate as needed hydroxyzine in hopes of addressing patient's insomnia also associated with ongoing anxiety.  Patient will be seen again in approximately 6 weeks for  reassessment.  Patient voices understanding of this and agreeable to this plan.    Medications:  -Continue duloxetine 120 mg p.o. once daily for management of major depressive disorder and generalized anxiety disorder.  - Increase buspirone to 10 mg p.o. twice daily x 7 days.  Further increase to buspirone 10 mg 3 times daily thereafter and continue for an additional 7 days.  Further increase to buspirone 20 mg p.o. twice daily thereafter and continue until next scheduled appointment.  - Initiate hydroxyzine 25 to 50 mg p.o. nightly as needed for acute anxiety/insomnia.    TREATMENT PLAN - SHORT AND LONG-TERM GOALS:   -Continue supportive psychotherapy efforts and medications as indicated. Treatment and medication options discussed during today's visit.   -Patient acknowledged and verbally consented to continue with current treatment plan and was educated on the importance of compliance with treatment and follow-up appointments.    SUMMARY/EDUCATION/DISCUSSION:  -Pt was given appropriate time to ask questions and concerns were addressed. A thorough discussion was had that included review of disease process, need for continued monitoring and additional treatment options including use of pharmacological and non-pharmacological approaches to care, decisions were made and agreed upon by patient and provider.   -Discussed medication options and treatment plan of prescribed medication as well as the risks, benefits, and side effects including potential falls, possible impaired driving and metabolic adversities among others; patient acknowledged and provided verbal consent.   -Patient has been educated regarding multimodal approach with healthy nutrition, healthy sleep, regular physical activity, social activities, counseling, and medications.  -Please call the office at [phone] within normal business hours (Monday-Friday, 8:00 AM - 4:30 PM) with any worsening of symptoms or onset of intolerable side effects. Please  ask to leave a message with office staff.  Please allow up to 24-48 hours for response to a patient call/question/refill request.  -Safety plan has been established and discussed in detail with the patient, who is agreeable to contact support system and/or call 911 or go to the nearest ER should he/she/they have any thoughts of harm to self or others.    MEDS ORDERED DURING VISIT:  New Medications Ordered This Visit   Medications    busPIRone (BUSPAR) 10 MG tablet     Sig: Take 1 tablet by mouth 2 (Two) Times a Day for 7 days, THEN 1 tablet 3 (Three) Times a Day for 7 days, THEN 2 tablets 2 (Two) Times a Day for 28 days.     Dispense:  147 tablet     Refill:  0    DULoxetine (CYMBALTA) 60 MG capsule     Sig: Take 2 capsules by mouth Daily for 90 days.     Dispense:  60 capsule     Refill:  2    hydrOXYzine (ATARAX) 25 MG tablet     Sig: Take 1-2 tablets by mouth Daily As Needed for Anxiety (For acute insomnia/anxiety.).     Dispense:  60 tablet     Refill:  1       FOLLOW UP:  Return in about 6 weeks (around 7/17/2025) for Next scheduled follow up.      Michael Huntley DO    This document has been electronically signed by Michael Huntley DO  June 5, 2025 09:38 EDT    Part of this note may be an electronic transcription/translation of spoken language to printed text using the Dragon Dictation System. Some of the data in this electronic note has been brought forward from a previous encounter, any necessary changes have been made, it has been reviewed by this provider, and it is accurate.    Answers submitted by the patient for this visit:  Anxiety (Submitted on 6/3/2025)  Chief Complaint: Anxiety  Visit: follow-up  Frequency: constantly  dry mouth: Yes  excessive worry: Yes  insomnia: Yes  malaise/fatigue: Yes  Sleep quality: poor  Hours of sleep per night: 4 Hours  Medication compliance: %    Conflicting answers have been found for some questions. Please document the patient's answers manually.

## 2025-06-26 NOTE — CONSULTS
"Subjective   Patient ID: Britt Whitley \"Saad" is a 67 y.o. female who presents for Establish Care and Nephrolithiasis.    HPI  She was in the ER several months ago with renal colic.  A CT scan done revealed a 2 mm UVJ stone.  She went home with Flomax and pain medication.  She also had nausea at that time.  Since then her symptoms have resolved.  She reports having several stone episodes over the past 3 years.  She has never required any surgical intervention.  She has no voiding complaints.  She has worked on weight reduction.        Review of Systems  As per HPI  Hypothyroidism  DM          Objective   Physical Exam  Well nourished  Breathing comfortably  Abdomen soft, ND, NT                Assessment/Plan   Problem List Items Addressed This Visit    None  Visit Diagnoses         Codes      Kidney stones    -  Primary N20.0    Relevant Orders    XR abdomen 1 view    Parathyroid Hormone, Intact    Follow Up In Urology             I reviewed her CT scan and her x-ray.  She has very small nonobstructive renal stones.  We discussed the natural history of stone disease.  I counseled her on prevention measures including diet and hydration.    I will order a metabolic evaluation.  I will order a PTH level, and a 24-hour urine collection.  She will return and see me in 6 months.          Tom Raines MD 06/26/25 10:45 AM   " [facility]   Consult Note    Patient Name: Kristina Yates  : 1960  MRN: [id number]  Primary Care Physician:  Nyla Mejia APRN  Referring Physician: DAYNE Garcias  Date of admission: 2024    Consults  Subjective   Subjective     Reason for Consult/ Chief Complaint: Assessment for rehabilitation    History of Present Illness  Kristina Yates is a 64 y.o. female admitted to [facility] on 2024 with a history of cervical radiculopathy and severe cervicalgia.  History of previous cervical fusion C5-6 C6-7 in  with worsening adjacent segment disease.  She also has a history of a L4-5 lumbar decompression and interbody fusion. In  2024 underwent right percutaneous sacroiliac joint fusion.    She is now status post 2024 anterior cervical C5 corpectomy with C4/5, C6/7 decompression, removal of old hardware and C4-7 instrumentation, duraplasty.  On a steroid taper-IV dexamethasone changed to 6-day DexPak on   [place] collar which is to be resized  Pain management with scheduled acetaminophen/scheduled methocarbamol and as needed oxycodone.  Also on duloxetine/gabapentin-Cleve report reviewed  .    Functionally, patient was premorbidly independent.  Postoperatively she has had dysphagia.  Did not pass a swallow evaluation and Cortrak tube placed.  Weakness with the left side elbow extension, thumb extension, wrist extension.  With Occupational Therapy on  was able to assist with lower body dressing to practice donning socks.  Requires cues for sequencing and safety with walker management.  Moderate assist with functional mobility transfers to the toilet.  Ambulated 20 feet min assist with a rolling walker.  Transfers min assist.    She does have a history of some premorbid bladder incontinence that she related more to having 2 children into the cauda equina.  No bowel incontinence.  Has some neuropathy for which she takes  duloxetine and gabapentin.  Also takes duloxetine for depression    Additional issues include:   COPD.  Albuterol nebulizer as needed  Diabetes mellitus.  Glargine insulin and sliding scale insulin.  Metformin extended release  Hypertension.  Amlodipine.  Hydrochlorothiazide.  Losartan.  Metoprolol  Leukocytosis-reactive and secondary to steroids  DVT prophylaxis-Lovenox  GI-bowel program        Review of Systems     Personal History     Past Medical History:   Diagnosis Date    Alcohol abuse     in recovery, sober since 2012    Anxiety     Arthritis     Asthma     Edmonds esophagus     Blurred vision     Cervical disc disease     Cervical neuritis     Chronic pain     NECK AND BACK    COPD (chronic obstructive pulmonary disease)     STATES NEVER DIAGNOSED WITH    Depression     Diabetes mellitus     Type 2    DJD (degenerative joint disease), cervical     Foot spasms     FROM BACK SURGERY    GERD (gastroesophageal reflux disease)     Hiatal hernia     Hyperlipidemia     Hypertension     Persistent headaches     ?BLOOD PRESSURE OR NECK RELATED???    Seasonal allergies     Spinal headache     Spinal stenosis        Past Surgical History:   Procedure Laterality Date    ANTERIOR CERVICAL DISCECTOMY W/ FUSION  11/2003    C5-6, C6-7    CERCLAGE CERVIX      CERVICAL EPIDURAL  2007    CERVICAL FUSION      DILATATION AND CURETTAGE      ENDOMETRIAL ABLATION  2007    ENDOSCOPY      MULTIPLE    ENDOSCOPY N/A 07/05/2016    Procedure: ESOPHAGOGASTRODUODENOSCOPY WITH COLD BIOPSIES;  Surgeon: Jose Mcclellan MD;  Location: [facility] ENDOSCOPY;  Service:     ESOPHAGEAL DILATATION  2009    HAND SURGERY Bilateral     CARTILAGE    LUMBAR FUSION      LUMBAR LAMINECTOMY WITH FUSION N/A 06/30/2023    Procedure: OPEN LUMBAR FOUR TO FIVE TRANSFORAMINAL LUMBAR INTERBODY FUSION;  Surgeon: Alex Ortiz MD;  Location: [facility] MAIN OR;  Service: Neurosurgery;  Laterality: N/A;    NISSEN FUNDOPLICATION LAPAROSCOPIC  03/2012    SACROILIAC JOINT  FUSION Right 06/04/2024    Procedure: RIGHT PERCUTANEOUS ARTHRODESIS SACROILIAC JOINT, NEUROMONITORING;  Surgeon: Alex Ortiz MD;  Location: [facility];  Service: Neurosurgery;  Laterality: Right;    TONSILLECTOMY         Family History: family history includes Cancer in her mother; Drug abuse in her sister; Heart disease in her brother and father; Lupus in her maternal aunt. Otherwise pertinent FHx was reviewed and not pertinent to current issue.    Social History:  reports that she has been smoking cigarettes. She started smoking about 18 years ago. She has a 9.3 pack-year smoking history. She has never used smokeless tobacco. She reports that she does not drink alcohol and does not use drugs.  Patient lives with her spouse.  5 steps enter the home.  Grab bar in the bathroom.  Has a higher toilet seat.  First-floor bedroom and bathroom  Home Medications:   DULoxetine, albuterol sulfate HFA, amLODIPine, atorvastatin, cyclobenzaprine, fluticasone, gabapentin, hydroCHLOROthiazide, metoprolol succinate XL, and multivitamin with minerals    Allergies:  Allergies   Allergen Reactions    Fluoxetine Hives     Hives    Sulfa Antibiotics Hives     THROAT CLOSES    Zofran [Ondansetron Hcl] Hives     THROAT CLOSES    Lisinopril Cough     MEDS:  Scheduled Meds:acetaminophen, 650 mg, Oral, Q4H  amLODIPine, 5 mg, Oral, Nightly  atorvastatin, 40 mg, Oral, Nightly  dexAMETHasone, 4.5 mg, Oral, BID With Meals   Followed by  [START ON 9/20/2024] dexAMETHasone, 3 mg, Oral, BID With Meals   Followed by  [START ON 9/22/2024] dexAMETHasone, 1.5 mg, Oral, BID With Meals  DULoxetine, 30 mg, Oral, Daily  DULoxetine, 60 mg, Oral, Daily  enoxaparin, 40 mg, Subcutaneous, Nightly  fluticasone, 2 spray, Nasal, Daily  gabapentin, 200 mg, Oral, BID  hydroCHLOROthiazide, 12.5 mg, Oral, Daily  insulin glargine, 5 Units, Subcutaneous, Daily  insulin regular, 2-7 Units, Subcutaneous, Q6H  lansoprazole, 30 mg, Nasogastric, Q AM  losartan, 100  mg, Oral, Daily  metFORMIN ER, 500 mg, Oral, Nightly  methocarbamol, 500 mg, Oral, Q6H  metoprolol succinate XL, 50 mg, Oral, Daily  multivitamin with minerals, 1 tablet, Oral, Daily  senna-docusate sodium, 2 tablet, Oral, BID   And  polyethylene glycol, 17 g, Oral, Daily  sodium chloride, 10 mL, Intravenous, Q12H      Continuous Infusions:lactated ringers, 9 mL/hr, Last Rate: 9 mL/hr (09/16/24 0733)  Pharmacy Consult - Pharmacy to dose,       PRN Meds:.  albuterol    senna-docusate sodium **AND** polyethylene glycol **AND** bisacodyl **AND** bisacodyl    dextrose    dextrose    glucagon (human recombinant)    [DISCONTINUED] HYDROmorphone **AND** naloxone    oxyCODONE    Pharmacy Consult - Pharmacy to dose    sodium chloride    sodium chloride    Objective    Objective     Vitals:  Temp:  [97.6 °F (36.4 °C)-98.8 °F (37.1 °C)] 98.8 °F (37.1 °C)  Heart Rate:  [] 87  Resp:  [14-16] 14  BP: (134-181)/(66-95) 181/94    Physical Exam  MENTAL STATUS -  AWAKE / ALERT  HEENT-cervical collar in place.  Cortrak tube.    PUPILS EQUALLY ROUND, SCLERAE ANICTERIC, CONJUNCTIVAE PINK, OP MOIST,  EARS UNREMARKABLE EXTERNALLY  LUNGS - CTA anteriorly, NO WHEEZES, RALES OR RHONCHI  HEART- RRR, NO RUB, MURMUR, OR GALLOP  ABD - NORMOACTIVE BOWEL SOUNDS, SOFT, NT.   EXT - NO EDEMA OR CYANOSIS  NEURO -oriented x 4.  No dysarthria.  Proprioception accurate the great toes bilaterally.  Good dexterity in the hands on opposing the thumb to the other 4 digits bilaterally  MOTOR EXAM -right/left: Shoulder abduction 5/5, elbow flexion 5/5, wrist extension 4/3 with IV present on the left side, elbow extension 5/4, finger flexion 5/4, hand intrinsics 4/3, hip flexion 5/5, knee extension 5/5, ankle dorsiflexion 5/5, eversion 5/5, right EHL 5/5, left EHL 4/5.      Result Review    Result Review:    Results from last 7 days   Lab Units 09/19/24  0337 09/18/24  0316 09/17/24  0430   WBC 10*3/mm3 19.26* 20.04* 20.02*   HEMOGLOBIN g/dL 11.0* 10.5*  11.5*   HEMATOCRIT % 33.2* 31.3* 33.8*   PLATELETS 10*3/mm3 248 240 267     Results from last 7 days   Lab Units 09/19/24  0337 09/18/24  0316 09/17/24  0430   SODIUM mmol/L 139 140 136   POTASSIUM mmol/L 4.1 4.2 3.9   CHLORIDE mmol/L 105 105 104   CO2 mmol/L 22.9 24.0 23.0   BUN mg/dL 16 12 5*   CREATININE mg/dL 0.51* 0.51* 0.39*   CALCIUM mg/dL 9.0 8.9 9.0   GLUCOSE mg/dL 143* 152* 153*         Assessment & Plan   Assessment / Plan         Active Hospital Problems:  Active Hospital Problems    Diagnosis     **Spinal cord injury, cervical, without spinal bone injury     Cervicalgia     Cervical radiculopathy      status post September 16, 2024 anterior cervical C5 corpectomy with C4/5, C6/7 decompression, removal of old hardware and C4-7 instrumentation, duraplasty.    Steroid taper-IV dexamethasone changed to 6-day DexPak on September 19, 2024    [place] collar which is to be resized    Pain management with scheduled acetaminophen/scheduled methocarbamol and as needed oxycodone.  Also on duloxetine/gabapentin-Cleve report reviewed    History of previous cervical fusion C5-6 C6-7 in 2003  History of a L4-5 lumbar decompression and interbody fusion June 2023 for cauda equina syndrome..  History of June 2024  right percutaneous sacroiliac joint fusion.       Functionally, patient was premorbidly independent.    Postoperatively she has had dysphagia.  Did not pass a swallow evaluation and Cortrak tube placed.    Weakness with the left side elbow extension, thumb extension, wrist extension.  With Occupational Therapy on September 19 was able to assist with lower body dressing to practice donning socks.  Requires cues for sequencing and safety with walker management.  Moderate assist with functional mobility transfers to the toilet.  Ambulated 20 feet min assist with a rolling walker.  Transfers min assist.    Additional issues include:   COPD.  Albuterol nebulizer as needed  Diabetes mellitus.  Glargine insulin  and sliding scale insulin.  Metformin extended release  Hypertension.  Amlodipine.  Hydrochlorothiazide.  Losartan.  Metoprolol  Leukocytosis-reactive and secondary to steroids  DVT prophylaxis-Lovenox  GI-bowel program  -has intermittent straight cath as needed orders.  Dorantes catheter removed September 18 and has been voiding on her own  Depression-duloxetine      Given her functional impairments and comorbidities, current recommendation is to pursue acute inpatient rehabilitation. .  This would be an interdisciplinary program with physical therapy 1 hour,  occupational therapy 1 hour, and speech therapy 1 hour, 5 days a week.  Rehabilitation nursing for carryover, monitoring of neurologic and pulmonary and nutritional   status, bowel and bladder, and skin  Ongoing physician follow-up.  Weekly team conferences.      Patient would require precertification        Jonathan Us MD

## 2025-07-10 RX ORDER — FLUTICASONE PROPIONATE 50 MCG
SPRAY, SUSPENSION (ML) NASAL
Qty: 16 G | Refills: 0 | Status: SHIPPED | OUTPATIENT
Start: 2025-07-10

## 2025-07-17 ENCOUNTER — OFFICE VISIT (OUTPATIENT)
Age: 65
End: 2025-07-17
Payer: COMMERCIAL

## 2025-07-17 VITALS
OXYGEN SATURATION: 99 % | HEIGHT: 66 IN | BODY MASS INDEX: 28.77 KG/M2 | WEIGHT: 179 LBS | HEART RATE: 112 BPM | SYSTOLIC BLOOD PRESSURE: 167 MMHG | DIASTOLIC BLOOD PRESSURE: 66 MMHG

## 2025-07-17 DIAGNOSIS — F99 INSOMNIA DUE TO OTHER MENTAL DISORDER: ICD-10-CM

## 2025-07-17 DIAGNOSIS — F51.05 INSOMNIA DUE TO OTHER MENTAL DISORDER: ICD-10-CM

## 2025-07-17 DIAGNOSIS — F33.2 SEVERE EPISODE OF RECURRENT MAJOR DEPRESSIVE DISORDER, WITHOUT PSYCHOTIC FEATURES: Primary | ICD-10-CM

## 2025-07-17 DIAGNOSIS — F41.1 GENERALIZED ANXIETY DISORDER: ICD-10-CM

## 2025-07-17 DIAGNOSIS — F10.21 ALCOHOL USE DISORDER, SEVERE, IN SUSTAINED REMISSION: ICD-10-CM

## 2025-07-17 RX ORDER — BUSPIRONE HYDROCHLORIDE 30 MG/1
30 TABLET ORAL DAILY
Qty: 30 TABLET | Refills: 2 | Status: SHIPPED | OUTPATIENT
Start: 2025-07-17 | End: 2026-07-17

## 2025-07-17 RX ORDER — DOXEPIN 6 MG/1
6 TABLET, FILM COATED ORAL NIGHTLY
Qty: 30 TABLET | Refills: 0 | Status: SHIPPED | OUTPATIENT
Start: 2025-07-17

## 2025-07-17 NOTE — PROGRESS NOTES
Subjective   Kristina Yates is a 64 y.o. female who presents today in follow up for management of major depressive disorder, generalized anxiety disorder and alcohol use disorder in sustained remission.    Patient reports that she is doing relatively well but ultimately endorses the persistence of various psychiatric symptoms will compare to clinical sinus at last visit.  She does report an overall improvement in her depressive and anxious symptoms but continues to endorse their presence on a daily basis all of which continue to negatively impact her daily functioning.  More specifically, patient continues to report a depressed mood associated with persistent anhedonia, decreased levels of energy/fatigue, difficulties initiating/maintaining sleep, low self worth, excessive feelings of guilt, difficulties directing her attention/concentration, and noticeable psychomotor retardation.  Patient currently denies any active suicidal ideation, intent or plan.  Patient also reports high levels of anxiety associated with near constant feelings of restlessness/feeling on edge.  She also reports intermittent periods of irritability associated with difficulties winding down/relaxing.  Patient reports consistent compliance with nonpsychiatric medications, denies any associated side effects and appears to be tolerating these medications well.  Patient does also remain active in AA and reports ongoing sobriety from alcohol.  Patient states that she did just recently received for 13-year medallion. Patient otherwise denies any auditory, visual, or tactile hallucinations and does not currently appear to be responding to internal stimuli.  Patient denies any generalized paranoia and displays no evidence of delusional thinking.    The following portions of the patient's history were reviewed and updated as appropriate: allergies, current medications, past family history, past medical history, past social history, past surgical history  and problem list.  History of Present Illness               Past Medical History:  Past Medical History:   Diagnosis Date    Alcohol abuse     in recovery, sober since 2012    Anxiety     Arthritis     Asthma     Edmonds esophagus     Blurred vision     Cervical disc disease     Cervical neuritis     Chronic pain     NECK AND BACK    COPD (chronic obstructive pulmonary disease)     STATES NEVER DIAGNOSED WITH    Depression     Diabetes mellitus     Type 2    DJD (degenerative joint disease), cervical     Foot spasms     FROM BACK SURGERY    GERD (gastroesophageal reflux disease)     Hiatal hernia     Hyperlipidemia     Hypertension     Persistent headaches     ?BLOOD PRESSURE OR NECK RELATED???    Seasonal allergies     Spinal headache     Spinal stenosis        Social History:  Social History     Socioeconomic History    Marital status:     Number of children: 2   Tobacco Use    Smoking status: Some Days     Current packs/day: 0.50     Average packs/day: 0.5 packs/day for 20.5 years (10.3 ttl pk-yrs)     Types: Cigarettes     Start date: 1/1/2005    Smokeless tobacco: Never   Vaping Use    Vaping status: Never Used   Substance and Sexual Activity    Alcohol use: No     Comment: sober since 2012, worsk 12 steps    Drug use: No    Sexual activity: Yes     Partners: Male       Family History:  Family History   Problem Relation Age of Onset    Cancer Mother         nonhodkinds lymphoma    Heart disease Father     Drug abuse Sister     Heart disease Brother     Lupus Maternal Aunt     Malig Hyperthermia Neg Hx        Past Surgical History:  Past Surgical History:   Procedure Laterality Date    ANTERIOR CERVICAL DISCECTOMY W/ FUSION  11/2003    C5-6, C6-7    ANTERIOR CERVICAL DISCECTOMY W/ FUSION Right 9/16/2024    Procedure: CERVICAL FOUR TO SIX DISCECTOMY ANTERIOR WITH FUSION WITH CERVICAL FIVE CORPECTOMY AND REMOVAL OF PREVIOUS HARDWARE;  Surgeon: Alex Ortiz MD;  Location: [facility];  Service:  Neurosurgery;  Laterality: Right;    CERCLAGE CERVIX      CERVICAL EPIDURAL  2007    CERVICAL FUSION      DILATATION AND CURETTAGE      ENDOMETRIAL ABLATION  2007    ENDOSCOPY      MULTIPLE    ENDOSCOPY N/A 07/05/2016    Procedure: ESOPHAGOGASTRODUODENOSCOPY WITH COLD BIOPSIES;  Surgeon: Jose Mcclellan MD;  Location: [facility] ENDOSCOPY;  Service:     ESOPHAGEAL DILATATION  2009    HAND SURGERY Bilateral     CARTILAGE    LUMBAR FUSION      LUMBAR LAMINECTOMY WITH FUSION N/A 06/30/2023    Procedure: OPEN LUMBAR FOUR TO FIVE TRANSFORAMINAL LUMBAR INTERBODY FUSION;  Surgeon: Alex Ortiz MD;  Location: [facility] MAIN OR;  Service: Neurosurgery;  Laterality: N/A;    NISSEN FUNDOPLICATION LAPAROSCOPIC  03/2012    SACROILIAC JOINT FUSION Right 06/04/2024    Procedure: RIGHT PERCUTANEOUS ARTHRODESIS SACROILIAC JOINT, NEUROMONITORING;  Surgeon: Alex Ortiz MD;  Location: [facility] MAIN OR;  Service: Neurosurgery;  Laterality: Right;    TONSILLECTOMY         Problem List:  Patient Active Problem List   Diagnosis    Cervical radiculopathy    Controlled type 2 diabetes mellitus without complication, without long-term current use of insulin    Diabetic peripheral neuropathy    Menopausal symptom    Adult acne    Hyperlipidemia    Arthritis    Arthropathy of cervical facet joint    Asthma    Spinal stenosis    Chronic pain    COPD (chronic obstructive pulmonary disease)    GERD (gastroesophageal reflux disease)    Alcohol use disorder, severe, in sustained remission    Edmonds esophagus    Essential hypertension    Low back pain    Cauda equina syndrome with neurogenic bladder    Lumbar back pain    Tobacco abuse    Chondromalacia of knee    IBRAHIMA exposure in utero    Impingement syndrome of right shoulder    Nephrolithiasis    Pes anserine bursitis    SI (sacroiliac) joint inflammation    Lumbar pseudoarthrosis    Cervicalgia    Acute neck pain    Spinal cord injury, cervical, without spinal bone injury    Acute low back pain     Urinary incontinence without sensory awareness    Severe episode of recurrent major depressive disorder, without psychotic features    Generalized anxiety disorder    Insomnia due to other mental disorder       Allergy:   Allergies   Allergen Reactions    Fluoxetine Hives     Hives    Sulfa Antibiotics Hives     THROAT CLOSES    Zofran [Ondansetron Hcl] Hives     THROAT CLOSES    Lisinopril Cough        Current Medications:   Current Outpatient Medications   Medication Sig Dispense Refill    acetaminophen (TYLENOL) 325 MG tablet 2 tablets.      albuterol sulfate  (90 Base) MCG/ACT inhaler INHALE 1 TO 2 PUFFS INTO THE LUNGS FOUR TIMES DAILY AS DIRECTED      amLODIPine (NORVASC) 10 MG tablet Take 1 tablet by mouth Every Night. 90 tablet 3    atorvastatin (LIPITOR) 40 MG tablet TAKE 1 TABLET BY MOUTH DAILY 90 tablet 1    busPIRone (BUSPAR) 10 MG tablet Take 1 tablet by mouth 2 (Two) Times a Day for 7 days, THEN 1 tablet 3 (Three) Times a Day for 7 days, THEN 2 tablets 2 (Two) Times a Day for 28 days. 147 tablet 0    busPIRone (BUSPAR) 30 MG tablet Take 1 tablet by mouth Daily. 30 tablet 2    cyclobenzaprine (FLEXERIL) 5 MG tablet Take 1-2 tablets by mouth Every 8 (Eight) Hours As Needed (neck pain). 15 tablet 0    Doxepin HCl 6 MG tablet Take 1 tablet by mouth Every Night. 30 tablet 0    DULoxetine (CYMBALTA) 60 MG capsule Take 2 capsules by mouth Daily for 90 days. 60 capsule 2    fluticasone (FLONASE) 50 MCG/ACT nasal spray SPRAY 2 SPRAYS IN EACH NOSTRIL ONCE DAILY 16 g 0    gabapentin (NEURONTIN) 300 MG capsule Take 1 capsule by mouth Daily with breakfast and dinner and 2 capsules at bedtime for 3 days, THEN 2 capsules Daily with breakfast and at bedtime and 1 capsule at dinner for 3 days, THEN 2 capsules capsules three times daily. 180 capsule 0    guaiFENesin (MUCINEX) 600 MG 12 hr tablet Take 1 tablet by mouth 2 (Two) Times a Day.      hydrALAZINE (APRESOLINE) 25 MG tablet Take 1 tablet by mouth 3 (Three)  "Times a Day.      hydroCHLOROthiazide 25 MG tablet TAKE 1 TABLET BY MOUTH DAILY 90 tablet 1    hydrOXYzine (ATARAX) 25 MG tablet Take 1-2 tablets by mouth Daily As Needed for Anxiety (For acute insomnia/anxiety.). 60 tablet 1    losartan (COZAAR) 100 MG tablet TAKE 1 TABLET BY MOUTH DAILY 90 tablet 1    metFORMIN ER (GLUCOPHAGE-XR) 500 MG 24 hr tablet TAKE ONE TABLET BY MOUTH ONCE NIGHTLY 90 tablet 0    metoprolol succinate XL (TOPROL-XL) 50 MG 24 hr tablet Take 1 tablet by mouth Daily. 90 tablet 3    Multiple Vitamins-Minerals (MULTIVITAMIN ADULT) tablet Take 1 tablet by mouth Daily.      oxyCODONE-acetaminophen (PERCOCET) 5-325 MG per tablet Take 1 tablet by mouth Every 4 (Four) Hours As Needed for Moderate Pain. 12 tablet 0    pantoprazole (PROTONIX) 40 MG EC tablet Take 1 tablet by mouth Every Morning.       No current facility-administered medications for this visit.       Review of Symptoms:    Review of Systems   Constitutional:  Positive for fatigue. Negative for activity change.   HENT:  Negative for tinnitus.    Eyes:  Negative for visual disturbance.   Respiratory:  Positive for shortness of breath.    Cardiovascular:  Positive for chest pain and palpitations.   Gastrointestinal:  Positive for nausea.   Endocrine: Negative for cold intolerance and heat intolerance.   Neurological:  Positive for confusion. Negative for dizziness.   Psychiatric/Behavioral:  Positive for decreased concentration, sleep disturbance and depressed mood. Negative for hallucinations, self-injury and suicidal ideas. The patient is nervous/anxious.          Physical Exam:   Blood pressure 167/66, pulse 112, height 167.6 cm (65.98\"), weight 81.2 kg (179 lb), SpO2 99%, not currently breastfeeding.  Appearance: Appears documented age, appropriate hygiene and grooming.  Gait, Station, Strength: Normal gait, station and strength.  Physical Exam               Mental Status Exam:   Hygiene:   good  Cooperation:  Cooperative  Eye Contact:  " Good  Psychomotor Behavior:  Slow  Affect:  Appropriate and Restricted  Mood: sad, depressed, and anxious  Hopelessness: Denies  Speech:  Normal  Thought Process:  Goal directed and Linear  Thought Content:  Normal  Suicidal:  None  Homicidal:  None  Hallucinations:  None  Delusion:  None  Memory:  Intact  Orientation:  Person, Place, Time, and Situation  Reliability:  good  Insight:  Good  Judgement:  Good  Impulse Control:  Good      Lab Results:   No visits with results within 1 Month(s) from this visit.   Latest known visit with results is:   Admission on 01/24/2025, Discharged on 01/25/2025   Component Date Value Ref Range Status    Glucose 01/24/2025 104 (H)  65 - 99 mg/dL Final    BUN 01/24/2025 9  8 - 23 mg/dL Final    Creatinine 01/24/2025 0.60  0.57 - 1.00 mg/dL Final    Sodium 01/24/2025 138  136 - 145 mmol/L Final    Potassium 01/24/2025 4.1  3.5 - 5.2 mmol/L Final    Chloride 01/24/2025 100  98 - 107 mmol/L Final    CO2 01/24/2025 26.0  22.0 - 29.0 mmol/L Final    Calcium 01/24/2025 9.3  8.6 - 10.5 mg/dL Final    Total Protein 01/24/2025 7.1  6.0 - 8.5 g/dL Final    Albumin 01/24/2025 4.5  3.5 - 5.2 g/dL Final    ALT (SGPT) 01/24/2025 14  1 - 33 U/L Final    AST (SGOT) 01/24/2025 17  1 - 32 U/L Final    Alkaline Phosphatase 01/24/2025 83  39 - 117 U/L Final    Total Bilirubin 01/24/2025 0.2  0.0 - 1.2 mg/dL Final    Globulin 01/24/2025 2.6  gm/dL Final    A/G Ratio 01/24/2025 1.7  g/dL Final    BUN/Creatinine Ratio 01/24/2025 15.0  7.0 - 25.0 Final    Anion Gap 01/24/2025 12.0  5.0 - 15.0 mmol/L Final    eGFR 01/24/2025 101.0  >60.0 mL/min/1.73 Final    Color, UA 01/24/2025 Yellow  Yellow, Straw Final    Appearance, UA 01/24/2025 Clear  Clear Final    pH, UA 01/24/2025 6.5  5.0 - 8.0 Final    Specific Gravity, UA 01/24/2025 <=1.005  1.005 - 1.030 Final    Glucose, UA 01/24/2025 Negative  Negative Final    Ketones, UA 01/24/2025 Negative  Negative Final    Bilirubin, UA 01/24/2025 Negative  Negative  Final    Blood, UA 01/24/2025 Negative  Negative Final    Protein, UA 01/24/2025 Negative  Negative Final    Leuk Esterase, UA 01/24/2025 Small (1+) (A)  Negative Final    Nitrite, UA 01/24/2025 Negative  Negative Final    Urobilinogen, UA 01/24/2025 0.2 E.U./dL  0.2 - 1.0 E.U./dL Final    Protime 01/24/2025 13.5  11.7 - 14.2 Seconds Final    INR 01/24/2025 1.03  0.90 - 1.10 Final    PTT 01/24/2025 50.9 (H)  22.7 - 35.4 seconds Final    WBC 01/24/2025 8.64  3.40 - 10.80 10*3/mm3 Final    RBC 01/24/2025 4.30  3.77 - 5.28 10*6/mm3 Final    Hemoglobin 01/24/2025 13.0  12.0 - 15.9 g/dL Final    Hematocrit 01/24/2025 39.0  34.0 - 46.6 % Final    MCV 01/24/2025 90.7  79.0 - 97.0 fL Final    MCH 01/24/2025 30.2  26.6 - 33.0 pg Final    MCHC 01/24/2025 33.3  31.5 - 35.7 g/dL Final    RDW 01/24/2025 12.8  12.3 - 15.4 % Final    RDW-SD 01/24/2025 42.3  37.0 - 54.0 fl Final    MPV 01/24/2025 10.1  6.0 - 12.0 fL Final    Platelets 01/24/2025 233  140 - 450 10*3/mm3 Final    Neutrophil % 01/24/2025 58.6  42.7 - 76.0 % Final    Lymphocyte % 01/24/2025 33.6  19.6 - 45.3 % Final    Monocyte % 01/24/2025 5.8  5.0 - 12.0 % Final    Eosinophil % 01/24/2025 1.6  0.3 - 6.2 % Final    Basophil % 01/24/2025 0.3  0.0 - 1.5 % Final    Immature Grans % 01/24/2025 0.1  0.0 - 0.5 % Final    Neutrophils, Absolute 01/24/2025 5.06  1.70 - 7.00 10*3/mm3 Final    Lymphocytes, Absolute 01/24/2025 2.90  0.70 - 3.10 10*3/mm3 Final    Monocytes, Absolute 01/24/2025 0.50  0.10 - 0.90 10*3/mm3 Final    Eosinophils, Absolute 01/24/2025 0.14  0.00 - 0.40 10*3/mm3 Final    Basophils, Absolute 01/24/2025 0.03  0.00 - 0.20 10*3/mm3 Final    Immature Grans, Absolute 01/24/2025 0.01  0.00 - 0.05 10*3/mm3 Final    nRBC 01/24/2025 0.0  0.0 - 0.2 /100 WBC Final    RBC, UA 01/24/2025 0-2  None Seen, 0-2 /HPF Final    WBC, UA 01/24/2025 3-5 (A)  None Seen, 0-2 /HPF Final    Bacteria, UA 01/24/2025 None Seen  None Seen /HPF Final    Squamous Epithelial Cells, UA  01/24/2025 0-2  None Seen, 0-2 /HPF Final    Hyaline Casts, UA 01/24/2025 None Seen  None Seen /LPF Final    Methodology 01/24/2025 Automated Microscopy   Final    Magnesium 01/24/2025 2.4  1.6 - 2.4 mg/dL Final    Glucose 01/25/2025 238 (H)  65 - 99 mg/dL Final    BUN 01/25/2025 12  8 - 23 mg/dL Final    Creatinine 01/25/2025 0.65  0.57 - 1.00 mg/dL Final    Sodium 01/25/2025 132 (L)  136 - 145 mmol/L Final    Potassium 01/25/2025 4.0  3.5 - 5.2 mmol/L Final    Chloride 01/25/2025 94 (L)  98 - 107 mmol/L Final    CO2 01/25/2025 22.0  22.0 - 29.0 mmol/L Final    Calcium 01/25/2025 9.0  8.6 - 10.5 mg/dL Final    BUN/Creatinine Ratio 01/25/2025 18.5  7.0 - 25.0 Final    Anion Gap 01/25/2025 16.0 (H)  5.0 - 15.0 mmol/L Final    eGFR 01/25/2025 99.1  >60.0 mL/min/1.73 Final    WBC 01/25/2025 13.92 (H)  3.40 - 10.80 10*3/mm3 Final    RBC 01/25/2025 3.90  3.77 - 5.28 10*6/mm3 Final    Hemoglobin 01/25/2025 11.5 (L)  12.0 - 15.9 g/dL Final    Hematocrit 01/25/2025 36.6  34.0 - 46.6 % Final    MCV 01/25/2025 93.8  79.0 - 97.0 fL Final    MCH 01/25/2025 29.5  26.6 - 33.0 pg Final    MCHC 01/25/2025 31.4 (L)  31.5 - 35.7 g/dL Final    RDW 01/25/2025 12.5  12.3 - 15.4 % Final    RDW-SD 01/25/2025 43.4  37.0 - 54.0 fl Final    MPV 01/25/2025 10.4  6.0 - 12.0 fL Final    Platelets 01/25/2025 231  140 - 450 10*3/mm3 Final    Neutrophil % 01/25/2025 89.5 (H)  42.7 - 76.0 % Final    Lymphocyte % 01/25/2025 8.5 (L)  19.6 - 45.3 % Final    Monocyte % 01/25/2025 1.4 (L)  5.0 - 12.0 % Final    Eosinophil % 01/25/2025 0.0 (L)  0.3 - 6.2 % Final    Basophil % 01/25/2025 0.1  0.0 - 1.5 % Final    Immature Grans % 01/25/2025 0.5  0.0 - 0.5 % Final    Neutrophils, Absolute 01/25/2025 12.45 (H)  1.70 - 7.00 10*3/mm3 Final    Lymphocytes, Absolute 01/25/2025 1.19  0.70 - 3.10 10*3/mm3 Final    Monocytes, Absolute 01/25/2025 0.20  0.10 - 0.90 10*3/mm3 Final    Eosinophils, Absolute 01/25/2025 0.00  0.00 - 0.40 10*3/mm3 Final    Basophils,  Absolute 01/25/2025 0.01  0.00 - 0.20 10*3/mm3 Final    Immature Grans, Absolute 01/25/2025 0.07 (H)  0.00 - 0.05 10*3/mm3 Final    nRBC 01/25/2025 0.0  0.0 - 0.2 /100 WBC Final    Glucose 01/24/2025 232 (H)  70 - 130 mg/dL Final    Glucose 01/25/2025 171 (H)  70 - 130 mg/dL Final    Glucose 01/25/2025 312 (H)  70 - 130 mg/dL Final     Results            PHQ-9 Total Score: 18    RIANA-7 Total Score: 14     Assessment & Plan    Diagnoses and all orders for this visit:    1. Severe episode of recurrent major depressive disorder, without psychotic features (Primary)  -     busPIRone (BUSPAR) 30 MG tablet; Take 1 tablet by mouth Daily.  Dispense: 30 tablet; Refill: 2    2. Generalized anxiety disorder  -     busPIRone (BUSPAR) 30 MG tablet; Take 1 tablet by mouth Daily.  Dispense: 30 tablet; Refill: 2    3. Alcohol use disorder, severe, in sustained remission  Overview:  in recovery, sober since 2012      4. Insomnia due to other mental disorder  -     Doxepin HCl 6 MG tablet; Take 1 tablet by mouth Every Night.  Dispense: 30 tablet; Refill: 0         Kristina Yates is a 64 y.o. female who presents today in follow up for management of major depressive disorder, generalized anxiety disorder and alcohol use disorder in sustained remission.     Patient appears to be doing relatively well overall but does continue to endorse the persistence of area psychiatric symptoms we appear to clinical status at last visit.  While the patient does report an overall improvement in depressive and anxious symptoms she continues to endorse the presence as evidenced by current PHQ-9 and RIANA-7 scores.  She reports consistent compliance with all medications, denies any associated side effects and appeared to be tolerating his medications well.  Despite patient's reported compliance patient continues to experience various depressive and anxious symptoms and has such I do recommend further maximizing daily dosage of buspirone at this time by  increasing to 30 mg p.o. twice daily.  We will also initiate low-dose doxepin at 6 mg p.o. nightly in hopes of addressing patient's ongoing insomnia.  Patient was advised not to take her hydroxyzine with her prescribed doxepin and to continue using her prescribed hydroxyzine on an as needed basis around mid day for acute anxiety.  We will make no further medication adjustments and patient will be seen again here in the clinic in approximately 4 weeks for reassessment.  Patient voices understanding of this and is agreeable to this plan.    Medications:  - Increase buspirone to 30 mg p.o. twice daily to address persistent depressive symptoms and send adjunctive treatment for major depressive disorder.  - Initiate doxepin 6 mg p.o. nightly for management of insomnia.  -Continue duloxetine 120 mg p.o. once daily for management of major depressive disorder and generalized anxiety disorder.  -Continue hydroxyzine 25 mg p.o. once daily as needed for acute anxiety.  Patient advised not to take this medication with recently prescribed doxepin.      TREATMENT PLAN - SHORT AND LONG-TERM GOALS:   -Continue supportive psychotherapy efforts and medications as indicated. Treatment and medication options discussed during today's visit.   -Patient acknowledged and verbally consented to continue with current treatment plan and was educated on the importance of compliance with treatment and follow-up appointments.    SUMMARY/EDUCATION/DISCUSSION:  -Pt was given appropriate time to ask questions and concerns were addressed. A thorough discussion was had that included review of disease process, need for continued monitoring and additional treatment options including use of pharmacological and non-pharmacological approaches to care, decisions were made and agreed upon by patient and provider.   -Discussed medication options and treatment plan of prescribed medication as well as the risks, benefits, and side effects including potential  falls, possible impaired driving and metabolic adversities among others; patient acknowledged and provided verbal consent.   -Patient has been educated regarding multimodal approach with healthy nutrition, healthy sleep, regular physical activity, social activities, counseling, and medications.  -Please call the office at [phone] within normal business hours (Monday-Friday, 8:00 AM - 4:30 PM) with any worsening of symptoms or onset of intolerable side effects. Please ask to leave a message with office staff.  Please allow up to 24-48 hours for response to a patient call/question/refill request.  -Safety plan has been established and discussed in detail with the patient, who is agreeable to contact support system and/or call 911 or go to the nearest ER should he/she/they have any thoughts of harm to self or others.    MEDS ORDERED DURING VISIT:  New Medications Ordered This Visit   Medications    busPIRone (BUSPAR) 30 MG tablet     Sig: Take 1 tablet by mouth Daily.     Dispense:  30 tablet     Refill:  2    Doxepin HCl 6 MG tablet     Sig: Take 1 tablet by mouth Every Night.     Dispense:  30 tablet     Refill:  0       FOLLOW UP:  Return in about 4 weeks (around 8/14/2025) for Next scheduled follow up.      Michael Huntley DO    This document has been electronically signed by iMchael Huntley DO  July 17, 2025 10:01 EDT    Part of this note may be an electronic transcription/translation of spoken language to printed text using the Dragon Dictation System. Some of the data in this electronic note has been brought forward from a previous encounter, any necessary changes have been made, it has been reviewed by this provider, and it is accurate.    Answers submitted by the patient for this visit:  Anxiety (Submitted on 7/16/2025)  Chief Complaint: Anxiety  Visit: follow-up  Frequency: constantly  Severity: severe  dry mouth: Yes  excessive worry: Yes  insomnia: Yes  irritability: Yes  malaise/fatigue:  Yes  obsessions: Yes  Sleep quality: poor  Hours of sleep per night: 4 Hours  Medication compliance: %  Additional information: Late afternoon early evening is worse

## 2025-07-31 ENCOUNTER — OFFICE VISIT (OUTPATIENT)
Dept: FAMILY MEDICINE CLINIC | Facility: CLINIC | Age: 65
End: 2025-07-31
Payer: COMMERCIAL

## 2025-07-31 VITALS
HEIGHT: 65 IN | BODY MASS INDEX: 29.82 KG/M2 | RESPIRATION RATE: 18 BRPM | HEART RATE: 71 BPM | WEIGHT: 179 LBS | OXYGEN SATURATION: 97 % | DIASTOLIC BLOOD PRESSURE: 80 MMHG | SYSTOLIC BLOOD PRESSURE: 148 MMHG

## 2025-07-31 DIAGNOSIS — M25.551 RIGHT HIP PAIN: ICD-10-CM

## 2025-07-31 DIAGNOSIS — E11.9 CONTROLLED TYPE 2 DIABETES MELLITUS WITHOUT COMPLICATION, WITHOUT LONG-TERM CURRENT USE OF INSULIN: Primary | ICD-10-CM

## 2025-07-31 DIAGNOSIS — Z01.84 IMMUNITY TO MEASLES, MUMPS, AND RUBELLA DETERMINED BY SEROLOGIC TEST: ICD-10-CM

## 2025-07-31 DIAGNOSIS — J41.0 SIMPLE CHRONIC BRONCHITIS: ICD-10-CM

## 2025-07-31 DIAGNOSIS — I10 ESSENTIAL HYPERTENSION: ICD-10-CM

## 2025-07-31 DIAGNOSIS — N39.46 MIXED STRESS AND URGE URINARY INCONTINENCE: ICD-10-CM

## 2025-07-31 RX ORDER — FLUTICASONE PROPIONATE AND SALMETEROL 250; 50 UG/1; UG/1
1 POWDER RESPIRATORY (INHALATION)
Qty: 60 EACH | Refills: 1 | Status: SHIPPED | OUTPATIENT
Start: 2025-07-31

## 2025-07-31 RX ORDER — ALBUTEROL SULFATE 90 UG/1
2 INHALANT RESPIRATORY (INHALATION) EVERY 4 HOURS PRN
Qty: 18 G | Refills: 3 | Status: SHIPPED | OUTPATIENT
Start: 2025-07-31

## 2025-07-31 RX ORDER — KETAMINE HCL 100 %
POWDER (GRAM) MISCELLANEOUS
COMMUNITY
Start: 2025-07-01

## 2025-07-31 NOTE — PROGRESS NOTES
Subjective   Kristina Yates is a 65 y.o. female.     Primary Care Follow-Up  Conditions present: diabetes, asthma and GERD    Current symptoms: dry mouth, nausea, shortness of breath, fatigue, weight gain and wheezing      Current symptoms: no chest pain, no confusion, no dizziness, no palpitations, no weight loss, no blurred vision, no foot ulcerations and no headaches    Recent oxygen %:  No  Side effects:  Dry mouth, fatigue, joint pain, nausea, weight gain and urinary  Treatment compliance:  All of the time  Treatment barriers:  No complaince problems  Exercise:  Every other day  Diabetes:     Current treatments:  Diet and oral medications    Home blood tests:  Not monitored    Meal planning:  Avoidance of concentrated sweets    Eye exam current: no      Sees podiatrist: no    Asthma:     Additional asthma information:  Weather affects my breathing  GERD:     Additional GERD information:  More heartburn than normal       The following portions of the patient's history were reviewed and updated as appropriate: allergies, current medications, past family history, past medical history, past social history, past surgical history and problem list.       The patient is a 65-year-old female who presents for chronic illness management and acute concerns.    She has controlled type 2 diabetes for several years, managed with metformin 500 mg at night. She reports no side effects from this medication.    She has essential hypertension for several years, managed with amlodipine 10 mg, hydralazine 25 mg three times a day, hydrochlorothiazide 25 mg, losartan 100 mg daily, and metoprolol 50 mg daily. She reports no chest pain, palpitations, or side effects from these medications. Her blood pressure is mildly elevated at 148/80 today.    She reports worsening mixed stress and urge incontinence but has no acute symptoms of urinary tract infection (UTI) today.    She also reports acute on chronic worsening right hip pain and would  like an x-ray of her right hip. She has chronic pain and is under the care of a spine specialist. She takes cyclobenzaprine 5 mg every 8 hours, duloxetine 120 mg daily, gabapentin 300 mg, and oxycodone 5/325 every 4 hours as needed. She reports no recent falls or acute injuries and presumes the pain is due to arthritis.    She has a history of COPD and chronic bronchitis. She requires refills of her albuterol inhaler and Flonase nasal spray.    With her blood draw today, she requests an MMR titer to see if she requires a booster.         Review of Systems   Constitutional:  Positive for fatigue and unexpected weight gain. Negative for unexpected weight loss.   Eyes:  Negative for blurred vision.   Respiratory:  Positive for shortness of breath and wheezing.    Cardiovascular:  Negative for chest pain and palpitations.   Gastrointestinal:  Positive for nausea.   Neurological:  Negative for dizziness and confusion.           Current Outpatient Medications:     acetaminophen (TYLENOL) 325 MG tablet, 2 tablets., Disp: , Rfl:     albuterol sulfate  (90 Base) MCG/ACT inhaler, Inhale 2 puffs Every 4 (Four) Hours As Needed for Wheezing., Disp: 18 g, Rfl: 3    amLODIPine (NORVASC) 10 MG tablet, Take 1 tablet by mouth Every Night., Disp: 90 tablet, Rfl: 3    atorvastatin (LIPITOR) 40 MG tablet, TAKE 1 TABLET BY MOUTH DAILY, Disp: 90 tablet, Rfl: 1    busPIRone (BUSPAR) 30 MG tablet, Take 1 tablet by mouth Daily., Disp: 30 tablet, Rfl: 2    cyclobenzaprine (FLEXERIL) 5 MG tablet, Take 1-2 tablets by mouth Every 8 (Eight) Hours As Needed (neck pain)., Disp: 15 tablet, Rfl: 0    Doxepin HCl 6 MG tablet, Take 1 tablet by mouth Every Night., Disp: 30 tablet, Rfl: 0    DULoxetine (CYMBALTA) 60 MG capsule, Take 2 capsules by mouth Daily for 90 days., Disp: 60 capsule, Rfl: 2    fluticasone (FLONASE) 50 MCG/ACT nasal spray, SPRAY 2 SPRAYS IN EACH NOSTRIL ONCE DAILY, Disp: 16 g, Rfl: 0    guaiFENesin (MUCINEX) 600 MG 12 hr  tablet, Take 1 tablet by mouth 2 (Two) Times a Day., Disp: , Rfl:     hydrALAZINE (APRESOLINE) 25 MG tablet, Take 1 tablet by mouth 3 (Three) Times a Day., Disp: , Rfl:     hydroCHLOROthiazide 25 MG tablet, TAKE 1 TABLET BY MOUTH DAILY, Disp: 90 tablet, Rfl: 1    hydrOXYzine (ATARAX) 25 MG tablet, Take 1-2 tablets by mouth Daily As Needed for Anxiety (For acute insomnia/anxiety.)., Disp: 60 tablet, Rfl: 1    Ketamine HCl powder, , Disp: , Rfl:     losartan (COZAAR) 100 MG tablet, TAKE 1 TABLET BY MOUTH DAILY, Disp: 90 tablet, Rfl: 1    metFORMIN ER (GLUCOPHAGE-XR) 500 MG 24 hr tablet, TAKE ONE TABLET BY MOUTH ONCE NIGHTLY, Disp: 90 tablet, Rfl: 0    metoprolol succinate XL (TOPROL-XL) 50 MG 24 hr tablet, Take 1 tablet by mouth Daily., Disp: 90 tablet, Rfl: 3    Multiple Vitamins-Minerals (MULTIVITAMIN ADULT) tablet, Take 1 tablet by mouth Daily., Disp: , Rfl:     oxyCODONE-acetaminophen (PERCOCET) 5-325 MG per tablet, Take 1 tablet by mouth Every 4 (Four) Hours As Needed for Moderate Pain., Disp: 12 tablet, Rfl: 0    pantoprazole (PROTONIX) 40 MG EC tablet, Take 1 tablet by mouth Every Morning., Disp: , Rfl:     Fluticasone-Salmeterol (ADVAIR/WIXELA) 250-50 MCG/ACT DISKUS, Inhale 1 puff 2 (Two) Times a Day., Disp: 60 each, Rfl: 1    gabapentin (NEURONTIN) 300 MG capsule, Take 1 capsule by mouth Daily with breakfast and dinner and 2 capsules at bedtime for 3 days, THEN 2 capsules Daily with breakfast and at bedtime and 1 capsule at dinner for 3 days, THEN 2 capsules capsules three times daily., Disp: 180 capsule, Rfl: 0    Objective   Physical Exam  Vitals reviewed.   Constitutional:       Appearance: Normal appearance.   Cardiovascular:      Rate and Rhythm: Normal rate and regular rhythm.      Pulses: Normal pulses.   Pulmonary:      Effort: Pulmonary effort is normal.      Breath sounds: Normal breath sounds.   Musculoskeletal:         General: Tenderness present. Normal range of motion.   Skin:     General: Skin  is warm.   Neurological:      General: No focal deficit present.      Mental Status: She is alert.   Psychiatric:         Mood and Affect: Mood is anxious and depressed.         Vitals:    07/31/25 0920   BP: 148/80   Pulse: 71   Resp: 18   SpO2: 97%     Body mass index is 29.79 kg/m².    Procedures    TSH   Date Value Ref Range Status   01/15/2025 0.482 0.450 - 4.500 uIU/mL Final     Hemoglobin A1C   Date Value Ref Range Status   01/15/2025 6.6 (H) 4.8 - 5.6 % Final     Comment:              Prediabetes: 5.7 - 6.4           Diabetes: >6.4           Glycemic control for adults with diabetes: <7.0                     Assessment & Plan   Problems Addressed this Visit       Controlled type 2 diabetes mellitus without complication, without long-term current use of insulin - Primary    Relevant Orders    Hemoglobin A1c    COPD (chronic obstructive pulmonary disease)    Relevant Medications    albuterol sulfate  (90 Base) MCG/ACT inhaler    Fluticasone-Salmeterol (ADVAIR/WIXELA) 250-50 MCG/ACT DISKUS    Essential hypertension    Relevant Orders    Comprehensive metabolic panel     Other Visit Diagnoses         Mixed stress and urge urinary incontinence        Relevant Orders    Ambulatory Referral to Gynecologic Urology      Right hip pain        Relevant Orders    XR Hip With or Without Pelvis 2 - 3 View Right      Immunity to measles, mumps, and rubella determined by serologic test        Relevant Orders    Measles / Mumps / Rubella Immunity          Diagnoses         Codes Comments      Controlled type 2 diabetes mellitus without complication, without long-term current use of insulin    -  Primary ICD-10-CM: E11.9  ICD-9-CM: 250.00       Essential hypertension     ICD-10-CM: I10  ICD-9-CM: 401.9       Mixed stress and urge urinary incontinence     ICD-10-CM: N39.46  ICD-9-CM: 788.33       Right hip pain     ICD-10-CM: M25.551  ICD-9-CM: 719.45       Simple chronic bronchitis     ICD-10-CM: J41.0  ICD-9-CM: 491.0        Immunity to measles, mumps, and rubella determined by serologic test     ICD-10-CM: Z01.84  ICD-9-CM: V72.61           Orders Placed This Encounter   Procedures    XR Hip With or Without Pelvis 2 - 3 View Right     Standing Status:   Future     Expected Date:   8/3/2025     Expiration Date:   10/31/2026     Reason for Exam::   chronic hip pain     Release to patient:   Routine Release [[id number]]    Comprehensive metabolic panel     Standing Status:   Future     Number of Occurrences:   1     Expected Date:   8/5/2025     Expiration Date:   10/31/2026     Release to patient:   Routine Release [[id number]]    Measles / Mumps / Rubella Immunity     Release to patient:   Routine Release [[id number]]    Hemoglobin A1c     Standing Status:   Future     Number of Occurrences:   1     Expected Date:   8/5/2025     Expiration Date:   10/31/2026     Release to patient:   Routine Release [[id number]]    Ambulatory Referral to Gynecologic Urology     Referral Priority:   Routine     Referral Type:   Consultation     Referral Reason:   Specialty Services Required     Requested Specialty:   Urogynecology     Number of Visits Requested:   1          1. Type 2 Diabetes.  - Well-controlled on metformin 500 mg at night.  - No medication side effects reported.  - Blood glucose levels stable as per patient history.  - Continue current management with metformin.    2. Essential Hypertension.  - Blood pressure mildly elevated at 148/80 today.  - Managed on amlodipine 10 mg, hydralazine 25 mg t.i.d., hydrochlorothiazide 25 mg, losartan 100 mg a day, and metoprolol 50 mg daily.  - No chest pain, palpitations, or medication side effects reported.  - Continue current antihypertensive regimen and monitor blood pressure.    3. Mixed Stress and Urge Incontinence.  - Reports worsening mixed stress and urge incontinence.  - No acute symptoms of UTI present today.  - Discussed management options and potential need for further  evaluation.  - Continue monitoring symptoms and consider referral to urology if no improvement.    4. Right Hip Pain.  - Reports acute on chronic worsening right hip pain, presumed due to arthritis.  - No recent falls or acute injuries reported.  - X-ray of the right hip will be ordered.  - Continue current pain management regimen and follow up with results.    5. Chronic Pain.  - Managed by a spine specialist.  - Takes cyclobenzaprine 5 mg every 8 hours, duloxetine 120 mg a day, gabapentin 300 mg, and oxycodone 5-3.25 mg every 4 hours p.r.n.  - No recent falls or acute injuries reported.  - Continue current pain management and follow up with the spine specialist as needed.    6. Chronic Obstructive Pulmonary Disease (COPD).  - History of COPD and chronic bronchitis.  - Refills for albuterol inhaler and Flonase nasal spray will be provided.  - Continue current respiratory management and monitor symptoms.    7. Health Maintenance.  - MMR titer will be ordered to determine if a booster is required.  - Blood draw for MMR titer to be conducted today.              Education provided in AVS   Return in about 6 months (around 1/31/2026) for Annual physical.    Patient or patient representative verbalized consent for the use of Ambient Listening during the visit with  DAYNE Garcias for chart documentation. 8/3/2025  14:13 EDT

## 2025-08-07 ENCOUNTER — CLINICAL SUPPORT (OUTPATIENT)
Dept: FAMILY MEDICINE CLINIC | Facility: CLINIC | Age: 65
End: 2025-08-07
Payer: COMMERCIAL

## 2025-08-07 DIAGNOSIS — Z23 NEED FOR VACCINATION: Primary | ICD-10-CM

## 2025-08-07 PROCEDURE — 90480 ADMN SARSCOV2 VAC 1/ONLY CMP: CPT | Performed by: NURSE PRACTITIONER

## 2025-08-07 PROCEDURE — 91320 SARSCV2 VAC 30MCG TRS-SUC IM: CPT | Performed by: NURSE PRACTITIONER

## 2025-08-13 LAB
MEV IGG SER IA-ACNC: 68.3 AU/ML
MUV IGG SER IA-ACNC: 30.5 AU/ML
RUBV IGG SERPL IA-ACNC: 16.3 INDEX

## 2025-08-20 ENCOUNTER — OFFICE VISIT (OUTPATIENT)
Age: 65
End: 2025-08-20
Payer: COMMERCIAL

## 2025-08-20 VITALS
HEART RATE: 79 BPM | HEIGHT: 65 IN | WEIGHT: 177.6 LBS | DIASTOLIC BLOOD PRESSURE: 82 MMHG | BODY MASS INDEX: 29.59 KG/M2 | OXYGEN SATURATION: 97 % | SYSTOLIC BLOOD PRESSURE: 129 MMHG

## 2025-08-20 DIAGNOSIS — F99 INSOMNIA DUE TO OTHER MENTAL DISORDER: ICD-10-CM

## 2025-08-20 DIAGNOSIS — F51.05 INSOMNIA DUE TO OTHER MENTAL DISORDER: ICD-10-CM

## 2025-08-20 DIAGNOSIS — F10.21 ALCOHOL USE DISORDER, SEVERE, IN SUSTAINED REMISSION: ICD-10-CM

## 2025-08-20 DIAGNOSIS — F33.2 SEVERE EPISODE OF RECURRENT MAJOR DEPRESSIVE DISORDER, WITHOUT PSYCHOTIC FEATURES: Primary | ICD-10-CM

## 2025-08-20 DIAGNOSIS — F41.1 GENERALIZED ANXIETY DISORDER: ICD-10-CM

## 2025-08-20 RX ORDER — BUSPIRONE HYDROCHLORIDE 30 MG/1
30 TABLET ORAL 2 TIMES DAILY
Qty: 60 TABLET | Refills: 1 | Status: SHIPPED | OUTPATIENT
Start: 2025-08-20

## 2025-08-20 RX ORDER — HYDROXYZINE HYDROCHLORIDE 25 MG/1
25-50 TABLET, FILM COATED ORAL DAILY PRN
Qty: 60 TABLET | Refills: 1 | Status: SHIPPED | OUTPATIENT
Start: 2025-08-20

## 2025-08-26 DIAGNOSIS — E78.5 HYPERLIPIDEMIA, UNSPECIFIED HYPERLIPIDEMIA TYPE: ICD-10-CM

## 2025-08-26 DIAGNOSIS — E11.42 DIABETIC PERIPHERAL NEUROPATHY: ICD-10-CM

## 2025-08-26 DIAGNOSIS — E11.9 CONTROLLED TYPE 2 DIABETES MELLITUS WITHOUT COMPLICATION, WITHOUT LONG-TERM CURRENT USE OF INSULIN: ICD-10-CM

## 2025-08-26 RX ORDER — LOSARTAN POTASSIUM 100 MG/1
100 TABLET ORAL DAILY
Qty: 90 TABLET | Refills: 1 | Status: SHIPPED | OUTPATIENT
Start: 2025-08-26

## 2025-08-26 RX ORDER — HYDROCHLOROTHIAZIDE 25 MG/1
25 TABLET ORAL DAILY
Qty: 90 TABLET | Refills: 1 | Status: SHIPPED | OUTPATIENT
Start: 2025-08-26

## 2025-08-26 RX ORDER — ATORVASTATIN CALCIUM 40 MG/1
40 TABLET, FILM COATED ORAL DAILY
Qty: 90 TABLET | Refills: 1 | Status: SHIPPED | OUTPATIENT
Start: 2025-08-26

## (undated) DEVICE — SPNG GZ WOVN 4X4IN 12PLY 10/BX STRL

## (undated) DEVICE — DRAPE,REIN 53X77,STERILE: Brand: MEDLINE

## (undated) DEVICE — DRSNG WND GZ PAD BORDERED 4X8IN STRL

## (undated) DEVICE — MARKR SKIN W/RULR 2TP

## (undated) DEVICE — 3M™ IOBAN™ 2 ANTIMICROBIAL INCISE DRAPE 6640EZ: Brand: IOBAN™ 2

## (undated) DEVICE — GLV SURG SENSICARE PI PF LF 7 GRN STRL

## (undated) DEVICE — LOU MINOR PROCEDURE: Brand: MEDLINE INDUSTRIES, INC.

## (undated) DEVICE — SUT MNCRYL 3/0 PS2 18IN MCP497G

## (undated) DEVICE — NEEDLE, QUINCKE 22GX3.5": Brand: MEDLINE INDUSTRIES, INC.

## (undated) DEVICE — PENCL ES MEGADINE EZ/CLEAN BUTN W/HOLSTR 10FT

## (undated) DEVICE — LABEL SHEET CUSTOM 2X2 YELLOW: Brand: MEDLINE INDUSTRIES, INC.

## (undated) DEVICE — DRP C/ARMOR

## (undated) DEVICE — SOL ISO/ALC 70PCT 4OZ

## (undated) DEVICE — STPLR SKIN VISISTAT WD 35CT

## (undated) DEVICE — DRSNG SURESITE WNDW 4X4.5

## (undated) DEVICE — TOOL MR8-14MH30 MR8 14CM MATCH 3MM: Brand: MIDAS REX MR8

## (undated) DEVICE — CONN TBG Y 5 IN 1 LF STRL

## (undated) DEVICE — COVER,C-ARM,41X74: Brand: MEDLINE

## (undated) DEVICE — SMOKE EVACUATION TUBING WITH 7/8 IN TO 1/4 IN REDUCER: Brand: BUFFALO FILTER

## (undated) DEVICE — PATIENT RETURN ELECTRODE, SINGLE-USE, CONTACT QUALITY MONITORING, ADULT, WITH 9FT CORD, FOR PATIENTS WEIGING OVER 33LBS. (15KG): Brand: MEGADYNE

## (undated) DEVICE — PIN, 9733236, 150MM, STERILE, PERC REF

## (undated) DEVICE — DISPOSABLE BIPOLAR CABLE 12FT. (3.6M): Brand: KIRWAN

## (undated) DEVICE — NDL HYPO PRECISIONGLIDE REG 25G 1 1/2

## (undated) DEVICE — GLOVE,SURG,SENSICARE,ALOE,LF,PF,7: Brand: MEDLINE

## (undated) DEVICE — ANTIBACTERIAL UNDYED BRAIDED (POLYGLACTIN 910), SYNTHETIC ABSORBABLE SUTURE: Brand: COATED VICRYL

## (undated) DEVICE — DRSNG WND BORDR/ADHS NONADHR/GZ LF 4X4IN STRL

## (undated) DEVICE — ELECTRD BLD EZ CLN MOD XLNG 2.75IN

## (undated) DEVICE — APPL CHLORAPREP HI/LITE 26ML ORNG

## (undated) DEVICE — ADHS SKIN SURG TISS VISC PREMIERPRO EXOFIN HI/VISC FAST/DRY